# Patient Record
Sex: MALE | Race: BLACK OR AFRICAN AMERICAN | NOT HISPANIC OR LATINO | Employment: OTHER | URBAN - METROPOLITAN AREA
[De-identification: names, ages, dates, MRNs, and addresses within clinical notes are randomized per-mention and may not be internally consistent; named-entity substitution may affect disease eponyms.]

---

## 2017-01-03 RX ORDER — METOPROLOL SUCCINATE 100 MG/1
TABLET, EXTENDED RELEASE ORAL
Qty: 90 TABLET | Refills: 0 | Status: SHIPPED | OUTPATIENT
Start: 2017-01-03 | End: 2017-02-21 | Stop reason: SDUPTHER

## 2017-01-12 RX ORDER — NAPROXEN SODIUM 220 MG/1
TABLET, FILM COATED ORAL
Qty: 90 TABLET | Refills: 0 | Status: SHIPPED | OUTPATIENT
Start: 2017-01-12 | End: 2017-02-21 | Stop reason: SDUPTHER

## 2017-02-16 ENCOUNTER — OFFICE VISIT (OUTPATIENT)
Dept: RHEUMATOLOGY | Facility: CLINIC | Age: 82
End: 2017-02-16
Payer: MEDICARE

## 2017-02-16 VITALS
BODY MASS INDEX: 29.29 KG/M2 | HEART RATE: 58 BPM | DIASTOLIC BLOOD PRESSURE: 74 MMHG | SYSTOLIC BLOOD PRESSURE: 154 MMHG | HEIGHT: 71 IN | WEIGHT: 209.19 LBS

## 2017-02-16 DIAGNOSIS — M06.09 SERONEGATIVE ARTHROPATHY OF MULTIPLE SITES: Primary | ICD-10-CM

## 2017-02-16 DIAGNOSIS — M15.9 PRIMARY OSTEOARTHRITIS INVOLVING MULTIPLE JOINTS: ICD-10-CM

## 2017-02-16 PROCEDURE — 99999 PR PBB SHADOW E&M-EST. PATIENT-LVL III: CPT | Mod: PBBFAC,,, | Performed by: INTERNAL MEDICINE

## 2017-02-16 PROCEDURE — 99213 OFFICE O/P EST LOW 20 MIN: CPT | Mod: S$PBB,,, | Performed by: INTERNAL MEDICINE

## 2017-02-16 PROCEDURE — 99213 OFFICE O/P EST LOW 20 MIN: CPT | Mod: PBBFAC | Performed by: INTERNAL MEDICINE

## 2017-02-16 NOTE — PROGRESS NOTES
History of present illness: 81-year-old gentleman I saw initially in October 2015.  He had had a previous episode of gout and was on chronic allopurinol therapy.  He had osteoarthritis and has undergone previous left total knee replacement.  He presented with synovitis in his hands and wrist.  Laboratory studies revealed increased inflammatory markers but negative SHIRA, CCP antibody, and rheumatoid factor.  I diagnosed him as having seronegative rheumatoid arthritis.  He was placed initially on prednisone.  He did quite well.  There was some problems getting him off prednisone initially but he is been off prednisone for the past month.  He has noticed no difference with stopping the prednisone.  He still has some pain in the hands but no swelling.  He did have an episode of swelling in his feet over the weekend.  This was the top of the foot.  He had been using a shovel.  It hurt for 24 hours.  He is topical medications which helped.  He still has some swelling but no pain.  He remains on gabapentin which has been helping.    Physical examination:  Musculoskeletal: He has bony hypertrophy of the PIPs and DIPs bilaterally.  He has no synovitis in the hands.  Wrists, elbows, and shoulders are unremarkable.  Knees and ankles are unremarkable.  He has soft tissue swelling on the dorsum of the left foot.  It does not appear to be confined any joint.  He has no tenderness to palpation.  There is no erythema or increased warmth.  He does have bilateral bunion deformities.    Assessment: I only find evidence of osteoarthritis.  He has no evidence of active inflammatory arthritis at this time.    Plans: Laboratory studies are obtained.  Continue gabapentin as before.  Return to see me in 6 months.

## 2017-02-21 ENCOUNTER — OFFICE VISIT (OUTPATIENT)
Dept: FAMILY MEDICINE | Facility: CLINIC | Age: 82
End: 2017-02-21
Payer: MEDICARE

## 2017-02-21 VITALS
BODY MASS INDEX: 29.26 KG/M2 | SYSTOLIC BLOOD PRESSURE: 160 MMHG | HEART RATE: 60 BPM | WEIGHT: 209 LBS | HEIGHT: 71 IN | TEMPERATURE: 98 F | OXYGEN SATURATION: 99 % | DIASTOLIC BLOOD PRESSURE: 70 MMHG

## 2017-02-21 DIAGNOSIS — D64.9 ANEMIA, UNSPECIFIED: ICD-10-CM

## 2017-02-21 DIAGNOSIS — N18.30 CKD STAGE 3 DUE TO TYPE 2 DIABETES MELLITUS: ICD-10-CM

## 2017-02-21 DIAGNOSIS — E78.5 HYPERLIPIDEMIA, UNSPECIFIED HYPERLIPIDEMIA TYPE: ICD-10-CM

## 2017-02-21 DIAGNOSIS — E11.21 DIABETES MELLITUS WITH PROTEINURIC DIABETIC NEPHROPATHY: ICD-10-CM

## 2017-02-21 DIAGNOSIS — E11.311 DIABETIC RETINOPATHY OF BOTH EYES WITH MACULAR EDEMA ASSOCIATED WITH TYPE 2 DIABETES MELLITUS, UNSPECIFIED RETINOPATHY SEVERITY: ICD-10-CM

## 2017-02-21 DIAGNOSIS — E11.29 DIABETES MELLITUS WITH PROTEINURIA: ICD-10-CM

## 2017-02-21 DIAGNOSIS — M1A.9XX0 CHRONIC GOUT WITHOUT TOPHUS, UNSPECIFIED CAUSE, UNSPECIFIED SITE: ICD-10-CM

## 2017-02-21 DIAGNOSIS — E11.22 CKD STAGE 3 DUE TO TYPE 2 DIABETES MELLITUS: ICD-10-CM

## 2017-02-21 DIAGNOSIS — I10 ESSENTIAL HYPERTENSION: ICD-10-CM

## 2017-02-21 DIAGNOSIS — K21.9 GASTROESOPHAGEAL REFLUX DISEASE, ESOPHAGITIS PRESENCE NOT SPECIFIED: ICD-10-CM

## 2017-02-21 DIAGNOSIS — R80.9 DIABETES MELLITUS WITH PROTEINURIA: ICD-10-CM

## 2017-02-21 DIAGNOSIS — Z86.010 HISTORY OF COLONIC POLYPS: ICD-10-CM

## 2017-02-21 PROCEDURE — 99215 OFFICE O/P EST HI 40 MIN: CPT | Mod: S$PBB,,, | Performed by: INTERNAL MEDICINE

## 2017-02-21 PROCEDURE — 99999 PR PBB SHADOW E&M-EST. PATIENT-LVL III: CPT | Mod: PBBFAC,,, | Performed by: INTERNAL MEDICINE

## 2017-02-21 PROCEDURE — 99213 OFFICE O/P EST LOW 20 MIN: CPT | Mod: PBBFAC,PO | Performed by: INTERNAL MEDICINE

## 2017-02-21 RX ORDER — ATORVASTATIN CALCIUM 20 MG/1
20 TABLET, FILM COATED ORAL DAILY
Qty: 90 TABLET | Refills: 3 | Status: SHIPPED | OUTPATIENT
Start: 2017-02-21 | End: 2018-03-05 | Stop reason: SDUPTHER

## 2017-02-21 RX ORDER — HYDROCHLOROTHIAZIDE 12.5 MG/1
12.5 CAPSULE ORAL
Qty: 38 CAPSULE | Refills: 11 | Status: SHIPPED | OUTPATIENT
Start: 2017-02-22 | End: 2018-03-16 | Stop reason: SDUPTHER

## 2017-02-21 RX ORDER — VALSARTAN 320 MG/1
320 TABLET ORAL DAILY
Qty: 90 TABLET | Refills: 90 | Status: SHIPPED | OUTPATIENT
Start: 2017-02-21 | End: 2018-03-05 | Stop reason: SDUPTHER

## 2017-02-21 RX ORDER — GLIPIZIDE 10 MG/1
10 TABLET, FILM COATED, EXTENDED RELEASE ORAL DAILY
Qty: 90 TABLET | Refills: 0 | Status: SHIPPED | OUTPATIENT
Start: 2017-02-21 | End: 2017-06-27 | Stop reason: SDUPTHER

## 2017-02-21 RX ORDER — NAPROXEN SODIUM 220 MG/1
TABLET, FILM COATED ORAL
Qty: 90 TABLET | Refills: 12 | Status: SHIPPED | OUTPATIENT
Start: 2017-02-21 | End: 2018-04-14 | Stop reason: SDUPTHER

## 2017-02-21 RX ORDER — ESOMEPRAZOLE MAGNESIUM 40 MG/1
CAPSULE, DELAYED RELEASE ORAL
Qty: 90 CAPSULE | Refills: 4 | Status: SHIPPED | OUTPATIENT
Start: 2017-02-21 | End: 2017-03-14 | Stop reason: SDUPTHER

## 2017-02-21 RX ORDER — ALLOPURINOL 100 MG/1
100 TABLET ORAL DAILY
Qty: 90 TABLET | Refills: 12 | Status: SHIPPED | OUTPATIENT
Start: 2017-02-21 | End: 2018-04-13 | Stop reason: SDUPTHER

## 2017-02-21 RX ORDER — AMLODIPINE BESYLATE 10 MG/1
10 TABLET ORAL DAILY
Qty: 90 TABLET | Refills: 12 | Status: SHIPPED | OUTPATIENT
Start: 2017-02-21 | End: 2017-05-02 | Stop reason: SDUPTHER

## 2017-02-21 RX ORDER — METOPROLOL SUCCINATE 100 MG/1
TABLET, EXTENDED RELEASE ORAL
Qty: 90 TABLET | Refills: 12 | Status: SHIPPED | OUTPATIENT
Start: 2017-02-21 | End: 2017-04-17 | Stop reason: SDUPTHER

## 2017-02-21 NOTE — PROGRESS NOTES
Chief complaint: Gen. checkup      81-year-old black male here for his general physical although he has Medicare which does not formally cover physical his general health and risk assessment will be done either way and we will also check on his medical problems including his diabetes.  He wasn't aware he needed to follow up every 3 months and explain that would twice a day oh.  His last A1c was 6 months ago.  His blood pressure runs 143/73 at home and today is 160 repeated.  He is due for a 5 year follow-up on his colon polyps and he does so at [facility].  He did see his [facility] urologist in November with an apparent normal PSA.  He is followed by an outside ophthalmologist for diabetic neuropathy and macular degeneration apparently.  He is followed by nephrology as well for his chronic kidney disease.  He would like to see a podiatrist here will be due for that in September or so.  Patient is always medications sent to a new pharmacy and he also needs diabetic supplies Center new pharmacy and all that was entered in sent by the physician himself.  Call her regarding all these issues and his physical essentially was performedTotal time over 45 minutes with over 50% counseling.      ROS:   CONST: weight stable. EYES: no vision change. ENT: no sore throat. CV: no chest pain w/ exertion. RESP: no shortness of breath. GI: no nausea, vomiting, diarrhea. No dysphagia. : no urinary issues. MUSCULOSKELETAL: no new myalgias or arthralgias. SKIN: no new changes. NEURO: no focal deficits. PSYCH: no new issues. ENDOCRINE: no polyuria. HEME: no lymph nodes. ALLERGY: no general pruritis.       Past medical history:  1.  Diabetes with renal disease and retinopathy  2.  Hypertension  3.  Hyperlipidemia  4.  Chronic renal failure stage III  5.  History of colon polyp, normal scope March 2012, 5 years----GI at [facility]- Dr Kumari  6.  Hyperuricemia and gout  7.  History of sciatica and lumbar disc disease remotely  8.  Erectile dysfunction  9.   Macular degeneration, followed by outside retina specialist  10.  GERD  11.  Low HDL  12.  Inflammatory arthritis of the hands, seeing rheumatology  13.  Bilateral cervical radiculopathy and axonal neuropathy, to have surgery 2015    14.  Followed by outside urology at [facility] Dr. noriega   15.  TKA  -angie Han  who is at [facility]    Past surgical history: Right knee replacement, left knee arthroscopy, bilateral cataracts, scrotal cyst removed.    Family history: Mother with hypertension and diabetes.  Father's history is unknown.  One brother who is okay.    Social history: Retired, quit smoking 1974.   with 3 children.  Drinks socially.    Vital signs as above  Gen: no distress  EYES: conjunctiva clear, non-icteric, PERRL  ENT: nose clear, nasal mucosa normal, oropharynx clear and moist, teeth good  NECK:supple, thyroid non-palpable  RESP: effort is good, lungs clear  CV: heart RRR w/o murmur, gallops or rubs; no carotid bruits, no edema  GI: abdomen soft, non-distended, non-tender, no hepatosplenomegaly  MS: gait normal, no clubbing or cyanosis of the digits  SKIN: no rashes, warm to touch    Bria was seen today for diabetes and results.    Diagnoses and all orders for this visit:    Uncontrolled type 2 diabetes mellitus with stage 3 chronic kidney disease, without long-term current use of insulin, reassess and follow-up in 3 months.  -     Microalbumin/creatinine urine ratio; Future  -     Hemoglobin A1c; Future    CKD stage 3 due to type 2 diabetes mellitus, reassess and followed by nephrology    Essential hypertension, potentially uncontrolled, he can monitor at home and encouraged him to send me the readings via the computer    Hyperlipidemia, unspecified hyperlipidemia type, reassess in the future    Diabetes mellitus with proteinuria    Diabetes mellitus with proteinuric diabetic nephropathy    History of colonic polyps, due for 5 year follow-up    Chronic gout without tophus, unspecified cause,  "unspecified site, medications refilled    Gastroesophageal reflux disease, esophagitis presence not specified, medications refilled    Anemia, unspecified  -     CBC auto differential; Future  -     Iron and TIBC; Future  -     Ferritin; Future    Diabetic retinopathy of both eyes with macular edema associated with type 2 diabetes mellitus, unspecified retinopathy severity    Other orders  -     valsartan (DIOVAN) 320 MG tablet; Take 1 tablet (320 mg total) by mouth once daily.  -     linagliptin (TRADJENTA) 5 mg Tab tablet; Take 1 tablet (5 mg total) by mouth once daily.  -     glipiZIDE (GLUCOTROL) 10 MG TR24; Take 1 tablet (10 mg total) by mouth once daily.  -     atorvastatin (LIPITOR) 20 MG tablet; Take 1 tablet (20 mg total) by mouth once daily.  -     metoprolol succinate (TOPROL-XL) 100 MG 24 hr tablet; TAKE 1 TABLET(100 MG) BY MOUTH EVERY DAY  -     amlodipine (NORVASC) 10 MG tablet; Take 1 tablet (10 mg total) by mouth once daily.  -     hydrochlorothiazide (MICROZIDE) 12.5 mg capsule; Take 1 capsule (12.5 mg total) by mouth every Mon, Wed, Fri.  -     esomeprazole (NEXIUM) 40 MG capsule; Take 1 capsule by mouth daily  -     allopurinol (ZYLOPRIM) 100 MG tablet; Take 1 tablet (100 mg total) by mouth once daily.  -     aspirin 81 MG Chew; CHEW AND SWALLOW 1 TABLET BY MOUTH DAILY  -     blood sugar diagnostic Strp; 1 strip by Misc.(Non-Drug; Combo Route) route 2 (two) times daily.          Clinical note will be sensitive so as to not cause confusion regarding physical and evaluation and management issues"This note will not be shared with the patient."  "

## 2017-03-14 ENCOUNTER — LAB VISIT (OUTPATIENT)
Dept: LAB | Facility: HOSPITAL | Age: 82
End: 2017-03-14
Attending: INTERNAL MEDICINE
Payer: MEDICARE

## 2017-03-14 DIAGNOSIS — K21.9 GASTROESOPHAGEAL REFLUX DISEASE, ESOPHAGITIS PRESENCE NOT SPECIFIED: Primary | ICD-10-CM

## 2017-03-14 DIAGNOSIS — N18.30 CKD (CHRONIC KIDNEY DISEASE), STAGE III: ICD-10-CM

## 2017-03-14 LAB
BILIRUB UR QL STRIP: NEGATIVE
CLARITY UR REFRACT.AUTO: CLEAR
COLOR UR AUTO: YELLOW
CREAT UR-MCNC: 97 MG/DL
CREAT UR-MCNC: 97 MG/DL
GLUCOSE UR QL STRIP: NEGATIVE
HGB UR QL STRIP: NEGATIVE
KETONES UR QL STRIP: NEGATIVE
LEUKOCYTE ESTERASE UR QL STRIP: NEGATIVE
MICROALBUMIN UR DL<=1MG/L-MCNC: 26 UG/ML
MICROALBUMIN/CREATININE RATIO: 26.8 UG/MG
MICROSCOPIC COMMENT: NORMAL
NITRITE UR QL STRIP: NEGATIVE
PH UR STRIP: 5 [PH] (ref 5–8)
PROT UR QL STRIP: NEGATIVE
PROT UR-MCNC: 15 MG/DL
PROT/CREAT RATIO, UR: 0.15
RBC #/AREA URNS AUTO: 2 /HPF (ref 0–4)
SP GR UR STRIP: 1.01 (ref 1–1.03)
URN SPEC COLLECT METH UR: ABNORMAL
UROBILINOGEN UR STRIP-ACNC: ABNORMAL EU/DL
WBC #/AREA URNS AUTO: 0 /HPF (ref 0–5)

## 2017-03-14 PROCEDURE — 82570 ASSAY OF URINE CREATININE: CPT

## 2017-03-14 PROCEDURE — 81001 URINALYSIS AUTO W/SCOPE: CPT

## 2017-03-14 PROCEDURE — 82043 UR ALBUMIN QUANTITATIVE: CPT

## 2017-03-14 RX ORDER — ESOMEPRAZOLE MAGNESIUM 40 MG/1
CAPSULE, DELAYED RELEASE ORAL
Qty: 90 CAPSULE | Refills: 4 | Status: SHIPPED | OUTPATIENT
Start: 2017-03-14 | End: 2017-08-11 | Stop reason: SDUPTHER

## 2017-03-16 DIAGNOSIS — E11.9 TYPE 2 DIABETES MELLITUS WITHOUT COMPLICATION: ICD-10-CM

## 2017-03-23 ENCOUNTER — OFFICE VISIT (OUTPATIENT)
Dept: NEPHROLOGY | Facility: CLINIC | Age: 82
End: 2017-03-23
Payer: MEDICARE

## 2017-03-23 VITALS
OXYGEN SATURATION: 97 % | HEIGHT: 71 IN | HEART RATE: 56 BPM | BODY MASS INDEX: 28.83 KG/M2 | DIASTOLIC BLOOD PRESSURE: 60 MMHG | WEIGHT: 205.94 LBS | SYSTOLIC BLOOD PRESSURE: 158 MMHG

## 2017-03-23 DIAGNOSIS — N18.30 CKD STAGE 3 DUE TO TYPE 2 DIABETES MELLITUS: Primary | ICD-10-CM

## 2017-03-23 DIAGNOSIS — E11.22 CKD STAGE 3 DUE TO TYPE 2 DIABETES MELLITUS: Primary | ICD-10-CM

## 2017-03-23 DIAGNOSIS — I12.9 HYPERTENSIVE CKD (CHRONIC KIDNEY DISEASE): ICD-10-CM

## 2017-03-23 DIAGNOSIS — I10 ESSENTIAL HYPERTENSION: ICD-10-CM

## 2017-03-23 PROCEDURE — 99214 OFFICE O/P EST MOD 30 MIN: CPT | Mod: S$PBB,,, | Performed by: INTERNAL MEDICINE

## 2017-03-23 PROCEDURE — 99213 OFFICE O/P EST LOW 20 MIN: CPT | Mod: PBBFAC | Performed by: INTERNAL MEDICINE

## 2017-03-23 PROCEDURE — 99999 PR PBB SHADOW E&M-EST. PATIENT-LVL III: CPT | Mod: PBBFAC,,, | Performed by: INTERNAL MEDICINE

## 2017-03-23 NOTE — PROGRESS NOTES
Subjective:       Patient ID: Bria Lawson is a 81 y.o. Black or  male who presents for follow-up evaluation of Chronic Kidney Disease    HPI     Mr. Lawson is seen in nephrology clinic for follow up on CKD. He was last seen on 10/13/16. He has CKD III, diabetes type 2, hypertension since age 38, chronic knee pain, DJD, rheumatoid arthritis. He had prior nephrology care at [facility] when he was told of eGFR in the range of 40's in 2013.     He is overall doing fine. He reports otherwise he has been doing fine, denies any nausea/ decreased or increased urine/ flank pain/ vomiting/ diarrhea/ fever. He denies NSAID use.     Labs from 3/14/17 noted for Na 141, K 4.2, bicarbonate 24, BUN 13, creatinine 1.4, Ca 9.7, phos 3.1, albumin 4.4, PTH 44. Urine PC ratio is 0.15.     Prior labs include normal C4, PTH 46, hep B and C screen negative, no evidence of paraproteinemia. US showed normal kidney size and no mass or obstruction.     Review of Systems     Constitutional: Negative for fever, chills, activity change, appetite change and fatigue.   HENT: Negative for hearing loss and nosebleeds.    Respiratory: Negative for cough, shortness of breath and wheezing.   Cardiovascular: Negative for chest pain and palpitations.   Gastrointestinal: Negative for nausea, vomiting, abdominal pain and diarrhea.   Endocrine: Negative for polydipsia and polyuria.   Genitourinary: Negative for dysuria, frequency, hematuria and flank pain.   Musculoskeletal: Positive for back pain, arthralgias and gait problem. Negative for myalgias.   Skin: Negative for pallor and rash.   Neurological: Negative for dizziness, light-headedness and headaches.   Psychiatric/Behavioral: Negative for behavioral problems. The patient is not nervous/anxious    Objective:      Physical Exam     Constitutional: He is oriented to person, place, and time. He appears well-developed and well-nourished. No distress.   Head: Normocephalic and atraumatic.    Neck: Normal range of motion. Neck supple. No JVD present.   Cardiovascular: Normal rate, regular rhythm and normal heart sounds.   No murmur heard.  Pulmonary/Chest: Effort normal and breath sounds normal. No respiratory distress. He has no wheezes. He has no rales.   Abdominal: Soft. Bowel sounds are normal. He exhibits no distension. There is no tenderness.   Musculoskeletal: He exhibits trace edema.   Neurological: He is alert and oriented to person, place, and time.   Skin: Skin is warm and dry. He is not diaphoretic.    Psychiatric: He has a normal mood and affect. His behavior is normal.   Vitals reviewed.    Assessment:       1. CKD stage 3 due to type 2 diabetes mellitus    2. Essential hypertension    3. Hypertensive CKD (chronic kidney disease)        Plan:     Mr. Lawson has longstanding type 2 diabetes, hypertension, occasional NSAID use, has CKD likely due to diabetic, hypertensive nephropathy and occasional NSAID use in the past, he has CKD III. Pt appears to have CKD III per labs here, he was under nephrology care at [facility] in the past.     CKD III  Stable renal labs  Continue current regimen of antihypertensive including ARB containing regimen for RAAS blockade, he has been on Diovan  Low salt diet  avoid NSAID/ bactrim/ IV contrast/ gadolinium/ aminoglycoside/ Fleet enema where possible    hypertension  Strict low salt diet  continue ARB containing regimen  home BP monitoring, goal less than 130-140/80                                                                                                                                                                                                                                                                                                                                                                 Chronic gout  Continue allopurinol    Diabetes, hyperlipidemia, RA management per PCP and rheumatology.  Labs discussed with patient, potential for  decline in renal function explained to him, periodic follow up on renal function stressed. His questions were answered to his satisfaction. Stressed importance of better glycemic control, weight loss and strict low salt diet. He agreed with the plan. Noted mildly low wbc count of 3.88, he has not had that before. Will be obtaining labs again next visit.    RTC 6 months.

## 2017-03-30 DIAGNOSIS — E11.9 TYPE 2 DIABETES MELLITUS WITHOUT COMPLICATION: ICD-10-CM

## 2017-04-17 RX ORDER — METOPROLOL SUCCINATE 100 MG/1
TABLET, EXTENDED RELEASE ORAL
Qty: 90 TABLET | Refills: 12 | Status: SHIPPED | OUTPATIENT
Start: 2017-04-17 | End: 2018-07-10 | Stop reason: SDUPTHER

## 2017-05-02 RX ORDER — AMLODIPINE BESYLATE 10 MG/1
10 TABLET ORAL DAILY
Qty: 90 TABLET | Refills: 3 | Status: SHIPPED | OUTPATIENT
Start: 2017-05-02 | End: 2018-03-05 | Stop reason: SDUPTHER

## 2017-06-16 ENCOUNTER — TELEPHONE (OUTPATIENT)
Dept: FAMILY MEDICINE | Facility: CLINIC | Age: 82
End: 2017-06-16

## 2017-06-16 NOTE — TELEPHONE ENCOUNTER
Looks like requesting clearance for eyelid reconstruction and brow plexi by Dr. Garcia.  Patient was seen back in February.  He was cleared from back but he will need an appointment if general anesthesia planned.  Takes aspirin and was cleared for 7-14 days if they need to hold that.  I will send copies of his labs from March and EKG that appears normal from the past.

## 2017-06-21 ENCOUNTER — OFFICE VISIT (OUTPATIENT)
Dept: PODIATRY | Facility: CLINIC | Age: 82
End: 2017-06-21
Payer: MEDICARE

## 2017-06-21 VITALS
BODY MASS INDEX: 28.7 KG/M2 | WEIGHT: 205 LBS | HEIGHT: 71 IN | DIASTOLIC BLOOD PRESSURE: 60 MMHG | SYSTOLIC BLOOD PRESSURE: 124 MMHG

## 2017-06-21 DIAGNOSIS — L60.0 INGROWN NAIL: ICD-10-CM

## 2017-06-21 DIAGNOSIS — E11.49 TYPE II DIABETES MELLITUS WITH NEUROLOGICAL MANIFESTATIONS: ICD-10-CM

## 2017-06-21 DIAGNOSIS — B35.1 DERMATOPHYTOSIS OF NAIL: ICD-10-CM

## 2017-06-21 DIAGNOSIS — E11.9 ENCOUNTER FOR DIABETIC FOOT EXAM: Primary | ICD-10-CM

## 2017-06-21 PROCEDURE — 99214 OFFICE O/P EST MOD 30 MIN: CPT | Mod: S$PBB,,, | Performed by: PODIATRIST

## 2017-06-21 PROCEDURE — 1159F MED LIST DOCD IN RCRD: CPT | Mod: ,,, | Performed by: PODIATRIST

## 2017-06-21 PROCEDURE — 1126F AMNT PAIN NOTED NONE PRSNT: CPT | Mod: ,,, | Performed by: PODIATRIST

## 2017-06-21 PROCEDURE — 99213 OFFICE O/P EST LOW 20 MIN: CPT | Mod: PBBFAC,PO | Performed by: PODIATRIST

## 2017-06-21 PROCEDURE — 99999 PR PBB SHADOW E&M-EST. PATIENT-LVL III: CPT | Mod: PBBFAC,,, | Performed by: PODIATRIST

## 2017-06-21 RX ORDER — TRIAMCINOLONE ACETONIDE 1 MG/G
OINTMENT TOPICAL
COMMUNITY
Start: 2017-04-07 | End: 2017-10-11 | Stop reason: ALTCHOICE

## 2017-06-21 RX ORDER — GABAPENTIN 300 MG/1
CAPSULE ORAL
COMMUNITY
Start: 2017-05-10 | End: 2017-10-11 | Stop reason: SDUPTHER

## 2017-06-21 NOTE — PATIENT INSTRUCTIONS
Recommend lotions: eucerin, aquaphor, A&D ointment, gold bond for diabetics, sween    Shoe recommendations: (try 6pm.com, zappos.com , nordstromrack.com, or shoes.com for discounted prices) you can visit DSW shoes in [place] as well    Asics (GT 1000 or gel foundations), new balance, saucony (stabil c3),  Warren (transcend), vionic, propet (tennis shoe)    soft brand, clarks, crocs, aerosoles, naturalizers, SAS, ecco, marcella, fran quinonez, rockports (dress shoes)    Vionic, volitiles, burkenstocks, fitflops, propet (sandals)    Nike comfort thong sandals, crocs (house shoes)    Nail Home remedy:  Vicks Vapor rub OR Listerine and apple cider vinegar in a spray bottle to nails    Occasional soaks for 15-20 mins in luke warm water with 1 cup of listerine and 1 cup of apple cider vinegar are ok You may add several drops of oil of oregano or tea tree oil as well      Diabetes: Inspecting Your Feet  Diabetes increases your chances of developing foot problems. So inspect your feet every day. This helps you find small skin irritations before they become serious infections. If you have trouble seeing the bottoms of your feet, use a mirror or ask a family member or friend to help.     Pressure spots on the bottom of the foot are common areas where problems develop.   How to check your feet  Below are tips to help you look for foot problems. Try to check your feet at the same time each day, such as when you get out of bed in the morning:  · Check the top of each foot. The tops of toes, back of the heel, and outer edge of the foot can get a lot of rubbing from poor-fitting shoes.  · Check the bottom of each foot. Daily wear and tear often leads to problems at pressure spots.  · Check the toes and nails. Fungal infections often occur between toes. Toenail problems can also be a sign of fungal infections or lead to breaks in the skin.  · Check your shoes, too. Loose objects inside a shoe can injure the foot. Use your hand to feel  inside your shoes for things like lane, loose stitching, or rough areas that could irritate your skin.  Warning signs  Look for any color changes in the foot. Redness with streaks can signal a severe infection, which needs immediate medical attention. Tell your doctor right away if you have any of these problems:  · Swelling, sometimes with color changes, may be a sign of poor blood flow or infection. Symptoms include tenderness and an increase in the size of your foot.  · Warm or hot areas on your feet may be signs of infection. A foot that is cold may not be getting enough blood.  · Sensations such as burning, tingling, or pins and needles can be signs of a problem. Also check for areas that may be numb.  · Hot spots are caused by friction or pressure. Look for hot spots in areas that get a lot of rubbing. Hot spots can turn into blisters, calluses, or sores.  · Cracks and sores are caused by dry or irritated skin. They are a sign that the skin is breaking down, which can lead to infection.  · Toenail problems to watch for include nails growing into the skin (ingrown toenail) and causing redness or pain. Thick, yellow, or discolored nails can signal a fungal infection.  · Drainage and odor can develop from untreated sores and ulcers. Call your doctor right away if you notice white or yellow drainage, bleeding, or unpleasant odor.   © 4557-6768 [employer]. [address] [place] [address], [place] [address]. All rights reserved. This information is not intended as a substitute for professional medical care. Always follow your healthcare professional's instructions.

## 2017-06-21 NOTE — PROGRESS NOTES
Subjective:      Patient ID: Bria Lawson is a 81 y.o. male.    Chief Complaint: Ingrown Toenail (right foot big toe pcp Dr. Bragg 2-21-17); Diabetes Mellitus; and Diabetic Foot Exam    Bria is a 81 y.o. male who presents to the clinic for evaluation and treatment of high risk feet. 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 The patient's chief complaint is painful medial right hallux ingrown toenail. Patient has attempted self treatment with nail nippers and soaks. This is a recurrent problem.  Patient  denies purulent drainage.     This patient has documented high risk feet requiring routine maintenance secondary to diabetes mellitis and those secondary complications of diabetes, as mentioned..    PCP: Adiel Bragg MD    Date Last Seen by PCP:   Chief Complaint   Patient presents with    Ingrown Toenail     right foot big toe pcp Dr. Bragg 2-21-17    Diabetes Mellitus    Diabetic Foot Exam     Current shoe gear:  Casual tennis shoes    Hemoglobin A1C   Date Value Ref Range Status   03/14/2017 6.8 (H) 4.5 - 6.2 % Final     Comment:     According to ADA guidelines, hemoglobin A1C <7.0% represents  optimal control in non-pregnant diabetic patients.  Different  metrics may apply to specific populations.   Standards of Medical Care in Diabetes - 2016.  For the purpose of screening for the presence of diabetes:  <5.7%     Consistent with the absence of diabetes  5.7-6.4%  Consistent with increasing risk for diabetes   (prediabetes)  >or=6.5%  Consistent with diabetes  Currently no  consensus exists for use of hemoglobin A1C  for diagnosis of diabetes for children.     08/18/2016 6.6 (H) 4.5 - 6.2 % Final     Comment:     According to ADA guidelines, hemoglobin A1C <7.0% represents  optimal control in non-pregnant diabetic patients.  Different  metrics may apply to specific populations.   Standards of Medical Care in Diabetes - 2016.  For the purpose of screening for the presence of diabetes:  <5.7%     Consistent with the absence of diabetes  5.7-6.4%  Consistent with increasing risk for diabetes   (prediabetes)  >or=6.5%  Consistent with diabetes  Currently no consensus exists for use of hemoglobin A1C  for diagnosis of diabetes for children.     03/11/2016 7.8 (H) 4.5 - 6.2 % Final         Patient Active Problem List   Diagnosis    GERD (gastroesophageal reflux disease)    Gout, chronic    HTN (hypertension)    Diabetes mellitus with renal manifestations, uncontrolled    Hyperlipidemia    CKD stage 3 due to type 2 diabetes mellitus    HDL lipoprotein deficiency    Cervical radiculopathy, acute    CN (constipation)    Uncontrolled secondary diabetes mellitus with stage 3 CKD (GFR 30-59)    Seronegative arthropathy of multiple sites    Long term current use of systemic steroids    Anemia    Gout of foot    CKD (chronic kidney disease), stage III    Primary osteoarthritis involving multiple joints    Diabetes mellitus with proteinuria    Diabetes mellitus with proteinuric diabetic nephropathy    Hypertensive CKD (chronic kidney disease)       Current Outpatient Prescriptions on File Prior to Visit   Medication Sig Dispense Refill    allopurinol (ZYLOPRIM) 100 MG tablet Take 1 tablet (100 mg total) by mouth once daily. 90 tablet 12    amlodipine (NORVASC) 10 MG tablet Take 1 tablet (10 mg total) by mouth once daily. 90 tablet 3    ascorbic acid (VITAMIN C) 1000 MG tablet Take 1,000 mg by mouth once daily.      aspirin 81 MG Chew CHEW AND SWALLOW 1 TABLET BY MOUTH DAILY 90  tablet 12    atorvastatin (LIPITOR) 20 MG tablet Take 1 tablet (20 mg total) by mouth once daily. 90 tablet 3    blood sugar diagnostic Strp 1 strip by Misc.(Non-Drug; Combo Route) route 2 (two) times daily. 100 strip 12    clobetasol (TEMOVATE) 0.05 % cream   2    esomeprazole (NEXIUM) 40 MG capsule Take 1 capsule by mouth daily 90 capsule 4    folic acid (FOLVITE) 800 MCG Tab Take 800 mcg by mouth once daily.      glipiZIDE (GLUCOTROL) 10 MG TR24 Take 1 tablet (10 mg total) by mouth once daily. 90 tablet 0    hydrochlorothiazide (MICROZIDE) 12.5 mg capsule Take 1 capsule (12.5 mg total) by mouth every Mon, Wed, Fri. 38 capsule 11    linagliptin (TRADJENTA) 5 mg Tab tablet Take 1 tablet (5 mg total) by mouth once daily. 90 tablet 3    metoprolol succinate (TOPROL-XL) 100 MG 24 hr tablet TAKE 1 TABLET(100 MG) BY MOUTH EVERY DAY 90 tablet 12    niacin 500 MG Tab Take 100 mg by mouth every evening.      oxycodone-acetaminophen (PERCOCET) 5-325 mg per tablet       tacrolimus (PROTOPIC) 0.1 % ointment       valsartan (DIOVAN) 320 MG tablet Take 1 tablet (320 mg total) by mouth once daily. 90 tablet 90     No current facility-administered medications on file prior to visit.        Review of patient's allergies indicates:  No Known Allergies    Past Surgical History:   Procedure Laterality Date    EYE SURGERY      cataracts    JOINT REPLACEMENT      knee replacement    scrotal cyst         History reviewed. No pertinent family history.    Social History     Social History    Marital status:      Spouse name: N/A    Number of children: N/A    Years of education: N/A     Occupational History    Not on file.     Social History Main Topics    Smoking status: Former Smoker    Smokeless tobacco: Not on file    Alcohol use 0.0 oz/week      Comment: Social    Drug use: No    Sexual activity: Not on file     Other Topics Concern    Not on file     Social History Narrative    No narrative on file  "          Review of Systems   Constitution: Negative for chills, fever and weakness.   Cardiovascular: Positive for leg swelling. Negative for claudication.   Respiratory: Negative for cough and shortness of breath.    Skin: Positive for dry skin and nail changes. Negative for itching and rash.   Musculoskeletal: Positive for arthritis (RA), back pain, joint pain and myalgias. Negative for falls, joint swelling and muscle weakness.   Gastrointestinal: Negative for diarrhea, nausea and vomiting.   Neurological: Positive for paresthesias. Negative for numbness and tremors.   Psychiatric/Behavioral: Negative for altered mental status and hallucinations.           Objective:       Vitals:    06/21/17 1123   BP: 124/60   Weight: 93 kg (205 lb)   Height: 5' 11" (1.803 m)   PainSc: 0-No pain       Physical Exam   Constitutional:  Non-toxic appearance. He does not have a sickly appearance. No distress.   Pt. is well-developed, well-nourished, appears stated age, in no acute distress, alert and oriented x 3. No evidence of depression, anxiety, or agitation. Calm, cooperative, and communicative. Appropriate interactions and affect.   Cardiovascular:   Pulses:       Dorsalis pedis pulses are 1+ on the right side, and 1+ on the left side.        Posterior tibial pulses are 1+ on the right side, and 1+ on the left side.    Dorsalis pedis and posterior tibial pulses are diminished Bilaterally. Toes are cool to touch. Feet are warm proximally.There is decreased digital hair. Skin is atrophic   Pulmonary/Chest: No respiratory distress.   Musculoskeletal:        Right ankle: No tenderness. No lateral malleolus, no medial malleolus, no AITFL, no CF ligament and no posterior TFL tenderness found. Achilles tendon exhibits no pain, no defect and normal Dahl's test results.        Left ankle: No tenderness. No lateral malleolus, no medial malleolus, no AITFL, no CF ligament and no posterior TFL tenderness found. Achilles tendon " exhibits no pain, no defect and normal Dahl's test results.        Right foot: There is no tenderness and no bony tenderness.        Left foot: There is no tenderness and no bony tenderness.   Fat pad atrophy to heels and met heads bilateral     Lymphadenopathy:   No lymphatic streaking    Negative lymphadenopathy bilateral popliteal fossa and tarsal tunnel.     Neurological:   [place] 5.07 monofilament is intact bilateral feet. Sharp/dull sensation is also intact Bilateral feet. Proprioception is grossly intact. Vibratory sensation intact (pt able to sense vibration stop within 3-5 seconds)    Paresthesias, and hyperesthesia bilateral feet with no clearly identified trigger or source.           Skin: Skin is warm, dry and intact. No abrasion, no bruising, no burn, no ecchymosis and no rash noted. He is not diaphoretic. No cyanosis. No pallor. Nails show no clubbing.    Skin is thin, atrophic    Toenails 1-2 bilaterally are elongated by 2-3 mm, thickened by 2-3 mm, discolored/yellowed, dystrophic, brittle with subungual debris. Tender to distal nail plate pressure, without periungual skin abnormality of each.    Medial hallux nail margin of right foot with ingrown nail plate. Surrounding erythema and minimal edema is noted there is no granuloma formation noted. no malodor      Psychiatric: His mood appears not anxious. His affect is not inappropriate. His speech is not slurred. He is not combative. He is communicative. He is attentive.   Nursing note reviewed.            Assessment:       Encounter Diagnoses   Name Primary?    Type II diabetes mellitus with neurological manifestations     Encounter for diabetic foot exam Yes    Dermatophytosis of nail     Ingrown nail          Plan:       Bria was seen today for ingrown toenail, diabetes mellitus and diabetic foot exam.    Diagnoses and all orders for this visit:    Encounter for diabetic foot exam    Type II diabetes mellitus with neurological  manifestations    Dermatophytosis of nail    Ingrown nail      I counseled the patient on his conditions, their implications and medical management.      Greater than 50% of this visit spent on counseling and coordination of care.    Greater than 20 minutes spent on education about the diabetic foot, neuropathy, and prevention of limb loss.    Shoe inspection. Diabetic Foot Education. Patient reminded of the importance of good nutrition and blood sugar control to help prevent podiatric complications of diabetes. Patient instructed on proper foot hygeine. We discussed wearing proper shoe gear, daily foot inspections, never walking without protective shoe gear, never putting sharp instruments to feet.        In depth conversation on the treatment of ingrown nail; partial nail avulsion vs chemical matrixectomy vs conservative treatment of soaking and nail trimming    Informed patient that many nail problems can be prevented by wearing the right shoes and trimming your nails properly.   The right shoes: Feet were measured.  Patient is to wear shoes that are supportive and roomy enough for toes to wiggle. Look for shoes made of natural materials such as leather, which allow  feet to breathe.   Proper trimming: To avoid problems, she was instructed to trim toenails straight across without cutting down into the corners.       He will continue to monitor the areas daily, inspect his feet, wear protective shoe gear when ambulatory, moisturizer to maintain skin integrity and follow in this office in approximately 6-12 months, sooner if he wishes to have nail procedure

## 2017-06-27 ENCOUNTER — OFFICE VISIT (OUTPATIENT)
Dept: FAMILY MEDICINE | Facility: CLINIC | Age: 82
End: 2017-06-27
Payer: MEDICARE

## 2017-06-27 VITALS
OXYGEN SATURATION: 95 % | WEIGHT: 204.13 LBS | TEMPERATURE: 98 F | HEART RATE: 56 BPM | SYSTOLIC BLOOD PRESSURE: 136 MMHG | HEIGHT: 72 IN | DIASTOLIC BLOOD PRESSURE: 80 MMHG | BODY MASS INDEX: 27.65 KG/M2

## 2017-06-27 DIAGNOSIS — E11.22 CKD STAGE 3 DUE TO TYPE 2 DIABETES MELLITUS: ICD-10-CM

## 2017-06-27 DIAGNOSIS — D69.6 THROMBOCYTOPENIA: ICD-10-CM

## 2017-06-27 DIAGNOSIS — I10 ESSENTIAL HYPERTENSION: ICD-10-CM

## 2017-06-27 DIAGNOSIS — N18.30 CKD STAGE 3 DUE TO TYPE 2 DIABETES MELLITUS: ICD-10-CM

## 2017-06-27 DIAGNOSIS — D64.9 ANEMIA, UNSPECIFIED TYPE: ICD-10-CM

## 2017-06-27 DIAGNOSIS — E78.5 HYPERLIPIDEMIA, UNSPECIFIED HYPERLIPIDEMIA TYPE: ICD-10-CM

## 2017-06-27 PROCEDURE — 90670 PCV13 VACCINE IM: CPT | Mod: PBBFAC,PO

## 2017-06-27 PROCEDURE — 99999 PR PBB SHADOW E&M-EST. PATIENT-LVL III: CPT | Mod: PBBFAC,,, | Performed by: INTERNAL MEDICINE

## 2017-06-27 PROCEDURE — 1159F MED LIST DOCD IN RCRD: CPT | Mod: ,,, | Performed by: INTERNAL MEDICINE

## 2017-06-27 PROCEDURE — 1126F AMNT PAIN NOTED NONE PRSNT: CPT | Mod: ,,, | Performed by: INTERNAL MEDICINE

## 2017-06-27 PROCEDURE — 99213 OFFICE O/P EST LOW 20 MIN: CPT | Mod: PBBFAC,PO | Performed by: INTERNAL MEDICINE

## 2017-06-27 PROCEDURE — 99214 OFFICE O/P EST MOD 30 MIN: CPT | Mod: S$PBB,,, | Performed by: INTERNAL MEDICINE

## 2017-06-27 RX ORDER — GLIPIZIDE 10 MG/1
10 TABLET, FILM COATED, EXTENDED RELEASE ORAL DAILY
Qty: 90 TABLET | Refills: 0 | Status: SHIPPED | OUTPATIENT
Start: 2017-06-27 | End: 2017-10-16 | Stop reason: SDUPTHER

## 2017-06-27 NOTE — PROGRESS NOTES
Chief complaint: Follow-up on diabetes    81-year-old black male here to follow-up on diabetes although did not have labs prior.  Reviewed all his labs and abnormalities.  In March his A1c was 6.8.  He has some chronic renal insufficiency.  He had a slight hemoglobin reduction which appears chronic and fluctuating clinical of last year or so us slight thrombocytopenia.  We discussed all those issues and the need to monitor them over time.  Is otherwise feeling well.  He has arthritis in his hands we discussed the application of heat in the morning.  It is not limiting.  His blood pressure appears to be in a good control.  Counseled at length regarding all these age-related issues, monitoring and so forth.Total time over 25 minutes with over 50% counseling.      ROS:   CONST: weight stable. EYES: no vision change. ENT: no sore throat. CV: no chest pain w/ exertion. RESP: no shortness of breath. GI: no nausea, vomiting, diarrhea. No dysphagia. : no urinary issues. MUSCULOSKELETAL: no new myalgias or arthralgias. SKIN: no new changes. NEURO: no focal deficits. PSYCH: no new issues. ENDOCRINE: no polyuria. HEME: no lymph nodes. ALLERGY: no general pruritis.       Past medical history:  1.  Diabetes with renal disease and retinopathy  2.  Hypertension  3.  Hyperlipidemia  4.  Chronic renal failure stage III  5.  History of colon polyp, normal scope March 2012, 5 years----GI at [facility]- Dr Kumari  6.  Hyperuricemia and gout  7.  History of sciatica and lumbar disc disease remotely  8.  Erectile dysfunction  9.  Macular degeneration, followed by outside retina specialist  10.  GERD  11.  Low HDL  12.  Inflammatory arthritis of the hands, seeing rheumatology  13.  Bilateral cervical radiculopathy and axonal neuropathy, to have surgery 2015    14.  Followed by outside urology at [facility] Dr. noriega   15.  TKA  -angie Han  who is at [facility]  16.  Prevnar given 2017    Past surgical history: Right knee replacement, left knee  "arthroscopy, bilateral cataracts, scrotal cyst removed.    Family history: Mother with hypertension and diabetes.  Father's history is unknown.  One brother who is okay.    Social history: Retired, quit smoking 1974.   with 3 children.  Drinks socially.    Vital signs as above  Gen: no distress     Bria was seen today for hypertension, diabetes and follow-up.    Diagnoses and all orders for this visit:    Uncontrolled secondary diabetes mellitus with stage 3 CKD (GFR 30-59), discussed treatment to a normal level to do what we can to prevent progression of his renal insufficiency and similarly make sure blood pressures under good control and so forth.  -     Hemoglobin A1c; Future    Essential hypertension  -     Comprehensive metabolic panel; Future    Uncontrolled type 2 diabetes mellitus with stage 3 chronic kidney disease, without long-term current use of insulin    CKD stage 3 due to type 2 diabetes mellitus, reassess  -     Comprehensive metabolic panel; Future    Hyperlipidemia, unspecified hyperlipidemia type, good control    Anemia, unspecified type, reassess  -     CBC auto differential; Future    Thrombocytopenia, reassess and consider referral to hematology should it ever progressively worsen.  We discussed bone marrow issues which can be associated with anemia, thrombocytopenia and so forth.  -     CBC auto differential; Future    Other orders  -     Pneumococcal Conjugate Vaccine (13 Valent) (IM)  -     Cancel: Lipid panel; Future  -     glipiZIDE (GLUCOTROL) 10 MG TR24; Take 1 tablet (10 mg total) by mouth once daily.       Clinical note will be sensitive so as to not cause confusion regarding physical and evaluation and management issues"This note will not be shared with the patient."  "

## 2017-06-28 ENCOUNTER — LAB VISIT (OUTPATIENT)
Dept: LAB | Facility: HOSPITAL | Age: 82
End: 2017-06-28
Attending: INTERNAL MEDICINE
Payer: MEDICARE

## 2017-06-28 DIAGNOSIS — E11.9 TYPE 2 DIABETES MELLITUS WITHOUT COMPLICATION: ICD-10-CM

## 2017-06-28 DIAGNOSIS — N18.30 CKD STAGE 3 DUE TO TYPE 2 DIABETES MELLITUS: ICD-10-CM

## 2017-06-28 DIAGNOSIS — D64.9 ANEMIA, UNSPECIFIED TYPE: ICD-10-CM

## 2017-06-28 DIAGNOSIS — D69.6 THROMBOCYTOPENIA: ICD-10-CM

## 2017-06-28 DIAGNOSIS — E11.22 CKD STAGE 3 DUE TO TYPE 2 DIABETES MELLITUS: ICD-10-CM

## 2017-06-28 DIAGNOSIS — I10 ESSENTIAL HYPERTENSION: ICD-10-CM

## 2017-06-28 LAB
ALBUMIN SERPL BCP-MCNC: 4 G/DL
ALP SERPL-CCNC: 54 U/L
ALT SERPL W/O P-5'-P-CCNC: 14 U/L
ANION GAP SERPL CALC-SCNC: 9 MMOL/L
AST SERPL-CCNC: 15 U/L
BASOPHILS # BLD AUTO: 0.01 K/UL
BASOPHILS NFR BLD: 0.2 %
BILIRUB SERPL-MCNC: 1.4 MG/DL
BUN SERPL-MCNC: 16 MG/DL
CALCIUM SERPL-MCNC: 9.4 MG/DL
CHLORIDE SERPL-SCNC: 105 MMOL/L
CHOLEST/HDLC SERPL: 3.2 {RATIO}
CO2 SERPL-SCNC: 28 MMOL/L
CREAT SERPL-MCNC: 1.2 MG/DL
DIFFERENTIAL METHOD: ABNORMAL
EOSINOPHIL # BLD AUTO: 0.3 K/UL
EOSINOPHIL NFR BLD: 5.3 %
ERYTHROCYTE [DISTWIDTH] IN BLOOD BY AUTOMATED COUNT: 13.3 %
EST. GFR  (AFRICAN AMERICAN): >60 ML/MIN/1.73 M^2
EST. GFR  (NON AFRICAN AMERICAN): 56.8 ML/MIN/1.73 M^2
GLUCOSE SERPL-MCNC: 166 MG/DL
HCT VFR BLD AUTO: 39.1 %
HDL/CHOLESTEROL RATIO: 31 %
HDLC SERPL-MCNC: 100 MG/DL
HDLC SERPL-MCNC: 31 MG/DL
HGB BLD-MCNC: 12.6 G/DL
LDLC SERPL CALC-MCNC: 35.4 MG/DL
LYMPHOCYTES # BLD AUTO: 1.2 K/UL
LYMPHOCYTES NFR BLD: 24 %
MCH RBC QN AUTO: 30.4 PG
MCHC RBC AUTO-ENTMCNC: 32.2 %
MCV RBC AUTO: 94 FL
MONOCYTES # BLD AUTO: 0.8 K/UL
MONOCYTES NFR BLD: 15.6 %
NEUTROPHILS # BLD AUTO: 2.7 K/UL
NEUTROPHILS NFR BLD: 54.3 %
NONHDLC SERPL-MCNC: 69 MG/DL
PLATELET # BLD AUTO: 116 K/UL
PMV BLD AUTO: 11.4 FL
POTASSIUM SERPL-SCNC: 4 MMOL/L
PROT SERPL-MCNC: 7.3 G/DL
RBC # BLD AUTO: 4.15 M/UL
SODIUM SERPL-SCNC: 142 MMOL/L
TRIGL SERPL-MCNC: 168 MG/DL
WBC # BLD AUTO: 4.95 K/UL

## 2017-06-28 PROCEDURE — 36415 COLL VENOUS BLD VENIPUNCTURE: CPT | Mod: PO

## 2017-06-28 PROCEDURE — 80061 LIPID PANEL: CPT

## 2017-06-28 PROCEDURE — 85025 COMPLETE CBC W/AUTO DIFF WBC: CPT

## 2017-06-28 PROCEDURE — 80053 COMPREHEN METABOLIC PANEL: CPT

## 2017-06-28 PROCEDURE — 83036 HEMOGLOBIN GLYCOSYLATED A1C: CPT

## 2017-06-29 LAB
ESTIMATED AVG GLUCOSE: 151 MG/DL
HBA1C MFR BLD HPLC: 6.9 %

## 2017-07-05 ENCOUNTER — TELEPHONE (OUTPATIENT)
Dept: FAMILY MEDICINE | Facility: CLINIC | Age: 82
End: 2017-07-05

## 2017-07-05 NOTE — TELEPHONE ENCOUNTER
----- Message from Margaret Bruner sent at 7/5/2017 12:20 PM CDT -----  Contact: SELF  PA NEEDED: esomeprazole (NEXIUM) 40 MG capsule    Patient advised that he has been waiting and drop off form to office.

## 2017-07-05 NOTE — TELEPHONE ENCOUNTER
----- Message from Mulu Caldera sent at 7/5/2017  2:09 PM CDT -----  Contact: [facility]  Pharmacy to check status of PA for esomeprazole (NEXIUM) 40 MG capsule.  Please call [facility] at .    Thanks

## 2017-07-05 NOTE — TELEPHONE ENCOUNTER
Your prior authorization has been faxed to the plan. The plan will receive your PA as a paper copy.    The plan will respond with the PA determination directly to your office, usually via fax.    Plan response time varies, but one to five business days is typical. If you haven't heard from the plan after that timeframe, or if you have any questions about your submission, please contact the plan directly at the number listed on the top of the PA request.     Key # [id number]

## 2017-07-14 ENCOUNTER — OFFICE VISIT (OUTPATIENT)
Dept: RHEUMATOLOGY | Facility: CLINIC | Age: 82
End: 2017-07-14
Payer: MEDICARE

## 2017-07-14 VITALS
HEART RATE: 56 BPM | WEIGHT: 204.31 LBS | BODY MASS INDEX: 27.67 KG/M2 | HEIGHT: 72 IN | DIASTOLIC BLOOD PRESSURE: 69 MMHG | SYSTOLIC BLOOD PRESSURE: 179 MMHG

## 2017-07-14 DIAGNOSIS — M70.22 OLECRANON BURSITIS OF LEFT ELBOW: Primary | ICD-10-CM

## 2017-07-14 DIAGNOSIS — M06.09 SERONEGATIVE ARTHROPATHY OF MULTIPLE SITES: ICD-10-CM

## 2017-07-14 DIAGNOSIS — M15.9 PRIMARY OSTEOARTHRITIS INVOLVING MULTIPLE JOINTS: ICD-10-CM

## 2017-07-14 DIAGNOSIS — Z79.52 LONG TERM CURRENT USE OF SYSTEMIC STEROIDS: ICD-10-CM

## 2017-07-14 DIAGNOSIS — M1A.00X0 IDIOPATHIC CHRONIC GOUT WITHOUT TOPHUS, UNSPECIFIED SITE: ICD-10-CM

## 2017-07-14 PROCEDURE — 1159F MED LIST DOCD IN RCRD: CPT | Mod: ,,, | Performed by: INTERNAL MEDICINE

## 2017-07-14 PROCEDURE — 99999 PR PBB SHADOW E&M-EST. PATIENT-LVL III: CPT | Mod: PBBFAC,,, | Performed by: INTERNAL MEDICINE

## 2017-07-14 PROCEDURE — 99213 OFFICE O/P EST LOW 20 MIN: CPT | Mod: PBBFAC | Performed by: INTERNAL MEDICINE

## 2017-07-14 PROCEDURE — 1125F AMNT PAIN NOTED PAIN PRSNT: CPT | Mod: ,,, | Performed by: INTERNAL MEDICINE

## 2017-07-14 PROCEDURE — 99214 OFFICE O/P EST MOD 30 MIN: CPT | Mod: S$PBB,GC,, | Performed by: INTERNAL MEDICINE

## 2017-07-14 ASSESSMENT — ROUTINE ASSESSMENT OF PATIENT INDEX DATA (RAPID3)
AM STIFFNESS SCORE: 1, YES
FATIGUE SCORE: 2
PSYCHOLOGICAL DISTRESS SCORE: 0
MDHAQ FUNCTION SCORE: 0
PAIN SCORE: .5
TOTAL RAPID3 SCORE: .83
PATIENT GLOBAL ASSESSMENT SCORE: 2
WHEN YOU AWAKENED IN THE MORNING OVER THE LAST WEEK, PLEASE INDICATE THE AMOUNT OF TIME IT TAKES UNTIL YOU ARE AS LIMBER AS YOU WILL BE FOR THE DAY: 10MIN

## 2017-07-14 NOTE — PROGRESS NOTES
"Subjective:       Patient ID: Bria Lawson is a 81 y.o. male.    Chief Complaint: Swelling (elbow fluid)    Mr. Lawson is a 82 yo F with hx of seronegative RA and gout. Presents to clinic due to a swelling over his left elbow that he noticed yesterday evening. He denies any pain and it does not cause him any discomfort. He denies any trauma to the elbow, but did note that he was resting his elbows often over the past week.  He denies any fever, chills, redness over the elbow joint. On 7/16/17,he had a bilateral eyelid surgery.   Denies any joint stiffness, pain or swelling. Feels well overall. He does not exercise, but stays active around the house.  Denies any recent gout attacks. Takes allopurinol daily.      Review of Systems   Constitutional: Negative for chills, fatigue, fever and unexpected weight change.   HENT:        + dry eyes   Respiratory: Negative for cough and shortness of breath.    Cardiovascular: Negative for chest pain.   Gastrointestinal: Positive for constipation. Negative for blood in stool, diarrhea, nausea and vomiting.   Genitourinary: Negative for dysuria and hematuria.   Musculoskeletal: Positive for joint swelling.   Skin: Positive for color change.        Lichen planus   Neurological: Negative for numbness.         Objective:   BP (!) 179/69 (BP Location: Right arm, Patient Position: Sitting, BP Method: Automatic)   Pulse (!) 56   Ht 5' 11.5" (1.816 m)   Wt 92.7 kg (204 lb 4.8 oz)   BMI 28.10 kg/m²      Physical Exam   Constitutional: He is oriented to person, place, and time and well-developed, well-nourished, and in no distress.   HENT:   Head: Normocephalic and atraumatic.   Eyes:   Swollen upper eyelids  Red conjunctivae bilaterally   Neck: No thyromegaly present.   Cardiovascular: Normal rate and regular rhythm.    No murmur heard.  Pulmonary/Chest: Breath sounds normal. He has no wheezes.   Abdominal: Soft. Bowel sounds are normal. He exhibits no distension. There is no " tenderness.       Right Side Rheumatological Exam     Examination finds the shoulder, elbow, wrist, knee, 1st PIP, 1st MCP, 2nd PIP, 2nd MCP, 3rd PIP, 3rd MCP, 4th PIP, 4th MCP, 5th PIP and 5th MCP normal.    Joint Exam Comments   Wrist: Limited ROM    Shoulder Exam   Sensation: normal    Knee Exam   Sensation: normal    Hip Exam   Sensation: normal    Elbow/Wrist Exam   Sensation: normal    Muscle Strength (0-5 scale):  Biceps: 5/5   Triceps:  5  : 5/5   Quadriceps:  5   Distal Lower Extremity: 5    Left Side Rheumatological Exam     Examination finds the wrist, knee, 1st PIP, 1st MCP, 2nd PIP, 2nd MCP, 3rd PIP, 3rd MCP, 4th PIP, 4th MCP, 5th PIP and 5th MCP normal.    The patient has an enlarged elbow.    Joint Exam Comments   Wrist: Limited ROM    Shoulder Exam   Sensation: normal    Knee Exam   Sensation: normal    Hip Exam   Sensation: normal    Elbow/Wrist Exam   Sensation: normal    Muscle Strength (0-5 scale):  Biceps: 5/5   Triceps:  5  :  5/5   Quadriceps:  5   Distal Lower Extremity: 5      Lymphadenopathy:     He has no cervical adenopathy.   Neurological: He is alert and oriented to person, place, and time.   Musculoskeletal: He exhibits edema. He exhibits no tenderness.   Fluid filled golf ball sac like structure over left olecranon           Assessment:       1. Olecranon bursitis of left elbow    2. Seronegative arthropathy of multiple sites    3. Primary osteoarthritis involving multiple joints    4. Long term current use of systemic steroids    5. Idiopathic chronic gout without tophus, unspecified site            Plan:   1. Olecranon bursitis  - no signs of infection  - monitor for any redness or pain  -avoid resting on elbows    2. Return to clinic in 6 months

## 2017-07-17 NOTE — PROGRESS NOTES
I have personally taken the history and examined the patient and concur with the resident's note as above.  He has a history of gouty arthritis and his been on chronic allopurinol therapy.  He has had no recent gout attacks.  He recently developed a seronegative inflammatory arthritis.  This has resolved and he is on no DMARD's.  He comes in now because of swelling in the left olecranon bursa.  It does not appear to be inflamed.  It is not tender to touch.  I do not feel it needs to be aspirated.  I explained to him the nature of the problem.  He will continue his prior medications.  I will see him in 6 months.

## 2017-08-07 RX ORDER — GABAPENTIN 300 MG/1
CAPSULE ORAL
Qty: 270 CAPSULE | Refills: 2 | Status: SHIPPED | OUTPATIENT
Start: 2017-08-07 | End: 2018-05-15 | Stop reason: SDUPTHER

## 2017-08-11 DIAGNOSIS — K21.9 GASTROESOPHAGEAL REFLUX DISEASE, ESOPHAGITIS PRESENCE NOT SPECIFIED: ICD-10-CM

## 2017-08-11 RX ORDER — ESOMEPRAZOLE MAGNESIUM 40 MG/1
CAPSULE, DELAYED RELEASE ORAL
Qty: 90 CAPSULE | Refills: 4 | Status: SHIPPED | OUTPATIENT
Start: 2017-08-11 | End: 2017-09-18 | Stop reason: CLARIF

## 2017-08-11 RX ORDER — OMEPRAZOLE 40 MG/1
40 CAPSULE, DELAYED RELEASE ORAL DAILY
Qty: 90 CAPSULE | Refills: 4 | Status: SHIPPED | OUTPATIENT
Start: 2017-08-11 | End: 2018-07-10 | Stop reason: SDUPTHER

## 2017-08-11 NOTE — TELEPHONE ENCOUNTER
Per Dr. SNIDER pt needs to be switched to another med like prilosec due to ins not covering nexium please advise left message informing pt

## 2017-08-11 NOTE — TELEPHONE ENCOUNTER
----- Message from Shameka Tiwari sent at 8/11/2017  1:40 PM CDT -----  Contact: [phone]  Pt is returning the phone call Please call pt at your earliest convenience.  Thanks !

## 2017-09-18 ENCOUNTER — TELEPHONE (OUTPATIENT)
Dept: FAMILY MEDICINE | Facility: CLINIC | Age: 82
End: 2017-09-18

## 2017-09-18 NOTE — TELEPHONE ENCOUNTER
[facility], spoke with pharmacy and this was corrected, omeprazole was sent by pharmacy to patient

## 2017-09-18 NOTE — TELEPHONE ENCOUNTER
----- Message from Nya Blake sent at 9/15/2017  9:03 AM CDT -----  Contact: Sandeep with [facility]  Has a question regarding patient meds esomeprazole (NEXIUM. Sandeep can be reached at [phone] . Refer #[id number]        Thanks,

## 2017-10-02 ENCOUNTER — LAB VISIT (OUTPATIENT)
Dept: LAB | Facility: HOSPITAL | Age: 82
End: 2017-10-02
Attending: INTERNAL MEDICINE
Payer: MEDICARE

## 2017-10-02 DIAGNOSIS — E11.22 CKD STAGE 3 DUE TO TYPE 2 DIABETES MELLITUS: ICD-10-CM

## 2017-10-02 DIAGNOSIS — N18.30 CKD STAGE 3 DUE TO TYPE 2 DIABETES MELLITUS: ICD-10-CM

## 2017-10-02 LAB
25(OH)D3+25(OH)D2 SERPL-MCNC: 46 NG/ML
ALBUMIN SERPL BCP-MCNC: 3.8 G/DL
ANION GAP SERPL CALC-SCNC: 7 MMOL/L
BASOPHILS # BLD AUTO: 0.01 K/UL
BASOPHILS NFR BLD: 0.3 %
BUN SERPL-MCNC: 16 MG/DL
CALCIUM SERPL-MCNC: 9.3 MG/DL
CHLORIDE SERPL-SCNC: 103 MMOL/L
CO2 SERPL-SCNC: 27 MMOL/L
CREAT SERPL-MCNC: 1.3 MG/DL
DIFFERENTIAL METHOD: ABNORMAL
EOSINOPHIL # BLD AUTO: 0.2 K/UL
EOSINOPHIL NFR BLD: 4.3 %
ERYTHROCYTE [DISTWIDTH] IN BLOOD BY AUTOMATED COUNT: 13 %
EST. GFR  (AFRICAN AMERICAN): 59.6 ML/MIN/1.73 M^2
EST. GFR  (NON AFRICAN AMERICAN): 51.5 ML/MIN/1.73 M^2
GLUCOSE SERPL-MCNC: 257 MG/DL
HCT VFR BLD AUTO: 37.4 %
HGB BLD-MCNC: 12.5 G/DL
LYMPHOCYTES # BLD AUTO: 0.9 K/UL
LYMPHOCYTES NFR BLD: 26.7 %
MCH RBC QN AUTO: 30.6 PG
MCHC RBC AUTO-ENTMCNC: 33.4 G/DL
MCV RBC AUTO: 91 FL
MONOCYTES # BLD AUTO: 0.6 K/UL
MONOCYTES NFR BLD: 15.9 %
NEUTROPHILS # BLD AUTO: 1.8 K/UL
NEUTROPHILS NFR BLD: 51.6 %
PHOSPHATE SERPL-MCNC: 2.8 MG/DL
PLATELET # BLD AUTO: 100 K/UL
PMV BLD AUTO: 10.8 FL
POTASSIUM SERPL-SCNC: 4.1 MMOL/L
PTH-INTACT SERPL-MCNC: 40 PG/ML
RBC # BLD AUTO: 4.09 M/UL
SODIUM SERPL-SCNC: 137 MMOL/L
WBC # BLD AUTO: 3.45 K/UL

## 2017-10-02 PROCEDURE — 80069 RENAL FUNCTION PANEL: CPT

## 2017-10-02 PROCEDURE — 82306 VITAMIN D 25 HYDROXY: CPT

## 2017-10-02 PROCEDURE — 85025 COMPLETE CBC W/AUTO DIFF WBC: CPT

## 2017-10-02 PROCEDURE — 36415 COLL VENOUS BLD VENIPUNCTURE: CPT | Mod: PO

## 2017-10-02 PROCEDURE — 83970 ASSAY OF PARATHORMONE: CPT

## 2017-10-11 ENCOUNTER — OFFICE VISIT (OUTPATIENT)
Dept: NEPHROLOGY | Facility: CLINIC | Age: 82
End: 2017-10-11
Payer: MEDICARE

## 2017-10-11 VITALS
OXYGEN SATURATION: 98 % | HEART RATE: 60 BPM | WEIGHT: 203.25 LBS | HEIGHT: 72 IN | SYSTOLIC BLOOD PRESSURE: 152 MMHG | DIASTOLIC BLOOD PRESSURE: 60 MMHG | BODY MASS INDEX: 27.53 KG/M2

## 2017-10-11 DIAGNOSIS — E11.29 DIABETES MELLITUS WITH PROTEINURIA: ICD-10-CM

## 2017-10-11 DIAGNOSIS — R80.9 DIABETES MELLITUS WITH PROTEINURIA: ICD-10-CM

## 2017-10-11 DIAGNOSIS — D69.6 THROMBOCYTOPENIA: ICD-10-CM

## 2017-10-11 DIAGNOSIS — D72.819 LEUKOPENIA, UNSPECIFIED TYPE: ICD-10-CM

## 2017-10-11 DIAGNOSIS — E11.22 CKD STAGE 3 DUE TO TYPE 2 DIABETES MELLITUS: Primary | ICD-10-CM

## 2017-10-11 DIAGNOSIS — N18.30 CKD STAGE 3 DUE TO TYPE 2 DIABETES MELLITUS: Primary | ICD-10-CM

## 2017-10-11 DIAGNOSIS — I12.9 HYPERTENSIVE CKD (CHRONIC KIDNEY DISEASE): ICD-10-CM

## 2017-10-11 PROCEDURE — 99999 PR PBB SHADOW E&M-EST. PATIENT-LVL III: CPT | Mod: PBBFAC,,, | Performed by: INTERNAL MEDICINE

## 2017-10-11 PROCEDURE — 99213 OFFICE O/P EST LOW 20 MIN: CPT | Mod: PBBFAC | Performed by: INTERNAL MEDICINE

## 2017-10-11 PROCEDURE — 99215 OFFICE O/P EST HI 40 MIN: CPT | Mod: S$PBB,,, | Performed by: INTERNAL MEDICINE

## 2017-10-11 RX ORDER — LANCETS 33 GAUGE
EACH MISCELLANEOUS
Refills: 12 | COMMUNITY
Start: 2017-09-18

## 2017-10-11 RX ORDER — ESOMEPRAZOLE MAGNESIUM 40 MG/1
40 CAPSULE, DELAYED RELEASE ORAL DAILY
COMMUNITY
End: 2018-07-18

## 2017-10-11 NOTE — PROGRESS NOTES
Subjective:       Patient ID: Bria Lawson is a 81 y.o. Black or  male who presents for follow-up evaluation of Chronic Kidney Disease    HPI     Mr. Lawson is seen in nephrology clinic for follow up on CKD. He was last seen on 3/23/17. He has CKD III, diabetes type 2, hypertension since age 38, chronic knee pain, DJD, rheumatoid arthritis. He had prior nephrology care at [facility] when he was told of eGFR in the range of 40's in 2013.     He is overall doing fine. He reports otherwise he has been doing fine, denies any nausea/ decreased or increased urine/ flank pain/ vomiting/ diarrhea/ fever. He denies NSAID use. He does have pain due to DJD/ sciatica.     Labs from 10/2/17 noted for Na 137, K 4.1, bicarbonate 27, BUN 16, creatinine 1.3, eGFR 59.6, Ca 9.3, phos 2.8, albumin 3.8, vit D 46, PTH 40, Hb 12.5. Urine PC ratio is 0.19, 1 RBC.     Prior labs include normal C4, hep B and C screen negative, no evidence of paraproteinemia. US from 1/16 showed normal kidney size and no mass or obstruction.     Review of Systems     Constitutional: Negative for fever, chills, activity change, appetite change and fatigue.   HENT: Negative for hearing loss and nosebleeds.    Respiratory: Negative for cough, shortness of breath and wheezing.   Cardiovascular: Negative for chest pain and palpitations.   Gastrointestinal: Negative for nausea, vomiting, abdominal pain and diarrhea.   Endocrine: Negative for polydipsia and polyuria.   Genitourinary: Negative for dysuria, frequency, hematuria and flank pain.   Musculoskeletal: Positive for back pain, arthralgias and gait problem. Negative for myalgias.   Skin: Negative for pallor and rash.   Neurological: Negative for dizziness, light-headedness and headaches.   Psychiatric/Behavioral: Negative for behavioral problems. The patient is not nervous/anxious    Objective:      Physical Exam     Constitutional: He is oriented to person, place, and time. He appears  well-developed and well-nourished. No distress.   Head: Normocephalic and atraumatic.   Neck: Normal range of motion. Neck supple. No JVD present.   Cardiovascular: Normal rate, regular rhythm and normal heart sounds.   No murmur heard.  Pulmonary/Chest: Effort normal and breath sounds normal. No respiratory distress. He has no wheezes. He has no rales.   Abdominal: Soft. Bowel sounds are normal. He exhibits no distension. There is no tenderness.   Musculoskeletal: He exhibits trace edema.   Neurological: He is alert and oriented to person, place, and time.   Skin: Skin is warm and dry. He is not diaphoretic.    Psychiatric: He has a normal mood and affect. His behavior is normal.   Vitals reviewed.    Assessment:       1. CKD stage 3 due to type 2 diabetes mellitus    2. Uncontrolled secondary diabetes mellitus with stage 3 CKD (GFR 30-59)    3. Diabetes mellitus with proteinuria    4. Hypertensive CKD (chronic kidney disease)        Plan:     Mr. Lawson has longstanding type 2 diabetes, hypertension, occasional NSAID use, has CKD due to diabetic, hypertensive nephropathy and occasional NSAID use in the past, he has CKD III. Pt appears to have CKD III per labs here, he was under nephrology care [facility] in the past.     CKD III  Stable renal labs  Continue current regimen of antihypertensive including ARB containing regimen for RAAS blockade, he has been on Diovan  Low salt diet  avoid NSAID/ bactrim/ IV contrast/ gadolinium/ aminoglycoside/ Fleet enema where possible    hypertension  Strict low salt diet  continue ARB containing regimen  home BP monitoring, goal less than 130-140/80                                                                                                                                                                                                                                                                                                                                                                    Chronic gout  Continue allopurinol  Follow uric acid level    Leukopenia  Thrombocytopenia  Slowly worsening, d/w patient about possible need to see hematologist but he is not interested  Will send a note to PCP     Diabetes, hyperlipidemia, RA management per PCP and rheumatology.  Labs discussed with patient, potential for decline in renal function explained to him, periodic follow up on renal function stressed. His questions were answered to his satisfaction. Stressed importance of better glycemic control, weight loss and strict low salt diet. He agreed with the plan.   RTC 6 months.

## 2017-10-11 NOTE — Clinical Note
He has slowly trending leukopenia and thrombocytopenia. He seems asymptomatic. I reviewed his meds, offered to see heme but he is not interested. Just wanted to keep you informed,  Thanks, Ella

## 2017-10-17 RX ORDER — GLIPIZIDE 10 MG/1
TABLET, FILM COATED, EXTENDED RELEASE ORAL
Qty: 90 TABLET | Refills: 0 | Status: SHIPPED | OUTPATIENT
Start: 2017-10-17 | End: 2018-01-29 | Stop reason: SDUPTHER

## 2017-11-03 ENCOUNTER — CLINICAL SUPPORT (OUTPATIENT)
Dept: FAMILY MEDICINE | Facility: CLINIC | Age: 82
End: 2017-11-03
Payer: MEDICARE

## 2017-11-03 DIAGNOSIS — Z23 NEED FOR PROPHYLACTIC VACCINATION AND INOCULATION AGAINST INFLUENZA: Primary | ICD-10-CM

## 2017-11-03 PROCEDURE — 99499 UNLISTED E&M SERVICE: CPT | Mod: S$PBB,,, | Performed by: INTERNAL MEDICINE

## 2017-11-03 PROCEDURE — G0008 ADMIN INFLUENZA VIRUS VAC: HCPCS | Mod: PBBFAC,PO | Performed by: INTERNAL MEDICINE

## 2017-11-03 NOTE — PROGRESS NOTES
Flu given &.VIS given. Tolerated injection well.Patient instructed to wait 15 minutes for monitoring. .

## 2017-11-30 ENCOUNTER — LAB VISIT (OUTPATIENT)
Dept: LAB | Facility: HOSPITAL | Age: 82
End: 2017-11-30
Attending: INTERNAL MEDICINE
Payer: MEDICARE

## 2017-11-30 ENCOUNTER — OFFICE VISIT (OUTPATIENT)
Dept: FAMILY MEDICINE | Facility: CLINIC | Age: 82
End: 2017-11-30
Payer: MEDICARE

## 2017-11-30 VITALS
OXYGEN SATURATION: 98 % | SYSTOLIC BLOOD PRESSURE: 130 MMHG | HEART RATE: 55 BPM | TEMPERATURE: 99 F | BODY MASS INDEX: 28.27 KG/M2 | DIASTOLIC BLOOD PRESSURE: 73 MMHG | WEIGHT: 201.94 LBS | HEIGHT: 71 IN

## 2017-11-30 DIAGNOSIS — N18.30 CKD STAGE 3 DUE TO TYPE 2 DIABETES MELLITUS: ICD-10-CM

## 2017-11-30 DIAGNOSIS — D69.6 THROMBOCYTOPENIA: ICD-10-CM

## 2017-11-30 DIAGNOSIS — D64.9 ANEMIA, UNSPECIFIED TYPE: ICD-10-CM

## 2017-11-30 DIAGNOSIS — E11.21 DIABETES MELLITUS WITH PROTEINURIC DIABETIC NEPHROPATHY: ICD-10-CM

## 2017-11-30 DIAGNOSIS — I10 ESSENTIAL HYPERTENSION: ICD-10-CM

## 2017-11-30 DIAGNOSIS — E11.22 CKD STAGE 3 DUE TO TYPE 2 DIABETES MELLITUS: ICD-10-CM

## 2017-11-30 LAB
ESTIMATED AVG GLUCOSE: 146 MG/DL
HBA1C MFR BLD HPLC: 6.7 %

## 2017-11-30 PROCEDURE — 99999 PR PBB SHADOW E&M-EST. PATIENT-LVL III: CPT | Mod: PBBFAC,,, | Performed by: INTERNAL MEDICINE

## 2017-11-30 PROCEDURE — 83036 HEMOGLOBIN GLYCOSYLATED A1C: CPT

## 2017-11-30 PROCEDURE — 99213 OFFICE O/P EST LOW 20 MIN: CPT | Mod: PBBFAC,PO | Performed by: INTERNAL MEDICINE

## 2017-11-30 PROCEDURE — 36415 COLL VENOUS BLD VENIPUNCTURE: CPT | Mod: PO

## 2017-11-30 PROCEDURE — 99214 OFFICE O/P EST MOD 30 MIN: CPT | Mod: S$PBB,,, | Performed by: INTERNAL MEDICINE

## 2017-11-30 NOTE — PROGRESS NOTES
Chief complaint: Follow-up on diabetes    82-year-old black male here to follow-up on diabetes although did not have labs prior, again.  Reviewed all his labs and abnormalities.  In june his A1c was 6.9.  He has some chronic renal insufficiency.  He had a slight hemoglobin reduction which appears chronic and fluctuating clinical of last year or so us slight thrombocytopenia.  We discussed all those issues and the need to monitor them over time.  Is otherwise feeling well.  He has arthritis in his hands we discussed the application of heat in the morning.  It is not limiting.  His blood pressure appears to be in a good control.  Counseled at length regarding all these age-related issues, monitoring and so forth.Total time over 25 minutes with over 50% counseling.    Recently when he went on vacation he had 2 weeks of sciatica down the right leg.  We discussed that we really need to avoid steroids with his diabetes but if it severe we might give him some mild dose steroids.  We need to avoid anti-inflammatories with the renal insufficiency.  Her pressures at home run  He took them right before the visit.  His sugars been running higher.  He is on 2 oral medications we discussed we may need to add insulin or Actos.  He does remember being on Actos in the past without problems.  We did discuss the possibility of increased    bladder cancer risk that it might still be an option since this is not absolutely proven.  His no family history personal history of bladder cancer and he does not smoke.  Counseled regarding all these issues    ROS:   CONST: weight stable. EYES: no vision change. ENT: no sore throat. CV: no chest pain w/ exertion. RESP: no shortness of breath. GI: no nausea, vomiting, diarrhea. No dysphagia. : no urinary issues. MUSCULOSKELETAL: no new myalgias or arthralgias. SKIN: no new changes. NEURO: no focal deficits. PSYCH: no new issues. ENDOCRINE: no polyuria. HEME: no lymph nodes. ALLERGY: no general  "pruritis.       Past medical history:  1.  Diabetes with renal disease and retinopathy  2.  Hypertension  3.  Hyperlipidemia  4.  Chronic renal failure stage III  5.  History of colon polyp, normal scope March 2012, 5 years----GI at [facility]- Dr Kumari  6.  Hyperuricemia and gout  7.  History of sciatica and lumbar disc disease remotely  8.  Erectile dysfunction  9.  Macular degeneration, followed by outside retina specialist  10.  GERD  11.  Low HDL  12.  Inflammatory arthritis of the hands, seeing rheumatology  13.  Bilateral cervical radiculopathy and axonal neuropathy, to have surgery 2015    14.  Followed by outside urology at [facility] Dr. noriega   15.  TKA  -angie Han  who is at [facility]  16.  Prevnar given 2017    Past surgical history: Right knee replacement, left knee arthroscopy, bilateral cataracts, scrotal cyst removed.    Family history: Mother with hypertension and diabetes.  Father's history is unknown.  One brother who is okay.    Social history: Retired, quit smoking 1974.   with 3 children.  Drinks socially.    Vital signs as above  Gen: no distress     Bria was seen today for diabetes.    Diagnoses and all orders for this visit:    Uncontrolled type 2 diabetes mellitus with stage 3 chronic kidney disease, without long-term current use of insulin, overdue and possibly worse, meds discussed as above  -     Hemoglobin A1c; Future    Diabetes mellitus with proteinuric diabetic nephropathy    CKD stage 3 due to type 2 diabetes mellitus, followed by nephrology    Essential hypertension, chronic and stable    Anemia, unspecified type, chronic and stable    Thrombocytopenia, chronic and stable    Right-sided sciatica, resolved, may be intermittent and may need intermittent treatment we are limited by his other medical problems       Clinical note will be sensitive so as to not cause confusion regarding physical and evaluation and management issues""This note will not be shared with the patient."  "

## 2017-12-29 RX ORDER — VALSARTAN 320 MG/1
TABLET ORAL
Qty: 90 TABLET | Refills: 0 | Status: SHIPPED | OUTPATIENT
Start: 2017-12-29 | End: 2018-03-05

## 2018-01-10 ENCOUNTER — OFFICE VISIT (OUTPATIENT)
Dept: RHEUMATOLOGY | Facility: CLINIC | Age: 83
End: 2018-01-10
Payer: MEDICARE

## 2018-01-10 VITALS
WEIGHT: 205 LBS | BODY MASS INDEX: 28.59 KG/M2 | HEART RATE: 66 BPM | SYSTOLIC BLOOD PRESSURE: 173 MMHG | DIASTOLIC BLOOD PRESSURE: 82 MMHG

## 2018-01-10 DIAGNOSIS — M15.9 PRIMARY OSTEOARTHRITIS INVOLVING MULTIPLE JOINTS: Primary | ICD-10-CM

## 2018-01-10 DIAGNOSIS — M06.09 SERONEGATIVE ARTHROPATHY OF MULTIPLE SITES: ICD-10-CM

## 2018-01-10 PROCEDURE — 99213 OFFICE O/P EST LOW 20 MIN: CPT | Mod: PBBFAC | Performed by: INTERNAL MEDICINE

## 2018-01-10 PROCEDURE — 99213 OFFICE O/P EST LOW 20 MIN: CPT | Mod: S$PBB,,, | Performed by: INTERNAL MEDICINE

## 2018-01-10 PROCEDURE — 99999 PR PBB SHADOW E&M-EST. PATIENT-LVL III: CPT | Mod: PBBFAC,,, | Performed by: INTERNAL MEDICINE

## 2018-01-11 NOTE — PROGRESS NOTES
History of present illness: 82-year-old gentleman with a long history of osteoarthritis.  He is status post left total knee replacement he has a history of gout and his been on chronic allopurinol therapy.  In 2015 he developed an inflammatory arthritis of his hands and wrists.  I diagnosed him as having seronegative rheumatoid arthritis.  It did not seem to be related to his gout.  He did well on prednisone and was eventually tapered off the prednisone.  I never placed him on a DMARD.  In July he had swelling in the olecranon bursa.  I did not think it was worthwhile aspirating it.  The swelling eventually resolve.  He had an episode of sciatic pain since his last visit but this one away.  His aching is otherwise been stable.  He continues to take gabapentin, primarily for diabetic neuropathy, but it helps all his pain.  He has been taking no other medication for his pain.  He has a history of chronic nail problems which has gotten recently.  He has been seeing dermatology and is on topical treatments.  He has had no other recent medical problems.    Physical examination:  Musculoskeletal: He has bony hypertrophy of the PIPs.  He has no synovitis in the hands or wrist.  Shoulders and elbows are unremarkable.  He has full range of motion of lumbar spine.  He has no synovitis in the lower extremities.    Assessment:  1.  Osteoarthritis  2.  Intercritical gout  3.  He has no further evidence of inflammatory arthritis    Plans: Continue gabapentin as before.  Return to see me in 12 months.

## 2018-01-30 RX ORDER — GLIPIZIDE 10 MG/1
TABLET, FILM COATED, EXTENDED RELEASE ORAL
Qty: 90 TABLET | Refills: 0 | Status: SHIPPED | OUTPATIENT
Start: 2018-01-30 | End: 2018-03-05 | Stop reason: SDUPTHER

## 2018-03-05 ENCOUNTER — LAB VISIT (OUTPATIENT)
Dept: LAB | Facility: HOSPITAL | Age: 83
End: 2018-03-05
Attending: INTERNAL MEDICINE
Payer: MEDICARE

## 2018-03-05 ENCOUNTER — OFFICE VISIT (OUTPATIENT)
Dept: FAMILY MEDICINE | Facility: CLINIC | Age: 83
End: 2018-03-05
Payer: MEDICARE

## 2018-03-05 VITALS
WEIGHT: 201.94 LBS | BODY MASS INDEX: 28.27 KG/M2 | OXYGEN SATURATION: 97 % | HEIGHT: 71 IN | SYSTOLIC BLOOD PRESSURE: 124 MMHG | DIASTOLIC BLOOD PRESSURE: 64 MMHG | TEMPERATURE: 98 F | HEART RATE: 56 BPM

## 2018-03-05 DIAGNOSIS — N18.30 CKD STAGE 3 DUE TO TYPE 2 DIABETES MELLITUS: ICD-10-CM

## 2018-03-05 DIAGNOSIS — D64.9 ANEMIA, UNSPECIFIED TYPE: ICD-10-CM

## 2018-03-05 DIAGNOSIS — E11.21 DIABETES MELLITUS WITH PROTEINURIC DIABETIC NEPHROPATHY: ICD-10-CM

## 2018-03-05 DIAGNOSIS — I12.9 HYPERTENSIVE CKD (CHRONIC KIDNEY DISEASE): ICD-10-CM

## 2018-03-05 DIAGNOSIS — R80.9 DIABETES MELLITUS WITH PROTEINURIA: ICD-10-CM

## 2018-03-05 DIAGNOSIS — E11.29 DIABETES MELLITUS WITH PROTEINURIA: ICD-10-CM

## 2018-03-05 DIAGNOSIS — I10 ESSENTIAL HYPERTENSION: ICD-10-CM

## 2018-03-05 DIAGNOSIS — E11.22 CKD STAGE 3 DUE TO TYPE 2 DIABETES MELLITUS: ICD-10-CM

## 2018-03-05 DIAGNOSIS — D69.6 THROMBOCYTOPENIA: ICD-10-CM

## 2018-03-05 LAB
ANION GAP SERPL CALC-SCNC: 10 MMOL/L
BASOPHILS # BLD AUTO: 0.01 K/UL
BASOPHILS NFR BLD: 0.3 %
BUN SERPL-MCNC: 19 MG/DL
CALCIUM SERPL-MCNC: 9.7 MG/DL
CHLORIDE SERPL-SCNC: 105 MMOL/L
CO2 SERPL-SCNC: 25 MMOL/L
CREAT SERPL-MCNC: 1.4 MG/DL
DIFFERENTIAL METHOD: ABNORMAL
EOSINOPHIL # BLD AUTO: 0.2 K/UL
EOSINOPHIL NFR BLD: 5.2 %
ERYTHROCYTE [DISTWIDTH] IN BLOOD BY AUTOMATED COUNT: 12.7 %
EST. GFR  (AFRICAN AMERICAN): 54.1 ML/MIN/1.73 M^2
EST. GFR  (NON AFRICAN AMERICAN): 46.8 ML/MIN/1.73 M^2
ESTIMATED AVG GLUCOSE: 143 MG/DL
GLUCOSE SERPL-MCNC: 225 MG/DL
HBA1C MFR BLD HPLC: 6.6 %
HCT VFR BLD AUTO: 37 %
HGB BLD-MCNC: 12.1 G/DL
IMM GRANULOCYTES # BLD AUTO: 0.03 K/UL
IMM GRANULOCYTES NFR BLD AUTO: 0.9 %
LYMPHOCYTES # BLD AUTO: 1.3 K/UL
LYMPHOCYTES NFR BLD: 38.8 %
MCH RBC QN AUTO: 30.1 PG
MCHC RBC AUTO-ENTMCNC: 32.7 G/DL
MCV RBC AUTO: 92 FL
MONOCYTES # BLD AUTO: 0.7 K/UL
MONOCYTES NFR BLD: 21.8 %
NEUTROPHILS # BLD AUTO: 1.1 K/UL
NEUTROPHILS NFR BLD: 33 %
NRBC BLD-RTO: 0 /100 WBC
PLATELET # BLD AUTO: 106 K/UL
PMV BLD AUTO: 11.6 FL
POTASSIUM SERPL-SCNC: 4.5 MMOL/L
RBC # BLD AUTO: 4.02 M/UL
SODIUM SERPL-SCNC: 140 MMOL/L
WBC # BLD AUTO: 3.25 K/UL

## 2018-03-05 PROCEDURE — 83036 HEMOGLOBIN GLYCOSYLATED A1C: CPT

## 2018-03-05 PROCEDURE — 80048 BASIC METABOLIC PNL TOTAL CA: CPT

## 2018-03-05 PROCEDURE — 99999 PR PBB SHADOW E&M-EST. PATIENT-LVL III: CPT | Mod: PBBFAC,,, | Performed by: INTERNAL MEDICINE

## 2018-03-05 PROCEDURE — 99214 OFFICE O/P EST MOD 30 MIN: CPT | Mod: S$PBB,,, | Performed by: INTERNAL MEDICINE

## 2018-03-05 PROCEDURE — 36415 COLL VENOUS BLD VENIPUNCTURE: CPT | Mod: PO

## 2018-03-05 PROCEDURE — 85025 COMPLETE CBC W/AUTO DIFF WBC: CPT

## 2018-03-05 PROCEDURE — 99213 OFFICE O/P EST LOW 20 MIN: CPT | Mod: PBBFAC,PO | Performed by: INTERNAL MEDICINE

## 2018-03-05 RX ORDER — ATORVASTATIN CALCIUM 20 MG/1
20 TABLET, FILM COATED ORAL DAILY
Qty: 90 TABLET | Refills: 3 | Status: SHIPPED | OUTPATIENT
Start: 2018-03-05 | End: 2019-02-15 | Stop reason: SDUPTHER

## 2018-03-05 RX ORDER — AMLODIPINE BESYLATE 10 MG/1
10 TABLET ORAL DAILY
Qty: 90 TABLET | Refills: 3 | Status: SHIPPED | OUTPATIENT
Start: 2018-03-05 | End: 2019-05-07 | Stop reason: SDUPTHER

## 2018-03-05 RX ORDER — GLIPIZIDE 10 MG/1
TABLET, FILM COATED, EXTENDED RELEASE ORAL
Qty: 90 TABLET | Refills: 0 | Status: SHIPPED | OUTPATIENT
Start: 2018-03-05 | End: 2018-07-10 | Stop reason: SDUPTHER

## 2018-03-05 RX ORDER — VALSARTAN 320 MG/1
320 TABLET ORAL DAILY
Qty: 90 TABLET | Refills: 90 | Status: SHIPPED | OUTPATIENT
Start: 2018-03-05 | End: 2018-08-15

## 2018-03-05 NOTE — PROGRESS NOTES
Chief complaint: Follow-up on diabetes    82-year-old black male here to follow-up on diabetes although did not have labs prior, again.  Reviewed all his labs and abnormalities.  In Nov his A1c was 6.7..  He has some chronic renal insufficiency.  He had a slight hemoglobin reduction which appears chronic and fluctuating clinical of last year or so us slight thrombocytopenia.  We discussed all those issues and the need to monitor them over time.  Is otherwise feeling well.    His blood pressure appears to be in a good control.  Counseled at length regarding all these age-related issues, monitoring and so forth.Total time over 25 minutes with over 50% counseling.Counseled regarding all these issues.  We discussed getting lipids with his next lab when he is fasting.  Monitors blood pressure at home it runs 124/64.  Reviewed that he had a normal colonoscopy last year outside the clinic.    ROS:   CONST: weight stable. EYES: no vision change. ENT: no sore throat. CV: no chest pain w/ exertion. RESP: no shortness of breath. GI: no nausea, vomiting, diarrhea. No dysphagia. : no urinary issues. MUSCULOSKELETAL: no new myalgias or arthralgias. SKIN: no new changes. NEURO: no focal deficits. PSYCH: no new issues. ENDOCRINE: no polyuria. HEME: no lymph nodes. ALLERGY: no general pruritis.       Past medical history:  1.  Diabetes with renal disease and retinopathy  2.  Hypertension  3.  Hyperlipidemia  4.  Chronic renal failure stage III  5.  History of colon polyp, normal scope March 2012, normal 2017-- 5 yrs----GI at [facility]- Dr Kumari  6.  Hyperuricemia and gout  7.  History of sciatica and lumbar disc disease remotely  8.  Erectile dysfunction  9.  Macular degeneration, followed by outside retina specialist  10.  GERD  11.  Low HDL  12.  Inflammatory arthritis of the hands, seeing rheumatology  13.  Bilateral cervical radiculopathy and axonal neuropathy, to have surgery 2015    14.  Followed by outside urology at [facility]   leann   15.  TKA  -angie Han  who is at [facility]  16.  Prevnar given 2017    Past surgical history: Right knee replacement, left knee arthroscopy, bilateral cataracts, scrotal cyst removed.    Family history: Mother with hypertension and diabetes.  Father's history is unknown.  One brother who is okay.    Social history: Retired, quit smoking 1974.   with 3 children.  Drinks socially.    Vital signs as above  Gen: no distress, exam otherwise deferred    Bria was seen today for diabetes.    Diagnoses and all orders for this visit:    Uncontrolled secondary diabetes mellitus with stage 3 CKD (GFR 30-59), patient did not have labs prior to the visit, we'll try to get him to have blood work in the future prior to the visit so that we can make management decisions.  Refilled his medications, discussed issues at length regarding glucometer issues.  We'll give him a prescription to get a new  -     Hemoglobin A1c; Future  -     Basic metabolic panel; Future  -     CBC auto differential; Future    Essential hypertension, monitoring at home, chronic and stable  -     Basic metabolic panel; Future    Hypertensive CKD (chronic kidney disease), reassess    Uncontrolled type 2 diabetes mellitus with stage 3 chronic kidney disease, without long-term current use of insulin    Diabetes mellitus with proteinuric diabetic nephropathy    Diabetes mellitus with proteinuria    CKD stage 3 due to type 2 diabetes mellitus  -     Basic metabolic panel; Future    Anemia, unspecified type, reassess  -     CBC auto differential; Future    Thrombocytopenia, reassess  -     CBC auto differential; Future    Other orders  -     glipiZIDE (GLUCOTROL) 10 MG TR24; TAKE 1 TABLET(10 MG) BY MOUTH EVERY DAY  -     atorvastatin (LIPITOR) 20 MG tablet; Take 1 tablet (20 mg total) by mouth once daily.  -     amLODIPine (NORVASC) 10 MG tablet; Take 1 tablet (10 mg total) by mouth once daily.  -     valsartan (DIOVAN) 320 MG tablet; Take 1  "tablet (320 mg total) by mouth once daily.  -     linagliptin (TRADJENTA) 5 mg Tab tablet; Take 1 tablet (5 mg total) by mouth once daily.  -     blood sugar diagnostic Strp; 1 strip by Misc.(Non-Drug; Combo Route) route 2 (two) times daily. Disp glucometer and lancets as well            Clinical note will be sensitive so as to not cause confusion regarding physical and evaluation and management issues"This note will not be shared with the patient."  "

## 2018-03-07 ENCOUNTER — TELEPHONE (OUTPATIENT)
Dept: FAMILY MEDICINE | Facility: CLINIC | Age: 83
End: 2018-03-07

## 2018-03-07 NOTE — TELEPHONE ENCOUNTER
In future, please process the refill and process to correct pharmacy rather than sending this message, thanks, will speed things up    Please process prescription

## 2018-03-07 NOTE — TELEPHONE ENCOUNTER
----- Message from Shameka Tiwari sent at 3/7/2018  2:08 PM CST -----  Contact: [phone]  Pt said his linagliptin (TRADJENTA) 5 mg Tab tablet was [facility] [place], [place] - [address] AT [address]AL TO [facility]

## 2018-03-15 RX ORDER — LINAGLIPTIN 5 MG/1
TABLET, FILM COATED ORAL
Qty: 90 TABLET | Refills: 0 | Status: SHIPPED | OUTPATIENT
Start: 2018-03-15 | End: 2018-05-29 | Stop reason: SDUPTHER

## 2018-03-19 RX ORDER — HYDROCHLOROTHIAZIDE 12.5 MG/1
CAPSULE ORAL
Qty: 38 CAPSULE | Refills: 12 | Status: SHIPPED | OUTPATIENT
Start: 2018-03-19 | End: 2019-05-16 | Stop reason: SDUPTHER

## 2018-04-13 RX ORDER — ALLOPURINOL 100 MG/1
TABLET ORAL
Qty: 90 TABLET | Refills: 0 | Status: SHIPPED | OUTPATIENT
Start: 2018-04-13 | End: 2018-07-10 | Stop reason: SDUPTHER

## 2018-04-13 RX ORDER — METOPROLOL SUCCINATE 100 MG/1
TABLET, EXTENDED RELEASE ORAL
Qty: 90 TABLET | Refills: 0 | Status: SHIPPED | OUTPATIENT
Start: 2018-04-13 | End: 2018-08-02 | Stop reason: SDUPTHER

## 2018-04-15 RX ORDER — NAPROXEN SODIUM 220 MG/1
TABLET, FILM COATED ORAL
Qty: 90 TABLET | Refills: 0 | Status: SHIPPED | OUTPATIENT
Start: 2018-04-15 | End: 2018-07-10 | Stop reason: SDUPTHER

## 2018-05-01 ENCOUNTER — OFFICE VISIT (OUTPATIENT)
Dept: NEPHROLOGY | Facility: CLINIC | Age: 83
End: 2018-05-01
Payer: MEDICARE

## 2018-05-01 VITALS
BODY MASS INDEX: 28.8 KG/M2 | SYSTOLIC BLOOD PRESSURE: 132 MMHG | DIASTOLIC BLOOD PRESSURE: 62 MMHG | OXYGEN SATURATION: 97 % | WEIGHT: 205.69 LBS | HEART RATE: 53 BPM | HEIGHT: 71 IN

## 2018-05-01 DIAGNOSIS — E11.22 CKD STAGE 3 DUE TO TYPE 2 DIABETES MELLITUS: Primary | ICD-10-CM

## 2018-05-01 DIAGNOSIS — E11.29 DIABETES MELLITUS WITH PROTEINURIA: ICD-10-CM

## 2018-05-01 DIAGNOSIS — R80.9 DIABETES MELLITUS WITH PROTEINURIA: ICD-10-CM

## 2018-05-01 DIAGNOSIS — N18.30 HYPERTENSIVE KIDNEY DISEASE WITH STAGE 3 CHRONIC KIDNEY DISEASE: ICD-10-CM

## 2018-05-01 DIAGNOSIS — I12.9 HYPERTENSIVE KIDNEY DISEASE WITH STAGE 3 CHRONIC KIDNEY DISEASE: ICD-10-CM

## 2018-05-01 DIAGNOSIS — N18.30 CKD STAGE 3 DUE TO TYPE 2 DIABETES MELLITUS: Primary | ICD-10-CM

## 2018-05-01 PROCEDURE — 99999 PR PBB SHADOW E&M-EST. PATIENT-LVL III: CPT | Mod: PBBFAC,,, | Performed by: INTERNAL MEDICINE

## 2018-05-01 PROCEDURE — 99213 OFFICE O/P EST LOW 20 MIN: CPT | Mod: PBBFAC | Performed by: INTERNAL MEDICINE

## 2018-05-01 PROCEDURE — 99214 OFFICE O/P EST MOD 30 MIN: CPT | Mod: S$PBB,,, | Performed by: INTERNAL MEDICINE

## 2018-05-01 RX ORDER — CLOBETASOL PROPIONATE 0.5 MG/G
OINTMENT TOPICAL
COMMUNITY
Start: 2018-04-16 | End: 2018-08-02 | Stop reason: SDUPTHER

## 2018-05-01 RX ORDER — TRIAMCINOLONE ACETONIDE 1 MG/G
OINTMENT TOPICAL
COMMUNITY
Start: 2018-04-16 | End: 2020-10-28

## 2018-05-01 NOTE — PROGRESS NOTES
Subjective:       Patient ID: Bria Lawson is a 82 y.o. Black or  male who presents for follow-up evaluation of Chronic Kidney Disease    HPI     Mr. Lawson is seen in nephrology clinic for follow up on CKD. He was last seen on 10/11/17. He has CKD III, diabetes type 2, hypertension since age 38, chronic knee pain, DJD, rheumatoid arthritis. He had prior nephrology care at [facility] when he was told of eGFR in the range of 40's in 2013.     He is overall doing fine. He reports otherwise he has been doing fine, denies any nausea/ decreased or increased urine/ flank pain/ vomiting/ diarrhea/ fever. He denies NSAID use. He does have pain due to DJD/ sciatica. He is planning his new trip in summer to [place].     He has been on Diovan. He also takes Omeprazole. He states he does not tolerate GERD symptoms if he skips taking Omeprazole.     Prior labs include normal C4, hep B and C screen negative, no evidence of paraproteinemia. US from 1/16 showed normal kidney size and no mass or obstruction.     Renal Function:  Lab Results   Component Value Date     (H) 03/05/2018     (H) 10/02/2017     03/05/2018     10/02/2017    K 4.5 03/05/2018    K 4.1 10/02/2017     03/05/2018     10/02/2017    CO2 25 03/05/2018    CO2 27 10/02/2017    BUN 19 03/05/2018    BUN 16 10/02/2017    CALCIUM 9.7 03/05/2018    CALCIUM 9.3 10/02/2017    CREATININE 1.4 03/05/2018    CREATININE 1.3 10/02/2017    ALBUMIN 3.8 10/02/2017    ALBUMIN 4.0 06/28/2017    PHOS 2.8 10/02/2017    PHOS 3.1 03/14/2017    ESTGFRAFRICA 54.1 (A) 03/05/2018    ESTGFRAFRICA 59.6 (A) 10/02/2017    EGFRNONAA 46.8 (A) 03/05/2018    EGFRNONAA 51.5 (A) 10/02/2017       Urinalysis:  Lab Results   Component Value Date    APPEARANCEUA Clear 10/02/2017    PHUR 6.0 10/02/2017    SPECGRAV 1.010 10/02/2017    PROTEINUA Negative 10/02/2017    GLUCUA 3+ (A) 10/02/2017    OCCULTUA Negative 10/02/2017    NITRITE Negative 10/02/2017     LEUKOCYTESUR Negative 10/02/2017       Protein/Creatinine Ratio:  Lab Results   Component Value Date    PROTEINURINE 10 10/02/2017    CREATRANDUR 53.0 10/02/2017    UTPCR 0.19 10/02/2017       CBC:  Lab Results   Component Value Date    WBC 3.25 (L) 03/05/2018    HGB 12.1 (L) 03/05/2018    HCT 37.0 (L) 03/05/2018     Last vit D 46, PTH 40      Review of Systems     Constitutional: Negative for fever, chills, activity change, appetite change and fatigue.   HENT: Negative for hearing loss and nosebleeds.    Respiratory: Negative for cough, shortness of breath and wheezing.   Cardiovascular: Negative for chest pain and palpitations.   Gastrointestinal: Negative for nausea, vomiting, abdominal pain and diarrhea.   Endocrine: Negative for polydipsia and polyuria.   Genitourinary: Negative for dysuria, frequency, hematuria and flank pain.   Musculoskeletal: Positive for back pain, arthralgias and gait problem. Negative for myalgias.   Skin: Negative for pallor and rash.   Neurological: Negative for dizziness, light-headedness and headaches.   Psychiatric/Behavioral: Negative for behavioral problems. The patient is not nervous/anxious    Objective:      Physical Exam     Constitutional: He is oriented to person, place, and time. He appears well-developed and well-nourished. No distress.   Head: Normocephalic and atraumatic.   Neck: Normal range of motion. Neck supple. No JVD present.   Cardiovascular: Normal rate, regular rhythm and normal heart sounds.   No murmur heard.  Pulmonary/Chest: Effort normal and breath sounds normal. No respiratory distress. He has no wheezes. He has no rales.   Abdominal: Soft. Bowel sounds are normal. He exhibits no distension. There is no tenderness.   Musculoskeletal: He exhibits trace edema.   Neurological: He is alert and oriented to person, place, and time.   Skin: Skin is warm and dry. He is not diaphoretic.    Psychiatric: He has a normal mood and affect. His behavior is normal.    Vitals reviewed.    Assessment:       1. CKD stage 3 due to type 2 diabetes mellitus    2. Diabetes mellitus with proteinuria    3. Hypertensive kidney disease with stage 3 chronic kidney disease        Plan:     Mr. Lawson has longstanding type 2 diabetes, hypertension, occasional NSAID use, has CKD due to diabetic, hypertensive nephropathy and occasional NSAID use in the past, he has CKD III.    CKD III  Stable renal labs  Continue current regimen of antihypertensive including ARB containing regimen for RAAS blockade, he has been on Diovan  Low salt diet  avoid NSAID/ bactrim/ IV contrast/ gadolinium/ aminoglycoside/ Fleet enema where possible    hypertension  Strict low salt diet  continue ARB containing regimen  home BP monitoring, goal less than 130-140/80                                                                                                                                                                                                                                                                                                                                                                   Chronic gout  Continue allopurinol  Follow uric acid level    Leukopenia  Thrombocytopenia  Slowly worsening, d/w patient about possible need to see hematologist but he is not interested. Defer further follow up to PCP.    Diabetes, hyperlipidemia, RA management per PCP and rheumatology.  Labs discussed with patient, potential for decline in renal function explained to him, periodic follow up on renal function stressed. His questions were answered to his satisfaction. Stressed importance of better glycemic control, weight loss and strict low salt diet. He agreed with the plan.   RTC 6 months.

## 2018-05-11 ENCOUNTER — PES CALL (OUTPATIENT)
Dept: ADMINISTRATIVE | Facility: CLINIC | Age: 83
End: 2018-05-11

## 2018-05-15 RX ORDER — GABAPENTIN 300 MG/1
CAPSULE ORAL
Qty: 270 CAPSULE | Refills: 0 | Status: SHIPPED | OUTPATIENT
Start: 2018-05-15 | End: 2018-08-16 | Stop reason: SDUPTHER

## 2018-07-10 ENCOUNTER — OFFICE VISIT (OUTPATIENT)
Dept: FAMILY MEDICINE | Facility: CLINIC | Age: 83
End: 2018-07-10
Payer: MEDICARE

## 2018-07-10 ENCOUNTER — LAB VISIT (OUTPATIENT)
Dept: LAB | Facility: HOSPITAL | Age: 83
End: 2018-07-10
Attending: INTERNAL MEDICINE
Payer: MEDICARE

## 2018-07-10 VITALS
DIASTOLIC BLOOD PRESSURE: 70 MMHG | HEART RATE: 55 BPM | SYSTOLIC BLOOD PRESSURE: 130 MMHG | BODY MASS INDEX: 28.52 KG/M2 | OXYGEN SATURATION: 98 % | TEMPERATURE: 98 F | WEIGHT: 203.69 LBS | HEIGHT: 71 IN

## 2018-07-10 DIAGNOSIS — I10 ESSENTIAL HYPERTENSION: ICD-10-CM

## 2018-07-10 DIAGNOSIS — D72.819 LEUKOPENIA, UNSPECIFIED TYPE: ICD-10-CM

## 2018-07-10 DIAGNOSIS — N18.30 STAGE 3 CHRONIC KIDNEY DISEASE: ICD-10-CM

## 2018-07-10 DIAGNOSIS — D69.6 THROMBOCYTOPENIA: ICD-10-CM

## 2018-07-10 DIAGNOSIS — D61.818 PANCYTOPENIA: ICD-10-CM

## 2018-07-10 LAB
ALBUMIN SERPL BCP-MCNC: 4.1 G/DL
ALP SERPL-CCNC: 54 U/L
ALT SERPL W/O P-5'-P-CCNC: 18 U/L
ANION GAP SERPL CALC-SCNC: 7 MMOL/L
AST SERPL-CCNC: 20 U/L
BASOPHILS # BLD AUTO: 0.02 K/UL
BASOPHILS NFR BLD: 0.5 %
BILIRUB SERPL-MCNC: 0.9 MG/DL
BUN SERPL-MCNC: 17 MG/DL
CALCIUM SERPL-MCNC: 9.5 MG/DL
CHLORIDE SERPL-SCNC: 103 MMOL/L
CO2 SERPL-SCNC: 26 MMOL/L
CREAT SERPL-MCNC: 1.3 MG/DL
DIFFERENTIAL METHOD: ABNORMAL
EOSINOPHIL # BLD AUTO: 0.1 K/UL
EOSINOPHIL NFR BLD: 3.3 %
ERYTHROCYTE [DISTWIDTH] IN BLOOD BY AUTOMATED COUNT: 12.7 %
EST. GFR  (AFRICAN AMERICAN): 59.2 ML/MIN/1.73 M^2
EST. GFR  (NON AFRICAN AMERICAN): 51.2 ML/MIN/1.73 M^2
ESTIMATED AVG GLUCOSE: 143 MG/DL
GLUCOSE SERPL-MCNC: 219 MG/DL
HBA1C MFR BLD HPLC: 6.6 %
HCT VFR BLD AUTO: 38.2 %
HGB BLD-MCNC: 12.2 G/DL
IMM GRANULOCYTES # BLD AUTO: 0.1 K/UL
IMM GRANULOCYTES NFR BLD AUTO: 2.6 %
LYMPHOCYTES # BLD AUTO: 1.3 K/UL
LYMPHOCYTES NFR BLD: 32.4 %
MCH RBC QN AUTO: 30 PG
MCHC RBC AUTO-ENTMCNC: 31.9 G/DL
MCV RBC AUTO: 94 FL
MONOCYTES # BLD AUTO: 0.8 K/UL
MONOCYTES NFR BLD: 21.3 %
NEUTROPHILS # BLD AUTO: 1.6 K/UL
NEUTROPHILS NFR BLD: 39.9 %
NRBC BLD-RTO: 0 /100 WBC
PLATELET # BLD AUTO: 97 K/UL
PMV BLD AUTO: 12 FL
POTASSIUM SERPL-SCNC: 4.1 MMOL/L
PROT SERPL-MCNC: 7.5 G/DL
RBC # BLD AUTO: 4.06 M/UL
SODIUM SERPL-SCNC: 136 MMOL/L
WBC # BLD AUTO: 3.89 K/UL

## 2018-07-10 PROCEDURE — 83036 HEMOGLOBIN GLYCOSYLATED A1C: CPT

## 2018-07-10 PROCEDURE — 99214 OFFICE O/P EST MOD 30 MIN: CPT | Mod: S$PBB,,, | Performed by: INTERNAL MEDICINE

## 2018-07-10 PROCEDURE — 85025 COMPLETE CBC W/AUTO DIFF WBC: CPT

## 2018-07-10 PROCEDURE — 99214 OFFICE O/P EST MOD 30 MIN: CPT | Mod: PBBFAC,PO | Performed by: INTERNAL MEDICINE

## 2018-07-10 PROCEDURE — 80053 COMPREHEN METABOLIC PANEL: CPT

## 2018-07-10 PROCEDURE — 99999 PR PBB SHADOW E&M-EST. PATIENT-LVL IV: CPT | Mod: PBBFAC,,, | Performed by: INTERNAL MEDICINE

## 2018-07-10 PROCEDURE — 36415 COLL VENOUS BLD VENIPUNCTURE: CPT | Mod: PO

## 2018-07-10 RX ORDER — GLIPIZIDE 10 MG/1
TABLET, FILM COATED, EXTENDED RELEASE ORAL
Qty: 90 TABLET | Refills: 3 | Status: SHIPPED | OUTPATIENT
Start: 2018-07-10 | End: 2019-07-26 | Stop reason: SDUPTHER

## 2018-07-10 RX ORDER — OMEPRAZOLE 40 MG/1
40 CAPSULE, DELAYED RELEASE ORAL DAILY
Qty: 90 CAPSULE | Refills: 4 | Status: SHIPPED | OUTPATIENT
Start: 2018-07-10 | End: 2018-08-02 | Stop reason: SDUPTHER

## 2018-07-10 RX ORDER — METOPROLOL SUCCINATE 100 MG/1
TABLET, EXTENDED RELEASE ORAL
Qty: 90 TABLET | Refills: 3 | Status: SHIPPED | OUTPATIENT
Start: 2018-07-10 | End: 2018-07-11 | Stop reason: SDUPTHER

## 2018-07-10 RX ORDER — NAPROXEN SODIUM 220 MG/1
TABLET, FILM COATED ORAL
Qty: 90 TABLET | Refills: 3 | Status: SHIPPED | OUTPATIENT
Start: 2018-07-10 | End: 2019-07-15 | Stop reason: SDUPTHER

## 2018-07-10 RX ORDER — ALLOPURINOL 100 MG/1
TABLET ORAL
Qty: 90 TABLET | Refills: 3 | Status: SHIPPED | OUTPATIENT
Start: 2018-07-10 | End: 2019-07-02 | Stop reason: SDUPTHER

## 2018-07-10 NOTE — PROGRESS NOTES
Chief complaint: Follow-up on diabetes    82-year-old black male here to follow-up on diabetes although did not have labs prior, again.  Reviewed all his labs and abnormalities.  In Nov his A1c was 6.7..  He has some chronic renal insufficiency. Trying to set renal appt- will refer.  He had a slight hemoglobin reduction which appears chronic and fluctuating clinical of last year or so with slight thrombocytopenia.  We discussed all those issues and the need to monitor them over time.  Is otherwise feeling well.    His blood pressure appears to be in a good control.  Counseled at length regarding all these age-related issues, monitoring and so forth.Total time over 25 minutes with over 50% counseling.Counseled regarding all these issues.  .  Monitors blood pressure at home it runs 130/70.  Reviewed that he had a normal colonoscopy last year outside the clinic.    ROS:   CONST: weight stable. EYES: no vision change. ENT: no sore throat. CV: no chest pain w/ exertion. RESP: no shortness of breath. GI: no nausea, vomiting, diarrhea. No dysphagia. : no urinary issues. MUSCULOSKELETAL: no new myalgias or arthralgias. SKIN: no new changes. NEURO: no focal deficits. PSYCH: no new issues. ENDOCRINE: no polyuria. HEME: no lymph nodes. ALLERGY: no general pruritis.       Past medical history:  1.  Diabetes with renal disease and retinopathy  2.  Hypertension  3.  Hyperlipidemia  4.  Chronic renal failure stage III  5.  History of colon polyp, normal scope March 2012, normal 2017-- 5 yrs----GI at [facility]- Dr Kumari  6.  Hyperuricemia and gout  7.  History of sciatica and lumbar disc disease remotely  8.  Erectile dysfunction  9.  Macular degeneration, followed by outside retina specialist  10.  GERD  11.  Low HDL  12.  Inflammatory arthritis of the hands, seeing rheumatology  13.  Bilateral cervical radiculopathy and axonal neuropathy, to have surgery 2015    14.  Followed by outside urology at [facility] Dr. noriega   15.  TKA  -  "angie Henriquez  who is at [facility]  16.  Prevnar given 2017 17. Pancytopenia 2018    Past surgical history: Right knee replacement, left knee arthroscopy, bilateral cataracts, scrotal cyst removed.    Family history: Mother with hypertension and diabetes.  Father's history is unknown.  One brother who is okay.    Social history: Retired, quit smoking 1974.   with 3 children.  Drinks socially.    Vital signs as above  Gen: no distress, exam otherwise deferred    Bria was seen today for diabetes.    Diagnoses and all orders for this visit:    Uncontrolled type 2 diabetes mellitus with stage 3 chronic kidney disease, without long-term current use of insulin, due, labs before appt next time  -     Comprehensive metabolic panel; Future  -     Hemoglobin A1c; Future  -     CBC auto differential; Future    Pancytopenia- refer for w/u- discussed possible BM Bx  -     Ambulatory referral to Hematology / Oncology    Leukopenia, unspecified type  -     CBC auto differential; Future    Thrombocytopenia  -     CBC auto differential; Future    Essential hypertension,  ,chronic condition and stable.    Other orders  -     glipiZIDE (GLUCOTROL) 10 MG TR24; TAKE 1 TABLET(10 MG) BY MOUTH EVERY DAY  -     metoprolol succinate (TOPROL-XL) 100 MG 24 hr tablet; TAKE 1 TABLET(100 MG) BY MOUTH EVERY DAY  -     allopurinol (ZYLOPRIM) 100 MG tablet; TAKE 1 TABLET(100 MG) BY MOUTH EVERY DAY  -     aspirin 81 MG Chew; CHEW AND SWALLOW 1 TABLET BY MOUTH DAILY  -     linagliptin (TRADJENTA) 5 mg Tab tablet; Take 1 tablet (5 mg total) by mouth once daily.  -     omeprazole (PRILOSEC) 40 MG capsule; Take 1 capsule (40 mg total) by mouth once daily.              Clinical note will be sensitive so as to not cause confusion regarding physical and evaluation and management issues""This note will not be shared with the patient."  "

## 2018-07-11 ENCOUNTER — TELEPHONE (OUTPATIENT)
Dept: FAMILY MEDICINE | Facility: CLINIC | Age: 83
End: 2018-07-11

## 2018-07-11 RX ORDER — METOPROLOL SUCCINATE 100 MG/1
TABLET, EXTENDED RELEASE ORAL
Qty: 90 TABLET | Refills: 3 | Status: SHIPPED | OUTPATIENT
Start: 2018-07-11 | End: 2019-04-22

## 2018-07-18 ENCOUNTER — TELEPHONE (OUTPATIENT)
Dept: FAMILY MEDICINE | Facility: CLINIC | Age: 83
End: 2018-07-18

## 2018-07-18 RX ORDER — OMEPRAZOLE 40 MG/1
40 CAPSULE, DELAYED RELEASE ORAL DAILY
Qty: 90 CAPSULE | Refills: 4 | Status: SHIPPED | OUTPATIENT
Start: 2018-07-18 | End: 2018-08-31 | Stop reason: SDUPTHER

## 2018-07-18 NOTE — TELEPHONE ENCOUNTER
Received prior auth form for pt nexium, left message for pt to call his ins and ask what would be the alternative drug they will cover.

## 2018-07-18 NOTE — TELEPHONE ENCOUNTER
Agree, looks like we already have omeprazole on his list and so I sent that back to the pharmacy.  Perhaps the Nexium was an incorrect refill that went to the pharmacy

## 2018-07-31 NOTE — PROGRESS NOTES
Chief Complaint :   Low blood counts    Hx of Present illness :  Patient is a 82 y.o. year old male who presents to the clinic today for   Oncology evaluation. No fever, pruritis, night sweats. . No weight loss. No overt bleeding. Stay7s active.      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    Transfusion :  None    Menstrual & obstetric Hx :  N/A        Present Meds :   Medication List with Changes/Refills   Current Medications    ALLOPURINOL (ZYLOPRIM) 100 MG TABLET    TAKE 1 TABLET(100 MG) BY MOUTH EVERY DAY    AMLODIPINE (NORVASC) 10 MG TABLET    Take 1 tablet (10 mg total) by mouth once daily.    ASCORBIC ACID (VITAMIN C) 1000 MG TABLET    Take 1,000 mg by mouth once daily.    ASPIRIN 81 MG CHEW    CHEW AND SWALLOW 1 TABLET BY MOUTH DAILY    ATORVASTATIN (LIPITOR) 20 MG TABLET    Take 1 tablet (20 mg total) by mouth once daily.    BLOOD SUGAR DIAGNOSTIC STRP    1 strip by Misc.(Non-Drug; Combo Route) route 2 (two) times daily.    BLOOD SUGAR DIAGNOSTIC STRP    1 strip by Misc.(Non-Drug; Combo Route) route 2 (two) times daily. Disp glucometer and lancets as well    CLOBETASOL (TEMOVATE) 0.05 % CREAM        CLOBETASOL 0.05% (TEMOVATE) 0.05 % OINT        FOLIC ACID (FOLVITE) 800 MCG TAB    Take 800 mcg by mouth once daily.    GABAPENTIN (NEURONTIN) 300 MG CAPSULE    TAKE 1 CAPSULE BY MOUTH THREE TIMES DAILY    GLIPIZIDE (GLUCOTROL) 10 MG TR24    TAKE 1 TABLET(10 MG) BY MOUTH EVERY DAY    HYDROCHLOROTHIAZIDE (MICROZIDE) 12.5 MG CAPSULE    TAKE 1 CAPSULE BY MOUTH EVERY MONDAY, WEDNESDAY, FRIDAY    LINAGLIPTIN (TRADJENTA) 5 MG TAB TABLET    Take 1 tablet (5 mg total) by mouth once daily.    LINAGLIPTIN (TRADJENTA) 5 MG TAB TABLET    Take 1 tablet (5 mg total) by mouth once daily.    METOPROLOL SUCCINATE (TOPROL-XL) 100 MG 24 HR TABLET    TAKE 1 TABLET(100 MG) BY MOUTH EVERY DAY    METOPROLOL SUCCINATE (TOPROL-XL) 100 MG 24 HR TABLET     TAKE 1 TABLET(100 MG) BY MOUTH EVERY DAY    NIACIN 500 MG TAB    Take 100 mg by mouth every evening.    OMEPRAZOLE (PRILOSEC) 40 MG CAPSULE    Take 1 capsule (40 mg total) by mouth once daily.    OMEPRAZOLE (PRILOSEC) 40 MG CAPSULE    Take 1 capsule (40 mg total) by mouth once daily.    TRIAMCINOLONE ACETONIDE 0.1% (KENALOG) 0.1 % OINTMENT        TRUEPLUS LANCETS 33 GAUGE MISC    USE TO TEST BLOOD SUGAR BID    VALSARTAN (DIOVAN) 320 MG TABLET    Take 1 tablet (320 mg total) by mouth once daily.       Past Medical Hx :   Hypertension; DM; Hyperlipidemia; Chronic atrial fibrillation; Macular degeneration, Lichen planus..CKD 3; CAD.GERD; Peripheral Neuropathy.  Known to have Rheumatoid arthritis, on no Rx  No Hx of TB, , Lupus, sarcoid, Seizure disorder or CVA. No Hx of DVT or PE./ No past Hx of cancer, chemotherapy or radiation therapy.    Past Medical Hx :  Past Medical History:   Diagnosis Date    Atrial fibrillation     CKD stage 3 due to type 2 diabetes mellitus 5/26/2015    Colon polyp     Coronary artery disease     Diabetes mellitus with renal manifestations, uncontrolled 2/26/2015    GERD (gastroesophageal reflux disease) 2/26/2015    Gout     Gout, chronic 2/26/2015    HDL lipoprotein deficiency 5/26/2015    Hyperlipidemia 2/26/2015    Uncontrolled secondary diabetes mellitus with stage 3 CKD (GFR 30-59) 8/28/2015       Travel Hx :   Back from three week vacation to [facility].    Immunization :  Immunization History   Administered Date(s) Administered    Influenza - High Dose 11/03/2017    Pneumococcal Conjugate - 13 Valent 06/27/2017    Td (ADULT) 11/18/2005       Family Hx :  No family history on file.    Social Hx :  Social History     Social History    Marital status:      Spouse name: N/A    Number of children: N/A    Years of education: N/A     Occupational History    Not on file.     Social History Main Topics    Smoking status: Former Smoker    Smokeless tobacco:  Never Used    Alcohol use 0.0 oz/week      Comment: Social    Drug use: No    Sexual activity: Not on file     Other Topics Concern    Not on file     Social History Narrative    No narrative on file       Surgery :  Bilateral Knee replacement; Bilateral Cataract surgery.    Symptoms :    Review of Systems   Constitutional: Negative for chills, diaphoresis, fever, malaise/fatigue and weight loss.   HENT: Positive for congestion. Negative for ear discharge, ear pain, hearing loss, nosebleeds, sinus pain, sore throat and tinnitus.    Eyes: Negative for blurred vision, double vision, photophobia, pain, discharge and redness.   Respiratory: Positive for shortness of breath. Negative for cough, hemoptysis, sputum production, wheezing and stridor.    Cardiovascular: Negative for chest pain, palpitations, orthopnea, claudication, leg swelling and PND.   Gastrointestinal: Negative for abdominal pain, blood in stool, constipation, diarrhea, heartburn, melena, nausea and vomiting.   Genitourinary: Negative for dysuria, flank pain, frequency, hematuria and urgency.   Musculoskeletal: Negative for back pain, falls, joint pain, myalgias and neck pain.   Skin: Negative for itching and rash.   Neurological: Positive for tingling and sensory change. Negative for dizziness, weakness and headaches.   Endo/Heme/Allergies: Negative for environmental allergies. Does not bruise/bleed easily.   Psychiatric/Behavioral: Negative for depression, hallucinations, memory loss, substance abuse and suicidal ideas. The patient is not nervous/anxious and does not have insomnia.        Physical Exam :  Looks younger than the stated age.  Physical Exam   Constitutional: He is oriented to person, place, and time and well-developed, well-nourished, and in no distress. Vital signs are normal. No distress.   HENT:   Head: Normocephalic and atraumatic.   Right Ear: External ear normal.   Left Ear: External ear normal.   Nose: Nose normal.    Mouth/Throat: Oropharynx is clear and moist. No oropharyngeal exudate.   Eyes: Conjunctivae, EOM and lids are normal. Pupils are equal, round, and reactive to light. Lids are everted and swept, no foreign bodies found. Right eye exhibits no discharge. Left eye exhibits no discharge. No scleral icterus.       Neck: Trachea normal, normal range of motion, full passive range of motion without pain and phonation normal. Neck supple. Normal carotid pulses, no hepatojugular reflux and no JVD present. No tracheal tenderness present. Carotid bruit is not present. No tracheal deviation present. No thyroid mass and no thyromegaly present.   Cardiovascular: Normal rate, regular rhythm, normal heart sounds, intact distal pulses and normal pulses.  PMI is not displaced.  Exam reveals no gallop and no friction rub.    No murmur heard.  Pulmonary/Chest: Effort normal and breath sounds normal. No stridor. No apnea. No respiratory distress. He has no wheezes. He has no rales. He exhibits no tenderness.   Abdominal: Soft. Normal appearance, normal aorta and bowel sounds are normal. He exhibits no distension, no ascites and no mass. There is no hepatosplenomegaly. There is no tenderness. There is no rebound, no guarding and no CVA tenderness. No hernia.   Genitourinary:   Genitourinary Comments: Not examined   Musculoskeletal: Normal range of motion. He exhibits no edema, tenderness or deformity.   Lymphadenopathy:        Head (right side): No submental, no submandibular, no tonsillar, no preauricular, no posterior auricular and no occipital adenopathy present.        Head (left side): No submental, no submandibular, no tonsillar, no preauricular, no posterior auricular and no occipital adenopathy present.     He has no cervical adenopathy.     He has no axillary adenopathy.        Right: No inguinal, no supraclavicular and no epitrochlear adenopathy present.        Left: No inguinal, no supraclavicular and no epitrochlear  adenopathy present.   Neurological: He is alert and oriented to person, place, and time. He has normal motor skills, normal sensation, normal strength, normal reflexes and intact cranial nerves. He displays normal reflexes. No cranial nerve deficit. He exhibits normal muscle tone. Gait normal. Coordination normal. GCS score is 15.   Skin: Skin is warm, dry and intact. No rash noted. He is not diaphoretic. No cyanosis or erythema. No pallor. Nails show no clubbing.   Psychiatric: Mood, memory, affect and judgment normal.         Labs & Imaging :  07/10/18 : WBC 3,590. ANC 1,600. Hgb 12. 2; Hct 35.2; Plts 97,000. Normal indices.   Platlets 134,000 in 2017 and 143,000 in 2016. NFBS 219; cr. 1.3; ca 9.5; Bili 0.9 ALP 54        Dx :  Thrombocytopenia. Mild neutropenia.      Assessment & Plan: Physical Exam unremarkable. Will get CBC, Plts, LDH, ESR, Rheumatoid factor. Review smear. Re evaluate with results. Patient understands and verbalised

## 2018-08-02 ENCOUNTER — LAB VISIT (OUTPATIENT)
Dept: LAB | Facility: HOSPITAL | Age: 83
End: 2018-08-02
Attending: INTERNAL MEDICINE
Payer: MEDICARE

## 2018-08-02 ENCOUNTER — INITIAL CONSULT (OUTPATIENT)
Dept: HEMATOLOGY/ONCOLOGY | Facility: CLINIC | Age: 83
End: 2018-08-02
Payer: MEDICARE

## 2018-08-02 VITALS
OXYGEN SATURATION: 97 % | HEIGHT: 71 IN | TEMPERATURE: 99 F | BODY MASS INDEX: 28.43 KG/M2 | WEIGHT: 203.06 LBS | SYSTOLIC BLOOD PRESSURE: 161 MMHG | HEART RATE: 66 BPM | DIASTOLIC BLOOD PRESSURE: 73 MMHG

## 2018-08-02 DIAGNOSIS — D69.6 THROMBOCYTOPENIA: ICD-10-CM

## 2018-08-02 DIAGNOSIS — D69.6 THROMBOCYTOPENIA: Primary | ICD-10-CM

## 2018-08-02 DIAGNOSIS — M06.9 RHEUMATOID ARTHRITIS, INVOLVING UNSPECIFIED SITE, UNSPECIFIED RHEUMATOID FACTOR PRESENCE: ICD-10-CM

## 2018-08-02 LAB
BASOPHILS # BLD AUTO: 0 K/UL
BASOPHILS NFR BLD: 0 %
DIFFERENTIAL METHOD: ABNORMAL
EOSINOPHIL # BLD AUTO: 0.1 K/UL
EOSINOPHIL NFR BLD: 1.6 %
ERYTHROCYTE [DISTWIDTH] IN BLOOD BY AUTOMATED COUNT: 13.2 %
ERYTHROCYTE [SEDIMENTATION RATE] IN BLOOD BY WESTERGREN METHOD: 6 MM/HR
HCT VFR BLD AUTO: 38.6 %
HGB BLD-MCNC: 12.8 G/DL
LDH SERPL L TO P-CCNC: 182 U/L
LYMPHOCYTES # BLD AUTO: 1.1 K/UL
LYMPHOCYTES NFR BLD: 13.9 %
MCH RBC QN AUTO: 30.1 PG
MCHC RBC AUTO-ENTMCNC: 33.2 G/DL
MCV RBC AUTO: 91 FL
MONOCYTES # BLD AUTO: 1.8 K/UL
MONOCYTES NFR BLD: 23 %
NEUTROPHILS # BLD AUTO: 4.7 K/UL
NEUTROPHILS NFR BLD: 61.9 %
PLATELET # BLD AUTO: 85 K/UL
PMV BLD AUTO: 11.8 FL
RBC # BLD AUTO: 4.25 M/UL
WBC # BLD AUTO: 7.62 K/UL

## 2018-08-02 PROCEDURE — 99205 OFFICE O/P NEW HI 60 MIN: CPT | Mod: S$PBB,,, | Performed by: INTERNAL MEDICINE

## 2018-08-02 PROCEDURE — 86431 RHEUMATOID FACTOR QUANT: CPT

## 2018-08-02 PROCEDURE — 85652 RBC SED RATE AUTOMATED: CPT

## 2018-08-02 PROCEDURE — 36415 COLL VENOUS BLD VENIPUNCTURE: CPT

## 2018-08-02 PROCEDURE — 99213 OFFICE O/P EST LOW 20 MIN: CPT | Mod: PBBFAC | Performed by: INTERNAL MEDICINE

## 2018-08-02 PROCEDURE — 99999 PR PBB SHADOW E&M-EST. PATIENT-LVL III: CPT | Mod: PBBFAC,,, | Performed by: INTERNAL MEDICINE

## 2018-08-02 PROCEDURE — 83615 LACTATE (LD) (LDH) ENZYME: CPT

## 2018-08-02 PROCEDURE — 85025 COMPLETE CBC W/AUTO DIFF WBC: CPT

## 2018-08-02 RX ORDER — VITAMIN E 268 MG
400 CAPSULE ORAL DAILY
COMMUNITY

## 2018-08-02 NOTE — LETTER
August 2, 2018      Adiel Bragg MD  [address] [place] [address]           [facility]  [address] [facility] [address] [place] [address]  Phone: [phone]          Patient: Bria Lawson   MR Number: [id number]   YOB: 1935   Date of Visit: 8/2/2018       Dear Dr. Adiel Bragg:    Thank you for referring Bria Lawson to me for evaluation. Attached you will find relevant portions of my assessment and plan of care.    If you have questions, please do not hesitate to call me. I look forward to following Bria Laswon along with you.    Sincerely,    Enoch Aranda MD    Enclosure  CC:  No Recipients    If you would like to receive this communication electronically, please contact [email or web address] or [phone] to request more information on [facility] access.    For providers and/or their staff who would like to refer a patient to [facility], please contact us through our one-stop-shop provider referral line, [facility], at [phone].    If you feel you have received this communication in error or would no longer like to receive these types of communications, please e-mail [email or web address]

## 2018-08-03 LAB — RHEUMATOID FACT SERPL-ACNC: <10 IU/ML

## 2018-08-07 ENCOUNTER — OFFICE VISIT (OUTPATIENT)
Dept: PODIATRY | Facility: CLINIC | Age: 83
End: 2018-08-07
Payer: MEDICARE

## 2018-08-07 VITALS
SYSTOLIC BLOOD PRESSURE: 136 MMHG | HEIGHT: 70 IN | WEIGHT: 203 LBS | DIASTOLIC BLOOD PRESSURE: 60 MMHG | BODY MASS INDEX: 29.06 KG/M2

## 2018-08-07 DIAGNOSIS — L43.9 LICHEN PLANUS: ICD-10-CM

## 2018-08-07 DIAGNOSIS — M20.5X2 HALLUX LIMITUS, ACQUIRED, LEFT: ICD-10-CM

## 2018-08-07 DIAGNOSIS — E11.9 ENCOUNTER FOR DIABETIC FOOT EXAM: ICD-10-CM

## 2018-08-07 DIAGNOSIS — M20.5X1 HALLUX LIMITUS, ACQUIRED, RIGHT: ICD-10-CM

## 2018-08-07 DIAGNOSIS — M20.41 HAMMER TOES OF BOTH FEET: ICD-10-CM

## 2018-08-07 DIAGNOSIS — M20.42 HAMMER TOES OF BOTH FEET: ICD-10-CM

## 2018-08-07 DIAGNOSIS — B35.1 DERMATOPHYTOSIS OF NAIL: ICD-10-CM

## 2018-08-07 DIAGNOSIS — E11.49 TYPE II DIABETES MELLITUS WITH NEUROLOGICAL MANIFESTATIONS: Primary | ICD-10-CM

## 2018-08-07 PROCEDURE — 99213 OFFICE O/P EST LOW 20 MIN: CPT | Mod: S$PBB,,, | Performed by: PODIATRIST

## 2018-08-07 PROCEDURE — 99999 PR PBB SHADOW E&M-EST. PATIENT-LVL III: CPT | Mod: PBBFAC,,, | Performed by: PODIATRIST

## 2018-08-07 PROCEDURE — 99213 OFFICE O/P EST LOW 20 MIN: CPT | Mod: PBBFAC,PO | Performed by: PODIATRIST

## 2018-08-07 NOTE — PATIENT INSTRUCTIONS
Recommend lotions: eucerin, eucerin for diabetics, aquaphor, A&D ointment, gold bond for diabetics, sween, [place]'s Bees all purpose baby ointment,  urea 40 with aloe (found on amazon.com)    Shoe recommendations: (try 6pm.com, zappos.Desire2Learn , nordstromrack.Desire2Learn, or shoes.Desire2Learn for discounted prices) you can visit DSW shoes in [place]  or [employer] [facility] in the [facility] (there are also several shoe brand outlets in the [facility])    Asics (GT 2000 or gel foundations), new balance stability type shoes (900 series), saucony (stabil c3),  Warren (GTS or Beast or transcend), vionic, propet (tennis shoe)    sofft brand, clarks, crocs, aerosoles, naturalizers, SAS, ecco, born, fran quinonez, rockports (dress shoes)    Vionic, burkenstocks, fitflops, propet (sandals)  Nike comfort thong sandals, crocs, propet (house shoes)    Nail Home remedy:  Vicks Vapor rub to nails for easier managability    Occasional soaks for 15-20 mins in luke warm water with 1 cup of listerine and 1 cup of apple cider vinegar are ok You may add several drops of oil of oregano or tea tree oil as well        Diabetes: Inspecting Your Feet  Diabetes increases your chances of developing foot problems. So inspect your feet every day. This helps you find small skin irritations before they become serious infections. If you have trouble seeing the bottoms of your feet, use a mirror or ask a family member or friend to help.     Pressure spots on the bottom of the foot are common areas where problems develop.   How to check your feet  Below are tips to help you look for foot problems. Try to check your feet at the same time each day, such as when you get out of bed in the morning:  · Check the top of each foot. The tops of toes, back of the heel, and outer edge of the foot can get a lot of rubbing from poor-fitting shoes.  · Check the bottom of each foot. Daily wear and tear often leads to problems at pressure spots.  · Check the toes and nails. Fungal infections often  occur between toes. Toenail problems can also be a sign of fungal infections or lead to breaks in the skin.  · Check your shoes, too. Loose objects inside a shoe can injure the foot. Use your hand to feel inside your shoes for things like lane, loose stitching, or rough areas that could irritate your skin.  Warning signs  Look for any color changes in the foot. Redness with streaks can signal a severe infection, which needs immediate medical attention. Tell your doctor right away if you have any of these problems:  · Swelling, sometimes with color changes, may be a sign of poor blood flow or infection. Symptoms include tenderness and an increase in the size of your foot.  · Warm or hot areas on your feet may be signs of infection. A foot that is cold may not be getting enough blood.  · Sensations such as burning, tingling, or pins and needles can be signs of a problem. Also check for areas that may be numb.  · Hot spots are caused by friction or pressure. Look for hot spots in areas that get a lot of rubbing. Hot spots can turn into blisters, calluses, or sores.  · Cracks and sores are caused by dry or irritated skin. They are a sign that the skin is breaking down, which can lead to infection.  · Toenail problems to watch for include nails growing into the skin (ingrown toenail) and causing redness or pain. Thick, yellow, or discolored nails can signal a fungal infection.  · Drainage and odor can develop from untreated sores and ulcers. Call your doctor right away if you notice white or yellow drainage, bleeding, or unpleasant odor.   © 2379-7085 [employer]. [address] [place] [address]. All rights reserved. This information is not intended as a substitute for professional medical care. Always follow your healthcare professional's instructions.        Step-by-Step:  Inspecting Your Feet (Diabetes)    Date Last Reviewed: 10/1/2016  © 2668-5616 [employer]. [address] [place] [address]  [place] [address]. All rights reserved. This information is not intended as a substitute for professional medical care. Always follow your healthcare professional's instructions.

## 2018-08-07 NOTE — PROGRESS NOTES
Subjective:      Patient ID: Bria Lawson is a 82 y.o. male.    Chief Complaint: Diabetes Mellitus (7/10/18 Ehrensing); Diabetic Foot Exam; and Nail Care    Bria is a 82 y.o. male who presents to the clinic for evaluation and treatment of high risk feet. 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  The patient has no major complaints with feet. Chief concern is how to care for feet as a diabetic.    This patient has documented high risk feet requiring routine maintenance secondary to diabetes mellitis and those secondary complications of diabetes, as mentioned..    PCP: Adiel Bragg MD    Date Last Seen by PCP:   Chief Complaint   Patient presents with    Diabetes Mellitus     7/10/18 Ehrensing    Diabetic Foot Exam    Nail Care     Current shoe gear:  Casual tennis shoes    Hemoglobin A1C   Date Value Ref Range Status   07/10/2018 6.6 (H) 4.0 - 5.6 % Final     Comment:     ADA Screening Guidelines:  5.7-6.4%  Consistent with prediabetes  >or=6.5%  Consistent with diabetes  High levels of fetal hemoglobin interfere with the HbA1C  assay. Heterozygous hemoglobin variants (HbS, HgC, etc)do  not significantly interfere with this assay.   However, presence of multiple variants may affect accuracy.     03/05/2018 6.6 (H) 4.0 - 5.6 % Final     Comment:     According to ADA guidelines, hemoglobin A1c <7.0% represents  optimal control in non-pregnant diabetic patients. Different  metrics may apply to specific patient populations.   Standards of Medical Care in Diabetes-2016.  For the purpose of  screening for the presence of diabetes:  <5.7%     Consistent with the absence of diabetes  5.7-6.4%  Consistent with increasing risk for diabetes   (prediabetes)  >or=6.5%  Consistent with diabetes  Currently, no consensus exists for use of hemoglobin A1c  for diagnosis of diabetes for children.  This Hemoglobin A1c assay has significant interference with fetal   hemoglobin   (HbF). The results are invalid for patients with abnormal amounts of   HbF,   including those with known Hereditary Persistence   of Fetal Hemoglobin. Heterozygous hemoglobin variants (HbAS, HbAC,   HbAD, HbAE, HbA2) do not significantly interfere with this assay;   however, presence of multiple variants in a sample may impact the %   interference.     11/30/2017 6.7 (H) 4.0 - 5.6 % Final     Comment:     According to ADA guidelines, hemoglobin A1c <7.0% represents  optimal control in non-pregnant diabetic patients. Different  metrics may apply to specific patient populations.   Standards of Medical Care in Diabetes-2016.  For the purpose of screening for the presence of diabetes:  <5.7%     Consistent with the absence of diabetes  5.7-6.4%  Consistent with increasing risk for diabetes   (prediabetes)  >or=6.5%  Consistent with diabetes  Currently, no consensus exists for use of hemoglobin A1c  for diagnosis of diabetes for children.  This Hemoglobin A1c assay has significant interference with fetal   hemoglobin   (HbF). The results are invalid for patients with abnormal amounts of   HbF,   including those with known Hereditary Persistence   of Fetal Hemoglobin. Heterozygous hemoglobin variants (HbAS, HbAC,   HbAD, HbAE, HbA2) do not significantly interfere with this assay;   however, presence of multiple variants in a sample may impact the %   interference.           Patient Active Problem List   Diagnosis    GERD (gastroesophageal reflux disease)    Gout, chronic    HTN (hypertension)    Diabetes mellitus with renal manifestations,  uncontrolled    Hyperlipidemia    CKD stage 3 due to type 2 diabetes mellitus    HDL lipoprotein deficiency    Cervical radiculopathy, acute    CN (constipation)    Uncontrolled secondary diabetes mellitus with stage 3 CKD (GFR 30-59)    Seronegative arthropathy of multiple sites    Anemia    Gout of foot    CKD (chronic kidney disease), stage III    Primary osteoarthritis involving multiple joints    Diabetes mellitus with proteinuria    Diabetes mellitus with proteinuric diabetic nephropathy    Hypertensive CKD (chronic kidney disease)    Olecranon bursitis of left elbow    Leukopenia    Thrombocytopenia       Current Outpatient Prescriptions on File Prior to Visit   Medication Sig Dispense Refill    allopurinol (ZYLOPRIM) 100 MG tablet TAKE 1 TABLET(100 MG) BY MOUTH EVERY DAY 90 tablet 3    amLODIPine (NORVASC) 10 MG tablet Take 1 tablet (10 mg total) by mouth once daily. 90 tablet 3    ascorbic acid (VITAMIN C) 1000 MG tablet Take 1,000 mg by mouth once daily.      aspirin 81 MG Chew CHEW AND SWALLOW 1 TABLET BY MOUTH DAILY 90 tablet 3    atorvastatin (LIPITOR) 20 MG tablet Take 1 tablet (20 mg total) by mouth once daily. 90 tablet 3    blood sugar diagnostic Strp 1 strip by Misc.(Non-Drug; Combo Route) route 2 (two) times daily. Disp glucometer and lancets as well 100 strip 12    clobetasol (TEMOVATE) 0.05 % cream   2    folic acid (FOLVITE) 800 MCG Tab Take 800 mcg by mouth once daily.      gabapentin (NEURONTIN) 300 MG capsule TAKE 1 CAPSULE BY MOUTH THREE TIMES DAILY 270 capsule 0    glipiZIDE (GLUCOTROL) 10 MG TR24 TAKE 1 TABLET(10 MG) BY MOUTH EVERY DAY 90 tablet 3    hydroCHLOROthiazide (MICROZIDE) 12.5 mg capsule TAKE 1 CAPSULE BY MOUTH EVERY MONDAY, WEDNESDAY, FRIDAY 38 capsule 12    linagliptin (TRADJENTA) 5 mg Tab tablet Take 1 tablet (5 mg total) by mouth once daily. 90 tablet 3    metoprolol succinate (TOPROL-XL) 100 MG 24 hr tablet TAKE 1 TABLET(100 MG) BY MOUTH EVERY  DAY 90 tablet 3    multivit-min/FA/lycopen/lutein (CENTRUM SILVER MEN ORAL) Take by mouth.      niacin 500 MG Tab Take 100 mg by mouth every evening.      omeprazole (PRILOSEC) 40 MG capsule Take 1 capsule (40 mg total) by mouth once daily. 90 capsule 4    triamcinolone acetonide 0.1% (KENALOG) 0.1 % ointment       TRUEPLUS LANCETS 33 gauge Misc USE TO TEST BLOOD SUGAR BID  12    TURMERIC ROOT EXTRACT ORAL Take by mouth.      valsartan (DIOVAN) 320 MG tablet Take 1 tablet (320 mg total) by mouth once daily. 90 tablet 90    vitamin E 400 UNIT capsule Take 400 Units by mouth once daily.       No current facility-administered medications on file prior to visit.        Review of patient's allergies indicates:  No Known Allergies    Past Surgical History:   Procedure Laterality Date    EYE SURGERY      cataracts    JOINT REPLACEMENT      knee replacement    scrotal cyst         History reviewed. No pertinent family history.    Social History     Social History    Marital status:      Spouse name: N/A    Number of children: N/A    Years of education: N/A     Occupational History    Not on file.     Social History Main Topics    Smoking status: Former Smoker    Smokeless tobacco: Never Used    Alcohol use 0.0 oz/week      Comment: Social    Drug use: No    Sexual activity: Not on file     Other Topics Concern    Not on file     Social History Narrative    No narrative on file           Review of Systems   Constitution: Negative for chills, fever and weakness.   Cardiovascular: Positive for leg swelling. Negative for claudication.   Respiratory: Negative for cough and shortness of breath.    Skin: Positive for dry skin, itching, nail changes and rash.        Lichen planus   Musculoskeletal: Positive for arthritis (RA), back pain, joint pain and myalgias. Negative for falls, joint swelling and muscle weakness.   Gastrointestinal: Negative for diarrhea, nausea and vomiting.   Neurological:  "Positive for paresthesias. Negative for numbness and tremors.   Psychiatric/Behavioral: Negative for altered mental status and hallucinations.           Objective:       Vitals:    08/07/18 1045   BP: 136/60   Weight: 92.1 kg (203 lb)   Height: 5' 10" (1.778 m)   PainSc: 0-No pain       Physical Exam   Constitutional:  Non-toxic appearance. He does not have a sickly appearance. No distress.   Pt. is well-developed, well-nourished, appears stated age, in no acute distress, alert and oriented x 3. No evidence of depression, anxiety, or agitation. Calm, cooperative, and communicative. Appropriate interactions and affect.   Cardiovascular:   Pulses:       Dorsalis pedis pulses are 1+ on the right side, and 1+ on the left side.        Posterior tibial pulses are 1+ on the right side, and 1+ on the left side.    Dorsalis pedis and posterior tibial pulses are diminished Bilaterally. Toes are cool to touch. Feet are warm proximally.There is decreased digital hair. Skin is atrophic   Pulmonary/Chest: No respiratory distress.   Musculoskeletal:        Right ankle: No tenderness. No lateral malleolus, no medial malleolus, no AITFL, no CF ligament and no posterior TFL tenderness found. Achilles tendon exhibits no pain, no defect and normal Dahl's test results.        Left ankle: No tenderness. No lateral malleolus, no medial malleolus, no AITFL, no CF ligament and no posterior TFL tenderness found. Achilles tendon exhibits no pain, no defect and normal Dahl's test results.        Right foot: There is no tenderness and no bony tenderness.        Left foot: There is no tenderness and no bony tenderness.   Fat pad atrophy to heels and met heads bilateral    Decreased stride, station of gait.  apropulsive toe off.  Increased angle and base of gait.    Patient has hammertoes of digits 2-5 bilateral partially reducible without symptom today.    Decreased first MPJ range of motion both weightbearing and nonweightbearing, no " crepitus observed the first MP joint, + dorsal flag sign.Mild  bunion deformity is observed .     Lymphadenopathy:   No lymphatic streaking    Negative lymphadenopathy bilateral popliteal fossa and tarsal tunnel.     Neurological: A sensory deficit is present.   [place] 5.07 monofilament is intact bilateral feet. Sharp/dull sensation is also intact Bilateral feet. Proprioception is grossly intact.     Decreased/absent vibratory sensation bilateral feet to 128Hz tuning fork.    Paresthesias, and hyperesthesia bilateral feet with no clearly identified trigger or source.           Skin: Skin is warm, dry and intact. Lesion noted. No abrasion, no bruising, no burn, no ecchymosis and no rash noted. He is not diaphoretic. No cyanosis. No pallor. Nails show no clubbing.    Skin is thin, atrophic    Toenails 1-2 bilaterally are elongated by  discolored/yellowed, dystrophic, brittle with subungual debris.     Diffuse flat topped violaceous lesions to the upper and lower extremity   Psychiatric: His mood appears not anxious. His affect is not inappropriate. His speech is not slurred. He is not combative. He is communicative. He is attentive.   Nursing note reviewed.          Assessment:       Encounter Diagnoses   Name Primary?    Type II diabetes mellitus with neurological manifestations Yes    Encounter for diabetic foot exam     Dermatophytosis of nail     Hammer toes of both feet     Hallux limitus, acquired, left     Hallux limitus, acquired, right     Lichen planus          Plan:       Bria was seen today for diabetes mellitus, diabetic foot exam and nail care.    Diagnoses and all orders for this visit:    Type II diabetes mellitus with neurological manifestations    Encounter for diabetic foot exam    Dermatophytosis of nail    Hammer toes of both feet    Hallux limitus, acquired, left    Hallux limitus, acquired, right    Lichen planus      I counseled the patient on his conditions, their  implications and medical management.      Greater than 50% of this visit spent on counseling and coordination of care.    Education about the diabetic foot, neuropathy, and prevention of limb loss.    Shoe inspection. Diabetic Foot Education. Patient reminded of the importance of good nutrition and blood sugar control to help prevent podiatric complications of diabetes. Patient instructed on proper foot hygeine. We discussed wearing proper shoe gear, daily foot inspections, never walking without protective shoe gear, never putting sharp instruments to feet.      LP being managed by dermatologist    Informed patient that many nail problems can be prevented by wearing the right shoes and trimming your nails properly.   The right shoes: Feet were measured.  Patient is to wear shoes that are supportive and roomy enough for toes to wiggle. Look for shoes made of natural materials such as leather, which allow  feet to breathe.   Proper trimming: To avoid problems, she was instructed to trim toenails straight across without cutting down into the corners.       He will continue to monitor the areas daily, inspect his feet, wear protective shoe gear when ambulatory, moisturizer to maintain skin integrity and follow in this office in approximately 6-12 months, sooner if he wishes to have nail procedure

## 2018-08-13 ENCOUNTER — TELEPHONE (OUTPATIENT)
Dept: FAMILY MEDICINE | Facility: CLINIC | Age: 83
End: 2018-08-13

## 2018-08-13 RX ORDER — IRBESARTAN 300 MG/1
300 TABLET ORAL NIGHTLY
Qty: 90 TABLET | Refills: 3 | Status: SHIPPED | OUTPATIENT
Start: 2018-08-13 | End: 2019-01-22

## 2018-08-13 NOTE — TELEPHONE ENCOUNTER
----- Message from Christopher Bello sent at 8/13/2018  1:50 PM CDT -----  Contact: Long/[phone]  #valsartan (DIOVAN) 320 MG tablet#    Brandon called to request and alternative medication for valsartan (DIOVAN) 320 MG tablet because of recall. Thank you.

## 2018-08-15 ENCOUNTER — OFFICE VISIT (OUTPATIENT)
Dept: NEPHROLOGY | Facility: CLINIC | Age: 83
End: 2018-08-15
Payer: MEDICARE

## 2018-08-15 ENCOUNTER — LAB VISIT (OUTPATIENT)
Dept: LAB | Facility: HOSPITAL | Age: 83
End: 2018-08-15
Attending: INTERNAL MEDICINE
Payer: MEDICARE

## 2018-08-15 VITALS
WEIGHT: 201.06 LBS | SYSTOLIC BLOOD PRESSURE: 124 MMHG | BODY MASS INDEX: 28.78 KG/M2 | OXYGEN SATURATION: 98 % | HEIGHT: 70 IN | DIASTOLIC BLOOD PRESSURE: 64 MMHG | HEART RATE: 58 BPM

## 2018-08-15 DIAGNOSIS — E11.22 CKD STAGE 3 DUE TO TYPE 2 DIABETES MELLITUS: Primary | ICD-10-CM

## 2018-08-15 DIAGNOSIS — N18.30 CKD STAGE 3 DUE TO TYPE 2 DIABETES MELLITUS: Primary | ICD-10-CM

## 2018-08-15 DIAGNOSIS — E11.29 DIABETES MELLITUS WITH PROTEINURIA: ICD-10-CM

## 2018-08-15 DIAGNOSIS — E11.22 CKD STAGE 3 DUE TO TYPE 2 DIABETES MELLITUS: ICD-10-CM

## 2018-08-15 DIAGNOSIS — R80.9 DIABETES MELLITUS WITH PROTEINURIA: ICD-10-CM

## 2018-08-15 DIAGNOSIS — N18.30 HYPERTENSIVE KIDNEY DISEASE WITH STAGE 3 CHRONIC KIDNEY DISEASE: ICD-10-CM

## 2018-08-15 DIAGNOSIS — N18.30 CKD (CHRONIC KIDNEY DISEASE), STAGE III: ICD-10-CM

## 2018-08-15 DIAGNOSIS — I12.9 HYPERTENSIVE KIDNEY DISEASE WITH STAGE 3 CHRONIC KIDNEY DISEASE: ICD-10-CM

## 2018-08-15 DIAGNOSIS — N18.30 CKD STAGE 3 DUE TO TYPE 2 DIABETES MELLITUS: ICD-10-CM

## 2018-08-15 DIAGNOSIS — M1A.0790 IDIOPATHIC CHRONIC GOUT OF FOOT WITHOUT TOPHUS, UNSPECIFIED LATERALITY: ICD-10-CM

## 2018-08-15 LAB
BILIRUB UR QL STRIP: NEGATIVE
CLARITY UR REFRACT.AUTO: CLEAR
COLOR UR AUTO: YELLOW
CREAT UR-MCNC: 85 MG/DL
GLUCOSE UR QL STRIP: ABNORMAL
HGB UR QL STRIP: NEGATIVE
KETONES UR QL STRIP: NEGATIVE
LEUKOCYTE ESTERASE UR QL STRIP: NEGATIVE
MICROSCOPIC COMMENT: NORMAL
NITRITE UR QL STRIP: NEGATIVE
PH UR STRIP: 5 [PH] (ref 5–8)
PROT UR QL STRIP: NEGATIVE
PROT UR-MCNC: 7 MG/DL
PROT/CREAT UR: 0.08 MG/G{CREAT}
RBC #/AREA URNS AUTO: 1 /HPF (ref 0–4)
SP GR UR STRIP: 1.01 (ref 1–1.03)
SQUAMOUS #/AREA URNS AUTO: 0 /HPF
URN SPEC COLLECT METH UR: ABNORMAL
UROBILINOGEN UR STRIP-ACNC: NEGATIVE EU/DL

## 2018-08-15 PROCEDURE — 81001 URINALYSIS AUTO W/SCOPE: CPT

## 2018-08-15 PROCEDURE — 82570 ASSAY OF URINE CREATININE: CPT

## 2018-08-15 PROCEDURE — 99214 OFFICE O/P EST MOD 30 MIN: CPT | Mod: S$PBB,,, | Performed by: INTERNAL MEDICINE

## 2018-08-15 PROCEDURE — 99999 PR PBB SHADOW E&M-EST. PATIENT-LVL IV: CPT | Mod: PBBFAC,,, | Performed by: INTERNAL MEDICINE

## 2018-08-15 PROCEDURE — 99214 OFFICE O/P EST MOD 30 MIN: CPT | Mod: PBBFAC | Performed by: INTERNAL MEDICINE

## 2018-08-15 NOTE — LETTER
August 15, 2018      Adiel Bragg MD  [address]  [place] [address]           [facility] - Nephrology  [address]  [place] [address]  Phone: [phone]  Fax: [phone]          Patient: Bria Lawson   MR Number: [id number]   YOB: 1935   Date of Visit: 8/15/2018       Dear Dr. Adiel Bragg:    Thank you for referring Bria Lawson to me for evaluation. Attached you will find relevant portions of my assessment and plan of care.    If you have questions, please do not hesitate to call me. I look forward to following Bria Lawson along with you.    Sincerely,    Ella Holm MD    Enclosure  CC:  No Recipients    If you would like to receive this communication electronically, please contact [email or web address] or [phone] to request more information on [employer] Link access.    For providers and/or their staff who would like to refer a patient to [facility], please contact us through our one-stop-shop provider referral line, [facility], at [phone].    If you feel you have received this communication in error or would no longer like to receive these types of communications, please e-mail [email or web address]

## 2018-08-15 NOTE — PROGRESS NOTES
Chief Complaint :   Low blood counts    Hx of Present illness :  Patient is a 82 y.o. year old male who presents to the clinic today for   Oncology evaluation. No fever, pruritis, night sweats. . No weight loss. No overt bleeding. Stay7s active.      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    Transfusion :  None    Menstrual & obstetric Hx :  N/A        Present Meds :   Medication List with Changes/Refills   Current Medications    ALLOPURINOL (ZYLOPRIM) 100 MG TABLET    TAKE 1 TABLET(100 MG) BY MOUTH EVERY DAY    AMLODIPINE (NORVASC) 10 MG TABLET    Take 1 tablet (10 mg total) by mouth once daily.    ASCORBIC ACID (VITAMIN C) 1000 MG TABLET    Take 1,000 mg by mouth once daily.    ASPIRIN 81 MG CHEW    CHEW AND SWALLOW 1 TABLET BY MOUTH DAILY    ATORVASTATIN (LIPITOR) 20 MG TABLET    Take 1 tablet (20 mg total) by mouth once daily.    BLOOD SUGAR DIAGNOSTIC STRP    1 strip by Misc.(Non-Drug; Combo Route) route 2 (two) times daily. Disp glucometer and lancets as well    CLOBETASOL (TEMOVATE) 0.05 % CREAM        FOLIC ACID (FOLVITE) 800 MCG TAB    Take 800 mcg by mouth once daily.    GABAPENTIN (NEURONTIN) 300 MG CAPSULE    TAKE 1 CAPSULE BY MOUTH THREE TIMES DAILY    GLIPIZIDE (GLUCOTROL) 10 MG TR24    TAKE 1 TABLET(10 MG) BY MOUTH EVERY DAY    HYDROCHLOROTHIAZIDE (MICROZIDE) 12.5 MG CAPSULE    TAKE 1 CAPSULE BY MOUTH EVERY MONDAY, WEDNESDAY, FRIDAY    IRBESARTAN (AVAPRO) 300 MG TABLET    Take 1 tablet (300 mg total) by mouth every evening.    LINAGLIPTIN (TRADJENTA) 5 MG TAB TABLET    Take 1 tablet (5 mg total) by mouth once daily.    METOPROLOL SUCCINATE (TOPROL-XL) 100 MG 24 HR TABLET    TAKE 1 TABLET(100 MG) BY MOUTH EVERY DAY    MULTIVIT-MIN/FA/LYCOPEN/LUTEIN (CENTRUM SILVER MEN ORAL)    Take by mouth.    NIACIN 500 MG TAB    Take 100 mg by mouth every evening.    OMEPRAZOLE (PRILOSEC) 40 MG CAPSULE    Take 1 capsule (40 mg  total) by mouth once daily.    TRIAMCINOLONE ACETONIDE 0.1% (KENALOG) 0.1 % OINTMENT        TRUEPLUS LANCETS 33 GAUGE MISC    USE TO TEST BLOOD SUGAR BID    TURMERIC ROOT EXTRACT ORAL    Take by mouth.    VALSARTAN (DIOVAN) 320 MG TABLET    Take 1 tablet (320 mg total) by mouth once daily.    VITAMIN E 400 UNIT CAPSULE    Take 400 Units by mouth once daily.       Past Medical Hx :   Hypertension; DM; Hyperlipidemia; Chronic atrial fibrillation; Macular degeneration, Lichen planus..CKD 3; CAD.GERD; Peripheral Neuropathy.  Known to have Rheumatoid arthritis, on no Rx  No Hx of TB, , Lupus, sarcoid, Seizure disorder or CVA. No Hx of DVT or PE./ No past Hx of cancer, chemotherapy or radiation therapy.    Past Medical Hx :  Past Medical History:   Diagnosis Date    Atrial fibrillation     CKD stage 3 due to type 2 diabetes mellitus 5/26/2015    Colon polyp     Coronary artery disease     Diabetes mellitus with renal manifestations, uncontrolled 2/26/2015    GERD (gastroesophageal reflux disease) 2/26/2015    Gout     Gout, chronic 2/26/2015    HDL lipoprotein deficiency 5/26/2015    Hyperlipidemia 2/26/2015    Uncontrolled secondary diabetes mellitus with stage 3 CKD (GFR 30-59) 8/28/2015       Travel Hx :   Back from three week vacation to [place] and [place].    Immunization :  Immunization History   Administered Date(s) Administered    Influenza - High Dose 11/03/2017    Pneumococcal Conjugate - 13 Valent 06/27/2017    Td (ADULT) 11/18/2005       Family Hx :  No family history on file.    Social Hx :  Social History     Socioeconomic History    Marital status:      Spouse name: Not on file    Number of children: Not on file    Years of education: Not on file    Highest education level: Not on file   Social Needs    Financial resource strain: Not on file    Food insecurity - worry: Not on file    Food insecurity - inability: Not on file    Transportation needs - medical: Not on file     Transportation needs - non-medical: Not on file   Occupational History    Not on file   Tobacco Use    Smoking status: Former Smoker    Smokeless tobacco: Never Used   Substance and Sexual Activity    Alcohol use: Yes     Alcohol/week: 0.0 oz     Comment: Social    Drug use: No    Sexual activity: Not on file   Other Topics Concern    Not on file   Social History Narrative    Not on file       Surgery :  Bilateral Knee replacement; Bilateral Cataract surgery.    Symptoms :   No new Sx.    Review of Systems   Constitutional: Negative for chills, diaphoresis, fever, malaise/fatigue and weight loss.   HENT: Positive for congestion. Negative for ear discharge, ear pain, hearing loss, nosebleeds, sinus pain, sore throat and tinnitus.    Eyes: Negative for blurred vision, double vision, photophobia, pain, discharge and redness.   Respiratory: Positive for shortness of breath. Negative for cough, hemoptysis, sputum production, wheezing and stridor.    Cardiovascular: Negative for chest pain, palpitations, orthopnea, claudication, leg swelling and PND.   Gastrointestinal: Negative for abdominal pain, blood in stool, constipation, diarrhea, heartburn, melena, nausea and vomiting.   Genitourinary: Negative for dysuria, flank pain, frequency, hematuria and urgency.   Musculoskeletal: Negative for back pain, falls, joint pain, myalgias and neck pain.   Skin: Negative for itching and rash.   Neurological: Positive for tingling and sensory change. Negative for dizziness, weakness and headaches.   Endo/Heme/Allergies: Negative for environmental allergies. Does not bruise/bleed easily.   Psychiatric/Behavioral: Negative for depression, hallucinations, memory loss, substance abuse and suicidal ideas. The patient is not nervous/anxious and does not have insomnia.        Physical Exam :  Looks younger than the stated age.  Physical Exam   Constitutional: He is oriented to person, place, and time and well-developed,  well-nourished, and in no distress. Vital signs are normal. No distress.   HENT:   Head: Normocephalic and atraumatic.   Right Ear: External ear normal.   Left Ear: External ear normal.   Nose: Nose normal.   Mouth/Throat: Oropharynx is clear and moist. No oropharyngeal exudate.   Eyes: Conjunctivae, EOM and lids are normal. Pupils are equal, round, and reactive to light. Lids are everted and swept, no foreign bodies found. Right eye exhibits no discharge. Left eye exhibits no discharge. No scleral icterus.       Neck: Trachea normal, normal range of motion, full passive range of motion without pain and phonation normal. Neck supple. Normal carotid pulses, no hepatojugular reflux and no JVD present. No tracheal tenderness present. Carotid bruit is not present. No tracheal deviation present. No thyroid mass and no thyromegaly present.   Cardiovascular: Normal rate, regular rhythm, normal heart sounds, intact distal pulses and normal pulses. PMI is not displaced. Exam reveals no gallop and no friction rub.   No murmur heard.  Pulmonary/Chest: Effort normal and breath sounds normal. No stridor. No apnea. No respiratory distress. He has no wheezes. He has no rales. He exhibits no tenderness.   Abdominal: Soft. Normal appearance, normal aorta and bowel sounds are normal. He exhibits no distension, no ascites and no mass. There is no hepatosplenomegaly. There is no tenderness. There is no rebound, no guarding and no CVA tenderness. No hernia.   Genitourinary:   Genitourinary Comments: Not examined   Musculoskeletal: Normal range of motion. He exhibits no edema, tenderness or deformity.   Lymphadenopathy:        Head (right side): No submental, no submandibular, no tonsillar, no preauricular, no posterior auricular and no occipital adenopathy present.        Head (left side): No submental, no submandibular, no tonsillar, no preauricular, no posterior auricular and no occipital adenopathy present.     He has no cervical  adenopathy.     He has no axillary adenopathy.        Right: No inguinal, no supraclavicular and no epitrochlear adenopathy present.        Left: No inguinal, no supraclavicular and no epitrochlear adenopathy present.   Neurological: He is alert and oriented to person, place, and time. He has normal motor skills, normal sensation, normal strength, normal reflexes and intact cranial nerves. No cranial nerve deficit. He exhibits normal muscle tone. Gait normal. Coordination normal. GCS score is 15.   Skin: Skin is warm, dry and intact. No rash noted. He is not diaphoretic. No cyanosis or erythema. No pallor. Nails show no clubbing.   Psychiatric: Mood, memory, affect and judgment normal.   No New finding.       Labs & Imaging :  07/10/18 : WBC 3,590. ANC 1,600. Hgb 12. 2; Hct 35.2; Plts 97,000. Normal indices.   Platlets 134,000 in 2017 and 143,000 in 2016. NFBS 219; cr. 1.3; ca 9.5; Bili 0.9 ALP 54  08/02/18 :  Rheumatoid Factor Neg. ESR 1; .  Hgb 12.8; Hct 38.6 Normal indices, ANC 4,700. Plts 85,000.      Dx :  Thrombocytopenia.       Assessment & Plan: Physical Exam unremarkable.  Reviewed peripheral smear. Has about 100,000 platelets. Red cell morphology normal. No immature cells. Repeat CBC, get U/S abdomen. Re evaluate with results. Patient understands and verbalised

## 2018-08-15 NOTE — PROGRESS NOTES
Subjective:       Patient ID: Bria Lawson is a 82 y.o. Black or  male who presents for follow-up evaluation of Chronic Kidney Disease    HPI     Mr. Lawson is seen in nephrology clinic for follow up on CKD. He was last seen on 5/1/18. He has CKD III, diabetes type 2, hypertension since age 38, chronic knee pain, DJD, rheumatoid arthritis. He had prior nephrology care at [facility] when he was told of eGFR in the range of 40's in 2013.     He is overall doing fine. He reports otherwise he has been doing fine, denies any nausea/ decreased or increased urine/ flank pain/ vomiting/ diarrhea/ fever. He denies NSAID use. He does have pain due to DJD/ sciatica. He had an excellent trip in summer to [place].     He has worsened platelet count and last visit I had discussed with him to see hematologist but he had declined it. But per his PCP he was referred to one recently and now under heme care for pancytopenia. Of note, recently due to recall he was taken off valsartan and started on irbesartan.    Since his last visit he has actually lost a few lbs of weight. He has stable appetite. He denies any night sweats/ bone pain/ bleeding gums. He has stable BP per him, he did not bring any home BP readings.     He also takes Omeprazole. He states he does not tolerate GERD symptoms if he skips taking Omeprazole.     Prior labs include normal C4, hep B and C screen negative, no evidence of paraproteinemia. US from 1/16 showed normal kidney size and no mass or obstruction.     Renal Function:  Lab Results   Component Value Date     (H) 07/10/2018     (H) 03/05/2018     07/10/2018     03/05/2018    K 4.1 07/10/2018    K 4.5 03/05/2018     07/10/2018     03/05/2018    CO2 26 07/10/2018    CO2 25 03/05/2018    BUN 17 07/10/2018    BUN 19 03/05/2018    CALCIUM 9.5 07/10/2018    CALCIUM 9.7 03/05/2018    CREATININE 1.3 07/10/2018    CREATININE 1.4 03/05/2018    ALBUMIN 4.1 07/10/2018     ALBUMIN 3.8 10/02/2017    PHOS 2.8 10/02/2017    PHOS 3.1 03/14/2017    ESTGFRAFRICA 59.2 (A) 07/10/2018    ESTGFRAFRICA 54.1 (A) 03/05/2018    EGFRNONAA 51.2 (A) 07/10/2018    EGFRNONAA 46.8 (A) 03/05/2018       Urinalysis:  Lab Results   Component Value Date    APPEARANCEUA Clear 10/02/2017    PHUR 6.0 10/02/2017    SPECGRAV 1.010 10/02/2017    PROTEINUA Negative 10/02/2017    GLUCUA 3+ (A) 10/02/2017    OCCULTUA Negative 10/02/2017    NITRITE Negative 10/02/2017    LEUKOCYTESUR Negative 10/02/2017       Protein/Creatinine Ratio:  Lab Results   Component Value Date    PROTEINURINE 10 10/02/2017    CREATRANDUR 53.0 10/02/2017    UTPCR 0.19 10/02/2017       CBC:  Lab Results   Component Value Date    WBC 7.62 08/02/2018    HGB 12.8 (L) 08/02/2018    HCT 38.6 (L) 08/02/2018     Last vit D 46, PTH 40  No recent urine study      Review of Systems     Constitutional: Negative for fever, chills, activity change, appetite change and fatigue.   HENT: Negative for hearing loss and nosebleeds.    Respiratory: Negative for cough, shortness of breath and wheezing.   Cardiovascular: Negative for chest pain and palpitations.   Gastrointestinal: Negative for nausea, vomiting, abdominal pain and diarrhea.   Endocrine: Negative for polydipsia and polyuria.   Genitourinary: Negative for dysuria, frequency, hematuria and flank pain.   Musculoskeletal: Positive for back pain, arthralgias and gait problem. Negative for myalgias.   Skin: Negative for pallor and rash.   Neurological: Negative for dizziness, light-headedness and headaches.   Psychiatric/Behavioral: Negative for behavioral problems. The patient is not nervous/anxious    Objective:      Physical Exam     Constitutional: He is oriented to person, place, and time. He appears well-developed and well-nourished. No distress.   Head: Normocephalic and atraumatic.   Neck: Normal range of motion. Neck supple. No JVD present.   Cardiovascular: Normal rate, regular rhythm and normal  heart sounds.   No murmur heard.  Pulmonary/Chest: Effort normal and breath sounds normal. No respiratory distress. He has no wheezes. He has no rales.   Abdominal: Soft. Bowel sounds are normal. He exhibits no distension. There is no tenderness.   Musculoskeletal: He exhibits trace edema.   Neurological: He is alert and oriented to person, place, and time.   Skin: Skin is warm and dry. He is not diaphoretic.    Psychiatric: He has a normal mood and affect. His behavior is normal.   Vitals reviewed.    Assessment:       1. CKD stage 3 due to type 2 diabetes mellitus    2. CKD (chronic kidney disease), stage III    3. Hypertensive kidney disease with stage 3 chronic kidney disease    4. Diabetes mellitus with proteinuria    5. Idiopathic chronic gout of foot without tophus, unspecified laterality        Plan:     Mr. Lawson has longstanding type 2 diabetes, hypertension, occasional NSAID use, has CKD due to diabetic, hypertensive nephropathy and occasional NSAID use in the past, he has CKD III.    CKD III  Stable renal labs  Continue current regimen of antihypertensive including ARB containing regimen for RAAS blockade  Low salt diet  avoid NSAID/ bactrim/ IV contrast/ gadolinium/ aminoglycoside/ Fleet enema where possible    hypertension  Strict low salt diet  continue ARB containing regimen  home BP monitoring, goal less than 130-140/80                                                                                                                                                                                                                                                                                                                                                                   Chronic gout  Continue allopurinol  Follow uric acid level    Leukopenia  Thrombocytopenia  Slowly worsening, now under care of hematologist, ? If it was due to valsartan which was recently changed to Irbesartan     Diabetes,  hyperlipidemia, RA management per PCP and rheumatology.  Labs discussed with patient, potential for decline in renal function explained to him, periodic follow up on renal function stressed. His questions were answered to his satisfaction. Stressed importance of better glycemic control, weight loss and strict low salt diet. He agreed with the plan.   Surveillance renal panel in 3 months   RTC 6 months.

## 2018-08-16 ENCOUNTER — OFFICE VISIT (OUTPATIENT)
Dept: HEMATOLOGY/ONCOLOGY | Facility: CLINIC | Age: 83
End: 2018-08-16
Payer: MEDICARE

## 2018-08-16 VITALS
DIASTOLIC BLOOD PRESSURE: 67 MMHG | OXYGEN SATURATION: 97 % | TEMPERATURE: 98 F | BODY MASS INDEX: 28.52 KG/M2 | SYSTOLIC BLOOD PRESSURE: 145 MMHG | HEART RATE: 58 BPM | WEIGHT: 198.75 LBS

## 2018-08-16 DIAGNOSIS — M06.9 RHEUMATOID ARTHRITIS, INVOLVING UNSPECIFIED SITE, UNSPECIFIED RHEUMATOID FACTOR PRESENCE: ICD-10-CM

## 2018-08-16 DIAGNOSIS — D69.6 THROMBOCYTOPENIA: Primary | ICD-10-CM

## 2018-08-16 PROCEDURE — 99213 OFFICE O/P EST LOW 20 MIN: CPT | Mod: PBBFAC | Performed by: INTERNAL MEDICINE

## 2018-08-16 PROCEDURE — 99999 PR PBB SHADOW E&M-EST. PATIENT-LVL III: CPT | Mod: PBBFAC,,, | Performed by: INTERNAL MEDICINE

## 2018-08-16 PROCEDURE — 99213 OFFICE O/P EST LOW 20 MIN: CPT | Mod: S$PBB,,, | Performed by: INTERNAL MEDICINE

## 2018-08-17 RX ORDER — GABAPENTIN 300 MG/1
CAPSULE ORAL
Qty: 270 CAPSULE | Refills: 1 | Status: SHIPPED | OUTPATIENT
Start: 2018-08-17 | End: 2019-02-13 | Stop reason: SDUPTHER

## 2018-08-24 ENCOUNTER — TELEPHONE (OUTPATIENT)
Dept: NEPHROLOGY | Facility: CLINIC | Age: 83
End: 2018-08-24

## 2018-08-28 NOTE — H&P (VIEW-ONLY)
Chief Complaint :   Low blood counts    Hx of Present illness :  Patient is a 82 y.o. year old male who presents to the clinic today for   Oncology evaluation. No fever, pruritis, night sweats. . No weight loss. No overt bleeding. Stays active.      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    Transfusion :  None    Menstrual & obstetric Hx :  N/A        Present Meds :   Medication List with Changes/Refills   Current Medications    ALLOPURINOL (ZYLOPRIM) 100 MG TABLET    TAKE 1 TABLET(100 MG) BY MOUTH EVERY DAY    AMLODIPINE (NORVASC) 10 MG TABLET    Take 1 tablet (10 mg total) by mouth once daily.    ASCORBIC ACID (VITAMIN C) 1000 MG TABLET    Take 1,000 mg by mouth once daily.    ASPIRIN 81 MG CHEW    CHEW AND SWALLOW 1 TABLET BY MOUTH DAILY    ATORVASTATIN (LIPITOR) 20 MG TABLET    Take 1 tablet (20 mg total) by mouth once daily.    BLOOD SUGAR DIAGNOSTIC STRP    1 strip by Misc.(Non-Drug; Combo Route) route 2 (two) times daily. Disp glucometer and lancets as well    CLOBETASOL (TEMOVATE) 0.05 % CREAM        FOLIC ACID (FOLVITE) 800 MCG TAB    Take 800 mcg by mouth once daily.    GABAPENTIN (NEURONTIN) 300 MG CAPSULE    TAKE 1 CAPSULE BY MOUTH THREE TIMES DAILY    GLIPIZIDE (GLUCOTROL) 10 MG TR24    TAKE 1 TABLET(10 MG) BY MOUTH EVERY DAY    HYDROCHLOROTHIAZIDE (MICROZIDE) 12.5 MG CAPSULE    TAKE 1 CAPSULE BY MOUTH EVERY MONDAY, WEDNESDAY, FRIDAY    IRBESARTAN (AVAPRO) 300 MG TABLET    Take 1 tablet (300 mg total) by mouth every evening.    LINAGLIPTIN (TRADJENTA) 5 MG TAB TABLET    Take 1 tablet (5 mg total) by mouth once daily.    METOPROLOL SUCCINATE (TOPROL-XL) 100 MG 24 HR TABLET    TAKE 1 TABLET(100 MG) BY MOUTH EVERY DAY    MULTIVIT-MIN/FA/LYCOPEN/LUTEIN (CENTRUM SILVER MEN ORAL)    Take by mouth.    NIACIN 500 MG TAB    Take 100 mg by mouth every evening.    OMEPRAZOLE (PRILOSEC) 40 MG CAPSULE    Take 1 capsule (40 mg total)  by mouth once daily.    TRIAMCINOLONE ACETONIDE 0.1% (KENALOG) 0.1 % OINTMENT        TRUEPLUS LANCETS 33 GAUGE MISC    USE TO TEST BLOOD SUGAR BID    TURMERIC ROOT EXTRACT ORAL    Take by mouth.    VITAMIN E 400 UNIT CAPSULE    Take 400 Units by mouth once daily.       Past Medical Hx :   Hypertension; DM; Hyperlipidemia; Chronic atrial fibrillation; Macular degeneration, Lichen planus..CKD 3; CAD.GERD; Peripheral Neuropathy.  Known to have Rheumatoid arthritis, on no Rx  No Hx of TB, , Lupus, sarcoid, Seizure disorder or CVA. No Hx of DVT or PE./ No past Hx of cancer, chemotherapy or radiation therapy.    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.    Immunization :  Immunization History   Administered Date(s) Administered    Influenza - High Dose 11/03/2017    Pneumococcal Conjugate - 13 Valent 06/27/2017    Td (ADULT) 11/18/2005       Family Hx :  No family history on file.    Social Hx :  Social History     Socioeconomic History    Marital status:      Spouse name: Not on file    Number of children: Not on file    Years of education: Not on file    Highest education level: Not on file   Social Needs    Financial resource strain: Not on file    Food insecurity - worry: Not on file    Food insecurity - inability: Not on file    Transportation needs - medical: Not on file    Transportation needs - non-medical: Not on file   Occupational History    Not on file   Tobacco  Use    Smoking status: Former Smoker    Smokeless tobacco: Never Used   Substance and Sexual Activity    Alcohol use: Yes     Alcohol/week: 0.0 oz     Comment: Social    Drug use: No    Sexual activity: Not on file   Other Topics Concern    Not on file   Social History Narrative    Not on file       Surgery :  Bilateral Knee replacement; Bilateral Cataract surgery.    Symptoms :   No new Sx.    Review of Systems   Constitutional: Negative for chills, diaphoresis, fever, malaise/fatigue and weight loss.   HENT: Positive for congestion. Negative for ear discharge, ear pain, hearing loss, nosebleeds, sinus pain, sore throat and tinnitus.    Eyes: Negative for blurred vision, double vision, photophobia, pain, discharge and redness.   Respiratory: Positive for shortness of breath. Negative for cough, hemoptysis, sputum production, wheezing and stridor.    Cardiovascular: Negative for chest pain, palpitations, orthopnea, claudication, leg swelling and PND.   Gastrointestinal: Negative for abdominal pain, blood in stool, constipation, diarrhea, heartburn, melena, nausea and vomiting.   Genitourinary: Negative for dysuria, flank pain, frequency, hematuria and urgency.   Musculoskeletal: Negative for back pain, falls, joint pain, myalgias and neck pain.   Skin: Negative for itching and rash.   Neurological: Positive for tingling and sensory change. Negative for dizziness, weakness and headaches.   Endo/Heme/Allergies: Negative for environmental allergies. Does not bruise/bleed easily.   Psychiatric/Behavioral: Negative for depression, hallucinations, memory loss, substance abuse and suicidal ideas. The patient is not nervous/anxious and does not have insomnia.        Physical Exam :  Looks younger than the stated age.  Physical Exam   Constitutional: He is oriented to person, place, and time and well-developed, well-nourished, and in no distress. Vital signs are normal. No distress.   HENT:   Head: Normocephalic and  atraumatic.   Right Ear: External ear normal.   Left Ear: External ear normal.   Nose: Nose normal.   Mouth/Throat: Oropharynx is clear and moist. No oropharyngeal exudate.   Eyes: Conjunctivae, EOM and lids are normal. Pupils are equal, round, and reactive to light. Lids are everted and swept, no foreign bodies found. Right eye exhibits no discharge. Left eye exhibits no discharge. No scleral icterus.       Neck: Trachea normal, normal range of motion, full passive range of motion without pain and phonation normal. Neck supple. Normal carotid pulses, no hepatojugular reflux and no JVD present. No tracheal tenderness present. Carotid bruit is not present. No tracheal deviation present. No thyroid mass and no thyromegaly present.   Cardiovascular: Normal rate, regular rhythm, normal heart sounds, intact distal pulses and normal pulses. PMI is not displaced. Exam reveals no gallop and no friction rub.   No murmur heard.  Pulmonary/Chest: Effort normal and breath sounds normal. No stridor. No apnea. No respiratory distress. He has no wheezes. He has no rales. He exhibits no tenderness.   Abdominal: Soft. Normal appearance, normal aorta and bowel sounds are normal. He exhibits no distension, no ascites and no mass. There is no hepatosplenomegaly. There is no tenderness. There is no rebound, no guarding and no CVA tenderness. No hernia.   Genitourinary:   Genitourinary Comments: Not examined   Musculoskeletal: Normal range of motion. He exhibits no edema, tenderness or deformity.   Lymphadenopathy:        Head (right side): No submental, no submandibular, no tonsillar, no preauricular, no posterior auricular and no occipital adenopathy present.        Head (left side): No submental, no submandibular, no tonsillar, no preauricular, no posterior auricular and no occipital adenopathy present.     He has no cervical adenopathy.     He has no axillary adenopathy.        Right: No inguinal, no supraclavicular and no  epitrochlear adenopathy present.        Left: No inguinal, no supraclavicular and no epitrochlear adenopathy present.   Neurological: He is alert and oriented to person, place, and time. He has normal motor skills, normal sensation, normal strength, normal reflexes and intact cranial nerves. No cranial nerve deficit. He exhibits normal muscle tone. Gait normal. Coordination normal. GCS score is 15.   Skin: Skin is warm, dry and intact. No rash noted. He is not diaphoretic. No cyanosis or erythema. No pallor. Nails show no clubbing.   Psychiatric: Mood, memory, affect and judgment normal.   No New finding.       Labs & Imaging :  07/10/18 : WBC 3,590. ANC 1,600. Hgb 12. 2; Hct 35.2; Plts 97,000. Normal indices.   Platlets 134,000 in 2017 and 143,000 in 2016. NFBS 219; cr. 1.3; ca 9.5; Bili 0.9 ALP 54  08/02/18 :  Rheumatoid Factor Neg. ESR 1; .  Hgb 12.8; Hct 38.6 Normal indices, ANC 4,700. Plts 85,000.  08/16/18 : U/S abdomen : Hepatic steatosis.  Bilateral Thin walled renal cysts.  08/29/18 : WBC 3,000. Hgb 12.5; Hct 35.9; MCV 92;  Plts 77,000; ANC 1,200.     Dx :  Thrombocytopenia.  Mild neutropenia.       Assessment & Plan: Physical Exam unremarkable.  Reviewed peripheral smear.   Red Cell Morphology normal. No immature white cells. Many large platelets present . Bone Marrow Bx scheduled

## 2018-08-28 NOTE — PROGRESS NOTES
Chief Complaint :   Low blood counts    Hx of Present illness :  Patient is a 82 y.o. year old male who presents to the clinic today for   Oncology evaluation. No fever, pruritis, night sweats. . No weight loss. No overt bleeding. Stays active.      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    Transfusion :  None    Menstrual & obstetric Hx :  N/A        Present Meds :   Medication List with Changes/Refills   Current Medications    ALLOPURINOL (ZYLOPRIM) 100 MG TABLET    TAKE 1 TABLET(100 MG) BY MOUTH EVERY DAY    AMLODIPINE (NORVASC) 10 MG TABLET    Take 1 tablet (10 mg total) by mouth once daily.    ASCORBIC ACID (VITAMIN C) 1000 MG TABLET    Take 1,000 mg by mouth once daily.    ASPIRIN 81 MG CHEW    CHEW AND SWALLOW 1 TABLET BY MOUTH DAILY    ATORVASTATIN (LIPITOR) 20 MG TABLET    Take 1 tablet (20 mg total) by mouth once daily.    BLOOD SUGAR DIAGNOSTIC STRP    1 strip by Misc.(Non-Drug; Combo Route) route 2 (two) times daily. Disp glucometer and lancets as well    CLOBETASOL (TEMOVATE) 0.05 % CREAM        FOLIC ACID (FOLVITE) 800 MCG TAB    Take 800 mcg by mouth once daily.    GABAPENTIN (NEURONTIN) 300 MG CAPSULE    TAKE 1 CAPSULE BY MOUTH THREE TIMES DAILY    GLIPIZIDE (GLUCOTROL) 10 MG TR24    TAKE 1 TABLET(10 MG) BY MOUTH EVERY DAY    HYDROCHLOROTHIAZIDE (MICROZIDE) 12.5 MG CAPSULE    TAKE 1 CAPSULE BY MOUTH EVERY MONDAY, WEDNESDAY, FRIDAY    IRBESARTAN (AVAPRO) 300 MG TABLET    Take 1 tablet (300 mg total) by mouth every evening.    LINAGLIPTIN (TRADJENTA) 5 MG TAB TABLET    Take 1 tablet (5 mg total) by mouth once daily.    METOPROLOL SUCCINATE (TOPROL-XL) 100 MG 24 HR TABLET    TAKE 1 TABLET(100 MG) BY MOUTH EVERY DAY    MULTIVIT-MIN/FA/LYCOPEN/LUTEIN (CENTRUM SILVER MEN ORAL)    Take by mouth.    NIACIN 500 MG TAB    Take 100 mg by mouth every evening.    OMEPRAZOLE (PRILOSEC) 40 MG CAPSULE    Take 1 capsule (40 mg total)  by mouth once daily.    TRIAMCINOLONE ACETONIDE 0.1% (KENALOG) 0.1 % OINTMENT        TRUEPLUS LANCETS 33 GAUGE MISC    USE TO TEST BLOOD SUGAR BID    TURMERIC ROOT EXTRACT ORAL    Take by mouth.    VITAMIN E 400 UNIT CAPSULE    Take 400 Units by mouth once daily.       Past Medical Hx :   Hypertension; DM; Hyperlipidemia; Chronic atrial fibrillation; Macular degeneration, Lichen planus..CKD 3; CAD.GERD; Peripheral Neuropathy.  Known to have Rheumatoid arthritis, on no Rx  No Hx of TB, , Lupus, sarcoid, Seizure disorder or CVA. No Hx of DVT or PE./ No past Hx of cancer, chemotherapy or radiation therapy.    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    Immunization :  Immunization History   Administered Date(s) Administered    Influenza - High Dose 11/03/2017    Pneumococcal Conjugate - 13 Valent 06/27/2017    Td (ADULT) 11/18/2005       Family Hx :  No family history on file.    Social Hx :  Social History     Socioeconomic History    Marital status:      Spouse name: Not on file    Number of children: Not on file    Years of education: Not on file    Highest education level: Not on file   Social Needs    Financial resource strain: Not on file    Food insecurity - worry: Not on file    Food insecurity - inability: Not on file    Transportation needs - medical: Not on file    Transportation needs - non-medical: Not on file   Occupational History    Not on file   Tobacco  Use    Smoking status: Former Smoker    Smokeless tobacco: Never Used   Substance and Sexual Activity    Alcohol use: Yes     Alcohol/week: 0.0 oz     Comment: Social    Drug use: No    Sexual activity: Not on file   Other Topics Concern    Not on file   Social History Narrative    Not on file       Surgery :  Bilateral Knee replacement; Bilateral Cataract surgery.    Symptoms :   No new Sx.    Review of Systems   Constitutional: Negative for chills, diaphoresis, fever, malaise/fatigue and weight loss.   HENT: Positive for congestion. Negative for ear discharge, ear pain, hearing loss, nosebleeds, sinus pain, sore throat and tinnitus.    Eyes: Negative for blurred vision, double vision, photophobia, pain, discharge and redness.   Respiratory: Positive for shortness of breath. Negative for cough, hemoptysis, sputum production, wheezing and stridor.    Cardiovascular: Negative for chest pain, palpitations, orthopnea, claudication, leg swelling and PND.   Gastrointestinal: Negative for abdominal pain, blood in stool, constipation, diarrhea, heartburn, melena, nausea and vomiting.   Genitourinary: Negative for dysuria, flank pain, frequency, hematuria and urgency.   Musculoskeletal: Negative for back pain, falls, joint pain, myalgias and neck pain.   Skin: Negative for itching and rash.   Neurological: Positive for tingling and sensory change. Negative for dizziness, weakness and headaches.   Endo/Heme/Allergies: Negative for environmental allergies. Does not bruise/bleed easily.   Psychiatric/Behavioral: Negative for depression, hallucinations, memory loss, substance abuse and suicidal ideas. The patient is not nervous/anxious and does not have insomnia.        Physical Exam :  Looks younger than the stated age.  Physical Exam   Constitutional: He is oriented to person, place, and time and well-developed, well-nourished, and in no distress. Vital signs are normal. No distress.   HENT:   Head: Normocephalic and  atraumatic.   Right Ear: External ear normal.   Left Ear: External ear normal.   Nose: Nose normal.   Mouth/Throat: Oropharynx is clear and moist. No oropharyngeal exudate.   Eyes: Conjunctivae, EOM and lids are normal. Pupils are equal, round, and reactive to light. Lids are everted and swept, no foreign bodies found. Right eye exhibits no discharge. Left eye exhibits no discharge. No scleral icterus.       Neck: Trachea normal, normal range of motion, full passive range of motion without pain and phonation normal. Neck supple. Normal carotid pulses, no hepatojugular reflux and no JVD present. No tracheal tenderness present. Carotid bruit is not present. No tracheal deviation present. No thyroid mass and no thyromegaly present.   Cardiovascular: Normal rate, regular rhythm, normal heart sounds, intact distal pulses and normal pulses. PMI is not displaced. Exam reveals no gallop and no friction rub.   No murmur heard.  Pulmonary/Chest: Effort normal and breath sounds normal. No stridor. No apnea. No respiratory distress. He has no wheezes. He has no rales. He exhibits no tenderness.   Abdominal: Soft. Normal appearance, normal aorta and bowel sounds are normal. He exhibits no distension, no ascites and no mass. There is no hepatosplenomegaly. There is no tenderness. There is no rebound, no guarding and no CVA tenderness. No hernia.   Genitourinary:   Genitourinary Comments: Not examined   Musculoskeletal: Normal range of motion. He exhibits no edema, tenderness or deformity.   Lymphadenopathy:        Head (right side): No submental, no submandibular, no tonsillar, no preauricular, no posterior auricular and no occipital adenopathy present.        Head (left side): No submental, no submandibular, no tonsillar, no preauricular, no posterior auricular and no occipital adenopathy present.     He has no cervical adenopathy.     He has no axillary adenopathy.        Right: No inguinal, no supraclavicular and no  epitrochlear adenopathy present.        Left: No inguinal, no supraclavicular and no epitrochlear adenopathy present.   Neurological: He is alert and oriented to person, place, and time. He has normal motor skills, normal sensation, normal strength, normal reflexes and intact cranial nerves. No cranial nerve deficit. He exhibits normal muscle tone. Gait normal. Coordination normal. GCS score is 15.   Skin: Skin is warm, dry and intact. No rash noted. He is not diaphoretic. No cyanosis or erythema. No pallor. Nails show no clubbing.   Psychiatric: Mood, memory, affect and judgment normal.   No New finding.       Labs & Imaging :  07/10/18 : WBC 3,590. ANC 1,600. Hgb 12. 2; Hct 35.2; Plts 97,000. Normal indices.   Platlets 134,000 in 2017 and 143,000 in 2016. NFBS 219; cr. 1.3; ca 9.5; Bili 0.9 ALP 54  08/02/18 :  Rheumatoid Factor Neg. ESR 1; .  Hgb 12.8; Hct 38.6 Normal indices, ANC 4,700. Plts 85,000.  08/16/18 : U/S abdomen : Hepatic steatosis.  Bilateral Thin walled renal cysts.  08/29/18 : WBC 3,000. Hgb 12.5; Hct 35.9; MCV 92;  Plts 77,000; ANC 1,200.     Dx :  Thrombocytopenia.  Mild neutropenia.       Assessment & Plan: Physical Exam unremarkable.  Reviewed peripheral smear.   Red Cell Morphology normal. No immature white cells. Many large platelets present . Bone Marrow Bx scheduled

## 2018-08-29 ENCOUNTER — HOSPITAL ENCOUNTER (OUTPATIENT)
Dept: RADIOLOGY | Facility: HOSPITAL | Age: 83
Discharge: HOME | End: 2018-08-29
Attending: INTERNAL MEDICINE
Payer: MEDICARE

## 2018-08-29 DIAGNOSIS — D69.6 THROMBOCYTOPENIA: ICD-10-CM

## 2018-08-29 DIAGNOSIS — M06.9 RHEUMATOID ARTHRITIS, INVOLVING UNSPECIFIED SITE, UNSPECIFIED RHEUMATOID FACTOR PRESENCE: ICD-10-CM

## 2018-08-29 PROCEDURE — 76700 US EXAM ABDOM COMPLETE: CPT | Mod: 26,,, | Performed by: RADIOLOGY

## 2018-08-29 PROCEDURE — 76700 US EXAM ABDOM COMPLETE: CPT | Mod: TC

## 2018-08-30 ENCOUNTER — TELEPHONE (OUTPATIENT)
Dept: HEMATOLOGY/ONCOLOGY | Facility: CLINIC | Age: 83
End: 2018-08-30

## 2018-08-30 ENCOUNTER — OFFICE VISIT (OUTPATIENT)
Dept: HEMATOLOGY/ONCOLOGY | Facility: CLINIC | Age: 83
End: 2018-08-30
Payer: MEDICARE

## 2018-08-30 VITALS
WEIGHT: 201.25 LBS | BODY MASS INDEX: 28.88 KG/M2 | DIASTOLIC BLOOD PRESSURE: 70 MMHG | SYSTOLIC BLOOD PRESSURE: 151 MMHG | TEMPERATURE: 98 F | OXYGEN SATURATION: 97 % | HEART RATE: 54 BPM

## 2018-08-30 DIAGNOSIS — D69.6 THROMBOCYTOPENIA: ICD-10-CM

## 2018-08-30 DIAGNOSIS — M06.9 RHEUMATOID ARTHRITIS, INVOLVING UNSPECIFIED SITE, UNSPECIFIED RHEUMATOID FACTOR PRESENCE: Primary | ICD-10-CM

## 2018-08-30 DIAGNOSIS — D69.6 THROMBOCYTOPENIA: Primary | ICD-10-CM

## 2018-08-30 PROCEDURE — 99213 OFFICE O/P EST LOW 20 MIN: CPT | Mod: PBBFAC | Performed by: INTERNAL MEDICINE

## 2018-08-30 PROCEDURE — 99213 OFFICE O/P EST LOW 20 MIN: CPT | Mod: S$PBB,,, | Performed by: INTERNAL MEDICINE

## 2018-08-30 PROCEDURE — 99999 PR PBB SHADOW E&M-EST. PATIENT-LVL III: CPT | Mod: PBBFAC,,, | Performed by: INTERNAL MEDICINE

## 2018-08-31 ENCOUNTER — PES CALL (OUTPATIENT)
Dept: ADMINISTRATIVE | Facility: CLINIC | Age: 83
End: 2018-08-31

## 2018-08-31 RX ORDER — OMEPRAZOLE 40 MG/1
40 CAPSULE, DELAYED RELEASE ORAL DAILY
Qty: 90 CAPSULE | Refills: 4 | Status: SHIPPED | OUTPATIENT
Start: 2018-08-31 | End: 2019-09-24 | Stop reason: SDUPTHER

## 2018-09-17 ENCOUNTER — TELEPHONE (OUTPATIENT)
Dept: HEMATOLOGY/ONCOLOGY | Facility: CLINIC | Age: 83
End: 2018-09-17

## 2018-09-17 NOTE — TELEPHONE ENCOUNTER
Called & spoke with pt, confirmed bone marrow biopsy on Wednesday, he will arrive for 8am. Reviewed instructions with him, he voiced understanding.

## 2018-09-18 DIAGNOSIS — D70.9 NEUTROPENIA, UNSPECIFIED TYPE: Primary | ICD-10-CM

## 2018-09-19 ENCOUNTER — ANESTHESIA EVENT (OUTPATIENT)
Dept: ENDOSCOPY | Facility: HOSPITAL | Age: 83
End: 2018-09-19
Payer: MEDICARE

## 2018-09-19 ENCOUNTER — HOSPITAL ENCOUNTER (OUTPATIENT)
Facility: HOSPITAL | Age: 83
Discharge: HOME | End: 2018-09-19
Attending: INTERNAL MEDICINE | Admitting: INTERNAL MEDICINE
Payer: MEDICARE

## 2018-09-19 ENCOUNTER — ANESTHESIA (OUTPATIENT)
Dept: ENDOSCOPY | Facility: HOSPITAL | Age: 83
End: 2018-09-19
Payer: MEDICARE

## 2018-09-19 VITALS
OXYGEN SATURATION: 68 % | BODY MASS INDEX: 28.28 KG/M2 | WEIGHT: 202 LBS | TEMPERATURE: 98 F | RESPIRATION RATE: 18 BRPM | DIASTOLIC BLOOD PRESSURE: 73 MMHG | SYSTOLIC BLOOD PRESSURE: 151 MMHG | HEART RATE: 51 BPM | HEIGHT: 71 IN

## 2018-09-19 DIAGNOSIS — D69.6 THROMBOCYTOPENIA: ICD-10-CM

## 2018-09-19 PROCEDURE — 88184 FLOWCYTOMETRY/ TC 1 MARKER: CPT | Performed by: PATHOLOGY

## 2018-09-19 PROCEDURE — 63600175 PHARM REV CODE 636 W HCPCS: Performed by: NURSE ANESTHETIST, CERTIFIED REGISTERED

## 2018-09-19 PROCEDURE — D9220A PRA ANESTHESIA: Mod: ANES,,, | Performed by: ANESTHESIOLOGY

## 2018-09-19 PROCEDURE — 88341 IMHCHEM/IMCYTCHM EA ADD ANTB: CPT | Mod: 26,,, | Performed by: PATHOLOGY

## 2018-09-19 PROCEDURE — 38222 DX BONE MARROW BX & ASPIR: CPT | Mod: RT,,, | Performed by: INTERNAL MEDICINE

## 2018-09-19 PROCEDURE — 88237 TISSUE CULTURE BONE MARROW: CPT

## 2018-09-19 PROCEDURE — 88311 DECALCIFY TISSUE: CPT | Mod: 26,,, | Performed by: PATHOLOGY

## 2018-09-19 PROCEDURE — 88305 TISSUE EXAM BY PATHOLOGIST: CPT | Mod: 26,,, | Performed by: PATHOLOGY

## 2018-09-19 PROCEDURE — 88185 FLOWCYTOMETRY/TC ADD-ON: CPT | Performed by: PATHOLOGY

## 2018-09-19 PROCEDURE — 37000009 HC ANESTHESIA EA ADD 15 MINS: Performed by: INTERNAL MEDICINE

## 2018-09-19 PROCEDURE — 88189 FLOWCYTOMETRY/READ 16 & >: CPT | Mod: ,,, | Performed by: PATHOLOGY

## 2018-09-19 PROCEDURE — 38221 DX BONE MARROW BIOPSIES: CPT | Performed by: INTERNAL MEDICINE

## 2018-09-19 PROCEDURE — 88264 CHROMOSOME ANALYSIS 20-25: CPT | Mod: 59

## 2018-09-19 PROCEDURE — 38222 DX BONE MARROW BX & ASPIR: CPT | Performed by: INTERNAL MEDICINE

## 2018-09-19 PROCEDURE — 88342 IMHCHEM/IMCYTCHM 1ST ANTB: CPT | Performed by: PATHOLOGY

## 2018-09-19 PROCEDURE — 88313 SPECIAL STAINS GROUP 2: CPT | Mod: 26,,, | Performed by: PATHOLOGY

## 2018-09-19 PROCEDURE — 25000003 PHARM REV CODE 250: Performed by: INTERNAL MEDICINE

## 2018-09-19 PROCEDURE — 37000008 HC ANESTHESIA 1ST 15 MINUTES: Performed by: INTERNAL MEDICINE

## 2018-09-19 PROCEDURE — 88342 IMHCHEM/IMCYTCHM 1ST ANTB: CPT | Mod: 26,59,, | Performed by: PATHOLOGY

## 2018-09-19 PROCEDURE — 85097 BONE MARROW INTERPRETATION: CPT | Mod: ,,, | Performed by: PATHOLOGY

## 2018-09-19 PROCEDURE — 88311 DECALCIFY TISSUE: CPT | Performed by: PATHOLOGY

## 2018-09-19 PROCEDURE — D9220A PRA ANESTHESIA: Mod: CRNA,,, | Performed by: NURSE ANESTHETIST, CERTIFIED REGISTERED

## 2018-09-19 PROCEDURE — 88313 SPECIAL STAINS GROUP 2: CPT

## 2018-09-19 RX ORDER — LIDOCAINE HCL/PF 100 MG/5ML
SYRINGE (ML) INTRAVENOUS
Status: COMPLETED
Start: 2018-09-19 | End: 2018-09-19

## 2018-09-19 RX ORDER — LIDOCAINE HYDROCHLORIDE 10 MG/ML
INJECTION, SOLUTION EPIDURAL; INFILTRATION; INTRACAUDAL; PERINEURAL
Status: DISCONTINUED
Start: 2018-09-19 | End: 2018-09-19 | Stop reason: HOSPADM

## 2018-09-19 RX ORDER — SODIUM CHLORIDE 9 MG/ML
INJECTION, SOLUTION INTRAVENOUS ONCE
Status: COMPLETED | OUTPATIENT
Start: 2018-09-19 | End: 2018-09-19

## 2018-09-19 RX ORDER — PROPOFOL 10 MG/ML
VIAL (ML) INTRAVENOUS
Status: DISCONTINUED | OUTPATIENT
Start: 2018-09-19 | End: 2018-09-19

## 2018-09-19 RX ORDER — LIDOCAINE HCL/PF 100 MG/5ML
SYRINGE (ML) INTRAVENOUS
Status: DISCONTINUED | OUTPATIENT
Start: 2018-09-19 | End: 2018-09-19

## 2018-09-19 RX ORDER — PROPOFOL 10 MG/ML
VIAL (ML) INTRAVENOUS
Status: COMPLETED
Start: 2018-09-19 | End: 2018-09-19

## 2018-09-19 RX ADMIN — PROPOFOL 40 MG: 10 INJECTION, EMULSION INTRAVENOUS at 10:09

## 2018-09-19 RX ADMIN — PROPOFOL 80 MG: 10 INJECTION, EMULSION INTRAVENOUS at 10:09

## 2018-09-19 RX ADMIN — SODIUM CHLORIDE: 0.9 INJECTION, SOLUTION INTRAVENOUS at 10:09

## 2018-09-19 RX ADMIN — LIDOCAINE HYDROCHLORIDE 100 MG: 20 INJECTION, SOLUTION INTRAVENOUS at 10:09

## 2018-09-19 RX ADMIN — PROPOFOL 30 MG: 10 INJECTION, EMULSION INTRAVENOUS at 10:09

## 2018-09-19 RX ADMIN — PROPOFOL 20 MG: 10 INJECTION, EMULSION INTRAVENOUS at 10:09

## 2018-09-19 NOTE — DISCHARGE INSTRUCTIONS
Keep bandage in place for 24 hours. Do not shower or take a tub bath during this time ( you may sponge bath). Call the nurse or physician for excessive bleeding or pain at biopsy site. You may take Tylenol as needed for pain . You have received medication to sedate you. Do not drive a car or operate heavy machinery for the rest of the day. You may resume other activities as tolerated

## 2018-09-19 NOTE — INTERVAL H&P NOTE
The patient has been examined and the H&P has been reviewed:    I concur with the findings and no changes have occurred since H&P was written.    Anesthesia/Surgery risks, benefits and alternative options discussed and understood by patient/family.          Active Hospital Problems    Diagnosis  POA    Thrombocytopenia [D69.6]  Yes      Resolved Hospital Problems   No resolved problems to display.

## 2018-09-19 NOTE — DISCHARGE SUMMARY
[facility]  Hematology/Oncology  Discharge Summary      Patient Name: Bria Lawson  MRN: [id number]  Admission Date: 9/19/2018  Hospital Length of Stay: 0 days  Discharge Date and Time: 9/19/2018  Attending Physician: Velia Tobias MD   Discharging Provider: Velia Tobias MD  Primary Care Provider: Adiel Bragg MD      Procedure(s)   Biopsy-bone marrow (Right)         Pending Diagnostic Studies:     None        Final Active Diagnoses:    Diagnosis Date Noted POA    Thrombocytopenia [D69.6] 10/11/2017 Yes      Problems Resolved During this Admission:      Discharged Condition: good    Disposition: Home or Self Care    Follow Up:    Patient Instructions:      Diet Adult Regular     Notify your health care provider if you experience any of the following:  temperature >100.4     Notify your health care provider if you experience any of the following:  persistent nausea and vomiting or diarrhea     Notify your health care provider if you experience any of the following:  severe uncontrolled pain     Notify your health care provider if you experience any of the following:  redness, tenderness, or signs of infection (pain, swelling, redness, odor or green/yellow discharge around incision site)     Notify your health care provider if you experience any of the following:  difficulty breathing or increased cough     Notify your health care provider if you experience any of the following:  severe persistent headache     Notify your health care provider if you experience any of the following:  worsening rash     Notify your health care provider if you experience any of the following:  persistent dizziness, light-headedness, or visual disturbances     Notify your health care provider if you experience any of the following:  increased confusion or weakness     Notify your health care provider if you experience any of the following:     Remove dressing in 24 hours     Activity as tolerated      Medications:  Cont home meds    Velia Tobias MD  Hematology/Oncology  [facility]

## 2018-09-19 NOTE — BRIEF OP NOTE
PROCEDURE NOTE:  Bone Marrow Biopsy  Date: 9/19/18  Indication: Thrombocytopenia  Consent: Informed consent was obtained from patient.  Timeout: Done and documented.  Site: Right   posterior illiac crest.  Prep: Betadine.  Needle used: 11 gauge Jamshidi needle.  Anesthetic: 1% lidocaine 5 cc.  Biopsy: The biopsy needle was introduced into the marrow cavity and an aspirate was obtained without complications. Core biopsy obtained and sent for routine histologic examination and cytogenetics. FISH obtained  Complications: None.  Disposition: The patient was discharged home when appropriate per anesthesia.  Minimal blood loss  ROMULO Tobias MD

## 2018-09-19 NOTE — TRANSFER OF CARE
"Anesthesia Transfer of Care Note    Patient: Bria Lawson    Procedure(s) Performed: Procedure(s) (LRB):  Biopsy-bone marrow (Left)    Patient location: PACU    Anesthesia Type: general    Transport from OR: Transported from OR on room air with adequate spontaneous ventilation    Post pain: adequate analgesia    Post assessment: no apparent anesthetic complications and tolerated procedure well    Post vital signs: stable    Level of consciousness: awake, oriented and alert    Nausea/Vomiting: no nausea/vomiting    Complications: none    Transfer of care protocol was followed      Last vitals:   Visit Vitals  BP (!) 106/59 (BP Location: Left arm, Patient Position: Lying)   Pulse (!) 54   Temp 36.6 °C (97.9 °F) (Oral)   Resp 14   Ht 5' 11" (1.803 m)   Wt 91.6 kg (202 lb)   SpO2 (!) 94%   BMI 28.17 kg/m²     "

## 2018-09-20 LAB
BODY SITE - BONE MARROW: NORMAL
CLINICAL DIAGNOSIS - BONE MARROW: NORMAL
FLOW CYTOMETRY ANTIBODIES ANALYZED - BONE MARROW: NORMAL
FLOW CYTOMETRY COMMENT - BONE MARROW: NORMAL
FLOW CYTOMETRY INTERPRETATION - BONE MARROW: NORMAL

## 2018-09-21 LAB
BONE MARROW IRON STAIN COMMENT: NORMAL
BONE MARROW WRIGHT STAIN COMMENT: NORMAL

## 2018-09-21 NOTE — ANESTHESIA PREPROCEDURE EVALUATION
09/21/2018  Bria Lawson is a 82 y.o., male.    Anesthesia Evaluation     I have reviewed the Nursing Notes.      Review of Systems  Anesthesia Hx:  No problems with previous Anesthesia   Social:  Former Smoker    Cardiovascular:   Denies Pacemaker. Hypertension  Denies Valvular problems/Murmurs. CAD   Dysrhythmias atrial fibrillation  Denies Angina.         hyperlipidemia    Pulmonary:  Pulmonary Normal    Renal/:   Chronic Renal Disease, CRI    Hepatic/GI:   No Bowel Prep. Denies PUD. GERD Denies Liver Disease. Denies Hepatitis.    Musculoskeletal:   Arthritis     Neurological:   Denies CVA. Denies Seizures.    Endocrine:   Diabetes, type 2 Denies Hypothyroidism. Denies Hyperthyroidism.        Physical Exam  General:  Well nourished    Airway/Jaw/Neck:  AIRWAY FINDINGS: Normal           Mental Status:  Mental Status Findings: Normal        Anesthesia Plan  Type of Anesthesia, risks & benefits discussed:  Anesthesia Type:  general  Patient's Preference:   Intra-op Monitoring Plan: standard ASA monitors  Intra-op Monitoring Plan Comments:   Post Op Pain Control Plan:   Post Op Pain Control Plan Comments:   Induction:   IV  Beta Blocker:  Patient is on a Beta-Blocker and has received one dose within the past 24 hours (No further documentation required).       Informed Consent: Patient understands risks and agrees with Anesthesia plan.  Questions answered. Anesthesia consent signed with patient.  ASA Score: 3     Day of Surgery Review of History & Physical:  There are no significant changes.  H&P update referred to the surgeon.         Ready For Surgery From Anesthesia Perspective.

## 2018-09-21 NOTE — ANESTHESIA POSTPROCEDURE EVALUATION
"Anesthesia Post Evaluation    Patient: Bria Lawson    Procedure(s) Performed: Procedure(s) (LRB):  Biopsy-bone marrow (Left)    Final Anesthesia Type: general  Patient location during evaluation: PACU  Patient participation: Yes- Able to Participate  Level of consciousness: awake and alert and oriented  Post-procedure vital signs: reviewed and stable  Pain management: adequate  Airway patency: patent  PONV status at discharge: No PONV  Anesthetic complications: no      Cardiovascular status: blood pressure returned to baseline and hemodynamically stable  Respiratory status: unassisted and spontaneous ventilation  Hydration status: euvolemic  Follow-up not needed.        Visit Vitals  BP (!) 151/73   Pulse (!) 51   Temp 36.6 °C (97.9 °F) (Oral)   Resp 18   Ht 5' 11" (1.803 m)   Wt 91.6 kg (202 lb)   SpO2 (!) 68%   BMI 28.17 kg/m²       Pain/Lee Score: No Data Recorded      "

## 2018-10-02 NOTE — PROGRESS NOTES
Chief Complaint :   Low blood counts    Hx of Present illness :  Patient is a 82 y.o. year old male who presents to the clinic today for   Oncology evaluation. No fever, pruritis, night sweats. . No weight loss. No overt bleeding. Stays active.  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    Transfusion :  None    Menstrual & obstetric Hx :  N/A        Present Meds :      Medication List           Accurate as of 10/2/18  8:04 AM. If you have any questions, ask your nurse or doctor.               CONTINUE taking these medications    allopurinol 100 MG tablet  Commonly known as:  ZYLOPRIM  TAKE 1 TABLET(100 MG) BY MOUTH EVERY DAY     amLODIPine 10 MG tablet  Commonly known as:  NORVASC  Take 1 tablet (10 mg total) by mouth once daily.     aspirin 81 MG Chew  CHEW AND SWALLOW 1 TABLET BY MOUTH DAILY     atorvastatin 20 MG tablet  Commonly known as:  LIPITOR  Take 1 tablet (20 mg total) by mouth once daily.     blood sugar diagnostic Strp  1 strip by Misc.(Non-Drug; Combo Route) route 2 (two) times daily. Disp glucometer and lancets as well     CENTRUM SILVER MEN ORAL     clobetasol 0.05 % cream  Commonly known as:  TEMOVATE     folic acid 800 MCG Tab  Commonly known as:  FOLVITE     gabapentin 300 MG capsule  Commonly known as:  NEURONTIN  TAKE 1 CAPSULE BY MOUTH THREE TIMES DAILY     glipiZIDE 10 MG Tr24  Commonly known as:  GLUCOTROL  TAKE 1 TABLET(10 MG) BY MOUTH EVERY DAY     hydroCHLOROthiazide 12.5 mg capsule  Commonly known as:  MICROZIDE  TAKE 1 CAPSULE BY MOUTH EVERY MONDAY, WEDNESDAY, FRIDAY     irbesartan 300 MG tablet  Commonly known as:  AVAPRO  Take 1 tablet (300 mg total) by mouth every evening.     linagliptin 5 mg Tab tablet  Commonly known as:  TRADJENTA  Take 1 tablet (5 mg total) by mouth once daily.     metoprolol succinate 100 MG 24 hr tablet  Commonly known as:   TOPROL-XL  TAKE 1 TABLET(100 MG) BY MOUTH EVERY DAY     niacin 500 MG Tab     omeprazole 40 MG capsule  Commonly known as:  PRILOSEC  Take 1 capsule (40 mg total) by mouth once daily.     triamcinolone acetonide 0.1% 0.1 % ointment  Commonly known as:  KENALOG     TRUEPLUS LANCETS 33 gauge Misc  Generic drug:  lancets     TURMERIC ROOT EXTRACT ORAL     VITAMIN C 1000 MG tablet  Generic drug:  ascorbic acid (vitamin C)     vitamin E 400 UNIT capsule            Past Medical Hx :   Hypertension; DM; Hyperlipidemia; Chronic atrial fibrillation; Macular degeneration, Lichen planus..CKD 3; CAD.GERD; Peripheral Neuropathy.  Known to have Rheumatoid arthritis, on no Rx  No Hx of TB, , Lupus, sarcoid, Seizure disorder or CVA. No Hx of DVT or PE./ No past Hx of cancer, chemotherapy or radiation therapy.    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    Immunization :  Immunization History   Administered Date(s) Administered    Influenza - High Dose 11/03/2017    Pneumococcal Conjugate - 13 Valent 06/27/2017    Td (ADULT) 11/18/2005       Family Hx :  No family history on file.    Social Hx :  Social History     Socioeconomic History    Marital status:      Spouse name: Not on file    Number of children: Not on file    Years of education: Not on file    Highest education level: Not on file   Social Needs    Financial resource strain: Not on file    Food insecurity - worry: Not on file     Food insecurity - inability: Not on file    Transportation needs - medical: Not on file    Transportation needs - non-medical: Not on file   Occupational History    Not on file   Tobacco Use    Smoking status: Former Smoker    Smokeless tobacco: Never Used   Substance and Sexual Activity    Alcohol use: Yes     Alcohol/week: 0.0 oz     Comment: Social    Drug use: No    Sexual activity: Not on file   Other Topics Concern    Not on file   Social History Narrative    Not on file       Surgery :  Bilateral Knee replacement; Bilateral Cataract surgery.    Symptoms :   No new Sx.    Review of Systems   Constitutional: Negative for chills, diaphoresis, fever, malaise/fatigue and weight loss.   HENT: Positive for congestion. Negative for ear discharge, ear pain, hearing loss, nosebleeds, sinus pain, sore throat and tinnitus.    Eyes: Negative for blurred vision, double vision, photophobia, pain, discharge and redness.   Respiratory: Positive for shortness of breath. Negative for cough, hemoptysis, sputum production, wheezing and stridor.    Cardiovascular: Negative for chest pain, palpitations, orthopnea, claudication, leg swelling and PND.   Gastrointestinal: Negative for abdominal pain, blood in stool, constipation, diarrhea, heartburn, melena, nausea and vomiting.   Genitourinary: Negative for dysuria, flank pain, frequency, hematuria and urgency.   Musculoskeletal: Negative for back pain, falls, joint pain, myalgias and neck pain.   Skin: Negative for itching and rash.   Neurological: Positive for tingling and sensory change. Negative for dizziness, weakness and headaches.   Endo/Heme/Allergies: Negative for environmental allergies. Does not bruise/bleed easily.   Psychiatric/Behavioral: Negative for depression, hallucinations, memory loss, substance abuse and suicidal ideas. The patient is not nervous/anxious and does not have insomnia.        Physical Exam :  Looks younger than the stated age.  Physical  Exam   Constitutional: He is oriented to person, place, and time and well-developed, well-nourished, and in no distress. Vital signs are normal. No distress.   HENT:   Head: Normocephalic and atraumatic.   Right Ear: External ear normal.   Left Ear: External ear normal.   Nose: Nose normal.   Mouth/Throat: Oropharynx is clear and moist. No oropharyngeal exudate.   Eyes: Conjunctivae, EOM and lids are normal. Pupils are equal, round, and reactive to light. Lids are everted and swept, no foreign bodies found. Right eye exhibits no discharge. Left eye exhibits no discharge. No scleral icterus.       Neck: Trachea normal, normal range of motion, full passive range of motion without pain and phonation normal. Neck supple. Normal carotid pulses, no hepatojugular reflux and no JVD present. No tracheal tenderness present. Carotid bruit is not present. No tracheal deviation present. No thyroid mass and no thyromegaly present.   Cardiovascular: Normal rate, regular rhythm, normal heart sounds, intact distal pulses and normal pulses. PMI is not displaced. Exam reveals no gallop and no friction rub.   No murmur heard.  Pulmonary/Chest: Effort normal and breath sounds normal. No stridor. No apnea. No respiratory distress. He has no wheezes. He has no rales. He exhibits no tenderness.   Abdominal: Soft. Normal appearance, normal aorta and bowel sounds are normal. He exhibits no distension, no ascites and no mass. There is no hepatosplenomegaly. There is no tenderness. There is no rebound, no guarding and no CVA tenderness. No hernia.   Genitourinary:   Genitourinary Comments: Not examined   Musculoskeletal: Normal range of motion. He exhibits no edema, tenderness or deformity.   Lymphadenopathy:        Head (right side): No submental, no submandibular, no tonsillar, no preauricular, no posterior auricular and no occipital adenopathy present.        Head (left side): No submental, no submandibular, no tonsillar, no preauricular,  no posterior auricular and no occipital adenopathy present.     He has no cervical adenopathy.     He has no axillary adenopathy.        Right: No inguinal, no supraclavicular and no epitrochlear adenopathy present.        Left: No inguinal, no supraclavicular and no epitrochlear adenopathy present.   Neurological: He is alert and oriented to person, place, and time. He has normal motor skills, normal sensation, normal strength, normal reflexes and intact cranial nerves. No cranial nerve deficit. He exhibits normal muscle tone. Gait normal. Coordination normal. GCS score is 15.   Skin: Skin is warm, dry and intact. No rash noted. He is not diaphoretic. No cyanosis or erythema. No pallor. Nails show no clubbing.   Psychiatric: Mood, memory, affect and judgment normal.   No New finding.       Labs & Imaging :  07/10/18 : WBC 3,590. ANC 1,600. Hgb 12. 2; Hct 35.2; Plts 97,000. Normal indices.   Platlets 134,000 in 2017 and 143,000 in 2016. NFBS 219; cr. 1.3; ca 9.5; Bili 0.9 ALP 54  08/02/18 :  Rheumatoid Factor Neg. ESR 1; .  Hgb 12.8; Hct 38.6 Normal indices, ANC 4,700. Plts 85,000.  08/16/18 : U/S abdomen : Hepatic steatosis.  Bilateral Thin walled renal cysts.  08/29/18 : WBC 3,000. Hgb 12.5; Hct 35.9; MCV 92;  Plts 77,000; ANC 1,200.   09/19/18 : Bone Marrow Bx :  Hypercellular Marrow. Megakaryocytes adequate.  Flow Cytometry : B Cell lymphoproliferative disorder, involving 5 % of Marrow cells.  Iron stain normal. Chromosome analysis pending.     Dx :  Thrombocytopenia.  Mild neutropenia.   Low grade  Lymphoma Involving  Bone Marrow, Incidental finding.       Assessment & Plan : reviewed with patient and spouse.  Schedule PET/CT. Repeat CBC, Plt, LDH, B 2 Microglobulin.CMP, Hepatitis B surface antigen, HCV antibody.  Re evaluate with results. Patient and spouse  understand and verbalised.

## 2018-10-03 ENCOUNTER — OFFICE VISIT (OUTPATIENT)
Dept: HEMATOLOGY/ONCOLOGY | Facility: CLINIC | Age: 83
End: 2018-10-03
Payer: MEDICARE

## 2018-10-03 VITALS
WEIGHT: 201.38 LBS | DIASTOLIC BLOOD PRESSURE: 73 MMHG | BODY MASS INDEX: 28.09 KG/M2 | HEART RATE: 65 BPM | OXYGEN SATURATION: 96 % | SYSTOLIC BLOOD PRESSURE: 145 MMHG | TEMPERATURE: 98 F

## 2018-10-03 DIAGNOSIS — C85.19 B-CELL LYMPHOMA OF EXTRANODAL SITE: ICD-10-CM

## 2018-10-03 DIAGNOSIS — D70.9 NEUTROPENIA, UNSPECIFIED TYPE: Primary | ICD-10-CM

## 2018-10-03 DIAGNOSIS — C85.10 B-CELL LYMPHOMA, UNSPECIFIED B-CELL LYMPHOMA TYPE, UNSPECIFIED BODY REGION: ICD-10-CM

## 2018-10-03 DIAGNOSIS — D69.6 THROMBOCYTOPENIA: ICD-10-CM

## 2018-10-03 PROCEDURE — 99213 OFFICE O/P EST LOW 20 MIN: CPT | Mod: S$PBB,,, | Performed by: INTERNAL MEDICINE

## 2018-10-03 PROCEDURE — 99999 PR PBB SHADOW E&M-EST. PATIENT-LVL III: CPT | Mod: PBBFAC,,, | Performed by: INTERNAL MEDICINE

## 2018-10-03 PROCEDURE — 99213 OFFICE O/P EST LOW 20 MIN: CPT | Mod: PBBFAC | Performed by: INTERNAL MEDICINE

## 2018-10-11 ENCOUNTER — HOSPITAL ENCOUNTER (OUTPATIENT)
Dept: RADIOLOGY | Facility: HOSPITAL | Age: 83
Discharge: HOME | End: 2018-10-11
Attending: INTERNAL MEDICINE
Payer: MEDICARE

## 2018-10-11 DIAGNOSIS — C85.10 B-CELL LYMPHOMA, UNSPECIFIED B-CELL LYMPHOMA TYPE, UNSPECIFIED BODY REGION: ICD-10-CM

## 2018-10-11 DIAGNOSIS — C85.19 B-CELL LYMPHOMA OF EXTRANODAL SITE: ICD-10-CM

## 2018-10-11 DIAGNOSIS — D70.9 NEUTROPENIA, UNSPECIFIED TYPE: ICD-10-CM

## 2018-10-11 DIAGNOSIS — D69.6 THROMBOCYTOPENIA: ICD-10-CM

## 2018-10-11 PROCEDURE — 78815 PET IMAGE W/CT SKULL-THIGH: CPT | Mod: 26,PI,, | Performed by: RADIOLOGY

## 2018-10-11 PROCEDURE — A9552 F18 FDG: HCPCS

## 2018-10-15 LAB
CHROM BANDING METHOD: NORMAL
CHROMOSOME ANALYSIS BM ADDITIONAL INFORMATION: NORMAL
CHROMOSOME ANALYSIS BM RELEASED BY: NORMAL
CHROMOSOME ANALYSIS BM RESULT SUMMARY: NORMAL
CLINICAL CYTOGENETICIST REVIEW: NORMAL
KARYOTYP MAR: NORMAL
REASON FOR REFERRAL (NARRATIVE): NORMAL
REF LAB TEST METHOD: NORMAL
SPECIMEN SOURCE: NORMAL
SPECIMEN: NORMAL

## 2018-10-16 NOTE — PROGRESS NOTES
Chief Complaint :   Low blood counts    Hx of Present illness :  Patient is a 82 y.o. year old male who presents to the clinic today for   Oncology Followup. Evaluated for Thrombocytopenia. 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  Here to review   PET/CT  Results.    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    Transfusion :  None    Menstrual & obstetric Hx :  N/A        Present Meds :      Medication List           Accurate as of 10/16/18  7:42 AM. If you have any questions, ask your nurse or doctor.               CONTINUE taking these medications    allopurinol 100 MG tablet  Commonly known as:  ZYLOPRIM  TAKE 1 TABLET(100 MG) BY MOUTH EVERY DAY     amLODIPine 10 MG tablet  Commonly known as:  NORVASC  Take 1 tablet (10 mg total) by mouth once daily.     aspirin 81 MG Chew  CHEW AND SWALLOW 1 TABLET BY MOUTH DAILY     atorvastatin 20 MG tablet  Commonly known as:  LIPITOR  Take 1 tablet (20 mg total) by mouth once daily.     blood sugar diagnostic Strp  1 strip by Misc.(Non-Drug; Combo Route) route 2 (two) times daily. Disp glucometer and lancets as well     CENTRUM SILVER MEN ORAL     clobetasol 0.05 % cream  Commonly known as:  TEMOVATE     folic acid 800 MCG Tab  Commonly known as:  FOLVITE     gabapentin 300 MG capsule  Commonly known as:  NEURONTIN  TAKE 1 CAPSULE BY MOUTH THREE TIMES DAILY     glipiZIDE 10 MG Tr24  Commonly known as:  GLUCOTROL  TAKE 1 TABLET(10 MG) BY MOUTH EVERY DAY     hydroCHLOROthiazide 12.5 mg capsule  Commonly known as:  MICROZIDE  TAKE 1 CAPSULE BY MOUTH EVERY MONDAY, WEDNESDAY, FRIDAY     irbesartan 300 MG tablet  Commonly known as:  AVAPRO  Take 1 tablet (300 mg total) by mouth every evening.     linagliptin 5 mg Tab tablet  Commonly known as:  TRADJENTA  Take 1 tablet (5 mg total) by mouth once daily.     metoprolol succinate 100 MG 24 hr tablet  Commonly known as:  TOPROL-XL  TAKE 1 TABLET(100 MG) BY  MOUTH EVERY DAY     niacin 500 MG Tab     omeprazole 40 MG capsule  Commonly known as:  PRILOSEC  Take 1 capsule (40 mg total) by mouth once daily.     triamcinolone acetonide 0.1% 0.1 % ointment  Commonly known as:  KENALOG     TRUEPLUS LANCETS 33 gauge Misc  Generic drug:  lancets     TURMERIC ROOT EXTRACT ORAL     VITAMIN C 1000 MG tablet  Generic drug:  ascorbic acid (vitamin C)     vitamin E 400 UNIT capsule            Past Medical Hx :   Hypertension; DM; Hyperlipidemia; Chronic atrial fibrillation; Macular degeneration, Lichen planus..CKD 3; CAD.GERD; Peripheral Neuropathy.  Known to have Rheumatoid arthritis, on no Rx  No Hx of TB, , Lupus, sarcoid, Seizure disorder or CVA. No Hx of DVT or PE./ No past Hx of cancer, chemotherapy or radiation therapy.    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    Immunization :  Immunization History   Administered Date(s) Administered    Influenza - High Dose 11/03/2017    Pneumococcal Conjugate - 13 Valent 06/27/2017    Td (ADULT) 11/18/2005       Family Hx :  No family history on file.    Social Hx :  Social History     Socioeconomic History    Marital status:      Spouse name: Not on file    Number of children: Not on file    Years of education: Not on file    Highest education level: Not on file   Social Needs    Financial resource strain: Not on file    Food insecurity - worry: Not on file    Food insecurity - inability: Not  on file    Transportation needs - medical: Not on file    Transportation needs - non-medical: Not on file   Occupational History    Not on file   Tobacco Use    Smoking status: Former Smoker    Smokeless tobacco: Never Used   Substance and Sexual Activity    Alcohol use: Yes     Alcohol/week: 0.0 oz     Comment: Social    Drug use: No    Sexual activity: Not on file   Other Topics Concern    Not on file   Social History Narrative    Not on file       Surgery :  Bilateral Knee replacement; Bilateral Cataract surgery.    Symptoms :   No new Sx.    Review of Systems   Constitutional: Negative for chills, diaphoresis, fever, malaise/fatigue and weight loss.   HENT: Positive for congestion. Negative for ear discharge, ear pain, hearing loss, nosebleeds, sinus pain, sore throat and tinnitus.    Eyes: Negative for blurred vision, double vision, photophobia, pain, discharge and redness.   Respiratory: Positive for shortness of breath. Negative for cough, hemoptysis, sputum production, wheezing and stridor.    Cardiovascular: Negative for chest pain, palpitations, orthopnea, claudication, leg swelling and PND.   Gastrointestinal: Negative for abdominal pain, blood in stool, constipation, diarrhea, heartburn, melena, nausea and vomiting.   Genitourinary: Negative for dysuria, flank pain, frequency, hematuria and urgency.   Musculoskeletal: Negative for back pain, falls, joint pain, myalgias and neck pain.   Skin: Negative for itching and rash.   Neurological: Positive for tingling and sensory change. Negative for dizziness, weakness and headaches.   Endo/Heme/Allergies: Negative for environmental allergies. Does not bruise/bleed easily.   Psychiatric/Behavioral: Negative for depression, hallucinations, memory loss, substance abuse and suicidal ideas. The patient is not nervous/anxious and does not have insomnia.        Physical Exam :  Looks younger than the stated age.  Physical Exam   Constitutional: He is  oriented to person, place, and time and well-developed, well-nourished, and in no distress. Vital signs are normal. No distress.   HENT:   Head: Normocephalic and atraumatic.   Right Ear: External ear normal.   Left Ear: External ear normal.   Nose: Nose normal.   Mouth/Throat: Oropharynx is clear and moist. No oropharyngeal exudate.   Eyes: Conjunctivae, EOM and lids are normal. Pupils are equal, round, and reactive to light. Lids are everted and swept, no foreign bodies found. Right eye exhibits no discharge. Left eye exhibits no discharge. No scleral icterus.       Neck: Trachea normal, normal range of motion, full passive range of motion without pain and phonation normal. Neck supple. Normal carotid pulses, no hepatojugular reflux and no JVD present. No tracheal tenderness present. Carotid bruit is not present. No tracheal deviation present. No thyroid mass and no thyromegaly present.   Cardiovascular: Normal rate, regular rhythm, normal heart sounds, intact distal pulses and normal pulses. PMI is not displaced. Exam reveals no gallop and no friction rub.   No murmur heard.  Pulmonary/Chest: Effort normal and breath sounds normal. No stridor. No apnea. No respiratory distress. He has no wheezes. He has no rales. He exhibits no tenderness.   Abdominal: Soft. Normal appearance, normal aorta and bowel sounds are normal. He exhibits no distension, no ascites and no mass. There is no hepatosplenomegaly. There is no tenderness. There is no rebound, no guarding and no CVA tenderness. No hernia.   Genitourinary:   Genitourinary Comments: Not examined   Musculoskeletal: Normal range of motion. He exhibits no edema, tenderness or deformity.   Lymphadenopathy:        Head (right side): No submental, no submandibular, no tonsillar, no preauricular, no posterior auricular and no occipital adenopathy present.        Head (left side): No submental, no submandibular, no tonsillar, no preauricular, no posterior auricular and no  occipital adenopathy present.     He has no cervical adenopathy.     He has no axillary adenopathy.        Right: No inguinal, no supraclavicular and no epitrochlear adenopathy present.        Left: No inguinal, no supraclavicular and no epitrochlear adenopathy present.   Neurological: He is alert and oriented to person, place, and time. He has normal motor skills, normal sensation, normal strength, normal reflexes and intact cranial nerves. No cranial nerve deficit. He exhibits normal muscle tone. Gait normal. Coordination normal. GCS score is 15.   Skin: Skin is warm, dry and intact. No rash noted. He is not diaphoretic. No cyanosis or erythema. No pallor. Nails show no clubbing.   Psychiatric: Mood, memory, affect and judgment normal.   No New finding.       Labs & Imaging :  07/10/18 : WBC 3,590. ANC 1,600. Hgb 12. 2; Hct 35.2; Plts 97,000. Normal indices.   Platlets 134,000 in 2017 and 143,000 in 2016. NFBS 219; cr. 1.3; ca 9.5; Bili 0.9 ALP 54  08/02/18 :  Rheumatoid Factor Neg. ESR 1; .  Hgb 12.8; Hct 38.6 Normal indices, ANC 4,700. Plts 85,000.  08/16/18 : U/S abdomen : Hepatic steatosis.  Bilateral Thin walled renal cysts.  08/29/18 : WBC 3,000. Hgb 12.5; Hct 35.9; MCV 92;  Plts 77,000; ANC 1,200.   09/19/18 : Bone Marrow Bx :  Hypercellular Marrow. Megakaryocytes adequate.  Flow Cytometry : B Cell lymphoproliferative disorder, involving 5 % of Marrow cells.  Iron stain normal.  Low level involvement with low grade Lymphoma. pending.   10/11/18 : HCV antibody : Negative. Hep B surface antigen neg. PET/CT : No Hypermetabolic activity.    IgG 1,282; ; Beta 2 Microglobulin 2.5;  hgb 13.7; hct 40.5; MCV 90; plts 77.000. ANC 1,700. NFBS 167; Cr. 1.48; Ca 8.2; Bili 1.3. ALP 53.    Dx :  Thrombocytopenia.  Mild neutropenia.   Low grade  Lymphoma Involving  Bone Marrow, Incidental finding.       Assessment & Plan : reviewed with patient and spouse.   Natural Hx of Low grade Lymphoma reviewed.  Patient's presenting features explained. Recommend rituxan Monotherapy once a week x4. Rationale explained. Side effects reviewed. Schedule Chemoschool and then initiate Rx.  Consent obtained. Patient has planned trip out of town for one week 10/21 thru 10/28/18.

## 2018-10-17 ENCOUNTER — OFFICE VISIT (OUTPATIENT)
Dept: HEMATOLOGY/ONCOLOGY | Facility: CLINIC | Age: 83
End: 2018-10-17
Payer: MEDICARE

## 2018-10-17 VITALS
WEIGHT: 202.81 LBS | OXYGEN SATURATION: 96 % | DIASTOLIC BLOOD PRESSURE: 79 MMHG | HEIGHT: 71 IN | SYSTOLIC BLOOD PRESSURE: 170 MMHG | TEMPERATURE: 98 F | HEART RATE: 61 BPM | BODY MASS INDEX: 28.39 KG/M2

## 2018-10-17 DIAGNOSIS — C85.19 B-CELL LYMPHOMA OF EXTRANODAL SITE: ICD-10-CM

## 2018-10-17 DIAGNOSIS — M06.9 RHEUMATOID ARTHRITIS, INVOLVING UNSPECIFIED SITE, UNSPECIFIED RHEUMATOID FACTOR PRESENCE: ICD-10-CM

## 2018-10-17 DIAGNOSIS — D69.6 THROMBOCYTOPENIA: Primary | ICD-10-CM

## 2018-10-17 PROCEDURE — 99999 PR PBB SHADOW E&M-EST. PATIENT-LVL III: CPT | Mod: PBBFAC,,, | Performed by: INTERNAL MEDICINE

## 2018-10-17 PROCEDURE — 99213 OFFICE O/P EST LOW 20 MIN: CPT | Mod: PBBFAC | Performed by: INTERNAL MEDICINE

## 2018-10-17 PROCEDURE — 99214 OFFICE O/P EST MOD 30 MIN: CPT | Mod: S$PBB,,, | Performed by: INTERNAL MEDICINE

## 2018-10-19 ENCOUNTER — LAB VISIT (OUTPATIENT)
Dept: LAB | Facility: HOSPITAL | Age: 83
End: 2018-10-19
Attending: INTERNAL MEDICINE
Payer: MEDICARE

## 2018-10-19 ENCOUNTER — CLINICAL SUPPORT (OUTPATIENT)
Dept: FAMILY MEDICINE | Facility: CLINIC | Age: 83
End: 2018-10-19
Payer: MEDICARE

## 2018-10-19 DIAGNOSIS — N18.30 CKD STAGE 3 DUE TO TYPE 2 DIABETES MELLITUS: ICD-10-CM

## 2018-10-19 DIAGNOSIS — Z23 NEED FOR PROPHYLACTIC VACCINATION AND INOCULATION AGAINST INFLUENZA: Primary | ICD-10-CM

## 2018-10-19 DIAGNOSIS — E11.22 CKD STAGE 3 DUE TO TYPE 2 DIABETES MELLITUS: ICD-10-CM

## 2018-10-19 LAB
ALBUMIN SERPL BCP-MCNC: 4.2 G/DL
ANION GAP SERPL CALC-SCNC: 8 MMOL/L
BUN SERPL-MCNC: 18 MG/DL
CALCIUM SERPL-MCNC: 9.6 MG/DL
CHLORIDE SERPL-SCNC: 105 MMOL/L
CO2 SERPL-SCNC: 26 MMOL/L
CREAT SERPL-MCNC: 1.3 MG/DL
EST. GFR  (AFRICAN AMERICAN): 59.2 ML/MIN/1.73 M^2
EST. GFR  (NON AFRICAN AMERICAN): 51.2 ML/MIN/1.73 M^2
GLUCOSE SERPL-MCNC: 263 MG/DL
PHOSPHATE SERPL-MCNC: 3 MG/DL
POTASSIUM SERPL-SCNC: 4 MMOL/L
SODIUM SERPL-SCNC: 139 MMOL/L

## 2018-10-19 PROCEDURE — 80069 RENAL FUNCTION PANEL: CPT

## 2018-10-19 PROCEDURE — 99499 UNLISTED E&M SERVICE: CPT | Mod: S$PBB,,, | Performed by: INTERNAL MEDICINE

## 2018-10-19 PROCEDURE — 36415 COLL VENOUS BLD VENIPUNCTURE: CPT | Mod: PO

## 2018-10-19 PROCEDURE — 90662 IIV NO PRSV INCREASED AG IM: CPT | Mod: PBBFAC,PO | Performed by: INTERNAL MEDICINE

## 2018-10-22 ENCOUNTER — TELEPHONE (OUTPATIENT)
Dept: NEPHROLOGY | Facility: CLINIC | Age: 83
End: 2018-10-22

## 2018-10-30 ENCOUNTER — TELEPHONE (OUTPATIENT)
Dept: NEPHROLOGY | Facility: CLINIC | Age: 83
End: 2018-10-30

## 2018-10-31 ENCOUNTER — INFUSION (OUTPATIENT)
Dept: INFUSION THERAPY | Facility: HOSPITAL | Age: 83
End: 2018-10-31
Attending: INTERNAL MEDICINE
Payer: MEDICARE

## 2018-10-31 VITALS
SYSTOLIC BLOOD PRESSURE: 144 MMHG | RESPIRATION RATE: 17 BRPM | TEMPERATURE: 98 F | OXYGEN SATURATION: 95 % | HEART RATE: 63 BPM | DIASTOLIC BLOOD PRESSURE: 63 MMHG

## 2018-10-31 DIAGNOSIS — C85.19 B-CELL LYMPHOMA OF EXTRANODAL SITE: Primary | ICD-10-CM

## 2018-10-31 PROCEDURE — 96413 CHEMO IV INFUSION 1 HR: CPT

## 2018-10-31 PROCEDURE — 96376 TX/PRO/DX INJ SAME DRUG ADON: CPT

## 2018-10-31 PROCEDURE — 96415 CHEMO IV INFUSION ADDL HR: CPT

## 2018-10-31 PROCEDURE — 96367 TX/PROPH/DG ADDL SEQ IV INF: CPT

## 2018-10-31 PROCEDURE — 63600175 PHARM REV CODE 636 W HCPCS: Performed by: INTERNAL MEDICINE

## 2018-10-31 PROCEDURE — 25000003 PHARM REV CODE 250: Performed by: INTERNAL MEDICINE

## 2018-10-31 PROCEDURE — S0028 INJECTION, FAMOTIDINE, 20 MG: HCPCS | Performed by: INTERNAL MEDICINE

## 2018-10-31 RX ORDER — ACETAMINOPHEN 325 MG/1
650 TABLET ORAL
Status: CANCELLED | OUTPATIENT
Start: 2018-11-21

## 2018-10-31 RX ORDER — FAMOTIDINE 10 MG/ML
20 INJECTION INTRAVENOUS
Status: CANCELLED | OUTPATIENT
Start: 2018-11-21 | End: 2018-11-21

## 2018-10-31 RX ORDER — HEPARIN 100 UNIT/ML
500 SYRINGE INTRAVENOUS
Status: CANCELLED | OUTPATIENT
Start: 2018-10-31

## 2018-10-31 RX ORDER — FAMOTIDINE 10 MG/ML
20 INJECTION INTRAVENOUS
Status: COMPLETED | OUTPATIENT
Start: 2018-10-31 | End: 2018-10-31

## 2018-10-31 RX ORDER — FAMOTIDINE 10 MG/ML
20 INJECTION INTRAVENOUS
Status: CANCELLED | OUTPATIENT
Start: 2018-11-14 | End: 2018-11-14

## 2018-10-31 RX ORDER — MEPERIDINE HYDROCHLORIDE 50 MG/ML
25 INJECTION INTRAMUSCULAR; INTRAVENOUS; SUBCUTANEOUS
Status: CANCELLED | OUTPATIENT
Start: 2018-10-31

## 2018-10-31 RX ORDER — FAMOTIDINE 10 MG/ML
20 INJECTION INTRAVENOUS
Status: CANCELLED | OUTPATIENT
Start: 2018-10-31 | End: 2018-10-31

## 2018-10-31 RX ORDER — SODIUM CHLORIDE 0.9 % (FLUSH) 0.9 %
10 SYRINGE (ML) INJECTION
Status: CANCELLED | OUTPATIENT
Start: 2018-10-31

## 2018-10-31 RX ORDER — HEPARIN 100 UNIT/ML
500 SYRINGE INTRAVENOUS
Status: CANCELLED | OUTPATIENT
Start: 2018-11-21

## 2018-10-31 RX ORDER — SODIUM CHLORIDE 0.9 % (FLUSH) 0.9 %
10 SYRINGE (ML) INJECTION
Status: CANCELLED | OUTPATIENT
Start: 2018-11-14

## 2018-10-31 RX ORDER — ACETAMINOPHEN 325 MG/1
650 TABLET ORAL
Status: CANCELLED | OUTPATIENT
Start: 2018-11-14

## 2018-10-31 RX ORDER — ACETAMINOPHEN 325 MG/1
650 TABLET ORAL
Status: COMPLETED | OUTPATIENT
Start: 2018-10-31 | End: 2018-10-31

## 2018-10-31 RX ORDER — MEPERIDINE HYDROCHLORIDE 50 MG/ML
25 INJECTION INTRAMUSCULAR; INTRAVENOUS; SUBCUTANEOUS
Status: CANCELLED | OUTPATIENT
Start: 2018-11-21

## 2018-10-31 RX ORDER — ACETAMINOPHEN 325 MG/1
650 TABLET ORAL
Status: CANCELLED | OUTPATIENT
Start: 2018-11-07

## 2018-10-31 RX ORDER — MEPERIDINE HYDROCHLORIDE 50 MG/ML
25 INJECTION INTRAMUSCULAR; INTRAVENOUS; SUBCUTANEOUS
Status: CANCELLED | OUTPATIENT
Start: 2018-11-14

## 2018-10-31 RX ORDER — HEPARIN 100 UNIT/ML
500 SYRINGE INTRAVENOUS
Status: CANCELLED | OUTPATIENT
Start: 2018-11-14

## 2018-10-31 RX ORDER — SODIUM CHLORIDE 0.9 % (FLUSH) 0.9 %
10 SYRINGE (ML) INJECTION
Status: CANCELLED | OUTPATIENT
Start: 2018-11-07

## 2018-10-31 RX ORDER — MEPERIDINE HYDROCHLORIDE 50 MG/ML
25 INJECTION INTRAMUSCULAR; INTRAVENOUS; SUBCUTANEOUS
Status: CANCELLED | OUTPATIENT
Start: 2018-11-07

## 2018-10-31 RX ORDER — ACETAMINOPHEN 325 MG/1
650 TABLET ORAL
Status: CANCELLED | OUTPATIENT
Start: 2018-10-31

## 2018-10-31 RX ORDER — MEPERIDINE HYDROCHLORIDE 50 MG/ML
25 INJECTION INTRAMUSCULAR; INTRAVENOUS; SUBCUTANEOUS
Status: DISCONTINUED | OUTPATIENT
Start: 2018-10-31 | End: 2018-10-31 | Stop reason: HOSPADM

## 2018-10-31 RX ORDER — HEPARIN 100 UNIT/ML
500 SYRINGE INTRAVENOUS
Status: CANCELLED | OUTPATIENT
Start: 2018-11-07

## 2018-10-31 RX ORDER — SODIUM CHLORIDE 0.9 % (FLUSH) 0.9 %
10 SYRINGE (ML) INJECTION
Status: CANCELLED | OUTPATIENT
Start: 2018-11-21

## 2018-10-31 RX ORDER — FAMOTIDINE 10 MG/ML
20 INJECTION INTRAVENOUS
Status: CANCELLED | OUTPATIENT
Start: 2018-11-07 | End: 2018-11-07

## 2018-10-31 RX ADMIN — FAMOTIDINE 20 MG: 10 INJECTION, SOLUTION INTRAVENOUS at 08:10

## 2018-10-31 RX ADMIN — RITUXIMAB 803 MG: 10 INJECTION, SOLUTION INTRAVENOUS at 09:10

## 2018-10-31 RX ADMIN — ACETAMINOPHEN 650 MG: 325 TABLET ORAL at 08:10

## 2018-10-31 RX ADMIN — DIPHENHYDRAMINE HYDROCHLORIDE 50 MG: 50 INJECTION INTRAMUSCULAR; INTRAVENOUS at 08:10

## 2018-10-31 NOTE — PLAN OF CARE
Problem: Patient Care Overview  Goal: Plan of Care Review  Outcome: Ongoing (interventions implemented as appropriate)  Pt oriented to unit. Chemo school performed at chairside. All questions were answered. Tolerated Rituxan. No reactions noted. VSS. Pt received next appt. Discharged with wife.

## 2018-11-07 ENCOUNTER — INFUSION (OUTPATIENT)
Dept: INFUSION THERAPY | Facility: HOSPITAL | Age: 83
End: 2018-11-07
Attending: INTERNAL MEDICINE
Payer: MEDICARE

## 2018-11-07 VITALS
DIASTOLIC BLOOD PRESSURE: 65 MMHG | TEMPERATURE: 98 F | HEART RATE: 47 BPM | OXYGEN SATURATION: 95 % | RESPIRATION RATE: 17 BRPM | SYSTOLIC BLOOD PRESSURE: 145 MMHG

## 2018-11-07 DIAGNOSIS — C85.19 B-CELL LYMPHOMA OF EXTRANODAL SITE: Primary | ICD-10-CM

## 2018-11-07 PROCEDURE — 96413 CHEMO IV INFUSION 1 HR: CPT

## 2018-11-07 PROCEDURE — 96375 TX/PRO/DX INJ NEW DRUG ADDON: CPT

## 2018-11-07 PROCEDURE — 63600175 PHARM REV CODE 636 W HCPCS: Mod: JG | Performed by: INTERNAL MEDICINE

## 2018-11-07 PROCEDURE — 25000003 PHARM REV CODE 250: Performed by: INTERNAL MEDICINE

## 2018-11-07 PROCEDURE — 96367 TX/PROPH/DG ADDL SEQ IV INF: CPT

## 2018-11-07 PROCEDURE — 96415 CHEMO IV INFUSION ADDL HR: CPT

## 2018-11-07 PROCEDURE — S0028 INJECTION, FAMOTIDINE, 20 MG: HCPCS | Performed by: INTERNAL MEDICINE

## 2018-11-07 RX ORDER — MEPERIDINE HYDROCHLORIDE 50 MG/ML
25 INJECTION INTRAMUSCULAR; INTRAVENOUS; SUBCUTANEOUS
Status: DISCONTINUED | OUTPATIENT
Start: 2018-11-07 | End: 2018-11-07 | Stop reason: HOSPADM

## 2018-11-07 RX ORDER — ACETAMINOPHEN 325 MG/1
650 TABLET ORAL
Status: COMPLETED | OUTPATIENT
Start: 2018-11-07 | End: 2018-11-07

## 2018-11-07 RX ORDER — FAMOTIDINE 10 MG/ML
20 INJECTION INTRAVENOUS
Status: COMPLETED | OUTPATIENT
Start: 2018-11-07 | End: 2018-11-07

## 2018-11-07 RX ADMIN — ACETAMINOPHEN 650 MG: 325 TABLET ORAL at 08:11

## 2018-11-07 RX ADMIN — DIPHENHYDRAMINE HYDROCHLORIDE 50 MG: 50 INJECTION INTRAMUSCULAR; INTRAVENOUS at 08:11

## 2018-11-07 RX ADMIN — FAMOTIDINE 20 MG: 10 INJECTION, SOLUTION INTRAVENOUS at 08:11

## 2018-11-07 RX ADMIN — RITUXIMAB 803 MG: 10 INJECTION, SOLUTION INTRAVENOUS at 09:11

## 2018-11-07 NOTE — PLAN OF CARE
Problem: Patient Care Overview  Goal: Plan of Care Review  Outcome: Ongoing (interventions implemented as appropriate)  Arrived to unit. No reported side effects after first Rituxan. Tolerated today's dose. VSS. No reactions noted. Pt has next appts. NAD and discharged.

## 2018-11-14 ENCOUNTER — INFUSION (OUTPATIENT)
Dept: INFUSION THERAPY | Facility: HOSPITAL | Age: 83
End: 2018-11-14
Attending: INTERNAL MEDICINE
Payer: MEDICARE

## 2018-11-14 VITALS
SYSTOLIC BLOOD PRESSURE: 135 MMHG | OXYGEN SATURATION: 97 % | HEART RATE: 59 BPM | RESPIRATION RATE: 16 BRPM | TEMPERATURE: 98 F | DIASTOLIC BLOOD PRESSURE: 63 MMHG

## 2018-11-14 DIAGNOSIS — C85.19 B-CELL LYMPHOMA OF EXTRANODAL SITE: Primary | ICD-10-CM

## 2018-11-14 PROCEDURE — 96375 TX/PRO/DX INJ NEW DRUG ADDON: CPT

## 2018-11-14 PROCEDURE — 96367 TX/PROPH/DG ADDL SEQ IV INF: CPT

## 2018-11-14 PROCEDURE — 25000003 PHARM REV CODE 250: Performed by: INTERNAL MEDICINE

## 2018-11-14 PROCEDURE — 96415 CHEMO IV INFUSION ADDL HR: CPT

## 2018-11-14 PROCEDURE — 63600175 PHARM REV CODE 636 W HCPCS: Mod: JG | Performed by: INTERNAL MEDICINE

## 2018-11-14 PROCEDURE — 96413 CHEMO IV INFUSION 1 HR: CPT

## 2018-11-14 PROCEDURE — S0028 INJECTION, FAMOTIDINE, 20 MG: HCPCS | Performed by: INTERNAL MEDICINE

## 2018-11-14 RX ORDER — MEPERIDINE HYDROCHLORIDE 50 MG/ML
25 INJECTION INTRAMUSCULAR; INTRAVENOUS; SUBCUTANEOUS
Status: DISCONTINUED | OUTPATIENT
Start: 2018-11-14 | End: 2018-11-14 | Stop reason: HOSPADM

## 2018-11-14 RX ORDER — FAMOTIDINE 10 MG/ML
20 INJECTION INTRAVENOUS
Status: COMPLETED | OUTPATIENT
Start: 2018-11-14 | End: 2018-11-14

## 2018-11-14 RX ORDER — ACETAMINOPHEN 325 MG/1
650 TABLET ORAL
Status: COMPLETED | OUTPATIENT
Start: 2018-11-14 | End: 2018-11-14

## 2018-11-14 RX ADMIN — RITUXIMAB 803 MG: 10 INJECTION, SOLUTION INTRAVENOUS at 09:11

## 2018-11-14 RX ADMIN — ACETAMINOPHEN 650 MG: 325 TABLET ORAL at 09:11

## 2018-11-14 RX ADMIN — DIPHENHYDRAMINE HYDROCHLORIDE 50 MG: 50 INJECTION INTRAMUSCULAR; INTRAVENOUS at 08:11

## 2018-11-14 RX ADMIN — FAMOTIDINE 20 MG: 10 INJECTION, SOLUTION INTRAVENOUS at 09:11

## 2018-11-14 NOTE — PLAN OF CARE
Problem: Patient Care Overview  Goal: Plan of Care Review  Outcome: Ongoing (interventions implemented as appropriate)  Patient received Rituxan. Tolerated well. VSS. No reactions noted during visit. Received discharge instructions and verbalized understanding.

## 2018-11-21 ENCOUNTER — INFUSION (OUTPATIENT)
Dept: INFUSION THERAPY | Facility: HOSPITAL | Age: 83
End: 2018-11-21
Attending: INTERNAL MEDICINE
Payer: MEDICARE

## 2018-11-21 VITALS
SYSTOLIC BLOOD PRESSURE: 148 MMHG | TEMPERATURE: 98 F | RESPIRATION RATE: 17 BRPM | HEART RATE: 50 BPM | OXYGEN SATURATION: 95 % | DIASTOLIC BLOOD PRESSURE: 67 MMHG

## 2018-11-21 DIAGNOSIS — C85.19 B-CELL LYMPHOMA OF EXTRANODAL SITE: Primary | ICD-10-CM

## 2018-11-21 PROCEDURE — S0028 INJECTION, FAMOTIDINE, 20 MG: HCPCS | Performed by: INTERNAL MEDICINE

## 2018-11-21 PROCEDURE — 96415 CHEMO IV INFUSION ADDL HR: CPT

## 2018-11-21 PROCEDURE — 96367 TX/PROPH/DG ADDL SEQ IV INF: CPT

## 2018-11-21 PROCEDURE — 63600175 PHARM REV CODE 636 W HCPCS: Mod: JG | Performed by: INTERNAL MEDICINE

## 2018-11-21 PROCEDURE — 25000003 PHARM REV CODE 250: Performed by: INTERNAL MEDICINE

## 2018-11-21 PROCEDURE — 96413 CHEMO IV INFUSION 1 HR: CPT

## 2018-11-21 PROCEDURE — 96375 TX/PRO/DX INJ NEW DRUG ADDON: CPT

## 2018-11-21 RX ORDER — ACETAMINOPHEN 325 MG/1
650 TABLET ORAL
Status: COMPLETED | OUTPATIENT
Start: 2018-11-21 | End: 2018-11-21

## 2018-11-21 RX ORDER — FAMOTIDINE 10 MG/ML
20 INJECTION INTRAVENOUS
Status: COMPLETED | OUTPATIENT
Start: 2018-11-21 | End: 2018-11-21

## 2018-11-21 RX ADMIN — ACETAMINOPHEN 650 MG: 325 TABLET ORAL at 08:11

## 2018-11-21 RX ADMIN — RITUXIMAB 803 MG: 10 INJECTION, SOLUTION INTRAVENOUS at 08:11

## 2018-11-21 RX ADMIN — FAMOTIDINE 20 MG: 10 INJECTION, SOLUTION INTRAVENOUS at 08:11

## 2018-11-21 RX ADMIN — DIPHENHYDRAMINE HYDROCHLORIDE 50 MG: 50 INJECTION INTRAMUSCULAR; INTRAVENOUS at 08:11

## 2018-11-21 NOTE — PLAN OF CARE
Problem: Patient Care Overview  Goal: Plan of Care Review  Outcome: Ongoing (interventions implemented as appropriate)  Pt presented for scheduled Rituxan. VSS. Afebrile. PIV attempted to L forearm x1 and unsuccesful. PIV successfully placed to R forearm. Pt tolerated infusion well-infusion titrated at ordered intervals to max of 400mg/hr as tolerated. Pt premedicated with Tylenol, Benadryl, and Pepcid. Pt walked in and out of unit without difficulty. Future appt information reviewed with pt.

## 2018-12-12 ENCOUNTER — LAB VISIT (OUTPATIENT)
Dept: LAB | Facility: HOSPITAL | Age: 83
End: 2018-12-12
Attending: INTERNAL MEDICINE
Payer: MEDICARE

## 2018-12-12 ENCOUNTER — TELEPHONE (OUTPATIENT)
Dept: HEMATOLOGY/ONCOLOGY | Facility: CLINIC | Age: 83
End: 2018-12-12

## 2018-12-12 DIAGNOSIS — M06.9 RHEUMATOID ARTHRITIS, INVOLVING UNSPECIFIED SITE, UNSPECIFIED RHEUMATOID FACTOR PRESENCE: ICD-10-CM

## 2018-12-12 DIAGNOSIS — D70.9 NEUTROPENIA, UNSPECIFIED TYPE: ICD-10-CM

## 2018-12-12 DIAGNOSIS — D69.6 THROMBOCYTOPENIA: ICD-10-CM

## 2018-12-12 DIAGNOSIS — C85.19 B-CELL LYMPHOMA OF EXTRANODAL SITE: Primary | ICD-10-CM

## 2018-12-12 DIAGNOSIS — C85.19 B-CELL LYMPHOMA OF EXTRANODAL SITE: ICD-10-CM

## 2018-12-12 LAB
ALBUMIN SERPL BCP-MCNC: 4.3 G/DL
ALP SERPL-CCNC: 54 U/L
ALT SERPL W/O P-5'-P-CCNC: 17 U/L
ANION GAP SERPL CALC-SCNC: 7 MMOL/L
AST SERPL-CCNC: 19 U/L
BASOPHILS # BLD AUTO: 0.01 K/UL
BASOPHILS NFR BLD: 0.2 %
BILIRUB SERPL-MCNC: 0.9 MG/DL
BUN SERPL-MCNC: 18 MG/DL
CALCIUM SERPL-MCNC: 9.4 MG/DL
CHLORIDE SERPL-SCNC: 103 MMOL/L
CO2 SERPL-SCNC: 29 MMOL/L
CREAT SERPL-MCNC: 1.4 MG/DL
DIFFERENTIAL METHOD: ABNORMAL
EOSINOPHIL # BLD AUTO: 0.1 K/UL
EOSINOPHIL NFR BLD: 1.8 %
ERYTHROCYTE [DISTWIDTH] IN BLOOD BY AUTOMATED COUNT: 13.1 %
EST. GFR  (AFRICAN AMERICAN): 54 ML/MIN/1.73 M^2
EST. GFR  (NON AFRICAN AMERICAN): 46 ML/MIN/1.73 M^2
GLUCOSE SERPL-MCNC: 282 MG/DL
HCT VFR BLD AUTO: 39.1 %
HGB BLD-MCNC: 13 G/DL
LDH SERPL L TO P-CCNC: 162 U/L
LYMPHOCYTES # BLD AUTO: 0.9 K/UL
LYMPHOCYTES NFR BLD: 20.4 %
MCH RBC QN AUTO: 30 PG
MCHC RBC AUTO-ENTMCNC: 33.2 G/DL
MCV RBC AUTO: 90 FL
MONOCYTES # BLD AUTO: 0.8 K/UL
MONOCYTES NFR BLD: 17.4 %
NEUTROPHILS # BLD AUTO: 2.7 K/UL
NEUTROPHILS NFR BLD: 60.2 %
PLATELET # BLD AUTO: 76 K/UL
PMV BLD AUTO: 11.8 FL
POTASSIUM SERPL-SCNC: 4 MMOL/L
PROT SERPL-MCNC: 7.5 G/DL
RBC # BLD AUTO: 4.34 M/UL
SODIUM SERPL-SCNC: 139 MMOL/L
WBC # BLD AUTO: 4.42 K/UL

## 2018-12-12 PROCEDURE — 36415 COLL VENOUS BLD VENIPUNCTURE: CPT

## 2018-12-12 PROCEDURE — 83615 LACTATE (LD) (LDH) ENZYME: CPT

## 2018-12-12 PROCEDURE — 80053 COMPREHEN METABOLIC PANEL: CPT

## 2018-12-12 PROCEDURE — 85025 COMPLETE CBC W/AUTO DIFF WBC: CPT

## 2018-12-12 NOTE — TELEPHONE ENCOUNTER
I called the pt to let him know that his paperwork is completed and that I faxed I. The pt is welcome to  a copy. Patient did not answer and I was unable to L/M

## 2018-12-19 ENCOUNTER — OFFICE VISIT (OUTPATIENT)
Dept: HEMATOLOGY/ONCOLOGY | Facility: CLINIC | Age: 83
End: 2018-12-19
Payer: MEDICARE

## 2018-12-19 VITALS
SYSTOLIC BLOOD PRESSURE: 197 MMHG | WEIGHT: 204.13 LBS | OXYGEN SATURATION: 97 % | BODY MASS INDEX: 28.58 KG/M2 | DIASTOLIC BLOOD PRESSURE: 85 MMHG | TEMPERATURE: 98 F | HEART RATE: 60 BPM | HEIGHT: 71 IN

## 2018-12-19 DIAGNOSIS — D69.6 THROMBOCYTOPENIA: ICD-10-CM

## 2018-12-19 DIAGNOSIS — M06.9 RHEUMATOID ARTHRITIS, INVOLVING UNSPECIFIED SITE, UNSPECIFIED RHEUMATOID FACTOR PRESENCE: ICD-10-CM

## 2018-12-19 DIAGNOSIS — C85.19 B-CELL LYMPHOMA OF EXTRANODAL SITE: Primary | ICD-10-CM

## 2018-12-19 PROCEDURE — 99213 OFFICE O/P EST LOW 20 MIN: CPT | Mod: PBBFAC | Performed by: INTERNAL MEDICINE

## 2018-12-19 PROCEDURE — 99999 PR PBB SHADOW E&M-EST. PATIENT-LVL III: CPT | Mod: PBBFAC,,, | Performed by: INTERNAL MEDICINE

## 2018-12-19 PROCEDURE — 99213 OFFICE O/P EST LOW 20 MIN: CPT | Mod: S$PBB,,, | Performed by: INTERNAL MEDICINE

## 2018-12-19 RX ORDER — OMEPRAZOLE 40 MG/1
CAPSULE, DELAYED RELEASE ORAL
COMMUNITY
Start: 2018-11-13 | End: 2019-12-11 | Stop reason: SDUPTHER

## 2018-12-19 RX ORDER — CLOBETASOL PROPIONATE 0.5 MG/G
OINTMENT TOPICAL
Refills: 0 | COMMUNITY
Start: 2018-10-16 | End: 2019-01-22 | Stop reason: SDUPTHER

## 2018-12-19 NOTE — PROGRESS NOTES
Chief Complaint :   Low  Grade Lymphoma involving Bone Marrow.    Hx of Present illness :  Patient is a 83 y.o. year old male who presents to the clinic today for   Oncology Followup. Evaluated for Thrombocytopenia. 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   Has completed one four week ciourse of rituxan Monotherapy.    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    Transfusion :  None    Menstrual & obstetric Hx :  N/A        Present Meds :      Medication List           Accurate as of 12/19/18  7:25 AM. If you have any questions, ask your nurse or doctor.               CONTINUE taking these medications    allopurinol 100 MG tablet  Commonly known as:  ZYLOPRIM  TAKE 1 TABLET(100 MG) BY MOUTH EVERY DAY     amLODIPine 10 MG tablet  Commonly known as:  NORVASC  Take 1 tablet (10 mg total) by mouth once daily.     aspirin 81 MG Chew  CHEW AND SWALLOW 1 TABLET BY MOUTH DAILY     atorvastatin 20 MG tablet  Commonly known as:  LIPITOR  Take 1 tablet (20 mg total) by mouth once daily.     blood sugar diagnostic Strp  1 strip by Misc.(Non-Drug; Combo Route) route 2 (two) times daily. Disp glucometer and lancets as well     CENTRUM SILVER MEN ORAL     clobetasol 0.05 % cream  Commonly known as:  TEMOVATE     folic acid 800 MCG Tab  Commonly known as:  FOLVITE     gabapentin 300 MG capsule  Commonly known as:  NEURONTIN  TAKE 1 CAPSULE BY MOUTH THREE TIMES DAILY     glipiZIDE 10 MG Tr24  Commonly known as:  GLUCOTROL  TAKE 1 TABLET(10 MG) BY MOUTH EVERY DAY     hydroCHLOROthiazide 12.5 mg capsule  Commonly known as:  MICROZIDE  TAKE 1 CAPSULE BY MOUTH EVERY MONDAY, WEDNESDAY, FRIDAY     irbesartan 300 MG tablet  Commonly known as:  AVAPRO  Take 1 tablet (300 mg total) by mouth every evening.     linagliptin 5 mg Tab tablet  Commonly known as:  TRADJENTA  Take 1 tablet (5 mg total) by mouth once daily.     metoprolol succinate 100 MG 24 hr  tablet  Commonly known as:  TOPROL-XL  TAKE 1 TABLET(100 MG) BY MOUTH EVERY DAY     niacin 500 MG Tab     omeprazole 40 MG capsule  Commonly known as:  PRILOSEC  Take 1 capsule (40 mg total) by mouth once daily.     triamcinolone acetonide 0.1% 0.1 % ointment  Commonly known as:  KENALOG     TRUEPLUS LANCETS 33 gauge Misc  Generic drug:  lancets     TURMERIC ROOT EXTRACT ORAL     VITAMIN C 1000 MG tablet  Generic drug:  ascorbic acid (vitamin C)     vitamin E 400 UNIT capsule            Past Medical Hx :   Hypertension; DM; Hyperlipidemia; Chronic atrial fibrillation; Macular degeneration, Lichen planus..CKD 3; CAD.GERD; Peripheral Neuropathy.  Known to have Rheumatoid arthritis, on no Rx  No Hx of TB, , Lupus, sarcoid, Seizure disorder or CVA. No Hx of DVT or PE./ No past Hx of cancer, chemotherapy or radiation therapy.    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    Immunization :  Immunization History   Administered Date(s) Administered    Influenza - High Dose 11/03/2017, 10/19/2018    Pneumococcal Conjugate - 13 Valent 06/27/2017    Td (ADULT) 11/18/2005       Family Hx :  No family history on file.    Social Hx :  Social History     Socioeconomic History    Marital status:      Spouse name: Not on file    Number of children: Not on file    Years of education: Not on file    Highest education level: Not on file   Social Needs    Financial resource strain: Not on file     Food insecurity - worry: Not on file    Food insecurity - inability: Not on file    Transportation needs - medical: Not on file    Transportation needs - non-medical: Not on file   Occupational History    Not on file   Tobacco Use    Smoking status: Former Smoker    Smokeless tobacco: Never Used   Substance and Sexual Activity    Alcohol use: Yes     Alcohol/week: 0.0 oz     Comment: Social    Drug use: No    Sexual activity: Not on file   Other Topics Concern    Not on file   Social History Narrative    Not on file       Surgery :  Bilateral Knee replacement; Bilateral Cataract surgery.    Symptoms :   No new Sx.    Review of Systems   Constitutional: Negative for chills, diaphoresis, fever, malaise/fatigue and weight loss.   HENT: Positive for congestion. Negative for ear discharge, ear pain, hearing loss, nosebleeds, sinus pain, sore throat and tinnitus.    Eyes: Negative for blurred vision, double vision, photophobia, pain, discharge and redness.   Respiratory: Positive for shortness of breath. Negative for cough, hemoptysis, sputum production, wheezing and stridor.    Cardiovascular: Negative for chest pain, palpitations, orthopnea, claudication, leg swelling and PND.   Gastrointestinal: Negative for abdominal pain, blood in stool, constipation, diarrhea, heartburn, melena, nausea and vomiting.   Genitourinary: Negative for dysuria, flank pain, frequency, hematuria and urgency.   Musculoskeletal: Negative for back pain, falls, joint pain, myalgias and neck pain.   Skin: Negative for itching and rash.   Neurological: Positive for tingling and sensory change. Negative for dizziness, weakness and headaches.   Endo/Heme/Allergies: Negative for environmental allergies. Does not bruise/bleed easily.   Psychiatric/Behavioral: Negative for depression, hallucinations, memory loss, substance abuse and suicidal ideas. The patient is not nervous/anxious and does not have insomnia.        Physical Exam :  Looks  younger than the stated age.  Physical Exam   Constitutional: He is oriented to person, place, and time and well-developed, well-nourished, and in no distress. Vital signs are normal. No distress.   HENT:   Head: Normocephalic and atraumatic.   Right Ear: External ear normal.   Left Ear: External ear normal.   Nose: Nose normal.   Mouth/Throat: Oropharynx is clear and moist. No oropharyngeal exudate.   Eyes: Conjunctivae, EOM and lids are normal. Pupils are equal, round, and reactive to light. Lids are everted and swept, no foreign bodies found. Right eye exhibits no discharge. Left eye exhibits no discharge. No scleral icterus.       Neck: Trachea normal, normal range of motion, full passive range of motion without pain and phonation normal. Neck supple. Normal carotid pulses, no hepatojugular reflux and no JVD present. No tracheal tenderness present. Carotid bruit is not present. No tracheal deviation present. No thyroid mass and no thyromegaly present.   Cardiovascular: Normal rate, regular rhythm, normal heart sounds, intact distal pulses and normal pulses. PMI is not displaced. Exam reveals no gallop and no friction rub.   No murmur heard.  Pulmonary/Chest: Effort normal and breath sounds normal. No stridor. No apnea. No respiratory distress. He has no wheezes. He has no rales. He exhibits no tenderness.   Abdominal: Soft. Normal appearance, normal aorta and bowel sounds are normal. He exhibits no distension, no ascites and no mass. There is no hepatosplenomegaly. There is no tenderness. There is no rebound, no guarding and no CVA tenderness. No hernia.   Genitourinary:   Genitourinary Comments: Not examined   Musculoskeletal: Normal range of motion. He exhibits no edema, tenderness or deformity.   Lymphadenopathy:        Head (right side): No submental, no submandibular, no tonsillar, no preauricular, no posterior auricular and no occipital adenopathy present.        Head (left side): No submental, no  submandibular, no tonsillar, no preauricular, no posterior auricular and no occipital adenopathy present.     He has no cervical adenopathy.     He has no axillary adenopathy.        Right: No inguinal, no supraclavicular and no epitrochlear adenopathy present.        Left: No inguinal, no supraclavicular and no epitrochlear adenopathy present.   Neurological: He is alert and oriented to person, place, and time. He has normal motor skills, normal sensation, normal strength, normal reflexes and intact cranial nerves. No cranial nerve deficit. He exhibits normal muscle tone. Gait normal. Coordination normal. GCS score is 15.   Skin: Skin is warm, dry and intact. No rash noted. He is not diaphoretic. No cyanosis or erythema. No pallor. Nails show no clubbing.   Psychiatric: Mood, memory, affect and judgment normal.   No New finding.       Labs & Imaging :  12/12/18 :  ; WBC 4,420. ANC 2,700.  Hgb 13; hct 39.1; MCV 90; Plts 76,000.     Dx :  Thrombocytopenia.  Mild  thrombocytopenia  Low grade  Lymphoma Involving  Bone Marrow, Incidental finding.       Assessment & Plan : reviewed with patient . .   Continue Monitoring. RTC 2 months with CBC,CMP. LDH. Patient understands and verbalised.

## 2019-01-09 ENCOUNTER — TELEPHONE (OUTPATIENT)
Dept: FAMILY MEDICINE | Facility: CLINIC | Age: 84
End: 2019-01-09

## 2019-01-09 ENCOUNTER — TELEPHONE (OUTPATIENT)
Dept: NEPHROLOGY | Facility: CLINIC | Age: 84
End: 2019-01-09

## 2019-01-09 NOTE — TELEPHONE ENCOUNTER
----- Message from Madeline Pratt LPN sent at 1/8/2019 11:08 AM CST -----  Regarding: Labs  Mr Lawson has an appt on 21 / he needs labs/ he has labs scheduled in Feb / does Dr. SNIDER want to wait until feb and add labs so he is not stuck 2 times

## 2019-01-09 NOTE — TELEPHONE ENCOUNTER
----- Message from Armani Jameson sent at 1/9/2019  2:30 PM CST -----  Contact: PT   Pt would like a call back regarding scheduling f/up appointment      Pt can be reached at [phone]

## 2019-01-09 NOTE — TELEPHONE ENCOUNTER
Called the patient and let him know that as of now we dont have any open appts , once the schedule open we will reach out and get him scheduled

## 2019-01-10 NOTE — TELEPHONE ENCOUNTER
Agree, patient can have his A1c added to his upcoming lab and then he can reschedule an appointment after he gets the labs in February.    The only thing he needs for me is the A1c

## 2019-01-14 ENCOUNTER — LAB VISIT (OUTPATIENT)
Dept: LAB | Facility: HOSPITAL | Age: 84
End: 2019-01-14
Attending: INTERNAL MEDICINE
Payer: MEDICARE

## 2019-01-14 LAB
ESTIMATED AVG GLUCOSE: 166 MG/DL
HBA1C MFR BLD HPLC: 7.4 %

## 2019-01-14 PROCEDURE — 36415 COLL VENOUS BLD VENIPUNCTURE: CPT | Mod: PO

## 2019-01-14 PROCEDURE — 83036 HEMOGLOBIN GLYCOSYLATED A1C: CPT

## 2019-01-22 ENCOUNTER — OFFICE VISIT (OUTPATIENT)
Dept: FAMILY MEDICINE | Facility: CLINIC | Age: 84
End: 2019-01-22
Payer: MEDICARE

## 2019-01-22 VITALS
SYSTOLIC BLOOD PRESSURE: 166 MMHG | BODY MASS INDEX: 28.67 KG/M2 | HEIGHT: 71 IN | TEMPERATURE: 98 F | DIASTOLIC BLOOD PRESSURE: 78 MMHG | WEIGHT: 204.81 LBS

## 2019-01-22 DIAGNOSIS — E11.29 DIABETES MELLITUS WITH PROTEINURIA: ICD-10-CM

## 2019-01-22 DIAGNOSIS — D61.818 PANCYTOPENIA: ICD-10-CM

## 2019-01-22 DIAGNOSIS — E11.22 CKD STAGE 3 DUE TO TYPE 2 DIABETES MELLITUS: ICD-10-CM

## 2019-01-22 DIAGNOSIS — N18.30 CKD STAGE 3 DUE TO TYPE 2 DIABETES MELLITUS: ICD-10-CM

## 2019-01-22 DIAGNOSIS — M06.9 RHEUMATOID ARTHRITIS, INVOLVING UNSPECIFIED SITE, UNSPECIFIED RHEUMATOID FACTOR PRESENCE: ICD-10-CM

## 2019-01-22 DIAGNOSIS — I10 ESSENTIAL HYPERTENSION: ICD-10-CM

## 2019-01-22 DIAGNOSIS — D69.6 THROMBOCYTOPENIA: ICD-10-CM

## 2019-01-22 DIAGNOSIS — E11.21 DIABETES MELLITUS WITH PROTEINURIC DIABETIC NEPHROPATHY: ICD-10-CM

## 2019-01-22 DIAGNOSIS — N18.30 STAGE 3 CHRONIC KIDNEY DISEASE: ICD-10-CM

## 2019-01-22 DIAGNOSIS — R80.9 DIABETES MELLITUS WITH PROTEINURIA: ICD-10-CM

## 2019-01-22 DIAGNOSIS — E11.311 DIABETIC RETINOPATHY OF BOTH EYES WITH MACULAR EDEMA ASSOCIATED WITH TYPE 2 DIABETES MELLITUS, UNSPECIFIED RETINOPATHY SEVERITY: ICD-10-CM

## 2019-01-22 DIAGNOSIS — C85.19 B-CELL LYMPHOMA OF EXTRANODAL SITE: ICD-10-CM

## 2019-01-22 DIAGNOSIS — L71.9 ROSACEA: ICD-10-CM

## 2019-01-22 DIAGNOSIS — D64.9 ANEMIA, UNSPECIFIED TYPE: ICD-10-CM

## 2019-01-22 PROCEDURE — 99999 PR PBB SHADOW E&M-EST. PATIENT-LVL IV: ICD-10-PCS | Mod: PBBFAC,,, | Performed by: INTERNAL MEDICINE

## 2019-01-22 PROCEDURE — 99214 OFFICE O/P EST MOD 30 MIN: CPT | Mod: PBBFAC,PO | Performed by: INTERNAL MEDICINE

## 2019-01-22 PROCEDURE — 99214 OFFICE O/P EST MOD 30 MIN: CPT | Mod: S$PBB,,, | Performed by: INTERNAL MEDICINE

## 2019-01-22 PROCEDURE — 99999 PR PBB SHADOW E&M-EST. PATIENT-LVL IV: CPT | Mod: PBBFAC,,, | Performed by: INTERNAL MEDICINE

## 2019-01-22 PROCEDURE — 99214 PR OFFICE/OUTPT VISIT, EST, LEVL IV, 30-39 MIN: ICD-10-PCS | Mod: S$PBB,,, | Performed by: INTERNAL MEDICINE

## 2019-01-22 RX ORDER — VALSARTAN 320 MG/1
320 TABLET ORAL DAILY
Qty: 90 TABLET | Refills: 3 | Status: SHIPPED | OUTPATIENT
Start: 2019-01-22 | End: 2020-01-13

## 2019-01-22 NOTE — PROGRESS NOTES
Chief complaint: Follow-up on diabetes    83-year-old black male here to follow-up on diabetes although did  have A1c prior.  Last seen 8/17. Reviewed all his labs and abnormalities.   his A1c was 7.4.. Eating poorly.  He has some chronic renal insufficiency. Seen renal 8/18.   He had a slight hemoglobin reduction which appears chronic and fluctuating clinical of last year or so with slight thrombocytopenia.  We discussed all those issues and the need to monitor them over time.  Is otherwise feeling well.    His blood pressure appears to be in a good control.  Counseled at length regarding all these age-related issues, monitoring and so forth.Total time over 25 minutes with over 50% counseling.Counseled regarding all these issues.  .  Not Monitoring blood pressure at home . High at latest Onc appts.  Reviewed that he had a normal colonoscopy 2017 outside the clinic.    Previous known history of rosacea any inquires about seeing Dermatology and referral was put in.    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 EYES: no vision change. ENT: no sore throat. CV: no chest pain w/ exertion. RESP: no shortness of breath. GI: no nausea, vomiting, diarrhea. No dysphagia. : no urinary issues. MUSCULOSKELETAL: no new myalgias or arthralgias. SKIN: no new changes. NEURO: no focal deficits. PSYCH: no new issues. ENDOCRINE: no polyuria. HEME: no lymph nodes. ALLERGY: no general pruritis.       Past medical history:  1.  Diabetes with renal disease and retinopathy  2.  Hypertension  3.  Hyperlipidemia  4.  Chronic renal failure stage III  5.  History of colon polyp, normal scope March 2012, normal 2017-- 5 yrs----[facility] Kumari  6.  Hyperuricemia and gout  7.  History of sciatica and lumbar disc disease remotely  8.  Erectile dysfunction  9.  Macular degeneration, followed by outside retina specialist  10.  GERD  11.  Low HDL  12.  Inflammatory arthritis of the hands, seeing rheumatology  13.   Bilateral cervical radiculopathy and axonal neuropathy, to have surgery 2015    14.  Followed by outside urology at [facility] Dr. noriega   15.  TKA  -angie Han  who is at [facility]  16.  Prevnar given 2017  17. 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    Family history: Mother with hypertension and diabetes.  Father's history is unknown.  One brother who is okay.    Social history: Retired, quit smoking 1974.   with 3 children.  Drinks socially.    Vital signs as above  Gen: no distress, exam otherwise deferred      Bria was seen today for diabetes and hypertension.    Diagnoses and all orders for this visit:    Diabetes mellitus with renal manifestations, uncontrolled, secondary to poor diet which I will ask him to improve and recheck in 3 months    Uncontrolled type 2 diabetes mellitus with stage 3 chronic kidney disease, without long-term current use of insulin    Essential hypertension, elevated at recent and current visit, we did switch him from valsartan to Avapro so will try to switch him back to valsartan 320    Thrombocytopenia, followed by Oncology    Stage 3 chronic kidney disease, encourage patient to message Nephrology to get a follow-up appointment    Pancytopenia    Uncontrolled secondary diabetes mellitus with stage 3 CKD (GFR 30-59)    Diabetes mellitus with proteinuric diabetic nephropathy    Diabetes mellitus with proteinuria    CKD stage 3 due to type 2 diabetes mellitus    Anemia, unspecified type    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               Clinical note will be sensitive so as to not cause confusion regarding physical and evaluation and management issues"/////"This note will not be shared with the patient."  "

## 2019-02-12 ENCOUNTER — LAB VISIT (OUTPATIENT)
Dept: LAB | Facility: HOSPITAL | Age: 84
End: 2019-02-12
Attending: INTERNAL MEDICINE
Payer: MEDICARE

## 2019-02-12 DIAGNOSIS — C85.19 B-CELL LYMPHOMA OF EXTRANODAL SITE: ICD-10-CM

## 2019-02-12 DIAGNOSIS — D69.6 THROMBOCYTOPENIA: ICD-10-CM

## 2019-02-12 DIAGNOSIS — M06.9 RHEUMATOID ARTHRITIS, INVOLVING UNSPECIFIED SITE, UNSPECIFIED RHEUMATOID FACTOR PRESENCE: ICD-10-CM

## 2019-02-12 LAB
ALBUMIN SERPL BCP-MCNC: 4.3 G/DL
ALP SERPL-CCNC: 50 U/L
ALT SERPL W/O P-5'-P-CCNC: 18 U/L
ANION GAP SERPL CALC-SCNC: 10 MMOL/L
AST SERPL-CCNC: 18 U/L
BASOPHILS # BLD AUTO: 0.01 K/UL
BASOPHILS NFR BLD: 0.2 %
BILIRUB SERPL-MCNC: 1.1 MG/DL
BUN SERPL-MCNC: 16 MG/DL
CALCIUM SERPL-MCNC: 9.9 MG/DL
CHLORIDE SERPL-SCNC: 101 MMOL/L
CO2 SERPL-SCNC: 29 MMOL/L
CREAT SERPL-MCNC: 1.4 MG/DL
DIFFERENTIAL METHOD: ABNORMAL
EOSINOPHIL # BLD AUTO: 0.1 K/UL
EOSINOPHIL NFR BLD: 1.5 %
ERYTHROCYTE [DISTWIDTH] IN BLOOD BY AUTOMATED COUNT: 13.1 %
EST. GFR  (AFRICAN AMERICAN): 54 ML/MIN/1.73 M^2
EST. GFR  (NON AFRICAN AMERICAN): 46 ML/MIN/1.73 M^2
GLUCOSE SERPL-MCNC: 194 MG/DL
HCT VFR BLD AUTO: 40.7 %
HGB BLD-MCNC: 13.4 G/DL
LDH SERPL L TO P-CCNC: 193 U/L
LYMPHOCYTES # BLD AUTO: 1 K/UL
LYMPHOCYTES NFR BLD: 17.3 %
MCH RBC QN AUTO: 30.3 PG
MCHC RBC AUTO-ENTMCNC: 32.9 G/DL
MCV RBC AUTO: 92 FL
MONOCYTES # BLD AUTO: 0.8 K/UL
MONOCYTES NFR BLD: 14.2 %
NEUTROPHILS # BLD AUTO: 3.7 K/UL
NEUTROPHILS NFR BLD: 66.8 %
PLATELET # BLD AUTO: 74 K/UL
PMV BLD AUTO: 11.9 FL
POTASSIUM SERPL-SCNC: 3.7 MMOL/L
PROT SERPL-MCNC: 7.7 G/DL
RBC # BLD AUTO: 4.42 M/UL
SODIUM SERPL-SCNC: 140 MMOL/L
WBC # BLD AUTO: 5.49 K/UL

## 2019-02-12 PROCEDURE — 85025 COMPLETE CBC W/AUTO DIFF WBC: CPT

## 2019-02-12 PROCEDURE — 36415 COLL VENOUS BLD VENIPUNCTURE: CPT

## 2019-02-12 PROCEDURE — 80053 COMPREHEN METABOLIC PANEL: CPT

## 2019-02-12 PROCEDURE — 83615 LACTATE (LD) (LDH) ENZYME: CPT

## 2019-02-14 RX ORDER — GABAPENTIN 300 MG/1
300 CAPSULE ORAL 3 TIMES DAILY
Qty: 270 CAPSULE | Refills: 1 | Status: SHIPPED | OUTPATIENT
Start: 2019-02-14 | End: 2019-08-09 | Stop reason: SDUPTHER

## 2019-02-18 RX ORDER — ATORVASTATIN CALCIUM 20 MG/1
20 TABLET, FILM COATED ORAL DAILY
Qty: 90 TABLET | Refills: 12 | Status: SHIPPED | OUTPATIENT
Start: 2019-02-18 | End: 2020-05-10

## 2019-02-19 ENCOUNTER — OFFICE VISIT (OUTPATIENT)
Dept: HEMATOLOGY/ONCOLOGY | Facility: CLINIC | Age: 84
End: 2019-02-19
Payer: MEDICARE

## 2019-02-19 VITALS
WEIGHT: 202.63 LBS | TEMPERATURE: 99 F | DIASTOLIC BLOOD PRESSURE: 84 MMHG | SYSTOLIC BLOOD PRESSURE: 205 MMHG | OXYGEN SATURATION: 96 % | BODY MASS INDEX: 28.37 KG/M2 | HEIGHT: 71 IN | HEART RATE: 59 BPM

## 2019-02-19 DIAGNOSIS — C85.19 B-CELL LYMPHOMA OF EXTRANODAL SITE: Primary | ICD-10-CM

## 2019-02-19 DIAGNOSIS — D69.6 THROMBOCYTOPENIA: ICD-10-CM

## 2019-02-19 DIAGNOSIS — M06.9 RHEUMATOID ARTHRITIS, INVOLVING UNSPECIFIED SITE, UNSPECIFIED RHEUMATOID FACTOR PRESENCE: ICD-10-CM

## 2019-02-19 PROCEDURE — 99213 OFFICE O/P EST LOW 20 MIN: CPT | Mod: S$PBB,,, | Performed by: INTERNAL MEDICINE

## 2019-02-19 PROCEDURE — 99213 OFFICE O/P EST LOW 20 MIN: CPT | Mod: PBBFAC | Performed by: INTERNAL MEDICINE

## 2019-02-19 PROCEDURE — 99999 PR PBB SHADOW E&M-EST. PATIENT-LVL III: CPT | Mod: PBBFAC,,, | Performed by: INTERNAL MEDICINE

## 2019-02-19 PROCEDURE — 99213 PR OFFICE/OUTPT VISIT, EST, LEVL III, 20-29 MIN: ICD-10-PCS | Mod: S$PBB,,, | Performed by: INTERNAL MEDICINE

## 2019-02-19 PROCEDURE — 99999 PR PBB SHADOW E&M-EST. PATIENT-LVL III: ICD-10-PCS | Mod: PBBFAC,,, | Performed by: INTERNAL MEDICINE

## 2019-02-19 RX ORDER — IVERMECTIN 10 MG/G
CREAM TOPICAL
COMMUNITY
Start: 2019-02-04 | End: 2020-06-11

## 2019-02-19 NOTE — PROGRESS NOTES
Chief Complaint :   Low  Grade Lymphoma involving Bone Marrow.    Hx of Present illness :  Patient is a 83 y.o. year old male who presents to the clinic today for   Oncology Followup. Evaluated for Thrombocytopenia. 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   Has completed one four week ciourse of rituxan Monotherapy.    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    Transfusion :  None    Menstrual & obstetric Hx :  N/A        Present Meds :   Medication List with Changes/Refills   Current Medications    ALLOPURINOL (ZYLOPRIM) 100 MG TABLET    TAKE 1 TABLET(100 MG) BY MOUTH EVERY DAY    AMLODIPINE (NORVASC) 10 MG TABLET    Take 1 tablet (10 mg total) by mouth once daily.    ASCORBIC ACID (VITAMIN C) 1000 MG TABLET    Take 1,000 mg by mouth once daily.    ASPIRIN 81 MG CHEW    CHEW AND SWALLOW 1 TABLET BY MOUTH DAILY    ATORVASTATIN (LIPITOR) 20 MG TABLET    Take 1 tablet (20 mg total) by mouth once daily.    BLOOD SUGAR DIAGNOSTIC STRP    1 strip by Misc.(Non-Drug; Combo Route) route 2 (two) times daily. Disp glucometer and lancets as well    CLOBETASOL (TEMOVATE) 0.05 % CREAM        FOLIC ACID (FOLVITE) 800 MCG TAB    Take 800 mcg by mouth once daily.    GABAPENTIN (NEURONTIN) 300 MG CAPSULE    Take 1 capsule (300 mg total) by mouth 3 (three) times daily.    GLIPIZIDE (GLUCOTROL) 10 MG TR24    TAKE 1 TABLET(10 MG) BY MOUTH EVERY DAY    HYDROCHLOROTHIAZIDE (MICROZIDE) 12.5 MG CAPSULE    TAKE 1 CAPSULE BY MOUTH EVERY MONDAY, WEDNESDAY, FRIDAY    LINAGLIPTIN (TRADJENTA) 5 MG TAB TABLET    Take 1 tablet (5 mg total) by mouth once daily.    METOPROLOL SUCCINATE (TOPROL-XL) 100 MG 24 HR TABLET    TAKE 1 TABLET(100 MG) BY MOUTH EVERY DAY    MULTIVIT-MIN/FA/LYCOPEN/LUTEIN (CENTRUM SILVER MEN ORAL)    Take by mouth.    NIACIN 500 MG TAB    Take 100 mg by mouth every evening.    OMEPRAZOLE (PRILOSEC) 40 MG CAPSULE        TRIAMCINOLONE ACETONIDE 0.1%  (KENALOG) 0.1 % OINTMENT        TRUEPLUS LANCETS 33 GAUGE MISC    USE TO TEST BLOOD SUGAR BID    TURMERIC ROOT EXTRACT ORAL    Take by mouth.    VALSARTAN (DIOVAN) 320 MG TABLET    Take 1 tablet (320 mg total) by mouth once daily.    VITAMIN E 400 UNIT CAPSULE    Take 400 Units by mouth once daily.       Past Medical Hx :   Hypertension; DM; Hyperlipidemia; Chronic atrial fibrillation; Macular degeneration, Lichen planus..CKD 3; CAD.GERD; Peripheral Neuropathy.  Known to have Rheumatoid arthritis, on no Rx  No Hx of TB, , Lupus, sarcoid, Seizure disorder or CVA. No Hx of DVT or PE./ No past Hx of cancer, chemotherapy or radiation therapy.    Past Medical Hx :  Past Medical History:   Diagnosis Date    Atrial fibrillation     CKD stage 3 due to type 2 diabetes mellitus 5/26/2015    Colon polyp     Coronary artery disease     Diabetes mellitus with renal manifestations, uncontrolled 2/26/2015    GERD (gastroesophageal reflux disease) 2/26/2015    Gout     Gout, chronic 2/26/2015    HDL lipoprotein deficiency 5/26/2015    Hyperlipidemia 2/26/2015    Uncontrolled secondary diabetes mellitus with stage 3 CKD (GFR 30-59) 8/28/2015       Travel Hx :    July  three week vacation to [facility].    Immunization :  Immunization History   Administered Date(s) Administered    Influenza 10/19/2018    Influenza - High Dose 11/03/2017, 10/19/2018    Pneumococcal Conjugate - 13 Valent 06/27/2017    Td (ADULT) 11/18/2005       Family Hx :  No family history on file.    Social Hx :  Social History     Socioeconomic History    Marital status:      Spouse name: Not on file    Number of children: Not on file    Years of education: Not on file    Highest education level: Not on file   Social Needs    Financial resource strain: Not on file    Food insecurity - worry: Not on file    Food insecurity - inability: Not on file    Transportation needs - medical: Not on file    Transportation needs -  non-medical: Not on file   Occupational History    Not on file   Tobacco Use    Smoking status: Former Smoker    Smokeless tobacco: Never Used   Substance and Sexual Activity    Alcohol use: Yes     Alcohol/week: 0.0 oz     Comment: Social    Drug use: No    Sexual activity: Not on file   Other Topics Concern    Not on file   Social History Narrative    Not on file       Surgery :  Bilateral Knee replacement; Bilateral Cataract surgery.    Symptoms :   No new Sx.    Review of Systems   Constitutional: Negative for chills, diaphoresis, fever, malaise/fatigue and weight loss.   HENT: Positive for congestion. Negative for hearing loss, nosebleeds, sore throat and tinnitus.    Eyes: Negative for blurred vision, double vision, photophobia and pain.   Respiratory: Negative for cough, hemoptysis, sputum production, shortness of breath and wheezing.    Cardiovascular: Negative.  Negative for chest pain, palpitations, orthopnea, claudication, leg swelling and PND.   Gastrointestinal: Negative for abdominal pain, blood in stool, constipation, diarrhea, heartburn, nausea and vomiting.   Genitourinary: Negative for dysuria, flank pain, frequency, hematuria and urgency.   Musculoskeletal: Negative for back pain, falls, joint pain, myalgias and neck pain.   Skin: Positive for rash. Negative for itching.        Saw Dermatologist for Rosacea few weeks ago.   Neurological: Positive for tingling and sensory change. Negative for dizziness and headaches.   Endo/Heme/Allergies: Negative for environmental allergies. Does not bruise/bleed easily.   Psychiatric/Behavioral: Negative for depression and memory loss. The patient is not nervous/anxious and does not have insomnia.        Physical Exam :  Looks younger than the stated age.  Physical Exam   Constitutional: He is oriented to person, place, and time and well-developed, well-nourished, and in no distress. Vital signs are normal. No distress.   HENT:   Head: Normocephalic and  atraumatic.   Right Ear: External ear normal.   Left Ear: External ear normal.   Nose: Nose normal.   Mouth/Throat: Oropharynx is clear and moist. No oropharyngeal exudate.   Eyes: Conjunctivae, EOM and lids are normal. Pupils are equal, round, and reactive to light. Lids are everted and swept, no foreign bodies found. Right eye exhibits no discharge. Left eye exhibits no discharge. No scleral icterus.       Neck: Trachea normal, normal range of motion, full passive range of motion without pain and phonation normal. Neck supple. Normal carotid pulses, no hepatojugular reflux and no JVD present. No tracheal tenderness present. Carotid bruit is not present. No tracheal deviation present. No thyroid mass and no thyromegaly present.   Cardiovascular: Normal rate, regular rhythm, normal heart sounds, intact distal pulses and normal pulses. PMI is not displaced. Exam reveals no gallop and no friction rub.   No murmur heard.  Pulmonary/Chest: Effort normal and breath sounds normal. No stridor. No apnea. No respiratory distress. He has no wheezes. He has no rales. He exhibits no tenderness.   Abdominal: Soft. Normal appearance, normal aorta and bowel sounds are normal. He exhibits no distension, no ascites and no mass. There is no hepatosplenomegaly. There is no tenderness. There is no rebound, no guarding and no CVA tenderness. No hernia.   Genitourinary:   Genitourinary Comments: Not examined   Musculoskeletal: Normal range of motion. He exhibits no edema, tenderness or deformity.   Lymphadenopathy:        Head (right side): No submental, no submandibular, no tonsillar, no preauricular, no posterior auricular and no occipital adenopathy present.        Head (left side): No submental, no submandibular, no tonsillar, no preauricular, no posterior auricular and no occipital adenopathy present.     He has no cervical adenopathy.     He has no axillary adenopathy.        Right: No inguinal, no supraclavicular and no  epitrochlear adenopathy present.        Left: No inguinal, no supraclavicular and no epitrochlear adenopathy present.   Neurological: He is alert and oriented to person, place, and time. He has normal motor skills, normal sensation, normal strength, normal reflexes and intact cranial nerves. No cranial nerve deficit. He exhibits normal muscle tone. Gait normal. Coordination normal. GCS score is 15.   Skin: Skin is warm, dry and intact. No rash noted. He is not diaphoretic. No cyanosis or erythema. No pallor. Nails show no clubbing.   Psychiatric: Mood, memory, affect and judgment normal.   No New finding.       Labs & Imaging :  02/12/19 : ; NFBS 194; Cr.1.4.  Ca 9.9.   Bili 1.1  ALP 50;  Hgb 13.4; Hct 40.7; MCV 92; Platelet 74,000. ANC 3,700    Dx :  .  Mild  thrombocytopenia  Low grade  Lymphoma Involving  Bone Marrow, Incidental finding.       Assessment & Plan : reviewed with patient .  Last PET/CT was in Oct 2018. Will recheck PET/CT in April 2018. Blood sugar followup with PCP.   Continue Monitoring. RTC 2 months with CBC,CMP. LDH. Patient understands and verbalised.

## 2019-03-29 ENCOUNTER — OFFICE VISIT (OUTPATIENT)
Dept: NEPHROLOGY | Facility: CLINIC | Age: 84
End: 2019-03-29
Payer: MEDICARE

## 2019-03-29 VITALS
WEIGHT: 202 LBS | HEART RATE: 63 BPM | BODY MASS INDEX: 28.28 KG/M2 | OXYGEN SATURATION: 96 % | SYSTOLIC BLOOD PRESSURE: 132 MMHG | HEIGHT: 71 IN | DIASTOLIC BLOOD PRESSURE: 80 MMHG

## 2019-03-29 DIAGNOSIS — R80.9 DIABETES MELLITUS WITH PROTEINURIA: ICD-10-CM

## 2019-03-29 DIAGNOSIS — C85.19 B-CELL LYMPHOMA OF EXTRANODAL SITE: ICD-10-CM

## 2019-03-29 DIAGNOSIS — N18.30 CKD STAGE 3 DUE TO TYPE 2 DIABETES MELLITUS: ICD-10-CM

## 2019-03-29 DIAGNOSIS — N18.30 HYPERTENSIVE KIDNEY DISEASE WITH STAGE 3 CHRONIC KIDNEY DISEASE: ICD-10-CM

## 2019-03-29 DIAGNOSIS — N18.30 CKD (CHRONIC KIDNEY DISEASE), STAGE III: Primary | ICD-10-CM

## 2019-03-29 DIAGNOSIS — D69.6 THROMBOCYTOPENIA: ICD-10-CM

## 2019-03-29 DIAGNOSIS — E11.29 DIABETES MELLITUS WITH PROTEINURIA: ICD-10-CM

## 2019-03-29 DIAGNOSIS — I12.9 HYPERTENSIVE KIDNEY DISEASE WITH STAGE 3 CHRONIC KIDNEY DISEASE: ICD-10-CM

## 2019-03-29 DIAGNOSIS — E11.22 CKD STAGE 3 DUE TO TYPE 2 DIABETES MELLITUS: ICD-10-CM

## 2019-03-29 PROCEDURE — 99214 OFFICE O/P EST MOD 30 MIN: CPT | Mod: S$PBB,,, | Performed by: INTERNAL MEDICINE

## 2019-03-29 PROCEDURE — 99214 PR OFFICE/OUTPT VISIT, EST, LEVL IV, 30-39 MIN: ICD-10-PCS | Mod: S$PBB,,, | Performed by: INTERNAL MEDICINE

## 2019-03-29 PROCEDURE — 99213 OFFICE O/P EST LOW 20 MIN: CPT | Mod: PBBFAC | Performed by: INTERNAL MEDICINE

## 2019-03-29 PROCEDURE — 99999 PR PBB SHADOW E&M-EST. PATIENT-LVL III: CPT | Mod: PBBFAC,,, | Performed by: INTERNAL MEDICINE

## 2019-03-29 PROCEDURE — 99999 PR PBB SHADOW E&M-EST. PATIENT-LVL III: ICD-10-PCS | Mod: PBBFAC,,, | Performed by: INTERNAL MEDICINE

## 2019-03-29 NOTE — PROGRESS NOTES
Subjective:       Patient ID: Bria Lawson is a 83 y.o. Black or  male who presents for follow-up evaluation of Chronic Kidney Disease    HPI     Mr. Lawson is seen in nephrology clinic for follow up on CKD. He was last seen on 8/15/18. He has CKD III, diabetes type 2, hypertension since age 38, chronic knee pain, DJD, rheumatoid arthritis. He had prior nephrology care at [facility] when he was told of eGFR in the range of 40's in 2013.     Interim, due to persistent thrombocytopenia he was referred to oncology/ hematology. He received work up including a bone marrow study when he was diagnosed with B cell lymphoma, low grade. He received 4 infusions of Rituxan by his oncologist.    He does report he had severely elevated BP noted during oncology visits in the last few months, sometimes in 200 SBP range. But he reports he checks his BP at home routinely and he has readings of SBP readings are under 140 mostly, DBP in 60's. He has not had any changes to his antihypertensive medicines.    He does not have any urinary symptoms. He has chronic leg edema. He does not feel it has worsened.     He is overall doing fine. He reports otherwise he has been doing fine, denies any nausea/ decreased or increased urine/ flank pain/ vomiting/ diarrhea/ fever. He denies NSAID use. He has stable appetite. He denies any night sweats/ bone pain/ bleeding gums.      He also takes Omeprazole. He states he does not tolerate GERD symptoms if he skips taking Omeprazole.     Prior labs include normal C4, hep B and C screen negative, no evidence of paraproteinemia. US from 1/16 showed normal kidney size and no mass or obstruction.     Renal Function:  Lab Results   Component Value Date     (H) 02/12/2019     (H) 12/12/2018     02/12/2019     12/12/2018    K 3.7 02/12/2019    K 4.0 12/12/2018     02/12/2019     12/12/2018    CO2 29 02/12/2019    CO2 29 12/12/2018    BUN 16 02/12/2019    BUN 18  12/12/2018    CALCIUM 9.9 02/12/2019    CALCIUM 9.4 12/12/2018    CREATININE 1.4 02/12/2019    CREATININE 1.4 12/12/2018    ALBUMIN 4.3 02/12/2019    ALBUMIN 4.3 12/12/2018    PHOS 3.0 10/19/2018    PHOS 2.8 10/02/2017    ESTGFRAFRICA 54 (A) 02/12/2019    ESTGFRAFRICA 54 (A) 12/12/2018    EGFRNONAA 46 (A) 02/12/2019    EGFRNONAA 46 (A) 12/12/2018       Urinalysis:  Lab Results   Component Value Date    APPEARANCEUA Clear 08/15/2018    PHUR 5.0 08/15/2018    SPECGRAV 1.015 08/15/2018    PROTEINUA Negative 08/15/2018    GLUCUA 2+ (A) 08/15/2018    OCCULTUA Negative 08/15/2018    NITRITE Negative 08/15/2018    LEUKOCYTESUR Negative 08/15/2018       Protein/Creatinine Ratio:  Lab Results   Component Value Date    PROTEINURINE 7 08/15/2018    CREATRANDUR 85.0 08/15/2018    UTPCR 0.08 08/15/2018       CBC:  Lab Results   Component Value Date    WBC 5.49 02/12/2019    HGB 13.4 (L) 02/12/2019    HCT 40.7 02/12/2019     Last vit D 46, PTH 40  No recent urine study      Review of Systems     Constitutional: Negative for fever, chills, activity change, appetite change and fatigue.   HENT: Negative for hearing loss and nosebleeds.    Respiratory: Negative for cough, shortness of breath and wheezing.   Cardiovascular: Negative for chest pain and palpitations.   Gastrointestinal: Negative for nausea, vomiting, abdominal pain and diarrhea.   Endocrine: Negative for polydipsia and polyuria.   Genitourinary: Negative for dysuria, frequency, hematuria and flank pain.   Musculoskeletal: Positive for back pain, arthralgias and gait problem. Negative for myalgias.   Skin: Negative for pallor and rash.   Neurological: Negative for dizziness, light-headedness and headaches.   Psychiatric/Behavioral: Negative for behavioral problems. The patient is not nervous/anxious    Objective:      Physical Exam     Constitutional: He is oriented to person, place, and time. He appears well-developed and well-nourished. No distress.   Head:  Normocephalic and atraumatic.   Neck: Normal range of motion. Neck supple. No JVD present.   Cardiovascular: Normal rate, regular rhythm and normal heart sounds.   No murmur heard.  Pulmonary/Chest: Effort normal and breath sounds normal. No respiratory distress. He has no wheezes. He has no rales.   Abdominal: Soft. Bowel sounds are normal. He exhibits no distension. There is no tenderness.   Musculoskeletal: He exhibits trace edema.   Neurological: He is alert and oriented to person, place, and time.   Skin: Skin is warm and dry. He is not diaphoretic.    Psychiatric: He has a normal mood and affect. His behavior is normal.   Vitals reviewed.    Assessment:       1. CKD (chronic kidney disease), stage III    2. Hypertensive kidney disease with stage 3 chronic kidney disease    3. B-cell lymphoma of extranodal site    4. Thrombocytopenia    5. CKD stage 3 due to type 2 diabetes mellitus    6. Diabetes mellitus with proteinuria        Plan:     Mr. Lawson has longstanding type 2 diabetes, hypertension, occasional NSAID use, has CKD due to diabetic, hypertensive nephropathy and occasional NSAID use in the past, he has CKD III.    Interim due to thrombocytopenia he was referred to hematology, he had further work up including a bone marrow study. He was diagnosed with a low grade B cell lymphoma. He has been treated with Rituxan by his oncologist. He did have problems with BP control over the last few months. Overall CKD III remains at baseline but he does not have a recent urine study. Will check urine test to rule out proteinuria.     CKD III  Stable renal labs  Continue current regimen of antihypertensive including ARB containing regimen for RAAS blockade  Low salt diet  avoid NSAID/ bactrim/ IV contrast/ gadolinium/ aminoglycoside/ Fleet enema where possible    hypertension  Strict low salt diet  continue ARB containing regimen  home BP monitoring, goal less than 130-140/80     Pt encouraged to follow BP more  closely at home and send readings periodically                                                                                                                                                                                                                                                                                                                                                                 Chronic gout  Continue allopurinol  Follow uric acid level    Leukopenia  Thrombocytopenia  B cell lymphoma  Management per oncologist    He has higher risk of contrast induced NITIN. avoid NSAID/ bactrim/ IV contrast/ gadolinium/ aminoglycoside where possible.      Diabetes, hyperlipidemia, RA management per PCP and rheumatology.  Labs discussed with patient, potential for decline in renal function explained to him, periodic follow up on renal function stressed. His questions were answered to his satisfaction. Stressed importance of better glycemic control, weight loss and strict low salt diet. He agreed with the plan.   Surveillance renal panel in 3 months   RTC 3-5  months.

## 2019-03-31 ENCOUNTER — EXTERNAL CHRONIC CARE MANAGEMENT (OUTPATIENT)
Dept: PRIMARY CARE CLINIC | Facility: CLINIC | Age: 84
End: 2019-03-31
Payer: MEDICARE

## 2019-03-31 PROCEDURE — 99490 PR CHRONIC CARE MGMT, 1ST 20 MIN: ICD-10-PCS | Mod: S$PBB,,, | Performed by: INTERNAL MEDICINE

## 2019-03-31 PROCEDURE — 99490 CHRNC CARE MGMT STAFF 1ST 20: CPT | Mod: S$PBB,,, | Performed by: INTERNAL MEDICINE

## 2019-03-31 PROCEDURE — 99490 CHRNC CARE MGMT STAFF 1ST 20: CPT | Mod: PBBFAC,PO | Performed by: INTERNAL MEDICINE

## 2019-04-11 ENCOUNTER — HOSPITAL ENCOUNTER (OUTPATIENT)
Dept: RADIOLOGY | Facility: HOSPITAL | Age: 84
Discharge: HOME | End: 2019-04-11
Attending: INTERNAL MEDICINE
Payer: MEDICARE

## 2019-04-11 DIAGNOSIS — C85.19 B-CELL LYMPHOMA OF EXTRANODAL SITE: ICD-10-CM

## 2019-04-11 DIAGNOSIS — D69.6 THROMBOCYTOPENIA: ICD-10-CM

## 2019-04-11 PROCEDURE — A9552 F18 FDG: HCPCS

## 2019-04-11 PROCEDURE — 78815 NM PET CT ROUTINE: ICD-10-PCS | Mod: 26,PS,, | Performed by: RADIOLOGY

## 2019-04-11 PROCEDURE — 78815 PET IMAGE W/CT SKULL-THIGH: CPT | Mod: TC,PS

## 2019-04-11 PROCEDURE — 78815 PET IMAGE W/CT SKULL-THIGH: CPT | Mod: 26,PS,, | Performed by: RADIOLOGY

## 2019-04-18 ENCOUNTER — OFFICE VISIT (OUTPATIENT)
Dept: HEMATOLOGY/ONCOLOGY | Facility: CLINIC | Age: 84
End: 2019-04-18
Payer: MEDICARE

## 2019-04-18 VITALS
WEIGHT: 204.13 LBS | SYSTOLIC BLOOD PRESSURE: 184 MMHG | BODY MASS INDEX: 28.58 KG/M2 | TEMPERATURE: 98 F | HEART RATE: 55 BPM | HEIGHT: 71 IN | DIASTOLIC BLOOD PRESSURE: 81 MMHG | OXYGEN SATURATION: 97 %

## 2019-04-18 DIAGNOSIS — D69.6 THROMBOCYTOPENIA: ICD-10-CM

## 2019-04-18 DIAGNOSIS — D70.9 NEUTROPENIA, UNSPECIFIED TYPE: ICD-10-CM

## 2019-04-18 DIAGNOSIS — C85.19 B-CELL LYMPHOMA OF EXTRANODAL SITE: Primary | ICD-10-CM

## 2019-04-18 PROCEDURE — 99213 OFFICE O/P EST LOW 20 MIN: CPT | Mod: PBBFAC | Performed by: INTERNAL MEDICINE

## 2019-04-18 PROCEDURE — 99213 PR OFFICE/OUTPT VISIT, EST, LEVL III, 20-29 MIN: ICD-10-PCS | Mod: S$PBB,,, | Performed by: INTERNAL MEDICINE

## 2019-04-18 PROCEDURE — 99999 PR PBB SHADOW E&M-EST. PATIENT-LVL III: CPT | Mod: PBBFAC,,, | Performed by: INTERNAL MEDICINE

## 2019-04-18 PROCEDURE — 99213 OFFICE O/P EST LOW 20 MIN: CPT | Mod: S$PBB,,, | Performed by: INTERNAL MEDICINE

## 2019-04-18 PROCEDURE — 99999 PR PBB SHADOW E&M-EST. PATIENT-LVL III: ICD-10-PCS | Mod: PBBFAC,,, | Performed by: INTERNAL MEDICINE

## 2019-04-18 RX ORDER — IRBESARTAN 300 MG/1
TABLET ORAL
COMMUNITY
Start: 2019-04-14 | End: 2019-04-22

## 2019-04-18 NOTE — PROGRESS NOTES
Chief Complaint :   Low  Grade Lymphoma involving Bone Marrow.    Hx of Present illness :  Patient is a 83 y.o. year old male who presents to the clinic today for   Oncology Followup. Evaluated for Thrombocytopenia. 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   Has completed one four week ciourse of rituxan Monotherapy.    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    Transfusion :  None    Menstrual & obstetric Hx :  N/A        Present Meds :   Medication List with Changes/Refills   Current Medications    ALLOPURINOL (ZYLOPRIM) 100 MG TABLET    TAKE 1 TABLET(100 MG) BY MOUTH EVERY DAY    AMLODIPINE (NORVASC) 10 MG TABLET    Take 1 tablet (10 mg total) by mouth once daily.    ASCORBIC ACID (VITAMIN C) 1000 MG TABLET    Take 1,000 mg by mouth once daily.    ASPIRIN 81 MG CHEW    CHEW AND SWALLOW 1 TABLET BY MOUTH DAILY    ATORVASTATIN (LIPITOR) 20 MG TABLET    Take 1 tablet (20 mg total) by mouth once daily.    BLOOD SUGAR DIAGNOSTIC STRP    1 strip by Misc.(Non-Drug; Combo Route) route 2 (two) times daily. Disp glucometer and lancets as well    CLOBETASOL (TEMOVATE) 0.05 % CREAM        FOLIC ACID (FOLVITE) 800 MCG TAB    Take 800 mcg by mouth once daily.    GABAPENTIN (NEURONTIN) 300 MG CAPSULE    Take 1 capsule (300 mg total) by mouth 3 (three) times daily.    GLIPIZIDE (GLUCOTROL) 10 MG TR24    TAKE 1 TABLET(10 MG) BY MOUTH EVERY DAY    HYDROCHLOROTHIAZIDE (MICROZIDE) 12.5 MG CAPSULE    TAKE 1 CAPSULE BY MOUTH EVERY MONDAY, WEDNESDAY, FRIDAY    LINAGLIPTIN (TRADJENTA) 5 MG TAB TABLET    Take 1 tablet (5 mg total) by mouth once daily.    METOPROLOL SUCCINATE (TOPROL-XL) 100 MG 24 HR TABLET    TAKE 1 TABLET(100 MG) BY MOUTH EVERY DAY    MULTIVIT-MIN/FA/LYCOPEN/LUTEIN (CENTRUM SILVER MEN ORAL)    Take by mouth.    NIACIN 500 MG TAB    Take 100 mg by mouth every evening.    OMEPRAZOLE (PRILOSEC) 40 MG CAPSULE        SOOLANTRA 1 % CREA         TRIAMCINOLONE ACETONIDE 0.1% (KENALOG) 0.1 % OINTMENT        TRUEPLUS LANCETS 33 GAUGE MISC    USE TO TEST BLOOD SUGAR BID    TURMERIC ROOT EXTRACT ORAL    Take by mouth.    VALSARTAN (DIOVAN) 320 MG TABLET    Take 1 tablet (320 mg total) by mouth once daily.    VITAMIN E 400 UNIT CAPSULE    Take 400 Units by mouth once daily.       Past Medical Hx :   Hypertension; DM; Hyperlipidemia; Chronic atrial fibrillation; Macular degeneration, Lichen planus..CKD 3; CAD.GERD; Peripheral Neuropathy.  Known to have Rheumatoid arthritis, on no Rx  No Hx of TB, , Lupus, sarcoid, Seizure disorder or CVA. No Hx of DVT or PE./ No past Hx of cancer, chemotherapy or radiation therapy.    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    Immunization :  Immunization History   Administered Date(s) Administered    Influenza 10/19/2018    Influenza - High Dose 11/03/2017, 10/19/2018    Pneumococcal Conjugate - 13 Valent 06/27/2017    Td (ADULT) 11/18/2005       Family Hx :  No family history on file.    Social Hx :  Social History     Socioeconomic History    Marital status:      Spouse name: Not on file    Number of children: Not on file    Years of education: Not on file    Highest education level: Not on file   Occupational History    Not on file   Social Needs    Financial resource strain: Not on file    Food insecurity:     Worry: Not on file     Inability: Not on file    Transportation needs:      Medical: Not on file     Non-medical: Not on file   Tobacco Use    Smoking status: Former Smoker    Smokeless tobacco: Never Used   Substance and Sexual Activity    Alcohol use: Yes     Alcohol/week: 0.0 oz     Comment: Social    Drug use: No    Sexual activity: Not on file   Lifestyle    Physical activity:     Days per week: Not on file     Minutes per session: Not on file    Stress: Not on file   Relationships    Social connections:     Talks on phone: Not on file     Gets together: Not on file     Attends Oriental orthodox service: Not on file     Active member of club or organization: Not on file     Attends meetings of clubs or organizations: Not on file     Relationship status: Not on file   Other Topics Concern    Not on file   Social History Narrative    Not on file       Surgery :  Bilateral Knee replacement; Bilateral Cataract surgery.    Symptoms :   No new Sx.    Review of Systems   Constitutional: Negative for chills, diaphoresis, fever, malaise/fatigue and weight loss.   HENT: Positive for congestion. Negative for hearing loss, nosebleeds, sore throat and tinnitus.    Eyes: Negative for blurred vision, double vision, photophobia and pain.   Respiratory: Negative for cough, hemoptysis, sputum production, shortness of breath and wheezing.    Cardiovascular: Negative.  Negative for chest pain, palpitations, orthopnea, claudication, leg swelling and PND.   Gastrointestinal: Negative for abdominal pain, blood in stool, constipation, diarrhea, heartburn, nausea and vomiting.   Genitourinary: Negative for dysuria, flank pain, frequency, hematuria and urgency.   Musculoskeletal: Negative for back pain, falls, joint pain, myalgias and neck pain.   Skin: Positive for rash. Negative for itching.        Saw Dermatologist for Rosacea few weeks ago.   Neurological: Positive for tingling and sensory change. Negative for dizziness and headaches.   Endo/Heme/Allergies: Negative for environmental allergies. Does  not bruise/bleed easily.   Psychiatric/Behavioral: Negative for depression and memory loss. The patient is not nervous/anxious and does not have insomnia.        Physical Exam :  Looks younger than the stated age.  Physical Exam   Constitutional: He is oriented to person, place, and time and well-developed, well-nourished, and in no distress. Vital signs are normal. No distress.   HENT:   Head: Normocephalic and atraumatic.   Right Ear: External ear normal.   Left Ear: External ear normal.   Nose: Nose normal.   Mouth/Throat: Oropharynx is clear and moist. No oropharyngeal exudate.   Eyes: Pupils are equal, round, and reactive to light. Conjunctivae, EOM and lids are normal. Lids are everted and swept, no foreign bodies found. Right eye exhibits no discharge. Left eye exhibits no discharge. No scleral icterus.       Neck: Trachea normal, normal range of motion, full passive range of motion without pain and phonation normal. Neck supple. Normal carotid pulses, no hepatojugular reflux and no JVD present. No tracheal tenderness present. Carotid bruit is not present. No tracheal deviation present. No thyroid mass and no thyromegaly present.   Cardiovascular: Normal rate, regular rhythm, normal heart sounds, intact distal pulses and normal pulses. PMI is not displaced. Exam reveals no gallop and no friction rub.   No murmur heard.  Pulmonary/Chest: Effort normal and breath sounds normal. No stridor. No apnea. No respiratory distress. He has no wheezes. He has no rales. He exhibits no tenderness.   Abdominal: Soft. Normal appearance, normal aorta and bowel sounds are normal. He exhibits no distension, no ascites and no mass. There is no hepatosplenomegaly. There is no tenderness. There is no rebound, no guarding and no CVA tenderness. No hernia.   Genitourinary:   Genitourinary Comments: Not examined   Musculoskeletal: Normal range of motion. He exhibits no edema, tenderness or deformity.   Lymphadenopathy:        Head  (right side): No submental, no submandibular, no tonsillar, no preauricular, no posterior auricular and no occipital adenopathy present.        Head (left side): No submental, no submandibular, no tonsillar, no preauricular, no posterior auricular and no occipital adenopathy present.     He has no cervical adenopathy.     He has no axillary adenopathy.        Right: No inguinal, no supraclavicular and no epitrochlear adenopathy present.        Left: No inguinal, no supraclavicular and no epitrochlear adenopathy present.   Neurological: He is alert and oriented to person, place, and time. He has normal motor skills, normal sensation, normal strength, normal reflexes and intact cranial nerves. No cranial nerve deficit. He exhibits normal muscle tone. Gait normal. Coordination normal. GCS score is 15.   Skin: Skin is warm, dry and intact. No rash noted. He is not diaphoretic. No cyanosis or erythema. No pallor. Nails show no clubbing.   Psychiatric: Mood, memory, affect and judgment normal.   No New finding.       Labs & Imaging :  04/11/19 :  ; NFBS 176. Cr.1.3; Ca 9.5  Bili 0.9. ALP 52.  WBC 4,310. ANC 2,200. Platelets 2,200.  hgb 12.9; Hct 39.9; .    04/11/19 : no hypermetabolic activity detected    Dx :  .  Mild Chronic  thrombocytopenia  Low grade  Lymphoma Involving  Bone Marrow, Incidental finding.  Poorly Controlled DM.      Assessment & Plan : reviewed with patient .  . Blood sugar followup with PCP.   Continue Monitoring. RTC 2 months with CBC,CMP. LDH. Patient understands and verbalised.         Advance Care Planning     Power of   I initiated the process of advance care planning today and explained the importance of this process to the patient.  I introduced the concept of advance directives to the patient, as well. Then the patient received detailed information about the importance of designating a Health Care Power of  (HCPOA). He was also instructed to communicate with this  person about their wishes for future healthcare, should he become sick and lose decision-making capacity. The patient  Has/has not:previously appointed a HCPOA. After our discussion, the patient Has decided to  Talk to hs wife         Living Will  During this visit, I engaged the patient in the advance care planning process.  The patient and I reviewed the role for advance directives and their purpose in directing future healthcare if the patient's unable to speak for him/herself.  At this point in time, the patient does have full decision-making capacity.  We discussed different extreme health states that he could experience, and reviewed what kind of medical care he would want in those situations.  The patient communicated that if he were comatose and had little chance of a meaningful recovery, he  Does not want machines/life-sustaining treatments used.    The patient HAS NOT completed a living will to reflect these preferences.  The patient   HAS NOT already designated a healthcare power of  to make decisions on his behalf.         Papers given to patient.

## 2019-04-22 ENCOUNTER — LAB VISIT (OUTPATIENT)
Dept: LAB | Facility: HOSPITAL | Age: 84
End: 2019-04-22
Attending: INTERNAL MEDICINE
Payer: MEDICARE

## 2019-04-22 ENCOUNTER — OFFICE VISIT (OUTPATIENT)
Dept: FAMILY MEDICINE | Facility: CLINIC | Age: 84
End: 2019-04-22
Payer: MEDICARE

## 2019-04-22 VITALS
HEART RATE: 61 BPM | OXYGEN SATURATION: 95 % | BODY MASS INDEX: 28.61 KG/M2 | HEIGHT: 71 IN | DIASTOLIC BLOOD PRESSURE: 76 MMHG | WEIGHT: 204.38 LBS | SYSTOLIC BLOOD PRESSURE: 166 MMHG | TEMPERATURE: 99 F

## 2019-04-22 DIAGNOSIS — E78.5 HYPERLIPIDEMIA, UNSPECIFIED HYPERLIPIDEMIA TYPE: ICD-10-CM

## 2019-04-22 DIAGNOSIS — L71.9 ROSACEA: ICD-10-CM

## 2019-04-22 DIAGNOSIS — M06.9 RHEUMATOID ARTHRITIS, INVOLVING UNSPECIFIED SITE, UNSPECIFIED RHEUMATOID FACTOR PRESENCE: ICD-10-CM

## 2019-04-22 DIAGNOSIS — E11.22 CKD STAGE 3 DUE TO TYPE 2 DIABETES MELLITUS: ICD-10-CM

## 2019-04-22 DIAGNOSIS — N18.30 HYPERTENSIVE KIDNEY DISEASE WITH STAGE 3 CHRONIC KIDNEY DISEASE: ICD-10-CM

## 2019-04-22 DIAGNOSIS — N18.30 STAGE 3 CHRONIC KIDNEY DISEASE: ICD-10-CM

## 2019-04-22 DIAGNOSIS — N18.30 CKD STAGE 3 DUE TO TYPE 2 DIABETES MELLITUS: ICD-10-CM

## 2019-04-22 DIAGNOSIS — E11.311 DIABETIC RETINOPATHY OF BOTH EYES WITH MACULAR EDEMA ASSOCIATED WITH TYPE 2 DIABETES MELLITUS, UNSPECIFIED RETINOPATHY SEVERITY: ICD-10-CM

## 2019-04-22 DIAGNOSIS — E11.21 DIABETES MELLITUS WITH PROTEINURIC DIABETIC NEPHROPATHY: ICD-10-CM

## 2019-04-22 DIAGNOSIS — E11.29 DIABETES MELLITUS WITH PROTEINURIA: ICD-10-CM

## 2019-04-22 DIAGNOSIS — C85.19 B-CELL LYMPHOMA OF EXTRANODAL SITE: ICD-10-CM

## 2019-04-22 DIAGNOSIS — I10 ESSENTIAL HYPERTENSION: Primary | ICD-10-CM

## 2019-04-22 DIAGNOSIS — D61.818 PANCYTOPENIA: ICD-10-CM

## 2019-04-22 DIAGNOSIS — I12.9 HYPERTENSIVE KIDNEY DISEASE WITH STAGE 3 CHRONIC KIDNEY DISEASE: ICD-10-CM

## 2019-04-22 DIAGNOSIS — M06.09 SERONEGATIVE ARTHROPATHY OF MULTIPLE SITES: ICD-10-CM

## 2019-04-22 DIAGNOSIS — R80.9 DIABETES MELLITUS WITH PROTEINURIA: ICD-10-CM

## 2019-04-22 DIAGNOSIS — D69.6 THROMBOCYTOPENIA: ICD-10-CM

## 2019-04-22 DIAGNOSIS — D64.9 ANEMIA, UNSPECIFIED TYPE: ICD-10-CM

## 2019-04-22 LAB
CHOLEST SERPL-MCNC: 109 MG/DL (ref 120–199)
CHOLEST/HDLC SERPL: 3.1 {RATIO} (ref 2–5)
HDLC SERPL-MCNC: 35 MG/DL (ref 40–75)
HDLC SERPL: 32.1 % (ref 20–50)
LDLC SERPL CALC-MCNC: 43.4 MG/DL (ref 63–159)
NONHDLC SERPL-MCNC: 74 MG/DL
TRIGL SERPL-MCNC: 153 MG/DL (ref 30–150)

## 2019-04-22 PROCEDURE — 99214 OFFICE O/P EST MOD 30 MIN: CPT | Mod: S$PBB,,, | Performed by: INTERNAL MEDICINE

## 2019-04-22 PROCEDURE — 83036 HEMOGLOBIN GLYCOSYLATED A1C: CPT

## 2019-04-22 PROCEDURE — 99214 OFFICE O/P EST MOD 30 MIN: CPT | Mod: PBBFAC,PO | Performed by: INTERNAL MEDICINE

## 2019-04-22 PROCEDURE — 80061 LIPID PANEL: CPT

## 2019-04-22 PROCEDURE — 99999 PR PBB SHADOW E&M-EST. PATIENT-LVL IV: ICD-10-PCS | Mod: PBBFAC,,, | Performed by: INTERNAL MEDICINE

## 2019-04-22 PROCEDURE — 99214 PR OFFICE/OUTPT VISIT, EST, LEVL IV, 30-39 MIN: ICD-10-PCS | Mod: S$PBB,,, | Performed by: INTERNAL MEDICINE

## 2019-04-22 PROCEDURE — 99999 PR PBB SHADOW E&M-EST. PATIENT-LVL IV: CPT | Mod: PBBFAC,,, | Performed by: INTERNAL MEDICINE

## 2019-04-22 PROCEDURE — 36415 COLL VENOUS BLD VENIPUNCTURE: CPT | Mod: PO

## 2019-04-22 RX ORDER — CARVEDILOL 25 MG/1
25 TABLET ORAL 2 TIMES DAILY WITH MEALS
Qty: 60 TABLET | Refills: 11 | Status: SHIPPED | OUTPATIENT
Start: 2019-04-22 | End: 2020-03-19

## 2019-04-22 NOTE — PROGRESS NOTES
Chief complaint: Follow-up on diabetes    83-year-old black male here to follow-up on diabetes although did  have A1c prior. Reviewed all his labs and abnormalities.   his A1c was 7.4 in 2/19. Eating poorly.  He has some chronic renal insufficiency. Seen renal 3/19.   He had a slight hemoglobin reduction which appears chronic and fluctuating clinical of last year or so with slight thrombocytopenia.  We discussed all those issues and the need to monitor them over time.  Is otherwise feeling well.    His blood pressure appears to be in a good control on some days but elevated on other days.  He is on 4 medications for his blood pressure.  He monitors pulse at home and is typically in the 50s but on several days has been as low as 47 but without any symptoms.  He is checking his blood pressure in the morning and we discussed checking at different times and he was doing so.  For no particular reason there were several days in a row where was very excellent other days where it was in the 140s to 150s.  Today at home his readings were similar to the initial reading of 166 P.  Counseled at length regarding all these age-related issues, monitoring and so forth.Total time over 25 minutes with over 50% counseling.Counseled regarding all these issues.Total time over 25 minutes with over 50% counseling.  .  Monitoring blood pressure at home . High at latest Onc appts but ok at renal appt ???.  Reviewed that he had a normal colonoscopy 2017 outside the clinic.  Also discussed his medications for diabetes which appear to be too medications and were avoiding metformin likely due to the renal insufficiency.  We discussed better diet improvement which likely needs to happen.  He is fasting today and his last lipid profile was reviewed about 2 years ago so will update.    We discussed switching the metoprolol 100 carvedilol 25 mg twice a day.  I gave him written instructions on the switch as well as to monitor blood pressure and  pulse.  If it is equivalent we might go back to metoprolol but hopefully will get better blood pressure control with the carvedilol.    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 EYES: no vision change. ENT: no sore throat. CV: no chest pain w/ exertion. RESP: no shortness of breath. GI: no nausea, vomiting, diarrhea. No dysphagia. : no urinary issues. MUSCULOSKELETAL: no new myalgias or arthralgias. SKIN: no new changes. NEURO: no focal deficits. PSYCH: no new issues. ENDOCRINE: no polyuria. HEME: no lymph nodes. ALLERGY: no general pruritis.       Past medical history:  1.  Diabetes with renal disease and retinopathy  2.  Hypertension  3.  Hyperlipidemia  4.  Chronic renal failure stage III  5.  History of colon polyp, normal scope March 2012, normal 2017-- 5 yrs----GI at [facility]- Dr Kumari  6.  Hyperuricemia and gout  7.  History of sciatica and lumbar disc disease remotely  8.  Erectile dysfunction  9.  Macular degeneration, followed by outside retina specialist  10.  GERD  11.  Low HDL  12.  Inflammatory arthritis of the hands, seeing rheumatology  13.  Bilateral cervical radiculopathy and axonal neuropathy, to have surgery 2015    14.  Followed by outside urology at [facility] Dr. noriega   15.  TKA  -angie Han  who is at [facility]  16.  Prevnar given 2017  17. 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    Family history: Mother with hypertension and diabetes.  Father's history is unknown.  One brother who is okay.    Social history: Retired, quit smoking 1974.   with 3 children.  Drinks socially.    Vital signs as above  Gen: no distress, exam otherwise deferred    Bria was seen today for diabetes and blood pressure check.    Diagnoses and all orders for this visit:    Essential hypertension, assume to be generally uncontrolled although he has good  "days.  He is asymptomatic regarding bradycardia.  We will switch metoprolol to carvedilol hopefully for better blood pressure control.  He continues on his low-dose HCTZ only 3 times a week due to the renal insufficiency so will hesitate on increasing that.    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 2 diabetes mellitus    Anemia, unspecified type    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          Clinical note will be sensitive so as to not cause confusion regarding physical and evaluation and management issues"This note will not be shared with the patient."  "

## 2019-04-23 LAB
ESTIMATED AVG GLUCOSE: 143 MG/DL (ref 68–131)
HBA1C MFR BLD HPLC: 6.6 % (ref 4–5.6)

## 2019-04-30 ENCOUNTER — EXTERNAL CHRONIC CARE MANAGEMENT (OUTPATIENT)
Dept: PRIMARY CARE CLINIC | Facility: CLINIC | Age: 84
End: 2019-04-30
Payer: MEDICARE

## 2019-04-30 PROCEDURE — 99490 CHRNC CARE MGMT STAFF 1ST 20: CPT | Mod: PBBFAC,PO | Performed by: INTERNAL MEDICINE

## 2019-04-30 PROCEDURE — 99490 PR CHRONIC CARE MGMT, 1ST 20 MIN: ICD-10-PCS | Mod: S$PBB,,, | Performed by: INTERNAL MEDICINE

## 2019-04-30 PROCEDURE — 99490 CHRNC CARE MGMT STAFF 1ST 20: CPT | Mod: S$PBB,,, | Performed by: INTERNAL MEDICINE

## 2019-05-07 RX ORDER — AMLODIPINE BESYLATE 10 MG/1
TABLET ORAL
Qty: 90 TABLET | Refills: 12 | Status: SHIPPED | OUTPATIENT
Start: 2019-05-07 | End: 2020-05-10

## 2019-05-16 ENCOUNTER — OFFICE VISIT (OUTPATIENT)
Dept: OPTOMETRY | Facility: CLINIC | Age: 84
End: 2019-05-16
Payer: MEDICARE

## 2019-05-16 DIAGNOSIS — E11.9 TYPE 2 DIABETES MELLITUS WITHOUT RETINOPATHY: Primary | ICD-10-CM

## 2019-05-16 DIAGNOSIS — Z96.1 PSEUDOPHAKIA: ICD-10-CM

## 2019-05-16 DIAGNOSIS — H52.7 REFRACTIVE ERROR: ICD-10-CM

## 2019-05-16 PROCEDURE — 92015 PR REFRACTION: ICD-10-PCS | Mod: ,,, | Performed by: OPTOMETRIST

## 2019-05-16 PROCEDURE — 99999 PR PBB SHADOW E&M-EST. PATIENT-LVL I: CPT | Mod: PBBFAC,,, | Performed by: OPTOMETRIST

## 2019-05-16 PROCEDURE — 92004 PR EYE EXAM, NEW PATIENT,COMPREHESV: ICD-10-PCS | Mod: S$PBB,,, | Performed by: OPTOMETRIST

## 2019-05-16 PROCEDURE — 99211 OFF/OP EST MAY X REQ PHY/QHP: CPT | Mod: PBBFAC,PO | Performed by: OPTOMETRIST

## 2019-05-16 PROCEDURE — 99999 PR PBB SHADOW E&M-EST. PATIENT-LVL I: ICD-10-PCS | Mod: PBBFAC,,, | Performed by: OPTOMETRIST

## 2019-05-16 PROCEDURE — 92004 COMPRE OPH EXAM NEW PT 1/>: CPT | Mod: S$PBB,,, | Performed by: OPTOMETRIST

## 2019-05-16 PROCEDURE — 92015 DETERMINE REFRACTIVE STATE: CPT | Mod: ,,, | Performed by: OPTOMETRIST

## 2019-05-16 RX ORDER — HYDROCHLOROTHIAZIDE 12.5 MG/1
CAPSULE ORAL
Qty: 38 CAPSULE | Refills: 0 | Status: SHIPPED | OUTPATIENT
Start: 2019-05-16 | End: 2019-08-06 | Stop reason: SDUPTHER

## 2019-05-16 NOTE — PROGRESS NOTES
Subjective:       Patient ID: Bria Lawson is a 83 y.o. male      Chief Complaint   Patient presents with    Diabetic Eye Exam     History of Present Illness  Dls: 6 months eye care Dr. Crawford     84 y/o male presents today for diabetic eye exam.  Pt states no changes in vision. Pt wears trifocal glasses. Pt wants new Rx   for glasses     today     No tearing  No itching  No burning  No pain  No floaters  No flashes    Eye meds  systane ultra ou  Prn     Hemoglobin A1C       Date                     Value               Ref Range           Status             04/22/2019               6.6 (H)             4.0 - 5.6 %         Final            01/14/2019               7.4 (H)             4.0 - 5.6 %         Final           07/10/2018               6.6 (H)             4.0 - 5.6 %         Final             Assessment/Plan:     1. Type 2 diabetes mellitus without retinopathy  No diabetic retinopathy. Discussed with pt the effects of diabetes on vision, importance of good blood sugar control, compliance with meds, and follow up care with PCP. Return in 1 year for dilated eye exam, sooner PRN.    2. Pseudophakia  Well-centered. Clear.     3. Refractive error  Educated patient on refractive error and discussed lens options. Dispensed updated spectacle Rx. Educated about adaptation period to new specs.    Eyeglass Final Rx     Eyeglass Final Rx       Sphere Cylinder Add    Right [place] Sphere +2.75    Left -0.50 Sphere +2.75    Expiration Date:  5/16/2020                  Follow up in about 1 year (around 5/16/2020) for Diabetic Eye Exam.

## 2019-05-20 ENCOUNTER — TELEPHONE (OUTPATIENT)
Dept: NEPHROLOGY | Facility: CLINIC | Age: 84
End: 2019-05-20

## 2019-05-21 ENCOUNTER — TELEPHONE (OUTPATIENT)
Dept: NEPHROLOGY | Facility: CLINIC | Age: 84
End: 2019-05-21

## 2019-05-21 NOTE — TELEPHONE ENCOUNTER
----- Message from Daniela Jaffe sent at 5/21/2019 11:14 AM CDT -----  Contact: Self [phone]  Pt is requesting a call back from [place] in response to the missed call to schedule an appointment.    Pt may be reached at [phone].    Thank you.  LC

## 2019-05-29 ENCOUNTER — TELEPHONE (OUTPATIENT)
Dept: PODIATRY | Facility: CLINIC | Age: 84
End: 2019-05-29

## 2019-05-29 NOTE — TELEPHONE ENCOUNTER
Spoke with patient     States that his toe is red and swollen and he is diabetic    Appointment scheduled

## 2019-05-29 NOTE — TELEPHONE ENCOUNTER
----- Message from David Soriano sent at 5/29/2019  2:14 PM CDT -----  Contact: pt   .Type: Patient Call Back    Who called:pt     What is the request in detail: Pt wants a sooner appt, and because his toe is swelling and he is a diabetic he feels he needs to be seen immediatley.     Can the clinic reply by [facility]?no    Would the patient rather a call back or a response via [facility]? Call back     Best call back number:[phone]    Additional Information

## 2019-05-30 ENCOUNTER — OFFICE VISIT (OUTPATIENT)
Dept: PODIATRY | Facility: CLINIC | Age: 84
End: 2019-05-30
Payer: MEDICARE

## 2019-05-30 VITALS
WEIGHT: 204 LBS | HEIGHT: 71 IN | DIASTOLIC BLOOD PRESSURE: 60 MMHG | BODY MASS INDEX: 28.56 KG/M2 | SYSTOLIC BLOOD PRESSURE: 138 MMHG

## 2019-05-30 DIAGNOSIS — L02.612 ABSCESS OF GREAT TOE, LEFT: ICD-10-CM

## 2019-05-30 DIAGNOSIS — M79.89 PAIN AND SWELLING OF TOE OF LEFT FOOT: Primary | ICD-10-CM

## 2019-05-30 DIAGNOSIS — M79.675 PAIN AND SWELLING OF TOE OF LEFT FOOT: Primary | ICD-10-CM

## 2019-05-30 DIAGNOSIS — L03.032 CELLULITIS OF GREAT TOE, LEFT: ICD-10-CM

## 2019-05-30 PROCEDURE — 99214 PR OFFICE/OUTPT VISIT, EST, LEVL IV, 30-39 MIN: ICD-10-PCS | Mod: S$PBB,25,, | Performed by: PODIATRIST

## 2019-05-30 PROCEDURE — 87070 CULTURE OTHR SPECIMN AEROBIC: CPT

## 2019-05-30 PROCEDURE — 99214 OFFICE O/P EST MOD 30 MIN: CPT | Mod: S$PBB,25,, | Performed by: PODIATRIST

## 2019-05-30 PROCEDURE — 99213 OFFICE O/P EST LOW 20 MIN: CPT | Mod: PBBFAC,PO,25 | Performed by: PODIATRIST

## 2019-05-30 PROCEDURE — 10160 PR PUNCTURE DRAINAGE, LESION: ICD-10-PCS | Mod: S$PBB,LT,, | Performed by: PODIATRIST

## 2019-05-30 PROCEDURE — 10160 PNXR ASPIR ABSC HMTMA BULLA: CPT | Mod: S$PBB,LT,, | Performed by: PODIATRIST

## 2019-05-30 PROCEDURE — 99999 PR PBB SHADOW E&M-EST. PATIENT-LVL III: CPT | Mod: PBBFAC,,, | Performed by: PODIATRIST

## 2019-05-30 PROCEDURE — 99999 PR PBB SHADOW E&M-EST. PATIENT-LVL III: ICD-10-PCS | Mod: PBBFAC,,, | Performed by: PODIATRIST

## 2019-05-30 PROCEDURE — 10160 PNXR ASPIR ABSC HMTMA BULLA: CPT | Mod: PBBFAC,PO | Performed by: PODIATRIST

## 2019-05-30 PROCEDURE — 87075 CULTR BACTERIA EXCEPT BLOOD: CPT

## 2019-05-30 RX ORDER — CLINDAMYCIN HYDROCHLORIDE 300 MG/1
300 CAPSULE ORAL EVERY 8 HOURS
Qty: 30 CAPSULE | Refills: 0 | Status: SHIPPED | OUTPATIENT
Start: 2019-05-30 | End: 2019-06-09

## 2019-05-30 NOTE — PROGRESS NOTES
Subjective:      Patient ID: Bria Lawson is a 83 y.o. male.    Chief Complaint: Diabetes Mellitus (left foot great toe redness (PCP Dr Bragg 4/22/19)); Diabetic Foot Exam; and Nail Care    Bria is a 83 y.o. male who presents to the clinic for evaluation and treatment of high risk feet. 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 He presents to the clinic complaining of painful ingrown toenail, left medial border. Patient has not attempted self treatment.  Patient relates that he was doing yard work this weekend in old shoes when he subsequently noted a red swollen toe. This is a initial problem.  Patient  denies seeing any drainage.       This patient has documented high risk feet requiring routine maintenance secondary to diabetes mellitis and those secondary complications of diabetes, as mentioned..      PCP: Adiel Bragg MD    Date Last Seen by PCP:   Chief Complaint   Patient presents with    Diabetes Mellitus     left foot great toe redness (PCP Dr Bragg 4/22/19)    Diabetic Foot Exam    Nail Care     Current shoe gear:  Casual tennis shoes    Hemoglobin A1C   Date Value Ref Range Status   04/22/2019 6.6 (H) 4.0 - 5.6 % Final     Comment:     ADA Screening Guidelines:  5.7-6.4%  Consistent with prediabetes  >or=6.5%  Consistent with diabetes  High levels of fetal hemoglobin interfere with the HbA1C  assay. Heterozygous hemoglobin variants (HbS, HgC, etc)do  not significantly interfere with this assay.   However, presence of  multiple variants may affect accuracy.     01/14/2019 7.4 (H) 4.0 - 5.6 % Final     Comment:     ADA Screening Guidelines:  5.7-6.4%  Consistent with prediabetes  >or=6.5%  Consistent with diabetes  High levels of fetal hemoglobin interfere with the HbA1C  assay. Heterozygous hemoglobin variants (HbS, HgC, etc)do  not significantly interfere with this assay.   However, presence of multiple variants may affect accuracy.     07/10/2018 6.6 (H) 4.0 - 5.6 % Final     Comment:     ADA Screening Guidelines:  5.7-6.4%  Consistent with prediabetes  >or=6.5%  Consistent with diabetes  High levels of fetal hemoglobin interfere with the HbA1C  assay. Heterozygous hemoglobin variants (HbS, HgC, etc)do  not significantly interfere with this assay.   However, presence of multiple variants may affect accuracy.           Patient Active Problem List   Diagnosis    GERD (gastroesophageal reflux disease)    Gout, chronic    HTN (hypertension)    Diabetes mellitus with renal manifestations, uncontrolled    Hyperlipidemia    CKD stage 3 due to type 2 diabetes mellitus    HDL lipoprotein deficiency    Cervical radiculopathy, acute    CN (constipation)    Uncontrolled secondary diabetes mellitus with stage 3 CKD (GFR 30-59)    Seronegative arthropathy of multiple sites    Anemia    Gout of foot    CKD (chronic kidney disease), stage III    Primary osteoarthritis involving multiple joints    Diabetes mellitus with proteinuria    Diabetes mellitus with proteinuric diabetic nephropathy    Hypertensive CKD (chronic kidney disease)    Olecranon bursitis of left elbow    Leukopenia    Thrombocytopenia    Rheumatoid arthritis    B-cell lymphoma of extranodal site    Neutropenia    Refractive error    Pseudophakia       Current Outpatient Medications on File Prior to Visit   Medication Sig Dispense Refill    allopurinol (ZYLOPRIM) 100 MG tablet TAKE 1 TABLET(100 MG) BY MOUTH EVERY DAY 90 tablet 3    amLODIPine  (NORVASC) 10 MG tablet TAKE 1 TABLET(10 MG) BY MOUTH EVERY DAY 90 tablet 12    ascorbic acid (VITAMIN C) 1000 MG tablet Take 1,000 mg by mouth once daily.      aspirin 81 MG Chew CHEW AND SWALLOW 1 TABLET BY MOUTH DAILY 90 tablet 3    atorvastatin (LIPITOR) 20 MG tablet Take 1 tablet (20 mg total) by mouth once daily. 90 tablet 12    blood sugar diagnostic Strp 1 strip by Misc.(Non-Drug; Combo Route) route 2 (two) times daily. Disp glucometer and lancets as well 100 strip 12    carvedilol (COREG) 25 MG tablet Take 1 tablet (25 mg total) by mouth 2 (two) times daily with meals. 60 tablet 11    clobetasol (TEMOVATE) 0.05 % cream   2    folic acid (FOLVITE) 800 MCG Tab Take 800 mcg by mouth once daily.      gabapentin (NEURONTIN) 300 MG capsule Take 1 capsule (300 mg total) by mouth 3 (three) times daily. 270 capsule 1    glipiZIDE (GLUCOTROL) 10 MG TR24 TAKE 1 TABLET(10 MG) BY MOUTH EVERY DAY 90 tablet 3    hydroCHLOROthiazide (MICROZIDE) 12.5 mg capsule TAKE 1 CAPSULE BY MOUTH EVERY MONDAY, WEDNESDAY, FRIDAY 38 capsule 0    linagliptin (TRADJENTA) 5 mg Tab tablet Take 1 tablet (5 mg total) by mouth once daily. 90 tablet 3    multivit-min/FA/lycopen/lutein (CENTRUM SILVER MEN ORAL) Take by mouth.      niacin 500 MG Tab Take 100 mg by mouth every evening.      omeprazole (PRILOSEC) 40 MG capsule       SOOLANTRA 1 % Crea       triamcinolone acetonide 0.1% (KENALOG) 0.1 % ointment       TRUEPLUS LANCETS 33 gauge Misc USE TO TEST BLOOD SUGAR BID  12    TURMERIC ROOT EXTRACT ORAL Take by mouth.      valsartan (DIOVAN) 320 MG tablet Take 1 tablet (320 mg total) by mouth once daily. 90 tablet 3    vitamin E 400 UNIT capsule Take 400 Units by mouth once daily.       No current facility-administered medications on file prior to visit.        Review of patient's allergies indicates:  No Known Allergies    Past Surgical History:   Procedure Laterality Date    Biopsy-bone marrow Left 9/19/2018    Performed by  [facility] at [facility]    CATARACT EXTRACTION Bilateral 1996    EYE SURGERY      cataracts    JOINT REPLACEMENT      knee replacement    retinal injection s Bilateral     scrotal cyst         Family History   Problem Relation Age of Onset    No Known Problems Mother     No Known Problems Father     No Known Problems Sister     No Known Problems Brother     No Known Problems Maternal Aunt     No Known Problems Maternal Uncle     No Known Problems Paternal Aunt     No Known Problems Paternal Uncle     No Known Problems Maternal Grandmother     No Known Problems Maternal Grandfather     No Known Problems Paternal Grandmother     No Known Problems Paternal Grandfather     Amblyopia Neg Hx     Blindness Neg Hx     Cancer Neg Hx     Cataracts Neg Hx     Diabetes Neg Hx     Glaucoma Neg Hx     Hypertension Neg Hx     Macular degeneration Neg Hx     Retinal detachment Neg Hx     Strabismus Neg Hx     Stroke Neg Hx     Thyroid disease Neg Hx        Social History     Socioeconomic History    Marital status:      Spouse name: Not on file    Number of children: Not on file    Years of education: Not on file    Highest education level: Not on file   Occupational History    Not on file   Social Needs    Financial resource strain: Not on file    Food insecurity:     Worry: Not on file     Inability: Not on file    Transportation needs:     Medical: Not on file     Non-medical: Not on file   Tobacco Use    Smoking status: Former Smoker    Smokeless tobacco: Never Used   Substance and Sexual Activity    Alcohol use: Yes     Alcohol/week: 0.0 oz     Comment: Social    Drug use: No    Sexual activity: Not on file   Lifestyle    Physical activity:     Days per week: Not on file     Minutes per session: Not on file    Stress: Not on file   Relationships    Social connections:     Talks on phone: Not on file     Gets together: Not on file     Attends Mu-ism service: Not  "on file     Active member of club or organization: Not on file     Attends meetings of clubs or organizations: Not on file     Relationship status: Not on file   Other Topics Concern    Not on file   Social History Narrative    Not on file           Review of Systems   Constitution: Negative for chills and fever.   Cardiovascular: Positive for leg swelling. Negative for claudication.   Respiratory: Negative for cough and shortness of breath.    Skin: Positive for dry skin, itching, nail changes and rash.        Lichen planus   Musculoskeletal: Positive for arthritis (RA), back pain, joint pain and myalgias. Negative for falls, joint swelling and muscle weakness.   Gastrointestinal: Negative for diarrhea, nausea and vomiting.   Neurological: Positive for paresthesias. Negative for numbness, tremors and weakness.   Psychiatric/Behavioral: Negative for altered mental status and hallucinations.           Objective:       Vitals:    05/30/19 0930   BP: 138/60   Weight: 92.5 kg (204 lb)   Height: 5' 11" (1.803 m)   PainSc: 0-No pain       Physical Exam   Constitutional:  Non-toxic appearance. He does not have a sickly appearance. No distress.   Pt. is well-developed, well-nourished, appears stated age, in no acute distress, alert and oriented x 3. No evidence of depression, anxiety, or agitation. Calm, cooperative, and communicative. Appropriate interactions and affect.   Cardiovascular:   Pulses:       Dorsalis pedis pulses are 1+ on the right side, and 1+ on the left side.        Posterior tibial pulses are 1+ on the right side, and 1+ on the left side.    Dorsalis pedis and posterior tibial pulses are diminished Bilaterally. Toes are cool to touch. Feet are warm proximally.There is decreased digital hair. Skin is atrophic   Pulmonary/Chest: No respiratory distress.   Musculoskeletal:        Right ankle: No tenderness. No lateral malleolus, no medial malleolus, no AITFL, no CF ligament and no posterior TFL tenderness " found. Achilles tendon exhibits no pain, no defect and normal Dahl's test results.        Left ankle: No tenderness. No lateral malleolus, no medial malleolus, no AITFL, no CF ligament and no posterior TFL tenderness found. Achilles tendon exhibits no pain, no defect and normal Dahl's test results.        Right foot: There is no tenderness and no bony tenderness.        Left foot: There is no tenderness and no bony tenderness.   Fat pad atrophy to heels and met heads bilateral    Decreased stride, station of gait.  apropulsive toe off.  Increased angle and base of gait.    Patient has hammertoes of digits 2-5 bilateral partially reducible without symptom today.    Decreased first MPJ range of motion both weightbearing and nonweightbearing, no crepitus observed the first MP joint, + dorsal flag sign.Mild  bunion deformity is observed .     Lymphadenopathy:   No lymphatic streaking    Negative lymphadenopathy bilateral popliteal fossa and tarsal tunnel.     Neurological: A sensory deficit is present.   Gladstone-Libby 5.07 monofilament is intact bilateral feet. Sharp/dull sensation is also intact Bilateral feet. Proprioception is grossly intact.     Decreased/absent vibratory sensation bilateral feet to 128Hz tuning fork.    Paresthesias, and hyperesthesia bilateral feet with no clearly identified trigger or source.           Skin: Skin is warm and dry. Lesion noted. No abrasion, no bruising, no ecchymosis and no rash noted. He is not diaphoretic. There is erythema. No cyanosis. No pallor. Nails show no clubbing.    Skin is thin, atrophic    Medial hallux nail margin of left foot with ingrown nail plate. Significant erythema and edema with fluctuance noted there is no granuloma formation noted. no malodor      Psychiatric: His mood appears not anxious. His affect is not inappropriate. His speech is not slurred. He is not combative. He is communicative. He is attentive.   Nursing note reviewed.                   Assessment:       Encounter Diagnoses   Name Primary?    Pain and swelling of toe of left foot Yes    Abscess of great toe, left     Cellulitis of great toe, left          Plan:       Bria was seen today for diabetes mellitus, diabetic foot exam and nail care.    Diagnoses and all orders for this visit:    Pain and swelling of toe of left foot  -     Aerobic culture  -     Culture, Anaerobic  -     X-Ray Foot Complete Left; Future    Abscess of great toe, left  -     Aerobic culture  -     Culture, Anaerobic  -     X-Ray Foot Complete Left; Future    Cellulitis of great toe, left  -     Aerobic culture  -     Culture, Anaerobic  -     X-Ray Foot Complete Left; Future    Other orders  -     clindamycin (CLEOCIN) 300 MG capsule; Take 1 capsule (300 mg total) by mouth every 8 (eight) hours. for 10 days      I counseled the patient on his conditions, their implications and medical management.    Greater than 50% of this visit spent on counseling and coordination of care.    Education about the prevention of limb loss.    Discussed wound healing cycle, skin integrity, ways to care for skin.Counseled patient on the effects of  blood glucose on healing. He verbalizes understanding that it can increase the chances of delayed healing and this prolonged exposure leads to infection or progression of infection which subsequently can result in loss of limb.    Adequate vitamin supplementation, protein intake, and hydration - discussed with patient    Imaging ordered to rule out bone involvement or gas in the soft tissues.     The toe is cleansed of foreign material as much as possible and inspected. Abscess noted. Abscess puncture procedure performed     Aerobic and anerobic cultures swabs taken. Rx empiric Cleocin    Dressings: iodosorb  Offloading: toe football and darco shoe    Detailed care instructions dispensed.     Follow-up: 2 weeks but should call [facility] immediately if any signs of infection, such as fever,  chills, sweats, increased redness or pain.    Short-term goals include maintaining good offloading and minimizing bioburden, promoting granulation and epithelialization to healing.  Long-term goals include keeping the wound healed by good offloading and medical management under the direction of internist.    Shoe inspection. Diabetic Foot Education. Patient reminded of the importance of good nutrition and blood sugar control to help prevent podiatric complications of diabetes. Patient instructed on proper foot hygeine. We discussed wearing proper shoe gear, daily foot inspections, never walking without protective shoe gear, never putting sharp instruments to feet.        Bulla aspiration as noted in procedure note.    OPERATIVE REPORT    SURGEON: Dari Lowe DPM  ASSITANT: None  PRE-OP DX: Bulla L foot  POST-OP DX: same.  PROCEDURE: Puncture aspiration of Bulla L foot  ANESTHESIA:  no anesthesia required.  HEMOSTASIS: None  EBL: None.    Time Out performed to verify patient and site and informed consent obtained. Alcohol prep of site.     Procedure: The patient was seen in the clinic room in satisfactory condition and placed in the supine position on the treatment table.  Attention was directed to the left hallux medial nail border. where a bulla was noted. See description in progress note. A puncture was made utilizing a scalpel. This initial puncture was carried around the roof of the bulla to completely unroof it. Once the level of the wounds and skin were reached, the area was flushed with saline, draining the fluid. No active bleeding occurred.     The surgical site was flushed thoroughly with sterile saline. Iodosorb and a dry, sterile, compressive dressing was then applied to the left foot. Capillary return was noted to be immediate to all digits of the foot. The patient having tolerated both surgery and anesthesia well     Home wound care instructions dispensed; patient to RTC in 2 weeks, sooner PRN.

## 2019-05-30 NOTE — PATIENT INSTRUCTIONS
Wound care Instructions:   · Once a day beginning in 24 hours::   ¨ Clean the toe separate from your body with warm running water and antibacterial soap such as dial   ¨ Clean any remaining crust away with soap and water using a cotton-tipped applicator.  ¨ DRY COMPLETELY  ¨ Apply betadine to the toe.  ¨ Cover with a breathable bandage until there is no more drainage or open flesh.  · Change the dressing daily, or whenever it becomes wet or dirty.  · If you were prescribed antibiotics, take them as directed until they are all gone.  · Wear comfortable shoes with a lot of toe room, or open-toe sandals, while your toe is healing.  · You may use acetaminophen or ibuprofen to control pain, unless another medicine was prescribed. If you have chronic liver or kidney disease or ever had a stomach ulcer or GI bleeding, talk with your doctor before using these medicines.      When to seek medical advice  Call your health care provider right away if any of the following occur:  · Increasing redness, pain or swelling of the toe  · Red streaks in the skin leading away from the wound  · Continued pus or fluid drainage for more than 24 hours  · Fever of 100.4º F (38º C) or higher, or as directed by your health care provider

## 2019-05-31 ENCOUNTER — EXTERNAL CHRONIC CARE MANAGEMENT (OUTPATIENT)
Dept: PRIMARY CARE CLINIC | Facility: CLINIC | Age: 84
End: 2019-05-31
Payer: MEDICARE

## 2019-05-31 PROCEDURE — 99490 PR CHRONIC CARE MGMT, 1ST 20 MIN: ICD-10-PCS | Mod: S$PBB,,, | Performed by: INTERNAL MEDICINE

## 2019-05-31 PROCEDURE — 99490 CHRNC CARE MGMT STAFF 1ST 20: CPT | Mod: PBBFAC,PO | Performed by: INTERNAL MEDICINE

## 2019-05-31 PROCEDURE — 99490 CHRNC CARE MGMT STAFF 1ST 20: CPT | Mod: S$PBB,,, | Performed by: INTERNAL MEDICINE

## 2019-06-03 ENCOUNTER — HOSPITAL ENCOUNTER (OUTPATIENT)
Dept: RADIOLOGY | Facility: HOSPITAL | Age: 84
Discharge: HOME | End: 2019-06-03
Attending: PODIATRIST
Payer: MEDICARE

## 2019-06-03 DIAGNOSIS — L03.032 CELLULITIS OF GREAT TOE, LEFT: ICD-10-CM

## 2019-06-03 DIAGNOSIS — M79.675 PAIN AND SWELLING OF TOE OF LEFT FOOT: ICD-10-CM

## 2019-06-03 DIAGNOSIS — M79.89 PAIN AND SWELLING OF TOE OF LEFT FOOT: ICD-10-CM

## 2019-06-03 DIAGNOSIS — L02.612 ABSCESS OF GREAT TOE, LEFT: ICD-10-CM

## 2019-06-03 LAB — BACTERIA SPEC AEROBE CULT: NORMAL

## 2019-06-03 PROCEDURE — 73630 X-RAY EXAM OF FOOT: CPT | Mod: 26,LT,, | Performed by: RADIOLOGY

## 2019-06-03 PROCEDURE — 73630 X-RAY EXAM OF FOOT: CPT | Mod: TC,FY,LT

## 2019-06-03 PROCEDURE — 73630 XR FOOT COMPLETE 3 VIEW LEFT: ICD-10-PCS | Mod: 26,LT,, | Performed by: RADIOLOGY

## 2019-06-04 LAB — BACTERIA SPEC ANAEROBE CULT: NORMAL

## 2019-06-13 ENCOUNTER — OFFICE VISIT (OUTPATIENT)
Dept: PODIATRY | Facility: CLINIC | Age: 84
End: 2019-06-13
Payer: MEDICARE

## 2019-06-13 VITALS
WEIGHT: 204 LBS | DIASTOLIC BLOOD PRESSURE: 60 MMHG | HEIGHT: 71 IN | BODY MASS INDEX: 28.56 KG/M2 | SYSTOLIC BLOOD PRESSURE: 132 MMHG

## 2019-06-13 DIAGNOSIS — E11.49 TYPE II DIABETES MELLITUS WITH NEUROLOGICAL MANIFESTATIONS: ICD-10-CM

## 2019-06-13 DIAGNOSIS — Z09 FOLLOW-UP EXAMINATION FOLLOWING SURGERY: Primary | ICD-10-CM

## 2019-06-13 PROCEDURE — 99024 POSTOP FOLLOW-UP VISIT: CPT | Mod: POP,,, | Performed by: PODIATRIST

## 2019-06-13 PROCEDURE — 99024 PR POST-OP FOLLOW-UP VISIT: ICD-10-PCS | Mod: POP,,, | Performed by: PODIATRIST

## 2019-06-13 PROCEDURE — 99213 OFFICE O/P EST LOW 20 MIN: CPT | Mod: PBBFAC,PO | Performed by: PODIATRIST

## 2019-06-13 PROCEDURE — 99999 PR PBB SHADOW E&M-EST. PATIENT-LVL III: CPT | Mod: PBBFAC,,, | Performed by: PODIATRIST

## 2019-06-13 PROCEDURE — 99999 PR PBB SHADOW E&M-EST. PATIENT-LVL III: ICD-10-PCS | Mod: PBBFAC,,, | Performed by: PODIATRIST

## 2019-06-13 NOTE — PROGRESS NOTES
Subjective:      Patient ID: Bria Lawson is a 83 y.o. male.    Chief Complaint: Foot Pain (left foot great toe redness (PCP Dr Bragg 4/22/19)) and Foot Problem      Bria Lawson is a 83 y.o. male returns to clinic for follow up of  Puncture aspiration of Bulla L foot    Date of Surgery: 05/30/19    He has cared for toe as instructed and completed abx    Sings of infection: none    No new pedal complaints.      Current Outpatient Medications on File Prior to Visit   Medication Sig Dispense Refill    allopurinol (ZYLOPRIM) 100 MG tablet TAKE 1 TABLET(100 MG) BY MOUTH EVERY DAY 90 tablet 3    amLODIPine (NORVASC) 10 MG tablet TAKE 1 TABLET(10 MG) BY MOUTH EVERY DAY 90 tablet 12    ascorbic acid (VITAMIN C) 1000 MG tablet Take 1,000 mg by mouth once daily.      aspirin 81 MG Chew CHEW AND SWALLOW 1 TABLET BY MOUTH DAILY 90 tablet 3    atorvastatin (LIPITOR) 20 MG tablet Take 1 tablet (20 mg total) by mouth once daily. 90 tablet 12    blood sugar diagnostic Strp 1 strip by Misc.(Non-Drug; Combo Route) route 2 (two) times daily. Disp glucometer and lancets as well 100 strip 12    carvedilol (COREG) 25 MG tablet Take 1 tablet (25 mg total) by mouth 2 (two) times daily with meals. 60 tablet 11    clobetasol (TEMOVATE) 0.05 % cream   2    folic acid (FOLVITE) 800 MCG Tab Take 800 mcg by mouth once daily.      gabapentin (NEURONTIN) 300 MG capsule Take 1 capsule (300 mg total) by mouth 3 (three) times daily. 270 capsule 1    glipiZIDE (GLUCOTROL) 10 MG TR24 TAKE 1 TABLET(10 MG) BY MOUTH EVERY DAY 90 tablet 3    hydroCHLOROthiazide (MICROZIDE) 12.5 mg capsule TAKE 1 CAPSULE BY MOUTH EVERY MONDAY, WEDNESDAY, FRIDAY 38 capsule 0    linagliptin (TRADJENTA) 5 mg Tab tablet Take 1 tablet (5 mg total) by mouth once daily. 90 tablet 3    multivit-min/FA/lycopen/lutein (CENTRUM SILVER MEN ORAL) Take by mouth.      niacin 500 MG Tab Take 100 mg by mouth every evening.      omeprazole (PRILOSEC) 40 MG  "capsule       SOOLANTRA 1 % Crea       triamcinolone acetonide 0.1% (KENALOG) 0.1 % ointment       TRUEPLUS LANCETS 33 gauge Misc USE TO TEST BLOOD SUGAR BID  12    TURMERIC ROOT EXTRACT ORAL Take by mouth.      valsartan (DIOVAN) 320 MG tablet Take 1 tablet (320 mg total) by mouth once daily. 90 tablet 3    vitamin E 400 UNIT capsule Take 400 Units by mouth once daily.       No current facility-administered medications on file prior to visit.        Review of patient's allergies indicates:  No Known Allergies      Review of Systems   Constitution: Negative for chills and fever.   Cardiovascular: Positive for leg swelling. Negative for claudication.   Respiratory: Negative for cough and shortness of breath.    Skin: Positive for dry skin, itching, nail changes and rash.        Lichen planus   Musculoskeletal: Positive for arthritis (RA), back pain, joint pain and myalgias. Negative for falls, joint swelling and muscle weakness.   Gastrointestinal: Negative for diarrhea, nausea and vomiting.   Neurological: Positive for paresthesias. Negative for numbness, tremors and weakness.   Psychiatric/Behavioral: Negative for altered mental status and hallucinations.           Objective:      Vitals:    06/13/19 1118   BP: 132/60   Weight: 92.5 kg (204 lb)   Height: 5' 11" (1.803 m)   PainSc: 0-No pain       General: Pt. is well-developed, well-nourished, appears stated age, in no acute distress, alert and oriented x 3. No evidence of depression, anxiety, or agitation. Calm, cooperative, and communicative. Appropriate interactions and affect.    Surgical Site  No drainage, pus, tracking, fluctuance, malodor, or signs of infection.   Signs of infection: none  Drainage: none  Periwound: Normal    06/13/19 05/30/2019                  Assessment:       Encounter Diagnoses   Name Primary?    Follow-up examination following surgery Yes    Type II diabetes mellitus with neurological manifestations          Plan:     "   Bria was seen today for foot pain and foot problem.    Diagnoses and all orders for this visit:    Follow-up examination following surgery    Type II diabetes mellitus with neurological manifestations      I counseled the patient on his conditions, their implications and medical management.    Wound has healed well with no signs of continued skin breakdown noted   Ok to transition into normal shoe gear at this time. I advised patient to check feet daily for signs of drainage or lesion re-opening   Discussed use of daily foot moisturizer to feet, avoiding the maggypace's    Follow-up: 12 months but should call [facility] immediately if any signs of infection, such as fever, chills, sweats, increased redness or pain.    Short-term goals include maintaining good offloading and minimizing bioburden, promoting granulation and epithelialization to healing.  Long-term goals include keeping the wound healed by good offloading and medical management under the direction of internist.        Procedures

## 2019-07-02 RX ORDER — ALLOPURINOL 100 MG/1
TABLET ORAL
Qty: 90 TABLET | Refills: 0 | Status: SHIPPED | OUTPATIENT
Start: 2019-07-02 | End: 2019-09-24 | Stop reason: SDUPTHER

## 2019-07-08 RX ORDER — LINAGLIPTIN 5 MG/1
TABLET, FILM COATED ORAL
Qty: 90 TABLET | Refills: 0 | Status: SHIPPED | OUTPATIENT
Start: 2019-07-08 | End: 2019-09-15 | Stop reason: SDUPTHER

## 2019-07-09 ENCOUNTER — LAB VISIT (OUTPATIENT)
Dept: LAB | Facility: HOSPITAL | Age: 84
End: 2019-07-09
Attending: INTERNAL MEDICINE
Payer: MEDICARE

## 2019-07-09 DIAGNOSIS — D69.6 THROMBOCYTOPENIA: ICD-10-CM

## 2019-07-09 DIAGNOSIS — C85.19 B-CELL LYMPHOMA OF EXTRANODAL SITE: ICD-10-CM

## 2019-07-09 LAB
ALBUMIN SERPL BCP-MCNC: 4 G/DL (ref 3.5–5.2)
ALP SERPL-CCNC: 56 U/L (ref 55–135)
ALT SERPL W/O P-5'-P-CCNC: 12 U/L (ref 10–44)
ANION GAP SERPL CALC-SCNC: 9 MMOL/L (ref 8–16)
AST SERPL-CCNC: 19 U/L (ref 10–40)
BASOPHILS # BLD AUTO: 0.01 K/UL (ref 0–0.2)
BASOPHILS NFR BLD: 0.2 % (ref 0–1.9)
BILIRUB SERPL-MCNC: 1.1 MG/DL (ref 0.1–1)
BUN SERPL-MCNC: 22 MG/DL (ref 8–23)
CALCIUM SERPL-MCNC: 9.1 MG/DL (ref 8.7–10.5)
CHLORIDE SERPL-SCNC: 105 MMOL/L (ref 95–110)
CO2 SERPL-SCNC: 25 MMOL/L (ref 23–29)
CREAT SERPL-MCNC: 1.4 MG/DL (ref 0.5–1.4)
DIFFERENTIAL METHOD: ABNORMAL
EOSINOPHIL # BLD AUTO: 0.1 K/UL (ref 0–0.5)
EOSINOPHIL NFR BLD: 2 % (ref 0–8)
ERYTHROCYTE [DISTWIDTH] IN BLOOD BY AUTOMATED COUNT: 12.8 % (ref 11.5–14.5)
EST. GFR  (AFRICAN AMERICAN): 54 ML/MIN/1.73 M^2
EST. GFR  (NON AFRICAN AMERICAN): 46 ML/MIN/1.73 M^2
GLUCOSE SERPL-MCNC: 254 MG/DL (ref 70–110)
HCT VFR BLD AUTO: 36.5 % (ref 40–54)
HGB BLD-MCNC: 11.8 G/DL (ref 14–18)
LYMPHOCYTES # BLD AUTO: 1.1 K/UL (ref 1–4.8)
LYMPHOCYTES NFR BLD: 21.7 % (ref 18–48)
MCH RBC QN AUTO: 29.9 PG (ref 27–31)
MCHC RBC AUTO-ENTMCNC: 32.3 G/DL (ref 32–36)
MCV RBC AUTO: 92 FL (ref 82–98)
MONOCYTES # BLD AUTO: 0.7 K/UL (ref 0.3–1)
MONOCYTES NFR BLD: 13.3 % (ref 4–15)
NEUTROPHILS # BLD AUTO: 3.1 K/UL (ref 1.8–7.7)
NEUTROPHILS NFR BLD: 63.2 % (ref 38–73)
PLATELET # BLD AUTO: 69 K/UL (ref 150–350)
PMV BLD AUTO: 12 FL (ref 9.2–12.9)
POTASSIUM SERPL-SCNC: 4.2 MMOL/L (ref 3.5–5.1)
PROT SERPL-MCNC: 7.3 G/DL (ref 6–8.4)
RBC # BLD AUTO: 3.95 M/UL (ref 4.6–6.2)
SODIUM SERPL-SCNC: 139 MMOL/L (ref 136–145)
WBC # BLD AUTO: 4.89 K/UL (ref 3.9–12.7)

## 2019-07-09 PROCEDURE — 85025 COMPLETE CBC W/AUTO DIFF WBC: CPT

## 2019-07-09 PROCEDURE — 80053 COMPREHEN METABOLIC PANEL: CPT

## 2019-07-09 PROCEDURE — 36415 COLL VENOUS BLD VENIPUNCTURE: CPT

## 2019-07-10 ENCOUNTER — OFFICE VISIT (OUTPATIENT)
Dept: HEMATOLOGY/ONCOLOGY | Facility: CLINIC | Age: 84
End: 2019-07-10
Payer: MEDICARE

## 2019-07-10 VITALS
SYSTOLIC BLOOD PRESSURE: 129 MMHG | HEART RATE: 52 BPM | DIASTOLIC BLOOD PRESSURE: 60 MMHG | BODY MASS INDEX: 29.14 KG/M2 | OXYGEN SATURATION: 96 % | WEIGHT: 208.13 LBS | HEIGHT: 71 IN | TEMPERATURE: 99 F

## 2019-07-10 DIAGNOSIS — C85.19 B-CELL LYMPHOMA OF EXTRANODAL SITE: Primary | ICD-10-CM

## 2019-07-10 DIAGNOSIS — D69.6 THROMBOCYTOPENIA: ICD-10-CM

## 2019-07-10 PROCEDURE — 99999 PR PBB SHADOW E&M-EST. PATIENT-LVL III: CPT | Mod: PBBFAC,,, | Performed by: INTERNAL MEDICINE

## 2019-07-10 PROCEDURE — 99213 PR OFFICE/OUTPT VISIT, EST, LEVL III, 20-29 MIN: ICD-10-PCS | Mod: S$PBB,,, | Performed by: INTERNAL MEDICINE

## 2019-07-10 PROCEDURE — 99999 PR PBB SHADOW E&M-EST. PATIENT-LVL III: ICD-10-PCS | Mod: PBBFAC,,, | Performed by: INTERNAL MEDICINE

## 2019-07-10 PROCEDURE — 99213 OFFICE O/P EST LOW 20 MIN: CPT | Mod: PBBFAC | Performed by: INTERNAL MEDICINE

## 2019-07-10 PROCEDURE — 99213 OFFICE O/P EST LOW 20 MIN: CPT | Mod: S$PBB,,, | Performed by: INTERNAL MEDICINE

## 2019-07-10 NOTE — PROGRESS NOTES
Chief Complaint :   Low  Grade Lymphoma involving Bone Marrow.    Hx of Present illness :  Patient is a 83 y.o. year old male who presents to the clinic today for   Oncology Followup. Evaluated for Thrombocytopenia. 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   Has completed one four week ciourse of rituxan Monotherapy.    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    Transfusion :  None    Menstrual & obstetric Hx :  N/A        Present Meds :   Medication List with Changes/Refills   Current Medications    ALLOPURINOL (ZYLOPRIM) 100 MG TABLET    TAKE 1 TABLET(100 MG) BY MOUTH EVERY DAY    AMLODIPINE (NORVASC) 10 MG TABLET    TAKE 1 TABLET(10 MG) BY MOUTH EVERY DAY    ASCORBIC ACID (VITAMIN C) 1000 MG TABLET    Take 1,000 mg by mouth once daily.    ASPIRIN 81 MG CHEW    CHEW AND SWALLOW 1 TABLET BY MOUTH DAILY    ATORVASTATIN (LIPITOR) 20 MG TABLET    Take 1 tablet (20 mg total) by mouth once daily.    BLOOD SUGAR DIAGNOSTIC STRP    1 strip by Misc.(Non-Drug; Combo Route) route 2 (two) times daily. Disp glucometer and lancets as well    CARVEDILOL (COREG) 25 MG TABLET    Take 1 tablet (25 mg total) by mouth 2 (two) times daily with meals.    CLOBETASOL (TEMOVATE) 0.05 % CREAM        FOLIC ACID (FOLVITE) 800 MCG TAB    Take 800 mcg by mouth once daily.    GABAPENTIN (NEURONTIN) 300 MG CAPSULE    Take 1 capsule (300 mg total) by mouth 3 (three) times daily.    GLIPIZIDE (GLUCOTROL) 10 MG TR24    TAKE 1 TABLET(10 MG) BY MOUTH EVERY DAY    HYDROCHLOROTHIAZIDE (MICROZIDE) 12.5 MG CAPSULE    TAKE 1 CAPSULE BY MOUTH EVERY MONDAY, WEDNESDAY, FRIDAY    MULTIVIT-MIN/FA/LYCOPEN/LUTEIN (CENTRUM SILVER MEN ORAL)    Take by mouth.    NIACIN 500 MG TAB    Take 100 mg by mouth every evening.    OMEPRAZOLE (PRILOSEC) 40 MG CAPSULE        SOOLANTRA 1 % CREA        TRADJENTA 5 MG TAB TABLET    TAKE 1 TABLET DAILY    TRIAMCINOLONE ACETONIDE 0.1% (KENALOG) 0.1 %  OINTMENT        TRUEPLUS LANCETS 33 GAUGE MISC    USE TO TEST BLOOD SUGAR BID    TURMERIC ROOT EXTRACT ORAL    Take by mouth.    VALSARTAN (DIOVAN) 320 MG TABLET    Take 1 tablet (320 mg total) by mouth once daily.    VITAMIN E 400 UNIT CAPSULE    Take 400 Units by mouth once daily.       Past Medical Hx :   Hypertension; DM; Hyperlipidemia; Chronic atrial fibrillation; Macular degeneration, Lichen planus..CKD 3; CAD.GERD; Peripheral Neuropathy.  Known to have Rheumatoid arthritis, on no Rx  No Hx of TB, , Lupus, sarcoid, Seizure disorder or CVA. No Hx of DVT or PE./ No past Hx of cancer, chemotherapy or radiation therapy.    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    Immunization :  Immunization History   Administered Date(s) Administered    Influenza 10/19/2018    Influenza - High Dose 11/03/2017, 10/19/2018    Pneumococcal Conjugate - 13 Valent 06/27/2017    Td (ADULT) 11/18/2005       Family Hx :  Family History   Problem Relation Age of Onset    No Known Problems Mother     No Known Problems Father     No Known Problems Sister     No Known Problems Brother     No Known Problems Maternal Aunt     No Known Problems Maternal Uncle     No Known Problems Paternal Aunt     No Known Problems Paternal Uncle     No Known Problems Maternal Grandmother     No Known Problems Maternal Grandfather     No Known Problems  Paternal Grandmother     No Known Problems Paternal Grandfather     Amblyopia Neg Hx     Blindness Neg Hx     Cancer Neg Hx     Cataracts Neg Hx     Diabetes Neg Hx     Glaucoma Neg Hx     Hypertension Neg Hx     Macular degeneration Neg Hx     Retinal detachment Neg Hx     Strabismus Neg Hx     Stroke Neg Hx     Thyroid disease Neg Hx        Social Hx :  Social History     Socioeconomic History    Marital status:      Spouse name: Not on file    Number of children: Not on file    Years of education: Not on file    Highest education level: Not on file   Occupational History    Not on file   Social Needs    Financial resource strain: Not on file    Food insecurity:     Worry: Not on file     Inability: Not on file    Transportation needs:     Medical: Not on file     Non-medical: Not on file   Tobacco Use    Smoking status: Former Smoker    Smokeless tobacco: Never Used   Substance and Sexual Activity    Alcohol use: Yes     Alcohol/week: 0.0 oz     Comment: Social    Drug use: No    Sexual activity: Not on file   Lifestyle    Physical activity:     Days per week: Not on file     Minutes per session: Not on file    Stress: Not on file   Relationships    Social connections:     Talks on phone: Not on file     Gets together: Not on file     Attends Restorationism service: Not on file     Active member of club or organization: Not on file     Attends meetings of clubs or organizations: Not on file     Relationship status: Not on file   Other Topics Concern    Not on file   Social History Narrative    Not on file       Surgery :  Bilateral Knee replacement; Bilateral Cataract surgery.    Symptoms :   No new Sx.    Review of Systems   Constitutional: Negative for chills, diaphoresis, fever, malaise/fatigue and weight loss.   HENT: Positive for congestion. Negative for hearing loss, nosebleeds, sore throat and tinnitus.    Eyes: Negative for blurred vision, double vision and photophobia.    Respiratory: Negative for cough, hemoptysis, shortness of breath and wheezing.    Cardiovascular: Negative.  Negative for chest pain, palpitations, orthopnea, claudication, leg swelling and PND.   Gastrointestinal: Negative for abdominal pain, blood in stool, constipation, diarrhea, heartburn, nausea and vomiting.   Genitourinary: Negative for dysuria, flank pain, frequency, hematuria and urgency.   Musculoskeletal: Negative for back pain, falls, joint pain, myalgias and neck pain.   Skin: Positive for rash. Negative for itching.        Saw Dermatologist for Rosacea few weeks ago.   Neurological: Positive for sensory change. Negative for dizziness, tingling and headaches.   Endo/Heme/Allergies: Negative for environmental allergies. Does not bruise/bleed easily.   Psychiatric/Behavioral: Negative for depression and memory loss. The patient is not nervous/anxious and does not have insomnia.        Physical Exam :  Looks younger than the stated age.  Physical Exam   Constitutional: He is oriented to person, place, and time and well-developed, well-nourished, and in no distress. Vital signs are normal. No distress.   HENT:   Head: Normocephalic and atraumatic.   Right Ear: External ear normal.   Left Ear: External ear normal.   Nose: Nose normal.   Mouth/Throat: Oropharynx is clear and moist. No oropharyngeal exudate.   Eyes: Pupils are equal, round, and reactive to light. Conjunctivae, EOM and lids are normal. Lids are everted and swept, no foreign bodies found. Right eye exhibits no discharge. Left eye exhibits no discharge. No scleral icterus.       Neck: Trachea normal, normal range of motion, full passive range of motion without pain and phonation normal. Neck supple. Normal carotid pulses, no hepatojugular reflux and no JVD present. No tracheal tenderness present. Carotid bruit is not present. No tracheal deviation present. No thyroid mass and no thyromegaly present.   Cardiovascular: Normal rate, regular  rhythm, normal heart sounds, intact distal pulses and normal pulses. PMI is not displaced. Exam reveals no gallop and no friction rub.   No murmur heard.  Pulmonary/Chest: Effort normal and breath sounds normal. No stridor. No apnea. No respiratory distress. He has no wheezes. He has no rales. He exhibits no tenderness.   Abdominal: Soft. Normal appearance, normal aorta and bowel sounds are normal. He exhibits no distension, no ascites and no mass. There is no hepatosplenomegaly. There is no tenderness. There is no rebound, no guarding and no CVA tenderness. No hernia.   Genitourinary:   Genitourinary Comments: Not examined   Musculoskeletal: Normal range of motion. He exhibits no edema, tenderness or deformity.   Lymphadenopathy:        Head (right side): No submental, no submandibular, no tonsillar, no preauricular, no posterior auricular and no occipital adenopathy present.        Head (left side): No submental, no submandibular, no tonsillar, no preauricular, no posterior auricular and no occipital adenopathy present.     He has no cervical adenopathy.     He has no axillary adenopathy.        Right: No inguinal, no supraclavicular and no epitrochlear adenopathy present.        Left: No inguinal, no supraclavicular and no epitrochlear adenopathy present.   Neurological: He is alert and oriented to person, place, and time. He has normal motor skills, normal sensation, normal strength, normal reflexes and intact cranial nerves. No cranial nerve deficit. He exhibits normal muscle tone. Gait normal. Coordination normal. GCS score is 15.   Skin: Skin is warm, dry and intact. No rash noted. He is not diaphoretic. No cyanosis or erythema. No pallor. Nails show no clubbing.   Psychiatric: Mood, memory, affect and judgment normal.   No New finding.       Labs & Imaging :  07/09/19 : NFBS 254;  Ca 9.1; cr 1.4.   Bili 1.1.  Liver enzymes normal.  WBC 4,890. hgb 11.5; hct 36.5; MCV 92. Platelets 69,000. ANC 3,100.        Dx  :  .  Mild Chronic  thrombocytopenia  Low grade  Lymphoma Involving  Bone Marrow, Incidental finding.  Poorly Controlled DM.      Assessment & Plan : reviewed with patient .  . Blood sugar followup with PCP.   Continue Monitoring. RTC 2 months with CBC,CMP. LDH. Patient understands and verbalised.         Advance Care Planning     Power of   I initiated the process of advance care planning today and explained the importance of this process to the patient.  I introduced the concept of advance directives to the patient, as well. Then the patient received detailed information about the importance of designating a Health Care Power of  (HCPOA). He was also instructed to communicate with this person about their wishes for future healthcare, should he become sick and lose decision-making capacity. The patient  Has/has not:previously appointed a HCPOA. After our discussion, the patient Has decided to  Talk to hs wife         Living Will  During this visit, I engaged the patient in the advance care planning process.  The patient and I reviewed the role for advance directives and their purpose in directing future healthcare if the patient's unable to speak for him/herself.  At this point in time, the patient does have full decision-making capacity.  We discussed different extreme health states that he could experience, and reviewed what kind of medical care he would want in those situations.  The patient communicated that if he were comatose and had little chance of a meaningful recovery, he  Does not want machines/life-sustaining treatments used.    The patient HAS NOT completed a living will to reflect these preferences.  The patient   HAS NOT already designated a healthcare power of  to make decisions on his behalf.         Papers given to patient.

## 2019-07-16 RX ORDER — NAPROXEN SODIUM 220 MG/1
TABLET, FILM COATED ORAL
Qty: 90 TABLET | Refills: 12 | Status: SHIPPED | OUTPATIENT
Start: 2019-07-16 | End: 2020-10-02

## 2019-07-17 ENCOUNTER — LAB VISIT (OUTPATIENT)
Dept: LAB | Facility: HOSPITAL | Age: 84
End: 2019-07-17
Attending: INTERNAL MEDICINE
Payer: MEDICARE

## 2019-07-17 DIAGNOSIS — E11.22 CKD STAGE 3 DUE TO TYPE 2 DIABETES MELLITUS: ICD-10-CM

## 2019-07-17 DIAGNOSIS — N18.30 HYPERTENSIVE KIDNEY DISEASE WITH STAGE 3 CHRONIC KIDNEY DISEASE: ICD-10-CM

## 2019-07-17 DIAGNOSIS — N18.30 CKD (CHRONIC KIDNEY DISEASE), STAGE III: ICD-10-CM

## 2019-07-17 DIAGNOSIS — E11.29 DIABETES MELLITUS WITH PROTEINURIA: ICD-10-CM

## 2019-07-17 DIAGNOSIS — I12.9 HYPERTENSIVE KIDNEY DISEASE WITH STAGE 3 CHRONIC KIDNEY DISEASE: ICD-10-CM

## 2019-07-17 DIAGNOSIS — N18.30 CKD STAGE 3 DUE TO TYPE 2 DIABETES MELLITUS: ICD-10-CM

## 2019-07-17 DIAGNOSIS — R80.9 DIABETES MELLITUS WITH PROTEINURIA: ICD-10-CM

## 2019-07-17 LAB
25(OH)D3+25(OH)D2 SERPL-MCNC: 46 NG/ML (ref 30–96)
ALBUMIN SERPL BCP-MCNC: 4 G/DL (ref 3.5–5.2)
ANION GAP SERPL CALC-SCNC: 7 MMOL/L (ref 8–16)
BUN SERPL-MCNC: 22 MG/DL (ref 8–23)
CALCIUM SERPL-MCNC: 9.3 MG/DL (ref 8.7–10.5)
CHLORIDE SERPL-SCNC: 102 MMOL/L (ref 95–110)
CO2 SERPL-SCNC: 27 MMOL/L (ref 23–29)
CREAT SERPL-MCNC: 1.4 MG/DL (ref 0.5–1.4)
EST. GFR  (AFRICAN AMERICAN): 54 ML/MIN/1.73 M^2
EST. GFR  (NON AFRICAN AMERICAN): 46 ML/MIN/1.73 M^2
GLUCOSE SERPL-MCNC: 242 MG/DL (ref 70–110)
PHOSPHATE SERPL-MCNC: 3.1 MG/DL (ref 2.7–4.5)
POTASSIUM SERPL-SCNC: 4.1 MMOL/L (ref 3.5–5.1)
PTH-INTACT SERPL-MCNC: 57.8 PG/ML (ref 9–77)
SODIUM SERPL-SCNC: 136 MMOL/L (ref 136–145)

## 2019-07-17 PROCEDURE — 36415 COLL VENOUS BLD VENIPUNCTURE: CPT

## 2019-07-17 PROCEDURE — 82306 VITAMIN D 25 HYDROXY: CPT

## 2019-07-17 PROCEDURE — 80069 RENAL FUNCTION PANEL: CPT

## 2019-07-17 PROCEDURE — 83970 ASSAY OF PARATHORMONE: CPT

## 2019-07-24 ENCOUNTER — OFFICE VISIT (OUTPATIENT)
Dept: NEPHROLOGY | Facility: CLINIC | Age: 84
End: 2019-07-24
Payer: MEDICARE

## 2019-07-24 VITALS
DIASTOLIC BLOOD PRESSURE: 60 MMHG | HEIGHT: 71 IN | WEIGHT: 202 LBS | HEART RATE: 53 BPM | OXYGEN SATURATION: 97 % | SYSTOLIC BLOOD PRESSURE: 104 MMHG | BODY MASS INDEX: 28.28 KG/M2

## 2019-07-24 DIAGNOSIS — D69.6 THROMBOCYTOPENIA: ICD-10-CM

## 2019-07-24 DIAGNOSIS — N18.30 CKD (CHRONIC KIDNEY DISEASE), STAGE III: Primary | ICD-10-CM

## 2019-07-24 DIAGNOSIS — M1A.00X0 IDIOPATHIC CHRONIC GOUT WITHOUT TOPHUS, UNSPECIFIED SITE: ICD-10-CM

## 2019-07-24 DIAGNOSIS — E11.21 DIABETES MELLITUS WITH PROTEINURIC DIABETIC NEPHROPATHY: ICD-10-CM

## 2019-07-24 DIAGNOSIS — I12.9 HYPERTENSIVE KIDNEY DISEASE WITH STAGE 3 CHRONIC KIDNEY DISEASE: ICD-10-CM

## 2019-07-24 DIAGNOSIS — N18.30 HYPERTENSIVE KIDNEY DISEASE WITH STAGE 3 CHRONIC KIDNEY DISEASE: ICD-10-CM

## 2019-07-24 PROCEDURE — 99214 PR OFFICE/OUTPT VISIT, EST, LEVL IV, 30-39 MIN: ICD-10-PCS | Mod: S$PBB,,, | Performed by: INTERNAL MEDICINE

## 2019-07-24 PROCEDURE — 99999 PR PBB SHADOW E&M-EST. PATIENT-LVL III: ICD-10-PCS | Mod: PBBFAC,,, | Performed by: INTERNAL MEDICINE

## 2019-07-24 PROCEDURE — 99999 PR PBB SHADOW E&M-EST. PATIENT-LVL III: CPT | Mod: PBBFAC,,, | Performed by: INTERNAL MEDICINE

## 2019-07-24 PROCEDURE — 99214 OFFICE O/P EST MOD 30 MIN: CPT | Mod: S$PBB,,, | Performed by: INTERNAL MEDICINE

## 2019-07-24 PROCEDURE — 99213 OFFICE O/P EST LOW 20 MIN: CPT | Mod: PBBFAC | Performed by: INTERNAL MEDICINE

## 2019-07-26 RX ORDER — GLIPIZIDE 10 MG/1
TABLET, FILM COATED, EXTENDED RELEASE ORAL
Qty: 90 TABLET | Refills: 0 | Status: SHIPPED | OUTPATIENT
Start: 2019-07-26 | End: 2019-07-29 | Stop reason: SDUPTHER

## 2019-07-29 RX ORDER — GLIPIZIDE 10 MG/1
TABLET, FILM COATED, EXTENDED RELEASE ORAL
Qty: 90 TABLET | Refills: 0 | Status: SHIPPED | OUTPATIENT
Start: 2019-07-29 | End: 2020-01-09

## 2019-07-30 ENCOUNTER — PATIENT OUTREACH (OUTPATIENT)
Dept: ADMINISTRATIVE | Facility: HOSPITAL | Age: 84
End: 2019-07-30

## 2019-08-06 RX ORDER — HYDROCHLOROTHIAZIDE 12.5 MG/1
CAPSULE ORAL
Qty: 38 CAPSULE | Refills: 12 | Status: SHIPPED | OUTPATIENT
Start: 2019-08-06 | End: 2020-01-23

## 2019-08-09 RX ORDER — GABAPENTIN 300 MG/1
CAPSULE ORAL
Qty: 270 CAPSULE | Refills: 0 | Status: SHIPPED | OUTPATIENT
Start: 2019-08-09 | End: 2019-11-22 | Stop reason: SDUPTHER

## 2019-08-13 ENCOUNTER — OFFICE VISIT (OUTPATIENT)
Dept: FAMILY MEDICINE | Facility: CLINIC | Age: 84
End: 2019-08-13
Payer: MEDICARE

## 2019-08-13 VITALS
DIASTOLIC BLOOD PRESSURE: 62 MMHG | BODY MASS INDEX: 29.32 KG/M2 | HEIGHT: 71 IN | OXYGEN SATURATION: 97 % | TEMPERATURE: 99 F | WEIGHT: 209.44 LBS | SYSTOLIC BLOOD PRESSURE: 130 MMHG | HEART RATE: 95 BPM

## 2019-08-13 DIAGNOSIS — D69.6 THROMBOCYTOPENIA: ICD-10-CM

## 2019-08-13 DIAGNOSIS — C85.19 B-CELL LYMPHOMA OF EXTRANODAL SITE: ICD-10-CM

## 2019-08-13 DIAGNOSIS — D61.818 PANCYTOPENIA: ICD-10-CM

## 2019-08-13 DIAGNOSIS — E11.21 DIABETES MELLITUS WITH PROTEINURIC DIABETIC NEPHROPATHY: ICD-10-CM

## 2019-08-13 DIAGNOSIS — D64.9 ANEMIA, UNSPECIFIED TYPE: ICD-10-CM

## 2019-08-13 DIAGNOSIS — E11.22 CKD STAGE 3 DUE TO TYPE 2 DIABETES MELLITUS: ICD-10-CM

## 2019-08-13 DIAGNOSIS — J30.2 SEASONAL ALLERGIC RHINITIS, UNSPECIFIED TRIGGER: ICD-10-CM

## 2019-08-13 DIAGNOSIS — E11.29 DIABETES MELLITUS WITH PROTEINURIA: ICD-10-CM

## 2019-08-13 DIAGNOSIS — R80.9 DIABETES MELLITUS WITH PROTEINURIA: ICD-10-CM

## 2019-08-13 DIAGNOSIS — E78.5 HYPERLIPIDEMIA, UNSPECIFIED HYPERLIPIDEMIA TYPE: ICD-10-CM

## 2019-08-13 DIAGNOSIS — I10 ESSENTIAL HYPERTENSION: ICD-10-CM

## 2019-08-13 DIAGNOSIS — N18.30 CKD STAGE 3 DUE TO TYPE 2 DIABETES MELLITUS: ICD-10-CM

## 2019-08-13 DIAGNOSIS — N18.30 STAGE 3 CHRONIC KIDNEY DISEASE: ICD-10-CM

## 2019-08-13 PROCEDURE — 99214 OFFICE O/P EST MOD 30 MIN: CPT | Mod: S$PBB,,, | Performed by: INTERNAL MEDICINE

## 2019-08-13 PROCEDURE — 99999 PR PBB SHADOW E&M-EST. PATIENT-LVL III: ICD-10-PCS | Mod: PBBFAC,,, | Performed by: INTERNAL MEDICINE

## 2019-08-13 PROCEDURE — 99214 PR OFFICE/OUTPT VISIT, EST, LEVL IV, 30-39 MIN: ICD-10-PCS | Mod: S$PBB,,, | Performed by: INTERNAL MEDICINE

## 2019-08-13 PROCEDURE — 99999 PR PBB SHADOW E&M-EST. PATIENT-LVL III: CPT | Mod: PBBFAC,,, | Performed by: INTERNAL MEDICINE

## 2019-08-13 PROCEDURE — 99213 OFFICE O/P EST LOW 20 MIN: CPT | Mod: PBBFAC,PO | Performed by: INTERNAL MEDICINE

## 2019-08-13 RX ORDER — FLUTICASONE PROPIONATE 50 MCG
2 SPRAY, SUSPENSION (ML) NASAL DAILY
Qty: 1 BOTTLE | Refills: 12 | Status: SHIPPED | OUTPATIENT
Start: 2019-08-13 | End: 2019-09-12

## 2019-08-13 NOTE — PROGRESS NOTES
Chief complaint: Follow-up on diabetes    83-year-old black male here to follow-up on diabetes although did  not have A1c prior. Reviewed all his labs and abnormalities.   his A1c was 7.4 in 2/19 then 6.6 april. Eating poorly.  He has some chronic renal insufficiency. Seen renal 7/19.   He had a slight hemoglobin reduction which appears chronic and fluctuating clinical of last year or so with slight thrombocytopenia.  We discussed all those issues and the need to monitor them over time.  Is otherwise feeling well.   He does have an upcoming lab appointment but not sure what day and it looks like it for Hematology and he will have an A1c added, order put in.     His blood pressure appears to be in a good control on some days but elevated on other days.  He is on 4 medications for his blood pressure.  He monitors pulse at home and is typically in the 50s but on several days has been as low as 47 but without any symptoms.  He is checking his blood pressure and good at home  Counseled at length regarding all these age-related issues, monitoring and so forth.Total time over 25 minutes with over 50% counseling.  .      Monitoring blood pressure at home .  Reviewed that he had a normal colonoscopy 2017 outside the clinic.  Also discussed his medications for diabetes which appear to be too medications and were avoiding metformin likely due to the renal insufficiency.  We discussed better diet improvement which likely needs to happen.      We switched  metoprolol 100 carvedilol 25 mg twice a day.  I gave him written instructions on the switch as well as to monitor blood pressure and pulse.  If it is equivalent we might go back to metoprolol but hopefully will get better blood pressure control with the carvedilol.  Blood pressure is improving.  Pulse is pretty much averaging right and 50 but he is asymptomatic    Bone Marrow + for Low grade Lymphoma    Also with some head congestion and cough and phlegm in the upper throat.   Occasionally this tan.  He has been ongoing for months and his wife noted it more than he did.  It does not sound infectious.  We discussed Claritin in the morning, Delsym for cough and also add Flonase and possibly Singulair in the future    ROS:   CONST: weight stable. EYES: no vision change. ENT: no sore throat. CV: no chest pain w/ exertion. RESP: no shortness of breath. GI: no nausea, vomiting, diarrhea. No dysphagia. : no urinary issues. MUSCULOSKELETAL: no new myalgias or arthralgias. SKIN: no new changes. NEURO: no focal deficits. PSYCH: no new issues. ENDOCRINE: no polyuria. HEME: no lymph nodes. ALLERGY: no general pruritis.       Past medical history:  1.  Diabetes with renal disease and retinopathy  2.  Hypertension  3.  Hyperlipidemia  4.  Chronic renal failure stage III  5.  History of colon polyp, normal scope March 2012, normal 2017-- 5 yrs----[facility] Kumari  6.  Hyperuricemia and gout  7.  History of sciatica and lumbar disc disease remotely  8.  Erectile dysfunction  9.  Macular degeneration, followed by outside retina specialist  10.  GERD  11.  Low HDL  12.  Inflammatory arthritis of the hands, seeing rheumatology  13.  Bilateral cervical radiculopathy and axonal neuropathy, to have surgery 2015    14.  Followed by outside urology at [facility] Dr. noriega   15.  TKA  -angie Han  who is at [facility]  16.  Prevnar given 2017  17. 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    Family history: Mother with hypertension and diabetes.  Father's history is unknown.  One brother who is okay.    Social history: Retired, quit smoking 1974.   with 3 children.  Drinks socially.    Vital signs as above  Gen: no distress  EYES: conjunctiva clear, non-icteric, PERRL  ENT: nose clear, nasal mucosa normal, oropharynx clear and moist, teeth good  NECK:supple, thyroid  "non-palpable  RESP: effort is good, lungs clear  CV: heart RRR w/o murmur, gallops or rubs; no carotid bruits, no edema  GI: abdomen soft, non-distended, non-tender, no hepatosplenomegaly  MS: gait normal, no clubbing or cyanosis of the digits  SKIN: no rashes, warm to touch    Bria was seen today for diabetes, fatigue and cough.    Diagnoses and all orders for this visit:    Diabetes mellitus with renal manifestations, uncontrolled, check w next labs    Uncontrolled type 2 diabetes mellitus with stage 3 chronic kidney disease, without long-term current use of insulin  -     Hemoglobin A1c; Future    Essential hypertension, chronic and stable, he does have bradycardia with the changed to carvedilol but he is asymptomatic    Thrombocytopenia, followed by Hematology    Stage 3 chronic kidney disease, followed by Nephrology    Pancytopenia    Uncontrolled secondary diabetes mellitus with stage 3 CKD (GFR 30-59)    Diabetes mellitus with proteinuric diabetic nephropathy    Diabetes mellitus with proteinuria    CKD stage 3 due to type 2 diabetes mellitus    Anemia, unspecified type, likely secondary to chronic renal insufficiency    B-cell lymphoma of extranodal site    Hyperlipidemia, unspecified hyperlipidemia type     cough/allergies, medications as above           Clinical note will be sensitive so as to not cause confusion regarding physical and evaluation and management issues"This note will not be shared with the patient."  "

## 2019-08-15 ENCOUNTER — LAB VISIT (OUTPATIENT)
Dept: LAB | Facility: HOSPITAL | Age: 84
End: 2019-08-15
Attending: INTERNAL MEDICINE
Payer: MEDICARE

## 2019-08-15 LAB
ESTIMATED AVG GLUCOSE: 148 MG/DL (ref 68–131)
HBA1C MFR BLD HPLC: 6.8 % (ref 4–5.6)

## 2019-08-15 PROCEDURE — 83036 HEMOGLOBIN GLYCOSYLATED A1C: CPT

## 2019-08-15 PROCEDURE — 36415 COLL VENOUS BLD VENIPUNCTURE: CPT

## 2019-09-02 ENCOUNTER — PATIENT MESSAGE (OUTPATIENT)
Dept: ADMINISTRATIVE | Facility: OTHER | Age: 84
End: 2019-09-02

## 2019-09-03 DIAGNOSIS — E11.9 TYPE 2 DIABETES MELLITUS: ICD-10-CM

## 2019-09-09 ENCOUNTER — LAB VISIT (OUTPATIENT)
Dept: LAB | Facility: HOSPITAL | Age: 84
End: 2019-09-09
Attending: INTERNAL MEDICINE
Payer: MEDICARE

## 2019-09-09 DIAGNOSIS — D69.6 THROMBOCYTOPENIA: ICD-10-CM

## 2019-09-09 DIAGNOSIS — C85.19 B-CELL LYMPHOMA OF EXTRANODAL SITE: ICD-10-CM

## 2019-09-09 LAB
ALBUMIN SERPL BCP-MCNC: 4.3 G/DL (ref 3.5–5.2)
ALP SERPL-CCNC: 52 U/L (ref 55–135)
ALT SERPL W/O P-5'-P-CCNC: 13 U/L (ref 10–44)
ANION GAP SERPL CALC-SCNC: 9 MMOL/L (ref 8–16)
AST SERPL-CCNC: 16 U/L (ref 10–40)
BASOPHILS # BLD AUTO: 0.01 K/UL (ref 0–0.2)
BASOPHILS NFR BLD: 0.2 % (ref 0–1.9)
BILIRUB SERPL-MCNC: 1.2 MG/DL (ref 0.1–1)
BUN SERPL-MCNC: 17 MG/DL (ref 8–23)
CALCIUM SERPL-MCNC: 9.5 MG/DL (ref 8.7–10.5)
CHLORIDE SERPL-SCNC: 106 MMOL/L (ref 95–110)
CO2 SERPL-SCNC: 28 MMOL/L (ref 23–29)
CREAT SERPL-MCNC: 1.4 MG/DL (ref 0.5–1.4)
DIFFERENTIAL METHOD: ABNORMAL
EOSINOPHIL # BLD AUTO: 0.1 K/UL (ref 0–0.5)
EOSINOPHIL NFR BLD: 1.6 % (ref 0–8)
ERYTHROCYTE [DISTWIDTH] IN BLOOD BY AUTOMATED COUNT: 13.3 % (ref 11.5–14.5)
EST. GFR  (AFRICAN AMERICAN): 54 ML/MIN/1.73 M^2
EST. GFR  (NON AFRICAN AMERICAN): 46 ML/MIN/1.73 M^2
GLUCOSE SERPL-MCNC: 173 MG/DL (ref 70–110)
HCT VFR BLD AUTO: 38.4 % (ref 40–54)
HGB BLD-MCNC: 12.1 G/DL (ref 14–18)
LYMPHOCYTES # BLD AUTO: 1.1 K/UL (ref 1–4.8)
LYMPHOCYTES NFR BLD: 21.9 % (ref 18–48)
MCH RBC QN AUTO: 29.4 PG (ref 27–31)
MCHC RBC AUTO-ENTMCNC: 31.5 G/DL (ref 32–36)
MCV RBC AUTO: 93 FL (ref 82–98)
MONOCYTES # BLD AUTO: 0.9 K/UL (ref 0.3–1)
MONOCYTES NFR BLD: 18.6 % (ref 4–15)
NEUTROPHILS # BLD AUTO: 2.8 K/UL (ref 1.8–7.7)
NEUTROPHILS NFR BLD: 57.7 % (ref 38–73)
PLATELET # BLD AUTO: 65 K/UL (ref 150–350)
PMV BLD AUTO: 11.3 FL (ref 9.2–12.9)
POTASSIUM SERPL-SCNC: 4.1 MMOL/L (ref 3.5–5.1)
PROT SERPL-MCNC: 7.4 G/DL (ref 6–8.4)
RBC # BLD AUTO: 4.12 M/UL (ref 4.6–6.2)
SODIUM SERPL-SCNC: 143 MMOL/L (ref 136–145)
WBC # BLD AUTO: 4.85 K/UL (ref 3.9–12.7)

## 2019-09-09 PROCEDURE — 80053 COMPREHEN METABOLIC PANEL: CPT

## 2019-09-09 PROCEDURE — 36415 COLL VENOUS BLD VENIPUNCTURE: CPT

## 2019-09-09 PROCEDURE — 85025 COMPLETE CBC W/AUTO DIFF WBC: CPT

## 2019-09-10 ENCOUNTER — PATIENT OUTREACH (OUTPATIENT)
Dept: OTHER | Facility: OTHER | Age: 84
End: 2019-09-10

## 2019-09-10 NOTE — LETTER
November 12, 2019     Bria Lawson  [address] [place] [address]       Dear Bria,    Welcome to [facility] Our goal is to make care effective, proactive and convenient by using data you send us from home to better treat your chronic conditions.          My name is Elba Ballesteros, and I am your dedicated Digital Medicine clinician. As an expert in medication management, I will help ensure that the medications you are taking continue to provide the intended benefits and help you reach your goals. You can reach me directly at [phone] or by sending me a message directly through your [facility] account.      I am Mckenna Torres and I will be your health . My job is to help you identify lifestyle changes to improve your disease control. We will talk about nutrition, exercise, and other ways you may be able to adjust your current habits to better your health. Additionally, we will help ensure you are completing the tests and screenings that are necessary to help manage your conditions. You can reach me directly at [phone] or by sending me a message directly through your [facility] account.    Most importantly, YOU are at the center of this team. Together, we will work to improve your overall health and encourage you to meet your goals for a healthier lifestyle.     What we expect from YOU:  · Please take frequent home blood pressure measurements. We ask that you take at least 1 blood pressure reading per week, but more information will better help us get you know you. Be sure you rest for a few minutes before taking the reading in a quiet, comfortable place.    · Please take frequent home blood sugar measurements according to the frequency your physician and Digital Medicine care team specify. It is important that your team see both fasting and after meal readings.      Be available to receive phone calls or [facility] messages, when appropriate, from your care team. Please  let us know if there are any specific days or times that work best for us to reach you via phone.     Complete routine tests and screenings. Dont worry, we will help keep you on track!           What you should expect from your Digital Medicine Care Team:   We will work with you to create a personalized plan of care and provide you with encouragement and education, including regarding lifestyle changes, that could help you manage your disease states.     We will adjust your current medications, if needed, and continue to monitor your long-term progress.     We will provide you and your physician with monthly progress reports after you have been in the program for more than 30 days.     We will send you reminders through [facility] and text messages to help ensure you do not miss any testing deadlines to help manage your disease states.    You will be able to reach us by phone or through your [facility] account by clicking our names under Care Team on the right side of the home screen.    I look forward to working with you to achieve your blood pressure goals!    We look forward to working with you to help manage your health,    Sincerely,    Your Digital Medicine Team    Please visit our websites to learn more:   · Hypertension: [email or web address]  · Diabetes: [email or web address]      Remember, we are not available for emergencies. If you have an emergency, please contact your doctors office directly or call [facility] on-call ([phone] or [phone]) or 911.    Diabetes: We want help you get important tests and screenings done regularly to assure that your health needs are met. We have put a new system in place, called CareTouch that will help us improve how we monitor and reach out to you about the following lab tests that you will need to help manage your diabetes.  · Hemoglobin A1c testing (Frequency: Every 3 to 6 months, dependent on A1c  goal)  · Nephropathy Assessment, generally urine micro albumin testing (Frequency: Yearly)  · Eye exam through a quick 30-minute Eye Photo Exam (Frequency: 1-2 Years, depending on result)    When necessary you can come in to one of the labs on the attached page any weekday between 10:30 am and 4:00 pm to have your tests done prior to their due date. Tell the  you received a CareTouch letter, or just look for the CareTouch sign.

## 2019-09-10 NOTE — LETTER
November 12, 2019     Bria Lawson  [address] [place] [address]       Dear Bria,    Welcome to [facility] Our goal is to make care effective, proactive and convenient by using data you send us from home to better treat your chronic conditions.          My name is Elba Ballesteros, and I am your dedicated Digital Medicine clinician. As an expert in medication management, I will help ensure that the medications you are taking continue to provide the intended benefits and help you reach your goals. You can reach me directly at [phone] or by sending me a message directly through your [facility] account.      I am Mckenna Torres and I will be your health . My job is to help you identify lifestyle changes to improve your disease control. We will talk about nutrition, exercise, and other ways you may be able to adjust your current habits to better your health. Additionally, we will help ensure you are completing the tests and screenings that are necessary to help manage your conditions. You can reach me directly at [phone] or by sending me a message directly through your [facility] account.    Most importantly, YOU are at the center of this team. Together, we will work to improve your overall health and encourage you to meet your goals for a healthier lifestyle.     What we expect from YOU:  · Please take frequent home blood pressure measurements. We ask that you take at least 1 blood pressure reading per week, but more information will better help us get you know you. Be sure you rest for a few minutes before taking the reading in a quiet, comfortable place.    · Please take frequent home blood sugar measurements according to the frequency your physician and Digital Medicine care team specify. It is important that your team see both fasting and after meal readings.      Be available to receive phone calls or [facility] messages, when appropriate, from your care team. Please  let us know if there are any specific days or times that work best for us to reach you via phone.     Complete routine tests and screenings. Dont worry, we will help keep you on track!           What you should expect from your Digital Medicine Care Team:   We will work with you to create a personalized plan of care and provide you with encouragement and education, including regarding lifestyle changes, that could help you manage your disease states.     We will adjust your current medications, if needed, and continue to monitor your long-term progress.     We will provide you and your physician with monthly progress reports after you have been in the program for more than 30 days.     We will send you reminders through [facility] and text messages to help ensure you do not miss any testing deadlines to help manage your disease states.    You will be able to reach us by phone or through your [facility] account by clicking our names under Care Team on the right side of the home screen.    I look forward to working with you to achieve your blood pressure goals!    We look forward to working with you to help manage your health,    Sincerely,    Your Digital Medicine Team    Please visit our websites to learn more:   · Hypertension: [email or web address]  · Diabetes: [email or web address]      Remember, we are not available for emergencies. If you have an emergency, please contact your doctors office directly or call [facility] on-call ([phone] or [phone]) or 911.    Diabetes: We want help you get important tests and screenings done regularly to assure that your health needs are met. We have put a new system in place, called CareTouch that will help us improve how we monitor and reach out to you about the following lab tests that you will need to help manage your diabetes.  · Hemoglobin A1c testing (Frequency: Every 3 to 6 months, dependent on A1c  goal)  · Nephropathy Assessment, generally urine micro albumin testing (Frequency: Yearly)  · Eye exam through a quick 30-minute Eye Photo Exam (Frequency: 1-2 Years, depending on result)    When necessary you can come in to one of the labs on the attached page any weekday between 10:30 am and 4:00 pm to have your tests done prior to their due date. Tell the  you received a CareTouch letter, or just look for the CareTouch sign.

## 2019-09-10 NOTE — LETTER
November 12, 2019     Bria Lawson  [address] [place] [address]       Dear Bria,    Thank you for your interest in [facility], a clinically-proven program designed to help you manage your chronic condition more conveniently and effectively. [facility] gives you:   Technology that lets you monitor your health at home and send readings directly to your care team at [facility]   Medications managed by a dedicated pharmacist or clinician    A  who calls and helps you take manageable steps towards a healthier lifestyle       We have tried to reach you via phone and [facility] Message to complete your enrollment in the Digital Medicine program. Unfortunately, weve been unable to reach you.     Please call us to complete your enrollment. Our number is [phone]. If we dont hear from you by 12/3/2019, we will be unable to complete your enrollment at this time.     We look forward to hearing from you soon.    Sincerely,     The Digital Medicine Team

## 2019-09-10 NOTE — LETTER
November 12, 2019     Bria Lawson  [address] [place] [address]       Dear Bria,    Thank you for your interest in [facility], a clinically-proven program designed to help you manage your chronic condition more conveniently and effectively. [facility] gives you:   Technology that lets you monitor your health at home and send readings directly to your care team at [facility]   Medications managed by a dedicated pharmacist or clinician    A  who calls and helps you take manageable steps towards a healthier lifestyle       We have tried to reach you via phone and [facility] Message to complete your enrollment in the Digital Medicine program. Unfortunately, weve been unable to reach you.     Please call us to complete your enrollment. Our number is [phone]. If we dont hear from you by 12/3/2019, we will be unable to complete your enrollment at this time.     We look forward to hearing from you soon.    Sincerely,     The Digital Medicine Team

## 2019-09-11 ENCOUNTER — OFFICE VISIT (OUTPATIENT)
Dept: HEMATOLOGY/ONCOLOGY | Facility: CLINIC | Age: 84
End: 2019-09-11
Payer: MEDICARE

## 2019-09-11 VITALS
HEIGHT: 71 IN | DIASTOLIC BLOOD PRESSURE: 69 MMHG | BODY MASS INDEX: 28.55 KG/M2 | SYSTOLIC BLOOD PRESSURE: 149 MMHG | HEART RATE: 51 BPM | TEMPERATURE: 99 F | WEIGHT: 203.94 LBS | OXYGEN SATURATION: 95 %

## 2019-09-11 DIAGNOSIS — C85.19 B-CELL LYMPHOMA OF EXTRANODAL SITE: Primary | ICD-10-CM

## 2019-09-11 DIAGNOSIS — D69.6 THROMBOCYTOPENIA: ICD-10-CM

## 2019-09-11 PROCEDURE — 99213 OFFICE O/P EST LOW 20 MIN: CPT | Mod: S$PBB,,, | Performed by: INTERNAL MEDICINE

## 2019-09-11 PROCEDURE — 99999 PR PBB SHADOW E&M-EST. PATIENT-LVL III: ICD-10-PCS | Mod: PBBFAC,,, | Performed by: INTERNAL MEDICINE

## 2019-09-11 PROCEDURE — 99213 OFFICE O/P EST LOW 20 MIN: CPT | Mod: PBBFAC | Performed by: INTERNAL MEDICINE

## 2019-09-11 PROCEDURE — 99213 PR OFFICE/OUTPT VISIT, EST, LEVL III, 20-29 MIN: ICD-10-PCS | Mod: S$PBB,,, | Performed by: INTERNAL MEDICINE

## 2019-09-11 PROCEDURE — 99999 PR PBB SHADOW E&M-EST. PATIENT-LVL III: CPT | Mod: PBBFAC,,, | Performed by: INTERNAL MEDICINE

## 2019-09-11 NOTE — PROGRESS NOTES
Chief Complaint :   Low  Grade Lymphoma involving Bone Marrow.    Hx of Present illness :  Patient is a 83 y.o. year old male who presents to the clinic today for   Oncology Followup. Evaluated for Thrombocytopenia. 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   Has completed one four week ciourse of rituxan Monotherapy.    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    Transfusion :  None    Menstrual & obstetric Hx :  N/A        Present Meds :   Medication List with Changes/Refills   Current Medications    ALLOPURINOL (ZYLOPRIM) 100 MG TABLET    TAKE 1 TABLET(100 MG) BY MOUTH EVERY DAY    AMLODIPINE (NORVASC) 10 MG TABLET    TAKE 1 TABLET(10 MG) BY MOUTH EVERY DAY    ASCORBIC ACID (VITAMIN C) 1000 MG TABLET    Take 1,000 mg by mouth once daily.    ASPIRIN 81 MG CHEW    CHEW AND SWALLOW 1 TABLET BY MOUTH DAILY    ATORVASTATIN (LIPITOR) 20 MG TABLET    Take 1 tablet (20 mg total) by mouth once daily.    BLOOD SUGAR DIAGNOSTIC STRP    1 strip by Misc.(Non-Drug; Combo Route) route 2 (two) times daily. Disp glucometer and lancets as well    CARVEDILOL (COREG) 25 MG TABLET    Take 1 tablet (25 mg total) by mouth 2 (two) times daily with meals.    CLOBETASOL (TEMOVATE) 0.05 % CREAM        FLUTICASONE PROPIONATE (FLONASE) 50 MCG/ACTUATION NASAL SPRAY    2 sprays (100 mcg total) by Each Nostril route once daily.    FOLIC ACID (FOLVITE) 800 MCG TAB    Take 800 mcg by mouth once daily.    GABAPENTIN (NEURONTIN) 300 MG CAPSULE    TAKE 1 CAPSULE(300 MG) BY MOUTH THREE TIMES DAILY    GLIPIZIDE (GLUCOTROL) 10 MG TR24    TAKE 1 TABLET(10 MG) BY MOUTH EVERY DAY    HYDROCHLOROTHIAZIDE (MICROZIDE) 12.5 MG CAPSULE    TAKE 1 CAPSULE BY MOUTH EVERY MONDAY, WEDNESDAY, FRIDAY    MULTIVIT-MIN/FA/LYCOPEN/LUTEIN (CENTRUM SILVER MEN ORAL)    Take by mouth.    NIACIN 500 MG TAB    Take 100 mg by mouth every evening.    OMEPRAZOLE (PRILOSEC) 40 MG CAPSULE        SOOLANTRA  1 % CREA        TRADJENTA 5 MG TAB TABLET    TAKE 1 TABLET DAILY    TRIAMCINOLONE ACETONIDE 0.1% (KENALOG) 0.1 % OINTMENT        TRUEPLUS LANCETS 33 GAUGE MISC    USE TO TEST BLOOD SUGAR BID    TURMERIC ROOT EXTRACT ORAL    Take by mouth.    VALSARTAN (DIOVAN) 320 MG TABLET    Take 1 tablet (320 mg total) by mouth once daily.    VITAMIN E 400 UNIT CAPSULE    Take 400 Units by mouth once daily.       Past Medical Hx :   Hypertension; DM; Hyperlipidemia; Chronic atrial fibrillation; Macular degeneration, Lichen planus..CKD 3; CAD.GERD; Peripheral Neuropathy.  Known to have Rheumatoid arthritis, on no Rx  No Hx of TB, , Lupus, sarcoid, Seizure disorder or CVA. No Hx of DVT or PE./ No past Hx of cancer, chemotherapy or radiation therapy.    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    Immunization :  Immunization History   Administered Date(s) Administered    Influenza 10/19/2018    Influenza - High Dose - PF (65 years and older) 11/03/2017, 10/19/2018    Pneumococcal Conjugate - 13 Valent 06/27/2017    Td (ADULT) 11/18/2005       Family Hx :  Family History   Problem Relation Age of Onset    No Known Problems Mother     No Known Problems Father     No Known Problems Sister     No Known Problems Brother     No Known Problems Maternal Aunt     No Known Problems Maternal Uncle     No Known Problems Paternal Aunt     No Known  Problems Paternal Uncle     No Known Problems Maternal Grandmother     No Known Problems Maternal Grandfather     No Known Problems Paternal Grandmother     No Known Problems Paternal Grandfather     Amblyopia Neg Hx     Blindness Neg Hx     Cancer Neg Hx     Cataracts Neg Hx     Diabetes Neg Hx     Glaucoma Neg Hx     Hypertension Neg Hx     Macular degeneration Neg Hx     Retinal detachment Neg Hx     Strabismus Neg Hx     Stroke Neg Hx     Thyroid disease Neg Hx        Social Hx :  Social History     Socioeconomic History    Marital status:      Spouse name: Not on file    Number of children: Not on file    Years of education: Not on file    Highest education level: Not on file   Occupational History    Not on file   Social Needs    Financial resource strain: Not hard at all    Food insecurity:     Worry: Never true     Inability: Never true    Transportation needs:     Medical: No     Non-medical: No   Tobacco Use    Smoking status: Former Smoker    Smokeless tobacco: Never Used   Substance and Sexual Activity    Alcohol use: Yes     Alcohol/week: 0.0 oz     Frequency: 2-4 times a month     Drinks per session: 1 or 2     Binge frequency: Never     Comment: Social    Drug use: No    Sexual activity: Not on file   Lifestyle    Physical activity:     Days per week: Not on file     Minutes per session: Not on file    Stress: Not on file   Relationships    Social connections:     Talks on phone: More than three times a week     Gets together: Once a week     Attends Sikhism service: 1 to 4 times per year     Active member of club or organization: No     Attends meetings of clubs or organizations: Never     Relationship status:    Other Topics Concern    Not on file   Social History Narrative    Not on file       Surgery :  Bilateral Knee replacement; Bilateral Cataract surgery.    Symptoms :   No new Sx.    Review of Systems   Constitutional: Negative for chills,  diaphoresis, fever, malaise/fatigue and weight loss.        Feels well. In good spirits. No fever, pruritis, night sweats or evening rise in temp.   HENT: Positive for congestion. Negative for hearing loss, nosebleeds, sore throat and tinnitus.    Eyes: Negative for blurred vision, double vision and photophobia.   Respiratory: Negative for cough, hemoptysis, shortness of breath and wheezing.    Cardiovascular: Negative.  Negative for chest pain, palpitations, orthopnea, claudication, leg swelling and PND.   Gastrointestinal: Positive for constipation. Negative for abdominal pain, blood in stool, diarrhea, heartburn, nausea and vomiting.   Genitourinary: Negative.  Negative for dysuria, flank pain, frequency, hematuria and urgency.   Musculoskeletal: Negative.  Negative for back pain, falls, joint pain, myalgias and neck pain.   Skin: Positive for rash. Negative for itching.        Saw Dermatologist for Rosacea few weeks ago.   Neurological: Positive for sensory change. Negative for dizziness, tingling and headaches.   Endo/Heme/Allergies: Negative for environmental allergies. Does not bruise/bleed easily.   Psychiatric/Behavioral: Negative for depression and memory loss. The patient is not nervous/anxious and does not have insomnia.        Physical Exam :  Looks younger than the stated age.  Physical Exam   Constitutional: He is oriented to person, place, and time and well-developed, well-nourished, and in no distress. Vital signs are normal. No distress.   HENT:   Head: Normocephalic and atraumatic.   Right Ear: External ear normal.   Left Ear: External ear normal.   Nose: Nose normal.   Mouth/Throat: Oropharynx is clear and moist. No oropharyngeal exudate.   Eyes: Pupils are equal, round, and reactive to light. Conjunctivae, EOM and lids are normal. Lids are everted and swept, no foreign bodies found. Right eye exhibits no discharge. Left eye exhibits no discharge. No scleral icterus.       Neck: Trachea normal,  normal range of motion, full passive range of motion without pain and phonation normal. Neck supple. Normal carotid pulses, no hepatojugular reflux and no JVD present. No tracheal tenderness present. Carotid bruit is not present. No tracheal deviation present. No thyroid mass and no thyromegaly present.   Cardiovascular: Normal rate, regular rhythm, normal heart sounds, intact distal pulses and normal pulses. PMI is not displaced. Exam reveals no gallop and no friction rub.   No murmur heard.  Pulmonary/Chest: Effort normal and breath sounds normal. No stridor. No apnea. No respiratory distress. He has no wheezes. He has no rales. He exhibits no tenderness.   Abdominal: Soft. Normal appearance, normal aorta and bowel sounds are normal. He exhibits no distension, no ascites and no mass. There is no hepatosplenomegaly. There is no tenderness. There is no rebound, no guarding and no CVA tenderness. No hernia.   Genitourinary:   Genitourinary Comments: Not examined   Musculoskeletal: Normal range of motion. He exhibits no edema, tenderness or deformity.   Lymphadenopathy:        Head (right side): No submental, no submandibular, no tonsillar, no preauricular, no posterior auricular and no occipital adenopathy present.        Head (left side): No submental, no submandibular, no tonsillar, no preauricular, no posterior auricular and no occipital adenopathy present.     He has no cervical adenopathy.     He has no axillary adenopathy.        Right: No inguinal, no supraclavicular and no epitrochlear adenopathy present.        Left: No inguinal, no supraclavicular and no epitrochlear adenopathy present.   Neurological: He is alert and oriented to person, place, and time. He has normal motor skills, normal sensation, normal strength, normal reflexes and intact cranial nerves. No cranial nerve deficit. He exhibits normal muscle tone. Gait normal. Coordination normal. GCS score is 15.   Skin: Skin is warm, dry and intact. No  rash noted. He is not diaphoretic. No cyanosis or erythema. No pallor. Nails show no clubbing.   Psychiatric: Mood, memory, affect and judgment normal.   No New finding.       Labs & Imaging :  09/09/19 :  NFBS 173. Cr.1.4; eGFR 46. Ca 9.5. Bili 1.2.  ALP 52. hgb 12.1; hct 38.4;  MCV 93. Platelets 65,000 ANC 2,500        Dx :  .  Mild Chronic  thrombocytopenia  Low grade  Lymphoma Involving  Bone Marrow, Incidental finding.  Poorly Controlled DM.      Assessment & Plan : reviewed with patient .  . Blood sugar followup with PCP.   Continue Monitoring. RTC 3 months with CBC,CMP. LDH. Patient understands and verbalised.         Advance Care Planning     Power of   I initiated the process of advance care planning today and explained the importance of this process to the patient.  I introduced the concept of advance directives to the patient, as well. Then the patient received detailed information about the importance of designating a Health Care Power of  (HCPOA). He was also instructed to communicate with this person about their wishes for future healthcare, should he become sick and lose decision-making capacity. The patient  Has/has not:previously appointed a HCPOA. After our discussion, the patient Has decided to  Talk to hs wife         Living Will  During this visit, I engaged the patient in the advance care planning process.  The patient and I reviewed the role for advance directives and their purpose in directing future healthcare if the patient's unable to speak for him/herself.  At this point in time, the patient does have full decision-making capacity.  We discussed different extreme health states that he could experience, and reviewed what kind of medical care he would want in those situations.  The patient communicated that if he were comatose and had little chance of a meaningful recovery, he  Does not want machines/life-sustaining treatments used.    The patient HAS NOT completed a living  will to reflect these preferences.  The patient   HAS NOT already designated a healthcare power of  to make decisions on his behalf.         Papers given to patient.

## 2019-09-16 RX ORDER — LINAGLIPTIN 5 MG/1
TABLET, FILM COATED ORAL
Qty: 90 TABLET | Refills: 0 | Status: SHIPPED | OUTPATIENT
Start: 2019-09-16 | End: 2020-01-23

## 2019-09-24 RX ORDER — OMEPRAZOLE 40 MG/1
CAPSULE, DELAYED RELEASE ORAL
Qty: 90 CAPSULE | Refills: 12 | Status: SHIPPED | OUTPATIENT
Start: 2019-09-24 | End: 2020-08-27

## 2019-09-24 RX ORDER — ALLOPURINOL 100 MG/1
TABLET ORAL
Qty: 90 TABLET | Refills: 12 | Status: SHIPPED | OUTPATIENT
Start: 2019-09-24 | End: 2020-10-02

## 2019-09-25 NOTE — PROGRESS NOTES
Digital [facility] Program Enrollment  HPI   2nd attempt for enrollment call.  No answer, left voicemail.  Sent my portal message.

## 2019-09-30 ENCOUNTER — EXTERNAL CHRONIC CARE MANAGEMENT (OUTPATIENT)
Dept: PRIMARY CARE CLINIC | Facility: CLINIC | Age: 84
End: 2019-09-30
Payer: MEDICARE

## 2019-09-30 PROCEDURE — 99490 PR CHRONIC CARE MGMT, 1ST 20 MIN: ICD-10-PCS | Mod: S$PBB,,, | Performed by: INTERNAL MEDICINE

## 2019-09-30 PROCEDURE — 99490 CHRNC CARE MGMT STAFF 1ST 20: CPT | Mod: PBBFAC,PO | Performed by: INTERNAL MEDICINE

## 2019-09-30 PROCEDURE — 99490 CHRNC CARE MGMT STAFF 1ST 20: CPT | Mod: S$PBB,,, | Performed by: INTERNAL MEDICINE

## 2019-10-11 ENCOUNTER — LAB VISIT (OUTPATIENT)
Dept: LAB | Facility: HOSPITAL | Age: 84
End: 2019-10-11
Attending: INTERNAL MEDICINE
Payer: MEDICARE

## 2019-10-11 ENCOUNTER — PATIENT MESSAGE (OUTPATIENT)
Dept: ADMINISTRATIVE | Facility: OTHER | Age: 84
End: 2019-10-11

## 2019-10-11 ENCOUNTER — PATIENT MESSAGE (OUTPATIENT)
Dept: FAMILY MEDICINE | Facility: CLINIC | Age: 84
End: 2019-10-11

## 2019-10-11 DIAGNOSIS — N18.30 CKD STAGE 3 DUE TO TYPE 2 DIABETES MELLITUS: ICD-10-CM

## 2019-10-11 DIAGNOSIS — E11.22 CKD STAGE 3 DUE TO TYPE 2 DIABETES MELLITUS: ICD-10-CM

## 2019-10-11 LAB
25(OH)D3+25(OH)D2 SERPL-MCNC: 46 NG/ML (ref 30–96)
ALBUMIN SERPL BCP-MCNC: 4 G/DL (ref 3.5–5.2)
ANION GAP SERPL CALC-SCNC: 8 MMOL/L (ref 8–16)
BUN SERPL-MCNC: 18 MG/DL (ref 8–23)
CALCIUM SERPL-MCNC: 9.1 MG/DL (ref 8.7–10.5)
CHLORIDE SERPL-SCNC: 107 MMOL/L (ref 95–110)
CO2 SERPL-SCNC: 26 MMOL/L (ref 23–29)
CREAT SERPL-MCNC: 1.4 MG/DL (ref 0.5–1.4)
EST. GFR  (AFRICAN AMERICAN): 54 ML/MIN/1.73 M^2
EST. GFR  (NON AFRICAN AMERICAN): 46 ML/MIN/1.73 M^2
GLUCOSE SERPL-MCNC: 270 MG/DL (ref 70–110)
PHOSPHATE SERPL-MCNC: 3.4 MG/DL (ref 2.7–4.5)
POTASSIUM SERPL-SCNC: 4.3 MMOL/L (ref 3.5–5.1)
PTH-INTACT SERPL-MCNC: 60.4 PG/ML (ref 9–77)
SODIUM SERPL-SCNC: 141 MMOL/L (ref 136–145)
URATE SERPL-MCNC: 5.7 MG/DL (ref 3.4–7)

## 2019-10-11 PROCEDURE — 80069 RENAL FUNCTION PANEL: CPT

## 2019-10-11 PROCEDURE — 84550 ASSAY OF BLOOD/URIC ACID: CPT

## 2019-10-11 PROCEDURE — 36415 COLL VENOUS BLD VENIPUNCTURE: CPT

## 2019-10-11 PROCEDURE — 83970 ASSAY OF PARATHORMONE: CPT

## 2019-10-11 PROCEDURE — 82306 VITAMIN D 25 HYDROXY: CPT

## 2019-10-14 NOTE — PROGRESS NOTES
Digital Medicine Program Enrollment  HPI     3rd attempt for enrollment call.  No answer, left voicemail.

## 2019-10-24 NOTE — PROGRESS NOTES
Digital Medicine Program Enrollment  HPI     4th  attempt for enrollment call.  No answer, left voicemail.Also sent enrolling provider message.

## 2019-10-31 ENCOUNTER — EXTERNAL CHRONIC CARE MANAGEMENT (OUTPATIENT)
Dept: PRIMARY CARE CLINIC | Facility: CLINIC | Age: 84
End: 2019-10-31
Payer: MEDICARE

## 2019-10-31 PROCEDURE — 99490 CHRNC CARE MGMT STAFF 1ST 20: CPT | Mod: PBBFAC,PO | Performed by: INTERNAL MEDICINE

## 2019-10-31 PROCEDURE — 99490 PR CHRONIC CARE MGMT, 1ST 20 MIN: ICD-10-PCS | Mod: S$PBB,,, | Performed by: INTERNAL MEDICINE

## 2019-10-31 PROCEDURE — 99490 CHRNC CARE MGMT STAFF 1ST 20: CPT | Mod: S$PBB,,, | Performed by: INTERNAL MEDICINE

## 2019-11-12 NOTE — PROGRESS NOTES
Digital Medicine Program Enrollment  HPI     5th  attempt for enrollment call.  No answer, left voicemail.  Also sent incomplete enrollment letter to pt.

## 2019-11-12 NOTE — PROGRESS NOTES
Digital Medicine Program Enrollment      Our goal is to decrease A1c within patient-specific target levels and make the process convenient so patient can avoid extra trips to the office. Reducing A1C by merely 1% results in a decreased risk of complications of at least 10%. For example, an A1C reduced from 8.5% to 7.5% results in almost 40% lower risk of kidney, eye, and nerve disease      Reviewed that the Digital Medicine care team - consisting of a clinician and a health  - will follow the most current evidence-based national guidelines for treating your condition.  The health  will focus on lifestyle modifications and motivation while the clinician will focus on medication therapy.  The care team will review all data on a regular basis and reach out as needed.      Explained that one of the key parts of the program is communication with the care team.  Asked patient to respond to outreach attempts and complete questionnaires.  Stressed importance of medication adherence.        Reviewed that the Digital Medicine care team - consisting of a clinician and a health  - will follow the most current evidence-based national guidelines for treating your condition.  The health  will focus on lifestyle modifications and motivation while the clinician will focus on medication therapy.  The care team will review all data on a regular basis and reach out as needed.      Explained that one of the key parts of the program is communication with the care team.  Asked patient to respond to outreach attempts and complete questionnaires.  Stressed importance of medication adherence.      Instructed patient not to allow anyone else to use phone and monitoring device.  Explained that we expect patient to test their blood sugar as prescribed by their physician or Digital Medicine Clinician.      Explained to patient that the digital medicine team is not available for emergencies.  Patient will call [facility] on-call  ([phone] or [phone]) or [phone] if needed.      Our goal is to get BP to consistently below 130/80mmHg and make the process convenient so patient can avoid extra trips to the office. Getting your blood pressure below 130/80mmHg (definition of control) will reduce your risk for heart attack, kidney failure, stroke and death (as well as kidney failure, eye disease, & dementia)      Reviewed that the Digital Medicine care team - consisting of a clinician and a health  - will follow the most current evidence-based national guidelines for treating your condition.  The health  will focus on lifestyle modifications and motivation while the clinician will focus on medication therapy.  The care team will review all data on a regular basis and reach out as needed.      Explained that one of the key parts of the program is communication with the care team.  Asked patient to respond to outreach attempts and complete questionnaires.  Stressed importance of medication adherence.      Explained that we expect patient to obtain several blood pressures per week at random times of day.  Instructed patient not to allow anyone else to use phone and monitoring device.  Confirmed appropriate BP monitoring technique.      Explained to patient that the digital medicine team is not available for emergencies.  Patient will call [facility] on-call ([phone] or [phone]) or [phone] if needed.

## 2019-11-13 RX ORDER — GABAPENTIN 300 MG/1
CAPSULE ORAL
Qty: 270 CAPSULE | Refills: 0 | OUTPATIENT
Start: 2019-11-13

## 2019-11-14 ENCOUNTER — PATIENT OUTREACH (OUTPATIENT)
Dept: OTHER | Facility: OTHER | Age: 84
End: 2019-11-14

## 2019-11-19 ENCOUNTER — PATIENT MESSAGE (OUTPATIENT)
Dept: RHEUMATOLOGY | Facility: CLINIC | Age: 84
End: 2019-11-19

## 2019-11-19 RX ORDER — GABAPENTIN 300 MG/1
CAPSULE ORAL
Qty: 270 CAPSULE | Refills: 0 | OUTPATIENT
Start: 2019-11-19

## 2019-11-19 NOTE — TELEPHONE ENCOUNTER
----- Message from Olga Dao sent at 11/19/2019  3:05 PM CST -----  Contact: pt   Rx Refill/Request     Is this a Refill or New Rx:  Refill  Rx Name and Strength:  gabapentin (NEURONTIN) 300 MG capsule  Preferred Pharmacy with phone number:   [id number] - [place] - [address] AT [facility] [address]  [address]  [place] [address]  Phone: [phone] Fax: [phone]  Communication Preference: [phone]  Additional Information: pt stated if he need an appt, please call to advise

## 2019-11-22 ENCOUNTER — PATIENT OUTREACH (OUTPATIENT)
Dept: ADMINISTRATIVE | Facility: OTHER | Age: 84
End: 2019-11-22

## 2019-11-22 ENCOUNTER — OFFICE VISIT (OUTPATIENT)
Dept: RHEUMATOLOGY | Facility: CLINIC | Age: 84
End: 2019-11-22
Payer: MEDICARE

## 2019-11-22 VITALS
SYSTOLIC BLOOD PRESSURE: 143 MMHG | HEIGHT: 71 IN | BODY MASS INDEX: 29.38 KG/M2 | WEIGHT: 209.88 LBS | DIASTOLIC BLOOD PRESSURE: 66 MMHG | HEART RATE: 54 BPM

## 2019-11-22 DIAGNOSIS — M1A.00X0 IDIOPATHIC CHRONIC GOUT WITHOUT TOPHUS, UNSPECIFIED SITE: ICD-10-CM

## 2019-11-22 DIAGNOSIS — M15.9 PRIMARY OSTEOARTHRITIS INVOLVING MULTIPLE JOINTS: Primary | ICD-10-CM

## 2019-11-22 DIAGNOSIS — E11.21 DIABETES MELLITUS WITH PROTEINURIC DIABETIC NEPHROPATHY: ICD-10-CM

## 2019-11-22 DIAGNOSIS — Z87.39 HISTORY OF RHEUMATOID ARTHRITIS: ICD-10-CM

## 2019-11-22 DIAGNOSIS — C85.19 B-CELL LYMPHOMA OF EXTRANODAL SITE: ICD-10-CM

## 2019-11-22 PROCEDURE — 99213 OFFICE O/P EST LOW 20 MIN: CPT | Mod: PBBFAC | Performed by: INTERNAL MEDICINE

## 2019-11-22 PROCEDURE — 99213 PR OFFICE/OUTPT VISIT, EST, LEVL III, 20-29 MIN: ICD-10-PCS | Mod: S$PBB,,, | Performed by: INTERNAL MEDICINE

## 2019-11-22 PROCEDURE — 99999 PR PBB SHADOW E&M-EST. PATIENT-LVL III: CPT | Mod: PBBFAC,,, | Performed by: INTERNAL MEDICINE

## 2019-11-22 PROCEDURE — 1159F PR MEDICATION LIST DOCUMENTED IN MEDICAL RECORD: ICD-10-PCS | Mod: ,,, | Performed by: INTERNAL MEDICINE

## 2019-11-22 PROCEDURE — 99999 PR PBB SHADOW E&M-EST. PATIENT-LVL III: ICD-10-PCS | Mod: PBBFAC,,, | Performed by: INTERNAL MEDICINE

## 2019-11-22 PROCEDURE — 99213 OFFICE O/P EST LOW 20 MIN: CPT | Mod: S$PBB,,, | Performed by: INTERNAL MEDICINE

## 2019-11-22 PROCEDURE — 1126F PR PAIN SEVERITY QUANTIFIED, NO PAIN PRESENT: ICD-10-PCS | Mod: ,,, | Performed by: INTERNAL MEDICINE

## 2019-11-22 PROCEDURE — 1126F AMNT PAIN NOTED NONE PRSNT: CPT | Mod: ,,, | Performed by: INTERNAL MEDICINE

## 2019-11-22 PROCEDURE — 1159F MED LIST DOCD IN RCRD: CPT | Mod: ,,, | Performed by: INTERNAL MEDICINE

## 2019-11-22 RX ORDER — GABAPENTIN 300 MG/1
CAPSULE ORAL
Qty: 270 CAPSULE | Refills: 3 | Status: SHIPPED | OUTPATIENT
Start: 2019-11-22 | End: 2020-11-19 | Stop reason: SDUPTHER

## 2019-11-25 NOTE — PROGRESS NOTES
History of present illness:  84-year-old gentleman I follow for osteoarthritis.  Her he also has a history of gouty arthritis and remains on allopurinol.  He was found to have a B-cell lymphoma since his last visit.  He was treated with rituximab in his disease is in remission.  He has had no recent gout attacks in his arthritis has been stable.  He remains on gabapentin 3 times daily for her neuropathy.  This diabetes has been doing well.  He has had no joint swelling he has no other recent medical problems.    Physical examination:  Musculoskeletal:  He has bony hypertrophy of the PIP and DIP knees.  He has no synovitis in the hands.  He has no tophi.  Other peripheral joints are stable.    Assessment:  1.  Osteoarthritis  2.  Inter critical gout  3.  Peripheral neuropathy  4.  B-cell lymphoma    Plans:  Continue allopurinol and gabapentin as before.  Return to see me in 12 months.

## 2019-11-30 ENCOUNTER — EXTERNAL CHRONIC CARE MANAGEMENT (OUTPATIENT)
Dept: PRIMARY CARE CLINIC | Facility: CLINIC | Age: 84
End: 2019-11-30
Payer: MEDICARE

## 2019-11-30 PROCEDURE — 99490 CHRNC CARE MGMT STAFF 1ST 20: CPT | Mod: PBBFAC,PO | Performed by: INTERNAL MEDICINE

## 2019-11-30 PROCEDURE — 99490 CHRNC CARE MGMT STAFF 1ST 20: CPT | Mod: S$PBB,,, | Performed by: INTERNAL MEDICINE

## 2019-11-30 PROCEDURE — 99490 PR CHRONIC CARE MGMT, 1ST 20 MIN: ICD-10-PCS | Mod: S$PBB,,, | Performed by: INTERNAL MEDICINE

## 2019-12-09 ENCOUNTER — LAB VISIT (OUTPATIENT)
Dept: LAB | Facility: HOSPITAL | Age: 84
End: 2019-12-09
Attending: INTERNAL MEDICINE
Payer: MEDICARE

## 2019-12-09 DIAGNOSIS — D69.6 THROMBOCYTOPENIA: ICD-10-CM

## 2019-12-09 DIAGNOSIS — C85.19 B-CELL LYMPHOMA OF EXTRANODAL SITE: ICD-10-CM

## 2019-12-09 LAB
ALBUMIN SERPL BCP-MCNC: 4.4 G/DL (ref 3.5–5.2)
ALP SERPL-CCNC: 50 U/L (ref 55–135)
ALT SERPL W/O P-5'-P-CCNC: 17 U/L (ref 10–44)
ANION GAP SERPL CALC-SCNC: 7 MMOL/L (ref 8–16)
AST SERPL-CCNC: 17 U/L (ref 10–40)
BASOPHILS # BLD AUTO: 0.03 K/UL (ref 0–0.2)
BASOPHILS NFR BLD: 0.6 % (ref 0–1.9)
BILIRUB SERPL-MCNC: 1.1 MG/DL (ref 0.1–1)
BUN SERPL-MCNC: 19 MG/DL (ref 8–23)
CALCIUM SERPL-MCNC: 9.4 MG/DL (ref 8.7–10.5)
CHLORIDE SERPL-SCNC: 107 MMOL/L (ref 95–110)
CO2 SERPL-SCNC: 26 MMOL/L (ref 23–29)
CREAT SERPL-MCNC: 1.5 MG/DL (ref 0.5–1.4)
DIFFERENTIAL METHOD: ABNORMAL
EOSINOPHIL # BLD AUTO: 0.1 K/UL (ref 0–0.5)
EOSINOPHIL NFR BLD: 2.1 % (ref 0–8)
ERYTHROCYTE [DISTWIDTH] IN BLOOD BY AUTOMATED COUNT: 12.4 % (ref 11.5–14.5)
EST. GFR  (AFRICAN AMERICAN): 49 ML/MIN/1.73 M^2
EST. GFR  (NON AFRICAN AMERICAN): 42 ML/MIN/1.73 M^2
GLUCOSE SERPL-MCNC: 253 MG/DL (ref 70–110)
HCT VFR BLD AUTO: 40.2 % (ref 40–54)
HGB BLD-MCNC: 12.6 G/DL (ref 14–18)
IMM GRANULOCYTES # BLD AUTO: 0.05 K/UL (ref 0–0.04)
IMM GRANULOCYTES NFR BLD AUTO: 1 % (ref 0–0.5)
LYMPHOCYTES # BLD AUTO: 0.9 K/UL (ref 1–4.8)
LYMPHOCYTES NFR BLD: 16.6 % (ref 18–48)
MCH RBC QN AUTO: 29.2 PG (ref 27–31)
MCHC RBC AUTO-ENTMCNC: 31.3 G/DL (ref 32–36)
MCV RBC AUTO: 93 FL (ref 82–98)
MONOCYTES # BLD AUTO: 0.7 K/UL (ref 0.3–1)
MONOCYTES NFR BLD: 14.3 % (ref 4–15)
NEUTROPHILS # BLD AUTO: 3.4 K/UL (ref 1.8–7.7)
NEUTROPHILS NFR BLD: 65.4 % (ref 38–73)
NRBC BLD-RTO: 0 /100 WBC
PLATELET # BLD AUTO: 60 K/UL (ref 150–350)
PMV BLD AUTO: 12.8 FL (ref 9.2–12.9)
POTASSIUM SERPL-SCNC: 4.1 MMOL/L (ref 3.5–5.1)
PROT SERPL-MCNC: 7.5 G/DL (ref 6–8.4)
RBC # BLD AUTO: 4.32 M/UL (ref 4.6–6.2)
SODIUM SERPL-SCNC: 140 MMOL/L (ref 136–145)
WBC # BLD AUTO: 5.17 K/UL (ref 3.9–12.7)

## 2019-12-09 PROCEDURE — 36415 COLL VENOUS BLD VENIPUNCTURE: CPT

## 2019-12-09 PROCEDURE — 85025 COMPLETE CBC W/AUTO DIFF WBC: CPT

## 2019-12-09 PROCEDURE — 80053 COMPREHEN METABOLIC PANEL: CPT

## 2019-12-10 NOTE — PROGRESS NOTES
Chief Complaint :   Low  Grade Lymphoma involving Bone Marrow.    Hx of Present illness :  Patient is a 84 y.o. year old male who presents to the clinic today for   Oncology Followup. Evaluated for Thrombocytopenia. 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   Has completed one four week ciourse of rituxan Monotherapy.    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    Transfusion :  None    Menstrual & obstetric Hx :  N/A        Present Meds :   Medication List with Changes/Refills   Current Medications    ALLOPURINOL (ZYLOPRIM) 100 MG TABLET    TAKE 1 TABLET(100 MG) BY MOUTH EVERY DAY    AMLODIPINE (NORVASC) 10 MG TABLET    TAKE 1 TABLET(10 MG) BY MOUTH EVERY DAY    ASCORBIC ACID (VITAMIN C) 1000 MG TABLET    Take 1,000 mg by mouth once daily.    ASPIRIN 81 MG CHEW    CHEW AND SWALLOW 1 TABLET BY MOUTH DAILY    ATORVASTATIN (LIPITOR) 20 MG TABLET    Take 1 tablet (20 mg total) by mouth once daily.    BLOOD SUGAR DIAGNOSTIC STRP    1 strip by Misc.(Non-Drug; Combo Route) route 2 (two) times daily. Disp glucometer and lancets as well    CARVEDILOL (COREG) 25 MG TABLET    Take 1 tablet (25 mg total) by mouth 2 (two) times daily with meals.    CLOBETASOL (TEMOVATE) 0.05 % CREAM        FOLIC ACID (FOLVITE) 800 MCG TAB    Take 800 mcg by mouth once daily.    GABAPENTIN (NEURONTIN) 300 MG CAPSULE    TAKE 1 CAPSULE(300 MG) BY MOUTH THREE TIMES DAILY    GLIPIZIDE (GLUCOTROL) 10 MG TR24    TAKE 1 TABLET(10 MG) BY MOUTH EVERY DAY    HYDROCHLOROTHIAZIDE (MICROZIDE) 12.5 MG CAPSULE    TAKE 1 CAPSULE BY MOUTH EVERY MONDAY, WEDNESDAY, FRIDAY    MULTIVIT-MIN/FA/LYCOPEN/LUTEIN (CENTRUM SILVER MEN ORAL)    Take by mouth.    NIACIN 500 MG TAB    Take 100 mg by mouth every evening.    OMEPRAZOLE (PRILOSEC) 40 MG CAPSULE        OMEPRAZOLE (PRILOSEC) 40 MG CAPSULE    TAKE 1 CAPSULE DAILY    SOOLANTRA 1 % CREA        TRADJENTA 5 MG TAB TABLET    TAKE 1 TABLET  DAILY    TRIAMCINOLONE ACETONIDE 0.1% (KENALOG) 0.1 % OINTMENT        TRUEPLUS LANCETS 33 GAUGE MISC    USE TO TEST BLOOD SUGAR BID    TURMERIC ROOT EXTRACT ORAL    Take by mouth.    VALSARTAN (DIOVAN) 320 MG TABLET    Take 1 tablet (320 mg total) by mouth once daily.    VITAMIN E 400 UNIT CAPSULE    Take 400 Units by mouth once daily.       Past Medical Hx :   Hypertension; DM; Hyperlipidemia; Chronic atrial fibrillation; Macular degeneration, Lichen planus..CKD 3; CAD.GERD; Peripheral Neuropathy.  Known to have Rheumatoid arthritis, on no Rx  No Hx of TB, , Lupus, sarcoid, Seizure disorder or CVA. No Hx of DVT or PE./ No past Hx of cancer, chemotherapy or radiation therapy.    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    Immunization :  Immunization History   Administered Date(s) Administered    Influenza 10/19/2018    Influenza - High Dose - PF (65 years and older) 11/03/2017, 10/19/2018, 10/11/2019    Pneumococcal Conjugate - 13 Valent 06/27/2017    Td (ADULT) 11/18/2005       Family Hx :  Family History   Problem Relation Age of Onset    No Known Problems Mother     No Known Problems Father     No Known Problems Sister     No Known Problems Brother     No Known Problems Maternal Aunt     No Known Problems Maternal Uncle     No Known Problems Paternal Aunt     No Known Problems Paternal Uncle     No Known Problems  Maternal Grandmother     No Known Problems Maternal Grandfather     No Known Problems Paternal Grandmother     No Known Problems Paternal Grandfather     Amblyopia Neg Hx     Blindness Neg Hx     Cancer Neg Hx     Cataracts Neg Hx     Diabetes Neg Hx     Glaucoma Neg Hx     Hypertension Neg Hx     Macular degeneration Neg Hx     Retinal detachment Neg Hx     Strabismus Neg Hx     Stroke Neg Hx     Thyroid disease Neg Hx        Social Hx :  Social History     Socioeconomic History    Marital status:      Spouse name: Not on file    Number of children: Not on file    Years of education: Not on file    Highest education level: Not on file   Occupational History    Not on file   Social Needs    Financial resource strain: Not hard at all    Food insecurity:     Worry: Never true     Inability: Never true    Transportation needs:     Medical: No     Non-medical: No   Tobacco Use    Smoking status: Former Smoker    Smokeless tobacco: Never Used   Substance and Sexual Activity    Alcohol use: Yes     Alcohol/week: 0.0 standard drinks     Frequency: 2-4 times a month     Drinks per session: 1 or 2     Binge frequency: Never     Comment: Social    Drug use: No    Sexual activity: Not on file   Lifestyle    Physical activity:     Days per week: Not on file     Minutes per session: Not on file    Stress: Not on file   Relationships    Social connections:     Talks on phone: More than three times a week     Gets together: Once a week     Attends [employer] service: 1 to 4 times per year     Active member of club or organization: No     Attends meetings of clubs or organizations: Never     Relationship status:    Other Topics Concern    Not on file   Social History Narrative    Not on file       Surgery :  Bilateral Knee replacement; Bilateral Cataract surgery.    Symptoms :   No new Sx.    Review of Systems   Constitutional: Negative for chills, diaphoresis, fever, malaise/fatigue  and weight loss.        Feels well. In good spirits. No fever, pruritis,  or evening rise in temp.  When humidity is high has night sweats.   HENT: Positive for congestion. Negative for hearing loss, nosebleeds, sore throat and tinnitus.    Eyes: Negative for blurred vision, double vision and photophobia.        Wears glasses   Respiratory: Negative for cough, hemoptysis, shortness of breath and wheezing.    Cardiovascular: Negative.  Negative for chest pain, palpitations, orthopnea, claudication, leg swelling and PND.   Gastrointestinal: Positive for constipation. Negative for abdominal pain, blood in stool, diarrhea, heartburn, nausea and vomiting.   Genitourinary: Negative.  Negative for dysuria, flank pain, frequency, hematuria and urgency.   Musculoskeletal: Negative.  Negative for back pain, falls, joint pain, myalgias and neck pain.   Skin: Positive for rash. Negative for itching.        Saw Dermatologist for Rosacea few weeks ago.   Neurological: Negative for dizziness, tingling, sensory change and headaches.   Endo/Heme/Allergies: Negative for environmental allergies. Does not bruise/bleed easily.   Psychiatric/Behavioral: Negative for depression and memory loss. The patient is not nervous/anxious and does not have insomnia.        Physical Exam :  Looks younger than the stated age.  Physical Exam   Constitutional: He is oriented to person, place, and time and well-developed, well-nourished, and in no distress. Vital signs are normal. No distress.   HENT:   Head: Normocephalic and atraumatic.   Right Ear: External ear normal.   Left Ear: External ear normal.   Nose: Nose normal.   Mouth/Throat: Oropharynx is clear and moist. No oropharyngeal exudate.   Eyes: Pupils are equal, round, and reactive to light. Conjunctivae, EOM and lids are normal. Lids are everted and swept, no foreign bodies found. Right eye exhibits no discharge. Left eye exhibits no discharge. No scleral icterus.       Neck: Trachea normal,  normal range of motion, full passive range of motion without pain and phonation normal. Neck supple. Normal carotid pulses, no hepatojugular reflux and no JVD present. No tracheal tenderness present. Carotid bruit is not present. No tracheal deviation present. No thyroid mass and no thyromegaly present.   Cardiovascular: Normal rate, regular rhythm, normal heart sounds, intact distal pulses and normal pulses. PMI is not displaced. Exam reveals no gallop and no friction rub.   No murmur heard.  Pulmonary/Chest: Effort normal and breath sounds normal. No stridor. No apnea. No respiratory distress. He has no wheezes. He has no rales. He exhibits no tenderness.   Abdominal: Soft. Normal appearance, normal aorta and bowel sounds are normal. He exhibits no distension, no ascites and no mass. There is no hepatosplenomegaly. There is no tenderness. There is no rebound, no guarding and no CVA tenderness. No hernia.   Genitourinary:   Genitourinary Comments: Not examined   Musculoskeletal: Normal range of motion. He exhibits no edema, tenderness or deformity.   Lymphadenopathy:        Head (right side): No submental, no submandibular, no tonsillar, no preauricular, no posterior auricular and no occipital adenopathy present.        Head (left side): No submental, no submandibular, no tonsillar, no preauricular, no posterior auricular and no occipital adenopathy present.     He has no cervical adenopathy.     He has no axillary adenopathy.        Right: No inguinal, no supraclavicular and no epitrochlear adenopathy present.        Left: No inguinal, no supraclavicular and no epitrochlear adenopathy present.   Neurological: He is alert and oriented to person, place, and time. He has normal motor skills, normal sensation, normal strength, normal reflexes and intact cranial nerves. No cranial nerve deficit. He exhibits normal muscle tone. Gait normal. Coordination normal. GCS score is 15.   Skin: Skin is warm, dry and intact. No  rash noted. He is not diaphoretic. No cyanosis or erythema. No pallor. Nails show no clubbing.   Psychiatric: Mood, memory, affect and judgment normal.   No New finding.     Labs & Imaging :  09/09/19 :  NFBS 173. Cr.1.4; eGFR 46. Ca 9.5. Bili 1.2.  ALP 52. hgb 12.1; hct 38.4;  MCV 93. Platelets 65,000 ANC 2,500  12/09/19 :  NFBS 253; Cr.1.5; eGFR 42; Ca 9.4; bili 1.1; Liver enzymes normal. Hgb 12.6; hct 40.2.  MCV 93; platelets 60,000 ANC 3,400      Dx :  .   Chronic  thrombocytopenia  Low grade  Lymphoma Involving  Bone Marrow, Incidental finding.  Poorly Controlled DM.      Assessment & Plan : reviewed with patient .  . Blood sugar followup with PCP.   Continue Monitoring. RTC 3 months with CBC,CMP. LDH. Patient understands and verbalised.         Advance Care Planning     Power of   I initiated the process of advance care planning today and explained the importance of this process to the patient.  I introduced the concept of advance directives to the patient, as well. Then the patient received detailed information about the importance of designating a Health Care Power of  (HCPOA). He was also instructed to communicate with this person about their wishes for future healthcare, should he become sick and lose decision-making capacity. The patient  Has/has not:previously appointed a HCPOA. After our discussion, the patient Has decided to  Talk to hs wife         Living Will  During this visit, I engaged the patient in the advance care planning process.  The patient and I reviewed the role for advance directives and their purpose in directing future healthcare if the patient's unable to speak for him/herself.  At this point in time, the patient does have full decision-making capacity.  We discussed different extreme health states that he could experience, and reviewed what kind of medical care he would want in those situations.  The patient communicated that if he were comatose and had little chance  of a meaningful recovery, he  Does not want machines/life-sustaining treatments used.    The patient HAS NOT completed a living will to reflect these preferences.  The patient   HAS NOT already designated a healthcare power of  to make decisions on his behalf.         Papers given to patient.

## 2019-12-11 ENCOUNTER — OFFICE VISIT (OUTPATIENT)
Dept: HEMATOLOGY/ONCOLOGY | Facility: CLINIC | Age: 84
End: 2019-12-11
Payer: MEDICARE

## 2019-12-11 VITALS
HEART RATE: 54 BPM | WEIGHT: 205.94 LBS | TEMPERATURE: 98 F | BODY MASS INDEX: 28.83 KG/M2 | SYSTOLIC BLOOD PRESSURE: 145 MMHG | DIASTOLIC BLOOD PRESSURE: 70 MMHG | HEIGHT: 71 IN | OXYGEN SATURATION: 99 %

## 2019-12-11 DIAGNOSIS — D69.6 THROMBOCYTOPENIA: ICD-10-CM

## 2019-12-11 DIAGNOSIS — M06.9 RHEUMATOID ARTHRITIS, INVOLVING UNSPECIFIED SITE, UNSPECIFIED RHEUMATOID FACTOR PRESENCE: ICD-10-CM

## 2019-12-11 DIAGNOSIS — C85.19 B-CELL LYMPHOMA OF EXTRANODAL SITE: Primary | ICD-10-CM

## 2019-12-11 PROCEDURE — 1159F MED LIST DOCD IN RCRD: CPT | Mod: ,,, | Performed by: INTERNAL MEDICINE

## 2019-12-11 PROCEDURE — 1126F PR PAIN SEVERITY QUANTIFIED, NO PAIN PRESENT: ICD-10-PCS | Mod: ,,, | Performed by: INTERNAL MEDICINE

## 2019-12-11 PROCEDURE — 1126F AMNT PAIN NOTED NONE PRSNT: CPT | Mod: ,,, | Performed by: INTERNAL MEDICINE

## 2019-12-11 PROCEDURE — 99213 OFFICE O/P EST LOW 20 MIN: CPT | Mod: PBBFAC | Performed by: INTERNAL MEDICINE

## 2019-12-11 PROCEDURE — 99999 PR PBB SHADOW E&M-EST. PATIENT-LVL III: ICD-10-PCS | Mod: PBBFAC,,, | Performed by: INTERNAL MEDICINE

## 2019-12-11 PROCEDURE — 1159F PR MEDICATION LIST DOCUMENTED IN MEDICAL RECORD: ICD-10-PCS | Mod: ,,, | Performed by: INTERNAL MEDICINE

## 2019-12-11 PROCEDURE — 99999 PR PBB SHADOW E&M-EST. PATIENT-LVL III: CPT | Mod: PBBFAC,,, | Performed by: INTERNAL MEDICINE

## 2019-12-11 PROCEDURE — 99213 PR OFFICE/OUTPT VISIT, EST, LEVL III, 20-29 MIN: ICD-10-PCS | Mod: S$PBB,,, | Performed by: INTERNAL MEDICINE

## 2019-12-11 PROCEDURE — 99213 OFFICE O/P EST LOW 20 MIN: CPT | Mod: S$PBB,,, | Performed by: INTERNAL MEDICINE

## 2019-12-11 RX ORDER — MOMETASONE FUROATE 1 MG/G
CREAM TOPICAL
Refills: 1 | COMMUNITY
Start: 2019-10-08 | End: 2021-03-19

## 2019-12-16 ENCOUNTER — TELEPHONE (OUTPATIENT)
Dept: NEPHROLOGY | Facility: CLINIC | Age: 84
End: 2019-12-16

## 2019-12-16 NOTE — TELEPHONE ENCOUNTER
----- Message from Edelmira Concepcion MA sent at 11/22/2019 10:44 AM CST -----  Pt past due for a f/u c [facility]

## 2019-12-19 NOTE — PROGRESS NOTES
Digital Medicine: Health  Introduction    Introduced Bria Lawson to Digital Medicine. Discussed health  role and recommended lifestyle modifications.    The history is provided by the patient.     HYPERTENSION  Our goal is to get BP to consistently below 130/80mmHg and make the process convenient so patient can avoid extra trips to the office. Getting your blood pressure below 130/80mmHg (definition of control) will reduce your risk for heart attack, kidney failure, stroke and death (as well as kidney failure, eye disease, & dementia)      Reviewed non-pharmacologic therapies and impact on BP.      Patient's BP goal is 140/90.Patient's BP average is 123/61 mmHg, which is at or below goal, per 2017 ACC/AHA Hypertension Guidelines.        DIABETES  Instructed patient not to allow anyone else to use phone and monitoring device.  Explained that we expect patient to test their blood sugar as prescribed by their physician or Digital Medicine Clinician.      Reviewed general Self-Monitoring of Blood Glucose (SMBG) goals:  · FPG: <  mg/dL  · 1h PPG: < 180 mg/dL  · 2h PPG: < 160 mg/dL  · Bedtime: < 150 mg/dL    Expected SMBG schedule: Daily  Patient's A1C goal is less than or equal to 7.  Patient's most recent A1C result is at or below goal.  Lab Results     Component                Value               Date                     HGBA1C                   6.8 (H)             08/15/2019                    Last 5 Patient Entered Readings                                      Current 30 Day Average: 123/61     Recent Readings 12/10/2019 12/10/2019 12/8/2019 12/8/2019 12/2/2019    SBP (mmHg) 128 135 121 127 132    DBP (mmHg) 59 62 59 60 63    Pulse 46 47 48 48 50        Last 6 Patient Entered Readings                                          Most Recent A1c: 6.8% on 8/15/2019  (Goal: 7%)     Recent Readings 12/19/2019 12/16/2019 12/15/2019 12/14/2019 12/13/2019    Blood Glucose (mg/dL) 142 180 175 144 158     "      INTERVENTION(S)  encouragement/support    PLAN  patient verbalizes understanding, patient unable to make changes at this time and additional monitoring needed      There are no preventive care reminders to display for this patient.    Reviewed the importance of self-monitoring, medication adherence, and that the health  can be used as a resource for lifestyle modifications to help reduce or maintain a healthy lifestyle.    Sent link to [facility]'s Digital Medicine webpages and my contact information via [employer] for future questions. Follow up scheduled.             Diet Screening   He cooks for self.      He cooks "quick and fast" meals. He would like to discuss diet after the holidays.    Physical Activity Screening     Patient has limited mobility: old injury    He says he cannot run because he has two bad knees but he can walk. He thinks he is "in pretty good shape for my age". He says he likes to stay busy and plans to build a shed with his son.    Medication Adherence Screening       Patient identified the following reasons for non-compliance: lack of perceived benefit    Pt says he has stopped HCTz and cut carvedilol in half. I do not see any mention of this in his chart. Will notify DM pharmacist Jessie SANTANA  "

## 2019-12-31 ENCOUNTER — EXTERNAL CHRONIC CARE MANAGEMENT (OUTPATIENT)
Dept: PRIMARY CARE CLINIC | Facility: CLINIC | Age: 84
End: 2019-12-31
Payer: MEDICARE

## 2019-12-31 PROCEDURE — 99490 CHRNC CARE MGMT STAFF 1ST 20: CPT | Mod: S$PBB,,, | Performed by: INTERNAL MEDICINE

## 2019-12-31 PROCEDURE — 99490 PR CHRONIC CARE MGMT, 1ST 20 MIN: ICD-10-PCS | Mod: S$PBB,,, | Performed by: INTERNAL MEDICINE

## 2019-12-31 PROCEDURE — 99490 CHRNC CARE MGMT STAFF 1ST 20: CPT | Mod: PBBFAC,PO | Performed by: INTERNAL MEDICINE

## 2020-01-09 RX ORDER — GLIPIZIDE 10 MG/1
TABLET, FILM COATED, EXTENDED RELEASE ORAL
Qty: 90 TABLET | Refills: 0 | Status: SHIPPED | OUTPATIENT
Start: 2020-01-09 | End: 2020-04-23

## 2020-01-13 RX ORDER — VALSARTAN 320 MG/1
TABLET ORAL
Qty: 90 TABLET | Refills: 3 | Status: SHIPPED | OUTPATIENT
Start: 2020-01-13 | End: 2020-12-27

## 2020-01-16 ENCOUNTER — PATIENT OUTREACH (OUTPATIENT)
Dept: OTHER | Facility: OTHER | Age: 85
End: 2020-01-16

## 2020-01-16 NOTE — PROGRESS NOTES
Digital Medicine: Health  Follow-Up    Pt is feeling well recently. No questions/concerns. Encouraged frequent monitoring and portion control.     The history is provided by the patient.     Follow Up  Follow-up reason(s): routine education      Routine Education Topics: eating patterns        INTERVENTION(S)  recommended diet modifications and encouragement/support    PLAN  patient verbalizes understanding, patient amenable to changes and additional monitoring needed      There are no preventive care reminders to display for this patient.    Last 5 Patient Entered Readings                                      Current 30 Day Average: 126/62     Recent Readings 1/15/2020 1/15/2020 1/12/2020 1/12/2020 1/11/2020    SBP (mmHg) 112 113 146 154 141    DBP (mmHg) 57 56 69 70 66    Pulse 49 50 49 50 61        Last 6 Patient Entered Readings                                          Most Recent A1c: 6.8% on 8/15/2019  (Goal: 7%)     Recent Readings 1/16/2020 1/15/2020 1/14/2020 1/12/2020 1/11/2020    Blood Glucose (mg/dL) 165 175 125 161 168                    Diet Screening   Patient reports eating or drinking the following: packaged/processed foodsHe skips meals regularly.  He cooks for self and has meals prepared by family.      Breakfast: maple and brown sugar instant oatmeal (12g sugar), coffee.   Lunch: usually doesn't eat. If he goes out, he doesn't eat dinner.  Dinner: He usually cooks. Baked fish and veggies, gumbo, soup, beans, grits and eggs  Snacks: unsalted crackers and peanut butter, coffee  Cookies, potato chips - depends on the day. If he's busy, he doesn't eat as much.    Pt sometimes reads food labels. Discussed having plain oats and adding toppings as an alternative to packaged oatmeal. Encouraged him to look at the serving size for the crackers, cookies, etc and try to keep snacks around 15g carbs.     Intervention(s): portion control and reading food labels    Physical Activity Screening       Dos  "house/yardwork - likes "to have a project"       SDOH  "

## 2020-01-23 ENCOUNTER — LAB VISIT (OUTPATIENT)
Dept: LAB | Facility: HOSPITAL | Age: 85
End: 2020-01-23
Attending: INTERNAL MEDICINE
Payer: MEDICARE

## 2020-01-23 ENCOUNTER — OFFICE VISIT (OUTPATIENT)
Dept: FAMILY MEDICINE | Facility: CLINIC | Age: 85
End: 2020-01-23
Payer: MEDICARE

## 2020-01-23 VITALS
BODY MASS INDEX: 28.73 KG/M2 | HEIGHT: 71 IN | HEART RATE: 54 BPM | TEMPERATURE: 98 F | DIASTOLIC BLOOD PRESSURE: 60 MMHG | SYSTOLIC BLOOD PRESSURE: 130 MMHG | OXYGEN SATURATION: 96 % | WEIGHT: 205.25 LBS

## 2020-01-23 DIAGNOSIS — N18.30 STAGE 3 CHRONIC KIDNEY DISEASE: ICD-10-CM

## 2020-01-23 DIAGNOSIS — E11.22 CKD STAGE 3 DUE TO TYPE 2 DIABETES MELLITUS: ICD-10-CM

## 2020-01-23 DIAGNOSIS — E11.21 DIABETES MELLITUS WITH PROTEINURIC DIABETIC NEPHROPATHY: ICD-10-CM

## 2020-01-23 DIAGNOSIS — E78.5 HYPERLIPIDEMIA, UNSPECIFIED HYPERLIPIDEMIA TYPE: ICD-10-CM

## 2020-01-23 DIAGNOSIS — D64.9 ANEMIA, UNSPECIFIED TYPE: ICD-10-CM

## 2020-01-23 DIAGNOSIS — M06.9 RHEUMATOID ARTHRITIS, INVOLVING UNSPECIFIED SITE, UNSPECIFIED RHEUMATOID FACTOR PRESENCE: ICD-10-CM

## 2020-01-23 DIAGNOSIS — M06.09 SERONEGATIVE ARTHROPATHY OF MULTIPLE SITES: ICD-10-CM

## 2020-01-23 DIAGNOSIS — D61.818 PANCYTOPENIA: ICD-10-CM

## 2020-01-23 DIAGNOSIS — D69.6 THROMBOCYTOPENIA: ICD-10-CM

## 2020-01-23 DIAGNOSIS — I12.9 HYPERTENSIVE KIDNEY DISEASE WITH STAGE 3 CHRONIC KIDNEY DISEASE: ICD-10-CM

## 2020-01-23 DIAGNOSIS — N18.30 HYPERTENSIVE KIDNEY DISEASE WITH STAGE 3 CHRONIC KIDNEY DISEASE: ICD-10-CM

## 2020-01-23 DIAGNOSIS — C85.19 B-CELL LYMPHOMA OF EXTRANODAL SITE: ICD-10-CM

## 2020-01-23 DIAGNOSIS — E11.29 DIABETES MELLITUS WITH PROTEINURIA: ICD-10-CM

## 2020-01-23 DIAGNOSIS — R80.9 DIABETES MELLITUS WITH PROTEINURIA: ICD-10-CM

## 2020-01-23 DIAGNOSIS — E11.311 DIABETIC RETINOPATHY OF BOTH EYES WITH MACULAR EDEMA ASSOCIATED WITH TYPE 2 DIABETES MELLITUS, UNSPECIFIED RETINOPATHY SEVERITY: ICD-10-CM

## 2020-01-23 DIAGNOSIS — I10 ESSENTIAL HYPERTENSION: ICD-10-CM

## 2020-01-23 DIAGNOSIS — N18.30 CKD STAGE 3 DUE TO TYPE 2 DIABETES MELLITUS: ICD-10-CM

## 2020-01-23 LAB
ANION GAP SERPL CALC-SCNC: 9 MMOL/L (ref 8–16)
BUN SERPL-MCNC: 15 MG/DL (ref 8–23)
CALCIUM SERPL-MCNC: 9.9 MG/DL (ref 8.7–10.5)
CHLORIDE SERPL-SCNC: 102 MMOL/L (ref 95–110)
CHOLEST SERPL-MCNC: 112 MG/DL (ref 120–199)
CHOLEST/HDLC SERPL: 3.3 {RATIO} (ref 2–5)
CO2 SERPL-SCNC: 27 MMOL/L (ref 23–29)
CREAT SERPL-MCNC: 1.4 MG/DL (ref 0.5–1.4)
EST. GFR  (AFRICAN AMERICAN): 53.3 ML/MIN/1.73 M^2
EST. GFR  (NON AFRICAN AMERICAN): 46.1 ML/MIN/1.73 M^2
GLUCOSE SERPL-MCNC: 268 MG/DL (ref 70–110)
HDLC SERPL-MCNC: 34 MG/DL (ref 40–75)
HDLC SERPL: 30.4 % (ref 20–50)
LDLC SERPL CALC-MCNC: 44 MG/DL (ref 63–159)
NONHDLC SERPL-MCNC: 78 MG/DL
POTASSIUM SERPL-SCNC: 4.3 MMOL/L (ref 3.5–5.1)
SODIUM SERPL-SCNC: 138 MMOL/L (ref 136–145)
TRIGL SERPL-MCNC: 170 MG/DL (ref 30–150)

## 2020-01-23 PROCEDURE — 36415 COLL VENOUS BLD VENIPUNCTURE: CPT | Mod: PO

## 2020-01-23 PROCEDURE — 1159F MED LIST DOCD IN RCRD: CPT | Mod: ,,, | Performed by: INTERNAL MEDICINE

## 2020-01-23 PROCEDURE — 1126F AMNT PAIN NOTED NONE PRSNT: CPT | Mod: ,,, | Performed by: INTERNAL MEDICINE

## 2020-01-23 PROCEDURE — 99214 OFFICE O/P EST MOD 30 MIN: CPT | Mod: PBBFAC,PO | Performed by: INTERNAL MEDICINE

## 2020-01-23 PROCEDURE — 99999 PR PBB SHADOW E&M-EST. PATIENT-LVL IV: CPT | Mod: PBBFAC,,, | Performed by: INTERNAL MEDICINE

## 2020-01-23 PROCEDURE — 1126F PR PAIN SEVERITY QUANTIFIED, NO PAIN PRESENT: ICD-10-PCS | Mod: ,,, | Performed by: INTERNAL MEDICINE

## 2020-01-23 PROCEDURE — 99214 OFFICE O/P EST MOD 30 MIN: CPT | Mod: S$PBB,,, | Performed by: INTERNAL MEDICINE

## 2020-01-23 PROCEDURE — 80061 LIPID PANEL: CPT

## 2020-01-23 PROCEDURE — 1159F PR MEDICATION LIST DOCUMENTED IN MEDICAL RECORD: ICD-10-PCS | Mod: ,,, | Performed by: INTERNAL MEDICINE

## 2020-01-23 PROCEDURE — 80048 BASIC METABOLIC PNL TOTAL CA: CPT

## 2020-01-23 PROCEDURE — 99214 PR OFFICE/OUTPT VISIT, EST, LEVL IV, 30-39 MIN: ICD-10-PCS | Mod: S$PBB,,, | Performed by: INTERNAL MEDICINE

## 2020-01-23 PROCEDURE — 83036 HEMOGLOBIN GLYCOSYLATED A1C: CPT

## 2020-01-23 PROCEDURE — 99999 PR PBB SHADOW E&M-EST. PATIENT-LVL IV: ICD-10-PCS | Mod: PBBFAC,,, | Performed by: INTERNAL MEDICINE

## 2020-01-23 RX ORDER — AMOXICILLIN 500 MG/1
CAPSULE ORAL
COMMUNITY
Start: 2020-01-08 | End: 2021-03-19

## 2020-01-23 NOTE — PROGRESS NOTES
Chief complaint: Follow-up on diabetes    84-year-old black male here to follow-up on diabetes although did  not have A1c prior. Reviewed all his labs and abnormalities.   his A1c was 7.4 in 2/19 then 6.6, 6.8  In 8/19. Eating poorly.  He has some chronic renal insufficiency. Seen renal .   He had a slight hemoglobin reduction which appears chronic and fluctuating clinical of last year or so with slight thrombocytopenia.  We discussed all those issues and the need to monitor them over time.  Is otherwise feeling well.       His blood pressure was actually running a little bit low and so back in September he stopped his 3 times per week hydrochlorothiazide.  He takes the Coreg 25 mg in the morning but he cut the evening to 1/2 a pill.  We discussed eliminating the half pill in the evening and monitoring blood pressure.  It may be more convenient than taking half pill.  He continues on the valsartan 320.      Counseled at length regarding all these age-related issues, monitoring and so forth.Total time over 25 minutes with over 50% counseling.  .     .  Reviewed that he had a normal colonoscopy 2017 outside the clinic.      We also reviewed his chronic renal failure.  His creatinine in December did go up to the highest it has been in years to 1.5.  Although slight increase we discussed and he is not using any anti-inflammatories, at that time he did not have any volume loss or dehydration.  We will reassess today.  Will check his lipids as well for the year.  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 EYES: no vision change. ENT: no sore throat. CV: no chest pain w/ exertion. RESP: no shortness of breath. GI: no nausea, vomiting, diarrhea. No dysphagia. : no urinary issues. MUSCULOSKELETAL: no new myalgias or arthralgias. SKIN: no new changes. NEURO: no focal deficits. PSYCH: no new issues. ENDOCRINE: no polyuria. HEME: no lymph nodes. ALLERGY: no general pruritis.       Past medical  history:  1.  Diabetes with renal disease and retinopathy  2.  Hypertension  3.  Hyperlipidemia  4.  Chronic renal failure stage III  5.  History of colon polyp, normal scope March 2012, normal 2017-- 5 yrs----GI at [facility]- Dr Kumari  6.  Hyperuricemia and gout  7.  History of sciatica and lumbar disc disease remotely  8.  Erectile dysfunction  9.  Macular degeneration, followed by outside retina specialist  10.  GERD  11.  Low HDL  12.  Inflammatory arthritis of the hands, seeing rheumatology  13.  Bilateral cervical radiculopathy and axonal neuropathy, to have surgery 2015    14.  Followed by outside urology at [facility] Dr. noriega   15.  TKA  -angie Han  who is at [facility]  16.  Prevnar given 2017  17. 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    Family history: Mother with hypertension and diabetes.  Father's history is unknown.  One brother who is okay.    Social history: Retired, quit smoking 1974.   with 3 children.  Drinks socially.    Vital signs as above  Gen: no distress    Bria was seen today for diabetes.    Diagnoses and all orders for this visit:    Diabetes mellitus with renal manifestations, uncontrolled, reassess, likely needs to improved diet    Uncontrolled type 2 diabetes mellitus with stage 3 chronic kidney disease, without long-term current use of insulin  -     Hemoglobin A1c; Future  -     Basic metabolic panel; Future    Essential hypertension, reduced medications and agree with that and continue to possibly reduce and eliminate the p.m. dose of carvedilol    Thrombocytopenia, chronic and stable    Stage 3 chronic kidney disease, reassess for worsening which can occur over time  -     Basic metabolic panel; Future    Pancytopenia    Uncontrolled secondary diabetes mellitus with stage 3 CKD (GFR 30-59)    Diabetes mellitus with proteinuric diabetic  "nephropathy    Diabetes mellitus with proteinuria    CKD stage 3 due to type 2 diabetes mellitus    Anemia, unspecified type    B-cell lymphoma of extranodal site    Hyperlipidemia, unspecified hyperlipidemia type  -     Lipid panel; Future    Diabetic retinopathy of both eyes with macular edema associated with type 2 diabetes mellitus, unspecified retinopathy severity    Rheumatoid arthritis, involving unspecified site, unspecified rheumatoid factor presence    Hypertensive kidney disease with stage 3 chronic kidney disease    Seronegative arthropathy of multiple sites    Other orders  -     SITagliptin (JANUVIA) 100 MG Tab; Take 1 tablet (100 mg total) by mouth once daily.           Clinical note will be sensitive so as to not cause confusion regarding evaluation and management issues"This note will not be shared with the patient."  "

## 2020-01-24 ENCOUNTER — PATIENT OUTREACH (OUTPATIENT)
Dept: OTHER | Facility: OTHER | Age: 85
End: 2020-01-24

## 2020-01-24 LAB
ESTIMATED AVG GLUCOSE: 146 MG/DL (ref 68–131)
HBA1C MFR BLD HPLC: 6.7 % (ref 4–5.6)

## 2020-01-27 ENCOUNTER — PATIENT OUTREACH (OUTPATIENT)
Dept: ADMINISTRATIVE | Facility: OTHER | Age: 85
End: 2020-01-27

## 2020-01-28 ENCOUNTER — OFFICE VISIT (OUTPATIENT)
Dept: PODIATRY | Facility: CLINIC | Age: 85
End: 2020-01-28
Payer: MEDICARE

## 2020-01-28 VITALS
BODY MASS INDEX: 28.73 KG/M2 | WEIGHT: 205.25 LBS | HEIGHT: 71 IN | SYSTOLIC BLOOD PRESSURE: 126 MMHG | DIASTOLIC BLOOD PRESSURE: 80 MMHG

## 2020-01-28 DIAGNOSIS — M20.5X2 HALLUX LIMITUS, ACQUIRED, LEFT: ICD-10-CM

## 2020-01-28 DIAGNOSIS — M20.42 HAMMER TOES OF BOTH FEET: ICD-10-CM

## 2020-01-28 DIAGNOSIS — M20.41 HAMMER TOES OF BOTH FEET: ICD-10-CM

## 2020-01-28 DIAGNOSIS — M20.5X1 HALLUX LIMITUS, ACQUIRED, RIGHT: ICD-10-CM

## 2020-01-28 DIAGNOSIS — E11.9 ENCOUNTER FOR DIABETIC FOOT EXAM: ICD-10-CM

## 2020-01-28 DIAGNOSIS — B35.1 DERMATOPHYTOSIS OF NAIL: ICD-10-CM

## 2020-01-28 DIAGNOSIS — E11.49 TYPE II DIABETES MELLITUS WITH NEUROLOGICAL MANIFESTATIONS: Primary | ICD-10-CM

## 2020-01-28 PROCEDURE — 99999 PR PBB SHADOW E&M-EST. PATIENT-LVL III: CPT | Mod: PBBFAC,,, | Performed by: PODIATRIST

## 2020-01-28 PROCEDURE — 99213 OFFICE O/P EST LOW 20 MIN: CPT | Mod: S$PBB,,, | Performed by: PODIATRIST

## 2020-01-28 PROCEDURE — 99213 PR OFFICE/OUTPT VISIT, EST, LEVL III, 20-29 MIN: ICD-10-PCS | Mod: S$PBB,,, | Performed by: PODIATRIST

## 2020-01-28 PROCEDURE — 99999 PR PBB SHADOW E&M-EST. PATIENT-LVL III: ICD-10-PCS | Mod: PBBFAC,,, | Performed by: PODIATRIST

## 2020-01-28 PROCEDURE — 1126F AMNT PAIN NOTED NONE PRSNT: CPT | Mod: ,,, | Performed by: PODIATRIST

## 2020-01-28 PROCEDURE — 99213 OFFICE O/P EST LOW 20 MIN: CPT | Mod: PBBFAC,PO | Performed by: PODIATRIST

## 2020-01-28 PROCEDURE — 1159F PR MEDICATION LIST DOCUMENTED IN MEDICAL RECORD: ICD-10-PCS | Mod: ,,, | Performed by: PODIATRIST

## 2020-01-28 PROCEDURE — 1126F PR PAIN SEVERITY QUANTIFIED, NO PAIN PRESENT: ICD-10-PCS | Mod: ,,, | Performed by: PODIATRIST

## 2020-01-28 PROCEDURE — 1159F MED LIST DOCD IN RCRD: CPT | Mod: ,,, | Performed by: PODIATRIST

## 2020-01-28 NOTE — PROGRESS NOTES
Subjective:      Patient ID: Bria Lawson is a 84 y.o. male.    Chief Complaint: Diabetes Mellitus (1/23/20 Dr Bragg PCP); Diabetic Foot Exam; and Nail Care    Bria is a 84 y.o. male who presents to the clinic for evaluation and treatment of high risk feet. 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  The patient has no major complaints with feet. Chief concern is how to care for feet as a diabetic.    This patient has documented high risk feet requiring routine maintenance secondary to diabetes mellitis and those secondary complications of diabetes, as mentioned..    PCP: Adiel Bragg MD    Date Last Seen by PCP:   Chief Complaint   Patient presents with    Diabetes Mellitus     1/23/20 Dr Bragg PCP    Diabetic Foot Exam    Nail Care     Current shoe gear:  Casual tennis shoes    Hemoglobin A1C   Date Value Ref Range Status   01/23/2020 6.7 (H) 4.0 - 5.6 % Final     Comment:     ADA Screening Guidelines:  5.7-6.4%  Consistent with prediabetes  >or=6.5%  Consistent with diabetes  High levels of fetal hemoglobin interfere with the HbA1C  assay. Heterozygous hemoglobin variants (HbS, HgC, etc)do  not significantly interfere with this assay.   However, presence of multiple variants may affect accuracy.     08/15/2019 6.8 (H) 4.0 - 5.6 % Final     Comment:     ADA Screening Guidelines:  5.7-6.4%  Consistent with prediabetes  >or=6.5%  Consistent with diabetes  High levels of fetal hemoglobin interfere with the HbA1C  assay. Heterozygous  hemoglobin variants (HbS, HgC, etc)do  not significantly interfere with this assay.   However, presence of multiple variants may affect accuracy.     04/22/2019 6.6 (H) 4.0 - 5.6 % Final     Comment:     ADA Screening Guidelines:  5.7-6.4%  Consistent with prediabetes  >or=6.5%  Consistent with diabetes  High levels of fetal hemoglobin interfere with the HbA1C  assay. Heterozygous hemoglobin variants (HbS, HgC, etc)do  not significantly interfere with this assay.   However, presence of multiple variants may affect accuracy.           Patient Active Problem List   Diagnosis    GERD (gastroesophageal reflux disease)    Gout, chronic    HTN (hypertension)    Diabetes mellitus with renal manifestations, uncontrolled    Hyperlipidemia    CKD stage 3 due to type 2 diabetes mellitus    HDL lipoprotein deficiency    Cervical radiculopathy, acute    CN (constipation)    Uncontrolled secondary diabetes mellitus with stage 3 CKD (GFR 30-59)    Seronegative arthropathy of multiple sites    Anemia    Gout of foot    CKD (chronic kidney disease), stage III    Primary osteoarthritis involving multiple joints    Diabetes mellitus with proteinuria    Diabetes mellitus with proteinuric diabetic nephropathy    Hypertensive CKD (chronic kidney disease)    Olecranon bursitis of left elbow    Leukopenia    Thrombocytopenia    Rheumatoid arthritis    B-cell lymphoma of extranodal site    Neutropenia    Refractive error    Pseudophakia       Current Outpatient Medications on File Prior to Visit   Medication Sig Dispense Refill    allopurinol (ZYLOPRIM) 100 MG tablet TAKE 1 TABLET(100 MG) BY MOUTH EVERY DAY 90 tablet 12    amLODIPine (NORVASC) 10 MG tablet TAKE 1 TABLET(10 MG) BY MOUTH EVERY DAY 90 tablet 12    amoxicillin (AMOXIL) 500 MG capsule       ascorbic acid (VITAMIN C) 1000 MG tablet Take 1,000 mg by mouth once daily.      aspirin 81 MG Chew CHEW AND SWALLOW 1 TABLET BY MOUTH DAILY 90 tablet 12     atorvastatin (LIPITOR) 20 MG tablet Take 1 tablet (20 mg total) by mouth once daily. 90 tablet 12    blood sugar diagnostic Strp 1 strip by Misc.(Non-Drug; Combo Route) route 2 (two) times daily. Disp glucometer and lancets as well 100 strip 12    carvedilol (COREG) 25 MG tablet Take 1 tablet (25 mg total) by mouth 2 (two) times daily with meals. 60 tablet 11    clobetasol (TEMOVATE) 0.05 % cream   2    folic acid (FOLVITE) 800 MCG Tab Take 800 mcg by mouth once daily.      gabapentin (NEURONTIN) 300 MG capsule TAKE 1 CAPSULE(300 MG) BY MOUTH THREE TIMES DAILY 270 capsule 3    glipiZIDE (GLUCOTROL) 10 MG TR24 TAKE 1 TABLET(10 MG) BY MOUTH EVERY DAY 90 tablet 0    mometasone 0.1% (ELOCON) 0.1 % cream APPLY TO AFFECTED AREA QD  1    multivit-min/FA/lycopen/lutein (CENTRUM SILVER MEN ORAL) Take by mouth.      niacin 500 MG Tab Take 100 mg by mouth every evening.      omeprazole (PRILOSEC) 40 MG capsule TAKE 1 CAPSULE DAILY 90 capsule 12    SITagliptin (JANUVIA) 100 MG Tab Take 1 tablet (100 mg total) by mouth once daily. 90 tablet 3    SOOLANTRA 1 % Crea       triamcinolone acetonide 0.1% (KENALOG) 0.1 % ointment       TRUEPLUS LANCETS 33 gauge Misc USE TO TEST BLOOD SUGAR BID  12    TURMERIC ROOT EXTRACT ORAL Take by mouth.      valsartan (DIOVAN) 320 MG tablet TAKE 1 TABLET ONCE DAILY 90 tablet 3    vitamin E 400 UNIT capsule Take 400 Units by mouth once daily.       No current facility-administered medications on file prior to visit.        Review of patient's allergies indicates:  No Known Allergies    Past Surgical History:   Procedure Laterality Date    BONE MARROW BIOPSY Left 9/19/2018    Procedure: Biopsy-bone marrow;  Surgeon: Velia Tobias MD;  [facility];  Service: Oncology;  Laterality: Left;    CATARACT EXTRACTION Bilateral 1996    EYE SURGERY      cataracts    JOINT REPLACEMENT      knee replacement    retinal injection s Bilateral     scrotal cyst         Family  History   Problem Relation Age of Onset    No Known Problems Mother     No Known Problems Father     No Known Problems Sister     No Known Problems Brother     No Known Problems Maternal Aunt     No Known Problems Maternal Uncle     No Known Problems Paternal Aunt     No Known Problems Paternal Uncle     No Known Problems Maternal Grandmother     No Known Problems Maternal Grandfather     No Known Problems Paternal Grandmother     No Known Problems Paternal Grandfather     Amblyopia Neg Hx     Blindness Neg Hx     Cancer Neg Hx     Cataracts Neg Hx     Diabetes Neg Hx     Glaucoma Neg Hx     Hypertension Neg Hx     Macular degeneration Neg Hx     Retinal detachment Neg Hx     Strabismus Neg Hx     Stroke Neg Hx     Thyroid disease Neg Hx        Social History     Socioeconomic History    Marital status:      Spouse name: Not on file    Number of children: Not on file    Years of education: Not on file    Highest education level: Not on file   Occupational History    Not on file   Social Needs    Financial resource strain: Not hard at all    Food insecurity:     Worry: Never true     Inability: Never true    Transportation needs:     Medical: No     Non-medical: No   Tobacco Use    Smoking status: Former Smoker    Smokeless tobacco: Never Used   Substance and Sexual Activity    Alcohol use: Yes     Alcohol/week: 0.0 standard drinks     Frequency: 2-4 times a month     Drinks per session: 1 or 2     Binge frequency: Never     Comment: Social    Drug use: No    Sexual activity: Not on file   Lifestyle    Physical activity:     Days per week: Not on file     Minutes per session: Not on file    Stress: Not on file   Relationships    Social connections:     Talks on phone: More than three times a week     Gets together: Once a week     Attends Mosque service: 1 to 4 times per year     Active member of club or organization: No     Attends meetings of clubs or organizations:  "Never     Relationship status:    Other Topics Concern    Not on file   Social History Narrative    Not on file           Review of Systems   Constitution: Negative for chills and fever.   Cardiovascular: Positive for leg swelling. Negative for claudication.   Respiratory: Negative for cough and shortness of breath.    Skin: Positive for dry skin, itching, nail changes and rash.        Lichen planus   Musculoskeletal: Positive for arthritis (RA), back pain, joint pain and myalgias. Negative for falls, joint swelling and muscle weakness.   Gastrointestinal: Negative for diarrhea, nausea and vomiting.   Neurological: Positive for paresthesias. Negative for numbness, tremors and weakness.   Psychiatric/Behavioral: Negative for altered mental status and hallucinations.           Objective:       Vitals:    01/28/20 1135   Weight: 93.1 kg (205 lb 4 oz)   Height: 5' 11" (1.803 m)   PainSc: 0-No pain       Physical Exam   Constitutional:  Non-toxic appearance. He does not have a sickly appearance. No distress.   Pt. is well-developed, well-nourished, appears stated age, in no acute distress, alert and oriented x 3. No evidence of depression, anxiety, or agitation. Calm, cooperative, and communicative. Appropriate interactions and affect.   Cardiovascular:   Pulses:       Dorsalis pedis pulses are 1+ on the right side, and 1+ on the left side.        Posterior tibial pulses are 1+ on the right side, and 1+ on the left side.    Dorsalis pedis and posterior tibial pulses are diminished Bilaterally. Toes are cool to touch. Feet are warm proximally.There is decreased digital hair. Skin is atrophic   Pulmonary/Chest: No respiratory distress.   Musculoskeletal:        Right ankle: No tenderness. No lateral malleolus, no medial malleolus, no AITFL, no CF ligament and no posterior TFL tenderness found. Achilles tendon exhibits no pain, no defect and normal Dahl's test results.        Left ankle: No tenderness. No " lateral malleolus, no medial malleolus, no AITFL, no CF ligament and no posterior TFL tenderness found. Achilles tendon exhibits no pain, no defect and normal Dahl's test results.        Right foot: There is no tenderness and no bony tenderness.        Left foot: There is no tenderness and no bony tenderness.   Fat pad atrophy to heels and met heads bilateral    Decreased stride, station of gait.  apropulsive toe off.  Increased angle and base of gait.    Patient has hammertoes of digits 2-5 bilateral partially reducible without symptom today.    Decreased first MPJ range of motion both weightbearing and nonweightbearing, no crepitus observed the first MP joint, + dorsal flag sign.Mild  bunion deformity is observed .     Lymphadenopathy:   No lymphatic streaking    Negative lymphadenopathy bilateral popliteal fossa and tarsal tunnel.     Neurological: A sensory deficit is present.   [place]by 5.07 monofilament is intact bilateral feet. Sharp/dull sensation is also intact Bilateral feet. Proprioception is grossly intact.     Decreased/absent vibratory sensation bilateral feet to 128Hz tuning fork.    Paresthesias, and hyperesthesia bilateral feet with no clearly identified trigger or source.           Skin: Skin is warm, dry and intact. Lesion noted. No abrasion, no bruising, no burn, no ecchymosis and no rash noted. He is not diaphoretic. No cyanosis. No pallor. Nails show no clubbing.    Skin is thin, atrophic    Toenails 1-2 bilaterally are elongated by  discolored/yellowed, dystrophic, brittle with subungual debris.     Diffuse flat topped violaceous lesions to the upper and lower extremity   Psychiatric: His mood appears not anxious. His affect is not inappropriate. His speech is not slurred. He is not combative. He is communicative. He is attentive.   Nursing note reviewed.          Assessment:       Encounter Diagnoses   Name Primary?    Type II diabetes mellitus with neurological manifestations  Yes    Encounter for diabetic foot exam     Dermatophytosis of nail     Hammer toes of both feet     Hallux limitus, acquired, left     Hallux limitus, acquired, right          Plan:       Bria was seen today for diabetes mellitus, diabetic foot exam and nail care.    Diagnoses and all orders for this visit:    Type II diabetes mellitus with neurological manifestations    Encounter for diabetic foot exam    Dermatophytosis of nail    Hammer toes of both feet    Hallux limitus, acquired, left    Hallux limitus, acquired, right      I counseled the patient on his conditions, their implications and medical management.      Greater than 50% of this visit spent on counseling and coordination of care.    Education about the diabetic foot, neuropathy, and prevention of limb loss.    Shoe inspection. Diabetic Foot Education. Patient reminded of the importance of good nutrition and blood sugar control to help prevent podiatric complications of diabetes. Patient instructed on proper foot hygeine. We discussed wearing proper shoe gear, daily foot inspections, never walking without protective shoe gear, never putting sharp instruments to feet.      LP being managed by dermatologist    Informed patient that many nail problems can be prevented by wearing the right shoes and trimming your nails properly.   The right shoes: Feet were measured.  Patient is to wear shoes that are supportive and roomy enough for toes to wiggle. Look for shoes made of natural materials such as leather, which allow  feet to breathe.   Proper trimming: To avoid problems, she was instructed to trim toenails straight across without cutting down into the corners.       He will continue to monitor the areas daily, inspect his feet, wear protective shoe gear when ambulatory, moisturizer to maintain skin integrity and follow in this office in approximately 6-12 months, sooner if he wishes to have nail procedure

## 2020-01-28 NOTE — PATIENT INSTRUCTIONS
Recommend lotions: eucerin, eucerin for diabetics, aquaphor, A&D ointment, gold bond for diabetics, sween, [place]'s Bees all purpose baby ointment,  urea 40 with aloe (found on amazon.com)    Shoe recommendations: (try 6pm.com, zappos.com , [email or web address].Offline Media, or shoes.Offline Media for discounted prices) you can visit DSW shoes in [place]  or [employer] in the [facility] (there are also several shoe brand outlets in the [facility])    Asics (GT 2000 or gel foundations), new balance stability type shoes, saucony (stabil c3),  Warren (GTS or Beast or transcend), propet (tennis shoe)    Sofft Brand or axxiom (women) Kelli&Dorian (men), clarks, crocs, aerosoles, naturalizers, SAS, ecco, born, fran qunionez, rockports (dress shoes)    Vionic, burkenstocks, fitflops, propet (sandals)  Nike comfort thong sandals, crocs, propet (house shoes)    Nail Home remedy:  Vicks Vapor rub to nails for easier manageability    Diabetes: Inspecting Your Feet  Diabetes increases your chances of developing foot problems. So inspect your feet every day. This helps you find small skin irritations before they become serious infections. If you have trouble seeing the bottoms of your feet, use a mirror or ask a family member or friend to help.     Pressure spots on the bottom of the foot are common areas where problems develop.   How to check your feet  Below are tips to help you look for foot problems. Try to check your feet at the same time each day, such as when you get out of bed in the morning:  · Check the top of each foot. The tops of toes, back of the heel, and outer edge of the foot can get a lot of rubbing from poor-fitting shoes.  · Check the bottom of each foot. Daily wear and tear often leads to problems at pressure spots.  · Check the toes and nails. Fungal infections often occur between toes. Toenail problems can also be a sign of fungal infections or lead to breaks in the skin.  · Check your shoes, too. Loose objects inside a shoe can  injure the foot. Use your hand to feel inside your shoes for things like lane, loose stitching, or rough areas that could irritate your skin.  Warning signs  Look for any color changes in the foot. Redness with streaks can signal a severe infection, which needs immediate medical attention. Tell your doctor right away if you have any of these problems:  · Swelling, sometimes with color changes, may be a sign of poor blood flow or infection. Symptoms include tenderness and an increase in the size of your foot.  · Warm or hot areas on your feet may be signs of infection. A foot that is cold may not be getting enough blood.  · Sensations such as burning, tingling, or pins and needles can be signs of a problem. Also check for areas that may be numb.  · Hot spots are caused by friction or pressure. Look for hot spots in areas that get a lot of rubbing. Hot spots can turn into blisters, calluses, or sores.  · Cracks and sores are caused by dry or irritated skin. They are a sign that the skin is breaking down, which can lead to infection.  · Toenail problems to watch for include nails growing into the skin (ingrown toenail) and causing redness or pain. Thick, yellow, or discolored nails can signal a fungal infection.  · Drainage and odor can develop from untreated sores and ulcers. Call your doctor right away if you notice white or yellow drainage, bleeding, or unpleasant odor.   © 2578-5288 [employer]. [address] [place] [address]. All rights reserved. This information is not intended as a substitute for professional medical care. Always follow your healthcare professional's instructions.        Step-by-Step:  Inspecting Your Feet (Diabetes)    Date Last Reviewed: 10/1/2016  © 6065-5450 [employer]. [address] [place] [address]. All rights reserved. This information is not intended as a substitute for professional medical care. Always follow your healthcare  professional's instructions.

## 2020-01-31 ENCOUNTER — EXTERNAL CHRONIC CARE MANAGEMENT (OUTPATIENT)
Dept: PRIMARY CARE CLINIC | Facility: CLINIC | Age: 85
End: 2020-01-31
Payer: MEDICARE

## 2020-01-31 PROCEDURE — 99490 PR CHRONIC CARE MGMT, 1ST 20 MIN: ICD-10-PCS | Mod: S$PBB,,, | Performed by: INTERNAL MEDICINE

## 2020-01-31 PROCEDURE — 99490 CHRNC CARE MGMT STAFF 1ST 20: CPT | Mod: S$PBB,,, | Performed by: INTERNAL MEDICINE

## 2020-01-31 PROCEDURE — 99490 CHRNC CARE MGMT STAFF 1ST 20: CPT | Mod: PBBFAC,PO | Performed by: INTERNAL MEDICINE

## 2020-02-29 ENCOUNTER — EXTERNAL CHRONIC CARE MANAGEMENT (OUTPATIENT)
Dept: PRIMARY CARE CLINIC | Facility: CLINIC | Age: 85
End: 2020-02-29
Payer: MEDICARE

## 2020-02-29 PROCEDURE — 99490 CHRNC CARE MGMT STAFF 1ST 20: CPT | Mod: PBBFAC,PO | Performed by: INTERNAL MEDICINE

## 2020-02-29 PROCEDURE — 99490 PR CHRONIC CARE MGMT, 1ST 20 MIN: ICD-10-PCS | Mod: S$PBB,,, | Performed by: INTERNAL MEDICINE

## 2020-02-29 PROCEDURE — 99490 CHRNC CARE MGMT STAFF 1ST 20: CPT | Mod: S$PBB,,, | Performed by: INTERNAL MEDICINE

## 2020-03-05 ENCOUNTER — PATIENT OUTREACH (OUTPATIENT)
Dept: OTHER | Facility: OTHER | Age: 85
End: 2020-03-05

## 2020-03-05 NOTE — PROGRESS NOTES
"Digital Medicine: Health  Follow-Up    The history is provided by the patient.     Follow Up  Follow-up reason(s): routine education      Routine Education Topics: eating patterns and meal planning        INTERVENTION(S)  recommended diet modifications and encouragement/support    PLAN  patient verbalizes understanding, patient amenable to changes and continue monitoring      There are no preventive care reminders to display for this patient.    Last 5 Patient Entered Readings                                      Current 30 Day Average: 133/61     Recent Readings 3/4/2020 3/4/2020 2/28/2020 2/28/2020 2/20/2020    SBP (mmHg) 134 140 136 138 131    DBP (mmHg) 62 63 59 66 62    Pulse 50 50 51 52 52        Last 6 Patient Entered Readings                                          Most Recent A1c: 6.7% on 1/23/2020  (Goal: 7%)     Recent Readings 3/5/2020 3/4/2020 3/3/2020 3/2/2020 3/1/2020    Blood Glucose (mg/dL) 112 114 100 124 108                    Diet Screening       Cut out "junk between meals" for Lent and he has noticed improvements in blood sugar. He is looking for healthier snacks - discussed some options such as yogurt, peanut butter and low sodium crackers, nuts. Agreeable to receive list of healthy snacks, will send via email.     Intervention(s): meal planning and increasing snacking (diabetic)    Physical Activity Screening   When asked if exercising, patient responded: yes    Patient participates in the following activities: yard/housework    Keeping busy with house/yardwork project. Today he was putting in LED lights in the attic, still working on shed project with his son, and preparing to do more gardening for spring.      SDOH  "

## 2020-03-09 ENCOUNTER — LAB VISIT (OUTPATIENT)
Dept: LAB | Facility: HOSPITAL | Age: 85
End: 2020-03-09
Attending: INTERNAL MEDICINE
Payer: MEDICARE

## 2020-03-09 DIAGNOSIS — N18.30 CHRONIC KIDNEY DISEASE, STAGE III (MODERATE): Primary | ICD-10-CM

## 2020-03-09 DIAGNOSIS — M06.9 RHEUMATOID ARTHRITIS, INVOLVING UNSPECIFIED SITE, UNSPECIFIED RHEUMATOID FACTOR PRESENCE: ICD-10-CM

## 2020-03-09 DIAGNOSIS — D69.6 THROMBOCYTOPENIA: ICD-10-CM

## 2020-03-09 DIAGNOSIS — C85.19 B-CELL LYMPHOMA OF EXTRANODAL SITE: ICD-10-CM

## 2020-03-09 LAB
ALBUMIN SERPL BCP-MCNC: 4 G/DL (ref 3.5–5.2)
ALP SERPL-CCNC: 51 U/L (ref 55–135)
ALT SERPL W/O P-5'-P-CCNC: 16 U/L (ref 10–44)
ANION GAP SERPL CALC-SCNC: 8 MMOL/L (ref 8–16)
AST SERPL-CCNC: 16 U/L (ref 10–40)
BASOPHILS # BLD AUTO: 0.02 K/UL (ref 0–0.2)
BASOPHILS NFR BLD: 0.3 % (ref 0–1.9)
BILIRUB SERPL-MCNC: 1 MG/DL (ref 0.1–1)
BUN SERPL-MCNC: 21 MG/DL (ref 8–23)
CALCIUM SERPL-MCNC: 8.9 MG/DL (ref 8.7–10.5)
CHLORIDE SERPL-SCNC: 108 MMOL/L (ref 95–110)
CO2 SERPL-SCNC: 26 MMOL/L (ref 23–29)
CREAT SERPL-MCNC: 1.4 MG/DL (ref 0.5–1.4)
DIFFERENTIAL METHOD: ABNORMAL
EOSINOPHIL # BLD AUTO: 0.1 K/UL (ref 0–0.5)
EOSINOPHIL NFR BLD: 1.3 % (ref 0–8)
ERYTHROCYTE [DISTWIDTH] IN BLOOD BY AUTOMATED COUNT: 12.5 % (ref 11.5–14.5)
EST. GFR  (AFRICAN AMERICAN): 53 ML/MIN/1.73 M^2
EST. GFR  (NON AFRICAN AMERICAN): 46 ML/MIN/1.73 M^2
GLUCOSE SERPL-MCNC: 204 MG/DL (ref 70–110)
HCT VFR BLD AUTO: 38.5 % (ref 40–54)
HGB BLD-MCNC: 12.2 G/DL (ref 14–18)
IMM GRANULOCYTES # BLD AUTO: 0.05 K/UL (ref 0–0.04)
IMM GRANULOCYTES NFR BLD AUTO: 0.7 % (ref 0–0.5)
LYMPHOCYTES # BLD AUTO: 1 K/UL (ref 1–4.8)
LYMPHOCYTES NFR BLD: 12.9 % (ref 18–48)
MCH RBC QN AUTO: 29.6 PG (ref 27–31)
MCHC RBC AUTO-ENTMCNC: 31.7 G/DL (ref 32–36)
MCV RBC AUTO: 93 FL (ref 82–98)
MONOCYTES # BLD AUTO: 1.5 K/UL (ref 0.3–1)
MONOCYTES NFR BLD: 19.9 % (ref 4–15)
NEUTROPHILS # BLD AUTO: 4.8 K/UL (ref 1.8–7.7)
NEUTROPHILS NFR BLD: 64.9 % (ref 38–73)
NRBC BLD-RTO: 0 /100 WBC
PLATELET # BLD AUTO: 59 K/UL (ref 150–350)
PMV BLD AUTO: 12.9 FL (ref 9.2–12.9)
POTASSIUM SERPL-SCNC: 4.1 MMOL/L (ref 3.5–5.1)
PROT SERPL-MCNC: 7.1 G/DL (ref 6–8.4)
RBC # BLD AUTO: 4.12 M/UL (ref 4.6–6.2)
SODIUM SERPL-SCNC: 142 MMOL/L (ref 136–145)
WBC # BLD AUTO: 7.42 K/UL (ref 3.9–12.7)

## 2020-03-09 PROCEDURE — 36415 COLL VENOUS BLD VENIPUNCTURE: CPT

## 2020-03-09 PROCEDURE — 80053 COMPREHEN METABOLIC PANEL: CPT

## 2020-03-09 PROCEDURE — 85025 COMPLETE CBC W/AUTO DIFF WBC: CPT

## 2020-03-10 NOTE — PROGRESS NOTES
Chief Complaint :   Low  Grade Lymphoma involving Bone Marrow.    Hx of Present illness :  Patient is a 84 y.o. year old male who presents to the clinic today for   Oncology Followup. Evaluated for Thrombocytopenia. 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   Has completed one four week ciourse of rituxan Monotherapy.    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    Transfusion :  None    Menstrual & obstetric Hx :  N/A        Present Meds :   Medication List with Changes/Refills   Current Medications    ALLOPURINOL (ZYLOPRIM) 100 MG TABLET    TAKE 1 TABLET(100 MG) BY MOUTH EVERY DAY    AMLODIPINE (NORVASC) 10 MG TABLET    TAKE 1 TABLET(10 MG) BY MOUTH EVERY DAY    AMOXICILLIN (AMOXIL) 500 MG CAPSULE        ASCORBIC ACID (VITAMIN C) 1000 MG TABLET    Take 1,000 mg by mouth once daily.    ASPIRIN 81 MG CHEW    CHEW AND SWALLOW 1 TABLET BY MOUTH DAILY    ATORVASTATIN (LIPITOR) 20 MG TABLET    Take 1 tablet (20 mg total) by mouth once daily.    BLOOD SUGAR DIAGNOSTIC STRP    1 strip by Misc.(Non-Drug; Combo Route) route 2 (two) times daily. Disp glucometer and lancets as well    CARVEDILOL (COREG) 25 MG TABLET    Take 1 tablet (25 mg total) by mouth 2 (two) times daily with meals.    CLOBETASOL (TEMOVATE) 0.05 % CREAM        FOLIC ACID (FOLVITE) 800 MCG TAB    Take 800 mcg by mouth once daily.    GABAPENTIN (NEURONTIN) 300 MG CAPSULE    TAKE 1 CAPSULE(300 MG) BY MOUTH THREE TIMES DAILY    GLIPIZIDE (GLUCOTROL) 10 MG TR24    TAKE 1 TABLET(10 MG) BY MOUTH EVERY DAY    MOMETASONE 0.1% (ELOCON) 0.1 % CREAM    APPLY TO AFFECTED AREA QD    MULTIVIT-MIN/FA/LYCOPEN/LUTEIN (CENTRUM SILVER MEN ORAL)    Take by mouth.    NIACIN 500 MG TAB    Take 100 mg by mouth every evening.    OMEPRAZOLE (PRILOSEC) 40 MG CAPSULE    TAKE 1 CAPSULE DAILY    SITAGLIPTIN (JANUVIA) 100 MG TAB    Take 1 tablet (100 mg total) by mouth once daily.    SOOLANTRA 1 % CREA         TRIAMCINOLONE ACETONIDE 0.1% (KENALOG) 0.1 % OINTMENT        TRUEPLUS LANCETS 33 GAUGE MISC    USE TO TEST BLOOD SUGAR BID    TURMERIC ROOT EXTRACT ORAL    Take by mouth.    VALSARTAN (DIOVAN) 320 MG TABLET    TAKE 1 TABLET ONCE DAILY    VITAMIN E 400 UNIT CAPSULE    Take 400 Units by mouth once daily.       Past Medical Hx :   Hypertension; DM; Hyperlipidemia; Chronic atrial fibrillation; Macular degeneration, Lichen planus..CKD 3; CAD.GERD; Peripheral Neuropathy.  Known to have Rheumatoid arthritis, on no Rx  No Hx of TB, , Lupus, sarcoid, Seizure disorder or CVA. No Hx of DVT or PE./ No past Hx of cancer, chemotherapy or radiation therapy.    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    Immunization :  Immunization History   Administered Date(s) Administered    Influenza 10/19/2018    Influenza - High Dose - PF (65 years and older) 11/03/2017, 10/19/2018, 10/11/2019    Pneumococcal Conjugate - 13 Valent 06/27/2017    Td (ADULT) 11/18/2005       Family Hx :  Family History   Problem Relation Age of Onset    No Known Problems Mother     No Known Problems Father     No Known Problems Sister     No Known Problems Brother     No Known Problems Maternal Aunt     No Known Problems Maternal Uncle     No Known Problems Paternal Aunt     No Known Problems Paternal Uncle     No Known Problems Maternal Grandmother     No Known  Problems Maternal Grandfather     No Known Problems Paternal Grandmother     No Known Problems Paternal Grandfather     Amblyopia Neg Hx     Blindness Neg Hx     Cancer Neg Hx     Cataracts Neg Hx     Diabetes Neg Hx     Glaucoma Neg Hx     Hypertension Neg Hx     Macular degeneration Neg Hx     Retinal detachment Neg Hx     Strabismus Neg Hx     Stroke Neg Hx     Thyroid disease Neg Hx        Social Hx :  Social History     Socioeconomic History    Marital status:      Spouse name: Not on file    Number of children: Not on file    Years of education: Not on file    Highest education level: Not on file   Occupational History    Not on file   Social Needs    Financial resource strain: Not hard at all    Food insecurity:     Worry: Never true     Inability: Never true    Transportation needs:     Medical: No     Non-medical: No   Tobacco Use    Smoking status: Former Smoker    Smokeless tobacco: Never Used   Substance and Sexual Activity    Alcohol use: Yes     Alcohol/week: 0.0 standard drinks     Frequency: 2-4 times a month     Drinks per session: 1 or 2     Binge frequency: Never     Comment: Social    Drug use: No    Sexual activity: Not on file   Lifestyle    Physical activity:     Days per week: Not on file     Minutes per session: Not on file    Stress: Not on file   Relationships    Social connections:     Talks on phone: More than three times a week     Gets together: Once a week     Attends Nondenominational service: 1 to 4 times per year     Active member of club or organization: No     Attends meetings of clubs or organizations: Never     Relationship status:    Other Topics Concern    Not on file   Social History Narrative    Not on file       Surgery :  Bilateral Knee replacement; Bilateral Cataract surgery.    Symptoms :   No new Sx.    Review of Systems   Constitutional: Negative for chills, diaphoresis, fever, malaise/fatigue and weight loss.        . No fever,  pruritis,  or evening rise in temp.  When humidity is high has night sweats.   HENT: Positive for congestion. Negative for hearing loss, nosebleeds, sore throat and tinnitus.    Eyes: Negative for blurred vision, double vision and photophobia.        Wears glasses   Respiratory: Negative for cough, hemoptysis, shortness of breath and wheezing.    Cardiovascular: Negative.  Negative for chest pain, palpitations, orthopnea, claudication, leg swelling and PND.   Gastrointestinal: Positive for constipation. Negative for abdominal pain, blood in stool, diarrhea, heartburn, nausea and vomiting.   Genitourinary: Negative.  Negative for dysuria, flank pain, frequency, hematuria and urgency.   Musculoskeletal: Negative.  Negative for back pain, falls, joint pain, myalgias and neck pain.   Skin: Positive for rash. Negative for itching.        Saw Dermatologist for Rosacea few weeks ago.   Neurological: Negative for dizziness, tingling, sensory change and headaches.   Endo/Heme/Allergies: Negative for environmental allergies. Does not bruise/bleed easily.   Psychiatric/Behavioral: Negative for depression and memory loss. The patient is not nervous/anxious and does not have insomnia.        Physical Exam :  Looks younger than the stated age.  Physical Exam   Constitutional: He is oriented to person, place, and time and well-developed, well-nourished, and in no distress. Vital signs are normal. No distress.   HENT:   Head: Normocephalic and atraumatic.   Right Ear: External ear normal.   Left Ear: External ear normal.   Nose: Nose normal.   Mouth/Throat: Oropharynx is clear and moist. No oropharyngeal exudate.   Eyes: Pupils are equal, round, and reactive to light. Conjunctivae, EOM and lids are normal. Lids are everted and swept, no foreign bodies found. Right eye exhibits no discharge. Left eye exhibits no discharge. No scleral icterus.       Neck: Trachea normal, normal range of motion, full passive range of motion without  pain and phonation normal. Neck supple. Normal carotid pulses, no hepatojugular reflux and no JVD present. No tracheal tenderness present. Carotid bruit is not present. No tracheal deviation present. No thyroid mass and no thyromegaly present.   Cardiovascular: Normal rate, regular rhythm, normal heart sounds, intact distal pulses and normal pulses. PMI is not displaced. Exam reveals no gallop and no friction rub.   No murmur heard.  Pulmonary/Chest: Effort normal and breath sounds normal. No stridor. No apnea. No respiratory distress. He has no wheezes. He has no rales. He exhibits no tenderness.   Abdominal: Soft. Normal appearance, normal aorta and bowel sounds are normal. He exhibits no distension, no ascites and no mass. There is no hepatosplenomegaly. There is no tenderness. There is no rebound, no guarding and no CVA tenderness. No hernia.   Genitourinary:   Genitourinary Comments: Not examined   Musculoskeletal: Normal range of motion. He exhibits no edema, tenderness or deformity.   Lymphadenopathy:        Head (right side): No submental, no submandibular, no tonsillar, no preauricular, no posterior auricular and no occipital adenopathy present.        Head (left side): No submental, no submandibular, no tonsillar, no preauricular, no posterior auricular and no occipital adenopathy present.     He has no cervical adenopathy.     He has no axillary adenopathy.        Right: No inguinal, no supraclavicular and no epitrochlear adenopathy present.        Left: No inguinal, no supraclavicular and no epitrochlear adenopathy present.   Neurological: He is alert and oriented to person, place, and time. He has normal motor skills, normal sensation, normal strength, normal reflexes and intact cranial nerves. No cranial nerve deficit. He exhibits normal muscle tone. Gait normal. Coordination normal. GCS score is 15.   Skin: Skin is warm, dry and intact. No rash noted. He is not diaphoretic. No cyanosis or erythema. No  pallor. Nails show no clubbing.   Psychiatric: Mood, memory, affect and judgment normal.   No New finding.     Labs & Imaging :  03/09/2020 : NFBS 204;  Cr 1.4; Ca 8.9; Bili  1.0; Liver enzymes normal. eGFR 46; Hgb 12.2; Hct 38.5; MCV 93;  Platelets 59,000. ANC 4,800.      Dx :  .   Chronic  thrombocytopenia  Low grade  Lymphoma Involving  Bone Marrow, Incidental finding.  Poorly Controlled DM.      Assessment & Plan : Reviewed with patient .  . Blood sugar followup with PCP.   Continue Monitoring. RTC 3 months with CBC,CMP. LDH. Patient understands and verbalised.         Advance Care Planning     Power of   I initiated the process of advance care planning today and explained the importance of this process to the patient.  I introduced the concept of advance directives to the patient, as well. Then the patient received detailed information about the importance of designating a Health Care Power of  (HCPOA). He was also instructed to communicate with this person about their wishes for future healthcare, should he become sick and lose decision-making capacity. The patient  Has/has not:previously appointed a HCPOA. After our discussion, the patient Has decided to  Talk to hs wife         Living Will  During this visit, I engaged the patient in the advance care planning process.  The patient and I reviewed the role for advance directives and their purpose in directing future healthcare if the patient's unable to speak for him/herself.  At this point in time, the patient does have full decision-making capacity.  We discussed different extreme health states that he could experience, and reviewed what kind of medical care he would want in those situations.  The patient communicated that if he were comatose and had little chance of a meaningful recovery, he  Does not want machines/life-sustaining treatments used.    The patient HAS NOT completed a living will to reflect these preferences.  The patient   HAS  NOT already designated a healthcare power of  to make decisions on his behalf.         Papers given to patient.

## 2020-03-11 ENCOUNTER — OFFICE VISIT (OUTPATIENT)
Dept: HEMATOLOGY/ONCOLOGY | Facility: CLINIC | Age: 85
End: 2020-03-11
Payer: MEDICARE

## 2020-03-11 VITALS
TEMPERATURE: 99 F | HEIGHT: 71 IN | HEART RATE: 54 BPM | OXYGEN SATURATION: 98 % | WEIGHT: 201.06 LBS | BODY MASS INDEX: 28.15 KG/M2 | SYSTOLIC BLOOD PRESSURE: 159 MMHG | DIASTOLIC BLOOD PRESSURE: 74 MMHG

## 2020-03-11 DIAGNOSIS — C85.19 B-CELL LYMPHOMA OF EXTRANODAL SITE: Primary | ICD-10-CM

## 2020-03-11 DIAGNOSIS — M06.9 RHEUMATOID ARTHRITIS, INVOLVING UNSPECIFIED SITE, UNSPECIFIED RHEUMATOID FACTOR PRESENCE: ICD-10-CM

## 2020-03-11 DIAGNOSIS — D69.6 THROMBOCYTOPENIA: ICD-10-CM

## 2020-03-11 PROCEDURE — 99999 PR PBB SHADOW E&M-EST. PATIENT-LVL III: ICD-10-PCS | Mod: PBBFAC,,, | Performed by: INTERNAL MEDICINE

## 2020-03-11 PROCEDURE — 99999 PR PBB SHADOW E&M-EST. PATIENT-LVL III: CPT | Mod: PBBFAC,,, | Performed by: INTERNAL MEDICINE

## 2020-03-11 PROCEDURE — 99213 OFFICE O/P EST LOW 20 MIN: CPT | Mod: PBBFAC | Performed by: INTERNAL MEDICINE

## 2020-03-11 PROCEDURE — 99213 OFFICE O/P EST LOW 20 MIN: CPT | Mod: S$PBB,,, | Performed by: INTERNAL MEDICINE

## 2020-03-11 PROCEDURE — 99213 PR OFFICE/OUTPT VISIT, EST, LEVL III, 20-29 MIN: ICD-10-PCS | Mod: S$PBB,,, | Performed by: INTERNAL MEDICINE

## 2020-03-16 ENCOUNTER — TELEPHONE (OUTPATIENT)
Dept: NEPHROLOGY | Facility: CLINIC | Age: 85
End: 2020-03-16

## 2020-03-18 ENCOUNTER — PATIENT OUTREACH (OUTPATIENT)
Dept: ADMINISTRATIVE | Facility: OTHER | Age: 85
End: 2020-03-18

## 2020-03-19 ENCOUNTER — PATIENT OUTREACH (OUTPATIENT)
Dept: OTHER | Facility: OTHER | Age: 85
End: 2020-03-19

## 2020-03-19 DIAGNOSIS — I10 ESSENTIAL HYPERTENSION: ICD-10-CM

## 2020-03-19 DIAGNOSIS — E11.21 DIABETES MELLITUS WITH PROTEINURIC DIABETIC NEPHROPATHY: Primary | ICD-10-CM

## 2020-03-19 RX ORDER — CARVEDILOL 25 MG/1
TABLET ORAL
Qty: 60 TABLET | Refills: 2 | Status: SHIPPED | OUTPATIENT
Start: 2020-03-19 | End: 2020-05-19

## 2020-03-19 NOTE — PROGRESS NOTES
Digital Medicine: Clinician Introduction    Bria Lawson is a 84 y.o. male who is newly enrolled in the Digital Medicine Clinic.    The following information was reviewed and updated:  Preferred pharmacy   [id number] - [place], [place] - [address] AT [facility] [address]  [address]  [place] [address]  Phone: [phone] Fax: [phone]    [facility] - [place], [place] - [address] AT [facility]  [address]  [facility] [address]  Phone: [phone] Fax: [phone]      Patient prefers a 90 days supply.     Review of patient's allergies indicates:  No Known Allergies    Completed initial call with Bria Lawson for Hypertension and Diabetes Digital Medicine program.     PMH: Patient Active Problem List:     GERD (gastroesophageal reflux disease)     Gout, chronic     HTN (hypertension)     Diabetes mellitus with renal manifestations, uncontrolled     Hyperlipidemia     CKD stage 3 due to type 2 diabetes mellitus     HDL lipoprotein deficiency     Cervical radiculopathy, acute     CN (constipation)     Uncontrolled secondary diabetes mellitus with stage 3 CKD (GFR 30-59)     Seronegative arthropathy of multiple sites     Anemia     Gout of foot     CKD (chronic kidney disease), stage III     Primary osteoarthritis involving multiple joints     Diabetes mellitus with proteinuria     Diabetes mellitus with proteinuric diabetic nephropathy     Hypertensive CKD (chronic kidney disease)     Olecranon bursitis of left elbow     Leukopenia     Thrombocytopenia     Rheumatoid arthritis     B-cell lymphoma of extranodal site     Neutropenia     Refractive error     Pseudophakia     Verified drug allergies. Reconciled medication regimen.     Reviewed program goals and monitoring frequency. Explained that we expect patient to obtain several blood pressures per week at random times of the day. Confirmed appropriate BP  monitoring technique.     Reviewed signs and symptoms of hypertension (HA, CP, SOB, etc.) and hypotension (dizziness, weakness, fatigue, etc).     Patient's BP average is 133/62 which is slightly above goal of <130/80 per 2017 ACC/AHA HTN guidelines.     Patient denies hypoglycemic s/sx (dizziness, extreme hunger, headaches, confusion, trouble concentrating, sweating, shaking, blurred vision, personality changes); patient denies hyperglycemic s/sx (increased thirst, increased urination, headaches, trouble concentrating, blurred vision, fatigue).    Reviewed recent readings. Per ADA guidelines (goal A1C < 7%), DM is well controlled.    Patient states he was working in the yard today and developed a rash from ankles to buttocks. He was able to receive a steroidal injection. Discussed expected response to injection on patient's BG and BP. He is understanding and will monitor.      Patient also received Rituxan recently - discussed potential affect on BP increasing post injection and possibility of this medication to cause peripheral edema (like amlodipine).     The history is provided by the patient.     HYPERTENSION  Our goal is to get BP to consistently below 130/80mmHg and make the process convenient so patient can avoid extra trips to the office. Getting your blood pressure below 130/80mmHg (definition of control) will reduce your risk for heart attack, kidney failure, stroke and death (as well as kidney failure, eye disease, & dementia)      Reviewed non-pharmacologic therapies and impact on BP      Explained that we expect patient to obtain several blood pressures per week at random times of day.  Instructed patient not to allow anyone else to use phone and monitoring device.  Confirmed appropriate BP monitoring technique.      Explained to patient that the digital medicine team is not available for emergencies.  Patient will call [facility] on-call ([phone] or [phone]) or [phone] if needed.    Patient's BP  goal is 130/80. Patients BP average is 133/62 mmHg, which is above goal, per 2017 ACC/AHA Hypertension Guidelines.    Allergies reviewed.    DIABETES    Explained to patient that the digital medicine team is not available for emergencies.  Patient will call [facility] on-call ([phone] or [phone]) or [phone] if needed.      Patient's A1C goals is less than or equal to 7. Patient's most recent A1C result is at or below goal. Lab Results     Component                Value               Date                     HGBA1C                   6.7 (H)             01/23/2020             Allergies reviewed.        Last 5 Patient Entered Readings                                      Current 30 Day Average: 133/62     Recent Readings 3/18/2020 3/18/2020 3/11/2020 3/11/2020 3/10/2020    SBP (mmHg) 132 147 141 147 143    DBP (mmHg) 61 68 63 68 62    Pulse 48 49 52 53 49        Last 6 Patient Entered Readings                                          Most Recent A1c: 6.7% on 1/23/2020  (Goal: 7%)     Recent Readings 3/19/2020 3/18/2020 3/17/2020 3/16/2020 3/15/2020    Blood Glucose (mg/dL) 152 185 155 180 135          INTERVENTION(S)  reviewed appropriate dose schedule, recommended physical activity, reviewed monitoring technique and encouragement/support    PLAN  patient verbalizes understanding, Health  follow up and continue monitoring    Continue current medication regimen. If further BG control is needed, consider a GLP-1 due to renal function and age limiting options.   Patient encouraged to continue with physical activity and monitor dietary intake.   I will continue to monitor regularly and will follow-up in 2-3 months.     Patient has my contact information and knows to call with any concerns or clinical changes.        There are no preventive care reminders to display for this patient.    Current Medication Regimen:  Hypertension Medications             amLODIPine (NORVASC) 10 MG tablet TAKE 1 TABLET(10 MG) BY  MOUTH EVERY DAY    carvediloL (COREG) 25 MG tablet TAKE 1 TABLET(25 MG) BY MOUTH TWICE DAILY WITH MEALS    valsartan (DIOVAN) 320 MG tablet TAKE 1 TABLET ONCE DAILY        Diabetes Medications             glipiZIDE (GLUCOTROL) 10 MG TR24 TAKE 1 TABLET(10 MG) BY MOUTH EVERY DAY    SITagliptin (JANUVIA) 100 MG Tab Take 1 tablet (100 mg total) by mouth once daily.          Reviewed the importance of self-monitoring, medication adherence, and that the health  can be used as a resource for lifestyle modifications to help reduce or maintain a healthy lifestyle.    Sent link to [facility] webpages and my contact information via [employer] for future questions. Follow up scheduled.         Screenings

## 2020-03-31 ENCOUNTER — EXTERNAL CHRONIC CARE MANAGEMENT (OUTPATIENT)
Dept: PRIMARY CARE CLINIC | Facility: CLINIC | Age: 85
End: 2020-03-31
Payer: MEDICARE

## 2020-03-31 PROCEDURE — 99490 CHRNC CARE MGMT STAFF 1ST 20: CPT | Mod: PBBFAC,PO | Performed by: INTERNAL MEDICINE

## 2020-03-31 PROCEDURE — 99490 PR CHRONIC CARE MGMT, 1ST 20 MIN: ICD-10-PCS | Mod: S$PBB,,, | Performed by: INTERNAL MEDICINE

## 2020-03-31 PROCEDURE — 99490 CHRNC CARE MGMT STAFF 1ST 20: CPT | Mod: S$PBB,,, | Performed by: INTERNAL MEDICINE

## 2020-04-16 ENCOUNTER — PATIENT OUTREACH (OUTPATIENT)
Dept: OTHER | Facility: OTHER | Age: 85
End: 2020-04-16

## 2020-04-23 RX ORDER — GLIPIZIDE 10 MG/1
TABLET, FILM COATED, EXTENDED RELEASE ORAL
Qty: 90 TABLET | Refills: 0 | Status: SHIPPED | OUTPATIENT
Start: 2020-04-23 | End: 2020-07-23 | Stop reason: SDUPTHER

## 2020-04-30 ENCOUNTER — EXTERNAL CHRONIC CARE MANAGEMENT (OUTPATIENT)
Dept: PRIMARY CARE CLINIC | Facility: CLINIC | Age: 85
End: 2020-04-30
Payer: MEDICARE

## 2020-04-30 PROCEDURE — 99490 CHRNC CARE MGMT STAFF 1ST 20: CPT | Mod: PBBFAC,PO | Performed by: INTERNAL MEDICINE

## 2020-04-30 PROCEDURE — 99490 PR CHRONIC CARE MGMT, 1ST 20 MIN: ICD-10-PCS | Mod: S$PBB,,, | Performed by: INTERNAL MEDICINE

## 2020-04-30 PROCEDURE — 99490 CHRNC CARE MGMT STAFF 1ST 20: CPT | Mod: S$PBB,,, | Performed by: INTERNAL MEDICINE

## 2020-05-10 RX ORDER — AMLODIPINE BESYLATE 10 MG/1
TABLET ORAL
Qty: 90 TABLET | Refills: 12 | Status: SHIPPED | OUTPATIENT
Start: 2020-05-10 | End: 2021-06-15

## 2020-05-10 RX ORDER — ATORVASTATIN CALCIUM 20 MG/1
TABLET, FILM COATED ORAL
Qty: 90 TABLET | Refills: 12 | Status: SHIPPED | OUTPATIENT
Start: 2020-05-10 | End: 2021-06-15

## 2020-05-19 RX ORDER — CARVEDILOL 12.5 MG/1
12.5 TABLET ORAL 2 TIMES DAILY WITH MEALS
Qty: 180 TABLET | Refills: 1
Start: 2020-05-19 | End: 2020-06-09 | Stop reason: SDUPTHER

## 2020-05-19 NOTE — PROGRESS NOTES
Digital Medicine: Clinician Follow-Up    Patient reports doing well without major concerns.  Staying active by doing some chores/updates around the house.    HTN: Reports taking carvedilol 1 tablet once daily but realized his readings were high so he went back to 1 tablet twice daily.  Then cut the evening dose in half due to lower readings.    Diabetes: Willing to update his A1c.  Reports her sugars are fine with the occasional diet excursion.  He understands not to let this get out of hand.    The history is provided by the patient.     Follow Up  Follow-up reason(s): reading review and medication change      Readings are trending down   Medication Change: dose decrease        INTERVENTION(S)  reviewed appropriate dose schedule, recommended med change and encouragement/support    PLAN  patient verbalizes understanding and patient amenable to changes    Assessment  HTN:  -30 day avg. 115/54 mmHg controlled but trending downwards    Diabetes:  -Last A1c 6.7% (Jan 2020) controlled but will update    Plan:  HTN:  -Decrease carvedilol to 12.5 mg (1/2 tab) twice daily    Diabetes:  -Continue medications as prescribed  -Add A1c to upcoming lab appointment on June 8        There are no preventive care reminders to display for this patient.    Last 5 Patient Entered Readings                                      Current 30 Day Average: 115/54     Recent Readings 5/16/2020 5/16/2020 4/28/2020 4/28/2020 4/15/2020    SBP (mmHg) 115 122 115 120 114    DBP (mmHg) 52 55 51 56 55    Pulse 49 50 49 51 53        Last 6 Patient Entered Readings                                          Most Recent A1c: 6.7% on 1/23/2020  (Goal: 7%)     Recent Readings 5/18/2020 5/17/2020 5/15/2020 5/14/2020 5/13/2020    Blood Glucose (mg/dL) 166 126 170 165 168           Hypertension Medications             amLODIPine (NORVASC) 10 MG tablet TAKE 1 TABLET(10 MG) BY MOUTH EVERY DAY    carvediloL (COREG) 25 MG tablet TAKE 1 TABLET(25 MG) BY MOUTH TWICE  DAILY WITH MEALS    valsartan (DIOVAN) 320 MG tablet TAKE 1 TABLET ONCE DAILY        Diabetes Medications             glipiZIDE (GLUCOTROL) 10 MG TR24 TAKE 1 TABLET(10 MG) BY MOUTH EVERY DAY    SITagliptin (JANUVIA) 100 MG Tab Take 1 tablet (100 mg total) by mouth once daily.                   Sleep Apnea Screening  Patient previously diagnosed with LORELEI He reports he is not currently using CPAP.

## 2020-05-26 ENCOUNTER — OFFICE VISIT (OUTPATIENT)
Dept: URGENT CARE | Facility: CLINIC | Age: 85
End: 2020-05-26
Payer: MEDICARE

## 2020-05-26 VITALS
TEMPERATURE: 97 F | OXYGEN SATURATION: 97 % | RESPIRATION RATE: 18 BRPM | DIASTOLIC BLOOD PRESSURE: 62 MMHG | SYSTOLIC BLOOD PRESSURE: 156 MMHG | HEART RATE: 71 BPM

## 2020-05-26 DIAGNOSIS — R04.2 HEMOPTYSIS: ICD-10-CM

## 2020-05-26 DIAGNOSIS — R05.9 COUGH: Primary | ICD-10-CM

## 2020-05-26 DIAGNOSIS — R05.3 COUGH, PERSISTENT: ICD-10-CM

## 2020-05-26 DIAGNOSIS — R53.83 FATIGUE, UNSPECIFIED TYPE: ICD-10-CM

## 2020-05-26 LAB
GLUCOSE SERPL-MCNC: 145 MG/DL (ref 70–110)
POC ANION GAP: 18 MMOL/L (ref 10–20)
POC BUN: 22 MMOL/L (ref 8–26)
POC CHLORIDE: 108 MMOL/L (ref 98–109)
POC CREATININE: 1.5 MG/DL (ref 0.6–1.3)
POC HEMATOCRIT: 36 %PCV (ref 42–52)
POC HEMOGLOBIN: 12.2 G/DL (ref 13.5–18)
POC ICA: 1.05 MMOL/L (ref 1.12–1.32)
POC POTASSIUM: 4.2 MMOL/L (ref 3.5–4.9)
POC SODIUM: 143 MMOL/L (ref 138–146)
POC TCO2: 23 MMOL/L (ref 24–29)
SARS-COV-2 IGG SERPLBLD QL IA.RAPID: NEGATIVE

## 2020-05-26 PROCEDURE — U0003 INFECTIOUS AGENT DETECTION BY NUCLEIC ACID (DNA OR RNA); SEVERE ACUTE RESPIRATORY SYNDROME CORONAVIRUS 2 (SARS-COV-2) (CORONAVIRUS DISEASE [COVID-19]), AMPLIFIED PROBE TECHNIQUE, MAKING USE OF HIGH THROUGHPUT TECHNOLOGIES AS DESCRIBED BY CMS-2020-01-R: HCPCS

## 2020-05-26 PROCEDURE — 99214 OFFICE O/P EST MOD 30 MIN: CPT | Mod: S$GLB,,, | Performed by: NURSE PRACTITIONER

## 2020-05-26 PROCEDURE — 80047 BASIC METABLC PNL IONIZED CA: CPT | Mod: QW,S$GLB,, | Performed by: NURSE PRACTITIONER

## 2020-05-26 PROCEDURE — 86769 SARS-COV-2 COVID-19 ANTIBODY: CPT

## 2020-05-26 PROCEDURE — 99214 PR OFFICE/OUTPT VISIT, EST, LEVL IV, 30-39 MIN: ICD-10-PCS | Mod: S$GLB,,, | Performed by: NURSE PRACTITIONER

## 2020-05-26 PROCEDURE — 71046 X-RAY EXAM CHEST 2 VIEWS: CPT | Mod: S$GLB,,, | Performed by: RADIOLOGY

## 2020-05-26 PROCEDURE — 71046 XR CHEST PA AND LATERAL: ICD-10-PCS | Mod: S$GLB,,, | Performed by: RADIOLOGY

## 2020-05-26 PROCEDURE — 80047 POCT CHEMISTRY PANEL: ICD-10-PCS | Mod: QW,S$GLB,, | Performed by: NURSE PRACTITIONER

## 2020-05-26 NOTE — PATIENT INSTRUCTIONS
If your cough worsens or if you develop fever or new symptoms, please call your Primary Provider to schedule an appointment or return tho this clinic.  If you symptoms become severe, you develop difficulty breathing or extreme fatigue, call 911 and go immediately to the Emergency Department.      Instructions for Patients with Confirmed or Suspected COVID-19    If you are awaiting your test result, you will either be called or it will be released to the patient portal.  If you have any questions about your test, please visit [email or web address] or call our COVID-19 information line at [phone].       Stay home and stay away from family members and friends. The CDC says, you can leave home after these three things have happened: 1) You have had no fever for at least 72 hours (that is three full days of no fever without the use of medicine that reduces fevers) 2) AND other symptoms have improved (for example, when your cough or shortness of breath have improved) 3) AND at least 7 days have passed since your symptoms first appeared.   Separate yourself from other people and animals in your home.   Call ahead before visiting your doctor.   Wear a facemask.   Cover your coughs and sneezes.   Wash your hands often with soap and water; hand  can be used, too.   Avoid sharing personal household items.   Wipe down surfaces used daily.   Monitor your symptoms. Seek prompt medical attention if your illness is worsening (e.g., difficulty breathing).    Before seeking care, call your healthcare provider.   If you have a medical emergency and need to call 911, notify the dispatch personnel that you have, or are being evaluated for COVID-19. If possible, put on a facemask before emergency medical services arrive.        Recommended precautions for household members, intimate partners, and caregivers in a home setting of a patient with symptomatic laboratory-confirmed COVID-19 or a patient under  investigation.  Household members, intimate partners, and caregivers in the home setting awaiting tests results have close contact with a person with symptomatic, laboratory-confirmed COVID-19 or a person under investigation. Close contacts should monitor their health; they should call their provider right away if they develop symptoms suggestive of COVID-19 (e.g., fever, cough, shortness of breath).    Close contacts should also follow these recommendations:   Make sure that you understand and can help the patient follow their provider's instructions for medication(s) and care. You should help the patient with basic needs in the home and provide support for getting groceries, prescriptions, and other personal needs.   Monitor the patient's symptoms. If the patient is getting sicker, call his or her healthcare provider and tell them that the patient has laboratory-confirmed COVID-19. If the patient has a medical emergency and you need to call 911, notify the dispatch personnel that the patient has, or is being evaluated for COVID-19.   Household members should stay in another room or be  from the patient. Household members should use a separate bedroom and bathroom, if available.   Prohibit visitors.   Household members should care for any pets in the home.   Make sure that shared spaces in the home have good air flow, such as by an air conditioner or an opened window, weather permitting.   Perform hand hygiene frequently. Wash your hands often with soap and water for at least 20 seconds or use an alcohol-based hand  (that contains > 60% alcohol) covering all surfaces of your hands and rubbing them together until they feel dry. Soap and water should be used preferentially.   Avoid touching your eyes, nose, and mouth.   The patient should wear a facemask. If the patient is not able to wear a facemask (for example, because it causes trouble breathing), caregivers should wear a mask when they  are in the same room as the patient.   Wear a disposable facemask and gloves when you touch or have contact with the patient's blood, stool, or body fluids, such as saliva, sputum, nasal mucus, vomit, urine.  o Throw out disposable facemasks and gloves after using them. Do not reuse.  o When removing personal protective equipment, first remove and dispose of gloves. Then, immediately clean your hands with soap and water or alcohol-based hand . Next, remove and dispose of facemask, and immediately clean your hands again with soap and water or alcohol-based hand .   You should not share dishes, drinking glasses, cups, eating utensils, towels, bedding, or other items with the patient. After the patient uses these items, you should wash them thoroughly (see below Wash laundry thoroughly).   Clean all high-touch surfaces, such as counters, tabletops, doorknobs, bathroom fixtures, toilets, phones, keyboards, tablets, and bedside tables, every day. Also, clean any surfaces that may have blood, stool, or body fluids on them.   Use a household cleaning spray or wipe, according to the label instructions. Labels contain instructions for safe and effective use of the cleaning product including precautions you should take when applying the product, such as wearing gloves and making sure you have good ventilation during use of the product.   Wash laundry thoroughly.  o Immediately remove and wash clothes or bedding that have blood, stool, or body fluids on them.  o Wear disposable gloves while handling soiled items and keep soiled items away from your body. Clean your hands (with soap and water or an alcohol-based hand ) immediately after removing your gloves.  o Read and follow directions on labels of laundry or clothing items and detergent. In general, using a normal laundry detergent according to washing machine instructions and dry thoroughly using the warmest temperatures recommended on  the clothing label.   Place all used disposable gloves, facemasks, and other contaminated items in a lined container before disposing of them with other household waste. Clean your hands (with soap and water or an alcohol-based hand ) immediately after handling these items. Soap and water should be used preferentially if hands are visibly dirty.   Discuss any additional questions with your state or local health department or healthcare provider. Check available hours when contacting your local health department.    For more information see CDC link below.      https://www.cdc.gov/coronavirus/2019-ncov/hcp/guidance-prevent-spread.html#precautions        Sources:  CDC, [facility] and [facility]

## 2020-05-26 NOTE — PROGRESS NOTES
Subjective:       Patient ID: Bria Lawson is a 84 y.o. male.    Vitals:  temperature is 96.6 °F (35.9 °C). His blood pressure is 156/62 (abnormal) and his pulse is 71. His respiration is 18 and oxygen saturation is 97%.     Chief Complaint: Cough    Coughing up blood for about three weeks  Headaches  Coughing is mainly at night    Provider note begins below:    Mr Lawson is an otherwise healthy appearing adult male in NAD.  He states he has had blood in sputum intermittently with deep cough for around 3 weeks. He denies fever or SOB.  No GI symptoms.  States has been able to perform ADLs without issue. Denies known sick contacts. He also complains of intermittent HA and has been somewhat tired. He was unsure if this was related to changes in his Carvedilol. This was changed from BID to once daily per HTN monitoring due to lower B/P, but subsequently changed back to BID 1 week ago.    Patient states he was seen in January by PCP and told he had post nasal drip, likely associated with allergy. Pt assumed this was the cause for persistent cough.  He became concerned due to hemoptysis.  He is also diagnosed with lymphoma, thrombocytopenia and anemia and recently completed course of rituxan.    Patient also has Hx of GERD on Prilosec for multiple years. No hx of esophageal varices or liver dysfunction.      Cough   This is a new problem. The current episode started 1 to 4 weeks ago. The problem has been gradually worsening. The cough is productive of bloody sputum. Associated symptoms include headaches and hemoptysis. Pertinent negatives include no chills, ear pain, eye redness, fever, myalgias, rash, sore throat, shortness of breath or wheezing. He has tried nothing for the symptoms.       Constitution: Positive for fatigue. Negative for chills, sweating and fever.   HENT: Negative for ear pain, congestion, sinus pain, sinus pressure, sore throat and voice change.    Neck: Negative for painful lymph nodes.   Eyes:  Negative for eye redness.   Respiratory: Positive for cough and bloody sputum. Negative for chest tightness, sputum production, COPD, shortness of breath, stridor, wheezing and asthma.    Gastrointestinal: Negative for nausea and vomiting.   Musculoskeletal: Negative for muscle ache.   Skin: Negative for rash.   Allergic/Immunologic: Negative for seasonal allergies and asthma.   Neurological: Positive for headaches.   Hematologic/Lymphatic: Negative for swollen lymph nodes.       Objective:      Physical Exam   Constitutional: He is oriented to person, place, and time. He appears well-developed and well-nourished. He is cooperative.  Non-toxic appearance. He does not have a sickly appearance. He does not appear ill. No distress.   HENT:   Head: Normocephalic and atraumatic.   Right Ear: Hearing and external ear normal.   Left Ear: Hearing and external ear normal.   Nose: No rhinorrhea. No epistaxis.   Mouth/Throat: Uvula is midline, oropharynx is clear and moist and mucous membranes are normal. Normal dentition. No oropharyngeal exudate, posterior oropharyngeal edema or posterior oropharyngeal erythema.   Eyes: Conjunctivae and lids are normal. No scleral icterus.   Neck: Trachea normal, full passive range of motion without pain and phonation normal. Neck supple. No JVD present. Carotid bruit is not present. No neck rigidity. No edema and no erythema present.   Cardiovascular: Normal rate, regular rhythm, intact distal pulses and normal pulses.   Murmur heard.   Systolic murmur is present with a grade of 2/6.  Pulses:       Carotid pulses are 2+ on the right side, and 2+ on the left side.       Radial pulses are 2+ on the right side, and 2+ on the left side.   Pulmonary/Chest: Effort normal and breath sounds normal. No stridor. No respiratory distress. He has no decreased breath sounds. He has no wheezes. He has no rhonchi. He has no rales.   Abdominal: Normal appearance.   Musculoskeletal: Normal range of motion.  He exhibits no edema or deformity.   Neurological: He is alert and oriented to person, place, and time. He is not disoriented. He displays no tremor. He exhibits normal muscle tone. Coordination normal.   Skin: Skin is warm, dry, intact, not diaphoretic and not pale.   Psychiatric: He has a normal mood and affect. His speech is normal and behavior is normal. Judgment and thought content normal. Cognition and memory are normal.   Nursing note and vitals reviewed.    X-ray Chest Pa And Lateral    Result Date: 5/26/2020  EXAMINATION: XR CHEST PA AND LATERAL CLINICAL HISTORY: Cough TECHNIQUE: PA and lateral views of the chest were performed. COMPARISON: None FINDINGS: Mediastinal structures are midline.  Cardiomediastinal silhouette is stable.  Descending thoracic aorta is tortuous.  Calcific plaque noted at the level of the arch in this 84-year-old man. There is faint patchy heterogeneous opacity in both lungs, more apparent in the right perihilar region but also evident in the left lower lung zone as seen on PA view.  This disease is not well visualized on lateral projection.  Radiographic appearance suggests aspirated blood in this patient with a history of hemoptysis.  I consider pulmonary parenchymal hemorrhage, aspiration or pneumonia less likely but not excluded.  Normal pulmonary vascular distribution argues against pulmonary edema. I detect no lymph node enlargement or pleural fluid in this patient with a history of lymphoma. I detect no pneumothorax, pneumomediastinum, pneumoperitoneum or significant osseous abnormality.     Abnormal chest radiograph.  Chest CT might provide more sensitive evaluation of the airways to assess for possible source for hemoptysis which could lead to aspiration of blood into the pulmonary parenchyma.  CT might also provide further information regarding pulmonary parenchymal detail. Electronically signed by: Chiara West MD Date:    05/26/2020 Time:    09:09    Results for orders  placed or performed in visit on 05/26/20   POCT Chemistry Panel   Result Value Ref Range    POC Sodium 143 138 - 146 MMOL/L    POC Potassium 4.2 3.5 - 4.9 MMOL/L    POC Chloride 108 98 - 109 MMOL/L    POC BUN 22 8 - 26 MMOL/L    POC Glucose 145 (A) 70 - 110 MG/DL    POC Creatinine 1.5 (A) 0.6 - 1.3 mg/dL    POC iCA 1.05 (A) 1.12 - 1.32 MMOL/L    POC TCO2 23 (A) 24 - 29 MMOL/L    POC Hematocrit 36 (A) 42 - 52 %PCV    POC Hemoglobin 12.2 (A) 13.5 - 18 g/dL    POC Anion Gap 18 10.0 - 20 MMOL/L     Orthostatics:    LYING    144/69 P 60    SITTING  155/71 P 60    STANDING 163/70 P 60        Assessment:       1. Cough    2. Hemoptysis    3. Fatigue, unspecified type    4. Cough, persistent        Plan:       Labs and Xrays ordered at this visit reviewed.     I discussed the disposition of this patient with the Medical Director, Dr. Khalil.     Contacted Pt's Hemonc and PCP and they are aware of labs ordered and CT and will follow up.    Cough  -     X-Ray Chest PA And Lateral; Future; Expected date: 05/26/2020  -     CBC auto differential  -     POCT Chemistry Panel  -     COVID-19 Routine Screening  -     COVID-19 (SARS CoV-2) IgG Antibody  -     Orthostatic vital signs  -     CT Chest Without Contrast; Future; Expected date: 05/26/2020    Hemoptysis  -     X-Ray Chest PA And Lateral; Future; Expected date: 05/26/2020  -     CBC auto differential  -     POCT Chemistry Panel  -     COVID-19 Routine Screening  -     COVID-19 (SARS CoV-2) IgG Antibody  -     Orthostatic vital signs  -     CT Chest Without Contrast; Future; Expected date: 05/26/2020    Fatigue, unspecified type  -     Orthostatic vital signs    Cough, persistent  -     CT Chest Without Contrast; Future; Expected date: 05/26/2020      Patient Instructions   If your cough worsens or if you develop fever or new symptoms, please call your Primary Provider to schedule an appointment or return tho this clinic.  If you symptoms become severe, you develop difficulty  breathing or extreme fatigue, call 911 and go immediately to the Emergency Department.      Instructions for Patients with Confirmed or Suspected COVID-19    If you are awaiting your test result, you will either be called or it will be released to the patient portal.  If you have any questions about your test, please visit [email or web address] or call our COVID-19 information line at [phone].       Stay home and stay away from family members and friends. The CDC says, you can leave home after these three things have happened: 1) You have had no fever for at least 72 hours (that is three full days of no fever without the use of medicine that reduces fevers) 2) AND other symptoms have improved (for example, when your cough or shortness of breath have improved) 3) AND at least 7 days have passed since your symptoms first appeared.   Separate yourself from other people and animals in your home.   Call ahead before visiting your doctor.   Wear a facemask.   Cover your coughs and sneezes.   Wash your hands often with soap and water; hand  can be used, too.   Avoid sharing personal household items.   Wipe down surfaces used daily.   Monitor your symptoms. Seek prompt medical attention if your illness is worsening (e.g., difficulty breathing).    Before seeking care, call your healthcare provider.   If you have a medical emergency and need to call 911, notify the dispatch personnel that you have, or are being evaluated for COVID-19. If possible, put on a facemask before emergency medical services arrive.        Recommended precautions for household members, intimate partners, and caregivers in a home setting of a patient with symptomatic laboratory-confirmed COVID-19 or a patient under investigation.  Household members, intimate partners, and caregivers in the home setting awaiting tests results have close contact with a person with symptomatic, laboratory-confirmed COVID-19 or a person  under investigation. Close contacts should monitor their health; they should call their provider right away if they develop symptoms suggestive of COVID-19 (e.g., fever, cough, shortness of breath).    Close contacts should also follow these recommendations:   Make sure that you understand and can help the patient follow their provider's instructions for medication(s) and care. You should help the patient with basic needs in the home and provide support for getting groceries, prescriptions, and other personal needs.   Monitor the patient's symptoms. If the patient is getting sicker, call his or her healthcare provider and tell them that the patient has laboratory-confirmed COVID-19. If the patient has a medical emergency and you need to call 911, notify the dispatch personnel that the patient has, or is being evaluated for COVID-19.   Household members should stay in another room or be  from the patient. Household members should use a separate bedroom and bathroom, if available.   Prohibit visitors.   Household members should care for any pets in the home.   Make sure that shared spaces in the home have good air flow, such as by an air conditioner or an opened window, weather permitting.   Perform hand hygiene frequently. Wash your hands often with soap and water for at least 20 seconds or use an alcohol-based hand  (that contains > 60% alcohol) covering all surfaces of your hands and rubbing them together until they feel dry. Soap and water should be used preferentially.   Avoid touching your eyes, nose, and mouth.   The patient should wear a facemask. If the patient is not able to wear a facemask (for example, because it causes trouble breathing), caregivers should wear a mask when they are in the same room as the patient.   Wear a disposable facemask and gloves when you touch or have contact with the patient's blood, stool, or body fluids, such as saliva, sputum, nasal mucus, vomit,  urine.  o Throw out disposable facemasks and gloves after using them. Do not reuse.  o When removing personal protective equipment, first remove and dispose of gloves. Then, immediately clean your hands with soap and water or alcohol-based hand . Next, remove and dispose of facemask, and immediately clean your hands again with soap and water or alcohol-based hand .   You should not share dishes, drinking glasses, cups, eating utensils, towels, bedding, or other items with the patient. After the patient uses these items, you should wash them thoroughly (see below Wash laundry thoroughly).   Clean all high-touch surfaces, such as counters, tabletops, doorknobs, bathroom fixtures, toilets, phones, keyboards, tablets, and bedside tables, every day. Also, clean any surfaces that may have blood, stool, or body fluids on them.   Use a household cleaning spray or wipe, according to the label instructions. Labels contain instructions for safe and effective use of the cleaning product including precautions you should take when applying the product, such as wearing gloves and making sure you have good ventilation during use of the product.   Wash laundry thoroughly.  o Immediately remove and wash clothes or bedding that have blood, stool, or body fluids on them.  o Wear disposable gloves while handling soiled items and keep soiled items away from your body. Clean your hands (with soap and water or an alcohol-based hand ) immediately after removing your gloves.  o Read and follow directions on labels of laundry or clothing items and detergent. In general, using a normal laundry detergent according to washing machine instructions and dry thoroughly using the warmest temperatures recommended on the clothing label.   Place all used disposable gloves, facemasks, and other contaminated items in a lined container before disposing of them with other household waste. Clean your hands (with soap and  water or an alcohol-based hand ) immediately after handling these items. Soap and water should be used preferentially if hands are visibly dirty.   Discuss any additional questions with your state or local health department or healthcare provider. Check available hours when contacting your local health department.    For more information see CDC link below.      https://www.cdc.gov/coronavirus/2019-ncov/hcp/guidance-prevent-spread.html#precautions        Sources:  [facility], [place] [facility] and [facility]

## 2020-05-27 ENCOUNTER — HOSPITAL ENCOUNTER (OUTPATIENT)
Dept: RADIOLOGY | Facility: HOSPITAL | Age: 85
Discharge: HOME | End: 2020-05-27
Attending: NURSE PRACTITIONER
Payer: MEDICARE

## 2020-05-27 DIAGNOSIS — R04.2 HEMOPTYSIS: ICD-10-CM

## 2020-05-27 DIAGNOSIS — R05.3 COUGH, PERSISTENT: ICD-10-CM

## 2020-05-27 DIAGNOSIS — R05.9 COUGH: ICD-10-CM

## 2020-05-27 LAB — SARS-COV-2 RNA RESP QL NAA+PROBE: NOT DETECTED

## 2020-05-27 PROCEDURE — 71250 CT CHEST WITHOUT CONTRAST: ICD-10-PCS | Mod: 26,,, | Performed by: RADIOLOGY

## 2020-05-27 PROCEDURE — 71250 CT THORAX DX C-: CPT | Mod: 26,,, | Performed by: RADIOLOGY

## 2020-05-27 PROCEDURE — 71250 CT THORAX DX C-: CPT | Mod: TC

## 2020-05-28 ENCOUNTER — TELEPHONE (OUTPATIENT)
Dept: URGENT CARE | Facility: CLINIC | Age: 85
End: 2020-05-28

## 2020-05-28 DIAGNOSIS — I50.9 CONGESTIVE HEART FAILURE, UNSPECIFIED HF CHRONICITY, UNSPECIFIED HEART FAILURE TYPE: Primary | ICD-10-CM

## 2020-05-28 PROBLEM — M70.22 OLECRANON BURSITIS OF LEFT ELBOW: Status: RESOLVED | Noted: 2017-07-14 | Resolved: 2020-05-28

## 2020-05-28 LAB
BASOPHILS # BLD AUTO: 0.2 X10E3/UL (ref 0–0.2)
BASOPHILS NFR BLD AUTO: 3 %
EOSINOPHIL # BLD AUTO: 0.2 X10E3/UL (ref 0–0.4)
EOSINOPHIL NFR BLD AUTO: 3 %
ERYTHROCYTE [DISTWIDTH] IN BLOOD BY AUTOMATED COUNT: 13.7 % (ref 11.6–15.4)
HCT VFR BLD AUTO: 36.5 % (ref 37.5–51)
HGB BLD-MCNC: 11.4 G/DL (ref 13–17.7)
LYMPHOCYTES # BLD AUTO: 2.1 X10E3/UL (ref 0.7–3.1)
LYMPHOCYTES NFR BLD AUTO: 37 %
MCH RBC QN AUTO: 28.8 PG (ref 26.6–33)
MCHC RBC AUTO-ENTMCNC: 31.2 G/DL (ref 31.5–35.7)
MCV RBC AUTO: 92 FL (ref 79–97)
MONOCYTES # BLD AUTO: 0.5 X10E3/UL (ref 0.1–0.9)
MONOCYTES NFR BLD AUTO: 9 %
MORPHOLOGY BLD-IMP: ABNORMAL
NEUTROPHILS # BLD AUTO: 2.7 X10E3/UL (ref 1.4–7)
NEUTROPHILS NFR BLD AUTO: 48 %
NRBC BLD AUTO-RTO: 3 % (ref 0–0)
PLATELET # BLD AUTO: 84 X10E3/UL (ref 150–450)
RBC # BLD AUTO: 3.96 X10E6/UL (ref 4.14–5.8)
WBC # BLD AUTO: 5.6 X10E3/UL (ref 3.4–10.8)

## 2020-05-28 RX ORDER — FLUOCINONIDE 0.5 MG/G
CREAM TOPICAL
COMMUNITY
Start: 2020-03-19 | End: 2020-06-11

## 2020-05-28 RX ORDER — FUROSEMIDE 20 MG/1
10 TABLET ORAL DAILY
Qty: 30 TABLET | Refills: 2 | Status: SHIPPED | OUTPATIENT
Start: 2020-05-28 | End: 2020-10-21 | Stop reason: SDUPTHER

## 2020-05-28 NOTE — TELEPHONE ENCOUNTER
I did speak with pt nephrologist about his situation. She recommends lasix as a choice for diuretic and agrees ct suggests chf. She recommend cardiology eval as well.   I will refer to cardiology. Also will start lasix and repeat cmp and BNP in 1 week. Also recommend pt follow with his PCP .   Pt notified of all this and he agrees to this plan.

## 2020-05-28 NOTE — PROGRESS NOTES
I left a message for pt to call us back for test results. CT suggests heart failure.   Labs show mild anemia and CKD- which appear stable . covid test negative and ct more indicative of CHF. Would suggest adding low dose diuretic and refer to cardiology and have him return to his kidney specialist as well within the week.

## 2020-05-29 ENCOUNTER — PATIENT OUTREACH (OUTPATIENT)
Dept: ADMINISTRATIVE | Facility: OTHER | Age: 85
End: 2020-05-29

## 2020-05-29 ENCOUNTER — OFFICE VISIT (OUTPATIENT)
Dept: CARDIOLOGY | Facility: CLINIC | Age: 85
End: 2020-05-29
Payer: MEDICARE

## 2020-05-29 VITALS
WEIGHT: 200.63 LBS | DIASTOLIC BLOOD PRESSURE: 72 MMHG | HEART RATE: 55 BPM | SYSTOLIC BLOOD PRESSURE: 142 MMHG | OXYGEN SATURATION: 95 % | RESPIRATION RATE: 16 BRPM | BODY MASS INDEX: 27.98 KG/M2

## 2020-05-29 DIAGNOSIS — R00.1 BRADYCARDIA: ICD-10-CM

## 2020-05-29 DIAGNOSIS — J81.0 ACUTE PULMONARY EDEMA: ICD-10-CM

## 2020-05-29 DIAGNOSIS — D69.6 THROMBOCYTOPENIA: ICD-10-CM

## 2020-05-29 DIAGNOSIS — I25.10 CORONARY ARTERY CALCIFICATION SEEN ON CAT SCAN: ICD-10-CM

## 2020-05-29 DIAGNOSIS — E78.5 HYPERLIPIDEMIA, UNSPECIFIED HYPERLIPIDEMIA TYPE: ICD-10-CM

## 2020-05-29 DIAGNOSIS — Z76.89 ESTABLISHING CARE WITH NEW DOCTOR, ENCOUNTER FOR: ICD-10-CM

## 2020-05-29 DIAGNOSIS — I10 ESSENTIAL HYPERTENSION: Primary | ICD-10-CM

## 2020-05-29 DIAGNOSIS — I44.0 FIRST DEGREE AV BLOCK: ICD-10-CM

## 2020-05-29 PROCEDURE — 99999 PR PBB SHADOW E&M-EST. PATIENT-LVL V: CPT | Mod: PBBFAC,,, | Performed by: INTERNAL MEDICINE

## 2020-05-29 PROCEDURE — 99204 PR OFFICE/OUTPT VISIT, NEW, LEVL IV, 45-59 MIN: ICD-10-PCS | Mod: S$PBB,,, | Performed by: INTERNAL MEDICINE

## 2020-05-29 PROCEDURE — 99204 OFFICE O/P NEW MOD 45 MIN: CPT | Mod: S$PBB,,, | Performed by: INTERNAL MEDICINE

## 2020-05-29 PROCEDURE — 93010 ELECTROCARDIOGRAM REPORT: CPT | Mod: S$PBB,,, | Performed by: INTERNAL MEDICINE

## 2020-05-29 PROCEDURE — 93005 ELECTROCARDIOGRAM TRACING: CPT | Mod: PBBFAC | Performed by: INTERNAL MEDICINE

## 2020-05-29 PROCEDURE — 93010 EKG 12-LEAD: ICD-10-PCS | Mod: S$PBB,,, | Performed by: INTERNAL MEDICINE

## 2020-05-29 PROCEDURE — 99215 OFFICE O/P EST HI 40 MIN: CPT | Mod: PBBFAC,25 | Performed by: INTERNAL MEDICINE

## 2020-05-29 PROCEDURE — 99999 PR PBB SHADOW E&M-EST. PATIENT-LVL V: ICD-10-PCS | Mod: PBBFAC,,, | Performed by: INTERNAL MEDICINE

## 2020-05-29 NOTE — PROGRESS NOTES
CARDIOLOGY CONSULTATION    REASON FOR CONSULT:   Bria Lawson is a 84 y.o. male who presents for evaluation of shortness of breath/hemoptysis.      HISTORY OF PRESENT ILLNESS:     Bria Lawson is an 85yo male who presents for evaluation of shortness of breath/hemoptysis. Symptoms since January of this year. C/o cough, mostly at night while laying down. No chest pain. Patient had a CT scan on 5/26/20 showing pulmonary edema, bilateral pleural effusions. Coronary calcification also noted - 3V. Hx of HTN, HLP, LDL 44.  DM. Hx of lymphoma/thrombocyytopenia/anemia. He denies any hx of CAD. No prior CV evaluation. Was also c/o fatigue. Had his coreg decreased secondary to bradycardia.      CT Chest 5/26/20:    1. CT findings favoring sequela of cardiac decompensation causing pulmonary edema and bilateral pleural effusions.  Superimposed COVID-19 pneumonia cannot be entirely excluded noting that pleural effusions is not a commonly reported finding and therefore would be atypical.  2. Persistent soft tissue attenuation nodule within the prevascular mediastinum, presumably an enlarged lymph node and unchanged when compared to previous PET-CTs.  3. Slight interval increased size of multiple mediastinal lymph nodes, nonspecific.  4. Cardiomegaly with severe dense coronary artery atherosclerosis in a 3 vessel distribution.  5. Additional findings as detailed in the body of the report.    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    Hyperlipidemia 2/26/2015    Hypertension     Uncontrolled  secondary diabetes mellitus with stage 3 CKD (GFR 30-59) 8/28/2015       PAST SURGICAL HISTORY:     Past Surgical History:   Procedure Laterality Date    BONE MARROW BIOPSY Left 9/19/2018    Procedure: Biopsy-bone marrow;  Surgeon: Velia Tobias MD;  [facility];  Service: Oncology;  Laterality: Left;    CATARACT EXTRACTION Bilateral 1996    EYE SURGERY      cataracts    JOINT REPLACEMENT      knee replacement    retinal injection s Bilateral     scrotal cyst         ALLERGIES AND MEDICATION:   Review of patient's allergies indicates:  No Known Allergies     Medication List           Accurate as of May 29, 2020 11:09 AM. If you have any questions, ask your nurse or doctor.               CONTINUE taking these medications    allopurinoL 100 MG tablet  Commonly known as:  ZYLOPRIM  TAKE 1 TABLET(100 MG) BY MOUTH EVERY DAY     amLODIPine 10 MG tablet  Commonly known as:  NORVASC  TAKE 1 TABLET(10 MG) BY MOUTH EVERY DAY     amoxicillin 500 MG capsule  Commonly known as:  AMOXIL     aspirin 81 MG Chew  CHEW AND SWALLOW 1 TABLET BY MOUTH DAILY     atorvastatin 20 MG tablet  Commonly known as:  LIPITOR  TAKE 1 TABLET(20 MG) BY MOUTH EVERY DAY     blood sugar diagnostic Strp  1 strip by Misc.(Non-Drug; Combo Route) route 2 (two) times daily. Disp glucometer and lancets as well     carvediloL 12.5 MG tablet  Commonly known as:  COREG  Take 1 tablet (12.5 mg total) by mouth 2 (two) times daily with meals.     CENTRUM SILVER MEN ORAL     clobetasoL 0.05 % cream  Commonly known as:  TEMOVATE     fluocinonide 0.05% 0.05 % cream  Commonly known as:  LIDEX     folic acid 800 MCG Tab  Commonly known as:  FOLVITE     furosemide 20 MG tablet  Commonly known as:  LASIX  Take 0.5 tablets (10 mg total) by mouth once daily.     gabapentin 300 MG capsule  Commonly known as:  NEURONTIN  TAKE 1 CAPSULE(300 MG) BY MOUTH THREE TIMES DAILY     glipiZIDE 10 MG Tr24  Commonly known as:  GLUCOTROL  TAKE 1 TABLET(10 MG) BY MOUTH  EVERY DAY     mometasone 0.1% 0.1 % cream  Commonly known as:  ELOCON     niacin 500 MG Tab     omeprazole 40 MG capsule  Commonly known as:  PRILOSEC  TAKE 1 CAPSULE DAILY     SITagliptin 100 MG Tab  Commonly known as:  JANUVIA  Take 1 tablet (100 mg total) by mouth once daily.     SOOLANTRA 1 % Crea  Generic drug:  ivermectin     triamcinolone acetonide 0.1% 0.1 % ointment  Commonly known as:  KENALOG     TRUEPLUS LANCETS 33 gauge Misc  Generic drug:  lancets     TURMERIC ROOT EXTRACT ORAL     valsartan 320 MG tablet  Commonly known as:  DIOVAN  TAKE 1 TABLET ONCE DAILY     VITAMIN C 1000 MG tablet  Generic drug:  ascorbic acid (vitamin C)     vitamin E 400 UNIT capsule            SOCIAL HISTORY:     Social History     Socioeconomic History    Marital status:      Spouse name: Not on file    Number of children: Not on file    Years of education: Not on file    Highest education level: Not on file   Occupational History    Not on file   Social Needs    Financial resource strain: Not hard at all    Food insecurity:     Worry: Never true     Inability: Never true    Transportation needs:     Medical: No     Non-medical: No   Tobacco Use    Smoking status: Former Smoker    Smokeless tobacco: Never Used   Substance and Sexual Activity    Alcohol use: Yes     Alcohol/week: 0.0 standard drinks     Frequency: 2-4 times a month     Drinks per session: 1 or 2     Binge frequency: Never     Comment: Social    Drug use: No    Sexual activity: Not on file   Lifestyle    Physical activity:     Days per week: Not on file     Minutes per session: Not on file    Stress: Not on file   Relationships    Social connections:     Talks on phone: More than three times a week     Gets together: Once a week     Attends Islam service: 1 to 4 times per year     Active member of club or organization: No     Attends meetings of clubs or organizations: Never     Relationship status:    Other Topics Concern     Not on file   Social History Narrative    Not on file       FAMILY HISTORY:     Family History   Problem Relation Age of Onset    No Known Problems Mother     No Known Problems Father     No Known Problems Sister     No Known Problems Brother     No Known Problems Maternal Aunt     No Known Problems Maternal Uncle     No Known Problems Paternal Aunt     No Known Problems Paternal Uncle     No Known Problems Maternal Grandmother     No Known Problems Maternal Grandfather     No Known Problems Paternal Grandmother     No Known Problems Paternal Grandfather     Amblyopia Neg Hx     Blindness Neg Hx     Cancer Neg Hx     Cataracts Neg Hx     Diabetes Neg Hx     Glaucoma Neg Hx     Hypertension Neg Hx     Macular degeneration Neg Hx     Retinal detachment Neg Hx     Strabismus Neg Hx     Stroke Neg Hx     Thyroid disease Neg Hx        REVIEW OF SYSTEMS:   Review of Systems   Constitutional: Positive for malaise/fatigue. Negative for diaphoresis and weight loss.   Respiratory: Positive for cough, hemoptysis and shortness of breath. Negative for sputum production and wheezing.    Cardiovascular: Negative for chest pain, palpitations, orthopnea, claudication, leg swelling and PND.   Gastrointestinal: Negative for abdominal pain, blood in stool, heartburn, nausea and vomiting.   Neurological: Negative for dizziness, sensory change, speech change, focal weakness, seizures, loss of consciousness, weakness and headaches.       PHYSICAL EXAM:     Vitals:    05/29/20 1048   BP: (!) 142/72   Pulse: (!) 55   Resp: 16    Body mass index is 27.98 kg/m².  Weight: 91 kg (200 lb 9.9 oz)         Physical Exam   Constitutional: He is oriented to person, place, and time. He appears well-developed and well-nourished. No distress.   Neck: No JVD present. Carotid bruit is not present.   Cardiovascular: Regular rhythm, normal heart sounds and normal pulses. Bradycardia present.   Pulmonary/Chest: Effort normal and  breath sounds normal.   Abdominal: Soft. Bowel sounds are normal. There is no tenderness.   Musculoskeletal:        Right lower leg: He exhibits no edema.        Left lower leg: He exhibits no edema.   Neurological: He is alert and oriented to person, place, and time.   Skin: He is not diaphoretic.   Psychiatric: He has a normal mood and affect. His speech is normal and behavior is normal.   Vitals reviewed.      DATA:   EKG: (personally reviewed tracing)  5/29/20 - SB with first degree AVB, low voltage  Laboratory:  CBC:  Recent Labs   Lab 12/09/19  0849 03/09/20  0905 05/26/20  0808   WBC 5.17 7.42 5.6   Hemoglobin 12.6 L 12.2 L 11.4 L   Hematocrit 40.2 38.5 L 36.5 L   Platelets 60 L 59 L 84 LL       CHEMISTRIES:  Recent Labs   Lab 12/09/19  0849 01/23/20  1154 03/09/20  0905   Glucose 253 H 268 H 204 H   Sodium 140 138 142   Potassium 4.1 4.3 4.1   BUN, Bld 19 15 21   Creatinine 1.5 H 1.4 1.4   eGFR if  49 A 53.3 A 53 A   eGFR if non  42 A 46.1 A 46 A   Calcium 9.4 9.9 8.9       CARDIAC BIOMARKERS:        COAGS:        LIPIDS/LFTS:  Recent Labs   Lab 06/28/17  0923  04/22/19  1151  09/09/19  0816 12/09/19  0849 01/23/20  1154 03/09/20  0905   Cholesterol 100 L  --  109 L  --   --   --  112 L  --    Triglycerides 168 H  --  153 H  --   --   --  170 H  --    HDL 31 L  --  35 L  --   --   --  34 L  --    LDL Cholesterol 35.4 L  --  43.4 L  --   --   --  44.0 L  --    Non-HDL Cholesterol 69  --  74  --   --   --  78  --    AST 15   < >  --    < > 16 17  --  16   ALT 14   < >  --    < > 13 17  --  16    < > = values in this interval not displayed.         ASSESSMENT:     1. Shortness of breath/hemoptysis  2. Abnormal CT scan/coronary calcification  3. Pulmonary edema  4. HTN  5. HLP: LDL 44  6. DM  7. Abnormal ekg  8. Thrombocytopenia    PLAN:     1. 2d echo  2. Continue current management  3. BP log  4. RTC 3 weeks.      Oleg Fontana MD, MPH, FACC, [facility]

## 2020-05-29 NOTE — LETTER
May 29, 2020      Flakita Aguila, DO  [address] [facility] [address] [facility] [address]           [facility]  [address]   [facility] [address]  Phone: [phone]          Patient: Bria Lawson   MR Number: [id number]   YOB: 1935   Date of Visit: 5/29/2020       Dear Dr. Flakita Aguila:    Thank you for referring Bria Lawson to me for evaluation. Attached you will find relevant portions of my assessment and plan of care.    If you have questions, please do not hesitate to call me. I look forward to following Bria Lawson along with you.    Sincerely,    Oleg Fontana MD    Enclosure  CC:  No Recipients    If you would like to receive this communication electronically, please contact [email or web address] or [phone] to request more information on [facility] access.    For providers and/or their staff who would like to refer a patient to [facility], please contact us through our one-stop-shop provider referral line, [facility], at [phone].    If you feel you have received this communication in error or would no longer like to receive these types of communications, please e-mail [email or web address]

## 2020-05-31 ENCOUNTER — EXTERNAL CHRONIC CARE MANAGEMENT (OUTPATIENT)
Dept: PRIMARY CARE CLINIC | Facility: CLINIC | Age: 85
End: 2020-05-31
Payer: MEDICARE

## 2020-05-31 PROCEDURE — 99490 PR CHRONIC CARE MGMT, 1ST 20 MIN: ICD-10-PCS | Mod: S$PBB,,, | Performed by: INTERNAL MEDICINE

## 2020-05-31 PROCEDURE — 99490 CHRNC CARE MGMT STAFF 1ST 20: CPT | Mod: PBBFAC,PO | Performed by: INTERNAL MEDICINE

## 2020-05-31 PROCEDURE — 99490 CHRNC CARE MGMT STAFF 1ST 20: CPT | Mod: S$PBB,,, | Performed by: INTERNAL MEDICINE

## 2020-05-31 PROCEDURE — 99454 REM MNTR PHYSIOL PARAM 16-30: CPT | Mod: PBBFAC,PO | Performed by: INTERNAL MEDICINE

## 2020-06-08 ENCOUNTER — LAB VISIT (OUTPATIENT)
Dept: LAB | Facility: HOSPITAL | Age: 85
End: 2020-06-08
Attending: INTERNAL MEDICINE
Payer: MEDICARE

## 2020-06-08 DIAGNOSIS — D69.6 THROMBOCYTOPENIA: ICD-10-CM

## 2020-06-08 DIAGNOSIS — E11.21 DIABETES MELLITUS WITH PROTEINURIC DIABETIC NEPHROPATHY: ICD-10-CM

## 2020-06-08 DIAGNOSIS — M06.9 RHEUMATOID ARTHRITIS, INVOLVING UNSPECIFIED SITE, UNSPECIFIED RHEUMATOID FACTOR PRESENCE: ICD-10-CM

## 2020-06-08 DIAGNOSIS — C85.19 B-CELL LYMPHOMA OF EXTRANODAL SITE: ICD-10-CM

## 2020-06-08 LAB
ALBUMIN SERPL BCP-MCNC: 4.2 G/DL (ref 3.5–5.2)
ALP SERPL-CCNC: 55 U/L (ref 55–135)
ALT SERPL W/O P-5'-P-CCNC: 19 U/L (ref 10–44)
ANION GAP SERPL CALC-SCNC: 6 MMOL/L (ref 8–16)
AST SERPL-CCNC: 17 U/L (ref 10–40)
BASOPHILS # BLD AUTO: 0.02 K/UL (ref 0–0.2)
BASOPHILS NFR BLD: 0.6 % (ref 0–1.9)
BILIRUB SERPL-MCNC: 1 MG/DL (ref 0.1–1)
BUN SERPL-MCNC: 16 MG/DL (ref 8–23)
CALCIUM SERPL-MCNC: 9.3 MG/DL (ref 8.7–10.5)
CHLORIDE SERPL-SCNC: 105 MMOL/L (ref 95–110)
CO2 SERPL-SCNC: 28 MMOL/L (ref 23–29)
CREAT SERPL-MCNC: 1.3 MG/DL (ref 0.5–1.4)
DIFFERENTIAL METHOD: ABNORMAL
EOSINOPHIL # BLD AUTO: 0.1 K/UL (ref 0–0.5)
EOSINOPHIL NFR BLD: 3 % (ref 0–8)
ERYTHROCYTE [DISTWIDTH] IN BLOOD BY AUTOMATED COUNT: 13.2 % (ref 11.5–14.5)
EST. GFR  (AFRICAN AMERICAN): 58 ML/MIN/1.73 M^2
EST. GFR  (NON AFRICAN AMERICAN): 50 ML/MIN/1.73 M^2
ESTIMATED AVG GLUCOSE: 108 MG/DL (ref 68–131)
GLUCOSE SERPL-MCNC: 193 MG/DL (ref 70–110)
HBA1C MFR BLD HPLC: 5.4 % (ref 4–5.6)
HCT VFR BLD AUTO: 41 % (ref 40–54)
HGB BLD-MCNC: 12.7 G/DL (ref 14–18)
IMM GRANULOCYTES # BLD AUTO: 0.04 K/UL (ref 0–0.04)
IMM GRANULOCYTES NFR BLD AUTO: 1.2 % (ref 0–0.5)
LDH SERPL L TO P-CCNC: 234 U/L (ref 110–260)
LYMPHOCYTES # BLD AUTO: 0.7 K/UL (ref 1–4.8)
LYMPHOCYTES NFR BLD: 21 % (ref 18–48)
MCH RBC QN AUTO: 28.5 PG (ref 27–31)
MCHC RBC AUTO-ENTMCNC: 31 G/DL (ref 32–36)
MCV RBC AUTO: 92 FL (ref 82–98)
MONOCYTES # BLD AUTO: 0.5 K/UL (ref 0.3–1)
MONOCYTES NFR BLD: 15.9 % (ref 4–15)
NEUTROPHILS # BLD AUTO: 1.9 K/UL (ref 1.8–7.7)
NEUTROPHILS NFR BLD: 58.3 % (ref 38–73)
NRBC BLD-RTO: 0 /100 WBC
PLATELET # BLD AUTO: 83 K/UL (ref 150–350)
PMV BLD AUTO: 12.1 FL (ref 9.2–12.9)
POTASSIUM SERPL-SCNC: 4.1 MMOL/L (ref 3.5–5.1)
PROT SERPL-MCNC: 7.5 G/DL (ref 6–8.4)
RBC # BLD AUTO: 4.45 M/UL (ref 4.6–6.2)
SODIUM SERPL-SCNC: 139 MMOL/L (ref 136–145)
WBC # BLD AUTO: 3.33 K/UL (ref 3.9–12.7)

## 2020-06-08 PROCEDURE — 80053 COMPREHEN METABOLIC PANEL: CPT

## 2020-06-08 PROCEDURE — 83615 LACTATE (LD) (LDH) ENZYME: CPT

## 2020-06-08 PROCEDURE — 83036 HEMOGLOBIN GLYCOSYLATED A1C: CPT

## 2020-06-08 PROCEDURE — 36415 COLL VENOUS BLD VENIPUNCTURE: CPT

## 2020-06-08 PROCEDURE — 85025 COMPLETE CBC W/AUTO DIFF WBC: CPT

## 2020-06-09 ENCOUNTER — OFFICE VISIT (OUTPATIENT)
Dept: FAMILY MEDICINE | Facility: CLINIC | Age: 85
End: 2020-06-09
Payer: MEDICARE

## 2020-06-09 VITALS
OXYGEN SATURATION: 97 % | DIASTOLIC BLOOD PRESSURE: 76 MMHG | RESPIRATION RATE: 18 BRPM | BODY MASS INDEX: 27.75 KG/M2 | SYSTOLIC BLOOD PRESSURE: 129 MMHG | WEIGHT: 198.19 LBS | TEMPERATURE: 98 F | HEIGHT: 71 IN | HEART RATE: 55 BPM

## 2020-06-09 DIAGNOSIS — R04.2 HEMOPTYSIS: ICD-10-CM

## 2020-06-09 DIAGNOSIS — N18.30 STAGE 3 CHRONIC KIDNEY DISEASE: ICD-10-CM

## 2020-06-09 DIAGNOSIS — D69.6 THROMBOCYTOPENIA: ICD-10-CM

## 2020-06-09 DIAGNOSIS — I10 ESSENTIAL HYPERTENSION: ICD-10-CM

## 2020-06-09 DIAGNOSIS — D64.9 ANEMIA, UNSPECIFIED TYPE: ICD-10-CM

## 2020-06-09 DIAGNOSIS — I25.10 CORONARY ARTERY DISEASE, ANGINA PRESENCE UNSPECIFIED, UNSPECIFIED VESSEL OR LESION TYPE, UNSPECIFIED WHETHER NATIVE OR TRANSPLANTED HEART: ICD-10-CM

## 2020-06-09 DIAGNOSIS — D61.818 PANCYTOPENIA: ICD-10-CM

## 2020-06-09 DIAGNOSIS — R93.89 ABNORMAL CT OF THE CHEST: ICD-10-CM

## 2020-06-09 DIAGNOSIS — R00.1 BRADYCARDIA: Primary | ICD-10-CM

## 2020-06-09 PROCEDURE — 99213 OFFICE O/P EST LOW 20 MIN: CPT | Mod: PBBFAC,PO | Performed by: INTERNAL MEDICINE

## 2020-06-09 PROCEDURE — 99999 PR PBB SHADOW E&M-EST. PATIENT-LVL III: CPT | Mod: PBBFAC,,, | Performed by: INTERNAL MEDICINE

## 2020-06-09 PROCEDURE — 99214 PR OFFICE/OUTPT VISIT, EST, LEVL IV, 30-39 MIN: ICD-10-PCS | Mod: S$PBB,,, | Performed by: INTERNAL MEDICINE

## 2020-06-09 PROCEDURE — 99999 PR PBB SHADOW E&M-EST. PATIENT-LVL III: ICD-10-PCS | Mod: PBBFAC,,, | Performed by: INTERNAL MEDICINE

## 2020-06-09 PROCEDURE — 99214 OFFICE O/P EST MOD 30 MIN: CPT | Mod: S$PBB,,, | Performed by: INTERNAL MEDICINE

## 2020-06-09 RX ORDER — CARVEDILOL 6.25 MG/1
6.25 TABLET ORAL 2 TIMES DAILY WITH MEALS
Qty: 180 TABLET | Refills: 12 | Status: SHIPPED | OUTPATIENT
Start: 2020-06-09 | End: 2021-06-29

## 2020-06-09 NOTE — PROGRESS NOTES
Chief complaint: Follow-up on diabetes, chest x-ray coughing up blood    84-year-old black male had a little bit of a cough earlier this year.  At the end of May he coughed up a little bit of blood and shows me a picture was mucus that appear to be reddish but not dominguez blood.  He only had a couple of occasions and has not had any since.  He feels fine.  He never really had any shortness of breath just a little bit of a cough, no fever.  He did go to urgent care and I communicated back and forth with them.  His chest x-ray showed some diffuse infiltrates and he did have a CT scan which again showed suggestion of pulmonary edema.  He really has no orthopnea PND or edema.  He did see cardiology ordered an echo which is set.  He is on digital hypertension and we reviewed his blood pressure and pulse readings.  His pulses mostly in the 40s and 50s occasionally up to 60.  He had his carvedilol reduced from 12.5-6.25 twice a day.  His pulse still appears to be mostly in the 50s.  He is asymptomatic.  We discussed that if symptomatic we would need to eliminate the carvedilol and assess response.  Reviewed all his other labs.  With the Lasix he did not have any worsening renal insufficiency.  He has an appointment with Hematology tomorrow.  His platelet count appears stable.      Seen in  when sent note to me:  I did speak with pt nephrologist about his situation. She recommends lasix as a choice for diuretic and agrees ct suggests chf. She recommend cardiology eval as well.   I will refer to cardiology. Also will start lasix and repeat cmp and BNP in 1 week. Also recommend pt follow with his PCP .   Pt notified of all this and he agrees to this plan.     Chest CT  Impression       1. CT findings favoring sequela of cardiac decompensation causing pulmonary edema and bilateral pleural effusions.  Superimposed COVID-19 pneumonia cannot be entirely excluded noting that pleural effusions is not a commonly reported finding and  therefore would be atypical.  2. Persistent soft tissue attenuation nodule within the prevascular mediastinum, presumably an enlarged lymph node and unchanged when compared to previous PET-CTs.  3. Slight interval increased size of multiple mediastinal lymph nodes, nonspecific.  4. Cardiomegaly with severe dense coronary artery atherosclerosis in a 3 vessel distribution.             here to follow-up on diabetes although did  not have A1c prior. Reviewed all his labs and abnormalities.   his A1c was 7.4 in 2/19 then 6.6, 6.8  In 8/19. Eating poorly.  He has some chronic renal insufficiency. Seen renal .   He had a slight hemoglobin reduction which appears chronic and fluctuating clinical of last year or so with slight thrombocytopenia.  We discussed all those issues and the need to monitor them over time.  Is otherwise feeling well.       His blood pressure was actually running a little bit low and so back in September he stopped his 3 times per week hydrochlorothiazide.  He takes the Coreg 25 mg in the morning but he cut the evening to 1/2 a pill.  We discussed eliminating the half pill in the evening and monitoring blood pressure.  It may be more convenient than taking half pill.  He continues on the valsartan 320.      Counseled at length regarding all these age-related issues, monitoring and so forth.Total time over 25 minutes with over 50% counseling.  .     .  Reviewed that he had a normal colonoscopy 2017 outside the clinic.      We also reviewed his chronic renal failure.  His creatinine in December did go up to the highest it has been in years to 1.5.  Although slight increase we discussed and he is not using any anti-inflammatories, at that time he did not have any volume loss or dehydration.  We will reassess today.  Will check his lipids as well for the year.  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 EYES: no vision change. ENT: no sore throat. CV: no chest pain w/ exertion.  RESP: no shortness of breath. GI: no nausea, vomiting, diarrhea. No dysphagia. : no urinary issues. MUSCULOSKELETAL: no new myalgias or arthralgias. SKIN: no new changes. NEURO: no focal deficits. PSYCH: no new issues. ENDOCRINE: no polyuria. HEME: no lymph nodes. ALLERGY: no general pruritis.       Past medical history:  1.  Diabetes with renal disease and retinopathy  2.  Hypertension  3.  Hyperlipidemia  4.  Chronic renal failure stage III  5.  History of colon polyp, normal scope March 2012, normal 2017-- 5 yrs----GI at [facility]- Dr Kumari  6.  Hyperuricemia and gout  7.  History of sciatica and lumbar disc disease remotely  8.  Erectile dysfunction  9.  Macular degeneration, followed by outside retina specialist  10.  GERD  11.  Low HDL  12.  Inflammatory arthritis of the hands, seeing rheumatology  13.  Bilateral cervical radiculopathy and axonal neuropathy, to have surgery 2015    14.  Followed by outside urology at [facility] Dr. noriega   15.  TKA  -angie Han  who is at [facility]  16.  Prevnar given 2017  17. 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    Family history: Mother with hypertension and diabetes.  Father's history is unknown.  One brother who is okay.    Social history: Retired, quit smoking 1974.   with 3 children.  Drinks socially.    Vital signs as above  Gen: no distress  EYES: conjunctiva clear, non-icteric, PERRL  ENT: nose clear, nasal mucosa normal, oropharynx clear and moist, teeth good  NECK:supple, thyroid non-palpable  RESP: effort is good, lungs clear  CV: heart RRR w/o murmur, gallops or rubs; no carotid bruits, no edema  GI: abdomen soft, non-distended, non-tender,  MS: gait normal, no clubbing or cyanosis of the digits  SKIN: no rashes, warm to touch    Bria was seen today for cough.    Diagnoses and all orders for this visit:    Bradycardia,  "asymptomatic and he did have a reduction in his carvedilol.  Plan as above    Diabetes mellitus with renal manifestations, uncontrolled, chronic and stable    Uncontrolled type 2 diabetes mellitus with stage 3 chronic kidney disease, without long-term current use of insulin    Essential hypertension  -     carvediloL (COREG) 6.25 MG tablet; Take 1 tablet (6.25 mg total) by mouth 2 (two) times daily with meals.    Thrombocytopenia, followed by hematology    Stage 3 chronic kidney disease, stable even with use of Lasix    Pancytopenia    Anemia, unspecified type    Hemoptysis    Abnormal CT of the chest, being worked up for CHF although his symptoms do not correlate.  Perhaps he had some transient viral infection.  His COVID testing was negative but discussed that could be false negative and about 14 days later from his initial negative testing and resolution of symptoms we discussed doing an antibody test and I put that order in and he will get it done with his next scheduled blood work  -     COVID-19 (SARS CoV-2) IgG Antibody; Future    Coronary artery disease, angina presence unspecified, unspecified vessel or lesion type, unspecified whether native or transplanted heart            Clinical note will be sensitive so as to not cause confusion regarding evaluation and management issues""This note will not be shared with the patient."    "

## 2020-06-09 NOTE — PROGRESS NOTES
Chief Complaint :   Low  Grade Lymphoma involving Bone Marrow.    Hx of Present illness :  Patient is a 84 y.o. year old male who presents to the clinic today for   Oncology Followup. Evaluated for Thrombocytopenia. 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   Has completed one four week ciourse of rituxan Monotherapy. About ten days ago went to Urgent care with Sx of Hemoptysis. CT Chest revealed cardiomegaly, cardiac decompensation, and slightly enlarged mediastinal nodes.  Has itching due to Lichen Planus.    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    Transfusion :  None    Menstrual & obstetric Hx :  N/A        Present Meds :   Medication List with Changes/Refills   Current Medications    ALLOPURINOL (ZYLOPRIM) 100 MG TABLET    TAKE 1 TABLET(100 MG) BY MOUTH EVERY DAY    AMLODIPINE (NORVASC) 10 MG TABLET    TAKE 1 TABLET(10 MG) BY MOUTH EVERY DAY    AMOXICILLIN (AMOXIL) 500 MG CAPSULE        ASCORBIC ACID (VITAMIN C) 1000 MG TABLET    Take 1,000 mg by mouth once daily.    ASPIRIN 81 MG CHEW    CHEW AND SWALLOW 1 TABLET BY MOUTH DAILY    ATORVASTATIN (LIPITOR) 20 MG TABLET    TAKE 1 TABLET(20 MG) BY MOUTH EVERY DAY    BLOOD SUGAR DIAGNOSTIC STRP    1 strip by Misc.(Non-Drug; Combo Route) route 2 (two) times daily. Disp glucometer and lancets as well    CARVEDILOL (COREG) 12.5 MG TABLET    Take 1 tablet (12.5 mg total) by mouth 2 (two) times daily with meals.    CLOBETASOL (TEMOVATE) 0.05 % CREAM        FLUOCINONIDE 0.05% (LIDEX) 0.05 % CREAM    APPLY AA BID    FOLIC ACID (FOLVITE) 800 MCG TAB    Take 800 mcg by mouth once daily.    FUROSEMIDE (LASIX) 20 MG TABLET    Take 0.5 tablets (10 mg total) by mouth once daily.    GABAPENTIN (NEURONTIN) 300 MG CAPSULE    TAKE 1 CAPSULE(300 MG) BY MOUTH THREE TIMES DAILY    GLIPIZIDE (GLUCOTROL) 10 MG TR24    TAKE 1 TABLET(10 MG) BY MOUTH EVERY DAY    MOMETASONE 0.1% (ELOCON) 0.1 % CREAM    APPLY  TO AFFECTED AREA QD    MULTIVIT-MIN/FA/LYCOPEN/LUTEIN (CENTRUM SILVER MEN ORAL)    Take by mouth.    NIACIN 500 MG TAB    Take 100 mg by mouth every evening.    OMEPRAZOLE (PRILOSEC) 40 MG CAPSULE    TAKE 1 CAPSULE DAILY    SITAGLIPTIN (JANUVIA) 100 MG TAB    Take 1 tablet (100 mg total) by mouth once daily.    SOOLANTRA 1 % CREA        TRIAMCINOLONE ACETONIDE 0.1% (KENALOG) 0.1 % OINTMENT        TRUEPLUS LANCETS 33 GAUGE MISC    USE TO TEST BLOOD SUGAR BID    TURMERIC ROOT EXTRACT ORAL    Take by mouth.    VALSARTAN (DIOVAN) 320 MG TABLET    TAKE 1 TABLET ONCE DAILY    VITAMIN E 400 UNIT CAPSULE    Take 400 Units by mouth once daily.       Past Medical Hx :   Hypertension; DM; Hyperlipidemia; Chronic atrial fibrillation; Macular degeneration, Lichen planus..CKD 3; CAD.GERD; Peripheral Neuropathy.  Known to have Rheumatoid arthritis, on no Rx  No Hx of TB, , Lupus, sarcoid, Seizure disorder or CVA. No Hx of DVT or PE./ No past Hx of cancer, chemotherapy or radiation therapy.    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    Immunization :  Immunization History   Administered Date(s) Administered    Influenza 10/19/2018    Influenza - High Dose - PF (65 years and older) 11/03/2017, 10/19/2018, 10/11/2019    Pneumococcal Conjugate - 13 Valent 06/27/2017    Td (ADULT) 11/18/2005       Family Hx :  Family History   Problem Relation Age  of Onset    No Known Problems Mother     No Known Problems Father     No Known Problems Sister     No Known Problems Brother     No Known Problems Maternal Aunt     No Known Problems Maternal Uncle     No Known Problems Paternal Aunt     No Known Problems Paternal Uncle     No Known Problems Maternal Grandmother     No Known Problems Maternal Grandfather     No Known Problems Paternal Grandmother     No Known Problems Paternal Grandfather     Amblyopia Neg Hx     Blindness Neg Hx     Cancer Neg Hx     Cataracts Neg Hx     Diabetes Neg Hx     Glaucoma Neg Hx     Hypertension Neg Hx     Macular degeneration Neg Hx     Retinal detachment Neg Hx     Strabismus Neg Hx     Stroke Neg Hx     Thyroid disease Neg Hx        Social Hx :  Social History     Socioeconomic History    Marital status:      Spouse name: Not on file    Number of children: Not on file    Years of education: Not on file    Highest education level: Not on file   Occupational History    Not on file   Social Needs    Financial resource strain: Not hard at all    Food insecurity:     Worry: Never true     Inability: Never true    Transportation needs:     Medical: No     Non-medical: No   Tobacco Use    Smoking status: Former Smoker    Smokeless tobacco: Never Used   Substance and Sexual Activity    Alcohol use: Yes     Alcohol/week: 0.0 standard drinks     Frequency: 2-4 times a month     Drinks per session: 1 or 2     Binge frequency: Never     Comment: Social    Drug use: No    Sexual activity: Not on file   Lifestyle    Physical activity:     Days per week: Not on file     Minutes per session: Not on file    Stress: Not on file   Relationships    Social connections:     Talks on phone: More than three times a week     Gets together: Once a week     Attends Church service: 1 to 4 times per year     Active member of club or organization: No     Attends meetings of clubs or organizations: Never      Relationship status:    Other Topics Concern    Not on file   Social History Narrative    Not on file       Surgery :  Bilateral Knee replacement; Bilateral Cataract surgery.    Symptoms :   No new Sx.    Review of Systems   Constitutional: Negative for chills, diaphoresis, fever, malaise/fatigue and weight loss.        . Has lost a few pounds.  No fever, pruritis,  or evening rise in temp.  When humidity is high has night sweats.   HENT: Positive for congestion. Negative for hearing loss, nosebleeds, sore throat and tinnitus.    Eyes: Negative for blurred vision, double vision and photophobia.        Wears glasses   Respiratory: Negative for cough, hemoptysis, shortness of breath and wheezing.    Cardiovascular: Negative.  Negative for chest pain, palpitations, orthopnea, claudication, leg swelling and PND.   Gastrointestinal: Negative for abdominal pain, blood in stool, constipation, diarrhea, heartburn, nausea and vomiting.   Genitourinary: Negative.  Negative for dysuria, flank pain, frequency, hematuria and urgency.   Musculoskeletal: Negative.  Negative for back pain, falls, joint pain, myalgias and neck pain.   Skin: Positive for rash. Negative for itching.        Saw Dermatologist for Rosacea few weeks ago.   Neurological: Negative for dizziness, tingling, sensory change and headaches.   Endo/Heme/Allergies: Negative for environmental allergies. Does not bruise/bleed easily.   Psychiatric/Behavioral: Negative for depression and memory loss. The patient is not nervous/anxious and does not have insomnia.        Physical Exam :  Looks younger than the stated age.  Physical Exam   Constitutional: He is oriented to person, place, and time and well-developed, well-nourished, and in no distress. Vital signs are normal. No distress.   HENT:   Head: Normocephalic and atraumatic.   Right Ear: External ear normal.   Left Ear: External ear normal.   Nose: Nose normal.   Mouth/Throat: Oropharynx is clear and  moist. No oropharyngeal exudate.   Eyes: Pupils are equal, round, and reactive to light. Conjunctivae, EOM and lids are normal. Lids are everted and swept, no foreign bodies found. Right eye exhibits no discharge. Left eye exhibits no discharge. No scleral icterus.       Neck: Trachea normal, normal range of motion, full passive range of motion without pain and phonation normal. Neck supple. Normal carotid pulses, no hepatojugular reflux and no JVD present. No tracheal tenderness present. Carotid bruit is not present. No tracheal deviation present. No thyroid mass and no thyromegaly present.   Cardiovascular: Normal rate, regular rhythm, normal heart sounds, intact distal pulses and normal pulses. PMI is not displaced. Exam reveals no gallop and no friction rub.   No murmur heard.  Pulmonary/Chest: Effort normal and breath sounds normal. No stridor. No apnea. No respiratory distress. He has no wheezes. He has no rales. He exhibits no tenderness.   Abdominal: Soft. Normal appearance, normal aorta and bowel sounds are normal. He exhibits no distension, no ascites and no mass. There is no hepatosplenomegaly. There is no tenderness. There is no rebound, no guarding and no CVA tenderness. No hernia.   Genitourinary:   Genitourinary Comments: Not examined   Musculoskeletal: Normal range of motion. He exhibits no edema, tenderness or deformity.   Lymphadenopathy:        Head (right side): No submental, no submandibular, no tonsillar, no preauricular, no posterior auricular and no occipital adenopathy present.        Head (left side): No submental, no submandibular, no tonsillar, no preauricular, no posterior auricular and no occipital adenopathy present.     He has no cervical adenopathy.     He has no axillary adenopathy.        Right: No inguinal, no supraclavicular and no epitrochlear adenopathy present.        Left: No inguinal, no supraclavicular and no epitrochlear adenopathy present.   Neurological: He is alert and  oriented to person, place, and time. He has normal motor skills, normal sensation, normal strength, normal reflexes and intact cranial nerves. No cranial nerve deficit. He exhibits normal muscle tone. Gait normal. Coordination normal. GCS score is 15.   Skin: Skin is warm, dry and intact. No rash noted. He is not diaphoretic. No cyanosis or erythema. No pallor. Nails show no clubbing.   Psychiatric: Mood, memory, affect and judgment normal.   No New finding.     Labs & Imaging :  06/08/2020 :  ; NFBS 109; Cr. 1.3; eGFR 50; ca 9.3. Bili 1.3; Liver enzymes normal. Hgb 12.7; Hct 41.0; MCV 92; ; Platelets 83,000. ANC 1,000.       03/09/2020 : NFBS 204;  Cr 1.4; Ca 8.9; Bili  1.0; Liver enzymes normal. eGFR 46; Hgb 12.2; Hct 38.5; MCV 93;  Platelets 59,000. ANC 4,800.      Dx :  .   Chronic  thrombocytopenia  Low grade  Lymphoma Involving  Bone Marrow, Incidental finding.  Poorly Controlled DM.      Assessment & Plan : Reviewed with patient .  . Blood sugar followup with PCP.   Schedule PET/CT in view  Of increasing mediastinal nodes. Re evaluate with results. . Patient understands and verbalised.         Advance Care Planning     Power of   I initiated the process of advance care planning today and explained the importance of this process to the patient.  I introduced the concept of advance directives to the patient, as well. Then the patient received detailed information about the importance of designating a Health Care Power of  (HCPOA). He was also instructed to communicate with this person about their wishes for future healthcare, should he become sick and lose decision-making capacity. The patient  Has/has not:previously appointed a HCPOA. After our discussion, the patient Has decided to  Talk to hs wife         Living Will  During this visit, I engaged the patient in the advance care planning process.  The patient and I reviewed the role for advance directives and their purpose in directing  future healthcare if the patient's unable to speak for him/herself.  At this point in time, the patient does have full decision-making capacity.  We discussed different extreme health states that he could experience, and reviewed what kind of medical care he would want in those situations.  The patient communicated that if he were comatose and had little chance of a meaningful recovery, he  Does not want machines/life-sustaining treatments used.    The patient HAS NOT completed a living will to reflect these preferences.  The patient   HAS NOT already designated a healthcare power of  to make decisions on his behalf.         Papers given to patient.

## 2020-06-11 ENCOUNTER — OFFICE VISIT (OUTPATIENT)
Dept: HEMATOLOGY/ONCOLOGY | Facility: CLINIC | Age: 85
End: 2020-06-11
Payer: MEDICARE

## 2020-06-11 VITALS
HEART RATE: 60 BPM | TEMPERATURE: 98 F | WEIGHT: 199.5 LBS | DIASTOLIC BLOOD PRESSURE: 79 MMHG | HEIGHT: 71 IN | BODY MASS INDEX: 27.93 KG/M2 | SYSTOLIC BLOOD PRESSURE: 176 MMHG | OXYGEN SATURATION: 97 %

## 2020-06-11 DIAGNOSIS — M06.9 RHEUMATOID ARTHRITIS, INVOLVING UNSPECIFIED SITE, UNSPECIFIED RHEUMATOID FACTOR PRESENCE: ICD-10-CM

## 2020-06-11 DIAGNOSIS — C85.19 B-CELL LYMPHOMA OF EXTRANODAL SITE: Primary | ICD-10-CM

## 2020-06-11 DIAGNOSIS — D69.6 THROMBOCYTOPENIA: ICD-10-CM

## 2020-06-11 PROCEDURE — 99213 OFFICE O/P EST LOW 20 MIN: CPT | Mod: PBBFAC | Performed by: INTERNAL MEDICINE

## 2020-06-11 PROCEDURE — 99213 OFFICE O/P EST LOW 20 MIN: CPT | Mod: S$PBB,,, | Performed by: INTERNAL MEDICINE

## 2020-06-11 PROCEDURE — 99999 PR PBB SHADOW E&M-EST. PATIENT-LVL III: CPT | Mod: PBBFAC,,, | Performed by: INTERNAL MEDICINE

## 2020-06-11 PROCEDURE — 99999 PR PBB SHADOW E&M-EST. PATIENT-LVL III: ICD-10-PCS | Mod: PBBFAC,,, | Performed by: INTERNAL MEDICINE

## 2020-06-11 PROCEDURE — 99213 PR OFFICE/OUTPT VISIT, EST, LEVL III, 20-29 MIN: ICD-10-PCS | Mod: S$PBB,,, | Performed by: INTERNAL MEDICINE

## 2020-06-12 ENCOUNTER — TELEPHONE (OUTPATIENT)
Dept: NEPHROLOGY | Facility: CLINIC | Age: 85
End: 2020-06-12

## 2020-06-18 ENCOUNTER — PATIENT OUTREACH (OUTPATIENT)
Dept: OTHER | Facility: OTHER | Age: 85
End: 2020-06-18

## 2020-06-18 NOTE — PROGRESS NOTES
LVM to discuss BP & BG readings and medications.     A1c was repeated and is well controlled and meeting goal of <7%  BP remains controlled and near goal of <130/80.     Follow up on medication adjustment made with last outreach - carvedilol dose reduced to 12.5 mg BID. Dose now further reduced by PCP to 6.25 mg BID. Unclear if patient is taking once daily. Will inquire if patient is compliant with change and tolerating well.     Sees cardiology tomorrow.     Last 5 Patient Entered Readings                                      Current 30 Day Average: 132/62     Recent Readings 6/16/2020 6/16/2020 6/11/2020 6/11/2020 6/9/2020    SBP (mmHg) 121 127 157 159 146    DBP (mmHg) 54 58 68 70 66    Pulse 57 57 53 55 52        Hypertension Medications             amLODIPine (NORVASC) 10 MG tablet TAKE 1 TABLET(10 MG) BY MOUTH EVERY DAY    carvediloL (COREG) 6.25 MG tablet Take 1 tablet (6.25 mg total) by mouth 2 (two) times daily with meals.    furosemide (LASIX) 20 MG tablet Take 0.5 tablets (10 mg total) by mouth once daily.    valsartan (DIOVAN) 320 MG tablet TAKE 1 TABLET ONCE DAILY

## 2020-06-19 ENCOUNTER — HOSPITAL ENCOUNTER (OUTPATIENT)
Dept: CARDIOLOGY | Facility: HOSPITAL | Age: 85
Discharge: HOME | End: 2020-06-19
Attending: INTERNAL MEDICINE
Payer: MEDICARE

## 2020-06-19 DIAGNOSIS — I10 ESSENTIAL HYPERTENSION: ICD-10-CM

## 2020-06-19 DIAGNOSIS — I25.10 CORONARY ARTERY CALCIFICATION SEEN ON CAT SCAN: ICD-10-CM

## 2020-06-19 DIAGNOSIS — J81.0 ACUTE PULMONARY EDEMA: ICD-10-CM

## 2020-06-19 LAB
AORTIC ROOT ANNULUS: 3.06 CM
AORTIC VALVE CUSP SEPERATION: 1.88 CM
ASCENDING AORTA: 3.1 CM
AV INDEX (PROSTH): 0.94
AV MEAN GRADIENT: 3 MMHG
AV PEAK GRADIENT: 7 MMHG
AV VALVE AREA: 2.92 CM2
AV VELOCITY RATIO: 0.86
CV ECHO LV RWT: 0.41 CM
DOP CALC AO PEAK VEL: 1.28 M/S
DOP CALC AO VTI: 31.69 CM
DOP CALC LVOT AREA: 3.1 CM2
DOP CALC LVOT DIAMETER: 1.99 CM
DOP CALC LVOT PEAK VEL: 1.1 M/S
DOP CALC LVOT STROKE VOLUME: 92.55 CM3
DOP CALCLVOT PEAK VEL VTI: 29.77 CM
E WAVE DECELERATION TIME: 267.28 MSEC
E/A RATIO: 1.15
E/E' RATIO: 13.07 M/S
ECHO LV POSTERIOR WALL: 1.21 CM (ref 0.6–1.1)
FRACTIONAL SHORTENING: 43 % (ref 28–44)
INTERVENTRICULAR SEPTUM: 1.2 CM (ref 0.6–1.1)
IVRT: 73.82 MSEC
LA MAJOR: 6.54 CM
LA MINOR: 6.46 CM
LA WIDTH: 5.29 CM
LEFT ATRIUM SIZE: 5.42 CM
LEFT ATRIUM VOLUME: 158.41 CM3
LEFT INTERNAL DIMENSION IN SYSTOLE: 3.34 CM (ref 2.1–4)
LEFT VENTRICLE DIASTOLIC VOLUME: 173.13 ML
LEFT VENTRICLE SYSTOLIC VOLUME: 45.6 ML
LEFT VENTRICULAR INTERNAL DIMENSION IN DIASTOLE: 5.9 CM (ref 3.5–6)
LEFT VENTRICULAR MASS: 307.17 G
LV LATERAL E/E' RATIO: 10.89 M/S
LV SEPTAL E/E' RATIO: 16.33 M/S
MV PEAK A VEL: 0.85 M/S
MV PEAK E VEL: 0.98 M/S
MV STENOSIS PRESSURE HALF TIME: 120.56 MS
MV VALVE AREA P 1/2 METHOD: 1.82 CM2
PISA TR MAX VEL: 2.84 M/S
PV PEAK VELOCITY: 1.16 CM/S
RA MAJOR: 5.83 CM
RA PRESSURE: 3 MMHG
RA WIDTH: 4.68 CM
RIGHT VENTRICULAR END-DIASTOLIC DIMENSION: 4.27 CM
RV TISSUE DOPPLER FREE WALL SYSTOLIC VELOCITY 1 (APICAL 4 CHAMBER VIEW): 9.63 CM/S
SINUS: 3.18 CM
STJ: 2.42 CM
TDI LATERAL: 0.09 M/S
TDI SEPTAL: 0.06 M/S
TDI: 0.08 M/S
TR MAX PG: 32 MMHG
TRICUSPID ANNULAR PLANE SYSTOLIC EXCURSION: 1.98 CM
TV REST PULMONARY ARTERY PRESSURE: 35 MMHG

## 2020-06-19 PROCEDURE — 93306 TTE W/DOPPLER COMPLETE: CPT | Mod: 26,,, | Performed by: INTERNAL MEDICINE

## 2020-06-19 PROCEDURE — 93306 ECHO (CUPID ONLY): ICD-10-PCS | Mod: 26,,, | Performed by: INTERNAL MEDICINE

## 2020-06-19 PROCEDURE — 93306 TTE W/DOPPLER COMPLETE: CPT

## 2020-06-26 ENCOUNTER — HOSPITAL ENCOUNTER (OUTPATIENT)
Dept: RADIOLOGY | Facility: HOSPITAL | Age: 85
Discharge: HOME | End: 2020-06-26
Attending: INTERNAL MEDICINE
Payer: MEDICARE

## 2020-06-26 DIAGNOSIS — D69.6 THROMBOCYTOPENIA: ICD-10-CM

## 2020-06-26 DIAGNOSIS — C85.19 B-CELL LYMPHOMA OF EXTRANODAL SITE: ICD-10-CM

## 2020-06-26 PROCEDURE — A9552 F18 FDG: HCPCS

## 2020-06-26 PROCEDURE — 78815 PET IMAGE W/CT SKULL-THIGH: CPT | Mod: 26,PS,, | Performed by: RADIOLOGY

## 2020-06-26 PROCEDURE — 78815 NM PET CT ROUTINE: ICD-10-PCS | Mod: 26,PS,, | Performed by: RADIOLOGY

## 2020-06-26 PROCEDURE — 78815 PET IMAGE W/CT SKULL-THIGH: CPT | Mod: TC,PS

## 2020-06-29 PROCEDURE — 99454 REM MNTR PHYSIOL PARAM 16-30: CPT | Mod: PBBFAC,PO | Performed by: INTERNAL MEDICINE

## 2020-06-30 ENCOUNTER — EXTERNAL CHRONIC CARE MANAGEMENT (OUTPATIENT)
Dept: PRIMARY CARE CLINIC | Facility: CLINIC | Age: 85
End: 2020-06-30
Payer: MEDICARE

## 2020-06-30 PROCEDURE — 99490 CHRNC CARE MGMT STAFF 1ST 20: CPT | Mod: S$PBB,,, | Performed by: INTERNAL MEDICINE

## 2020-06-30 PROCEDURE — 99490 PR CHRONIC CARE MGMT, 1ST 20 MIN: ICD-10-PCS | Mod: S$PBB,,, | Performed by: INTERNAL MEDICINE

## 2020-06-30 PROCEDURE — 99490 CHRNC CARE MGMT STAFF 1ST 20: CPT | Mod: PBBFAC,PO | Performed by: INTERNAL MEDICINE

## 2020-07-01 NOTE — PROGRESS NOTES
Digital Medicine: Clinician Follow-Up    Called patient for routine follow up.     Early June coughing up blood - not much, but tinged. Went to urgent care. States his pulse was reduced at this visit as well as follow up cardiology which prompted a reduction in carvedilol dose.     He is tolerating the change well, no complaints. Today he is active, changing lightbulbs in his home.     Denies further bleeding, but does endorse 10-15 pound unintentional weight reduction. States he did discuss with his physician.   A1c repeat was obtained, very well controlled, but considering false low due to SMBG values being higher and recent bleed.     The history is provided by the patient.   Follow Up  Follow-up reason(s): reading review, medication change follow-up and routine education      Patient started new medication.    Is patient tolerating med change?:  Yes      INTERVENTION(S)  reviewed appropriate dose schedule, reviewed monitoring technique and encouragement/support    PLAN  patient verbalizes understanding, Health  follow up and continue monitoring    BP is near goal - no changes made today.   SMBG is slightly elevated on occasion, A1c is well controlled - consider false low.     Recommend repeat of A1c at 3 month wilma.     Will continue to monitor.       There are no preventive care reminders to display for this patient.    Last 5 Patient Entered Readings                                      Current 30 Day Average: 131/61     Recent Readings 6/29/2020 6/23/2020 6/23/2020 6/16/2020 6/16/2020    SBP (mmHg) 135 127 141 121 127    DBP (mmHg) 66 62 66 54 58    Pulse 48 50 51 57 57        Last 6 Patient Entered Readings                                          Most Recent A1c: 5.4% on 6/8/2020  (Goal: 7%)     Recent Readings 7/1/2020 6/30/2020 6/29/2020 6/27/2020 6/26/2020    Blood Glucose (mg/dL) 134 141 157 186 133           Hypertension Medications             amLODIPine (NORVASC) 10 MG tablet TAKE 1 TABLET(10  MG) BY MOUTH EVERY DAY    carvediloL (COREG) 6.25 MG tablet Take 1 tablet (6.25 mg total) by mouth 2 (two) times daily with meals.    furosemide (LASIX) 20 MG tablet Take 0.5 tablets (10 mg total) by mouth once daily.    valsartan (DIOVAN) 320 MG tablet TAKE 1 TABLET ONCE DAILY        Diabetes Medications             glipiZIDE (GLUCOTROL) 10 MG TR24 TAKE 1 TABLET(10 MG) BY MOUTH EVERY DAY    SITagliptin (JANUVIA) 100 MG Tab Take 1 tablet (100 mg total) by mouth once daily.                   Sleep Apnea Screening  Patient previously diagnosed with LORELEI He reports he is not currently using CPAP.

## 2020-07-01 NOTE — PROGRESS NOTES
Chief Complaint :   Low  Grade Lymphoma involving Bone Marrow.    Hx of Present illness :  Patient is a 84 y.o. year old male who presents to the clinic today for   Oncology Followup. Evaluated for Thrombocytopenia. 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   Has completed one four week ciourse of rituxan Monotherapy. About ten days ago went to Urgent care with Sx of Hemoptysis. CT Chest revealed cardiomegaly, cardiac decompensation, and slightly enlarged mediastinal nodes.  Has itching due to Lichen Planus.    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    Transfusion :  None    Menstrual & obstetric Hx :  N/A        Present Meds :   Medication List with Changes/Refills   Current Medications    ALLOPURINOL (ZYLOPRIM) 100 MG TABLET    TAKE 1 TABLET(100 MG) BY MOUTH EVERY DAY    AMLODIPINE (NORVASC) 10 MG TABLET    TAKE 1 TABLET(10 MG) BY MOUTH EVERY DAY    AMOXICILLIN (AMOXIL) 500 MG CAPSULE        ASCORBIC ACID (VITAMIN C) 1000 MG TABLET    Take 1,000 mg by mouth once daily.    ASPIRIN 81 MG CHEW    CHEW AND SWALLOW 1 TABLET BY MOUTH DAILY    ATORVASTATIN (LIPITOR) 20 MG TABLET    TAKE 1 TABLET(20 MG) BY MOUTH EVERY DAY    BLOOD SUGAR DIAGNOSTIC STRP    1 strip by Misc.(Non-Drug; Combo Route) route 2 (two) times daily. Disp glucometer and lancets as well    CARVEDILOL (COREG) 6.25 MG TABLET    Take 1 tablet (6.25 mg total) by mouth 2 (two) times daily with meals.    FOLIC ACID (FOLVITE) 800 MCG TAB    Take 800 mcg by mouth once daily.    FUROSEMIDE (LASIX) 20 MG TABLET    Take 0.5 tablets (10 mg total) by mouth once daily.    GABAPENTIN (NEURONTIN) 300 MG CAPSULE    TAKE 1 CAPSULE(300 MG) BY MOUTH THREE TIMES DAILY    GLIPIZIDE (GLUCOTROL) 10 MG TR24    TAKE 1 TABLET(10 MG) BY MOUTH EVERY DAY    MOMETASONE 0.1% (ELOCON) 0.1 % CREAM    APPLY TO AFFECTED AREA QD    MULTIVIT-MIN/FA/LYCOPEN/LUTEIN (CENTRUM SILVER MEN ORAL)    Take by mouth.     NIACIN 500 MG TAB    Take 100 mg by mouth every evening.    OMEPRAZOLE (PRILOSEC) 40 MG CAPSULE    TAKE 1 CAPSULE DAILY    SITAGLIPTIN (JANUVIA) 100 MG TAB    Take 1 tablet (100 mg total) by mouth once daily.    TRIAMCINOLONE ACETONIDE 0.1% (KENALOG) 0.1 % OINTMENT        TRUEPLUS LANCETS 33 GAUGE MISC    USE TO TEST BLOOD SUGAR BID    TURMERIC ROOT EXTRACT ORAL    Take by mouth.    VALSARTAN (DIOVAN) 320 MG TABLET    TAKE 1 TABLET ONCE DAILY    VITAMIN E 400 UNIT CAPSULE    Take 400 Units by mouth once daily.       Past Medical Hx :   Hypertension; DM; Hyperlipidemia; Chronic atrial fibrillation; Macular degeneration, Lichen planus..CKD 3; CAD.GERD; Peripheral Neuropathy.  Known to have Rheumatoid arthritis, on no Rx  No Hx of TB, , Lupus, sarcoid, Seizure disorder or CVA. No Hx of DVT or PE./ No past Hx of cancer, chemotherapy or radiation therapy.    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    Immunization :  Immunization History   Administered Date(s) Administered    Influenza 10/19/2018    Influenza - High Dose - PF (65 years and older) 11/03/2017, 10/19/2018, 10/11/2019    Pneumococcal Conjugate - 13 Valent 06/27/2017    Td (ADULT) 11/18/2005       Family Hx :  Family History   Problem Relation Age of Onset    No Known Problems Mother     No Known Problems Father     No Known Problems Sister     No Known Problems  Brother     No Known Problems Maternal Aunt     No Known Problems Maternal Uncle     No Known Problems Paternal Aunt     No Known Problems Paternal Uncle     No Known Problems Maternal Grandmother     No Known Problems Maternal Grandfather     No Known Problems Paternal Grandmother     No Known Problems Paternal Grandfather     Amblyopia Neg Hx     Blindness Neg Hx     Cancer Neg Hx     Cataracts Neg Hx     Diabetes Neg Hx     Glaucoma Neg Hx     Hypertension Neg Hx     Macular degeneration Neg Hx     Retinal detachment Neg Hx     Strabismus Neg Hx     Stroke Neg Hx     Thyroid disease Neg Hx        Social Hx :  Social History     Socioeconomic History    Marital status:      Spouse name: Not on file    Number of children: Not on file    Years of education: Not on file    Highest education level: Not on file   Occupational History    Not on file   Social Needs    Financial resource strain: Not hard at all    Food insecurity     Worry: Never true     Inability: Never true    Transportation needs     Medical: No     Non-medical: No   Tobacco Use    Smoking status: Former Smoker    Smokeless tobacco: Never Used   Substance and Sexual Activity    Alcohol use: Yes     Alcohol/week: 0.0 standard drinks     Frequency: 2-4 times a month     Drinks per session: 1 or 2     Binge frequency: Never     Comment: Social    Drug use: No    Sexual activity: Not on file   Lifestyle    Physical activity     Days per week: Not on file     Minutes per session: Not on file    Stress: Not on file   Relationships    Social connections     Talks on phone: More than three times a week     Gets together: Once a week     Attends Scientologist service: 1 to 4 times per year     Active member of club or organization: No     Attends meetings of clubs or organizations: Never     Relationship status:    Other Topics Concern    Not on file   Social History Narrative    Not on file       Surgery :   Bilateral Knee replacement; Bilateral Cataract surgery.    Symptoms :   No new Sx.    Review of Systems   Constitutional: Negative for chills, diaphoresis, fever, malaise/fatigue and weight loss.        . Has lost a few pounds.  No fever, pruritis,  or evening rise in temp.  When humidity is high has night sweats.   HENT: Positive for congestion. Negative for hearing loss, nosebleeds, sore throat and tinnitus.    Eyes: Negative for blurred vision, double vision and photophobia.        Wears glasses   Respiratory: Negative for cough, hemoptysis, shortness of breath and wheezing.    Cardiovascular: Negative.  Negative for chest pain, palpitations, orthopnea, claudication, leg swelling and PND.   Gastrointestinal: Negative for abdominal pain, blood in stool, constipation, diarrhea, heartburn, nausea and vomiting.   Genitourinary: Negative.  Negative for dysuria, flank pain, frequency, hematuria and urgency.   Musculoskeletal: Negative.  Negative for back pain, falls, joint pain, myalgias and neck pain.   Skin: Positive for rash. Negative for itching.        Saw Dermatologist for Rosacea few weeks ago.   Neurological: Negative for dizziness, tingling, sensory change and headaches.   Endo/Heme/Allergies: Negative for environmental allergies. Does not bruise/bleed easily.   Psychiatric/Behavioral: Negative for depression and memory loss. The patient is not nervous/anxious and does not have insomnia.    No new Sx    Physical Exam :    Physical Exam   Constitutional: He is oriented to person, place, and time and well-developed, well-nourished, and in no distress. Vital signs are normal. No distress.   HENT:   Head: Normocephalic and atraumatic.   Right Ear: External ear normal.   Left Ear: External ear normal.   Nose: Nose normal.   Mouth/Throat: Oropharynx is clear and moist. No oropharyngeal exudate.   Eyes: Pupils are equal, round, and reactive to light. Conjunctivae, EOM and lids are normal. Lids are everted and swept,  no foreign bodies found. Right eye exhibits no discharge. Left eye exhibits no discharge. No scleral icterus.       Neck: Trachea normal, normal range of motion, full passive range of motion without pain and phonation normal. Neck supple. Normal carotid pulses, no hepatojugular reflux and no JVD present. No tracheal tenderness present. Carotid bruit is not present. No tracheal deviation present. No thyroid mass and no thyromegaly present.   Cardiovascular: Normal rate, regular rhythm, normal heart sounds, intact distal pulses and normal pulses. PMI is not displaced. Exam reveals no gallop and no friction rub.   No murmur heard.  Pulmonary/Chest: Effort normal and breath sounds normal. No stridor. No apnea. No respiratory distress. He has no wheezes. He has no rales. He exhibits no tenderness.   Abdominal: Soft. Normal appearance, normal aorta and bowel sounds are normal. He exhibits no distension, no ascites and no mass. There is no hepatosplenomegaly. There is no abdominal tenderness. There is no rebound, no guarding and no CVA tenderness. No hernia.   Genitourinary:    Genitourinary Comments: Not examined     Musculoskeletal: Normal range of motion.         General: No tenderness, deformity or edema.   Lymphadenopathy:        Head (right side): No submental, no submandibular, no tonsillar, no preauricular, no posterior auricular and no occipital adenopathy present.        Head (left side): No submental, no submandibular, no tonsillar, no preauricular, no posterior auricular and no occipital adenopathy present.     He has no cervical adenopathy.     He has no axillary adenopathy.        Right: No supraclavicular and no epitrochlear adenopathy present.        Left: No supraclavicular and no epitrochlear adenopathy present.   Neurological: He is alert and oriented to person, place, and time. He has normal motor skills, normal sensation, normal strength, normal reflexes and intact cranial nerves. No cranial nerve  deficit. He exhibits normal muscle tone. Gait normal. Coordination normal. GCS score is 15.   Skin: Skin is warm, dry and intact. No rash noted. He is not diaphoretic. No cyanosis or erythema. No pallor. Nails show no clubbing.   Psychiatric: Mood, memory, affect and judgment normal.   No New finding.     Labs & Imaging :  06/26/2020 : PET/CT : No hypermetabolic activity to suggest Lymphoma.  Non Hypermetabolic Prevascular nodule Unchanged.       06/08/2020 :  ; NFBS 109; Cr. 1.3; eGFR 50; ca 9.3. Bili 1.3; Liver enzymes normal. Hgb 12.7; Hct 41.0; MCV 92; ; Platelets 83,000. ANC 1,000.       03/09/2020 : NFBS 204;  Cr 1.4; Ca 8.9; Bili  1.0; Liver enzymes normal. eGFR 46; Hgb 12.2; Hct 38.5; MCV 93;  Platelets 59,000. ANC 4,800.      Dx :  .   Chronic  thrombocytopenia  Low grade  Lymphoma Involving  Bone Marrow, Incidental finding.  Poorly Controlled DM.      Assessment & Plan : Reviewed with patient .  . Blood sugar followup with PCP.   RTC three  Months with CBC,CMP. . Patient understands and verbalised.         Advance Care Planning     Power of   I initiated the process of advance care planning today and explained the importance of this process to the patient.  I introduced the concept of advance directives to the patient, as well. Then the patient received detailed information about the importance of designating a Health Care Power of  (HCPOA). He was also instructed to communicate with this person about their wishes for future healthcare, should he become sick and lose decision-making capacity. The patient  Has/has not:previously appointed a HCPOA. After our discussion, the patient Has decided to  Talk to hs wife         Living Will  During this visit, I engaged the patient in the advance care planning process.  The patient and I reviewed the role for advance directives and their purpose in directing future healthcare if the patient's unable to speak for him/herself.  At this point in  time, the patient does have full decision-making capacity.  We discussed different extreme health states that he could experience, and reviewed what kind of medical care he would want in those situations.  The patient communicated that if he were comatose and had little chance of a meaningful recovery, he  Does not want machines/life-sustaining treatments used.    The patient HAS NOT completed a living will to reflect these preferences.  The patient   HAS NOT already designated a healthcare power of  to make decisions on his behalf.         Papers given to patient.

## 2020-07-07 ENCOUNTER — OFFICE VISIT (OUTPATIENT)
Dept: HEMATOLOGY/ONCOLOGY | Facility: CLINIC | Age: 85
End: 2020-07-07
Payer: MEDICARE

## 2020-07-07 VITALS
BODY MASS INDEX: 27.81 KG/M2 | HEIGHT: 71 IN | HEART RATE: 65 BPM | WEIGHT: 198.63 LBS | DIASTOLIC BLOOD PRESSURE: 84 MMHG | TEMPERATURE: 98 F | SYSTOLIC BLOOD PRESSURE: 181 MMHG | OXYGEN SATURATION: 97 %

## 2020-07-07 DIAGNOSIS — D69.6 THROMBOCYTOPENIA: ICD-10-CM

## 2020-07-07 DIAGNOSIS — C85.19 B-CELL LYMPHOMA OF EXTRANODAL SITE: Primary | ICD-10-CM

## 2020-07-07 DIAGNOSIS — M06.9 RHEUMATOID ARTHRITIS, INVOLVING UNSPECIFIED SITE, UNSPECIFIED RHEUMATOID FACTOR PRESENCE: ICD-10-CM

## 2020-07-07 PROCEDURE — 99215 OFFICE O/P EST HI 40 MIN: CPT | Mod: PBBFAC | Performed by: INTERNAL MEDICINE

## 2020-07-07 PROCEDURE — 99999 PR PBB SHADOW E&M-EST. PATIENT-LVL V: ICD-10-PCS | Mod: PBBFAC,,, | Performed by: INTERNAL MEDICINE

## 2020-07-07 PROCEDURE — 99212 OFFICE O/P EST SF 10 MIN: CPT | Mod: S$PBB,,, | Performed by: INTERNAL MEDICINE

## 2020-07-07 PROCEDURE — 99212 PR OFFICE/OUTPT VISIT, EST, LEVL II, 10-19 MIN: ICD-10-PCS | Mod: S$PBB,,, | Performed by: INTERNAL MEDICINE

## 2020-07-07 PROCEDURE — 99999 PR PBB SHADOW E&M-EST. PATIENT-LVL V: CPT | Mod: PBBFAC,,, | Performed by: INTERNAL MEDICINE

## 2020-07-08 ENCOUNTER — PATIENT OUTREACH (OUTPATIENT)
Dept: ADMINISTRATIVE | Facility: OTHER | Age: 85
End: 2020-07-08

## 2020-07-10 ENCOUNTER — OFFICE VISIT (OUTPATIENT)
Dept: CARDIOLOGY | Facility: CLINIC | Age: 85
End: 2020-07-10
Payer: MEDICARE

## 2020-07-10 VITALS
HEIGHT: 71 IN | DIASTOLIC BLOOD PRESSURE: 76 MMHG | HEART RATE: 58 BPM | SYSTOLIC BLOOD PRESSURE: 165 MMHG | BODY MASS INDEX: 27.78 KG/M2 | WEIGHT: 198.44 LBS | OXYGEN SATURATION: 95 %

## 2020-07-10 DIAGNOSIS — I34.0 NONRHEUMATIC MITRAL VALVE REGURGITATION: ICD-10-CM

## 2020-07-10 DIAGNOSIS — E11.22 CKD STAGE 3 DUE TO TYPE 2 DIABETES MELLITUS: ICD-10-CM

## 2020-07-10 DIAGNOSIS — I25.10 CORONARY ARTERY CALCIFICATION SEEN ON CAT SCAN: Primary | ICD-10-CM

## 2020-07-10 DIAGNOSIS — E78.5 DYSLIPIDEMIA, GOAL LDL BELOW 100: ICD-10-CM

## 2020-07-10 DIAGNOSIS — I10 ESSENTIAL HYPERTENSION: ICD-10-CM

## 2020-07-10 DIAGNOSIS — I27.21 PAH (PULMONARY ARTERY HYPERTENSION): ICD-10-CM

## 2020-07-10 DIAGNOSIS — D69.6 THROMBOCYTOPENIA: ICD-10-CM

## 2020-07-10 DIAGNOSIS — N18.30 CKD STAGE 3 DUE TO TYPE 2 DIABETES MELLITUS: ICD-10-CM

## 2020-07-10 PROCEDURE — 99215 OFFICE O/P EST HI 40 MIN: CPT | Mod: PBBFAC | Performed by: INTERNAL MEDICINE

## 2020-07-10 PROCEDURE — 99999 PR PBB SHADOW E&M-EST. PATIENT-LVL V: CPT | Mod: PBBFAC,,, | Performed by: INTERNAL MEDICINE

## 2020-07-10 PROCEDURE — 99213 PR OFFICE/OUTPT VISIT, EST, LEVL III, 20-29 MIN: ICD-10-PCS | Mod: S$PBB,,, | Performed by: INTERNAL MEDICINE

## 2020-07-10 PROCEDURE — 99213 OFFICE O/P EST LOW 20 MIN: CPT | Mod: S$PBB,,, | Performed by: INTERNAL MEDICINE

## 2020-07-10 PROCEDURE — 99999 PR PBB SHADOW E&M-EST. PATIENT-LVL V: ICD-10-PCS | Mod: PBBFAC,,, | Performed by: INTERNAL MEDICINE

## 2020-07-10 NOTE — PROGRESS NOTES
CARDIOLOGY CLINIC VISIT        HISTORY OF PRESENT ILLNESS:     Bria Lawson is an 83yo male who presents for continued care. Blood pressure has been elevated on last few clinic visits. His bp log from home shows good values. He feels good. No chest pain or sob. Doing work at his house. Denies limitations.  Echo showed normal to low normal LVEF. LVH. Mod MR, mild TR. Mildly elevated PASP. Heme/Onc visit 7/7. Completed four week course rituxan. PET 6/11/20. No evidence of active lymphoma. Seen 5/29/20 for evaluation of shortness of breath/hemoptysis. No recurrence. Patient had a CT scan on 5/26/20 showing pulmonary edema, bilateral pleural effusions. Coronary calcification also noted - 3V. Hx of HTN, HLP, LDL 44.  DM.         CT Chest 5/26/20:     1. CT findings favoring sequela of cardiac decompensation causing pulmonary edema and bilateral pleural effusions.  Superimposed COVID-19 pneumonia cannot be entirely excluded noting that pleural effusions is not a commonly reported finding and therefore would be atypical.  2. Persistent soft tissue attenuation nodule within the prevascular mediastinum, presumably an enlarged lymph node and unchanged when compared to previous PET-CTs.  3. Slight interval increased size of multiple mediastinal lymph nodes, nonspecific.  4. Cardiomegaly with severe dense coronary artery atherosclerosis in a 3 vessel distribution.  5. Additional findings as detailed in the body of the report.    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    Hyperlipidemia 2/26/2015     Hypertension     Uncontrolled secondary diabetes mellitus with stage 3 CKD (GFR 30-59) 8/28/2015       PAST SURGICAL HISTORY:     Past Surgical History:   Procedure Laterality Date    BONE MARROW BIOPSY Left 9/19/2018    Procedure: Biopsy-bone marrow;  Surgeon: Velia Tobias MD;  Location: [facility];  Service: Oncology;  Laterality: Left;    CATARACT EXTRACTION Bilateral 1996    EYE SURGERY      cataracts    JOINT REPLACEMENT      knee replacement    retinal injection s Bilateral     scrotal cyst         ALLERGIES AND MEDICATION:   Review of patient's allergies indicates:  No Known Allergies     Medication List          Accurate as of July 10, 2020  8:46 AM. If you have any questions, ask your nurse or doctor.            CONTINUE taking these medications    allopurinoL 100 MG tablet  Commonly known as: ZYLOPRIM  TAKE 1 TABLET(100 MG) BY MOUTH EVERY DAY     amLODIPine 10 MG tablet  Commonly known as: NORVASC  TAKE 1 TABLET(10 MG) BY MOUTH EVERY DAY     amoxicillin 500 MG capsule  Commonly known as: AMOXIL     aspirin 81 MG Chew  CHEW AND SWALLOW 1 TABLET BY MOUTH DAILY     atorvastatin 20 MG tablet  Commonly known as: LIPITOR  TAKE 1 TABLET(20 MG) BY MOUTH EVERY DAY     blood sugar diagnostic Strp  1 strip by Misc.(Non-Drug; Combo Route) route 2 (two) times daily. Disp glucometer and lancets as well     carvediloL 6.25 MG tablet  Commonly known as: COREG  Take 1 tablet (6.25 mg total) by mouth 2 (two) times daily with meals.     CENTRUM SILVER MEN ORAL     folic acid 800 MCG Tab  Commonly known as: FOLVITE     furosemide 20 MG tablet  Commonly known as: LASIX  Take 0.5 tablets (10 mg total) by mouth once daily.     gabapentin 300 MG capsule  Commonly known as: NEURONTIN  TAKE 1 CAPSULE(300 MG) BY MOUTH THREE TIMES DAILY     glipiZIDE 10 MG Tr24  Commonly known as: GLUCOTROL  TAKE 1 TABLET(10 MG) BY MOUTH EVERY DAY     mometasone 0.1% 0.1 % cream  Commonly known as: ELOCON     niacin 500 MG Tab      omeprazole 40 MG capsule  Commonly known as: PRILOSEC  TAKE 1 CAPSULE DAILY     SITagliptin 100 MG Tab  Commonly known as: JANUVIA  Take 1 tablet (100 mg total) by mouth once daily.     triamcinolone acetonide 0.1% 0.1 % ointment  Commonly known as: KENALOG     TRUEPLUS LANCETS 33 gauge Misc  Generic drug: lancets     TURMERIC ROOT EXTRACT ORAL     valsartan 320 MG tablet  Commonly known as: DIOVAN  TAKE 1 TABLET ONCE DAILY     VITAMIN C 1000 MG tablet  Generic drug: ascorbic acid (vitamin C)     vitamin E 400 UNIT capsule            SOCIAL HISTORY:     Social History     Socioeconomic History    Marital status:      Spouse name: Not on file    Number of children: Not on file    Years of education: Not on file    Highest education level: Not on file   Occupational History    Not on file   Social Needs    Financial resource strain: Not hard at all    Food insecurity     Worry: Never true     Inability: Never true    Transportation needs     Medical: No     Non-medical: No   Tobacco Use    Smoking status: Former Smoker    Smokeless tobacco: Never Used   Substance and Sexual Activity    Alcohol use: Yes     Alcohol/week: 0.0 standard drinks     Frequency: 2-4 times a month     Drinks per session: 1 or 2     Binge frequency: Never     Comment: Social    Drug use: No    Sexual activity: Not on file   Lifestyle    Physical activity     Days per week: Not on file     Minutes per session: Not on file    Stress: Not on file   Relationships    Social connections     Talks on phone: More than three times a week     Gets together: Once a week     Attends [employer] service: 1 to 4 times per year     Active member of club or organization: No     Attends meetings of clubs or organizations: Never     Relationship status:    Other Topics Concern    Not on file   Social History Narrative    Not on file       FAMILY HISTORY:     Family History   Problem Relation Age of Onset    No Known Problems  Mother     No Known Problems Father     No Known Problems Sister     No Known Problems Brother     No Known Problems Maternal Aunt     No Known Problems Maternal Uncle     No Known Problems Paternal Aunt     No Known Problems Paternal Uncle     No Known Problems Maternal Grandmother     No Known Problems Maternal Grandfather     No Known Problems Paternal Grandmother     No Known Problems Paternal Grandfather     Amblyopia Neg Hx     Blindness Neg Hx     Cancer Neg Hx     Cataracts Neg Hx     Diabetes Neg Hx     Glaucoma Neg Hx     Hypertension Neg Hx     Macular degeneration Neg Hx     Retinal detachment Neg Hx     Strabismus Neg Hx     Stroke Neg Hx     Thyroid disease Neg Hx        REVIEW OF SYSTEMS:   Review of Systems   Constitutional: Negative for diaphoresis, malaise/fatigue and weight loss.   HENT: Negative for nosebleeds.    Respiratory: Negative for cough, hemoptysis and shortness of breath.    Cardiovascular: Negative for chest pain, palpitations, orthopnea, claudication, leg swelling and PND.   Gastrointestinal: Negative for abdominal pain, heartburn, nausea and vomiting.   Neurological: Negative for dizziness, tingling, sensory change, speech change, focal weakness, weakness and headaches.   Endo/Heme/Allergies: Does not bruise/bleed easily.       PHYSICAL EXAM:   There were no vitals filed for this visit. There is no height or weight on file to calculate BMI.            Physical Exam  Vitals signs reviewed.   Constitutional:       General: He is not in acute distress.     Appearance: He is well-developed. He is not diaphoretic.   Neck:      Vascular: No carotid bruit or JVD.   Cardiovascular:      Rate and Rhythm: Normal rate and regular rhythm.      Pulses: Normal pulses.      Heart sounds: Murmur present. Systolic murmur present with a grade of 3/6.   Pulmonary:      Effort: Pulmonary effort is normal.      Breath sounds: Normal breath sounds.   Abdominal:      General: Bowel  sounds are normal.      Palpations: Abdomen is soft.      Tenderness: There is no abdominal tenderness.   Musculoskeletal:      Right lower leg: No edema.      Left lower leg: No edema.   Neurological:      Mental Status: He is alert and oriented to person, place, and time.   Psychiatric:         Speech: Speech normal.         Behavior: Behavior normal.         DATA:     Laboratory:  CBC:  Recent Labs   Lab 03/09/20  0905 05/26/20  0808 06/08/20  0954   WBC 7.42 5.6 3.33 L   Hemoglobin 12.2 L 11.4 L 12.7 L   Hematocrit 38.5 L 36.5 L 41.0   Platelets 59 L 84 LL 83 L       CHEMISTRIES:  Recent Labs   Lab 01/23/20  1154 03/09/20  0905 06/08/20  0954   Glucose 268 H 204 H 193 H   Sodium 138 142 139   Potassium 4.3 4.1 4.1   BUN, Bld 15 21 16   Creatinine 1.4 1.4 1.3   eGFR if  53.3 A 53 A 58 A   eGFR if non  46.1 A 46 A 50 A   Calcium 9.9 8.9 9.3             LIPIDS/LFTS:  Recent Labs   Lab 04/22/19  1151  12/09/19  0849 01/23/20  1154 03/09/20  0905 06/08/20  0954   Cholesterol 109 L  --   --  112 L  --   --    Triglycerides 153 H  --   --  170 H  --   --    HDL 35 L  --   --  34 L  --   --    LDL Cholesterol 43.4 L  --   --  44.0 L  --   --    Non-HDL Cholesterol 74  --   --  78  --   --    AST  --    < > 17  --  16 17   ALT  --    < > 17  --  16 19    < > = values in this interval not displayed.       Cardiovascular Testing:    Echo 6/19/20:    · Low normal left ventricular systolic function. The estimated ejection fraction is 50-55%.  · Mild eccentric left ventricular hypertrophy.  · Mild left ventricular enlargement.  · No wall motion abnormalities.  · Normal right ventricular systolic function.  · Moderate mitral regurgitation.  · Mild tricuspid regurgitation.  · The estimated PA systolic pressure is 35 mmHg.    ASSESSMENT:     1. Shortness of breath/hemoptysis: no recurrence  2. Abnormal CT scan/coronary calcification  3. HTN: home log values good  4. HLP: LDL 44  5. DM  6.  Lymphoma  7. PAH: mild  8. Mitral regurgitation      PLAN:     1. Continue current management.  2. RTC 3 months.       Oleg Fontana MD, MPH, FACC, [facility]

## 2020-07-13 ENCOUNTER — PATIENT OUTREACH (OUTPATIENT)
Dept: OTHER | Facility: OTHER | Age: 85
End: 2020-07-13

## 2020-07-13 NOTE — PROGRESS NOTES
"Digital Medicine: Health  Follow-Up    Pt is doing well. He's had some doctors visits recently but states everything is fine. He had a PET scan that was negative. He reports elevated BP in office was likely due to anxiety about the PET scan.     The history is provided by the patient.   Follow Up  Follow-up reason(s): reading review and routine education          INTERVENTION(S)  encouragement/support    PLAN  patient verbalizes understanding and continue monitoring      There are no preventive care reminders to display for this patient.    Last 5 Patient Entered Readings                                      Current 30 Day Average: 134/62     Recent Readings 7/8/2020 7/8/2020 7/7/2020 7/7/2020 6/29/2020    SBP (mmHg) 123 128 162 162 135    DBP (mmHg) 59 61 66 70 66    Pulse 59 58 55 55 48        Last 6 Patient Entered Readings                                          Most Recent A1c: 5.4% on 6/8/2020  (Goal: 7%)     Recent Readings 7/13/2020 7/12/2020 7/11/2020 7/10/2020 7/9/2020    Blood Glucose (mg/dL) 133 163 157 167 120                    Diet Screening       Patient attributes elevated BP last week to eating too much on the 4th of July - pork chops, sausage, corn, potatoes, beer, ice cream. Recognizes he "fell off the wagon" but otherwise states that diet has been normal.     Physical Activity Screening   No change to exercise routine.          SDOH  "

## 2020-07-23 RX ORDER — GLIPIZIDE 10 MG/1
TABLET, FILM COATED, EXTENDED RELEASE ORAL
Qty: 90 TABLET | Refills: 0 | Status: SHIPPED | OUTPATIENT
Start: 2020-07-23 | End: 2020-10-19

## 2020-07-31 ENCOUNTER — EXTERNAL CHRONIC CARE MANAGEMENT (OUTPATIENT)
Dept: PRIMARY CARE CLINIC | Facility: CLINIC | Age: 85
End: 2020-07-31
Payer: MEDICARE

## 2020-07-31 PROCEDURE — 99457 RPM TX MGMT 1ST 20 MIN: CPT | Mod: S$PBB,,, | Performed by: INTERNAL MEDICINE

## 2020-07-31 PROCEDURE — 99490 CHRNC CARE MGMT STAFF 1ST 20: CPT | Mod: PBBFAC,PO | Performed by: INTERNAL MEDICINE

## 2020-07-31 PROCEDURE — 99490 PR CHRONIC CARE MGMT, 1ST 20 MIN: ICD-10-PCS | Mod: S$PBB,,, | Performed by: INTERNAL MEDICINE

## 2020-07-31 PROCEDURE — 99490 CHRNC CARE MGMT STAFF 1ST 20: CPT | Mod: S$PBB,,, | Performed by: INTERNAL MEDICINE

## 2020-07-31 PROCEDURE — 99457 PR MONITORING, PHYSIOL PARAM, REMOTE, 1ST 20 MINS, PER MONTH: ICD-10-PCS | Mod: S$PBB,,, | Performed by: INTERNAL MEDICINE

## 2020-08-18 ENCOUNTER — PATIENT OUTREACH (OUTPATIENT)
Dept: ADMINISTRATIVE | Facility: OTHER | Age: 85
End: 2020-08-18

## 2020-08-19 ENCOUNTER — OFFICE VISIT (OUTPATIENT)
Dept: OPTOMETRY | Facility: CLINIC | Age: 85
End: 2020-08-19
Payer: MEDICARE

## 2020-08-19 DIAGNOSIS — E11.9 TYPE 2 DIABETES MELLITUS WITHOUT RETINOPATHY: Primary | ICD-10-CM

## 2020-08-19 DIAGNOSIS — H52.7 REFRACTIVE ERROR: ICD-10-CM

## 2020-08-19 DIAGNOSIS — Z96.1 PSEUDOPHAKIA: ICD-10-CM

## 2020-08-19 PROCEDURE — 92015 PR REFRACTION: ICD-10-PCS | Mod: ,,, | Performed by: OPTOMETRIST

## 2020-08-19 PROCEDURE — 99999 PR PBB SHADOW E&M-EST. PATIENT-LVL III: ICD-10-PCS | Mod: PBBFAC,,, | Performed by: OPTOMETRIST

## 2020-08-19 PROCEDURE — 92014 COMPRE OPH EXAM EST PT 1/>: CPT | Mod: S$PBB,,, | Performed by: OPTOMETRIST

## 2020-08-19 PROCEDURE — 99213 OFFICE O/P EST LOW 20 MIN: CPT | Mod: PBBFAC,PO | Performed by: OPTOMETRIST

## 2020-08-19 PROCEDURE — 99999 PR PBB SHADOW E&M-EST. PATIENT-LVL III: CPT | Mod: PBBFAC,,, | Performed by: OPTOMETRIST

## 2020-08-19 PROCEDURE — 92015 DETERMINE REFRACTIVE STATE: CPT | Mod: ,,, | Performed by: OPTOMETRIST

## 2020-08-19 PROCEDURE — 92014 PR EYE EXAM, EST PATIENT,COMPREHESV: ICD-10-PCS | Mod: S$PBB,,, | Performed by: OPTOMETRIST

## 2020-08-19 NOTE — PROGRESS NOTES
Subjective:       Patient ID: Bria Lawson is a 84 y.o. male      Chief Complaint   Patient presents with    Concerns About Ocular Health    Diabetic Eye Exam     History of Present Illness  Dls: 5/16/19 Dr. Gibson     85 y/o male presents today for diabetic eye exam.   Pt states no changes in vision. Pt wear trifocal glasses.      this am     + tearing  No itching  No burning  No pain  No ha's  No floaters  No flashes    Eye meds  None    Hemoglobin A1C       Date                     Value               Ref Range           Status                06/08/2020               5.4                 4.0 - 5.6 %         Final                  01/23/2020               6.7 (H)             4.0 - 5.6 %         Final                 08/15/2019               6.8 (H)             4.0 - 5.6 %         Final                 Assessment/Plan:     1. Type 2 diabetes mellitus without retinopathy  No diabetic retinopathy. Discussed with pt the effects of diabetes on vision, importance of good blood sugar control, compliance with meds, and follow up care with PCP. Return in 1 year for dilated eye exam, sooner PRN.    2. Pseudophakia  Well-centered. Clear.     3. Refractive error  Educated patient on refractive error and discussed lens options. Dispensed updated spectacle Rx. Educated about adaptation period to new specs.    Eyeglass Final Rx     Eyeglass Final Rx       Sphere Cylinder Add    Right Lane Sphere +2.75    Left -0.50 Sphere +2.75    Expiration Date: 8/20/2021                  Follow up in about 1 year (around 8/19/2021) for Diabetic Eye Exam.

## 2020-08-20 ENCOUNTER — OFFICE VISIT (OUTPATIENT)
Dept: PODIATRY | Facility: CLINIC | Age: 85
End: 2020-08-20
Payer: MEDICARE

## 2020-08-20 VITALS
SYSTOLIC BLOOD PRESSURE: 136 MMHG | BODY MASS INDEX: 27.72 KG/M2 | HEIGHT: 71 IN | WEIGHT: 198 LBS | DIASTOLIC BLOOD PRESSURE: 78 MMHG

## 2020-08-20 DIAGNOSIS — B35.1 DERMATOPHYTOSIS OF NAIL: ICD-10-CM

## 2020-08-20 DIAGNOSIS — M20.5X1 HALLUX LIMITUS, ACQUIRED, RIGHT: ICD-10-CM

## 2020-08-20 DIAGNOSIS — E11.49 TYPE II DIABETES MELLITUS WITH NEUROLOGICAL MANIFESTATIONS: Primary | ICD-10-CM

## 2020-08-20 DIAGNOSIS — M20.41 HAMMER TOES OF BOTH FEET: ICD-10-CM

## 2020-08-20 DIAGNOSIS — L43.9 LICHEN PLANUS: ICD-10-CM

## 2020-08-20 DIAGNOSIS — M20.5X2 HALLUX LIMITUS, ACQUIRED, LEFT: ICD-10-CM

## 2020-08-20 DIAGNOSIS — E11.9 ENCOUNTER FOR DIABETIC FOOT EXAM: ICD-10-CM

## 2020-08-20 DIAGNOSIS — M20.42 HAMMER TOES OF BOTH FEET: ICD-10-CM

## 2020-08-20 PROCEDURE — 99999 PR PBB SHADOW E&M-EST. PATIENT-LVL IV: CPT | Mod: PBBFAC,,, | Performed by: PODIATRIST

## 2020-08-20 PROCEDURE — 99214 PR OFFICE/OUTPT VISIT, EST, LEVL IV, 30-39 MIN: ICD-10-PCS | Mod: S$PBB,,, | Performed by: PODIATRIST

## 2020-08-20 PROCEDURE — 99214 OFFICE O/P EST MOD 30 MIN: CPT | Mod: S$PBB,,, | Performed by: PODIATRIST

## 2020-08-20 PROCEDURE — 99214 OFFICE O/P EST MOD 30 MIN: CPT | Mod: PBBFAC,PO | Performed by: PODIATRIST

## 2020-08-20 PROCEDURE — 99999 PR PBB SHADOW E&M-EST. PATIENT-LVL IV: ICD-10-PCS | Mod: PBBFAC,,, | Performed by: PODIATRIST

## 2020-08-20 NOTE — PROGRESS NOTES
Subjective:      Patient ID: Bria Lawson is a 84 y.o. male.    Chief Complaint: Diabetes Mellitus (PCP  06/09/2020) and Nail Care    Bria is a 84 y.o. male who presents to the clinic for evaluation and treatment of high risk feet. 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  The patient has no major complaints with feet. Chief concern is how to care for feet as a diabetic.  However patient does have known like his planus and is currently having itchy plaques to the right ft.    This patient has documented high risk feet requiring routine maintenance secondary to diabetes mellitis and those secondary complications of diabetes, as mentioned..    PCP: Adiel Bragg MD    Date Last Seen by PCP:   Chief Complaint   Patient presents with    Diabetes Mellitus     PCP  06/09/2020    Nail Care     Current shoe gear:  Casual tennis shoes    Hemoglobin A1C   Date Value Ref Range Status   06/08/2020 5.4 4.0 - 5.6 % Final     Comment:     ADA Screening Guidelines:  5.7-6.4%  Consistent with prediabetes  >or=6.5%  Consistent with diabetes  High levels of fetal hemoglobin interfere with the HbA1C  assay. Heterozygous hemoglobin variants (HbS, HgC, etc)do  not significantly interfere with this assay.   However, presence of multiple variants may affect accuracy.     01/23/2020 6.7 (H) 4.0 - 5.6 % Final     Comment:     ADA Screening Guidelines:  5.7-6.4%  Consistent with prediabetes  >or=6.5%  Consistent with diabetes  High levels of  fetal hemoglobin interfere with the HbA1C  assay. Heterozygous hemoglobin variants (HbS, HgC, etc)do  not significantly interfere with this assay.   However, presence of multiple variants may affect accuracy.     08/15/2019 6.8 (H) 4.0 - 5.6 % Final     Comment:     ADA Screening Guidelines:  5.7-6.4%  Consistent with prediabetes  >or=6.5%  Consistent with diabetes  High levels of fetal hemoglobin interfere with the HbA1C  assay. Heterozygous hemoglobin variants (HbS, HgC, etc)do  not significantly interfere with this assay.   However, presence of multiple variants may affect accuracy.           Patient Active Problem List   Diagnosis    GERD (gastroesophageal reflux disease)    Gout, chronic    HTN (hypertension)    Diabetes mellitus with renal manifestations, uncontrolled    Hyperlipidemia    CKD stage 3 due to type 2 diabetes mellitus    HDL lipoprotein deficiency    Cervical radiculopathy, acute    CN (constipation)    Uncontrolled secondary diabetes mellitus with stage 3 CKD (GFR 30-59)    Seronegative arthropathy of multiple sites    Anemia    Gout of foot    CKD (chronic kidney disease), stage III    Primary osteoarthritis involving multiple joints    Diabetes mellitus with proteinuria    Diabetes mellitus with proteinuric diabetic nephropathy    Hypertensive CKD (chronic kidney disease)    Leukopenia    Thrombocytopenia    Rheumatoid arthritis    B-cell lymphoma of extranodal site    Neutropenia    Refractive error    Pseudophakia       Current Outpatient Medications on File Prior to Visit   Medication Sig Dispense Refill    allopurinol (ZYLOPRIM) 100 MG tablet TAKE 1 TABLET(100 MG) BY MOUTH EVERY DAY 90 tablet 12    amLODIPine (NORVASC) 10 MG tablet TAKE 1 TABLET(10 MG) BY MOUTH EVERY DAY 90 tablet 12    amoxicillin (AMOXIL) 500 MG capsule       ascorbic acid (VITAMIN C) 1000 MG tablet Take 1,000 mg by mouth once daily.      aspirin 81 MG Chew CHEW AND SWALLOW 1 TABLET BY MOUTH  DAILY 90 tablet 12    atorvastatin (LIPITOR) 20 MG tablet TAKE 1 TABLET(20 MG) BY MOUTH EVERY DAY 90 tablet 12    blood sugar diagnostic Strp 1 strip by Misc.(Non-Drug; Combo Route) route 2 (two) times daily. Disp glucometer and lancets as well 100 strip 12    carvediloL (COREG) 6.25 MG tablet Take 1 tablet (6.25 mg total) by mouth 2 (two) times daily with meals. 180 tablet 12    folic acid (FOLVITE) 800 MCG Tab Take 800 mcg by mouth once daily.      furosemide (LASIX) 20 MG tablet Take 0.5 tablets (10 mg total) by mouth once daily. 30 tablet 2    gabapentin (NEURONTIN) 300 MG capsule TAKE 1 CAPSULE(300 MG) BY MOUTH THREE TIMES DAILY 270 capsule 3    glipiZIDE (GLUCOTROL) 10 MG TR24 TAKE 1 TABLET(10 MG) BY MOUTH EVERY DAY 90 tablet 0    mometasone 0.1% (ELOCON) 0.1 % cream APPLY TO AFFECTED AREA QD  1    multivit-min/FA/lycopen/lutein (CENTRUM SILVER MEN ORAL) Take by mouth.      niacin 500 MG Tab Take 100 mg by mouth every evening.      omeprazole (PRILOSEC) 40 MG capsule TAKE 1 CAPSULE DAILY 90 capsule 12    SITagliptin (JANUVIA) 100 MG Tab Take 1 tablet (100 mg total) by mouth once daily. 90 tablet 3    triamcinolone acetonide 0.1% (KENALOG) 0.1 % ointment       TRUEPLUS LANCETS 33 gauge Misc USE TO TEST BLOOD SUGAR BID  12    TURMERIC ROOT EXTRACT ORAL Take by mouth.      valsartan (DIOVAN) 320 MG tablet TAKE 1 TABLET ONCE DAILY 90 tablet 3    vitamin E 400 UNIT capsule Take 400 Units by mouth once daily.       No current facility-administered medications on file prior to visit.        Review of patient's allergies indicates:  No Known Allergies    Past Surgical History:   Procedure Laterality Date    BONE MARROW BIOPSY Left 9/19/2018    Procedure: Biopsy-bone marrow;  Surgeon: Velia Tobias MD;  Location: [facility];  Service: Oncology;  Laterality: Left;    CATARACT EXTRACTION Bilateral 1996    EYE SURGERY      cataracts    JOINT REPLACEMENT      knee replacement    retinal injection  s Bilateral     scrotal cyst         Family History   Problem Relation Age of Onset    No Known Problems Mother     No Known Problems Father     No Known Problems Sister     No Known Problems Brother     No Known Problems Maternal Aunt     No Known Problems Maternal Uncle     No Known Problems Paternal Aunt     No Known Problems Paternal Uncle     No Known Problems Maternal Grandmother     No Known Problems Maternal Grandfather     No Known Problems Paternal Grandmother     No Known Problems Paternal Grandfather     Amblyopia Neg Hx     Blindness Neg Hx     Cancer Neg Hx     Cataracts Neg Hx     Diabetes Neg Hx     Glaucoma Neg Hx     Hypertension Neg Hx     Macular degeneration Neg Hx     Retinal detachment Neg Hx     Strabismus Neg Hx     Stroke Neg Hx     Thyroid disease Neg Hx        Social History     Socioeconomic History    Marital status:      Spouse name: Not on file    Number of children: Not on file    Years of education: Not on file    Highest education level: Not on file   Occupational History    Not on file   Social Needs    Financial resource strain: Not hard at all    Food insecurity     Worry: Never true     Inability: Never true    Transportation needs     Medical: No     Non-medical: No   Tobacco Use    Smoking status: Former Smoker    Smokeless tobacco: Never Used   Substance and Sexual Activity    Alcohol use: Yes     Alcohol/week: 0.0 standard drinks     Frequency: 2-4 times a month     Drinks per session: 1 or 2     Binge frequency: Never     Comment: Social    Drug use: No    Sexual activity: Not on file   Lifestyle    Physical activity     Days per week: Not on file     Minutes per session: Not on file    Stress: Not on file   Relationships    Social connections     Talks on phone: More than three times a week     Gets together: Once a week     Attends [employer] service: 1 to 4 times per year     Active member of club or organization: No      "Attends meetings of clubs or organizations: Never     Relationship status:    Other Topics Concern    Not on file   Social History Narrative    Not on file           Review of Systems   Constitution: Negative for chills and fever.   Cardiovascular: Positive for leg swelling. Negative for claudication.   Respiratory: Negative for cough and shortness of breath.    Skin: Positive for dry skin, itching, nail changes and rash.        Lichen planus   Musculoskeletal: Positive for arthritis (RA), back pain, joint pain and myalgias. Negative for falls, joint swelling and muscle weakness.   Gastrointestinal: Negative for diarrhea, nausea and vomiting.   Neurological: Positive for paresthesias. Negative for numbness, tremors and weakness.   Psychiatric/Behavioral: Negative for altered mental status and hallucinations.           Objective:       Vitals:    08/20/20 1109   BP: 136/78   Weight: 89.8 kg (198 lb)   Height: 5' 11" (1.803 m)   PainSc: 0-No pain       Physical Exam  Nursing note reviewed.   Constitutional:       General: He is not in acute distress.     Appearance: He is not toxic-appearing or diaphoretic.      Comments: Pt. is well-developed, well-nourished, appears stated age, in no acute distress, alert and oriented x 3. No evidence of depression, anxiety, or agitation. Calm, cooperative, and communicative. Appropriate interactions and affect.   Cardiovascular:      Pulses:           Dorsalis pedis pulses are 1+ on the right side and 1+ on the left side.        Posterior tibial pulses are 1+ on the right side and 1+ on the left side.      Comments:  Dorsalis pedis and posterior tibial pulses are diminished Bilaterally. Toes are cool to touch. Feet are warm proximally.There is decreased digital hair. Skin is atrophic  Pulmonary:      Effort: No respiratory distress.   Musculoskeletal:      Right ankle: No tenderness. No lateral malleolus, no medial malleolus, no AITFL, no CF ligament and no posterior TFL " tenderness found. Achilles tendon exhibits no pain, no defect and normal Dahl's test results.      Left ankle: No tenderness. No lateral malleolus, no medial malleolus, no AITFL, no CF ligament and no posterior TFL tenderness found. Achilles tendon exhibits no pain, no defect and normal Dahl's test results.      Right foot: No tenderness or bony tenderness.      Left foot: No tenderness or bony tenderness.      Comments: Fat pad atrophy to heels and met heads bilateral    Decreased stride, station of gait.  apropulsive toe off.  Increased angle and base of gait.    Patient has hammertoes of digits 2-5 bilateral partially reducible without symptom today.    Decreased first MPJ range of motion both weightbearing and nonweightbearing, no crepitus observed the first MP joint, + dorsal flag sign.Mild  bunion deformity is observed .     Lymphadenopathy:      Comments: No lymphatic streaking    Negative lymphadenopathy bilateral popliteal fossa and tarsal tunnel.     Skin:     General: Skin is warm and dry.      Coloration: Skin is not pale.      Findings: Lesion present. No abrasion, bruising, burn, ecchymosis or rash.      Nails: There is no clubbing.        Comments:  Skin is thin, atrophic    Toenails 1-2 bilaterally are elongated by  discolored/yellowed, dystrophic, brittle with subungual debris.     Diffuse flat topped violaceous lesions to the upper and lower extremity, of note to the 1st MTPJ of the right foot   Neurological:      Sensory: Sensory deficit present.      Comments: [place] 5.07 monofilament is intact bilateral feet. Sharp/dull sensation is also intact Bilateral feet. Proprioception is grossly intact.     Decreased/absent vibratory sensation bilateral feet to 128Hz tuning fork.    Paresthesias, and hyperesthesia bilateral feet with no clearly identified trigger or source.           Psychiatric:         Attention and Perception: He is attentive.         Mood and Affect: Mood is not  anxious. Affect is not inappropriate.         Speech: He is communicative. Speech is not slurred.         Behavior: Behavior is not combative.             Assessment:       Encounter Diagnoses   Name Primary?    Type II diabetes mellitus with neurological manifestations Yes    Encounter for diabetic foot exam     Dermatophytosis of nail     Hammer toes of both feet     Hallux limitus, acquired, left     Hallux limitus, acquired, right     Lichen planus          Plan:       Bria was seen today for diabetes mellitus and nail care.    Diagnoses and all orders for this visit:    Type II diabetes mellitus with neurological manifestations    Encounter for diabetic foot exam    Dermatophytosis of nail    Hammer toes of both feet    Hallux limitus, acquired, left    Hallux limitus, acquired, right    Lichen planus      I counseled the patient on his conditions, their implications and medical management.      Greater than 50% of this visit spent on counseling and coordination of care.    Education about the diabetic foot, neuropathy, and prevention of limb loss.    Shoe inspection. Diabetic Foot Education. Patient reminded of the importance of good nutrition and blood sugar control to help prevent podiatric complications of diabetes. Patient instructed on proper foot hygeine. We discussed wearing proper shoe gear, daily foot inspections, never walking without protective shoe gear, never putting sharp instruments to feet.      Patient declines prescription shoes.    LP being managed by dermatologist however for itching lesions of the right foot, compound cream from [employer] prescribed    Informed patient that many nail problems can be prevented by wearing the right shoes and trimming your nails properly.   The right shoes: Feet were measured.  Patient is to wear shoes that are supportive and roomy enough for toes to wiggle. Look for shoes made of natural materials such as leather, which allow  feet to breathe.    Proper trimming: To avoid problems, she was instructed to trim toenails straight across without cutting down into the corners.       He will continue to monitor the areas daily, inspect his feet, wear protective shoe gear when ambulatory, moisturizer to maintain skin integrity and follow in this office in approximately 12 months, sooner if he wishes to have nail procedure

## 2020-08-31 ENCOUNTER — EXTERNAL CHRONIC CARE MANAGEMENT (OUTPATIENT)
Dept: PRIMARY CARE CLINIC | Facility: CLINIC | Age: 85
End: 2020-08-31
Payer: MEDICARE

## 2020-08-31 ENCOUNTER — TELEPHONE (OUTPATIENT)
Dept: FAMILY MEDICINE | Facility: CLINIC | Age: 85
End: 2020-08-31

## 2020-08-31 PROCEDURE — 99490 CHRNC CARE MGMT STAFF 1ST 20: CPT | Mod: S$PBB,,, | Performed by: INTERNAL MEDICINE

## 2020-08-31 PROCEDURE — 99454 REM MNTR PHYSIOL PARAM 16-30: CPT | Mod: PBBFAC,PO | Performed by: INTERNAL MEDICINE

## 2020-08-31 PROCEDURE — 99490 CHRNC CARE MGMT STAFF 1ST 20: CPT | Mod: PBBFAC,PO | Performed by: INTERNAL MEDICINE

## 2020-08-31 PROCEDURE — 99490 PR CHRONIC CARE MGMT, 1ST 20 MIN: ICD-10-PCS | Mod: S$PBB,,, | Performed by: INTERNAL MEDICINE

## 2020-09-04 ENCOUNTER — TELEPHONE (OUTPATIENT)
Dept: NEPHROLOGY | Facility: CLINIC | Age: 85
End: 2020-09-04

## 2020-09-04 DIAGNOSIS — N18.30 CKD (CHRONIC KIDNEY DISEASE), STAGE III: Primary | ICD-10-CM

## 2020-09-04 NOTE — TELEPHONE ENCOUNTER
----- Message from Berta Benavides sent at 9/4/2020  2:13 PM CDT -----  Contact: patient  Name of Caller:     Bria     Reason for Visit/Symptoms:   fallow up      Best Contact Number or Confirm if Mychart Preferred:[phone]    Preferred Date/Time of Appointment: first available     Interested in Virtual Visit:  No     Additional Information: Patient would like a in person visit. I tried to schedule but no appointments coming up and would like to speak to your offcie

## 2020-09-15 ENCOUNTER — LAB VISIT (OUTPATIENT)
Dept: LAB | Facility: HOSPITAL | Age: 85
End: 2020-09-15
Attending: INTERNAL MEDICINE
Payer: MEDICARE

## 2020-09-15 DIAGNOSIS — N18.30 CKD (CHRONIC KIDNEY DISEASE), STAGE III: ICD-10-CM

## 2020-09-15 LAB
ALBUMIN SERPL BCP-MCNC: 4.2 G/DL (ref 3.5–5.2)
ANION GAP SERPL CALC-SCNC: 7 MMOL/L (ref 8–16)
BUN SERPL-MCNC: 16 MG/DL (ref 8–23)
CALCIUM SERPL-MCNC: 8.8 MG/DL (ref 8.7–10.5)
CHLORIDE SERPL-SCNC: 104 MMOL/L (ref 95–110)
CO2 SERPL-SCNC: 29 MMOL/L (ref 23–29)
CREAT SERPL-MCNC: 1.4 MG/DL (ref 0.5–1.4)
EST. GFR  (AFRICAN AMERICAN): 53 ML/MIN/1.73 M^2
EST. GFR  (NON AFRICAN AMERICAN): 46 ML/MIN/1.73 M^2
GLUCOSE SERPL-MCNC: 236 MG/DL (ref 70–110)
PHOSPHATE SERPL-MCNC: 2.6 MG/DL (ref 2.7–4.5)
POTASSIUM SERPL-SCNC: 4.3 MMOL/L (ref 3.5–5.1)
SODIUM SERPL-SCNC: 140 MMOL/L (ref 136–145)

## 2020-09-15 PROCEDURE — 36415 COLL VENOUS BLD VENIPUNCTURE: CPT

## 2020-09-15 PROCEDURE — 80069 RENAL FUNCTION PANEL: CPT

## 2020-09-15 NOTE — PROGRESS NOTES
Your kidney function is at your baseline and is stable from what is has been in the past.  Continue your medications as prescribed and follow-up as scheduled. Blood sugar is 236 which is high, hope you're in contact with your diabetes doctor.     Thanks.

## 2020-09-21 ENCOUNTER — PATIENT OUTREACH (OUTPATIENT)
Dept: ADMINISTRATIVE | Facility: OTHER | Age: 85
End: 2020-09-21

## 2020-09-21 NOTE — PROGRESS NOTES
Care Everywhere: updated  Immunization: updated (delay in links system)   Health Maintenance: updated  Media Review:   Legacy Review:   Order placed:   Upcoming appts:

## 2020-09-22 ENCOUNTER — PATIENT OUTREACH (OUTPATIENT)
Dept: OTHER | Facility: OTHER | Age: 85
End: 2020-09-22

## 2020-09-22 ENCOUNTER — OFFICE VISIT (OUTPATIENT)
Dept: NEPHROLOGY | Facility: CLINIC | Age: 85
End: 2020-09-22
Payer: MEDICARE

## 2020-09-22 VITALS
DIASTOLIC BLOOD PRESSURE: 70 MMHG | OXYGEN SATURATION: 98 % | HEART RATE: 54 BPM | BODY MASS INDEX: 27.67 KG/M2 | SYSTOLIC BLOOD PRESSURE: 158 MMHG | WEIGHT: 198.44 LBS

## 2020-09-22 DIAGNOSIS — N18.30 CKD STAGE 3 DUE TO TYPE 2 DIABETES MELLITUS: ICD-10-CM

## 2020-09-22 DIAGNOSIS — R80.9 DIABETES MELLITUS WITH PROTEINURIA: ICD-10-CM

## 2020-09-22 DIAGNOSIS — I12.9 HYPERTENSIVE KIDNEY DISEASE WITH STAGE 3 CHRONIC KIDNEY DISEASE: ICD-10-CM

## 2020-09-22 DIAGNOSIS — E11.22 CKD STAGE 3 DUE TO TYPE 2 DIABETES MELLITUS: ICD-10-CM

## 2020-09-22 DIAGNOSIS — N18.30 HYPERTENSIVE KIDNEY DISEASE WITH STAGE 3 CHRONIC KIDNEY DISEASE: ICD-10-CM

## 2020-09-22 DIAGNOSIS — N18.30 CKD (CHRONIC KIDNEY DISEASE), STAGE III: Primary | ICD-10-CM

## 2020-09-22 DIAGNOSIS — M1A.00X0 IDIOPATHIC CHRONIC GOUT WITHOUT TOPHUS, UNSPECIFIED SITE: ICD-10-CM

## 2020-09-22 DIAGNOSIS — E11.29 DIABETES MELLITUS WITH PROTEINURIA: ICD-10-CM

## 2020-09-22 PROCEDURE — 99999 PR PBB SHADOW E&M-EST. PATIENT-LVL V: CPT | Mod: PBBFAC,,, | Performed by: INTERNAL MEDICINE

## 2020-09-22 PROCEDURE — 99214 PR OFFICE/OUTPT VISIT, EST, LEVL IV, 30-39 MIN: ICD-10-PCS | Mod: S$PBB,,, | Performed by: INTERNAL MEDICINE

## 2020-09-22 PROCEDURE — 99999 PR PBB SHADOW E&M-EST. PATIENT-LVL V: ICD-10-PCS | Mod: PBBFAC,,, | Performed by: INTERNAL MEDICINE

## 2020-09-22 PROCEDURE — 99215 OFFICE O/P EST HI 40 MIN: CPT | Mod: PBBFAC | Performed by: INTERNAL MEDICINE

## 2020-09-22 PROCEDURE — 99214 OFFICE O/P EST MOD 30 MIN: CPT | Mod: S$PBB,,, | Performed by: INTERNAL MEDICINE

## 2020-09-22 RX ORDER — LIDOCAINE 50 MG/G
OINTMENT TOPICAL
COMMUNITY
Start: 2020-08-20 | End: 2021-03-19

## 2020-09-22 RX ORDER — HYDROCORTISONE 25 MG/G
CREAM TOPICAL
COMMUNITY
Start: 2020-08-20 | End: 2022-05-03

## 2020-09-22 NOTE — PROGRESS NOTES
Subjective:       Patient ID: Bria Lawson is a 84 y.o. Black or  male who presents for follow-up evaluation of CKD.    HPI     Mr. Lawson is seen in nephrology clinic for follow up on CKD. In March 2020 he had a virtual visit, but he could not connect to the [facility] so visit was replaced by a phone evaluation for him, he was doing fine then.    He has CKD III, diabetes type 2, hypertension since age 38, chronic knee pain, DJD, rheumatoid arthritis. He had prior nephrology care at [facility] when he was told of eGFR in the range of 40's in 2013.       In may 2020 he had a visit to urgent care due to coughing up blood and headaches. He received CT Chest without contrast from the urgent care. It was read showing pulmonary edema and pleural effusions. Subsequently Dr. Aguila contacted me as she was evaluating this CT scan and she wanted to start him on diuretic, she wanted to confirm it was ok from renal standpoint. I did clarify to her it was ok from renal standpoint and deferred the dose to her. Since then he has been started on lasix 10 mg daily. He has had cardiology visit subsequently. He does not have a recent changes in his medicines otherwise. He does not feel SOB. He reports chronic leg puffiness due to his skin condition, lichen planus. He denies orthopnea.    He reports stable BP control recently. He denies any episodes of low BP. He has been having itching of legs, he feels he had received relief last time his dermatologist gave him a steroid shot. He wanted to know if it was ok. It appears he received it from a non [facility] dermatologist in the past, so unable to review any details pertaining to this. Explained to him ok for steroid shots and difference between steroids and non steroidal anti-inflammatory medicines/ NSAIDs.     Per prior notes:  Due to persistent thrombocytopenia he was referred to oncology/ hematology. He received work up including a bone marrow study when he was diagnosed with B  cell lymphoma, low grade. He received 4 infusions of Rituxan by his oncologist. He does not have any urinary symptoms. He has chronic leg edema. He does not feel it has worsened.     He also takes Omeprazole. He states he does not tolerate GERD symptoms if he skips taking Omeprazole.     Prior labs include normal C4, hep B and C screen negative, no evidence of paraproteinemia. US from 1/16 showed normal kidney size and no mass or obstruction.     Renal Function:  Lab Results   Component Value Date     (H) 09/15/2020     (H) 06/08/2020     09/15/2020     06/08/2020    K 4.3 09/15/2020    K 4.1 06/08/2020     09/15/2020     06/08/2020    CO2 29 09/15/2020    CO2 28 06/08/2020    BUN 16 09/15/2020    BUN 16 06/08/2020    CALCIUM 8.8 09/15/2020    CALCIUM 9.3 06/08/2020    CREATININE 1.4 09/15/2020    CREATININE 1.3 06/08/2020    ALBUMIN 4.2 09/15/2020    ALBUMIN 4.2 06/08/2020    PHOS 2.6 (L) 09/15/2020    PHOS 3.4 10/11/2019    ESTGFRAFRICA 53 (A) 09/15/2020    ESTGFRAFRICA 58 (A) 06/08/2020    EGFRNONAA 46 (A) 09/15/2020    EGFRNONAA 50 (A) 06/08/2020       Urinalysis:  Lab Results   Component Value Date    APPEARANCEUA Clear 09/15/2020    PHUR 6.0 09/15/2020    SPECGRAV 1.015 09/15/2020    PROTEINUA Negative 09/15/2020    GLUCUA 2+ (A) 09/15/2020    OCCULTUA Negative 09/15/2020    NITRITE Negative 09/15/2020    LEUKOCYTESUR Negative 09/15/2020       Protein/Creatinine Ratio:  Lab Results   Component Value Date    PROTEINURINE 14 09/15/2020    CREATRANDUR 57.1 09/15/2020    UTPCR 0.25 (H) 09/15/2020       CBC:  Lab Results   Component Value Date    WBC 3.33 (L) 06/08/2020    HGB 12.7 (L) 06/08/2020    HCT 41.0 06/08/2020     Last vit D 46, PTH 60.4      Constitutional: Negative for fever, chills, activity change, appetite change and fatigue.   HENT: Negative for hearing loss and nosebleeds.    Respiratory: Negative for cough, shortness of breath and wheezing.   Cardiovascular:  Negative for chest pain and palpitations.   Gastrointestinal: Negative for nausea, vomiting, abdominal pain and diarrhea.   Endocrine: Negative for polydipsia and polyuria.   Genitourinary: Negative for dysuria, frequency, hematuria and flank pain.   Musculoskeletal: Positive for back pain. Negative for myalgias.   Skin: Negative for pallor. +ve itching.  Neurological: Negative for dizziness, light-headedness and headaches.   Psychiatric/Behavioral: Negative for behavioral problems. The patient is not nervous/anxious    Objective:      Physical Exam     Constitutional: He is oriented to person, place, and time. He appears well-developed and well-nourished. No distress.   Head: Normocephalic.   Neck: Normal range of motion. Neck supple.   Cardiovascular: Normal rate, regular rhythm  Pulmonary/Chest: Effort normal. No respiratory distress. He has no audible wheezes.   Abdominal: Soft. There is no tenderness. There is no CVA tenderness.   Musculoskeletal: He exhibits trace edema.   Neurological: He is alert and oriented to person, place, and time.   Skin: Skin is warm and dry. He is not diaphoretic.    Psychiatric: He has a normal mood and affect. His behavior is normal.   Vitals reviewed.    Assessment:       1. CKD (chronic kidney disease), stage III    2. Hypertensive kidney disease with stage 3 chronic kidney disease    3. CKD stage 3 due to type 2 diabetes mellitus    4. Diabetes mellitus with proteinuria    5. Idiopathic chronic gout without tophus, unspecified site        Plan:     Mr. Lawson has longstanding type 2 diabetes, hypertension, occasional NSAID use, has CKD due to diabetic, hypertensive nephropathy and occasional NSAID use in the past, he has CKD III.    Interim due to thrombocytopenia he was referred to hematology, he had further work up including a bone marrow study. He was diagnosed with a low grade B cell lymphoma. He has been treated with Rituxan by his oncologist.     Overall CKD III remains at  baseline with minimal proteinuria.     Continue with Digital medicine/ PCP for hypertension management.    CKD III  Stable renal labs  Continue current regimen of antihypertensive including ARB containing regimen for RAAS blockade  Low salt diet  avoid NSAID/ bactrim/ IV contrast/ gadolinium/ aminoglycoside/ Fleet enema where possible    hypertension  Strict low salt diet  continue ARB containing regimen  home BP monitoring, goal less than 130-140/80     Pt encouraged to follow BP more closely at home and send readings periodically                                                                                                                                                                                                                                                                                                                                                                 Chronic gout  Continue allopurinol  Follow uric acid level, stable per last check    Leukopenia  Thrombocytopenia  B cell lymphoma  Management per oncologist    He has higher risk of contrast induced NITIN. avoid NSAID/ bactrim/ IV contrast/ gadolinium/ aminoglycoside where possible.    He is going to follow with cardiologist next month to decide on the continuation of lasix. CT chest was reported as pulmonary edema when he had an episode of hemoptysis but apparently being treated with a very low dose of lasix. So not sure if he had actually upper or lower respiratory tract infection/ inflammation causing his symptoms in May 2020. Pt explained. Also pt to see his dermatologist soon given itching. Ok for steroid injection from renal standpoint. Avoid NSAID use, systemic or oral, both can cause worsening of CKD.    Diabetes, hyperlipidemia, RA management per PCP and rheumatology.  Labs discussed with patient, potential for decline in renal function explained to him, periodic follow up on renal function stressed. His questions were answered to his  satisfaction. Stressed importance of better glycemic control, weight loss and strict low salt diet. He agreed with the plan.   RTC 4 months

## 2020-09-22 NOTE — PROGRESS NOTES
Digital Medicine: Health  Follow-Up    The history is provided by the patient.             Reason for review: Blood glucose not at goal and Blood pressure at goal        Topics Covered on Call: Diet    Additional Follow-up details: Patient is doing well. He saw his nephrologist today. He has some concerns about itching in feet which is an ongoing problem that he is working with podiatry on. He asked about medication interactions for topical medications which I encouraged him to discussed with podiatry.    I encouraged BP readings at least once per week.           Diet-Change      Dietary Indiscretions:Patient reports overeating recently and attributes elevated BG due to this. Also reports eating restaurant food (biscuits, potatoes) with a few alcoholic drinks when he went out to eat. He recognizes which foods elevated BG and will make an effort to reduce those foods. I encouaged him to portion out snacks instead of sitting down with bag.     Intervention(s): portion control and carb reduction      Physical Activity-Not assessed    Medication Adherence-Medication Adherence not addressed.      Substance, Sleep, Stress-Not assessed      Additional monitoring needed.  Continue current diet/physical activity routine.       Addressed patient questions and patient has my contact information if needed prior to next outreach. Patient verbalizes understanding.      Explained the importance of self-monitoring and medication adherence. Encouraged the patient to communicate with their health  for lifestyle modifications to help improve or maintain a healthy lifestyle.            There are no preventive care reminders to display for this patient.    Last 5 Patient Entered Readings                                      Current 30 Day Average: 133/61     Recent Readings 9/18/2020 9/18/2020 9/14/2020 9/14/2020 9/13/2020    SBP (mmHg) 123 124 113 116 144    DBP (mmHg) 57 60 52 55 63    Pulse 57 59 54 55 51        Last 6  Patient Entered Readings                                          Most Recent A1c: 5.4% on 6/8/2020  (Goal: 7%)     Recent Readings 9/21/2020 9/20/2020 9/19/2020 9/18/2020 9/17/2020    Blood Glucose (mg/dL) 220 183 206 160 173

## 2020-09-30 ENCOUNTER — PATIENT OUTREACH (OUTPATIENT)
Dept: OTHER | Facility: OTHER | Age: 85
End: 2020-09-30

## 2020-09-30 ENCOUNTER — EXTERNAL CHRONIC CARE MANAGEMENT (OUTPATIENT)
Dept: PRIMARY CARE CLINIC | Facility: CLINIC | Age: 85
End: 2020-09-30
Payer: MEDICARE

## 2020-09-30 PROCEDURE — 99490 CHRNC CARE MGMT STAFF 1ST 20: CPT | Mod: S$PBB,,, | Performed by: INTERNAL MEDICINE

## 2020-09-30 PROCEDURE — 99490 PR CHRONIC CARE MGMT, 1ST 20 MIN: ICD-10-PCS | Mod: S$PBB,,, | Performed by: INTERNAL MEDICINE

## 2020-09-30 PROCEDURE — 99490 CHRNC CARE MGMT STAFF 1ST 20: CPT | Mod: PBBFAC,PO | Performed by: INTERNAL MEDICINE

## 2020-09-30 NOTE — PROGRESS NOTES
LVM to discuss BG readings and medications.   A1c at goal.     Last 6 Patient Entered Readings                                          Most Recent A1c: 5.4% on 6/8/2020  (Goal: 7%)     Recent Readings 9/30/2020 9/29/2020 9/28/2020 9/27/2020 9/27/2020    Blood Glucose (mg/dL) 138 130 207 171 213        Diabetes Medications             glipiZIDE (GLUCOTROL) 10 MG TR24 TAKE 1 TABLET(10 MG) BY MOUTH EVERY DAY    SITagliptin (JANUVIA) 100 MG Tab Take 1 tablet (100 mg total) by mouth once daily.

## 2020-10-02 ENCOUNTER — PATIENT OUTREACH (OUTPATIENT)
Dept: ADMINISTRATIVE | Facility: HOSPITAL | Age: 85
End: 2020-10-02

## 2020-10-02 RX ORDER — ALLOPURINOL 100 MG/1
TABLET ORAL
Qty: 90 TABLET | Refills: 12 | Status: SHIPPED | OUTPATIENT
Start: 2020-10-02 | End: 2021-12-10

## 2020-10-02 RX ORDER — NAPROXEN SODIUM 220 MG/1
TABLET, FILM COATED ORAL
Qty: 90 TABLET | Refills: 12 | Status: SHIPPED | OUTPATIENT
Start: 2020-10-02 | End: 2022-04-05

## 2020-10-05 ENCOUNTER — LAB VISIT (OUTPATIENT)
Dept: LAB | Facility: HOSPITAL | Age: 85
End: 2020-10-05
Attending: INTERNAL MEDICINE
Payer: MEDICARE

## 2020-10-05 DIAGNOSIS — D69.6 THROMBOCYTOPENIA: ICD-10-CM

## 2020-10-05 DIAGNOSIS — C85.19 B-CELL LYMPHOMA OF EXTRANODAL SITE: ICD-10-CM

## 2020-10-05 DIAGNOSIS — M06.9 RHEUMATOID ARTHRITIS: ICD-10-CM

## 2020-10-05 LAB
ALBUMIN SERPL BCP-MCNC: 4.4 G/DL (ref 3.5–5.2)
ALP SERPL-CCNC: 52 U/L (ref 55–135)
ALT SERPL W/O P-5'-P-CCNC: 14 U/L (ref 10–44)
ANION GAP SERPL CALC-SCNC: 9 MMOL/L (ref 8–16)
AST SERPL-CCNC: 16 U/L (ref 10–40)
BASOPHILS # BLD AUTO: 0.01 K/UL (ref 0–0.2)
BASOPHILS NFR BLD: 0.2 % (ref 0–1.9)
BILIRUB SERPL-MCNC: 1.2 MG/DL (ref 0.1–1)
BUN SERPL-MCNC: 20 MG/DL (ref 8–23)
CALCIUM SERPL-MCNC: 9.3 MG/DL (ref 8.7–10.5)
CHLORIDE SERPL-SCNC: 105 MMOL/L (ref 95–110)
CO2 SERPL-SCNC: 29 MMOL/L (ref 23–29)
CREAT SERPL-MCNC: 1.5 MG/DL (ref 0.5–1.4)
DIFFERENTIAL METHOD: ABNORMAL
EOSINOPHIL # BLD AUTO: 0.1 K/UL (ref 0–0.5)
EOSINOPHIL NFR BLD: 2.2 % (ref 0–8)
ERYTHROCYTE [DISTWIDTH] IN BLOOD BY AUTOMATED COUNT: 12.3 % (ref 11.5–14.5)
EST. GFR  (AFRICAN AMERICAN): 49 ML/MIN/1.73 M^2
EST. GFR  (NON AFRICAN AMERICAN): 42 ML/MIN/1.73 M^2
GLUCOSE SERPL-MCNC: 194 MG/DL (ref 70–110)
HCT VFR BLD AUTO: 40.7 % (ref 40–54)
HGB BLD-MCNC: 13 G/DL (ref 14–18)
IMM GRANULOCYTES # BLD AUTO: 0.02 K/UL (ref 0–0.04)
IMM GRANULOCYTES NFR BLD AUTO: 0.4 % (ref 0–0.5)
LDH SERPL L TO P-CCNC: 208 U/L (ref 110–260)
LYMPHOCYTES # BLD AUTO: 0.7 K/UL (ref 1–4.8)
LYMPHOCYTES NFR BLD: 15 % (ref 18–48)
MCH RBC QN AUTO: 29.3 PG (ref 27–31)
MCHC RBC AUTO-ENTMCNC: 31.9 G/DL (ref 32–36)
MCV RBC AUTO: 92 FL (ref 82–98)
MONOCYTES # BLD AUTO: 0.7 K/UL (ref 0.3–1)
MONOCYTES NFR BLD: 14.8 % (ref 4–15)
NEUTROPHILS # BLD AUTO: 3.3 K/UL (ref 1.8–7.7)
NEUTROPHILS NFR BLD: 67.4 % (ref 38–73)
NRBC BLD-RTO: 0 /100 WBC
PLATELET # BLD AUTO: 58 K/UL (ref 150–350)
PMV BLD AUTO: 12.5 FL (ref 9.2–12.9)
POTASSIUM SERPL-SCNC: 4.1 MMOL/L (ref 3.5–5.1)
PROT SERPL-MCNC: 7.6 G/DL (ref 6–8.4)
RBC # BLD AUTO: 4.43 M/UL (ref 4.6–6.2)
SODIUM SERPL-SCNC: 143 MMOL/L (ref 136–145)
WBC # BLD AUTO: 4.93 K/UL (ref 3.9–12.7)

## 2020-10-05 PROCEDURE — 85025 COMPLETE CBC W/AUTO DIFF WBC: CPT

## 2020-10-05 PROCEDURE — 80053 COMPREHEN METABOLIC PANEL: CPT

## 2020-10-05 PROCEDURE — 83615 LACTATE (LD) (LDH) ENZYME: CPT

## 2020-10-05 PROCEDURE — 36415 COLL VENOUS BLD VENIPUNCTURE: CPT

## 2020-10-05 NOTE — PROGRESS NOTES
Chief Complaint :    Thrombocytopenia. Low  Grade Lymphoma involving Bone Marrow.    Hx of Present illness :  Patient is a 84 y.o. year old male who presents to the clinic today for   Oncology Followup. Evaluated for Thrombocytopenia. 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   Has completed one four week ciourse of rituxan Monotherapy. About ten days ago went to Urgent care with Sx of Hemoptysis. CT Chest revealed cardiomegaly, cardiac decompensation, and slightly enlarged mediastinal nodes.  Has itching due to Lichen Planus.    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    Transfusion :  None    Menstrual & obstetric Hx :  N/A        Present Meds :   Medication List with Changes/Refills   Current Medications    ALLOPURINOL (ZYLOPRIM) 100 MG TABLET    TAKE 1 TABLET(100 MG) BY MOUTH EVERY DAY    AMLODIPINE (NORVASC) 10 MG TABLET    TAKE 1 TABLET(10 MG) BY MOUTH EVERY DAY    AMOXICILLIN (AMOXIL) 500 MG CAPSULE        ASCORBIC ACID (VITAMIN C) 1000 MG TABLET    Take 1,000 mg by mouth once daily.    ASPIRIN 81 MG CHEW    CHEW AND SWALLOW 1 TABLET BY MOUTH DAILY    ATORVASTATIN (LIPITOR) 20 MG TABLET    TAKE 1 TABLET(20 MG) BY MOUTH EVERY DAY    BLOOD SUGAR DIAGNOSTIC STRP    1 strip by Misc.(Non-Drug; Combo Route) route 2 (two) times daily. Disp glucometer and lancets as well    CARVEDILOL (COREG) 6.25 MG TABLET    Take 1 tablet (6.25 mg total) by mouth 2 (two) times daily with meals.    FOLIC ACID (FOLVITE) 800 MCG TAB    Take 800 mcg by mouth once daily.    FUROSEMIDE (LASIX) 20 MG TABLET    Take 0.5 tablets (10 mg total) by mouth once daily.    GABAPENTIN (NEURONTIN) 300 MG CAPSULE    TAKE 1 CAPSULE(300 MG) BY MOUTH THREE TIMES DAILY    GLIPIZIDE (GLUCOTROL) 10 MG TR24    TAKE 1 TABLET(10 MG) BY MOUTH EVERY DAY    HYDROCORTISONE 2.5 % CREAM    APPLY UP TO ONE HALF GRAM TO THE AFFECTED AREA TWICE DAILY    LIDOCAINE (XYLOCAINE) 5 % OINT  OINTMENT    APPLY 1.5 GRAMS TO THE AFFECTED AREAS TWICE DAILY.    MOMETASONE 0.1% (ELOCON) 0.1 % CREAM    APPLY TO AFFECTED AREA QD    MULTIVIT-MIN/FA/LYCOPEN/LUTEIN (CENTRUM SILVER MEN ORAL)    Take by mouth.    NIACIN 500 MG TAB    Take 100 mg by mouth every evening.    OMEPRAZOLE (PRILOSEC) 40 MG CAPSULE    TAKE 1 CAPSULE DAILY    SITAGLIPTIN (JANUVIA) 100 MG TAB    Take 1 tablet (100 mg total) by mouth once daily.    TRIAMCINOLONE ACETONIDE 0.1% (KENALOG) 0.1 % OINTMENT        TRUEPLUS LANCETS 33 GAUGE MISC    USE TO TEST BLOOD SUGAR BID    TURMERIC ROOT EXTRACT ORAL    Take by mouth.    VALSARTAN (DIOVAN) 320 MG TABLET    TAKE 1 TABLET ONCE DAILY    VITAMIN E 400 UNIT CAPSULE    Take 400 Units by mouth once daily.       Past Medical Hx :   Hypertension; DM; Hyperlipidemia; Chronic atrial fibrillation; Macular degeneration, Lichen planus..CKD 3; CAD.GERD; Peripheral Neuropathy.  Known to have Rheumatoid arthritis, on no Rx  No Hx of TB, , Lupus, sarcoid, Seizure disorder or CVA. No Hx of DVT or PE./ No past Hx of cancer, chemotherapy or radiation therapy.    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    Immunization :  Immunization History   Administered Date(s) Administered    Influenza 10/19/2018    Influenza - High Dose - PF (65 years and older) 11/03/2017, 10/19/2018, 10/11/2019    Pneumococcal Conjugate - 13 Valent  06/27/2017    Td (ADULT) 11/18/2005       Family Hx :  Family History   Problem Relation Age of Onset    No Known Problems Mother     No Known Problems Father     No Known Problems Sister     No Known Problems Brother     No Known Problems Maternal Aunt     No Known Problems Maternal Uncle     No Known Problems Paternal Aunt     No Known Problems Paternal Uncle     No Known Problems Maternal Grandmother     No Known Problems Maternal Grandfather     No Known Problems Paternal Grandmother     No Known Problems Paternal Grandfather     Amblyopia Neg Hx     Blindness Neg Hx     Cancer Neg Hx     Cataracts Neg Hx     Diabetes Neg Hx     Glaucoma Neg Hx     Hypertension Neg Hx     Macular degeneration Neg Hx     Retinal detachment Neg Hx     Strabismus Neg Hx     Stroke Neg Hx     Thyroid disease Neg Hx        Social Hx :  Social History     Socioeconomic History    Marital status:      Spouse name: Not on file    Number of children: Not on file    Years of education: Not on file    Highest education level: Not on file   Occupational History    Not on file   Social Needs    Financial resource strain: Not hard at all    Food insecurity     Worry: Never true     Inability: Never true    Transportation needs     Medical: No     Non-medical: No   Tobacco Use    Smoking status: Former Smoker    Smokeless tobacco: Never Used   Substance and Sexual Activity    Alcohol use: Yes     Alcohol/week: 0.0 standard drinks     Frequency: 2-4 times a month     Drinks per session: 1 or 2     Binge frequency: Never     Comment: Social    Drug use: No    Sexual activity: Not on file   Lifestyle    Physical activity     Days per week: Not on file     Minutes per session: Not on file    Stress: Not on file   Relationships    Social connections     Talks on phone: More than three times a week     Gets together: Once a week     Attends Church service: 1 to 4 times per year     Active member of  club or organization: No     Attends meetings of clubs or organizations: Never     Relationship status:    Other Topics Concern    Not on file   Social History Narrative    Not on file       Surgery :  Bilateral Knee replacement; Bilateral Cataract surgery.    Symptoms :   No new Sx.    Review of Systems   Constitutional: Negative for chills, diaphoresis, fever, malaise/fatigue and weight loss.        . Has lost a few pounds.  No fever, pruritis,  or evening rise in temp.  When humidity is high has night sweats.   HENT: Positive for congestion. Negative for hearing loss, nosebleeds, sore throat and tinnitus.    Eyes: Negative for blurred vision, double vision and photophobia.        Wears glasses   Respiratory: Negative for cough, hemoptysis, shortness of breath and wheezing.    Cardiovascular: Negative.  Negative for chest pain, palpitations, orthopnea, claudication, leg swelling and PND.   Gastrointestinal: Negative for abdominal pain, blood in stool, constipation, diarrhea, heartburn, nausea and vomiting.   Genitourinary: Negative.  Negative for dysuria, flank pain, frequency, hematuria and urgency.   Musculoskeletal: Negative.  Negative for back pain, falls, joint pain, myalgias and neck pain.   Skin: Positive for rash. Negative for itching.        Saw Dermatologist for Rosacea few weeks ago.   Neurological: Negative for dizziness, tingling, sensory change and headaches.   Endo/Heme/Allergies: Negative for environmental allergies. Does not bruise/bleed easily.   Psychiatric/Behavioral: Negative for depression and memory loss. The patient is not nervous/anxious and does not have insomnia.    No new Sx    Physical Exam :    Physical Exam   Constitutional: He is oriented to person, place, and time and well-developed, well-nourished, and in no distress. Vital signs are normal. No distress.   HENT:   Head: Normocephalic and atraumatic.   Right Ear: External ear normal.   Left Ear: External ear normal.    Nose: Nose normal.   Mouth/Throat: Oropharynx is clear and moist. No oropharyngeal exudate.   Eyes: Pupils are equal, round, and reactive to light. Conjunctivae, EOM and lids are normal. Lids are everted and swept, no foreign bodies found. Right eye exhibits no discharge. Left eye exhibits no discharge. No scleral icterus.       Neck: Trachea normal, normal range of motion, full passive range of motion without pain and phonation normal. Neck supple. Normal carotid pulses, no hepatojugular reflux and no JVD present. No tracheal tenderness present. Carotid bruit is not present. No tracheal deviation present. No thyroid mass and no thyromegaly present.   Cardiovascular: Normal rate, regular rhythm, normal heart sounds, intact distal pulses and normal pulses. PMI is not displaced. Exam reveals no gallop and no friction rub.   No murmur heard.  Pulmonary/Chest: Effort normal and breath sounds normal. No stridor. No apnea. No respiratory distress. He has no wheezes. He has no rales. He exhibits no tenderness.   Abdominal: Soft. Normal appearance, normal aorta and bowel sounds are normal. He exhibits no distension, no ascites and no mass. There is no hepatosplenomegaly. There is no abdominal tenderness. There is no rebound, no guarding and no CVA tenderness. No hernia.   Genitourinary:    Genitourinary Comments: Not examined     Musculoskeletal: Normal range of motion.         General: No tenderness, deformity or edema.   Lymphadenopathy:        Head (right side): No submental, no submandibular, no tonsillar, no preauricular, no posterior auricular and no occipital adenopathy present.        Head (left side): No submental, no submandibular, no tonsillar, no preauricular, no posterior auricular and no occipital adenopathy present.     He has no cervical adenopathy.     He has no axillary adenopathy.        Right: No supraclavicular and no epitrochlear adenopathy present.        Left: No supraclavicular and no epitrochlear  adenopathy present.   Neurological: He is alert and oriented to person, place, and time. He has normal motor skills, normal sensation, normal strength, normal reflexes and intact cranial nerves. No cranial nerve deficit. He exhibits normal muscle tone. Gait normal. Coordination normal. GCS score is 15.   Skin: Skin is warm, dry and intact. No rash noted. He is not diaphoretic. No cyanosis or erythema. No pallor. Nails show no clubbing.   Psychiatric: Mood, memory, affect and judgment normal.   No New finding.     Labs & Imaging :  10/05/2020 : WBC 4,930.  ANC 3,300; Hgb 13.0; Hct 40.7; MCV 92; Platelets 58,000.  NFBS 194; Cr.1.5; eGFR 42; Ca 9.3; Bili 1.2; Liver enzymes normal.     LDH  : 234        06/26/2020 : PET/CT : No hypermetabolic activity to suggest Lymphoma.  Non Hypermetabolic Prevascular nodule Unchanged.       06/08/2020 :  ; NFBS 109; Cr. 1.3; eGFR 50; ca 9.3. Bili 1.3; Liver enzymes normal. Hgb 12.7; Hct 41.0; MCV 92; ; Platelets 83,000. ANC 1,000.       03/09/2020 : NFBS 204;  Cr 1.4; Ca 8.9; Bili  1.0; Liver enzymes normal. eGFR 46; Hgb 12.2; Hct 38.5; MCV 93;  Platelets 59,000. ANC 4,800.      Dx :  .   Chronic  thrombocytopenia  Low grade  Lymphoma Involving  Bone Marrow, Incidental finding.  Poorly Controlled DM.      Assessment & Plan : Reviewed with patient .  .   RTC three  Months with CBC,CMP. Blood sugar followup with .. Patient understands and verbalised.         Advance Care Planning     Power of   I initiated the process of advance care planning today and explained the importance of this process to the patient.  I introduced the concept of advance directives to the patient, as well. Then the patient received detailed information about the importance of designating a Health Care Power of  (HCPOA). He was also instructed to communicate with this person about their wishes for future healthcare, should he become sick and lose decision-making capacity. The  patient  Has/has not:previously appointed a HCPOA. After our discussion, the patient Has decided to  Talk to hs wife         Living Will  During this visit, I engaged the patient in the advance care planning process.  The patient and I reviewed the role for advance directives and their purpose in directing future healthcare if the patient's unable to speak for him/herself.  At this point in time, the patient does have full decision-making capacity.  We discussed different extreme health states that he could experience, and reviewed what kind of medical care he would want in those situations.  The patient communicated that if he were comatose and had little chance of a meaningful recovery, he  Does not want machines/life-sustaining treatments used.    The patient HAS NOT completed a living will to reflect these preferences.  The patient   HAS NOT already designated a healthcare power of  to make decisions on his behalf.         Papers given to patient.

## 2020-10-07 ENCOUNTER — OFFICE VISIT (OUTPATIENT)
Dept: HEMATOLOGY/ONCOLOGY | Facility: CLINIC | Age: 85
End: 2020-10-07
Payer: MEDICARE

## 2020-10-07 VITALS
OXYGEN SATURATION: 97 % | TEMPERATURE: 98 F | DIASTOLIC BLOOD PRESSURE: 72 MMHG | HEIGHT: 71 IN | HEART RATE: 61 BPM | BODY MASS INDEX: 27.84 KG/M2 | WEIGHT: 198.88 LBS | SYSTOLIC BLOOD PRESSURE: 164 MMHG

## 2020-10-07 DIAGNOSIS — D69.6 THROMBOCYTOPENIA: ICD-10-CM

## 2020-10-07 DIAGNOSIS — C85.19 B-CELL LYMPHOMA OF EXTRANODAL SITE: Primary | ICD-10-CM

## 2020-10-07 PROCEDURE — 99213 OFFICE O/P EST LOW 20 MIN: CPT | Mod: S$PBB,,, | Performed by: INTERNAL MEDICINE

## 2020-10-07 PROCEDURE — 99213 PR OFFICE/OUTPT VISIT, EST, LEVL III, 20-29 MIN: ICD-10-PCS | Mod: S$PBB,,, | Performed by: INTERNAL MEDICINE

## 2020-10-07 PROCEDURE — 99215 OFFICE O/P EST HI 40 MIN: CPT | Mod: PBBFAC | Performed by: INTERNAL MEDICINE

## 2020-10-07 PROCEDURE — 99999 PR PBB SHADOW E&M-EST. PATIENT-LVL V: ICD-10-PCS | Mod: PBBFAC,,, | Performed by: INTERNAL MEDICINE

## 2020-10-07 PROCEDURE — 99999 PR PBB SHADOW E&M-EST. PATIENT-LVL V: CPT | Mod: PBBFAC,,, | Performed by: INTERNAL MEDICINE

## 2020-10-12 ENCOUNTER — OFFICE VISIT (OUTPATIENT)
Dept: CARDIOLOGY | Facility: CLINIC | Age: 85
End: 2020-10-12
Payer: MEDICARE

## 2020-10-12 VITALS
BODY MASS INDEX: 27.9 KG/M2 | HEART RATE: 85 BPM | HEIGHT: 71 IN | SYSTOLIC BLOOD PRESSURE: 182 MMHG | OXYGEN SATURATION: 96 % | DIASTOLIC BLOOD PRESSURE: 76 MMHG | WEIGHT: 199.31 LBS

## 2020-10-12 DIAGNOSIS — I27.21 PAH (PULMONARY ARTERY HYPERTENSION): ICD-10-CM

## 2020-10-12 DIAGNOSIS — I10 ESSENTIAL HYPERTENSION: ICD-10-CM

## 2020-10-12 DIAGNOSIS — I34.0 NONRHEUMATIC MITRAL VALVE REGURGITATION: ICD-10-CM

## 2020-10-12 DIAGNOSIS — I11.9 LEFT VENTRICULAR HYPERTROPHY DUE TO HYPERTENSIVE DISEASE, WITHOUT HEART FAILURE: ICD-10-CM

## 2020-10-12 DIAGNOSIS — E11.22 CKD STAGE 3 DUE TO TYPE 2 DIABETES MELLITUS: ICD-10-CM

## 2020-10-12 DIAGNOSIS — I25.10 CORONARY ARTERY CALCIFICATION SEEN ON CAT SCAN: Primary | ICD-10-CM

## 2020-10-12 DIAGNOSIS — I70.0 ATHEROSCLEROSIS OF ABDOMINAL AORTA: ICD-10-CM

## 2020-10-12 DIAGNOSIS — E78.5 HYPERLIPIDEMIA ASSOCIATED WITH TYPE 2 DIABETES MELLITUS: ICD-10-CM

## 2020-10-12 DIAGNOSIS — E11.69 HYPERLIPIDEMIA ASSOCIATED WITH TYPE 2 DIABETES MELLITUS: ICD-10-CM

## 2020-10-12 DIAGNOSIS — I44.0 FIRST DEGREE AV BLOCK: ICD-10-CM

## 2020-10-12 DIAGNOSIS — N18.30 CKD STAGE 3 DUE TO TYPE 2 DIABETES MELLITUS: ICD-10-CM

## 2020-10-12 PROCEDURE — 99999 PR PBB SHADOW E&M-EST. PATIENT-LVL V: ICD-10-PCS | Mod: PBBFAC,,, | Performed by: INTERNAL MEDICINE

## 2020-10-12 PROCEDURE — 99214 OFFICE O/P EST MOD 30 MIN: CPT | Mod: S$PBB,,, | Performed by: INTERNAL MEDICINE

## 2020-10-12 PROCEDURE — 99214 PR OFFICE/OUTPT VISIT, EST, LEVL IV, 30-39 MIN: ICD-10-PCS | Mod: S$PBB,,, | Performed by: INTERNAL MEDICINE

## 2020-10-12 PROCEDURE — 99999 PR PBB SHADOW E&M-EST. PATIENT-LVL V: CPT | Mod: PBBFAC,,, | Performed by: INTERNAL MEDICINE

## 2020-10-12 PROCEDURE — 99215 OFFICE O/P EST HI 40 MIN: CPT | Mod: PBBFAC | Performed by: INTERNAL MEDICINE

## 2020-10-12 NOTE — PROGRESS NOTES
CARDIOLOGY CLINIC VISIT        HISTORY OF PRESENT ILLNESS:     Bria Lawson presents for continued care.  Last seen 07/10/2020.  Feels good.  No complaints.  Digital hypertension log shows well-controlled blood pressures though today's values elevated. His initial echo showed normal to low normal LVEF. LVH. Mod MR, mild TR. Mildly elevated PASP. PET 6/11/20. No evidence of active lymphoma. Seen 5/29/20 for evaluation of shortness of breath/hemoptysis. No recurrence. Patient had a CT scan on 5/26/20 showing pulmonary edema, bilateral pleural effusions. Coronary calcification also noted - 3V. Hx of HTN, HLP, LDL 44.  DM.         CT Chest 5/26/20:     1. CT findings favoring sequela of cardiac decompensation causing pulmonary edema and bilateral pleural effusions.  Superimposed COVID-19 pneumonia cannot be entirely excluded noting that pleural effusions is not a commonly reported finding and therefore would be atypical.  2. Persistent soft tissue attenuation nodule within the prevascular mediastinum, presumably an enlarged lymph node and unchanged when compared to previous PET-CTs.  3. Slight interval increased size of multiple mediastinal lymph nodes, nonspecific.  4. Cardiomegaly with severe dense coronary artery atherosclerosis in a 3 vessel distribution.  5. Additional findings as detailed in the body of the report.    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    Hyperlipidemia 2/26/2015    Hypertension     Uncontrolled secondary diabetes mellitus with  stage 3 CKD (GFR 30-59) 8/28/2015       PAST SURGICAL HISTORY:     Past Surgical History:   Procedure Laterality Date    BONE MARROW BIOPSY Left 9/19/2018    Procedure: Biopsy-bone marrow;  Surgeon: Velia Tobias MD;  [facility];  Service: Oncology;  Laterality: Left;    CATARACT EXTRACTION Bilateral 1996    EYE SURGERY      cataracts    JOINT REPLACEMENT      knee replacement    retinal injection s Bilateral     scrotal cyst         ALLERGIES AND MEDICATION:   Review of patient's allergies indicates:  No Known Allergies     Medication List          Accurate as of October 12, 2020 10:29 AM. If you have any questions, ask your nurse or doctor.            CONTINUE taking these medications    allopurinoL 100 MG tablet  Commonly known as: ZYLOPRIM  TAKE 1 TABLET(100 MG) BY MOUTH EVERY DAY     amLODIPine 10 MG tablet  Commonly known as: NORVASC  TAKE 1 TABLET(10 MG) BY MOUTH EVERY DAY     amoxicillin 500 MG capsule  Commonly known as: AMOXIL     aspirin 81 MG Chew  CHEW AND SWALLOW 1 TABLET BY MOUTH DAILY     atorvastatin 20 MG tablet  Commonly known as: LIPITOR  TAKE 1 TABLET(20 MG) BY MOUTH EVERY DAY     blood sugar diagnostic Strp  1 strip by Misc.(Non-Drug; Combo Route) route 2 (two) times daily. Disp glucometer and lancets as well     carvediloL 6.25 MG tablet  Commonly known as: COREG  Take 1 tablet (6.25 mg total) by mouth 2 (two) times daily with meals.     CENTRUM SILVER MEN ORAL     folic acid 800 MCG Tab  Commonly known as: FOLVITE     furosemide 20 MG tablet  Commonly known as: LASIX  Take 0.5 tablets (10 mg total) by mouth once daily.     gabapentin 300 MG capsule  Commonly known as: NEURONTIN  TAKE 1 CAPSULE(300 MG) BY MOUTH THREE TIMES DAILY     glipiZIDE 10 MG Tr24  Commonly known as: GLUCOTROL  TAKE 1 TABLET(10 MG) BY MOUTH EVERY DAY     hydrocortisone 2.5 % cream     lidocaine 5 % Oint ointment  Commonly known as: XYLOCAINE     mometasone 0.1% 0.1 % cream  Commonly known as: ELOCON      niacin 500 MG Tab     omeprazole 40 MG capsule  Commonly known as: PRILOSEC  TAKE 1 CAPSULE DAILY     SITagliptin 100 MG Tab  Commonly known as: JANUVIA  Take 1 tablet (100 mg total) by mouth once daily.     triamcinolone acetonide 0.1% 0.1 % ointment  Commonly known as: KENALOG     TRUEPLUS LANCETS 33 gauge Misc  Generic drug: lancets     TURMERIC ROOT EXTRACT ORAL     valsartan 320 MG tablet  Commonly known as: DIOVAN  TAKE 1 TABLET ONCE DAILY     VITAMIN C 1000 MG tablet  Generic drug: ascorbic acid (vitamin C)     vitamin E 400 UNIT capsule            SOCIAL HISTORY:     Social History     Socioeconomic History    Marital status:      Spouse name: Not on file    Number of children: Not on file    Years of education: Not on file    Highest education level: Not on file   Occupational History    Not on file   Social Needs    Financial resource strain: Not hard at all    Food insecurity     Worry: Never true     Inability: Never true    Transportation needs     Medical: No     Non-medical: No   Tobacco Use    Smoking status: Former Smoker    Smokeless tobacco: Never Used   Substance and Sexual Activity    Alcohol use: Yes     Alcohol/week: 0.0 standard drinks     Frequency: 2-4 times a month     Drinks per session: 1 or 2     Binge frequency: Never     Comment: Social    Drug use: No    Sexual activity: Not on file   Lifestyle    Physical activity     Days per week: Not on file     Minutes per session: Not on file    Stress: Not on file   Relationships    Social connections     Talks on phone: More than three times a week     Gets together: Once a week     Attends [employer] service: 1 to 4 times per year     Active member of club or organization: No     Attends meetings of clubs or organizations: Never     Relationship status:    Other Topics Concern    Not on file   Social History Narrative    Not on file       FAMILY HISTORY:     Family History   Problem Relation Age of Onset     "No Known Problems Mother     No Known Problems Father     No Known Problems Sister     No Known Problems Brother     No Known Problems Maternal Aunt     No Known Problems Maternal Uncle     No Known Problems Paternal Aunt     No Known Problems Paternal Uncle     No Known Problems Maternal Grandmother     No Known Problems Maternal Grandfather     No Known Problems Paternal Grandmother     No Known Problems Paternal Grandfather     Amblyopia Neg Hx     Blindness Neg Hx     Cancer Neg Hx     Cataracts Neg Hx     Diabetes Neg Hx     Glaucoma Neg Hx     Hypertension Neg Hx     Macular degeneration Neg Hx     Retinal detachment Neg Hx     Strabismus Neg Hx     Stroke Neg Hx     Thyroid disease Neg Hx        REVIEW OF SYSTEMS:   Review of Systems   Respiratory: Negative for sputum production, shortness of breath and wheezing.    Cardiovascular: Negative for chest pain, palpitations, orthopnea, claudication, leg swelling and PND.   Gastrointestinal: Negative for abdominal pain, heartburn, nausea and vomiting.   Neurological: Negative for dizziness, sensory change, speech change, seizures, loss of consciousness, weakness and headaches.       PHYSICAL EXAM:     Vitals:    10/12/20 1019   BP: (!) 182/76   Pulse: 85    Body mass index is 27.8 kg/m².  Weight: 90.4 kg (199 lb 4.7 oz)   Height: 5' 11" (180.3 cm)     Physical Exam  Vitals signs reviewed.   Constitutional:       General: He is not in acute distress.     Appearance: He is well-developed. He is not diaphoretic.   Neck:      Vascular: No carotid bruit or JVD.   Cardiovascular:      Rate and Rhythm: Normal rate and regular rhythm.      Pulses: Normal pulses.      Heart sounds: Murmur present. Systolic murmur present with a grade of 3/6.   Pulmonary:      Effort: Pulmonary effort is normal.      Breath sounds: Normal breath sounds.   Abdominal:      General: Bowel sounds are normal.      Palpations: Abdomen is soft.      Tenderness: There is no " abdominal tenderness.   Musculoskeletal:      Right lower leg: No edema.      Left lower leg: No edema.   Neurological:      Mental Status: He is alert and oriented to person, place, and time.   Psychiatric:         Speech: Speech normal.         Behavior: Behavior normal.         DATA:     Laboratory:  CBC:  Recent Labs   Lab 05/26/20  0808 06/08/20  0954 10/05/20  0838   WBC 5.6 3.33 L 4.93   Hemoglobin 11.4 L 12.7 L 13.0 L   Hematocrit 36.5 L 41.0 40.7   Platelets 84 LL 83 L 58 L       CHEMISTRIES:  Recent Labs   Lab 06/08/20  0954 09/15/20  1017 10/05/20  0838   Glucose 193 H 236 H 194 H   Sodium 139 140 143   Potassium 4.1 4.3 4.1   BUN, Bld 16 16 20   Creatinine 1.3 1.4 1.5 H   eGFR if  58 A 53 A 49 A   eGFR if non  50 A 46 A 42 A   Calcium 9.3 8.8 9.3       CARDIAC BIOMARKERS:        COAGS:        LIPIDS/LFTS:  Recent Labs   Lab 04/22/19  1151  01/23/20  1154 03/09/20  0905 06/08/20  0954 10/05/20  0838   Cholesterol 109 L  --  112 L  --   --   --    Triglycerides 153 H  --  170 H  --   --   --    HDL 35 L  --  34 L  --   --   --    LDL Cholesterol 43.4 L  --  44.0 L  --   --   --    Non-HDL Cholesterol 74  --  78  --   --   --    AST  --    < >  --  16 17 16   ALT  --    < >  --  16 19 14    < > = values in this interval not displayed.       Cardiovascular Testing:    Echo 6/19/20:     · Low normal left ventricular systolic function. The estimated ejection fraction is 50-55%.  · Mild eccentric left ventricular hypertrophy.  · Mild left ventricular enlargement.  · No wall motion abnormalities.  · Normal right ventricular systolic function.  · Moderate mitral regurgitation.  · Mild tricuspid regurgitation.  · The estimated PA systolic pressure is 35 mmHg.    ASSESSMENT:     1. Shortness of breath/hemoptysis: no recurrence  2. Abnormal CT scan/coronary calcification  3. HTN: home log values good  4. HLP: LDL 44  5. DM  6. Lymphoma:  No recurrence on PET  7. PAH: mild  8. Mitral  regurgitation:  Moderate by last echo  9.  Atherosclerosis of abdominal aorta  10.  Left ventricular hypertrophy without heart failure    PLAN:     1. Shortness of breath/hemoptysis: no recurrence  2. Abnormal CT scan/coronary calcification:  Risk factor modification  3. HTN: home log values good  4. HLP:  Continue current management  5.  Mitral regurgitation:  Follow-up echo in 6 months.  6. Return to clinic 4 months.    Oleg Fontana MD, MPH, FACC, [facility]

## 2020-10-15 ENCOUNTER — OFFICE VISIT (OUTPATIENT)
Dept: FAMILY MEDICINE | Facility: CLINIC | Age: 85
End: 2020-10-15
Payer: MEDICARE

## 2020-10-15 VITALS
OXYGEN SATURATION: 97 % | HEIGHT: 71 IN | TEMPERATURE: 98 F | HEART RATE: 54 BPM | SYSTOLIC BLOOD PRESSURE: 101 MMHG | WEIGHT: 198.63 LBS | DIASTOLIC BLOOD PRESSURE: 51 MMHG | BODY MASS INDEX: 27.81 KG/M2

## 2020-10-15 DIAGNOSIS — E78.5 HYPERLIPIDEMIA, UNSPECIFIED HYPERLIPIDEMIA TYPE: ICD-10-CM

## 2020-10-15 DIAGNOSIS — E11.29 DIABETES MELLITUS WITH PROTEINURIA: ICD-10-CM

## 2020-10-15 DIAGNOSIS — R00.1 BRADYCARDIA: ICD-10-CM

## 2020-10-15 DIAGNOSIS — I25.10 CORONARY ARTERY DISEASE, ANGINA PRESENCE UNSPECIFIED, UNSPECIFIED VESSEL OR LESION TYPE, UNSPECIFIED WHETHER NATIVE OR TRANSPLANTED HEART: ICD-10-CM

## 2020-10-15 DIAGNOSIS — D69.6 THROMBOCYTOPENIA: ICD-10-CM

## 2020-10-15 DIAGNOSIS — D64.9 ANEMIA, UNSPECIFIED TYPE: ICD-10-CM

## 2020-10-15 DIAGNOSIS — N18.30 STAGE 3 CHRONIC KIDNEY DISEASE, UNSPECIFIED WHETHER STAGE 3A OR 3B CKD: ICD-10-CM

## 2020-10-15 DIAGNOSIS — R80.9 DIABETES MELLITUS WITH PROTEINURIA: ICD-10-CM

## 2020-10-15 DIAGNOSIS — I10 ESSENTIAL HYPERTENSION: ICD-10-CM

## 2020-10-15 DIAGNOSIS — C85.19 B-CELL LYMPHOMA OF EXTRANODAL SITE: ICD-10-CM

## 2020-10-15 DIAGNOSIS — E11.21 DIABETES MELLITUS WITH PROTEINURIC DIABETIC NEPHROPATHY: ICD-10-CM

## 2020-10-15 PROCEDURE — 99215 OFFICE O/P EST HI 40 MIN: CPT | Mod: PBBFAC,PO | Performed by: INTERNAL MEDICINE

## 2020-10-15 PROCEDURE — 99999 PR PBB SHADOW E&M-EST. PATIENT-LVL V: ICD-10-PCS | Mod: PBBFAC,,, | Performed by: INTERNAL MEDICINE

## 2020-10-15 PROCEDURE — 99214 PR OFFICE/OUTPT VISIT, EST, LEVL IV, 30-39 MIN: ICD-10-PCS | Mod: S$PBB,,, | Performed by: INTERNAL MEDICINE

## 2020-10-15 PROCEDURE — 99999 PR PBB SHADOW E&M-EST. PATIENT-LVL V: CPT | Mod: PBBFAC,,, | Performed by: INTERNAL MEDICINE

## 2020-10-15 PROCEDURE — G0008 ADMIN INFLUENZA VIRUS VAC: HCPCS | Mod: PBBFAC,PO

## 2020-10-15 PROCEDURE — 90694 VACC AIIV4 NO PRSRV 0.5ML IM: CPT | Mod: PBBFAC,PO

## 2020-10-15 PROCEDURE — 99214 OFFICE O/P EST MOD 30 MIN: CPT | Mod: S$PBB,,, | Performed by: INTERNAL MEDICINE

## 2020-10-15 NOTE — PROGRESS NOTES
Called for routine follow up.     BP and A1c controlled.     Patient is at clinic and requests a call back later.

## 2020-10-15 NOTE — PROGRESS NOTES
Chief complaint: Follow-up on diabetes,     84-year-old black male.  Here for general follow-up and is flu shot.  We did review labs back in June his A1c had improved.  We discussed checking it with his scheduled labs at the end of December.  His diabetes has generally been uncontrolled in the 6.5 range although did improve into five range most recently.  We will continue to monitor.  He has had some chronic renal insufficiency and is followed by nephrology.  There was a general trend up in his creatinine lately but overall by review of all of his labs it has been essentially this way for some time.  He is on Lasix 10 mg a day but has no edema or shortness of breath in review of the recent normal ejection fraction will have him hold the Lasix.  I reviewed his blood pressures on his phone.  1 day will be 147/67 then yesterday it was 101/51.  He was initially up today and up at the Cardiology office but he is followed and appears to have generally good blood pressure control on his monitor more days than not.  We will not make adjustments in his blood pressure medications because of this     .  He did see cardiology ordered an echo     · Low normal left ventricular systolic function. The estimated ejection fraction is 50-55%.  · Mild eccentric left ventricular hypertrophy.  · Mild left ventricular enlargement.  · No wall motion abnormalities.  · Normal right ventricular systolic function.  · Moderate mitral regurgitation.  · Mild tricuspid regurgitation.  · The estimated PA systolic pressure is 35 mmHg.     He is on digital hypertension and we reviewed his blood pressure and pulse readings.  His pulses mostly in the 40s and 50s occasionally up to 60.  He had his carvedilol reduced from 12.5-6.25 twice a day.  His pulse still appears to be mostly in the 50s.  He is asymptomatic.  We discussed that if symptomatic we would need to eliminate the carvedilol and assess response.  Reviewed all his other labs.  With the Lasix  he did not have any worsening renal insufficiency.  He has an appointment with Hematology tomorrow.  His platelet count appears stable.      Seen in  when sent note to me:  I did speak with pt nephrologist about his situation. She recommends lasix as a choice for diuretic and agrees ct suggests chf. She recommend cardiology eval as well.   I will refer to cardiology. Also will start lasix and repeat cmp and BNP in 1 week. Also recommend pt follow with his PCP .   Pt notified of all this and he agrees to this plan.     Chest CT  Impression       1. CT findings favoring sequela of cardiac decompensation causing pulmonary edema and bilateral pleural effusions.  Superimposed COVID-19 pneumonia cannot be entirely excluded noting that pleural effusions is not a commonly reported finding and therefore would be atypical.  2. Persistent soft tissue attenuation nodule within the prevascular mediastinum, presumably an enlarged lymph node and unchanged when compared to previous PET-CTs.  3. Slight interval increased size of multiple mediastinal lymph nodes, nonspecific.  4. Cardiomegaly with severe dense coronary artery atherosclerosis in a 3 vessel distribution.             here to follow-up on diabetes although did  not have A1c prior. Reviewed all his labs and abnormalities.   his A1c was 7.4 in 2/19 then 6.6, 6.8, 5.4 in june. Was Eating poorly.  He has some chronic renal insufficiency. Seen renal .   He had a slight hemoglobin reduction which appears chronic and fluctuating clinical of last year or so with slight thrombocytopenia.  We discussed all those issues and the need to monitor them over time.  Is otherwise feeling well.           Counseled at length regarding all these age-related issues, monitoring and so forth.Total time over 25 minutes with over 50% counseling.  .     .  Reviewed that he had a normal colonoscopy 2017 outside the clinic.            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 EYES: no vision change. ENT: no sore throat. CV: no chest pain w/ exertion. RESP: no shortness of breath. GI: no nausea, vomiting, diarrhea. No dysphagia. : no urinary issues. MUSCULOSKELETAL: no new myalgias or arthralgias. SKIN: no new changes. NEURO: no focal deficits. PSYCH: no new issues. ENDOCRINE: no polyuria. HEME: no lymph nodes. ALLERGY: no general pruritis.       Past medical history:  1.  Diabetes with renal disease and retinopathy  2.  Hypertension  3.  Hyperlipidemia  4.  Chronic renal failure stage III  5.  History of colon polyp, normal scope March 2012, normal 2017-- 5 yrs----GI at [facility]- Dr Kumari  6.  Hyperuricemia and gout  7.  History of sciatica and lumbar disc disease remotely  8.  Erectile dysfunction  9.  Macular degeneration, followed by outside retina specialist  10.  GERD  11.  Low HDL  12.  Inflammatory arthritis of the hands, seeing rheumatology  13.  Bilateral cervical radiculopathy and axonal neuropathy, to have surgery 2015    14.  Followed by outside urology at [facility] Dr. noriega   15.  TKA  -angie Han  who is at [facility]  16.  Prevnar given 2017  17. 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    Family history: Mother with hypertension and diabetes.  Father's history is unknown.  One brother who is okay.    Social history: Retired, quit smoking 1974.   with 3 children.  Drinks socially.    Vital signs as above  Gen: no distress  No edema    Bria was seen today for blood pressure check.    Diagnoses and all orders for this visit:    Diabetes mellitus with renal manifestations, uncontrolled, significant improvement in June but will continue to follow as in general it has been uncontrolled.  Recheck in December    Uncontrolled type 2 diabetes mellitus with stage 3 chronic kidney disease, without long-term current use of  "insulin    Essential hypertension, followed by digital medicine and overall appears to be controlled although has some days where it    Thrombocytopenia, appears stable, followed by Hematology    Stage 3 chronic kidney disease, unspecified whether stage 3a or 3b CKD, followed by renal    Anemia, unspecified type, chronic and stable    Coronary artery disease, angina presence unspecified, unspecified vessel or lesion type, unspecified whether native or transplanted heart, followed by Cardiology    Diabetes mellitus with proteinuria    Diabetes mellitus with proteinuric diabetic nephropathy    Bradycardia    B-cell lymphoma of extranodal site    Hyperlipidemia, unspecified hyperlipidemia type         Clinical note will be sensitive so as to not cause confusion regarding evaluation and management issues"ooooo"This note will not be shared with the patient."      Answers for HPI/ROS submitted by the patient on 10/15/2020   Hypertension  Chronicity: recurrent  Onset: more than 1 year ago  Progression since onset: waxing and waning  Condition status: resistant  anxiety: Yes  blurred vision: No  chest pain: No  headaches: No  malaise/fatigue: No  neck pain: No  peripheral edema: No  PND: No  shortness of breath: No  sweats: Yes  Agents associated with hypertension: no associated agents  CAD risks: diabetes mellitus  Compliance problems: diet, medication side effects  Past treatments: ACE inhibitors, angiotensin blockers, beta blockers  Improvement on treatment: moderate    "

## 2020-10-21 DIAGNOSIS — I50.9 CONGESTIVE HEART FAILURE, UNSPECIFIED HF CHRONICITY, UNSPECIFIED HEART FAILURE TYPE: ICD-10-CM

## 2020-10-21 NOTE — PROGRESS NOTES
Digital Medicine: Clinician Follow-Up    Eating out more. Discussed BG elevation.   Patient is taking fasting readings, labeled appropriately. He is in [place] now after staying for a couple of nights. He ate out for every meal.     Lasix recently reduced by PCP. Tolerating fine. BP controlled.     The history is provided by the patient.      Review of patient's allergies indicates:  No Known Allergies  Follow-up reason(s): medication change follow-up and routine follow up.     Hypertension    Patient's blood pressure is stable.   Diabetes    Readings are trending up   Patient is not experiencing signs/symptoms of hypotension.  Patient is not experiencing signs/symptoms of hypertension.  Patient is not experiencing signs/symptoms of hypoglycemia.  Patient is not experiencing signs/symptoms of hyperglycemia.    Additional Follow-up details: Reduction of lasix dose to 10 mg tolerated.     Patient did make medication change.    Is patient tolerating med change? yes            Last 5 Patient Entered Readings                                      Current 30 Day Average: 128/61     Recent Readings 10/14/2020 10/14/2020 10/13/2020 10/13/2020 10/11/2020    SBP (mmHg) 99 101 147 155 143    DBP (mmHg) 48 51 67 69 66    Pulse 54 53 54 55 54        Last 6 Patient Entered Readings                                          Most Recent A1c: 5.4% on 6/8/2020  (Goal: 7%)     Recent Readings 10/18/2020 10/17/2020 10/15/2020 10/14/2020 10/13/2020    Blood Glucose (mg/dL) 143 173 161 160 190               Depression Screening  Did not address depression screening.    Sleep Apnea Screening    Did not address sleep apnea screening.     Medication Affordability Screening  Did not address medication affordability screening.     Medication Adherence-Medication adherence was assessed.          ASSESSMENT(S)  Patients BP average is 128/61 mmHg, which is at goal. Patient's BP goal is less than or equal to 130/80.  Patient's A1C goal is less  than or equal to 7. Patient's most recent A1C result is at goal. Lab Results    Component                Value               Date                     HGBA1C                   5.4                 06/08/2020          .       A1c is well controlled, due for repeat in December, patient is aware and will obtain at already scheduled lab appointment.   SMBG fasting readings are elevated above goal. Patient states he has been eating out more and not monitoring his intake.       Hypertension Plan  Continue current therapy.  Continue current diet/physical activity routine.    Diabetes Plan  Labs ordered. A1c standing order in place, patient to obtain in December Expected Lab Date:  Continue current therapy. Initiate GLP-1 with next outreach if further control is needed. Patient to work on dietary indisgressions.        Addressed patient questions and patient has my contact information if needed prior to next outreach. Patient verbalizes understanding.      Explained the importance of self-monitoring and medication adherence. Encouraged the patient to communicate with their health  for lifestyle modifications to help improve or maintain a healthy lifestyle.               There are no preventive care reminders to display for this patient.  There are no preventive care reminders to display for this patient.      Hypertension Medications             amLODIPine (NORVASC) 10 MG tablet TAKE 1 TABLET(10 MG) BY MOUTH EVERY DAY    carvediloL (COREG) 6.25 MG tablet Take 1 tablet (6.25 mg total) by mouth 2 (two) times daily with meals.    furosemide (LASIX) 20 MG tablet Take 0.5 tablets (10 mg total) by mouth once daily.    valsartan (DIOVAN) 320 MG tablet TAKE 1 TABLET ONCE DAILY        Diabetes Medications             glipiZIDE (GLUCOTROL) 10 MG TR24 TAKE 1 TABLET(10 MG) BY MOUTH EVERY DAY    SITagliptin (JANUVIA) 100 MG Tab Take 1 tablet (100 mg total) by mouth once daily.

## 2020-10-23 RX ORDER — FUROSEMIDE 20 MG/1
10 TABLET ORAL DAILY
Qty: 30 TABLET | Refills: 2 | Status: ON HOLD | OUTPATIENT
Start: 2020-10-23 | End: 2020-11-24

## 2020-10-26 ENCOUNTER — HOSPITAL ENCOUNTER (EMERGENCY)
Facility: HOSPITAL | Age: 85
Discharge: HOME | End: 2020-10-26
Attending: EMERGENCY MEDICINE
Payer: MEDICARE

## 2020-10-26 ENCOUNTER — OFFICE VISIT (OUTPATIENT)
Dept: URGENT CARE | Facility: CLINIC | Age: 85
End: 2020-10-26
Payer: MEDICARE

## 2020-10-26 VITALS
WEIGHT: 198 LBS | SYSTOLIC BLOOD PRESSURE: 179 MMHG | OXYGEN SATURATION: 97 % | HEART RATE: 70 BPM | TEMPERATURE: 100 F | BODY MASS INDEX: 27.62 KG/M2 | DIASTOLIC BLOOD PRESSURE: 80 MMHG

## 2020-10-26 VITALS
HEIGHT: 71 IN | WEIGHT: 198 LBS | DIASTOLIC BLOOD PRESSURE: 79 MMHG | TEMPERATURE: 99 F | OXYGEN SATURATION: 98 % | RESPIRATION RATE: 24 BRPM | BODY MASS INDEX: 27.72 KG/M2 | SYSTOLIC BLOOD PRESSURE: 189 MMHG | HEART RATE: 64 BPM

## 2020-10-26 DIAGNOSIS — I25.10 CAD, MULTIPLE VESSEL: ICD-10-CM

## 2020-10-26 DIAGNOSIS — R14.0 BLOATING: ICD-10-CM

## 2020-10-26 DIAGNOSIS — R10.13 EPIGASTRIC PAIN: Primary | ICD-10-CM

## 2020-10-26 DIAGNOSIS — I44.0 FIRST DEGREE HEART BLOCK: ICD-10-CM

## 2020-10-26 DIAGNOSIS — D64.9 ANEMIA, UNSPECIFIED TYPE: ICD-10-CM

## 2020-10-26 DIAGNOSIS — R11.2 NON-INTRACTABLE VOMITING WITH NAUSEA, UNSPECIFIED VOMITING TYPE: ICD-10-CM

## 2020-10-26 DIAGNOSIS — R10.13 EPIGASTRIC ABDOMINAL PAIN: Primary | ICD-10-CM

## 2020-10-26 DIAGNOSIS — R03.0 ELEVATED BLOOD PRESSURE READING: ICD-10-CM

## 2020-10-26 LAB
ALBUMIN SERPL BCP-MCNC: 3.7 G/DL (ref 3.5–5.2)
ALP SERPL-CCNC: 78 U/L (ref 55–135)
ALT SERPL W/O P-5'-P-CCNC: 24 U/L (ref 10–44)
ANION GAP SERPL CALC-SCNC: 7 MMOL/L (ref 8–16)
AST SERPL-CCNC: 24 U/L (ref 10–40)
BACTERIA #/AREA URNS HPF: NORMAL /HPF
BASOPHILS # BLD AUTO: 0.02 K/UL (ref 0–0.2)
BASOPHILS NFR BLD: 0.2 % (ref 0–1.9)
BILIRUB SERPL-MCNC: 1.2 MG/DL (ref 0.1–1)
BILIRUB UR QL STRIP: NEGATIVE
BILIRUB UR QL STRIP: NEGATIVE
BUN SERPL-MCNC: 15 MG/DL (ref 8–23)
CALCIUM SERPL-MCNC: 9.6 MG/DL (ref 8.7–10.5)
CHLORIDE SERPL-SCNC: 103 MMOL/L (ref 95–110)
CLARITY UR: CLEAR
CO2 SERPL-SCNC: 28 MMOL/L (ref 23–29)
COLOR UR: YELLOW
CREAT SERPL-MCNC: 1.2 MG/DL (ref 0.5–1.4)
CTP QC/QA: YES
DIFFERENTIAL METHOD: ABNORMAL
EOSINOPHIL # BLD AUTO: 0.1 K/UL (ref 0–0.5)
EOSINOPHIL NFR BLD: 1.1 % (ref 0–8)
ERYTHROCYTE [DISTWIDTH] IN BLOOD BY AUTOMATED COUNT: 12.4 % (ref 11.5–14.5)
EST. GFR  (AFRICAN AMERICAN): >60 ML/MIN/1.73 M^2
EST. GFR  (NON AFRICAN AMERICAN): 56 ML/MIN/1.73 M^2
GLUCOSE SERPL-MCNC: 144 MG/DL (ref 70–110)
GLUCOSE SERPL-MCNC: 166 MG/DL (ref 70–110)
GLUCOSE UR QL STRIP: NEGATIVE
GLUCOSE UR QL STRIP: NEGATIVE
HCT VFR BLD AUTO: 38.6 % (ref 40–54)
HGB BLD-MCNC: 12.3 G/DL (ref 14–18)
HGB UR QL STRIP: NEGATIVE
HYALINE CASTS #/AREA URNS LPF: 0 /LPF
IMM GRANULOCYTES # BLD AUTO: 0.06 K/UL (ref 0–0.04)
IMM GRANULOCYTES NFR BLD AUTO: 0.7 % (ref 0–0.5)
KETONES UR QL STRIP: NEGATIVE
KETONES UR QL STRIP: NEGATIVE
LACTATE SERPL-SCNC: 1 MMOL/L (ref 0.5–2.2)
LEUKOCYTE ESTERASE UR QL STRIP: NEGATIVE
LEUKOCYTE ESTERASE UR QL STRIP: NEGATIVE
LIPASE SERPL-CCNC: 21 U/L (ref 4–60)
LYMPHOCYTES # BLD AUTO: 0.6 K/UL (ref 1–4.8)
LYMPHOCYTES NFR BLD: 7 % (ref 18–48)
MCH RBC QN AUTO: 29.5 PG (ref 27–31)
MCHC RBC AUTO-ENTMCNC: 31.9 G/DL (ref 32–36)
MCV RBC AUTO: 93 FL (ref 82–98)
MICROSCOPIC COMMENT: NORMAL
MONOCYTES # BLD AUTO: 1.7 K/UL (ref 0.3–1)
MONOCYTES NFR BLD: 18.5 % (ref 4–15)
NEUTROPHILS # BLD AUTO: 6.6 K/UL (ref 1.8–7.7)
NEUTROPHILS NFR BLD: 72.5 % (ref 38–73)
NITRITE UR QL STRIP: NEGATIVE
NRBC BLD-RTO: 0 /100 WBC
PH UR STRIP: 6 [PH] (ref 5–8)
PH, POC UA: 6 (ref 5–8)
PLATELET # BLD AUTO: 125 K/UL (ref 150–350)
PMV BLD AUTO: 12.2 FL (ref 9.2–12.9)
POC ANION GAP: 20 MMOL/L (ref 10–20)
POC BLOOD, URINE: NEGATIVE
POC BUN: 15 MMOL/L (ref 8–26)
POC CHLORIDE: 102 MMOL/L (ref 98–109)
POC CREATININE: 1.2 MG/DL (ref 0.6–1.3)
POC HEMATOCRIT: 40 %PCV (ref 42–52)
POC HEMOGLOBIN: 13.6 G/DL (ref 13.5–18)
POC ICA: 1.16 MMOL/L (ref 1.12–1.32)
POC NITRATES, URINE: NEGATIVE
POC POTASSIUM: 4.2 MMOL/L (ref 3.5–4.9)
POC SODIUM: 141 MMOL/L (ref 138–146)
POC TCO2: 25 MMOL/L (ref 24–29)
POTASSIUM SERPL-SCNC: 4.2 MMOL/L (ref 3.5–5.1)
PROT SERPL-MCNC: 8 G/DL (ref 6–8.4)
PROT UR QL STRIP: ABNORMAL
PROT UR QL STRIP: POSITIVE
RBC # BLD AUTO: 4.17 M/UL (ref 4.6–6.2)
RBC #/AREA URNS HPF: 0 /HPF (ref 0–4)
SARS-COV-2 RDRP RESP QL NAA+PROBE: NEGATIVE
SODIUM SERPL-SCNC: 138 MMOL/L (ref 136–145)
SP GR UR STRIP: 1.01 (ref 1–1.03)
SP GR UR STRIP: 1.02 (ref 1–1.03)
TROPONIN I SERPL DL<=0.01 NG/ML-MCNC: <0.006 NG/ML (ref 0–0.03)
URN SPEC COLLECT METH UR: ABNORMAL
UROBILINOGEN UR STRIP-ACNC: ABNORMAL (ref 0.3–2.2)
UROBILINOGEN UR STRIP-ACNC: NEGATIVE EU/DL
WBC # BLD AUTO: 9.1 K/UL (ref 3.9–12.7)
WBC #/AREA URNS HPF: 0 /HPF (ref 0–5)

## 2020-10-26 PROCEDURE — 93005 ELECTROCARDIOGRAM TRACING: CPT

## 2020-10-26 PROCEDURE — 93010 ELECTROCARDIOGRAM REPORT: CPT | Mod: S$GLB,,, | Performed by: INTERNAL MEDICINE

## 2020-10-26 PROCEDURE — 81000 URINALYSIS NONAUTO W/SCOPE: CPT

## 2020-10-26 PROCEDURE — 71046 X-RAY EXAM CHEST 2 VIEWS: CPT | Mod: S$GLB,,, | Performed by: RADIOLOGY

## 2020-10-26 PROCEDURE — 99214 OFFICE O/P EST MOD 30 MIN: CPT | Mod: 25,S$GLB,, | Performed by: INTERNAL MEDICINE

## 2020-10-26 PROCEDURE — 74019 RADEX ABDOMEN 2 VIEWS: CPT | Mod: S$GLB,,, | Performed by: RADIOLOGY

## 2020-10-26 PROCEDURE — 93010 ELECTROCARDIOGRAM REPORT: CPT | Mod: 76,,, | Performed by: INTERNAL MEDICINE

## 2020-10-26 PROCEDURE — U0002 COVID-19 LAB TEST NON-CDC: HCPCS | Mod: QW,S$GLB,, | Performed by: INTERNAL MEDICINE

## 2020-10-26 PROCEDURE — 83690 ASSAY OF LIPASE: CPT

## 2020-10-26 PROCEDURE — 83605 ASSAY OF LACTIC ACID: CPT

## 2020-10-26 PROCEDURE — 81003 URINALYSIS AUTO W/O SCOPE: CPT | Mod: QW,S$GLB,, | Performed by: INTERNAL MEDICINE

## 2020-10-26 PROCEDURE — 96374 THER/PROPH/DIAG INJ IV PUSH: CPT

## 2020-10-26 PROCEDURE — 80053 COMPREHEN METABOLIC PANEL: CPT

## 2020-10-26 PROCEDURE — 93010 EKG 12-LEAD: ICD-10-PCS | Mod: S$GLB,,, | Performed by: INTERNAL MEDICINE

## 2020-10-26 PROCEDURE — 80047 POCT CHEMISTRY PANEL: ICD-10-PCS | Mod: QW,S$GLB,, | Performed by: INTERNAL MEDICINE

## 2020-10-26 PROCEDURE — 84484 ASSAY OF TROPONIN QUANT: CPT

## 2020-10-26 PROCEDURE — C9113 INJ PANTOPRAZOLE SODIUM, VIA: HCPCS | Performed by: EMERGENCY MEDICINE

## 2020-10-26 PROCEDURE — 74019 XR ABDOMEN FLAT AND ERECT: ICD-10-PCS | Mod: S$GLB,,, | Performed by: RADIOLOGY

## 2020-10-26 PROCEDURE — 99284 EMERGENCY DEPT VISIT MOD MDM: CPT | Mod: 25

## 2020-10-26 PROCEDURE — U0002: ICD-10-PCS | Mod: QW,S$GLB,, | Performed by: INTERNAL MEDICINE

## 2020-10-26 PROCEDURE — 81003 POCT URINALYSIS, DIPSTICK, AUTOMATED, W/O SCOPE: ICD-10-PCS | Mod: QW,S$GLB,, | Performed by: INTERNAL MEDICINE

## 2020-10-26 PROCEDURE — 93005 EKG 12-LEAD: ICD-10-PCS | Mod: S$GLB,,, | Performed by: INTERNAL MEDICINE

## 2020-10-26 PROCEDURE — 71046 XR CHEST PA AND LATERAL: ICD-10-PCS | Mod: S$GLB,,, | Performed by: RADIOLOGY

## 2020-10-26 PROCEDURE — 93005 ELECTROCARDIOGRAM TRACING: CPT | Mod: S$GLB,,, | Performed by: INTERNAL MEDICINE

## 2020-10-26 PROCEDURE — 99214 PR OFFICE/OUTPT VISIT, EST, LEVL IV, 30-39 MIN: ICD-10-PCS | Mod: 25,S$GLB,, | Performed by: INTERNAL MEDICINE

## 2020-10-26 PROCEDURE — 80047 BASIC METABLC PNL IONIZED CA: CPT | Mod: QW,S$GLB,, | Performed by: INTERNAL MEDICINE

## 2020-10-26 PROCEDURE — 63600175 PHARM REV CODE 636 W HCPCS: Performed by: EMERGENCY MEDICINE

## 2020-10-26 PROCEDURE — 93010 EKG 12-LEAD: ICD-10-PCS | Mod: 76,,, | Performed by: INTERNAL MEDICINE

## 2020-10-26 PROCEDURE — 85025 COMPLETE CBC W/AUTO DIFF WBC: CPT

## 2020-10-26 PROCEDURE — 96375 TX/PRO/DX INJ NEW DRUG ADDON: CPT

## 2020-10-26 RX ORDER — ONDANSETRON 2 MG/ML
4 INJECTION INTRAMUSCULAR; INTRAVENOUS
Status: COMPLETED | OUTPATIENT
Start: 2020-10-26 | End: 2020-10-26

## 2020-10-26 RX ORDER — PANTOPRAZOLE SODIUM 20 MG/1
40 TABLET, DELAYED RELEASE ORAL DAILY
Qty: 60 TABLET | Refills: 0 | Status: SHIPPED | OUTPATIENT
Start: 2020-10-26 | End: 2020-11-25

## 2020-10-26 RX ORDER — METHOCARBAMOL 750 MG/1
750 TABLET, FILM COATED ORAL 3 TIMES DAILY
Qty: 20 TABLET | Refills: 0 | Status: SHIPPED | OUTPATIENT
Start: 2020-10-26 | End: 2020-10-31

## 2020-10-26 RX ORDER — ONDANSETRON 4 MG/1
4 TABLET, ORALLY DISINTEGRATING ORAL
Status: COMPLETED | OUTPATIENT
Start: 2020-10-26 | End: 2020-10-26

## 2020-10-26 RX ORDER — ASPIRIN 81 MG/1
243 TABLET ORAL
Status: DISCONTINUED | OUTPATIENT
Start: 2020-10-26 | End: 2020-10-26

## 2020-10-26 RX ORDER — PANTOPRAZOLE SODIUM 40 MG/10ML
40 INJECTION, POWDER, LYOPHILIZED, FOR SOLUTION INTRAVENOUS
Status: COMPLETED | OUTPATIENT
Start: 2020-10-26 | End: 2020-10-26

## 2020-10-26 RX ORDER — ONDANSETRON 4 MG/1
4 TABLET, FILM COATED ORAL EVERY 8 HOURS PRN
Qty: 12 TABLET | Refills: 0 | Status: SHIPPED | OUTPATIENT
Start: 2020-10-26 | End: 2021-03-19

## 2020-10-26 RX ADMIN — PANTOPRAZOLE SODIUM 40 MG: 40 INJECTION, POWDER, FOR SOLUTION INTRAVENOUS at 02:10

## 2020-10-26 RX ADMIN — ONDANSETRON 4 MG: 4 TABLET, ORALLY DISINTEGRATING ORAL at 10:10

## 2020-10-26 RX ADMIN — ONDANSETRON 4 MG: 2 INJECTION INTRAMUSCULAR; INTRAVENOUS at 02:10

## 2020-10-26 NOTE — DISCHARGE INSTRUCTIONS
Take your blood pressure at home twice a day for 3 days and write these numbers down, bring with you to your primary care doctor in 2-3 days for blood pressure recheck as you may need your medications changed. Return to the ED for chest pain, shortness of breath, numbness, weakness, loss of vision or any other concerns.     Please return for worsening abdominal pain, vomiting, fever or any other concern for further workup and possible CT scan. Follow up with primary care doctor in 1-2 days for recheck.     Thank you for coming to our Emergency Department today. It is important to remember that some problems are difficult to diagnose and may not be found during your first visit. Be sure to follow up with your primary care doctor and review any labs/imaging that was performed with them. If you do not have a primary care doctor, you may contact the one listed on your discharge paperwork or you may also call the [facility] Appointment Desk at [phone] to schedule an appointment with one.     All medications may potentially have side effects and it is impossible to predict which medications may give you side effects. If you feel that you are having a negative effect of any medication you should immediately stop taking them and seek medical attention.    Return to the ER with any questions/concerns, new/concerning symptoms, worsening or failure to improve. Do not drive or make any important decisions for 24 hours if you have received any pain medications, sedatives or mood altering drugs during your ER visit.

## 2020-10-26 NOTE — PROGRESS NOTES
"Subjective:       Patient ID: Bria Lawson is a 84 y.o. male.    Vitals:  weight is 89.8 kg (198 lb). His temperature is 100 °F (37.8 °C). His blood pressure is 179/80 (abnormal) and his pulse is 70. His oxygen saturation is 97%.     Chief Complaint: Abdominal Pain    84-year-old male with past medical history of hypertension, hyperlipidemia, CAD, DM type 2, CKD stage 3, atrial fibrillation, lymphoma, GERD, Gout, presents to urgent care complaining of gradually worsening epigastric abdominal pain and bloating with associated nausea and vomiting that started 6 days ago. Pain is constant, described as "sharp/burning" non-radiating, and 7/10.  Pain is worse with palpation, after eating, and laying down at night.  Patient denies any recent COVID positive exposure that he is aware of, but states that he just returned from a [place] in [facility] and may have over done it with eating junk food and drinking one too many alcoholic drinks." Pt states he saw his cardiologist Dr. Fontana 2 weeks ago "who said everything looked great." Pt denies taking blood thinners and states that he has never had stents placed or CABG for his 3 vessel disease. Pt denies recent illness/travel, fever, chills, ear pain, sore throat, nasal congestion, loss of smell/taste, cough, SOB, chest pain, leg pain/swelling, diarrhea, blood in vomit/stool, back pain, neck pain, headache, speech changes, numbness or focal weakness.          Abdominal Pain  This is a new problem. The current episode started in the past 7 days. The onset quality is sudden. The problem occurs constantly. The pain is located in the epigastric region. The quality of the pain is aching. Associated symptoms include a fever, headaches, nausea and vomiting. Pertinent negatives include no constipation, diarrhea or dysuria. There is no history of abdominal surgery.       Constitution: Positive for fatigue and fever. Negative for appetite change, chills and sweating.   HENT: Negative " for ear pain and sore throat.    Neck: Negative for neck pain and neck stiffness.   Cardiovascular: Negative for chest pain, leg swelling, palpitations and sob on exertion.   Eyes: Negative for double vision and blurred vision.   Respiratory: Negative for cough, COPD, shortness of breath and asthma.    Gastrointestinal: Positive for abdominal pain, nausea and vomiting. Negative for abdominal trauma, abdominal bloating, history of abdominal surgery, constipation, diarrhea, dark colored stools and heartburn.   Genitourinary: Negative for dysuria and pelvic pain.   Musculoskeletal: Negative for back pain.   Skin: Negative for rash.   Allergic/Immunologic: Negative for asthma.   Neurological: Positive for headaches. Negative for numbness and tingling.       Objective:      Physical Exam   Constitutional: He is oriented to person, place, and time. He appears well-developed.  Non-toxic appearance. He appears ill. No distress.   HENT:   Head: Normocephalic and atraumatic.   Mouth/Throat: Mucous membranes are normal.   Eyes: Lids are normal.   Neck: Trachea normal and full passive range of motion without pain. Neck supple.   Cardiovascular: Normal rate, regular rhythm, normal heart sounds and normal pulses.   Pulmonary/Chest: Effort normal and breath sounds normal. No respiratory distress. He has no wheezes. He has no rhonchi. He has no rales.   Abdominal: Soft. Normal appearance and bowel sounds are normal. He exhibits no distension, no abdominal bruit, no pulsatile midline mass and no mass. There is abdominal tenderness in the epigastric area. There is no rebound, no guarding, no tenderness at McBurney's point, negative Alarcon's sign, no left CVA tenderness, negative Rovsing's sign and no right CVA tenderness.      Comments: mild epigastric abdominal discomfort/tenderness. No rebound, guarding, or CVA tenderness. Negative Alarcon and Rovsing test.   Musculoskeletal: Normal range of motion.      Right lower leg: No edema.       Left lower leg: No edema.   Lymphadenopathy:     He has no cervical adenopathy.   Neurological: He is alert and oriented to person, place, and time. He has normal strength. He displays no weakness. No sensory deficit.   Skin: Skin is warm, dry, intact and not diaphoretic. Psychiatric: His speech is normal and behavior is normal. Mood, judgment and thought content normal.   Nursing note and vitals reviewed.        Assessment:       1. CAD, multiple vessel    2. First degree heart block    3. Epigastric abdominal pain    4. Non-intractable vomiting with nausea, unspecified vomiting type        Plan:         CAD, multiple vessel    First degree heart block  -     Refer to Emergency Dept.    Epigastric abdominal pain  -     IN OFFICE EKG 12-LEAD (to Muse)  -     POCT Chemistry Panel  -     XR CHEST PA AND LATERAL; Future; Expected date: 10/26/2020  -     XR ABDOMEN FLAT AND ERECT; Future; Expected date: 10/26/2020  -     POCT Urinalysis, Dipstick, Automated, W/O Scope  -     POCT COVID-19 Rapid Screening  -     (pyxis) gi cocktail (mylanta 30 mL, lidocaine 2 % viscous 10 mL, dicyclomine 10 mL) 50 mL  -     Discontinue: aspirin EC tablet 243 mg    Non-intractable vomiting with nausea, unspecified vomiting type  -     ondansetron disintegrating tablet 4 mg      Results for orders placed or performed in visit on 10/26/20   POCT Chemistry Panel   Result Value Ref Range    POC Sodium 141 138 - 146 MMOL/L    POC Potassium 4.2 3.5 - 4.9 MMOL/L    POC Chloride 102 98 - 109 MMOL/L    POC BUN 15 8 - 26 MMOL/L    POC Glucose 144 (A) 70 - 110 MG/DL    POC Creatinine 1.2 0.6 - 1.3 mg/dL    POC iCA 1.16 1.12 - 1.32 MMOL/L    POC TCO2 25 24 - 29 MMOL/L    POC Hematocrit 40 (A) 42 - 52 %PCV    POC Hemoglobin 13.6 13.5 - 18 g/dL    POC Anion Gap 20 10.0 - 20 MMOL/L   POCT Urinalysis, Dipstick, Automated, W/O Scope   Result Value Ref Range    POC Blood, Urine Negative Negative    POC Bilirubin, Urine Negative Negative    POC  Urobilinogen, Urine norm 0.3 - 2.2    POC Ketones, Urine Negative Negative    POC Protein, Urine Positive (A) Negative    POC Nitrates, Urine Negative Negative    POC Glucose, Urine Negative Negative    pH, UA 6.0 5 - 8    POC Specific Gravity, Urine 1.020 1.003 - 1.029    POC Leukocytes, Urine Negative Negative   POCT COVID-19 Rapid Screening   Result Value Ref Range    POC Rapid COVID Negative Negative     Acceptable Yes        EKG: Sinus Rhythm with 1st degree AV block. Normal Axis. Biphasic P waves indicative of atrial enlargement. TWI in V1. No ST elevation/depression.       Xr Chest Pa And Lateral    Result Date: 10/26/2020  EXAMINATION: XR CHEST PA AND LATERAL CLINICAL HISTORY: Epigastric pain TECHNIQUE: PA and lateral views of the chest were performed. COMPARISON: Chest radiograph: 05/26/2020. Chest CT: 05/27/2020. FINDINGS: Soft tissues of the patient's arms project over lateral view obscuring some detail of the retrosternal airspace and mediastinal margins.. Mediastinal structures are midline. Cardiac silhouette and pulmonary vascular distribution are normal. Lung volumes are normal and symmetric.  Lungs appear clear.  Multifocal patchy subsegmental pulmonary opacities present on CT of 05/27/2020 are no longer evident suggesting interval resolution. Dependent left and right pleural fluid, greater on the right, also noted on that earlier CT and also not evident on the current study. I detect no lymph node enlargement, cardiac decompensation, pneumothorax, pneumomediastinum, pneumoperitoneum or significant osseous abnormality.     No convincing evidence of disease.  No source for abdominal pain identified. Electronically signed by: Chiara West MD Date:    10/26/2020 Time:    10:15    Xr Abdomen Flat And Erect    Result Date: 10/26/2020  EXAMINATION: XR ABDOMEN FLAT AND ERECT CLINICAL HISTORY: Epigastric pain TECHNIQUE: Flat and erect AP views of the abdomen were performed. COMPARISON: None  FINDINGS: No dilated loops of small bowel identified.No differential air fluid levels.  No findings to suggest free air.  No pathologic calcifications. Multilevel degenerative lumbar spondylosis noted..     Nonobstructive bowel gas pattern. Electronically signed by: Jeffrey Leahy MD Date:    10/26/2020 Time:    10:21      *Rapid COVID and UA negative. Abdominal XR and CXR as above. EKG shows sinus rhythm with 1st degree heart block and TWI in V1. No acute ST changes.   Per reviewing patient's medical records, pt was seen by Dr. Fontana on 10/12/20. Per note, Digital hypertension log shows well-controlled blood pressures though today's values elevated. His initial echo showed normal to low normal LVEF. LVH. Mod MR, mild TR. Mildly elevated PASP. PET 6/11/20. No evidence of active lymphoma. Seen 5/29/20 for evaluation of shortness of breath/hemoptysis. No recurrence. Chest CT scan 5/26/20: showing pulmonary edema, bilateral pleural effusions. Coronary calcification also noted - 3V (no intervention has been done and patient is not currently on blood thinners).     Although there are no active ST changes on EKG today, I am still concerned for active ACS based on patients history and known 3 vessel CAD currently without intervention/not on blood thinners.   Will refer to ED for blood work (cardiac markers, CBC, CMP, lipase, amylase, +/- CT abdomen to r/o pancreatitis, cholelithiasis, other abdominal pathology. Discussed results/diagnosis/plan with patient in clinic. Answered all of patient's questions and concerns. Patient verbally understood and agreed with treatment plan and states he will drive himself to [facility] ED. Spoke and gave report to charge nurse Janae.       Patient Instructions      **GO STRAIGHT TO THE ER**  DO NOT EAT OR DRINK ANYTHING        UNLESS A HEALTHCARE PROVIDER GIVES IT TO YOU      Understanding First-Degree Heart Block    Heart block is a condition in which the electrical system of the heart does not  work properly. Sometimes it can result in a slow heartbeat that is either regular or irregular. This may cause symptoms. There are different types of heart block that can be more or less serious. First-degree heart block is a condition in which the wiring of the heart is slow to send electrical signals but all of the signals are able to pass successfully. There is no electrical block but rather a slowing or delay of the signal. It usually does not cause problems. Often it does not need treatment.  What causes first-degree heart block?  First-degree heart block may be caused by:  · Natural aging process  · Damage to the heart from surgery  · Damage to the heart muscle from a heart attack  · Other types of heart disease that damage the heart muscle  · Low thyroid levels  · Electrolyte abnormalities  · Inflammatory or infectious heart conditions  · Other diseases, including rheumatic fever and sarcoidosis  · Some medicines  In addition, well-conditioned athletes may develop first-degree heart block from heart changes that result from exercising a lot. This is considered normal. Some babies are born with heart block. Heart block may also run in families.  What are the symptoms of first-degree heart block?  First-degree heart block often does not have any symptoms. It may be found when your healthcare provider is examining you for some other reason.  In more severe cases, people may have an uncomfortable awareness of the heartbeat.   How is first-degree heart block treated?  First-degree heart block usually doesnt need treatment. Your healthcare provider may ask you to have regular follow-up visits. You may also be asked to take your own pulse and be alert to changes in your heart rate.  What are the  complications of first-degree heart block?  In rare instances, a first-degree heart block may develop into a more serious type of heart block that results in slower heartbeats. This may cause symptoms.  When should I call  my healthcare provider?  Call your healthcare provider right away if you have any of these:  · Unusual tiredness  · Shortness of breath  · Chest pain  · Weakness, dizziness, or fainting  · Unusual drowsiness or confusion  · Pain that gets worse  · Symptoms that dont get better with treatment, or symptoms that get worse  · New symptoms   Date Last Reviewed: 5/1/2016 © 2000-2017 [employer]. [address] [place] [address], [place] [address]. All rights reserved. This information is not intended as a substitute for professional medical care. Always follow your healthcare professional's instructions.          Understanding Coronary Artery Disease (CAD)    To understand coronary artery disease (CAD), you need to know how your heart works. Your heart is a muscle that pumps blood throughout your body. To work right, your heart needs a steady supply of oxygen. It gets this oxygen from blood supplied by the coronary arteries.     Healthy artery   Healthy artery. When a coronary artery is healthy and has no blockages, blood flows through easily. Healthy arteries can easily supply the oxygen-rich blood your heart needs.     Damaged artery   Damaged artery. Coronary artery disease begins when damage to the artery lining leads to the buildup of fat-like substances and cholesterol along the artery wall. This is called plaque. This damage could be caused by things like high blood pressure or smoking. This plaque buildup begins to narrow the arteries carrying blood to the heart. This is called atherosclerosis,     Narrowed artery   Narrowed artery. As more plaque builds up, your artery has trouble supplying blood to your heart muscle when it needs it most, such as during exercise. You may not feel any symptoms when this happens. Or you may feel angina--pressure, tightness, achiness, or pain in your chest, jaw, neck, back, or arm.     Blocked artery   Blocked artery. A piece of plaque may break off and completely block the  artery. Or a blood clot may plug the narrowed artery. When this happens, blood flow is blocked from reaching the heart. Without oxygen-rich blood, part of the heart muscle becomes damaged and stops working. You may feel crushing pressure or pain in or around your chest. This is a heart attack (acute myocardial infarction, or AMI) and is a medical emergency.     Date Last Reviewed: 3/28/2016  © 1037-7338 [employer]. [address] [place] [address], [place] [id number]. All rights reserved. This information is not intended as a substitute for professional medical care. Always follow your healthcare professional's instructions.

## 2020-10-26 NOTE — ED PROVIDER NOTES
Encounter Date: 10/26/2020    SCRIBE #1 NOTE: I, Darin Luo, am scribing for, and in the presence of, Berta Elena MD.       History     Chief Complaint   Patient presents with    Abdominal Pain     started x 1 week     Bloated     sent from       Bria Lawson is an 84 year old male who presents to the ED with an onset of bilateral upper abdominal burning and bloating lasting six days. Associated symptoms include nausea and an episode of vomiting which was self-induced. He was last seen by Dr. Oleg Fontana (Cardiology) two weeks ago with a normal CT and Echo. Patient reports being in [place] at a casino two days prior to onset, where he recorded a CBG of 200 and had a Crown and coke as well as two martinis. He endorses taking Pepto Bismol for three days with some improvement. Patient arrives today on recommendation of urgent care, where he was seen just prior to arrival, for cardiac workup give 1st degree AV block on EKG (per note). Denies shortness of breath, chest pain, hematemesis, constipation, difficulty urinating, dysuria, or hematuria. Past medical history includes a-fib, CAD, and non-insulin dependent diabetes. Patient is not taking blood thinners. He is taking Nexium for many years. Most recent CBG's have been normal aside from the high measurement taken eight days ago. BP at home WNL. Denies social history of tobacco or other drug use. No known drug allergies.    The history is provided by the patient.     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    Hyperlipidemia  2/26/2015    Hypertension     Lymphoma     Uncontrolled secondary diabetes mellitus with stage 3 CKD (GFR 30-59) 8/28/2015     Past Surgical History:   Procedure Laterality Date    BONE MARROW BIOPSY Left 9/19/2018    Procedure: Biopsy-bone marrow;  Surgeon: Velia Tobias MD;  Location: [facility];  Service: Oncology;  Laterality: Left;    CATARACT EXTRACTION Bilateral 1996    EYE SURGERY      cataracts    JOINT REPLACEMENT      knee replacement    retinal injection s Bilateral     scrotal cyst       Family History   Problem Relation Age of Onset    No Known Problems Mother     No Known Problems Father     No Known Problems Sister     No Known Problems Brother     No Known Problems Maternal Aunt     No Known Problems Maternal Uncle     No Known Problems Paternal Aunt     No Known Problems Paternal Uncle     No Known Problems Maternal Grandmother     No Known Problems Maternal Grandfather     No Known Problems Paternal Grandmother     No Known Problems Paternal Grandfather     Amblyopia Neg Hx     Blindness Neg Hx     Cancer Neg Hx     Cataracts Neg Hx     Diabetes Neg Hx     Glaucoma Neg Hx     Hypertension Neg Hx     Macular degeneration Neg Hx     Retinal detachment Neg Hx     Strabismus Neg Hx     Stroke Neg Hx     Thyroid disease Neg Hx      Social History     Tobacco Use    Smoking status: Former Smoker    Smokeless tobacco: Never Used   Substance Use Topics    Alcohol use: Yes     Alcohol/week: 0.0 standard drinks     Frequency: Monthly or less     Drinks per session: 1 or 2     Binge frequency: Never     Comment: occasionally    Drug use: No     Review of Systems   Constitutional: Negative for chills, diaphoresis and fever.   HENT: Negative for sore throat.    Eyes: Negative for photophobia and visual disturbance.   Respiratory: Negative for cough and shortness of breath.    Cardiovascular: Negative for chest pain and leg swelling.   Gastrointestinal: Positive for  abdominal distention, abdominal pain, nausea and vomiting. Negative for blood in stool, constipation and diarrhea.        Negative hematemesis   Genitourinary: Negative for difficulty urinating, dysuria, frequency, hematuria and urgency.   Musculoskeletal: Negative for neck pain and neck stiffness.   Skin: Negative for rash and wound.   Neurological: Negative for weakness, light-headedness, numbness and headaches.   Hematological: Does not bruise/bleed easily.   Psychiatric/Behavioral: Negative for confusion and suicidal ideas.   All other systems reviewed and are negative.      Physical Exam     Initial Vitals [10/26/20 1254]   BP Pulse Resp Temp SpO2   (!) 189/80 60 18 98 °F (36.7 °C) 97 %      MAP       --         Physical Exam    Nursing note and vitals reviewed.  Constitutional: He appears well-developed and well-nourished. He is not diaphoretic. No distress.   Nontoxic, joking on exam. Appears younger than stated age.    HENT:   Head: Normocephalic and atraumatic.   Right Ear: External ear normal.   Left Ear: External ear normal.   Mouth/Throat: Oropharynx is clear and moist. No oropharyngeal exudate.   Eyes: Conjunctivae and EOM are normal. Pupils are equal, round, and reactive to light. Right eye exhibits no discharge. Left eye exhibits no discharge.   Neck: Normal range of motion. Neck supple. No JVD present.   Cardiovascular: Normal rate, regular rhythm, normal heart sounds and intact distal pulses. Exam reveals no gallop and no friction rub.    No murmur heard.  Pulses:       Radial pulses are 2+ on the right side and 2+ on the left side.        Dorsalis pedis pulses are 2+ on the right side and 2+ on the left side.        Posterior tibial pulses are 2+ on the right side and 2+ on the left side.   Pulmonary/Chest: Effort normal and breath sounds normal. No respiratory distress. He has no wheezes. He has no rhonchi. He has no rales.   Abdominal: Soft. Bowel sounds are normal. He exhibits no distension.  There is no abdominal tenderness. There is no rebound and no guarding.   Mild epigastric discomfort without reproducible tenderness. Negative Alarcon's sign, no CVA tenderness   Musculoskeletal: Normal range of motion. No tenderness or edema.   Lymphadenopathy:     He has no cervical adenopathy.   Neurological: He is alert and oriented to person, place, and time. He has normal strength. No cranial nerve deficit or sensory deficit. GCS score is 15. GCS eye subscore is 4. GCS verbal subscore is 5. GCS motor subscore is 6.   Moves all extremities and carries on conversation. CN- II: PERRL; III/IV/VI: EOMI w/out evidence of nystagmus; V: no deficits appreciated to light touch bilateral face; VII: no facial weakness, no facial asymmetry. Eyebrow raise symmetric. Smile symmetric; IX/X: palate midline, and raises symmetrically; XI: shoulder shrug 5/5 bilaterally; XII: tongue is midline w/out asymmetry. Strength 5/5 to bilateral upper and lower extremities, sensation intact to light touch   Skin: Skin is warm and dry. Capillary refill takes less than 2 seconds. No rash noted.   Psychiatric: He has a normal mood and affect. His behavior is normal. Thought content normal.         ED Course   Procedures  Labs Reviewed   CBC W/ AUTO DIFFERENTIAL - Abnormal; Notable for the following components:       Result Value    RBC 4.17 (*)     Hemoglobin 12.3 (*)     Hematocrit 38.6 (*)     Mean Corpuscular Hemoglobin Conc 31.9 (*)     Platelets 125 (*)     Immature Granulocytes 0.7 (*)     Immature Grans (Abs) 0.06 (*)     Lymph # 0.6 (*)     Mono # 1.7 (*)     Lymph% 7.0 (*)     Mono% 18.5 (*)     All other components within normal limits   COMPREHENSIVE METABOLIC PANEL - Abnormal; Notable for the following components:    Glucose 166 (*)     Total Bilirubin 1.2 (*)     Anion Gap 7 (*)     eGFR if non  56 (*)     All other components within normal limits   URINALYSIS, REFLEX TO URINE CULTURE - Abnormal; Notable for the  following components:    Protein, UA 1+ (*)     All other components within normal limits    Narrative:     Specimen Source->Urine   LIPASE   TROPONIN I   LACTIC ACID, PLASMA   URINALYSIS MICROSCOPIC    Narrative:     Specimen Source->Urine     EKG Readings: (Independently Interpreted)   Sinus bradycardia at a rate of 59 bpm with 1st degree AV block. No ST segment elevation or depression. T wave inversion in V1. Normal axis. QTc interval of 419 ms.       Imaging Results    None          Medical Decision Making:   History:   Old Medical Records: I decided to obtain old medical records.  Old Records Summarized: other records.       <> Summary of Records: Per chart review, patient was seen at urgent care earlier today for these symptoms, at which time the 1st degree AV block was first noted on his EKG. Chest CT was benign. X-ray of the abdomen showed multilevel degenerative lumbar spondylosis with no further findings.  Independently Interpreted Test(s):   I have ordered and independently interpreted EKG Reading(s) - see prior notes  Clinical Tests:   Lab Tests: Ordered and Reviewed  Medical Tests: Ordered and Reviewed  MDM  84 year old patient presenting 2/2 abdominal pain of nonemergent etiology. Patient is non toxic in appearance with normal vitals. Pain is able to be controlled with  medication and abdominal exam is not impressive.     Also considered but less likely:     AAA: location inconsistent, no bruits  Cholecystitis: location inconsistent, no relation with meals, negative hernandezs  SBO: normal BM and flatus. No vomiting, Xray from UC with nonobstructive bowel pattern  Appendicitis: location inconsistent, no fever, no rebound/guarding  Mesenteric ischemia: HPI inconsistent, does not coincide with meals, other dx more likely, lactic WNL  Kidney stone: no radiation to back or cva tenderness, no dysuria, no hematuria  Pyelonephritis: no cva tenderness, no dysuria, no fever  Pancreatitis: no history of alcohol  abuse, unlikely gallstone obstructing lipase WNL  Diverticulitis: location not most common, no history of diverticulitis, no fever, no wbc  UTI: UA negative, no dysuria or increased frequency of urination  Testicular torsion: no testicular pain/swelling.   ACS: given epigastric pain, however atypical, trop negative with ongoing symptoms x 1 week, EKG nonischemic, doubt ACS    Patient symptoms improving over the last few days and occurred after eating heavy meal at casino as well as drinking heavily. Likely 2/2 gastritis, although patient does report lifting heavy luggage at casino and reports pain is worse with certain movement and laying down flat at night. May be component of muscle strain. EKG with 1st degree AV block, not ischemia. nontender on exam and patient reports feeling improved. Offered CT abdomen, however patient would prefer to try at home treatment and will return for worsening symptoms, fever, vomiting, chest pain or any other concerns for further workup. Will discharge with PCP follow up and strict return precautions. Patient in agreement with plan.               Scribe Attestation:   Scribe #1: I performed the above scribed service and the documentation accurately describes the services I performed. I attest to the accuracy of the note.                      Clinical Impression:       ICD-10-CM ICD-9-CM   1. Epigastric pain  R10.13 789.06   2. Anemia, unspecified type  D64.9 285.9   3. Bloating  R14.0 787.3   4. Elevated blood pressure reading  R03.0 796.2                      Disposition:   Disposition: Discharged  Condition: Stable     ED Disposition Condition    Discharge Stable        ED Prescriptions     Medication Sig Dispense Start Date End Date Auth. Provider    pantoprazole (PROTONIX) 20 MG tablet Take 2 tablets (40 mg total) by mouth once daily. 60 tablet 10/26/2020 11/25/2020 Berta Elena MD    methocarbamoL (ROBAXIN) 750 MG Tab Take 1 tablet (750 mg total) by mouth 3 (three) times  daily. As needed for muscle spasm for 5 days 20 tablet 10/26/2020 10/31/2020 Berta Elena MD    ondansetron (ZOFRAN) 4 MG tablet Take 1 tablet (4 mg total) by mouth every 8 (eight) hours as needed for Nausea. 12 tablet 10/26/2020  Berta Elena MD        Follow-up Information     Follow up With Specialties Details Why Contact Info    Adiel Bragg MD Internal Medicine Schedule an appointment as soon as possible for a visit in 2 days to discuss recent ED visit, to establish primary doctor [address] [place] [address]  [place] [address]  [phone]      [facility] Emergency Medicine  As needed, If symptoms worsen [address]  [facility] [address] [phone]                      I, Berta Elena, personally performed the services described in this documentation. All medical record entries made by the scribe were at my direction and in my presence. I have reviewed the chart and agree that the record reflects my personal performance and is accurate and complete.               Berta Elena MD  10/26/20 8830

## 2020-10-26 NOTE — PATIENT INSTRUCTIONS
**GO STRAIGHT TO THE ER**  DO NOT EAT OR DRINK ANYTHING        UNLESS A HEALTHCARE PROVIDER GIVES IT TO YOU      Understanding First-Degree Heart Block    Heart block is a condition in which the electrical system of the heart does not work properly. Sometimes it can result in a slow heartbeat that is either regular or irregular. This may cause symptoms. There are different types of heart block that can be more or less serious. First-degree heart block is a condition in which the wiring of the heart is slow to send electrical signals but all of the signals are able to pass successfully. There is no electrical block but rather a slowing or delay of the signal. It usually does not cause problems. Often it does not need treatment.  What causes first-degree heart block?  First-degree heart block may be caused by:  · Natural aging process  · Damage to the heart from surgery  · Damage to the heart muscle from a heart attack  · Other types of heart disease that damage the heart muscle  · Low thyroid levels  · Electrolyte abnormalities  · Inflammatory or infectious heart conditions  · Other diseases, including rheumatic fever and sarcoidosis  · Some medicines  In addition, well-conditioned athletes may develop first-degree heart block from heart changes that result from exercising a lot. This is considered normal. Some babies are born with heart block. Heart block may also run in families.  What are the symptoms of first-degree heart block?  First-degree heart block often does not have any symptoms. It may be found when your healthcare provider is examining you for some other reason.  In more severe cases, people may have an uncomfortable awareness of the heartbeat.   How is first-degree heart block treated?  First-degree heart block usually doesnt need treatment. Your healthcare provider may ask you to have regular follow-up visits. You may also be asked to take your own pulse and be alert to changes in your heart  rate.  What are the  complications of first-degree heart block?  In rare instances, a first-degree heart block may develop into a more serious type of heart block that results in slower heartbeats. This may cause symptoms.  When should I call my healthcare provider?  Call your healthcare provider right away if you have any of these:  · Unusual tiredness  · Shortness of breath  · Chest pain  · Weakness, dizziness, or fainting  · Unusual drowsiness or confusion  · Pain that gets worse  · Symptoms that dont get better with treatment, or symptoms that get worse  · New symptoms   Date Last Reviewed: 5/1/2016  © 2074-9098 [employer]. [address] [place] [address], [place] [address]. All rights reserved. This information is not intended as a substitute for professional medical care. Always follow your healthcare professional's instructions.          Understanding Coronary Artery Disease (CAD)    To understand coronary artery disease (CAD), you need to know how your heart works. Your heart is a muscle that pumps blood throughout your body. To work right, your heart needs a steady supply of oxygen. It gets this oxygen from blood supplied by the coronary arteries.     Healthy artery   Healthy artery. When a coronary artery is healthy and has no blockages, blood flows through easily. Healthy arteries can easily supply the oxygen-rich blood your heart needs.     Damaged artery   Damaged artery. Coronary artery disease begins when damage to the artery lining leads to the buildup of fat-like substances and cholesterol along the artery wall. This is called plaque. This damage could be caused by things like high blood pressure or smoking. This plaque buildup begins to narrow the arteries carrying blood to the heart. This is called atherosclerosis,     Narrowed artery   Narrowed artery. As more plaque builds up, your artery has trouble supplying blood to your heart muscle when it needs it most, such as during exercise. You  may not feel any symptoms when this happens. Or you may feel angina--pressure, tightness, achiness, or pain in your chest, jaw, neck, back, or arm.     Blocked artery   Blocked artery. A piece of plaque may break off and completely block the artery. Or a blood clot may plug the narrowed artery. When this happens, blood flow is blocked from reaching the heart. Without oxygen-rich blood, part of the heart muscle becomes damaged and stops working. You may feel crushing pressure or pain in or around your chest. This is a heart attack (acute myocardial infarction, or AMI) and is a medical emergency.     Date Last Reviewed: 3/28/2016  © 6636-2609 [employer]. [address] [place] [address], [place] [address]. All rights reserved. This information is not intended as a substitute for professional medical care. Always follow your healthcare professional's instructions.

## 2020-10-26 NOTE — ED TRIAGE NOTES
The patient reports generalized abdominal pain and bloating x 1 week. The patient patient denies nausea, vomiting, diarrhea, constipation. Patient reports that tylenol temporarily helps the pain. Patient last BM yesterday, normal per patient.

## 2020-10-27 ENCOUNTER — PATIENT OUTREACH (OUTPATIENT)
Dept: ADMINISTRATIVE | Facility: OTHER | Age: 85
End: 2020-10-27

## 2020-10-27 ENCOUNTER — TELEPHONE (OUTPATIENT)
Dept: FAMILY MEDICINE | Facility: CLINIC | Age: 85
End: 2020-10-27

## 2020-10-27 NOTE — TELEPHONE ENCOUNTER
----- Message from Marina Laguna sent at 10/27/2020  8:03 AM CDT -----  Regarding: self [phone]  .Type:  Patient Requesting Referral    Who Called: self     Referral to What Specialty: Gastro     Reason for Referral: stomach pain     Does the patient want the referral with a specific physician? no    [facility]    Would the patient rather a call back or a response via [facility]?  Call back     Best Call Back Number [phone]

## 2020-10-27 NOTE — PROGRESS NOTES
Health Maintenance Due   Topic Date Due    Shingles Vaccine (1 of 2) 11/19/1986    TETANUS VACCINE  11/18/2015    Pneumococcal Vaccine (65+ High/Highest Risk) (2 of 2 - PPSV23) 08/22/2017     Updates were requested from care everywhere.  Chart was reviewed for overdue [facility]active [facility] Encounters ([facility]) topics (CRS, Breast Cancer Screening, Eye exam)  Health Maintenance has been updated.  LINKS not responding.

## 2020-10-27 NOTE — TELEPHONE ENCOUNTER
Patient states  He went to ED on yesterday , c/o bloated stomach with pain that started last Tuesday. States he was given medication by ED (protonix, zofran) requesting referral to [facility]

## 2020-10-28 ENCOUNTER — OFFICE VISIT (OUTPATIENT)
Dept: GASTROENTEROLOGY | Facility: CLINIC | Age: 85
End: 2020-10-28
Payer: MEDICARE

## 2020-10-28 ENCOUNTER — TELEPHONE (OUTPATIENT)
Dept: GASTROENTEROLOGY | Facility: CLINIC | Age: 85
End: 2020-10-28

## 2020-10-28 VITALS
WEIGHT: 191.38 LBS | HEIGHT: 71 IN | BODY MASS INDEX: 26.79 KG/M2 | SYSTOLIC BLOOD PRESSURE: 137 MMHG | DIASTOLIC BLOOD PRESSURE: 62 MMHG | HEART RATE: 64 BPM

## 2020-10-28 DIAGNOSIS — R10.84 GENERALIZED ABDOMINAL PAIN: ICD-10-CM

## 2020-10-28 DIAGNOSIS — Z12.11 SCREENING FOR COLON CANCER: ICD-10-CM

## 2020-10-28 DIAGNOSIS — R14.0 ABDOMINAL BLOATING: Primary | ICD-10-CM

## 2020-10-28 PROCEDURE — 99214 OFFICE O/P EST MOD 30 MIN: CPT | Mod: S$PBB,,, | Performed by: NURSE PRACTITIONER

## 2020-10-28 PROCEDURE — 99215 OFFICE O/P EST HI 40 MIN: CPT | Mod: PBBFAC,PO | Performed by: NURSE PRACTITIONER

## 2020-10-28 PROCEDURE — 99999 PR PBB SHADOW E&M-EST. PATIENT-LVL V: CPT | Mod: PBBFAC,,, | Performed by: NURSE PRACTITIONER

## 2020-10-28 PROCEDURE — 99999 PR PBB SHADOW E&M-EST. PATIENT-LVL V: ICD-10-PCS | Mod: PBBFAC,,, | Performed by: NURSE PRACTITIONER

## 2020-10-28 PROCEDURE — 99214 PR OFFICE/OUTPT VISIT, EST, LEVL IV, 30-39 MIN: ICD-10-PCS | Mod: S$PBB,,, | Performed by: NURSE PRACTITIONER

## 2020-10-28 RX ORDER — POLYETHYLENE GLYCOL 3350, SODIUM SULFATE ANHYDROUS, SODIUM BICARBONATE, SODIUM CHLORIDE, POTASSIUM CHLORIDE 236; 22.74; 6.74; 5.86; 2.97 G/4L; G/4L; G/4L; G/4L; G/4L
4 POWDER, FOR SOLUTION ORAL ONCE
Qty: 1 BOTTLE | Refills: 0 | Status: SHIPPED | OUTPATIENT
Start: 2020-10-28 | End: 2020-10-28

## 2020-10-28 NOTE — PROGRESS NOTES
GASTROENTEROLOGY CLINIC NOTE    Chief Complaint: The primary encounter diagnosis was Abdominal bloating. A diagnosis of Generalized abdominal pain was also pertinent to this visit.  Referring provider/PCP: Adiel Bragg MD    HPI:  Bria Lawson is a 84 y.o. male who is a new patient to me with a PMH that is significant for Arthritis, Atrial fibrillation, CKD stage 3 due to type 2 diabetes mellitus, Colon polyp, Coronary artery disease, Diabetic retinopathy, GERD (gastroesophageal reflux disease), Gout, HDL lipoprotein deficiency, Hyperlipidemia, Hypertension, Lymphoma, and Uncontrolled secondary diabetes mellitus with stage 3 CKD and is accompanied by his wife.  She is here today to establish care for abdominal bloating and abdominal pain.  These are new problems that began 7-10 days ago.  [place] meals over two days when the bloating and generalized abdominal cramping started. He reports the symptoms improved after two days and then returned one day later. This prompted him to seek care at Urgent Care and the emergency room where he was noted to have 1st degree AV block.  He reported his symptoms were improving and he was discharged with Protonix, Robaxin, and Zofran.  Patient reports to me a generalized abdominal cramping accompanied by bloating that waxes and wanes.  It is gradual in onset and does not radiate.  He describes the pain as a 3 out of 10.  His symptoms are worse after meals.  Denies any changes in bowel habits.  His BMs are daily. Denies diarrhea, constipation, hematochezia, melena, vomiting, or any genitourinary symptoms. He has tried Tylenol and Pepto Bismol for his symptoms and he is currently taking Protonix as prescribed to him in the ER.     Prior Endoscopy: 2004. Esophageal dilation was performed.  Esophagitis and small hiatal hernia noted in report. H.pylori biopsy was negative  Prior Colonoscopy: 2007. Diverticulosis. Tubular  adenoma; 5 year recall  Family h/o Colon Cancer: No  Family h/o Crohn's Disease or Ulcerative Colitis: No  Abdominal Surgeries: No    GI ROS:  Reflux: No  Dysphagia: No   Constipation: No  Diarrhea: No  Rectal bleeding/Melena/hematemesis: No  NSAIDs: No  Anticoagulation or Antiplatelet: ASA      Review of Systems   Constitutional: Negative for weight loss.   HENT: Negative for sore throat.    Eyes: Negative for blurred vision.   Respiratory: Negative for cough.    Cardiovascular: Negative for chest pain.   Gastrointestinal: Positive for abdominal pain and nausea. Negative for blood in stool, constipation, diarrhea, heartburn, melena and vomiting.        Bloating   Genitourinary: Negative for dysuria.   Musculoskeletal: Negative for myalgias.   Skin: Negative for rash.   Neurological: Negative for headaches.   Endo/Heme/Allergies: Negative for environmental allergies.   Psychiatric/Behavioral: Negative for suicidal ideas. The patient is not nervous/anxious.        Past Medical History: has a past medical history of Arthritis, Atrial fibrillation, CKD stage 3 due to type 2 diabetes mellitus, Colon polyp, Coronary artery disease, Diabetes mellitus with renal manifestations, uncontrolled, Diabetic retinopathy, GERD (gastroesophageal reflux disease), Gout, Gout, chronic, HDL lipoprotein deficiency, Hyperlipidemia, Hypertension, Lymphoma, and Uncontrolled secondary diabetes mellitus with stage 3 CKD (GFR 30-59).    Past Surgical History: has a past surgical history that includes Joint replacement; Eye surgery; scrotal cyst; Bone marrow biopsy (Left, 9/19/2018); Cataract extraction (Bilateral, 1996); and retinal injection s (Bilateral).    Family History:family history includes No Known Problems in his brother, father, maternal aunt, maternal grandfather, maternal grandmother, maternal uncle, mother, paternal aunt, paternal grandfather, paternal grandmother, paternal uncle, and sister.    Allergies: Review of patient's  allergies indicates:  No Known Allergies    Social History: reports that he has quit smoking. He has never used smokeless tobacco. He reports current alcohol use. He reports that he does not use drugs.    Home medications:   Current Outpatient Medications on File Prior to Visit   Medication Sig Dispense Refill    allopurinoL (ZYLOPRIM) 100 MG tablet TAKE 1 TABLET(100 MG) BY MOUTH EVERY DAY 90 tablet 12    amLODIPine (NORVASC) 10 MG tablet TAKE 1 TABLET(10 MG) BY MOUTH EVERY DAY 90 tablet 12    amoxicillin (AMOXIL) 500 MG capsule       ascorbic acid (VITAMIN C) 1000 MG tablet Take 1,000 mg by mouth once daily.      aspirin 81 MG Chew CHEW AND SWALLOW 1 TABLET BY MOUTH DAILY 90 tablet 12    atorvastatin (LIPITOR) 20 MG tablet TAKE 1 TABLET(20 MG) BY MOUTH EVERY DAY 90 tablet 12    blood sugar diagnostic Strp 1 strip by Misc.(Non-Drug; Combo Route) route 2 (two) times daily. Disp glucometer and lancets as well 100 strip 12    carvediloL (COREG) 6.25 MG tablet Take 1 tablet (6.25 mg total) by mouth 2 (two) times daily with meals. 180 tablet 12    folic acid (FOLVITE) 800 MCG Tab Take 800 mcg by mouth once daily.      furosemide (LASIX) 20 MG tablet Take 0.5 tablets (10 mg total) by mouth once daily. 30 tablet 2    gabapentin (NEURONTIN) 300 MG capsule TAKE 1 CAPSULE(300 MG) BY MOUTH THREE TIMES DAILY 270 capsule 3    glipiZIDE (GLUCOTROL) 10 MG TR24 TAKE 1 TABLET(10 MG) BY MOUTH EVERY DAY 90 tablet 0    hydrocortisone 2.5 % cream APPLY UP TO ONE HALF GRAM TO THE AFFECTED AREA TWICE DAILY      lidocaine (XYLOCAINE) 5 % Oint ointment APPLY 1.5 GRAMS TO THE AFFECTED AREAS TWICE DAILY.      methocarbamoL (ROBAXIN) 750 MG Tab Take 1 tablet (750 mg total) by mouth 3 (three) times daily. As needed for muscle spasm for 5 days 20 tablet 0    mometasone 0.1% (ELOCON) 0.1 % cream APPLY TO AFFECTED AREA QD  1    multivit-min/FA/lycopen/lutein (CENTRUM SILVER MEN ORAL) Take by mouth.      niacin 500 MG Tab Take 100  "mg by mouth every evening.      ondansetron (ZOFRAN) 4 MG tablet Take 1 tablet (4 mg total) by mouth every 8 (eight) hours as needed for Nausea. 12 tablet 0    pantoprazole (PROTONIX) 20 MG tablet Take 2 tablets (40 mg total) by mouth once daily. 60 tablet 0    SITagliptin (JANUVIA) 100 MG Tab Take 1 tablet (100 mg total) by mouth once daily. 90 tablet 3    TRUEPLUS LANCETS 33 gauge Misc USE TO TEST BLOOD SUGAR BID  12    TURMERIC ROOT EXTRACT ORAL Take by mouth.      valsartan (DIOVAN) 320 MG tablet TAKE 1 TABLET ONCE DAILY 90 tablet 3    vitamin E 400 UNIT capsule Take 400 Units by mouth once daily.      [DISCONTINUED] omeprazole (PRILOSEC) 40 MG capsule TAKE 1 CAPSULE DAILY 90 capsule 3    [DISCONTINUED] triamcinolone acetonide 0.1% (KENALOG) 0.1 % ointment        No current facility-administered medications on file prior to visit.        Vital signs:  /62   Pulse 64   Ht 5' 11" (1.803 m)   Wt 86.8 kg (191 lb 5.8 oz)   BMI 26.69 kg/m²     Physical Exam  Vitals signs reviewed.   Constitutional:       General: He is not in acute distress.     Appearance: Normal appearance. He is not ill-appearing.   HENT:      Head: Normocephalic.   Cardiovascular:      Rate and Rhythm: Normal rate and regular rhythm.      Heart sounds: Normal heart sounds. No murmur.   Pulmonary:      Effort: Pulmonary effort is normal. No respiratory distress.      Breath sounds: Normal breath sounds.   Chest:      Chest wall: No tenderness.   Abdominal:      General: Bowel sounds are normal. There is no distension.      Palpations: Abdomen is soft.      Tenderness: There is no abdominal tenderness. Negative signs include Alarcon's sign.      Hernia: No hernia is present.   Skin:     General: Skin is warm.   Neurological:      Mental Status: He is alert and oriented to person, place, and time.   Psychiatric:         Mood and Affect: Mood normal.         Behavior: Behavior normal.         Routine labs:  Lab Results   Component " Value Date    WBC 9.10 10/26/2020    HGB 12.3 (L) 10/26/2020    HCT 38.6 (L) 10/26/2020    MCV 93 10/26/2020     (L) 10/26/2020     No results found for: INR  Lab Results   Component Value Date    IRON 94 03/14/2017    FERRITIN 84 03/14/2017    TIBC 389 03/14/2017    FESATURATED 24 03/14/2017     Lab Results   Component Value Date     10/26/2020    K 4.2 10/26/2020     10/26/2020    CO2 28 10/26/2020    BUN 15 10/26/2020    CREATININE 1.2 10/26/2020     Lab Results   Component Value Date    ALBUMIN 3.7 10/26/2020    ALT 24 10/26/2020    AST 24 10/26/2020    ALKPHOS 78 10/26/2020    BILITOT 1.2 (H) 10/26/2020     No results found for: GLUCOSE  No results found for: TSH  Lab Results   Component Value Date    CALCIUM 9.6 10/26/2020    PHOS 2.6 (L) 09/15/2020       Imaging:  XR ABDOMEN FLAT AND ERECT  Narrative: EXAMINATION:  XR ABDOMEN FLAT AND ERECT    CLINICAL HISTORY:  Epigastric pain    TECHNIQUE:  Flat and erect AP views of the abdomen were performed.    COMPARISON:  None    FINDINGS:  No dilated loops of small bowel identified.No differential air fluid levels.  No findings to suggest free air.  No pathologic calcifications. Multilevel degenerative lumbar spondylosis noted..  Impression: Nonobstructive bowel gas pattern.    Electronically signed by: Jeffrey Leahy MD  Date:    10/26/2020  Time:    10:21    Assessment:  1. Abdominal bloating    2. Generalized abdominal pain    3. Screening for colon cancer        Plan:  Orders Placed This Encounter    COVID-19 Routine Screening    polyethylene glycol (GOLYTELY,NULYTELY) 236-22.74-6.74 -5.86 gram suspension    Case request GI: COLONOSCOPY, EGD (ESOPHAGOGASTRODUODENOSCOPY)     EGD to evaluate bloating and abdominal pain. Consider biopsy for H.pylori.  Colonoscopy for colon cancer screening.     Patient will need cardiac clearance for procedure.       Karina Avalos, APRN,FNP-BC  [facility]

## 2020-10-28 NOTE — PATIENT INSTRUCTIONS
GOLYTELY/ COLYTE/ NULYTELY Instructions    You are scheduled for a colonoscopy with Dr. Hwang on 11/24/20 at [facility] located at [address] [place] [address].  Check in at the admit desk, first floor of the hospital (which is the building on the left).     You will receive a call 2-3 days before your colonoscopy to tell you the time to arrive.  If you have not received a call by the day before your procedure, call the Endoscopy Lab at [phone].    To ensure that your test is accurate and complete, you MUST follow these instructions listed below.  If you have any questions, please call our office at [phone].  Plan on being at the hospital for your procedure for 3-4 hours.    1.  Follow a CLEAR LIQUID DIET for the entire day before your scheduled colonoscopy.  This means no solid food the entire day starting when you wake.  You may have as much of the clear liquids as you want throughout the day.   CLEAR LIQUID DIET:   - Avoid Red, Orange, Purple, and/or Blue food coloring   - NO DAIRY   - You can have:  Coffee with sugar (no creamer), tea, water, soda, apple or white grape juice, chicken or beef broth/bouillon (no meat, noodles, or veggies), green/yellow popsicles, green/yellow Jell-O, lemonade.    2.  MIX GOLYTELY/COLYTE/NULYTELY (all names for same product) WITH ONE (1) GALLON OF WATER.  YOU MAY ADD A FLAVOR PACKET OR YELLOW/GREEN POWDER DRINK MIX TO THIS.  PUT IN REFRIGERATOR.  This is easier to drink if this solution is cold, so you can mix the solution one day ahead of time and place in the refrigerator prior to drinking.  You have to drink the solution within 24-36 hours of mixing it.  Do NOT put this solution over ice.  It IS ok to drink with a straw.    3. AT 5 PM THE DAY BEFORE YOUR COLONOSCOPY, DRINK ONE (1) 8 OUNCE GLASS OF MIXTURE EVERY 10 MINUTES UNTIL HALF OF THE GALLON IS CONSUMED.  Keep this mixture cold and in refrigerator as much as you can while drinking it.  Place the  remaining half of mixture in the refrigerator when you finish the first half.    4.  The endoscopy department will call you 2 days before your colonoscopy to tell you the exact time to arrive, AND to tell you the exact time to drink the 2nd portion of your prep (which will be FIVE HOURS BEFORE YOUR ARRIVAL TIME).  At this time given to you, DRINK ONE (1) 8 OUNCE GLASS OF MIXTURE EVERY 10 MINUTES UNTIL THE OTHER HALF IS CONSUMED. Keep the mixture cold while you are drinking it. Once this is complete, you may not have ANYTHING else by mouth!      5.  You must have someone with you to DRIVE YOU HOME since you will be receiving IV sedation for the colonoscopy.    6.  It is ok to take your heart, blood pressure, and seizure medications in the morning of your test with a SIP of water.  Hold other medications until after your procedure.  Do NOT have anything else to eat or drink the morning of your colonoscopy.  It is ok to brush your teeth.    7.  If you are on blood thinners THAT YOU HAVE BEEN INSTRUCTED TO HOLD BY YOUR DOCTOR FOR THIS PROCEDURE, then do NOT take this the morning of your colonoscopy.  Do NOT stop these medications on your own, they must be approved to be held by your doctor.  Your colonoscopy can NOT be done if you are on these medications.  Examples of blood thinners include: Coumadin, Aggrenox, Plavix, Pradaxa, Reapro, Pletal, Xarelto, Ticagrelor, Brilinta, Eliquis, and high dose aspirin (325 mg).  You do not have to stop baby aspirin 81 mg.    8.  IF YOU ARE DIABETIC:  NO INSULIN OR ORAL MEDICATIONS THE MORNING OF THE COLONOSCOPY.  TAKE ONLY HALF THE DOSE OF YOUR INSULIN THE DAY BEFORE THE COLONOSCOPY.  DO NOT TAKE ANY ORAL DIABETIC MEDICATIONS THE DAY BEFORE THE COLONOSCOPY.  IF YOU ARE AN INSULIN DEPENDENT DIABETIC WITH UNSTABLE BLOOD SUGARS, NOTIFY YOUR PRIMARY CARE PHYSICIAN FOR INSTRUCTIONS.

## 2020-10-28 NOTE — H&P (VIEW-ONLY)
GASTROENTEROLOGY CLINIC NOTE    Chief Complaint: The primary encounter diagnosis was Abdominal bloating. A diagnosis of Generalized abdominal pain was also pertinent to this visit.  Referring provider/PCP: Adiel Bragg MD    HPI:  Bria Lawson is a 84 y.o. male who is a new patient to me with a PMH that is significant for Arthritis, Atrial fibrillation, CKD stage 3 due to type 2 diabetes mellitus, Colon polyp, Coronary artery disease, Diabetic retinopathy, GERD (gastroesophageal reflux disease), Gout, HDL lipoprotein deficiency, Hyperlipidemia, Hypertension, Lymphoma, and Uncontrolled secondary diabetes mellitus with stage 3 CKD and is accompanied by his wife.  She is here today to establish care for abdominal bloating and abdominal pain.  These are new problems that began 7-10 days ago.  Patient [place] large meals over two days when the bloating and generalized abdominal cramping started. He reports the symptoms improved after two days and then returned one day later. This prompted him to seek care at Urgent Care and the emergency room where he was noted to have 1st degree AV block.  He reported his symptoms were improving and he was discharged with Protonix, Robaxin, and Zofran.  Patient reports to me a generalized abdominal cramping accompanied by bloating that waxes and wanes.  It is gradual in onset and does not radiate.  He describes the pain as a 3 out of 10.  His symptoms are worse after meals.  Denies any changes in bowel habits.  His BMs are daily. Denies diarrhea, constipation, hematochezia, melena, vomiting, or any genitourinary symptoms. He has tried Tylenol and Pepto Bismol for his symptoms and he is currently taking Protonix as prescribed to him in the ER.     Prior Endoscopy: 2004. Esophageal dilation was performed.  Esophagitis and small hiatal hernia noted in report. H.pylori biopsy was negative  Prior Colonoscopy: 2007. Diverticulosis. Tubular  adenoma; 5 year recall  Family h/o Colon Cancer: No  Family h/o Crohn's Disease or Ulcerative Colitis: No  Abdominal Surgeries: No    GI ROS:  Reflux: No  Dysphagia: No   Constipation: No  Diarrhea: No  Rectal bleeding/Melena/hematemesis: No  NSAIDs: No  Anticoagulation or Antiplatelet: ASA      Review of Systems   Constitutional: Negative for weight loss.   HENT: Negative for sore throat.    Eyes: Negative for blurred vision.   Respiratory: Negative for cough.    Cardiovascular: Negative for chest pain.   Gastrointestinal: Positive for abdominal pain and nausea. Negative for blood in stool, constipation, diarrhea, heartburn, melena and vomiting.        Bloating   Genitourinary: Negative for dysuria.   Musculoskeletal: Negative for myalgias.   Skin: Negative for rash.   Neurological: Negative for headaches.   Endo/Heme/Allergies: Negative for environmental allergies.   Psychiatric/Behavioral: Negative for suicidal ideas. The patient is not nervous/anxious.        Past Medical History: has a past medical history of Arthritis, Atrial fibrillation, CKD stage 3 due to type 2 diabetes mellitus, Colon polyp, Coronary artery disease, Diabetes mellitus with renal manifestations, uncontrolled, Diabetic retinopathy, GERD (gastroesophageal reflux disease), Gout, Gout, chronic, HDL lipoprotein deficiency, Hyperlipidemia, Hypertension, Lymphoma, and Uncontrolled secondary diabetes mellitus with stage 3 CKD (GFR 30-59).    Past Surgical History: has a past surgical history that includes Joint replacement; Eye surgery; scrotal cyst; Bone marrow biopsy (Left, 9/19/2018); Cataract extraction (Bilateral, 1996); and retinal injection s (Bilateral).    Family History:family history includes No Known Problems in his brother, father, maternal aunt, maternal grandfather, maternal grandmother, maternal uncle, mother, paternal aunt, paternal grandfather, paternal grandmother, paternal uncle, and sister.    Allergies: Review of patient's  allergies indicates:  No Known Allergies    Social History: reports that he has quit smoking. He has never used smokeless tobacco. He reports current alcohol use. He reports that he does not use drugs.    Home medications:   Current Outpatient Medications on File Prior to Visit   Medication Sig Dispense Refill    allopurinoL (ZYLOPRIM) 100 MG tablet TAKE 1 TABLET(100 MG) BY MOUTH EVERY DAY 90 tablet 12    amLODIPine (NORVASC) 10 MG tablet TAKE 1 TABLET(10 MG) BY MOUTH EVERY DAY 90 tablet 12    amoxicillin (AMOXIL) 500 MG capsule       ascorbic acid (VITAMIN C) 1000 MG tablet Take 1,000 mg by mouth once daily.      aspirin 81 MG Chew CHEW AND SWALLOW 1 TABLET BY MOUTH DAILY 90 tablet 12    atorvastatin (LIPITOR) 20 MG tablet TAKE 1 TABLET(20 MG) BY MOUTH EVERY DAY 90 tablet 12    blood sugar diagnostic Strp 1 strip by Misc.(Non-Drug; Combo Route) route 2 (two) times daily. Disp glucometer and lancets as well 100 strip 12    carvediloL (COREG) 6.25 MG tablet Take 1 tablet (6.25 mg total) by mouth 2 (two) times daily with meals. 180 tablet 12    folic acid (FOLVITE) 800 MCG Tab Take 800 mcg by mouth once daily.      furosemide (LASIX) 20 MG tablet Take 0.5 tablets (10 mg total) by mouth once daily. 30 tablet 2    gabapentin (NEURONTIN) 300 MG capsule TAKE 1 CAPSULE(300 MG) BY MOUTH THREE TIMES DAILY 270 capsule 3    glipiZIDE (GLUCOTROL) 10 MG TR24 TAKE 1 TABLET(10 MG) BY MOUTH EVERY DAY 90 tablet 0    hydrocortisone 2.5 % cream APPLY UP TO ONE HALF GRAM TO THE AFFECTED AREA TWICE DAILY      lidocaine (XYLOCAINE) 5 % Oint ointment APPLY 1.5 GRAMS TO THE AFFECTED AREAS TWICE DAILY.      methocarbamoL (ROBAXIN) 750 MG Tab Take 1 tablet (750 mg total) by mouth 3 (three) times daily. As needed for muscle spasm for 5 days 20 tablet 0    mometasone 0.1% (ELOCON) 0.1 % cream APPLY TO AFFECTED AREA QD  1    multivit-min/FA/lycopen/lutein (CENTRUM SILVER MEN ORAL) Take by mouth.      niacin 500 MG Tab Take 100  "mg by mouth every evening.      ondansetron (ZOFRAN) 4 MG tablet Take 1 tablet (4 mg total) by mouth every 8 (eight) hours as needed for Nausea. 12 tablet 0    pantoprazole (PROTONIX) 20 MG tablet Take 2 tablets (40 mg total) by mouth once daily. 60 tablet 0    SITagliptin (JANUVIA) 100 MG Tab Take 1 tablet (100 mg total) by mouth once daily. 90 tablet 3    TRUEPLUS LANCETS 33 gauge Misc USE TO TEST BLOOD SUGAR BID  12    TURMERIC ROOT EXTRACT ORAL Take by mouth.      valsartan (DIOVAN) 320 MG tablet TAKE 1 TABLET ONCE DAILY 90 tablet 3    vitamin E 400 UNIT capsule Take 400 Units by mouth once daily.      [DISCONTINUED] omeprazole (PRILOSEC) 40 MG capsule TAKE 1 CAPSULE DAILY 90 capsule 3    [DISCONTINUED] triamcinolone acetonide 0.1% (KENALOG) 0.1 % ointment        No current facility-administered medications on file prior to visit.        Vital signs:  /62   Pulse 64   Ht 5' 11" (1.803 m)   Wt 86.8 kg (191 lb 5.8 oz)   BMI 26.69 kg/m²     Physical Exam  Vitals signs reviewed.   Constitutional:       General: He is not in acute distress.     Appearance: Normal appearance. He is not ill-appearing.   HENT:      Head: Normocephalic.   Cardiovascular:      Rate and Rhythm: Normal rate and regular rhythm.      Heart sounds: Normal heart sounds. No murmur.   Pulmonary:      Effort: Pulmonary effort is normal. No respiratory distress.      Breath sounds: Normal breath sounds.   Chest:      Chest wall: No tenderness.   Abdominal:      General: Bowel sounds are normal. There is no distension.      Palpations: Abdomen is soft.      Tenderness: There is no abdominal tenderness. Negative signs include Alarcon's sign.      Hernia: No hernia is present.   Skin:     General: Skin is warm.   Neurological:      Mental Status: He is alert and oriented to person, place, and time.   Psychiatric:         Mood and Affect: Mood normal.         Behavior: Behavior normal.         Routine labs:  Lab Results   Component " Value Date    WBC 9.10 10/26/2020    HGB 12.3 (L) 10/26/2020    HCT 38.6 (L) 10/26/2020    MCV 93 10/26/2020     (L) 10/26/2020     No results found for: INR  Lab Results   Component Value Date    IRON 94 03/14/2017    FERRITIN 84 03/14/2017    TIBC 389 03/14/2017    FESATURATED 24 03/14/2017     Lab Results   Component Value Date     10/26/2020    K 4.2 10/26/2020     10/26/2020    CO2 28 10/26/2020    BUN 15 10/26/2020    CREATININE 1.2 10/26/2020     Lab Results   Component Value Date    ALBUMIN 3.7 10/26/2020    ALT 24 10/26/2020    AST 24 10/26/2020    ALKPHOS 78 10/26/2020    BILITOT 1.2 (H) 10/26/2020     No results found for: GLUCOSE  No results found for: TSH  Lab Results   Component Value Date    CALCIUM 9.6 10/26/2020    PHOS 2.6 (L) 09/15/2020       Imaging:  XR ABDOMEN FLAT AND ERECT  Narrative: EXAMINATION:  XR ABDOMEN FLAT AND ERECT    CLINICAL HISTORY:  Epigastric pain    TECHNIQUE:  Flat and erect AP views of the abdomen were performed.    COMPARISON:  None    FINDINGS:  No dilated loops of small bowel identified.No differential air fluid levels.  No findings to suggest free air.  No pathologic calcifications. Multilevel degenerative lumbar spondylosis noted..  Impression: Nonobstructive bowel gas pattern.    Electronically signed by: Jeffrey Leahy MD  Date:    10/26/2020  Time:    10:21    Assessment:  1. Abdominal bloating    2. Generalized abdominal pain    3. Screening for colon cancer        Plan:  Orders Placed This Encounter    COVID-19 Routine Screening    polyethylene glycol (GOLYTELY,NULYTELY) 236-22.74-6.74 -5.86 gram suspension    Case request GI: COLONOSCOPY, EGD (ESOPHAGOGASTRODUODENOSCOPY)     EGD to evaluate bloating and abdominal pain. Consider biopsy for H.pylori.  Colonoscopy for colon cancer screening.     Patient will need cardiac clearance for procedure.       Karina Avalos, APRN,FNP-BC  [facility]

## 2020-10-31 ENCOUNTER — EXTERNAL CHRONIC CARE MANAGEMENT (OUTPATIENT)
Dept: PRIMARY CARE CLINIC | Facility: CLINIC | Age: 85
End: 2020-10-31
Payer: MEDICARE

## 2020-10-31 PROCEDURE — 99490 CHRNC CARE MGMT STAFF 1ST 20: CPT | Mod: S$PBB,,, | Performed by: INTERNAL MEDICINE

## 2020-10-31 PROCEDURE — 99490 CHRNC CARE MGMT STAFF 1ST 20: CPT | Mod: PBBFAC,PO | Performed by: INTERNAL MEDICINE

## 2020-10-31 PROCEDURE — 99490 PR CHRONIC CARE MGMT, 1ST 20 MIN: ICD-10-PCS | Mod: S$PBB,,, | Performed by: INTERNAL MEDICINE

## 2020-11-03 ENCOUNTER — TELEPHONE (OUTPATIENT)
Dept: GASTROENTEROLOGY | Facility: CLINIC | Age: 85
End: 2020-11-03

## 2020-11-03 NOTE — TELEPHONE ENCOUNTER
----- Message from Oleg Fontana MD sent at 11/3/2020  3:41 PM CST -----  Patient can proceed for his colonoscopy.  He is not on any blood thinners.  Low to moderate risk.  Continue beta-blocker lon procedure.

## 2020-11-03 NOTE — TELEPHONE ENCOUNTER
We have received the clearance for EGD/colonoscopy. Left a message on VM to call the clinic back.

## 2020-11-09 ENCOUNTER — PATIENT OUTREACH (OUTPATIENT)
Dept: OTHER | Facility: OTHER | Age: 85
End: 2020-11-09

## 2020-11-09 ENCOUNTER — TELEPHONE (OUTPATIENT)
Dept: RHEUMATOLOGY | Facility: CLINIC | Age: 85
End: 2020-11-09

## 2020-11-09 NOTE — TELEPHONE ENCOUNTER
Called to reschedule canceled appointment due to pt having procedure done   Waiting on a call back

## 2020-11-09 NOTE — PROGRESS NOTES
Digital Medicine: Health  Follow-Up    The history is provided by the patient.             Reason for review: Blood glucose not at goal and Blood pressure at goal        Topics Covered on Call: Diet and stomach problems, health maintenance    Additional Follow-up details: He was evaluated in ED recently for stomach pain and has upcoming test for this. He states pain has improved but still there. He has been taking Tylenol for this. He also reports taking Mucinex for his sinuses. Patient reports frustration with not being able to see his doctors in a timely manner and having to go through central scheduling.    Patient's health maintenance indicates his eye exam is due. Patient states he had his eye exam with Dr. Gibson. This is shown in patient's chart, visit on 8/19/20.             Diet-Change      Dietary Indiscretions:Fried food and bread    Additional diet details: He reports eating lighter than normal due to stomach pain. He did eat fried fish and bread on Friday, this may have contributed to elevated BG Saturday morning.    Physical Activity-Not assessed    Medication Adherence-Medication adherence was assessed.      Substance, Sleep, Stress-Not assessed      Additional monitoring needed.  Continue current diet/physical activity routine.       Addressed patient questions and patient has my contact information if needed prior to next outreach. Patient verbalizes understanding.      Explained the importance of self-monitoring and medication adherence. Encouraged the patient to communicate with their health  for lifestyle modifications to help improve or maintain a healthy lifestyle.               There are no preventive care reminders to display for this patient.      Last 5 Patient Entered Readings                                      Current 30 Day Average: 127/59     Recent Readings 11/9/2020 11/9/2020 11/3/2020 11/3/2020 10/25/2020    SBP (mmHg) 114 117 121 139 124    DBP (mmHg) 53 56 52 60 53    Pulse  55 55 60 61 57        Last 6 Patient Entered Readings                                          Most Recent A1c: 5.4% on 6/8/2020  (Goal: 7%)     Recent Readings 11/9/2020 11/8/2020 11/7/2020 10/25/2020 10/23/2020    Blood Glucose (mg/dL) 156 147 202 124 167

## 2020-11-18 ENCOUNTER — TELEPHONE (OUTPATIENT)
Dept: RHEUMATOLOGY | Facility: CLINIC | Age: 85
End: 2020-11-18

## 2020-11-18 NOTE — TELEPHONE ENCOUNTER
----- Message from Luana Avila sent at 11/18/2020 10:17 AM CST -----  Contact: pt  Pt called to reschedule appt from 11/24/20. Pt called last week to reschedule appt and still have not received a call back    
Left message so we can schedule him for Friday waiting on a call back   
diabetes/cardiovascular/neurologic/gastrointestinal/genitourinary/respiratory

## 2020-11-19 RX ORDER — GABAPENTIN 300 MG/1
CAPSULE ORAL
Qty: 270 CAPSULE | Refills: 0 | Status: SHIPPED | OUTPATIENT
Start: 2020-11-19 | End: 2021-02-18 | Stop reason: SDUPTHER

## 2020-11-20 ENCOUNTER — TELEPHONE (OUTPATIENT)
Dept: ENDOSCOPY | Facility: HOSPITAL | Age: 85
End: 2020-11-20

## 2020-11-20 NOTE — TELEPHONE ENCOUNTER
Left message instructing patient to call dept @ [phone] between 8am-4pm.    Arrival time to be given @ 1030  Colon/GGolytely  (Message sent via [facility] portal)

## 2020-11-21 ENCOUNTER — LAB VISIT (OUTPATIENT)
Dept: URGENT CARE | Facility: CLINIC | Age: 85
End: 2020-11-21
Payer: MEDICARE

## 2020-11-21 DIAGNOSIS — R14.0 ABDOMINAL BLOATING: ICD-10-CM

## 2020-11-21 DIAGNOSIS — R10.84 GENERALIZED ABDOMINAL PAIN: ICD-10-CM

## 2020-11-21 PROCEDURE — 99211 OFF/OP EST MAY X REQ PHY/QHP: CPT | Mod: S$GLB,,, | Performed by: NURSE PRACTITIONER

## 2020-11-21 PROCEDURE — U0003 INFECTIOUS AGENT DETECTION BY NUCLEIC ACID (DNA OR RNA); SEVERE ACUTE RESPIRATORY SYNDROME CORONAVIRUS 2 (SARS-COV-2) (CORONAVIRUS DISEASE [COVID-19]), AMPLIFIED PROBE TECHNIQUE, MAKING USE OF HIGH THROUGHPUT TECHNOLOGIES AS DESCRIBED BY CMS-2020-01-R: HCPCS

## 2020-11-21 PROCEDURE — 99211 PR OFFICE/OUTPT VISIT, EST, LEVL I: ICD-10-PCS | Mod: S$GLB,,, | Performed by: NURSE PRACTITIONER

## 2020-11-22 LAB — SARS-COV-2 RNA RESP QL NAA+PROBE: NOT DETECTED

## 2020-11-23 ENCOUNTER — TELEPHONE (OUTPATIENT)
Dept: ENDOSCOPY | Facility: HOSPITAL | Age: 85
End: 2020-11-23

## 2020-11-23 ENCOUNTER — TELEPHONE (OUTPATIENT)
Dept: NEPHROLOGY | Facility: CLINIC | Age: 85
End: 2020-11-23

## 2020-11-23 PROCEDURE — 99454 REM MNTR PHYSIOL PARAM 16-30: CPT | Mod: PBBFAC,PO | Performed by: INTERNAL MEDICINE

## 2020-11-23 NOTE — TELEPHONE ENCOUNTER
Spoke to pt , informed that schd has not open yet for jan. Pt v/u                             ----- Message from Ebony Taylor sent at 11/23/2020  1:38 PM CST -----  Regarding: Appt Access  Pt called to schedule a 3 month f/up appt in January. I did try to schedule but there was nothing coming up.    Pt can be reached at [phone]    Thank you

## 2020-11-23 NOTE — TELEPHONE ENCOUNTER
Spoke with patient about arrival time @ 1030.   EGD/Colon  Covid test = negative    Prep instructions reviewed: the day before the procedure, follow a clear liquid diet all day, then start the first 1/2 of prep at 5pm and take 2nd 1/2 of prep @ 0530.  Pt must be completely NPO when prep completed @ 0730.              Medications: Do not take Insulin or oral diabetic medications the day of the procedure.  Take as prescribed: heart, seizure and blood pressure medication in the morning with a sip of water (less than an ounce).  Take any breathing medications and bring inhalers to hospital with you Leave all valuables and jewelry at home.     Wear comfortable clothes to procedure to change into hospital gown You cannot drive for 24 hours after your procedure because you will receive sedation for your procedure to make you comfortable.  A ride must be provided at discharge.

## 2020-11-24 ENCOUNTER — HOSPITAL ENCOUNTER (OUTPATIENT)
Facility: HOSPITAL | Age: 85
Discharge: HOME | End: 2020-11-24
Attending: INTERNAL MEDICINE | Admitting: INTERNAL MEDICINE
Payer: MEDICARE

## 2020-11-24 ENCOUNTER — PATIENT MESSAGE (OUTPATIENT)
Dept: RHEUMATOLOGY | Facility: CLINIC | Age: 85
End: 2020-11-24

## 2020-11-24 ENCOUNTER — ANESTHESIA (OUTPATIENT)
Dept: ENDOSCOPY | Facility: HOSPITAL | Age: 85
End: 2020-11-24
Payer: MEDICARE

## 2020-11-24 ENCOUNTER — ANESTHESIA EVENT (OUTPATIENT)
Dept: ENDOSCOPY | Facility: HOSPITAL | Age: 85
End: 2020-11-24
Payer: MEDICARE

## 2020-11-24 VITALS
TEMPERATURE: 99 F | DIASTOLIC BLOOD PRESSURE: 60 MMHG | HEIGHT: 71 IN | OXYGEN SATURATION: 99 % | SYSTOLIC BLOOD PRESSURE: 124 MMHG | HEART RATE: 55 BPM | WEIGHT: 192 LBS | BODY MASS INDEX: 26.88 KG/M2 | RESPIRATION RATE: 20 BRPM

## 2020-11-24 DIAGNOSIS — Z12.11 SCREENING FOR MALIGNANT NEOPLASM OF COLON: ICD-10-CM

## 2020-11-24 LAB
GLUCOSE SERPL-MCNC: 119 MG/DL (ref 70–110)
POCT GLUCOSE: 119 MG/DL (ref 70–110)

## 2020-11-24 PROCEDURE — 37000008 HC ANESTHESIA 1ST 15 MINUTES: Performed by: INTERNAL MEDICINE

## 2020-11-24 PROCEDURE — G0105 COLORECTAL SCRN; HI RISK IND: ICD-10-PCS | Mod: ,,, | Performed by: INTERNAL MEDICINE

## 2020-11-24 PROCEDURE — 43239 PR EGD, FLEX, W/BIOPSY, SGL/MULTI: ICD-10-PCS | Mod: 51,,, | Performed by: INTERNAL MEDICINE

## 2020-11-24 PROCEDURE — 25000003 PHARM REV CODE 250: Performed by: INTERNAL MEDICINE

## 2020-11-24 PROCEDURE — 37000009 HC ANESTHESIA EA ADD 15 MINS: Performed by: INTERNAL MEDICINE

## 2020-11-24 PROCEDURE — 43239 EGD BIOPSY SINGLE/MULTIPLE: CPT | Performed by: INTERNAL MEDICINE

## 2020-11-24 PROCEDURE — 63600175 PHARM REV CODE 636 W HCPCS: Performed by: NURSE ANESTHETIST, CERTIFIED REGISTERED

## 2020-11-24 PROCEDURE — 82962 GLUCOSE BLOOD TEST: CPT | Performed by: INTERNAL MEDICINE

## 2020-11-24 PROCEDURE — 88305 TISSUE EXAM BY PATHOLOGIST: CPT | Mod: 26,,, | Performed by: PATHOLOGY

## 2020-11-24 PROCEDURE — 25000003 PHARM REV CODE 250: Performed by: NURSE ANESTHETIST, CERTIFIED REGISTERED

## 2020-11-24 PROCEDURE — G0105 COLORECTAL SCRN; HI RISK IND: HCPCS | Mod: ,,, | Performed by: INTERNAL MEDICINE

## 2020-11-24 PROCEDURE — 27201012 HC FORCEPS, HOT/COLD, DISP: Performed by: INTERNAL MEDICINE

## 2020-11-24 PROCEDURE — 88305 TISSUE EXAM BY PATHOLOGIST: ICD-10-PCS | Mod: 26,,, | Performed by: PATHOLOGY

## 2020-11-24 PROCEDURE — 88305 TISSUE EXAM BY PATHOLOGIST: CPT | Performed by: PATHOLOGY

## 2020-11-24 PROCEDURE — 43239 EGD BIOPSY SINGLE/MULTIPLE: CPT | Mod: 51,,, | Performed by: INTERNAL MEDICINE

## 2020-11-24 PROCEDURE — G0105 COLORECTAL SCRN; HI RISK IND: HCPCS | Performed by: INTERNAL MEDICINE

## 2020-11-24 RX ORDER — LIDOCAINE HCL/PF 100 MG/5ML
SYRINGE (ML) INTRAVENOUS
Status: DISCONTINUED | OUTPATIENT
Start: 2020-11-24 | End: 2020-11-24

## 2020-11-24 RX ORDER — PROPOFOL 10 MG/ML
VIAL (ML) INTRAVENOUS CONTINUOUS PRN
Status: DISCONTINUED | OUTPATIENT
Start: 2020-11-24 | End: 2020-11-24

## 2020-11-24 RX ORDER — PROPOFOL 10 MG/ML
VIAL (ML) INTRAVENOUS
Status: DISCONTINUED | OUTPATIENT
Start: 2020-11-24 | End: 2020-11-24

## 2020-11-24 RX ORDER — SODIUM CHLORIDE 0.9 % (FLUSH) 0.9 %
10 SYRINGE (ML) INJECTION
Status: DISCONTINUED | OUTPATIENT
Start: 2020-11-24 | End: 2020-11-24 | Stop reason: HOSPADM

## 2020-11-24 RX ORDER — PANTOPRAZOLE SODIUM 40 MG/1
40 TABLET, DELAYED RELEASE ORAL DAILY
Qty: 90 TABLET | Refills: 3 | Status: SHIPPED | OUTPATIENT
Start: 2020-11-24 | End: 2021-09-13

## 2020-11-24 RX ORDER — SODIUM CHLORIDE 9 MG/ML
INJECTION, SOLUTION INTRAVENOUS CONTINUOUS
Status: DISCONTINUED | OUTPATIENT
Start: 2020-11-24 | End: 2020-11-24 | Stop reason: HOSPADM

## 2020-11-24 RX ADMIN — SODIUM CHLORIDE: 0.9 INJECTION, SOLUTION INTRAVENOUS at 11:11

## 2020-11-24 RX ADMIN — PROPOFOL 80 MG: 10 INJECTION, EMULSION INTRAVENOUS at 12:11

## 2020-11-24 RX ADMIN — PROPOFOL 150 MCG/KG/MIN: 10 INJECTION, EMULSION INTRAVENOUS at 12:11

## 2020-11-24 RX ADMIN — LIDOCAINE HYDROCHLORIDE 100 MG: 20 INJECTION, SOLUTION INTRAVENOUS at 12:11

## 2020-11-24 NOTE — PROVATION PATIENT INSTRUCTIONS
Discharge Summary/Instructions after an Endoscopic Procedure  Patient Name: Bria Lawson  Patient MRN: [id number]  Patient YOB: 1935 Tuesday, November 24, 2020  Masoud Hwang MD  Your health is very important to us during the Covid Crisis. Following your   procedure today, you will receive a daily text for 2 weeks asking about   signs or symptoms of Covid 19.  Please respond to this text when you   receive it so we can follow up and keep you as safe as possible.   RESTRICTIONS:  During your procedure today, you received medications for sedation.  These   medications may affect your judgment, balance and coordination.  Therefore,   for 24 hours, you have the following restrictions:   - DO NOT drive a car, operate machinery, make legal/financial decisions,   sign important papers or drink alcohol.    ACTIVITY:  Today: no heavy lifting, straining or running due to procedural   sedation/anesthesia.  The following day: return to full activity including work.  DIET:  Eat and drink normally unless instructed otherwise.     TREATMENT FOR COMMON SIDE EFFECTS:  - Mild abdominal pain, nausea, belching, bloating or excessive gas:  rest,   eat lightly and use a heating pad.  - Sore Throat: treat with throat lozenges and/or gargle with warm salt   water.  - Because air was used during the procedure, expelling large amounts of air   from your rectum or belching is normal.  - If a bowel prep was taken, you may not have a bowel movement for 1-3 days.    This is normal.  SYMPTOMS TO WATCH FOR AND REPORT TO YOUR PHYSICIAN:  1. Abdominal pain or bloating, other than gas cramps.  2. Chest pain.  3. Back pain.  4. Signs of infection such as: chills or fever occurring within 24 hours   after the procedure.  5. Rectal bleeding, which would show as bright red, maroon, or black stools.   (A tablespoon of blood from the rectum is not serious, especially if   hemorrhoids are present.)  6. Vomiting.  7. Weakness or  dizziness.  GO DIRECTLY TO THE NEAREST EMERGENCY ROOM IF YOU HAVE ANY OF THE FOLLOWING:      Difficulty breathing              Chills and/or fever over 101 F   Persistent vomiting and/or vomiting blood   Severe abdominal pain   Severe chest pain   Black, tarry stools   Bleeding- more than one tablespoon   Any other symptom or condition that you feel may need urgent attention  Your doctor recommends these additional instructions:  If any biopsies were taken, your doctors clinic will contact you in 1 to 2   weeks with any results.  - Discharge patient to home (via wheelchair).   - Patient has a contact number available for emergencies.  The signs and   symptoms of potential delayed complications were discussed with the   patient.  Return to normal activities tomorrow.  Written discharge   instructions were provided to the patient.   - Resume previous diet.   - Continue present medications.   - Await pathology results.  For questions, problems or results please call your physician - Masoud Hwang MD.  EMERGENCY PHONE NUMBER: [phone],  LAB RESULTS: [phone]  IF A COMPLICATION OR EMERGENCY SITUATION ARISES AND YOU ARE UNABLE TO REACH   YOUR PHYSICIAN - GO DIRECTLY TO THE EMERGENCY ROOM.  Masoud Hwang MD  11/24/2020 12:47:43 PM  This report has been verified and signed electronically.  PROVATION

## 2020-11-24 NOTE — INTERVAL H&P NOTE
The patient has been examined and the H&P has been reviewed:    I concur with the findings and no changes have occurred since H&P was written.    Surgery risks, benefits and alternative options discussed and understood by patient/family.          Active Hospital Problems    Diagnosis  POA    Screening for malignant neoplasm of colon [Z12.11]  Not Applicable      Resolved Hospital Problems   No resolved problems to display.

## 2020-11-24 NOTE — ANESTHESIA PREPROCEDURE EVALUATION
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    Hyperlipidemia 2/26/2015    Hypertension     Lymphoma     Uncontrolled secondary diabetes mellitus with stage 3 CKD (GFR 30-59) 8/28/2015     Past Surgical History:   Procedure Laterality Date    BONE MARROW BIOPSY Left 9/19/2018    Procedure: Biopsy-bone marrow;  Surgeon: Velia Tobias MD;  Location: [facility];  Service: Oncology;  Laterality: Left;    CATARACT EXTRACTION Bilateral 1996    EYE SURGERY      cataracts    JOINT REPLACEMENT      knee replacement    retinal injection s Bilateral     scrotal cyst           Anesthesia Evaluation    I have reviewed the Patient Summary Reports.   I have reviewed the NPO Status.   I have reviewed the Medications.     Review of Systems  Social:  Former Smoker        Physical Exam  General:  Well nourished    Airway/Jaw/Neck:  Airway Findings: Mallampati: II      Chest/Lungs:  Chest/Lungs Clear    Heart/Vascular:  Heart Findings: Normal        6/2020  · Low normal left ventricular systolic function. The estimated ejection fraction is 50-55%.  · Mild eccentric left ventricular hypertrophy.  · Mild left ventricular enlargement.  · No wall motion abnormalities.  · Normal right ventricular systolic function.  · Moderate mitral regurgitation.  · Mild tricuspid regurgitation.  · The estimated PA systolic pressure is 35 mmHg.    Anesthesia Plan  Type of Anesthesia, risks  & benefits discussed:  Anesthesia Type:  MAC  Patient's Preference:   Intra-op Monitoring Plan: standard ASA monitors  Intra-op Monitoring Plan Comments:   Post Op Pain Control Plan: multimodal analgesia  Post Op Pain Control Plan Comments:   Induction:    Beta Blocker:  Patient is not currently on a Beta-Blocker (No further documentation required).       Informed Consent: Patient understands risks and agrees with Anesthesia plan.  Questions answered. Anesthesia consent signed with patient.  ASA Score: 3     Day of Surgery Review of History & Physical:            Ready For Surgery From Anesthesia Perspective.

## 2020-11-24 NOTE — ANESTHESIA POSTPROCEDURE EVALUATION
Anesthesia Post Evaluation    Patient: Bria Lawson    Procedure(s) Performed: Procedure(s) (LRB):  EGD (ESOPHAGOGASTRODUODENOSCOPY) (N/A)  COLONOSCOPY Golytely (N/A)    Final Anesthesia Type: MAC    Patient location during evaluation: GI PACU  Patient participation: Yes- Able to Participate  Level of consciousness: awake and alert  Post-procedure vital signs: reviewed and stable  Pain management: adequate  Airway patency: patent  LORELEI mitigation strategies: Multimodal analgesia  PONV status at discharge: No PONV  Anesthetic complications: no      Cardiovascular status: hemodynamically stable and blood pressure returned to baseline  Respiratory status: room air, unassisted and spontaneous ventilation  Hydration status: euvolemic  Follow-up not needed.          Vitals Value Taken Time   BP 83/48 11/24/20 1302   Temp 37 °C (98.6 °F) 11/24/20 1302   Pulse 55 11/24/20 1302   Resp 20 11/24/20 1302   SpO2 96 % 11/24/20 1302         No case tracking events are documented in the log.      Pain/Lee Score: Lee Score: 10 (11/24/2020  1:04 PM)

## 2020-11-24 NOTE — TRANSFER OF CARE
"Anesthesia Transfer of Care Note    Patient: Bria Lawson    Procedure(s) Performed: Procedure(s) (LRB):  EGD (ESOPHAGOGASTRODUODENOSCOPY) (N/A)  COLONOSCOPY Golytely (N/A)    Patient location: GI    Anesthesia Type: MAC    Transport from OR: Transported from OR on room air with adequate spontaneous ventilation    Post pain: adequate analgesia    Post assessment: no apparent anesthetic complications and tolerated procedure well    Post vital signs: stable    Level of consciousness: awake, alert and oriented    Nausea/Vomiting: no nausea/vomiting    Complications: none    Transfer of care protocol was followed      Last vitals:   Visit Vitals  BP (!) 175/79 (Patient Position: Lying)   Pulse 69   Temp 37.2 °C (99 °F) (Temporal)   Resp 20   Ht 5' 11" (1.803 m)   Wt 87.1 kg (192 lb)   SpO2 99%   BMI 26.78 kg/m²     "

## 2020-11-24 NOTE — PROVATION PATIENT INSTRUCTIONS
Discharge Summary/Instructions after an Endoscopic Procedure  Patient Name: Bria Lawson  Patient MRN: [id number]  Patient YOB: 1935 Tuesday, November 24, 2020  Masoud Hwang MD  Your health is very important to us during the Covid Crisis. Following your   procedure today, you will receive a daily text for 2 weeks asking about   signs or symptoms of Covid 19.  Please respond to this text when you   receive it so we can follow up and keep you as safe as possible.   RESTRICTIONS:  During your procedure today, you received medications for sedation.  These   medications may affect your judgment, balance and coordination.  Therefore,   for 24 hours, you have the following restrictions:   - DO NOT drive a car, operate machinery, make legal/financial decisions,   sign important papers or drink alcohol.    ACTIVITY:  Today: no heavy lifting, straining or running due to procedural   sedation/anesthesia.  The following day: return to full activity including work.  DIET:  Eat and drink normally unless instructed otherwise.     TREATMENT FOR COMMON SIDE EFFECTS:  - Mild abdominal pain, nausea, belching, bloating or excessive gas:  rest,   eat lightly and use a heating pad.  - Sore Throat: treat with throat lozenges and/or gargle with warm salt   water.  - Because air was used during the procedure, expelling large amounts of air   from your rectum or belching is normal.  - If a bowel prep was taken, you may not have a bowel movement for 1-3 days.    This is normal.  SYMPTOMS TO WATCH FOR AND REPORT TO YOUR PHYSICIAN:  1. Abdominal pain or bloating, other than gas cramps.  2. Chest pain.  3. Back pain.  4. Signs of infection such as: chills or fever occurring within 24 hours   after the procedure.  5. Rectal bleeding, which would show as bright red, maroon, or black stools.   (A tablespoon of blood from the rectum is not serious, especially if   hemorrhoids are present.)  6. Vomiting.  7. Weakness or  dizziness.  GO DIRECTLY TO THE NEAREST EMERGENCY ROOM IF YOU HAVE ANY OF THE FOLLOWING:      Difficulty breathing              Chills and/or fever over 101 F   Persistent vomiting and/or vomiting blood   Severe abdominal pain   Severe chest pain   Black, tarry stools   Bleeding- more than one tablespoon   Any other symptom or condition that you feel may need urgent attention  Your doctor recommends these additional instructions:  If any biopsies were taken, your doctors clinic will contact you in 1 to 2   weeks with any results.  - Discharge patient to home (via wheelchair).   - Patient has a contact number available for emergencies.  The signs and   symptoms of potential delayed complications were discussed with the   patient.  Return to normal activities tomorrow.  Written discharge   instructions were provided to the patient.   - Resume previous diet.   - Continue present medications.   - No repeat colonoscopy due to age.  For questions, problems or results please call your physician - Masoud Hwang MD.  EMERGENCY PHONE NUMBER: [phone],  LAB RESULTS: [phone]  IF A COMPLICATION OR EMERGENCY SITUATION ARISES AND YOU ARE UNABLE TO REACH   YOUR PHYSICIAN - GO DIRECTLY TO THE EMERGENCY ROOM.  Masoud Hwang MD  11/24/2020 12:58:37 PM  This report has been verified and signed electronically.  PROVATION

## 2020-11-24 NOTE — DISCHARGE INSTRUCTIONS

## 2020-11-27 ENCOUNTER — TELEPHONE (OUTPATIENT)
Dept: GASTROENTEROLOGY | Facility: CLINIC | Age: 85
End: 2020-11-27

## 2020-11-27 LAB
FINAL PATHOLOGIC DIAGNOSIS: NORMAL
GROSS: NORMAL
Lab: NORMAL

## 2020-11-30 ENCOUNTER — EXTERNAL CHRONIC CARE MANAGEMENT (OUTPATIENT)
Dept: PRIMARY CARE CLINIC | Facility: CLINIC | Age: 85
End: 2020-11-30
Payer: MEDICARE

## 2020-11-30 PROCEDURE — 99487 PR COMPLX CHRON CARE MGMT, 1ST HR, PER MONTH: ICD-10-PCS | Mod: S$PBB,,, | Performed by: INTERNAL MEDICINE

## 2020-11-30 PROCEDURE — 99489 CPLX CHRNC CARE EA ADDL 30: CPT | Mod: PBBFAC,PO | Performed by: INTERNAL MEDICINE

## 2020-11-30 PROCEDURE — 99489 PR COMPLX CHRON CARE MGMT, EA ADDTL 30 MIN, PER MONTH: ICD-10-PCS | Mod: S$PBB,,, | Performed by: INTERNAL MEDICINE

## 2020-11-30 PROCEDURE — 99489 CPLX CHRNC CARE EA ADDL 30: CPT | Mod: S$PBB,,, | Performed by: INTERNAL MEDICINE

## 2020-11-30 PROCEDURE — 99487 CPLX CHRNC CARE 1ST 60 MIN: CPT | Mod: S$PBB,,, | Performed by: INTERNAL MEDICINE

## 2020-11-30 PROCEDURE — 99487 CPLX CHRNC CARE 1ST 60 MIN: CPT | Mod: PBBFAC,PO | Performed by: INTERNAL MEDICINE

## 2020-12-16 ENCOUNTER — PATIENT OUTREACH (OUTPATIENT)
Dept: OTHER | Facility: OTHER | Age: 85
End: 2020-12-16

## 2020-12-19 PROCEDURE — 99454 REM MNTR PHYSIOL PARAM 16-30: CPT | Mod: PBBFAC,PO | Performed by: INTERNAL MEDICINE

## 2020-12-29 ENCOUNTER — LAB VISIT (OUTPATIENT)
Dept: LAB | Facility: HOSPITAL | Age: 85
End: 2020-12-29
Attending: INTERNAL MEDICINE
Payer: MEDICARE

## 2020-12-29 DIAGNOSIS — E11.22 CKD STAGE 3 DUE TO TYPE 2 DIABETES MELLITUS: ICD-10-CM

## 2020-12-29 DIAGNOSIS — I12.9 HYPERTENSIVE KIDNEY DISEASE WITH STAGE 3 CHRONIC KIDNEY DISEASE: ICD-10-CM

## 2020-12-29 DIAGNOSIS — N18.30 HYPERTENSIVE KIDNEY DISEASE WITH STAGE 3 CHRONIC KIDNEY DISEASE: ICD-10-CM

## 2020-12-29 DIAGNOSIS — N18.30 CKD STAGE 3 DUE TO TYPE 2 DIABETES MELLITUS: ICD-10-CM

## 2020-12-29 DIAGNOSIS — E11.29 DIABETES MELLITUS WITH PROTEINURIA: ICD-10-CM

## 2020-12-29 DIAGNOSIS — R80.9 DIABETES MELLITUS WITH PROTEINURIA: ICD-10-CM

## 2020-12-29 DIAGNOSIS — M1A.00X0 IDIOPATHIC CHRONIC GOUT WITHOUT TOPHUS, UNSPECIFIED SITE: ICD-10-CM

## 2020-12-29 DIAGNOSIS — N18.30 CKD (CHRONIC KIDNEY DISEASE), STAGE III: ICD-10-CM

## 2020-12-29 LAB
25(OH)D3+25(OH)D2 SERPL-MCNC: 49 NG/ML (ref 30–96)
ALBUMIN SERPL BCP-MCNC: 4.3 G/DL (ref 3.5–5.2)
ANION GAP SERPL CALC-SCNC: 9 MMOL/L (ref 8–16)
BASOPHILS # BLD AUTO: 0.03 K/UL (ref 0–0.2)
BASOPHILS NFR BLD: 0.7 % (ref 0–1.9)
BUN SERPL-MCNC: 17 MG/DL (ref 8–23)
CALCIUM SERPL-MCNC: 8.9 MG/DL (ref 8.7–10.5)
CHLORIDE SERPL-SCNC: 103 MMOL/L (ref 95–110)
CO2 SERPL-SCNC: 25 MMOL/L (ref 23–29)
CREAT SERPL-MCNC: 1.4 MG/DL (ref 0.5–1.4)
DIFFERENTIAL METHOD: ABNORMAL
EOSINOPHIL # BLD AUTO: 0.1 K/UL (ref 0–0.5)
EOSINOPHIL NFR BLD: 1.8 % (ref 0–8)
ERYTHROCYTE [DISTWIDTH] IN BLOOD BY AUTOMATED COUNT: 12.6 % (ref 11.5–14.5)
EST. GFR  (AFRICAN AMERICAN): 53 ML/MIN/1.73 M^2
EST. GFR  (NON AFRICAN AMERICAN): 46 ML/MIN/1.73 M^2
GLUCOSE SERPL-MCNC: 273 MG/DL (ref 70–110)
HCT VFR BLD AUTO: 41.4 % (ref 40–54)
HGB BLD-MCNC: 13.3 G/DL (ref 14–18)
IMM GRANULOCYTES # BLD AUTO: 0.06 K/UL (ref 0–0.04)
IMM GRANULOCYTES NFR BLD AUTO: 1.3 % (ref 0–0.5)
LYMPHOCYTES # BLD AUTO: 0.9 K/UL (ref 1–4.8)
LYMPHOCYTES NFR BLD: 20.9 % (ref 18–48)
MCH RBC QN AUTO: 30.1 PG (ref 27–31)
MCHC RBC AUTO-ENTMCNC: 32.1 G/DL (ref 32–36)
MCV RBC AUTO: 94 FL (ref 82–98)
MONOCYTES # BLD AUTO: 0.7 K/UL (ref 0.3–1)
MONOCYTES NFR BLD: 16.2 % (ref 4–15)
NEUTROPHILS # BLD AUTO: 2.7 K/UL (ref 1.8–7.7)
NEUTROPHILS NFR BLD: 59.1 % (ref 38–73)
NRBC BLD-RTO: 0 /100 WBC
PHOSPHATE SERPL-MCNC: 3.3 MG/DL (ref 2.7–4.5)
PLATELET # BLD AUTO: 58 K/UL (ref 150–350)
PMV BLD AUTO: 12.6 FL (ref 9.2–12.9)
POTASSIUM SERPL-SCNC: 3.9 MMOL/L (ref 3.5–5.1)
PTH-INTACT SERPL-MCNC: 52.2 PG/ML (ref 9–77)
RBC # BLD AUTO: 4.42 M/UL (ref 4.6–6.2)
SODIUM SERPL-SCNC: 137 MMOL/L (ref 136–145)
WBC # BLD AUTO: 4.5 K/UL (ref 3.9–12.7)

## 2020-12-29 PROCEDURE — 85025 COMPLETE CBC W/AUTO DIFF WBC: CPT

## 2020-12-29 PROCEDURE — 82306 VITAMIN D 25 HYDROXY: CPT

## 2020-12-29 PROCEDURE — 80069 RENAL FUNCTION PANEL: CPT

## 2020-12-29 PROCEDURE — 83970 ASSAY OF PARATHORMONE: CPT

## 2020-12-31 ENCOUNTER — EXTERNAL CHRONIC CARE MANAGEMENT (OUTPATIENT)
Dept: PRIMARY CARE CLINIC | Facility: CLINIC | Age: 85
End: 2020-12-31
Payer: MEDICARE

## 2020-12-31 PROCEDURE — G2058 PR CHRON CARE MGMT, EA ADDTL 20 MINS: ICD-10-PCS | Mod: S$PBB,,, | Performed by: INTERNAL MEDICINE

## 2020-12-31 PROCEDURE — 99490 CHRNC CARE MGMT STAFF 1ST 20: CPT | Mod: S$PBB,,, | Performed by: INTERNAL MEDICINE

## 2020-12-31 PROCEDURE — 99490 CHRNC CARE MGMT STAFF 1ST 20: CPT | Mod: PBBFAC,PO | Performed by: INTERNAL MEDICINE

## 2020-12-31 PROCEDURE — G2058 CCM ADD 20MIN: HCPCS | Mod: PBBFAC,PO | Performed by: INTERNAL MEDICINE

## 2020-12-31 PROCEDURE — G2058 CCM ADD 20MIN: HCPCS | Mod: S$PBB,,, | Performed by: INTERNAL MEDICINE

## 2020-12-31 PROCEDURE — 99490 PR CHRONIC CARE MGMT, 1ST 20 MIN: ICD-10-PCS | Mod: S$PBB,,, | Performed by: INTERNAL MEDICINE

## 2021-01-07 ENCOUNTER — OFFICE VISIT (OUTPATIENT)
Dept: HEMATOLOGY/ONCOLOGY | Facility: CLINIC | Age: 86
End: 2021-01-07
Payer: MEDICARE

## 2021-01-07 VITALS
DIASTOLIC BLOOD PRESSURE: 76 MMHG | OXYGEN SATURATION: 98 % | BODY MASS INDEX: 28.02 KG/M2 | HEIGHT: 71 IN | SYSTOLIC BLOOD PRESSURE: 175 MMHG | WEIGHT: 200.19 LBS | HEART RATE: 63 BPM | TEMPERATURE: 98 F

## 2021-01-07 DIAGNOSIS — D69.6 THROMBOCYTOPENIA: ICD-10-CM

## 2021-01-07 DIAGNOSIS — C85.19 B-CELL LYMPHOMA OF EXTRANODAL SITE: Primary | ICD-10-CM

## 2021-01-07 PROCEDURE — 99999 PR PBB SHADOW E&M-EST. PATIENT-LVL IV: ICD-10-PCS | Mod: PBBFAC,,, | Performed by: INTERNAL MEDICINE

## 2021-01-07 PROCEDURE — 99214 OFFICE O/P EST MOD 30 MIN: CPT | Mod: PBBFAC | Performed by: INTERNAL MEDICINE

## 2021-01-07 PROCEDURE — 99213 OFFICE O/P EST LOW 20 MIN: CPT | Mod: S$PBB,,, | Performed by: INTERNAL MEDICINE

## 2021-01-07 PROCEDURE — 99999 PR PBB SHADOW E&M-EST. PATIENT-LVL IV: CPT | Mod: PBBFAC,,, | Performed by: INTERNAL MEDICINE

## 2021-01-07 PROCEDURE — 99213 PR OFFICE/OUTPT VISIT, EST, LEVL III, 20-29 MIN: ICD-10-PCS | Mod: S$PBB,,, | Performed by: INTERNAL MEDICINE

## 2021-01-07 RX ORDER — METRONIDAZOLE 500 MG/1
500 TABLET ORAL DAILY
COMMUNITY
Start: 2020-12-18 | End: 2021-03-19

## 2021-01-11 ENCOUNTER — OFFICE VISIT (OUTPATIENT)
Dept: FAMILY MEDICINE | Facility: CLINIC | Age: 86
End: 2021-01-11
Payer: MEDICARE

## 2021-01-11 VITALS
HEIGHT: 71 IN | BODY MASS INDEX: 27.93 KG/M2 | DIASTOLIC BLOOD PRESSURE: 60 MMHG | SYSTOLIC BLOOD PRESSURE: 138 MMHG | WEIGHT: 199.5 LBS | OXYGEN SATURATION: 99 % | HEART RATE: 62 BPM | TEMPERATURE: 98 F

## 2021-01-11 DIAGNOSIS — D64.9 ANEMIA, UNSPECIFIED TYPE: ICD-10-CM

## 2021-01-11 DIAGNOSIS — C85.19 B-CELL LYMPHOMA OF EXTRANODAL SITE: ICD-10-CM

## 2021-01-11 DIAGNOSIS — I12.9 HYPERTENSIVE KIDNEY DISEASE WITH STAGE 3 CHRONIC KIDNEY DISEASE, UNSPECIFIED WHETHER STAGE 3A OR 3B CKD: ICD-10-CM

## 2021-01-11 DIAGNOSIS — I10 ESSENTIAL HYPERTENSION: Primary | ICD-10-CM

## 2021-01-11 DIAGNOSIS — N18.30 STAGE 3 CHRONIC KIDNEY DISEASE, UNSPECIFIED WHETHER STAGE 3A OR 3B CKD: ICD-10-CM

## 2021-01-11 DIAGNOSIS — M06.9 RHEUMATOID ARTHRITIS, INVOLVING UNSPECIFIED SITE, UNSPECIFIED WHETHER RHEUMATOID FACTOR PRESENT: ICD-10-CM

## 2021-01-11 DIAGNOSIS — D61.818 PANCYTOPENIA: ICD-10-CM

## 2021-01-11 DIAGNOSIS — E11.311 DIABETIC RETINOPATHY OF BOTH EYES WITH MACULAR EDEMA ASSOCIATED WITH TYPE 2 DIABETES MELLITUS, UNSPECIFIED RETINOPATHY SEVERITY: ICD-10-CM

## 2021-01-11 DIAGNOSIS — N18.30 HYPERTENSIVE KIDNEY DISEASE WITH STAGE 3 CHRONIC KIDNEY DISEASE, UNSPECIFIED WHETHER STAGE 3A OR 3B CKD: ICD-10-CM

## 2021-01-11 DIAGNOSIS — E11.29 DIABETES MELLITUS WITH PROTEINURIA: ICD-10-CM

## 2021-01-11 DIAGNOSIS — M06.09 SERONEGATIVE ARTHROPATHY OF MULTIPLE SITES: ICD-10-CM

## 2021-01-11 DIAGNOSIS — N18.30 CKD STAGE 3 DUE TO TYPE 2 DIABETES MELLITUS: ICD-10-CM

## 2021-01-11 DIAGNOSIS — I27.21 PAH (PULMONARY ARTERY HYPERTENSION): ICD-10-CM

## 2021-01-11 DIAGNOSIS — K57.90 DIVERTICULOSIS: ICD-10-CM

## 2021-01-11 DIAGNOSIS — E11.21 DIABETES MELLITUS WITH PROTEINURIC DIABETIC NEPHROPATHY: ICD-10-CM

## 2021-01-11 DIAGNOSIS — R80.9 DIABETES MELLITUS WITH PROTEINURIA: ICD-10-CM

## 2021-01-11 DIAGNOSIS — R10.9 ABDOMINAL PAIN, UNSPECIFIED ABDOMINAL LOCATION: ICD-10-CM

## 2021-01-11 DIAGNOSIS — E11.22 CKD STAGE 3 DUE TO TYPE 2 DIABETES MELLITUS: ICD-10-CM

## 2021-01-11 DIAGNOSIS — I70.0 ATHEROSCLEROSIS OF ABDOMINAL AORTA: ICD-10-CM

## 2021-01-11 DIAGNOSIS — I25.10 CORONARY ARTERY DISEASE, ANGINA PRESENCE UNSPECIFIED, UNSPECIFIED VESSEL OR LESION TYPE, UNSPECIFIED WHETHER NATIVE OR TRANSPLANTED HEART: ICD-10-CM

## 2021-01-11 PROCEDURE — 99214 OFFICE O/P EST MOD 30 MIN: CPT | Mod: PBBFAC,PO | Performed by: INTERNAL MEDICINE

## 2021-01-11 PROCEDURE — 99999 PR PBB SHADOW E&M-EST. PATIENT-LVL IV: CPT | Mod: PBBFAC,,, | Performed by: INTERNAL MEDICINE

## 2021-01-11 PROCEDURE — 99214 PR OFFICE/OUTPT VISIT, EST, LEVL IV, 30-39 MIN: ICD-10-PCS | Mod: S$PBB,,, | Performed by: INTERNAL MEDICINE

## 2021-01-11 PROCEDURE — 99214 OFFICE O/P EST MOD 30 MIN: CPT | Mod: S$PBB,,, | Performed by: INTERNAL MEDICINE

## 2021-01-11 PROCEDURE — 99999 PR PBB SHADOW E&M-EST. PATIENT-LVL IV: ICD-10-PCS | Mod: PBBFAC,,, | Performed by: INTERNAL MEDICINE

## 2021-01-20 PROCEDURE — 99454 REM MNTR PHYSIOL PARAM 16-30: CPT | Mod: PBBFAC,PO | Performed by: INTERNAL MEDICINE

## 2021-01-31 ENCOUNTER — EXTERNAL CHRONIC CARE MANAGEMENT (OUTPATIENT)
Dept: PRIMARY CARE CLINIC | Facility: CLINIC | Age: 86
End: 2021-01-31
Payer: MEDICARE

## 2021-01-31 PROCEDURE — 99490 CHRNC CARE MGMT STAFF 1ST 20: CPT | Mod: S$PBB,,, | Performed by: INTERNAL MEDICINE

## 2021-01-31 PROCEDURE — 99490 CHRNC CARE MGMT STAFF 1ST 20: CPT | Mod: PBBFAC,PO | Performed by: INTERNAL MEDICINE

## 2021-01-31 PROCEDURE — 99490 PR CHRONIC CARE MGMT, 1ST 20 MIN: ICD-10-PCS | Mod: S$PBB,,, | Performed by: INTERNAL MEDICINE

## 2021-02-05 ENCOUNTER — TELEPHONE (OUTPATIENT)
Dept: RHEUMATOLOGY | Facility: CLINIC | Age: 86
End: 2021-02-05

## 2021-02-05 ENCOUNTER — TELEPHONE (OUTPATIENT)
Dept: NEPHROLOGY | Facility: CLINIC | Age: 86
End: 2021-02-05

## 2021-02-05 DIAGNOSIS — N18.30 CKD STAGE 3 DUE TO TYPE 2 DIABETES MELLITUS: Primary | ICD-10-CM

## 2021-02-05 DIAGNOSIS — E11.22 CKD STAGE 3 DUE TO TYPE 2 DIABETES MELLITUS: Primary | ICD-10-CM

## 2021-02-08 ENCOUNTER — PES CALL (OUTPATIENT)
Dept: ADMINISTRATIVE | Facility: CLINIC | Age: 86
End: 2021-02-08

## 2021-02-08 ENCOUNTER — TELEPHONE (OUTPATIENT)
Dept: RHEUMATOLOGY | Facility: CLINIC | Age: 86
End: 2021-02-08

## 2021-02-17 PROCEDURE — 99454 REM MNTR PHYSIOL PARAM 16-30: CPT | Mod: PBBFAC,PO | Performed by: INTERNAL MEDICINE

## 2021-02-18 RX ORDER — GABAPENTIN 300 MG/1
CAPSULE ORAL
Qty: 270 CAPSULE | Refills: 0 | Status: SHIPPED | OUTPATIENT
Start: 2021-02-18 | End: 2021-05-24 | Stop reason: SDUPTHER

## 2021-02-22 ENCOUNTER — OFFICE VISIT (OUTPATIENT)
Dept: PODIATRY | Facility: CLINIC | Age: 86
End: 2021-02-22
Payer: MEDICARE

## 2021-02-22 VITALS
DIASTOLIC BLOOD PRESSURE: 75 MMHG | HEIGHT: 71 IN | BODY MASS INDEX: 27.93 KG/M2 | SYSTOLIC BLOOD PRESSURE: 154 MMHG | HEART RATE: 61 BPM | WEIGHT: 199.5 LBS

## 2021-02-22 DIAGNOSIS — M20.5X2 HALLUX LIMITUS, ACQUIRED, LEFT: ICD-10-CM

## 2021-02-22 DIAGNOSIS — M20.5X1 HALLUX LIMITUS, ACQUIRED, RIGHT: ICD-10-CM

## 2021-02-22 DIAGNOSIS — M20.42 HAMMER TOES OF BOTH FEET: ICD-10-CM

## 2021-02-22 DIAGNOSIS — E11.9 ENCOUNTER FOR DIABETIC FOOT EXAM: Primary | ICD-10-CM

## 2021-02-22 DIAGNOSIS — M20.41 HAMMER TOES OF BOTH FEET: ICD-10-CM

## 2021-02-22 PROCEDURE — 99999 PR PBB SHADOW E&M-EST. PATIENT-LVL V: ICD-10-PCS | Mod: PBBFAC,,, | Performed by: PODIATRIST

## 2021-02-22 PROCEDURE — 99999 PR PBB SHADOW E&M-EST. PATIENT-LVL V: CPT | Mod: PBBFAC,,, | Performed by: PODIATRIST

## 2021-02-22 PROCEDURE — 99215 OFFICE O/P EST HI 40 MIN: CPT | Mod: PBBFAC,PO | Performed by: PODIATRIST

## 2021-02-22 PROCEDURE — 99214 PR OFFICE/OUTPT VISIT, EST, LEVL IV, 30-39 MIN: ICD-10-PCS | Mod: S$PBB,,, | Performed by: PODIATRIST

## 2021-02-22 PROCEDURE — 99214 OFFICE O/P EST MOD 30 MIN: CPT | Mod: S$PBB,,, | Performed by: PODIATRIST

## 2021-02-23 ENCOUNTER — PATIENT MESSAGE (OUTPATIENT)
Dept: RHEUMATOLOGY | Facility: CLINIC | Age: 86
End: 2021-02-23

## 2021-02-25 ENCOUNTER — PATIENT OUTREACH (OUTPATIENT)
Dept: ADMINISTRATIVE | Facility: OTHER | Age: 86
End: 2021-02-25

## 2021-02-25 ENCOUNTER — LAB VISIT (OUTPATIENT)
Dept: LAB | Facility: HOSPITAL | Age: 86
End: 2021-02-25
Attending: INTERNAL MEDICINE
Payer: MEDICARE

## 2021-02-25 DIAGNOSIS — E11.22 CKD STAGE 3 DUE TO TYPE 2 DIABETES MELLITUS: ICD-10-CM

## 2021-02-25 DIAGNOSIS — M1A.00X0 IDIOPATHIC CHRONIC GOUT WITHOUT TOPHUS, UNSPECIFIED SITE: ICD-10-CM

## 2021-02-25 DIAGNOSIS — N18.30 CKD STAGE 3 DUE TO TYPE 2 DIABETES MELLITUS: ICD-10-CM

## 2021-02-25 LAB
ALBUMIN SERPL BCP-MCNC: 4.2 G/DL (ref 3.5–5.2)
ANION GAP SERPL CALC-SCNC: 10 MMOL/L (ref 8–16)
BASOPHILS # BLD AUTO: 0.03 K/UL (ref 0–0.2)
BASOPHILS NFR BLD: 0.2 % (ref 0–1.9)
BUN SERPL-MCNC: 18 MG/DL (ref 8–23)
CALCIUM SERPL-MCNC: 8.8 MG/DL (ref 8.7–10.5)
CHLORIDE SERPL-SCNC: 105 MMOL/L (ref 95–110)
CO2 SERPL-SCNC: 25 MMOL/L (ref 23–29)
CREAT SERPL-MCNC: 1.5 MG/DL (ref 0.5–1.4)
DIFFERENTIAL METHOD: ABNORMAL
EOSINOPHIL # BLD AUTO: 0.1 K/UL (ref 0–0.5)
EOSINOPHIL NFR BLD: 0.8 % (ref 0–8)
ERYTHROCYTE [DISTWIDTH] IN BLOOD BY AUTOMATED COUNT: 12.8 % (ref 11.5–14.5)
EST. GFR  (AFRICAN AMERICAN): 49 ML/MIN/1.73 M^2
EST. GFR  (NON AFRICAN AMERICAN): 42 ML/MIN/1.73 M^2
GLUCOSE SERPL-MCNC: 260 MG/DL (ref 70–110)
HCT VFR BLD AUTO: 42.2 % (ref 40–54)
HGB BLD-MCNC: 13.7 G/DL (ref 14–18)
IMM GRANULOCYTES # BLD AUTO: 0.08 K/UL (ref 0–0.04)
IMM GRANULOCYTES NFR BLD AUTO: 0.6 % (ref 0–0.5)
LYMPHOCYTES # BLD AUTO: 1 K/UL (ref 1–4.8)
LYMPHOCYTES NFR BLD: 7.8 % (ref 18–48)
MCH RBC QN AUTO: 30.2 PG (ref 27–31)
MCHC RBC AUTO-ENTMCNC: 32.5 G/DL (ref 32–36)
MCV RBC AUTO: 93 FL (ref 82–98)
MONOCYTES # BLD AUTO: 2.1 K/UL (ref 0.3–1)
MONOCYTES NFR BLD: 15.4 % (ref 4–15)
NEUTROPHILS # BLD AUTO: 10 K/UL (ref 1.8–7.7)
NEUTROPHILS NFR BLD: 75.2 % (ref 38–73)
NRBC BLD-RTO: 0 /100 WBC
PHOSPHATE SERPL-MCNC: 3.3 MG/DL (ref 2.7–4.5)
PLATELET # BLD AUTO: 59 K/UL (ref 150–350)
PMV BLD AUTO: 13.5 FL (ref 9.2–12.9)
POTASSIUM SERPL-SCNC: 4.1 MMOL/L (ref 3.5–5.1)
PTH-INTACT SERPL-MCNC: 86.2 PG/ML (ref 9–77)
RBC # BLD AUTO: 4.54 M/UL (ref 4.6–6.2)
SODIUM SERPL-SCNC: 140 MMOL/L (ref 136–145)
WBC # BLD AUTO: 13.33 K/UL (ref 3.9–12.7)

## 2021-02-25 PROCEDURE — 83970 ASSAY OF PARATHORMONE: CPT

## 2021-02-25 PROCEDURE — 80069 RENAL FUNCTION PANEL: CPT

## 2021-02-25 PROCEDURE — 85025 COMPLETE CBC W/AUTO DIFF WBC: CPT

## 2021-02-25 PROCEDURE — 36415 COLL VENOUS BLD VENIPUNCTURE: CPT

## 2021-02-25 PROCEDURE — 82306 VITAMIN D 25 HYDROXY: CPT

## 2021-02-25 PROCEDURE — 84550 ASSAY OF BLOOD/URIC ACID: CPT

## 2021-02-26 ENCOUNTER — TELEPHONE (OUTPATIENT)
Dept: NEPHROLOGY | Facility: CLINIC | Age: 86
End: 2021-02-26

## 2021-02-26 ENCOUNTER — OFFICE VISIT (OUTPATIENT)
Dept: RHEUMATOLOGY | Facility: CLINIC | Age: 86
End: 2021-02-26
Payer: MEDICARE

## 2021-02-26 VITALS
SYSTOLIC BLOOD PRESSURE: 162 MMHG | BODY MASS INDEX: 28.73 KG/M2 | HEIGHT: 71 IN | DIASTOLIC BLOOD PRESSURE: 65 MMHG | HEART RATE: 60 BPM | WEIGHT: 205.25 LBS

## 2021-02-26 DIAGNOSIS — M15.9 PRIMARY OSTEOARTHRITIS INVOLVING MULTIPLE JOINTS: ICD-10-CM

## 2021-02-26 DIAGNOSIS — M1A.00X0 IDIOPATHIC CHRONIC GOUT WITHOUT TOPHUS, UNSPECIFIED SITE: Primary | ICD-10-CM

## 2021-02-26 LAB
25(OH)D3+25(OH)D2 SERPL-MCNC: 45 NG/ML (ref 30–96)
URATE SERPL-MCNC: 4.9 MG/DL (ref 3.4–7)

## 2021-02-26 PROCEDURE — 99213 PR OFFICE/OUTPT VISIT, EST, LEVL III, 20-29 MIN: ICD-10-PCS | Mod: S$PBB,,, | Performed by: INTERNAL MEDICINE

## 2021-02-26 PROCEDURE — 99999 PR PBB SHADOW E&M-EST. PATIENT-LVL IV: ICD-10-PCS | Mod: PBBFAC,,, | Performed by: INTERNAL MEDICINE

## 2021-02-26 PROCEDURE — 99214 OFFICE O/P EST MOD 30 MIN: CPT | Mod: PBBFAC | Performed by: INTERNAL MEDICINE

## 2021-02-26 PROCEDURE — 99213 OFFICE O/P EST LOW 20 MIN: CPT | Mod: S$PBB,,, | Performed by: INTERNAL MEDICINE

## 2021-02-26 PROCEDURE — 99999 PR PBB SHADOW E&M-EST. PATIENT-LVL IV: CPT | Mod: PBBFAC,,, | Performed by: INTERNAL MEDICINE

## 2021-02-28 ENCOUNTER — EXTERNAL CHRONIC CARE MANAGEMENT (OUTPATIENT)
Dept: PRIMARY CARE CLINIC | Facility: CLINIC | Age: 86
End: 2021-02-28
Payer: MEDICARE

## 2021-02-28 PROCEDURE — 99490 CHRNC CARE MGMT STAFF 1ST 20: CPT | Mod: PBBFAC,PO | Performed by: INTERNAL MEDICINE

## 2021-02-28 PROCEDURE — 99490 CHRNC CARE MGMT STAFF 1ST 20: CPT | Mod: S$PBB,,, | Performed by: INTERNAL MEDICINE

## 2021-02-28 PROCEDURE — 99439 CHRNC CARE MGMT STAF EA ADDL: CPT | Mod: PBBFAC,PO | Performed by: INTERNAL MEDICINE

## 2021-02-28 PROCEDURE — 99439 CHRNC CARE MGMT STAF EA ADDL: CPT | Mod: S$PBB,,, | Performed by: INTERNAL MEDICINE

## 2021-02-28 PROCEDURE — 99490 PR CHRONIC CARE MGMT, 1ST 20 MIN: ICD-10-PCS | Mod: S$PBB,,, | Performed by: INTERNAL MEDICINE

## 2021-02-28 PROCEDURE — 99439 PR CHRONIC CARE MGMT, EA ADDTL 20 MIN: ICD-10-PCS | Mod: S$PBB,,, | Performed by: INTERNAL MEDICINE

## 2021-03-01 ENCOUNTER — OFFICE VISIT (OUTPATIENT)
Dept: CARDIOLOGY | Facility: CLINIC | Age: 86
End: 2021-03-01
Payer: MEDICARE

## 2021-03-01 VITALS
OXYGEN SATURATION: 98 % | HEIGHT: 71 IN | HEART RATE: 57 BPM | SYSTOLIC BLOOD PRESSURE: 154 MMHG | BODY MASS INDEX: 27.82 KG/M2 | DIASTOLIC BLOOD PRESSURE: 82 MMHG | WEIGHT: 198.75 LBS

## 2021-03-01 DIAGNOSIS — E11.69 HYPERLIPIDEMIA ASSOCIATED WITH TYPE 2 DIABETES MELLITUS: ICD-10-CM

## 2021-03-01 DIAGNOSIS — I27.21 PAH (PULMONARY ARTERY HYPERTENSION): ICD-10-CM

## 2021-03-01 DIAGNOSIS — I34.0 NONRHEUMATIC MITRAL VALVE REGURGITATION: ICD-10-CM

## 2021-03-01 DIAGNOSIS — I11.9 LEFT VENTRICULAR HYPERTROPHY DUE TO HYPERTENSIVE DISEASE, WITHOUT HEART FAILURE: ICD-10-CM

## 2021-03-01 DIAGNOSIS — I25.10 CORONARY ARTERY CALCIFICATION SEEN ON CAT SCAN: Primary | ICD-10-CM

## 2021-03-01 DIAGNOSIS — I10 ESSENTIAL HYPERTENSION: ICD-10-CM

## 2021-03-01 DIAGNOSIS — I44.0 FIRST DEGREE AV BLOCK: ICD-10-CM

## 2021-03-01 DIAGNOSIS — E78.5 HYPERLIPIDEMIA ASSOCIATED WITH TYPE 2 DIABETES MELLITUS: ICD-10-CM

## 2021-03-01 DIAGNOSIS — I70.0 ATHEROSCLEROSIS OF ABDOMINAL AORTA: ICD-10-CM

## 2021-03-01 PROCEDURE — 99999 PR PBB SHADOW E&M-EST. PATIENT-LVL V: ICD-10-PCS | Mod: PBBFAC,,, | Performed by: INTERNAL MEDICINE

## 2021-03-01 PROCEDURE — 93010 ELECTROCARDIOGRAM REPORT: CPT | Mod: S$PBB,,, | Performed by: INTERNAL MEDICINE

## 2021-03-01 PROCEDURE — 93010 EKG 12-LEAD: ICD-10-PCS | Mod: S$PBB,,, | Performed by: INTERNAL MEDICINE

## 2021-03-01 PROCEDURE — 99215 OFFICE O/P EST HI 40 MIN: CPT | Mod: PBBFAC | Performed by: INTERNAL MEDICINE

## 2021-03-01 PROCEDURE — 99999 PR PBB SHADOW E&M-EST. PATIENT-LVL V: CPT | Mod: PBBFAC,,, | Performed by: INTERNAL MEDICINE

## 2021-03-01 PROCEDURE — 99214 OFFICE O/P EST MOD 30 MIN: CPT | Mod: S$PBB,,, | Performed by: INTERNAL MEDICINE

## 2021-03-01 PROCEDURE — 93005 ELECTROCARDIOGRAM TRACING: CPT | Mod: PBBFAC | Performed by: INTERNAL MEDICINE

## 2021-03-01 PROCEDURE — 99214 PR OFFICE/OUTPT VISIT, EST, LEVL IV, 30-39 MIN: ICD-10-PCS | Mod: S$PBB,,, | Performed by: INTERNAL MEDICINE

## 2021-03-02 ENCOUNTER — OFFICE VISIT (OUTPATIENT)
Dept: NEPHROLOGY | Facility: CLINIC | Age: 86
End: 2021-03-02
Payer: MEDICARE

## 2021-03-02 VITALS
DIASTOLIC BLOOD PRESSURE: 68 MMHG | HEIGHT: 71 IN | WEIGHT: 198.63 LBS | OXYGEN SATURATION: 98 % | SYSTOLIC BLOOD PRESSURE: 134 MMHG | HEART RATE: 56 BPM | BODY MASS INDEX: 27.81 KG/M2

## 2021-03-02 DIAGNOSIS — I12.9 HYPERTENSIVE KIDNEY DISEASE WITH STAGE 3B CHRONIC KIDNEY DISEASE: Primary | ICD-10-CM

## 2021-03-02 DIAGNOSIS — N18.30 CKD STAGE 3 DUE TO TYPE 2 DIABETES MELLITUS: ICD-10-CM

## 2021-03-02 DIAGNOSIS — R80.9 DIABETES MELLITUS WITH PROTEINURIA: ICD-10-CM

## 2021-03-02 DIAGNOSIS — E11.29 DIABETES MELLITUS WITH PROTEINURIA: ICD-10-CM

## 2021-03-02 DIAGNOSIS — N18.32 HYPERTENSIVE KIDNEY DISEASE WITH STAGE 3B CHRONIC KIDNEY DISEASE: Primary | ICD-10-CM

## 2021-03-02 DIAGNOSIS — E11.21 DIABETES MELLITUS WITH PROTEINURIC DIABETIC NEPHROPATHY: ICD-10-CM

## 2021-03-02 DIAGNOSIS — E11.22 CKD STAGE 3 DUE TO TYPE 2 DIABETES MELLITUS: ICD-10-CM

## 2021-03-02 PROCEDURE — 99999 PR PBB SHADOW E&M-EST. PATIENT-LVL V: CPT | Mod: PBBFAC,,, | Performed by: INTERNAL MEDICINE

## 2021-03-02 PROCEDURE — 99999 PR PBB SHADOW E&M-EST. PATIENT-LVL V: ICD-10-PCS | Mod: PBBFAC,,, | Performed by: INTERNAL MEDICINE

## 2021-03-02 PROCEDURE — 99214 OFFICE O/P EST MOD 30 MIN: CPT | Mod: S$PBB,,, | Performed by: INTERNAL MEDICINE

## 2021-03-02 PROCEDURE — 99215 OFFICE O/P EST HI 40 MIN: CPT | Mod: PBBFAC | Performed by: INTERNAL MEDICINE

## 2021-03-02 PROCEDURE — 99214 PR OFFICE/OUTPT VISIT, EST, LEVL IV, 30-39 MIN: ICD-10-PCS | Mod: S$PBB,,, | Performed by: INTERNAL MEDICINE

## 2021-03-19 ENCOUNTER — LAB VISIT (OUTPATIENT)
Dept: LAB | Facility: HOSPITAL | Age: 86
End: 2021-03-19
Attending: FAMILY MEDICINE
Payer: MEDICARE

## 2021-03-19 ENCOUNTER — OFFICE VISIT (OUTPATIENT)
Dept: FAMILY MEDICINE | Facility: CLINIC | Age: 86
End: 2021-03-19
Payer: MEDICARE

## 2021-03-19 DIAGNOSIS — D69.6 THROMBOCYTOPENIA: Primary | ICD-10-CM

## 2021-03-19 DIAGNOSIS — R53.81 MALAISE AND FATIGUE: Primary | ICD-10-CM

## 2021-03-19 DIAGNOSIS — R53.83 MALAISE AND FATIGUE: Primary | ICD-10-CM

## 2021-03-19 DIAGNOSIS — I10 ESSENTIAL HYPERTENSION: ICD-10-CM

## 2021-03-19 DIAGNOSIS — R53.83 MALAISE AND FATIGUE: ICD-10-CM

## 2021-03-19 DIAGNOSIS — R53.81 MALAISE AND FATIGUE: ICD-10-CM

## 2021-03-19 DIAGNOSIS — E11.21 DIABETES MELLITUS WITH PROTEINURIC DIABETIC NEPHROPATHY: ICD-10-CM

## 2021-03-19 DIAGNOSIS — D64.9 NORMOCYTIC ANEMIA: ICD-10-CM

## 2021-03-19 DIAGNOSIS — N18.30 STAGE 3 CHRONIC KIDNEY DISEASE, UNSPECIFIED WHETHER STAGE 3A OR 3B CKD: ICD-10-CM

## 2021-03-19 LAB
ALBUMIN SERPL BCP-MCNC: 3.2 G/DL (ref 3.5–5.2)
ALP SERPL-CCNC: 102 U/L (ref 55–135)
ALT SERPL W/O P-5'-P-CCNC: 34 U/L (ref 10–44)
ANION GAP SERPL CALC-SCNC: 10 MMOL/L (ref 8–16)
AST SERPL-CCNC: 27 U/L (ref 10–40)
BASOPHILS # BLD AUTO: 0.03 K/UL (ref 0–0.2)
BASOPHILS NFR BLD: 0.2 % (ref 0–1.9)
BILIRUB SERPL-MCNC: 2.7 MG/DL (ref 0.1–1)
BUN SERPL-MCNC: 28 MG/DL (ref 8–23)
CALCIUM SERPL-MCNC: 8.7 MG/DL (ref 8.7–10.5)
CHLORIDE SERPL-SCNC: 102 MMOL/L (ref 95–110)
CO2 SERPL-SCNC: 26 MMOL/L (ref 23–29)
CREAT SERPL-MCNC: 1.6 MG/DL (ref 0.5–1.4)
DIFFERENTIAL METHOD: ABNORMAL
EOSINOPHIL # BLD AUTO: 0.1 K/UL (ref 0–0.5)
EOSINOPHIL NFR BLD: 0.9 % (ref 0–8)
ERYTHROCYTE [DISTWIDTH] IN BLOOD BY AUTOMATED COUNT: 12.8 % (ref 11.5–14.5)
EST. GFR  (AFRICAN AMERICAN): 45 ML/MIN/1.73 M^2
EST. GFR  (NON AFRICAN AMERICAN): 39 ML/MIN/1.73 M^2
GLUCOSE SERPL-MCNC: 265 MG/DL (ref 70–110)
HCT VFR BLD AUTO: 35.4 % (ref 40–54)
HGB BLD-MCNC: 11.3 G/DL (ref 14–18)
IMM GRANULOCYTES # BLD AUTO: 0.09 K/UL (ref 0–0.04)
IMM GRANULOCYTES NFR BLD AUTO: 0.7 % (ref 0–0.5)
LYMPHOCYTES # BLD AUTO: 0.5 K/UL (ref 1–4.8)
LYMPHOCYTES NFR BLD: 3.7 % (ref 18–48)
MCH RBC QN AUTO: 29.4 PG (ref 27–31)
MCHC RBC AUTO-ENTMCNC: 31.9 G/DL (ref 32–36)
MCV RBC AUTO: 92 FL (ref 82–98)
MONOCYTES # BLD AUTO: 3.2 K/UL (ref 0.3–1)
MONOCYTES NFR BLD: 26.1 % (ref 4–15)
NEUTROPHILS # BLD AUTO: 8.5 K/UL (ref 1.8–7.7)
NEUTROPHILS NFR BLD: 68.4 % (ref 38–73)
NRBC BLD-RTO: 0 /100 WBC
PLATELET # BLD AUTO: 51 K/UL (ref 150–350)
PLATELET BLD QL SMEAR: ABNORMAL
PMV BLD AUTO: 14 FL (ref 9.2–12.9)
POTASSIUM SERPL-SCNC: 3.6 MMOL/L (ref 3.5–5.1)
PROT SERPL-MCNC: 7 G/DL (ref 6–8.4)
RBC # BLD AUTO: 3.84 M/UL (ref 4.6–6.2)
SODIUM SERPL-SCNC: 138 MMOL/L (ref 136–145)
WBC # BLD AUTO: 12.42 K/UL (ref 3.9–12.7)

## 2021-03-19 PROCEDURE — 85025 COMPLETE CBC W/AUTO DIFF WBC: CPT | Performed by: FAMILY MEDICINE

## 2021-03-19 PROCEDURE — 36415 COLL VENOUS BLD VENIPUNCTURE: CPT | Performed by: FAMILY MEDICINE

## 2021-03-19 PROCEDURE — 99211 OFF/OP EST MAY X REQ PHY/QHP: CPT | Mod: PBBFAC,PN | Performed by: FAMILY MEDICINE

## 2021-03-19 PROCEDURE — 99214 PR OFFICE/OUTPT VISIT, EST, LEVL IV, 30-39 MIN: ICD-10-PCS | Mod: S$PBB,,, | Performed by: FAMILY MEDICINE

## 2021-03-19 PROCEDURE — 99214 OFFICE O/P EST MOD 30 MIN: CPT | Mod: S$PBB,,, | Performed by: FAMILY MEDICINE

## 2021-03-19 PROCEDURE — 99999 PR PBB SHADOW E&M-EST. PATIENT-LVL I: CPT | Mod: PBBFAC,,, | Performed by: FAMILY MEDICINE

## 2021-03-19 PROCEDURE — 80053 COMPREHEN METABOLIC PANEL: CPT | Performed by: FAMILY MEDICINE

## 2021-03-19 PROCEDURE — 99999 PR PBB SHADOW E&M-EST. PATIENT-LVL I: ICD-10-PCS | Mod: PBBFAC,,, | Performed by: FAMILY MEDICINE

## 2021-03-22 ENCOUNTER — TELEPHONE (OUTPATIENT)
Dept: FAMILY MEDICINE | Facility: CLINIC | Age: 86
End: 2021-03-22

## 2021-03-22 RX ORDER — VALSARTAN 320 MG/1
TABLET ORAL
Qty: 90 TABLET | Refills: 12 | Status: SHIPPED | OUTPATIENT
Start: 2021-03-22 | End: 2022-03-08

## 2021-03-23 ENCOUNTER — LAB VISIT (OUTPATIENT)
Dept: LAB | Facility: HOSPITAL | Age: 86
End: 2021-03-23
Attending: FAMILY MEDICINE
Payer: MEDICARE

## 2021-03-23 ENCOUNTER — TELEPHONE (OUTPATIENT)
Dept: FAMILY MEDICINE | Facility: CLINIC | Age: 86
End: 2021-03-23

## 2021-03-23 DIAGNOSIS — D50.9 IRON DEFICIENCY ANEMIA, UNSPECIFIED IRON DEFICIENCY ANEMIA TYPE: Primary | ICD-10-CM

## 2021-03-23 DIAGNOSIS — D64.9 NORMOCYTIC ANEMIA: ICD-10-CM

## 2021-03-23 DIAGNOSIS — R53.83 MALAISE AND FATIGUE: ICD-10-CM

## 2021-03-23 DIAGNOSIS — D69.6 THROMBOCYTOPENIA: ICD-10-CM

## 2021-03-23 DIAGNOSIS — R53.81 MALAISE AND FATIGUE: ICD-10-CM

## 2021-03-23 LAB
BASOPHILS # BLD AUTO: 0.05 K/UL (ref 0–0.2)
BASOPHILS NFR BLD: 0.5 % (ref 0–1.9)
DIFFERENTIAL METHOD: ABNORMAL
EOSINOPHIL # BLD AUTO: 0.2 K/UL (ref 0–0.5)
EOSINOPHIL NFR BLD: 1.5 % (ref 0–8)
ERYTHROCYTE [DISTWIDTH] IN BLOOD BY AUTOMATED COUNT: 13.1 % (ref 11.5–14.5)
FERRITIN SERPL-MCNC: 523 NG/ML (ref 20–300)
HCT VFR BLD AUTO: 30.6 % (ref 40–54)
HGB BLD-MCNC: 9.6 G/DL (ref 14–18)
IMM GRANULOCYTES # BLD AUTO: 0.18 K/UL (ref 0–0.04)
IMM GRANULOCYTES NFR BLD AUTO: 1.6 % (ref 0–0.5)
IRON SERPL-MCNC: 25 UG/DL (ref 45–160)
LYMPHOCYTES # BLD AUTO: 0.6 K/UL (ref 1–4.8)
LYMPHOCYTES NFR BLD: 5.3 % (ref 18–48)
MCH RBC QN AUTO: 29.5 PG (ref 27–31)
MCHC RBC AUTO-ENTMCNC: 31.4 G/DL (ref 32–36)
MCV RBC AUTO: 94 FL (ref 82–98)
MONOCYTES # BLD AUTO: 2.3 K/UL (ref 0.3–1)
MONOCYTES NFR BLD: 21.1 % (ref 4–15)
NEUTROPHILS # BLD AUTO: 7.8 K/UL (ref 1.8–7.7)
NEUTROPHILS NFR BLD: 70 % (ref 38–73)
NRBC BLD-RTO: 0 /100 WBC
PLATELET # BLD AUTO: 161 K/UL (ref 150–350)
PLATELET BLD QL SMEAR: ABNORMAL
PMV BLD AUTO: 12.6 FL (ref 9.2–12.9)
RBC # BLD AUTO: 3.25 M/UL (ref 4.6–6.2)
RETICS/RBC NFR AUTO: 2 % (ref 0.4–2)
SATURATED IRON: 11 % (ref 20–50)
TOTAL IRON BINDING CAPACITY: 235 UG/DL (ref 250–450)
TRANSFERRIN SERPL-MCNC: 159 MG/DL (ref 200–375)
TSH SERPL DL<=0.005 MIU/L-ACNC: 1.36 UIU/ML (ref 0.4–4)
WBC # BLD AUTO: 11.09 K/UL (ref 3.9–12.7)

## 2021-03-23 PROCEDURE — 85045 AUTOMATED RETICULOCYTE COUNT: CPT | Performed by: FAMILY MEDICINE

## 2021-03-23 PROCEDURE — 36415 COLL VENOUS BLD VENIPUNCTURE: CPT | Mod: PN | Performed by: FAMILY MEDICINE

## 2021-03-23 PROCEDURE — 83540 ASSAY OF IRON: CPT | Performed by: FAMILY MEDICINE

## 2021-03-23 PROCEDURE — 99454 REM MNTR PHYSIOL PARAM 16-30: CPT | Mod: PBBFAC,PO | Performed by: INTERNAL MEDICINE

## 2021-03-23 PROCEDURE — 82607 VITAMIN B-12: CPT | Performed by: FAMILY MEDICINE

## 2021-03-23 PROCEDURE — 85025 COMPLETE CBC W/AUTO DIFF WBC: CPT | Performed by: FAMILY MEDICINE

## 2021-03-23 PROCEDURE — 82728 ASSAY OF FERRITIN: CPT | Performed by: FAMILY MEDICINE

## 2021-03-23 PROCEDURE — 84443 ASSAY THYROID STIM HORMONE: CPT | Performed by: FAMILY MEDICINE

## 2021-03-23 RX ORDER — FERROUS SULFATE 325(65) MG
325 TABLET ORAL
Qty: 90 TABLET | Refills: 0 | Status: SHIPPED | OUTPATIENT
Start: 2021-03-23 | End: 2021-06-15 | Stop reason: SDUPTHER

## 2021-03-24 ENCOUNTER — TELEPHONE (OUTPATIENT)
Dept: FAMILY MEDICINE | Facility: CLINIC | Age: 86
End: 2021-03-24

## 2021-03-24 LAB — VIT B12 SERPL-MCNC: 617 PG/ML (ref 210–950)

## 2021-03-25 ENCOUNTER — TELEPHONE (OUTPATIENT)
Dept: FAMILY MEDICINE | Facility: CLINIC | Age: 86
End: 2021-03-25

## 2021-03-25 ENCOUNTER — TELEPHONE (OUTPATIENT)
Dept: ADMINISTRATIVE | Facility: HOSPITAL | Age: 86
End: 2021-03-25

## 2021-03-29 ENCOUNTER — PATIENT OUTREACH (OUTPATIENT)
Dept: ADMINISTRATIVE | Facility: OTHER | Age: 86
End: 2021-03-29

## 2021-03-29 ENCOUNTER — OFFICE VISIT (OUTPATIENT)
Dept: GASTROENTEROLOGY | Facility: CLINIC | Age: 86
End: 2021-03-29
Payer: MEDICARE

## 2021-03-29 VITALS — WEIGHT: 194.69 LBS | HEIGHT: 71 IN | BODY MASS INDEX: 27.26 KG/M2

## 2021-03-29 DIAGNOSIS — D64.9 NORMOCYTIC ANEMIA: ICD-10-CM

## 2021-03-29 PROCEDURE — 99214 OFFICE O/P EST MOD 30 MIN: CPT | Mod: PBBFAC,PO | Performed by: NURSE PRACTITIONER

## 2021-03-29 PROCEDURE — 99999 PR PBB SHADOW E&M-EST. PATIENT-LVL IV: CPT | Mod: PBBFAC,,, | Performed by: NURSE PRACTITIONER

## 2021-03-29 PROCEDURE — 99213 PR OFFICE/OUTPT VISIT, EST, LEVL III, 20-29 MIN: ICD-10-PCS | Mod: S$PBB,,, | Performed by: NURSE PRACTITIONER

## 2021-03-29 PROCEDURE — 99999 PR PBB SHADOW E&M-EST. PATIENT-LVL IV: ICD-10-PCS | Mod: PBBFAC,,, | Performed by: NURSE PRACTITIONER

## 2021-03-29 PROCEDURE — 99213 OFFICE O/P EST LOW 20 MIN: CPT | Mod: S$PBB,,, | Performed by: NURSE PRACTITIONER

## 2021-03-31 ENCOUNTER — EXTERNAL CHRONIC CARE MANAGEMENT (OUTPATIENT)
Dept: PRIMARY CARE CLINIC | Facility: CLINIC | Age: 86
End: 2021-03-31
Payer: MEDICARE

## 2021-03-31 PROCEDURE — 99487 PR COMPLX CHRON CARE MGMT, 1ST HR, PER MONTH: ICD-10-PCS | Mod: S$PBB,,, | Performed by: INTERNAL MEDICINE

## 2021-03-31 PROCEDURE — 99489 CPLX CHRNC CARE EA ADDL 30: CPT | Mod: S$PBB,,, | Performed by: INTERNAL MEDICINE

## 2021-03-31 PROCEDURE — 99487 CPLX CHRNC CARE 1ST 60 MIN: CPT | Mod: PBBFAC,PO | Performed by: INTERNAL MEDICINE

## 2021-03-31 PROCEDURE — 99487 CPLX CHRNC CARE 1ST 60 MIN: CPT | Mod: S$PBB,,, | Performed by: INTERNAL MEDICINE

## 2021-03-31 PROCEDURE — 99489 PR COMPLX CHRON CARE MGMT, EA ADDTL 30 MIN, PER MONTH: ICD-10-PCS | Mod: S$PBB,,, | Performed by: INTERNAL MEDICINE

## 2021-03-31 PROCEDURE — 99489 CPLX CHRNC CARE EA ADDL 30: CPT | Mod: PBBFAC,PO | Performed by: INTERNAL MEDICINE

## 2021-04-06 ENCOUNTER — LAB VISIT (OUTPATIENT)
Dept: LAB | Facility: HOSPITAL | Age: 86
End: 2021-04-06
Attending: INTERNAL MEDICINE
Payer: MEDICARE

## 2021-04-06 DIAGNOSIS — D69.6 THROMBOCYTOPENIA: ICD-10-CM

## 2021-04-06 DIAGNOSIS — C85.19 B-CELL LYMPHOMA OF EXTRANODAL SITE: ICD-10-CM

## 2021-04-06 LAB
ALBUMIN SERPL BCP-MCNC: 3.9 G/DL (ref 3.5–5.2)
ALP SERPL-CCNC: 62 U/L (ref 55–135)
ALT SERPL W/O P-5'-P-CCNC: 14 U/L (ref 10–44)
ANION GAP SERPL CALC-SCNC: 10 MMOL/L (ref 8–16)
AST SERPL-CCNC: 13 U/L (ref 10–40)
BASOPHILS # BLD AUTO: 0.02 K/UL (ref 0–0.2)
BASOPHILS NFR BLD: 0.5 % (ref 0–1.9)
BILIRUB SERPL-MCNC: 0.7 MG/DL (ref 0.1–1)
BUN SERPL-MCNC: 16 MG/DL (ref 8–23)
CALCIUM SERPL-MCNC: 9.1 MG/DL (ref 8.7–10.5)
CHLORIDE SERPL-SCNC: 105 MMOL/L (ref 95–110)
CO2 SERPL-SCNC: 26 MMOL/L (ref 23–29)
CREAT SERPL-MCNC: 1.3 MG/DL (ref 0.5–1.4)
DIFFERENTIAL METHOD: ABNORMAL
EOSINOPHIL # BLD AUTO: 0.1 K/UL (ref 0–0.5)
EOSINOPHIL NFR BLD: 1.9 % (ref 0–8)
ERYTHROCYTE [DISTWIDTH] IN BLOOD BY AUTOMATED COUNT: 13.8 % (ref 11.5–14.5)
EST. GFR  (AFRICAN AMERICAN): 58 ML/MIN/1.73 M^2
EST. GFR  (NON AFRICAN AMERICAN): 50 ML/MIN/1.73 M^2
GLUCOSE SERPL-MCNC: 196 MG/DL (ref 70–110)
HCT VFR BLD AUTO: 36.3 % (ref 40–54)
HGB BLD-MCNC: 11.4 G/DL (ref 14–18)
IMM GRANULOCYTES # BLD AUTO: 0.03 K/UL (ref 0–0.04)
IMM GRANULOCYTES NFR BLD AUTO: 0.8 % (ref 0–0.5)
LYMPHOCYTES # BLD AUTO: 0.9 K/UL (ref 1–4.8)
LYMPHOCYTES NFR BLD: 25.3 % (ref 18–48)
MCH RBC QN AUTO: 29.5 PG (ref 27–31)
MCHC RBC AUTO-ENTMCNC: 31.4 G/DL (ref 32–36)
MCV RBC AUTO: 94 FL (ref 82–98)
MONOCYTES # BLD AUTO: 0.8 K/UL (ref 0.3–1)
MONOCYTES NFR BLD: 20.2 % (ref 4–15)
NEUTROPHILS # BLD AUTO: 1.9 K/UL (ref 1.8–7.7)
NEUTROPHILS NFR BLD: 51.3 % (ref 38–73)
NRBC BLD-RTO: 0 /100 WBC
PLATELET # BLD AUTO: 98 K/UL (ref 150–450)
PMV BLD AUTO: 11.4 FL (ref 9.2–12.9)
POTASSIUM SERPL-SCNC: 4 MMOL/L (ref 3.5–5.1)
PROT SERPL-MCNC: 7.3 G/DL (ref 6–8.4)
RBC # BLD AUTO: 3.86 M/UL (ref 4.6–6.2)
SODIUM SERPL-SCNC: 141 MMOL/L (ref 136–145)
WBC # BLD AUTO: 3.72 K/UL (ref 3.9–12.7)

## 2021-04-06 PROCEDURE — 80053 COMPREHEN METABOLIC PANEL: CPT | Performed by: INTERNAL MEDICINE

## 2021-04-06 PROCEDURE — 85025 COMPLETE CBC W/AUTO DIFF WBC: CPT | Performed by: INTERNAL MEDICINE

## 2021-04-06 PROCEDURE — 36415 COLL VENOUS BLD VENIPUNCTURE: CPT | Performed by: INTERNAL MEDICINE

## 2021-04-08 ENCOUNTER — OFFICE VISIT (OUTPATIENT)
Dept: HEMATOLOGY/ONCOLOGY | Facility: CLINIC | Age: 86
End: 2021-04-08
Payer: MEDICARE

## 2021-04-08 VITALS
OXYGEN SATURATION: 98 % | DIASTOLIC BLOOD PRESSURE: 80 MMHG | WEIGHT: 194.25 LBS | BODY MASS INDEX: 27.19 KG/M2 | SYSTOLIC BLOOD PRESSURE: 189 MMHG | HEART RATE: 59 BPM | HEIGHT: 71 IN | TEMPERATURE: 98 F

## 2021-04-08 DIAGNOSIS — D50.9 IRON DEFICIENCY ANEMIA, UNSPECIFIED IRON DEFICIENCY ANEMIA TYPE: ICD-10-CM

## 2021-04-08 DIAGNOSIS — C85.19 B-CELL LYMPHOMA OF EXTRANODAL SITE: Primary | ICD-10-CM

## 2021-04-08 DIAGNOSIS — K57.30 COLON, DIVERTICULOSIS: ICD-10-CM

## 2021-04-08 DIAGNOSIS — D69.6 THROMBOCYTOPENIA: ICD-10-CM

## 2021-04-08 PROCEDURE — 99999 PR PBB SHADOW E&M-EST. PATIENT-LVL V: CPT | Mod: PBBFAC,,, | Performed by: INTERNAL MEDICINE

## 2021-04-08 PROCEDURE — 99999 PR PBB SHADOW E&M-EST. PATIENT-LVL V: ICD-10-PCS | Mod: PBBFAC,,, | Performed by: INTERNAL MEDICINE

## 2021-04-08 PROCEDURE — 99215 OFFICE O/P EST HI 40 MIN: CPT | Mod: PBBFAC | Performed by: INTERNAL MEDICINE

## 2021-04-08 PROCEDURE — 99213 PR OFFICE/OUTPT VISIT, EST, LEVL III, 20-29 MIN: ICD-10-PCS | Mod: S$PBB,,, | Performed by: INTERNAL MEDICINE

## 2021-04-08 PROCEDURE — 99213 OFFICE O/P EST LOW 20 MIN: CPT | Mod: S$PBB,,, | Performed by: INTERNAL MEDICINE

## 2021-04-12 ENCOUNTER — PES CALL (OUTPATIENT)
Dept: ADMINISTRATIVE | Facility: CLINIC | Age: 86
End: 2021-04-12

## 2021-04-19 PROCEDURE — 99454 REM MNTR PHYSIOL PARAM 16-30: CPT | Mod: PBBFAC,PO | Performed by: INTERNAL MEDICINE

## 2021-04-30 ENCOUNTER — EXTERNAL CHRONIC CARE MANAGEMENT (OUTPATIENT)
Dept: PRIMARY CARE CLINIC | Facility: CLINIC | Age: 86
End: 2021-04-30
Payer: MEDICARE

## 2021-04-30 PROCEDURE — 99490 CHRNC CARE MGMT STAFF 1ST 20: CPT | Mod: S$PBB,,, | Performed by: INTERNAL MEDICINE

## 2021-04-30 PROCEDURE — 99490 CHRNC CARE MGMT STAFF 1ST 20: CPT | Mod: PBBFAC,PO | Performed by: INTERNAL MEDICINE

## 2021-04-30 PROCEDURE — 99490 PR CHRONIC CARE MGMT, 1ST 20 MIN: ICD-10-PCS | Mod: S$PBB,,, | Performed by: INTERNAL MEDICINE

## 2021-05-24 RX ORDER — GABAPENTIN 300 MG/1
CAPSULE ORAL
Qty: 270 CAPSULE | Refills: 3 | Status: SHIPPED | OUTPATIENT
Start: 2021-05-24 | End: 2022-06-03 | Stop reason: SDUPTHER

## 2021-05-25 PROCEDURE — 99454 REM MNTR PHYSIOL PARAM 16-30: CPT | Mod: PBBFAC,PO | Performed by: INTERNAL MEDICINE

## 2021-05-31 ENCOUNTER — EXTERNAL CHRONIC CARE MANAGEMENT (OUTPATIENT)
Dept: PRIMARY CARE CLINIC | Facility: CLINIC | Age: 86
End: 2021-05-31
Payer: MEDICARE

## 2021-05-31 ENCOUNTER — PES CALL (OUTPATIENT)
Dept: ADMINISTRATIVE | Facility: CLINIC | Age: 86
End: 2021-05-31

## 2021-05-31 PROCEDURE — 99490 PR CHRONIC CARE MGMT, 1ST 20 MIN: ICD-10-PCS | Mod: S$PBB,,, | Performed by: INTERNAL MEDICINE

## 2021-05-31 PROCEDURE — 99490 CHRNC CARE MGMT STAFF 1ST 20: CPT | Mod: PBBFAC,PO | Performed by: INTERNAL MEDICINE

## 2021-05-31 PROCEDURE — 99490 CHRNC CARE MGMT STAFF 1ST 20: CPT | Mod: S$PBB,,, | Performed by: INTERNAL MEDICINE

## 2021-05-31 PROCEDURE — 99439 PR CHRONIC CARE MGMT, EA ADDTL 20 MIN: ICD-10-PCS | Mod: S$PBB,,, | Performed by: INTERNAL MEDICINE

## 2021-05-31 PROCEDURE — 99439 CHRNC CARE MGMT STAF EA ADDL: CPT | Mod: S$PBB,,, | Performed by: INTERNAL MEDICINE

## 2021-05-31 PROCEDURE — 99439 CHRNC CARE MGMT STAF EA ADDL: CPT | Mod: PBBFAC,PO | Performed by: INTERNAL MEDICINE

## 2021-06-03 ENCOUNTER — PATIENT OUTREACH (OUTPATIENT)
Dept: ADMINISTRATIVE | Facility: HOSPITAL | Age: 86
End: 2021-06-03

## 2021-06-03 ENCOUNTER — PATIENT MESSAGE (OUTPATIENT)
Dept: ADMINISTRATIVE | Facility: HOSPITAL | Age: 86
End: 2021-06-03

## 2021-06-03 DIAGNOSIS — E78.5 HYPERLIPIDEMIA, UNSPECIFIED HYPERLIPIDEMIA TYPE: Primary | ICD-10-CM

## 2021-06-14 DIAGNOSIS — D50.9 IRON DEFICIENCY ANEMIA, UNSPECIFIED IRON DEFICIENCY ANEMIA TYPE: ICD-10-CM

## 2021-06-14 RX ORDER — FERROUS SULFATE 325(65) MG
325 TABLET ORAL
Qty: 90 TABLET | Refills: 0 | OUTPATIENT
Start: 2021-06-14

## 2021-06-15 ENCOUNTER — OFFICE VISIT (OUTPATIENT)
Dept: FAMILY MEDICINE | Facility: CLINIC | Age: 86
End: 2021-06-15
Payer: MEDICARE

## 2021-06-15 VITALS
TEMPERATURE: 98 F | BODY MASS INDEX: 27.56 KG/M2 | DIASTOLIC BLOOD PRESSURE: 54 MMHG | HEART RATE: 54 BPM | HEIGHT: 71 IN | SYSTOLIC BLOOD PRESSURE: 152 MMHG | WEIGHT: 196.88 LBS | OXYGEN SATURATION: 96 %

## 2021-06-15 DIAGNOSIS — R80.9 DIABETES MELLITUS WITH PROTEINURIA: ICD-10-CM

## 2021-06-15 DIAGNOSIS — I25.10 CORONARY ARTERY DISEASE, ANGINA PRESENCE UNSPECIFIED, UNSPECIFIED VESSEL OR LESION TYPE, UNSPECIFIED WHETHER NATIVE OR TRANSPLANTED HEART: ICD-10-CM

## 2021-06-15 DIAGNOSIS — N18.30 HYPERTENSIVE KIDNEY DISEASE WITH STAGE 3 CHRONIC KIDNEY DISEASE, UNSPECIFIED WHETHER STAGE 3A OR 3B CKD: ICD-10-CM

## 2021-06-15 DIAGNOSIS — M06.09 SERONEGATIVE ARTHROPATHY OF MULTIPLE SITES: ICD-10-CM

## 2021-06-15 DIAGNOSIS — C85.19 B-CELL LYMPHOMA OF EXTRANODAL SITE: ICD-10-CM

## 2021-06-15 DIAGNOSIS — E78.5 HYPERLIPIDEMIA, UNSPECIFIED HYPERLIPIDEMIA TYPE: ICD-10-CM

## 2021-06-15 DIAGNOSIS — I10 ESSENTIAL HYPERTENSION: ICD-10-CM

## 2021-06-15 DIAGNOSIS — E11.29 DIABETES MELLITUS WITH PROTEINURIA: ICD-10-CM

## 2021-06-15 DIAGNOSIS — I70.0 ATHEROSCLEROSIS OF ABDOMINAL AORTA: ICD-10-CM

## 2021-06-15 DIAGNOSIS — D64.9 ANEMIA, UNSPECIFIED TYPE: ICD-10-CM

## 2021-06-15 DIAGNOSIS — E11.311 DIABETIC RETINOPATHY OF BOTH EYES WITH MACULAR EDEMA ASSOCIATED WITH TYPE 2 DIABETES MELLITUS, UNSPECIFIED RETINOPATHY SEVERITY: ICD-10-CM

## 2021-06-15 DIAGNOSIS — M06.9 RHEUMATOID ARTHRITIS, INVOLVING UNSPECIFIED SITE, UNSPECIFIED WHETHER RHEUMATOID FACTOR PRESENT: ICD-10-CM

## 2021-06-15 DIAGNOSIS — N18.32 HYPERTENSIVE KIDNEY DISEASE WITH STAGE 3B CHRONIC KIDNEY DISEASE: ICD-10-CM

## 2021-06-15 DIAGNOSIS — D69.6 THROMBOCYTOPENIA: ICD-10-CM

## 2021-06-15 DIAGNOSIS — I27.21 PAH (PULMONARY ARTERY HYPERTENSION): ICD-10-CM

## 2021-06-15 DIAGNOSIS — E11.22 CKD STAGE 3 DUE TO TYPE 2 DIABETES MELLITUS: ICD-10-CM

## 2021-06-15 DIAGNOSIS — D50.9 IRON DEFICIENCY ANEMIA, UNSPECIFIED IRON DEFICIENCY ANEMIA TYPE: Primary | ICD-10-CM

## 2021-06-15 DIAGNOSIS — N18.30 CKD STAGE 3 DUE TO TYPE 2 DIABETES MELLITUS: ICD-10-CM

## 2021-06-15 DIAGNOSIS — N18.30 STAGE 3 CHRONIC KIDNEY DISEASE, UNSPECIFIED WHETHER STAGE 3A OR 3B CKD: ICD-10-CM

## 2021-06-15 DIAGNOSIS — L50.9 URTICARIA: ICD-10-CM

## 2021-06-15 DIAGNOSIS — I12.9 HYPERTENSIVE KIDNEY DISEASE WITH STAGE 3 CHRONIC KIDNEY DISEASE, UNSPECIFIED WHETHER STAGE 3A OR 3B CKD: ICD-10-CM

## 2021-06-15 DIAGNOSIS — E11.21 DIABETES MELLITUS WITH PROTEINURIC DIABETIC NEPHROPATHY: ICD-10-CM

## 2021-06-15 DIAGNOSIS — I12.9 HYPERTENSIVE KIDNEY DISEASE WITH STAGE 3B CHRONIC KIDNEY DISEASE: ICD-10-CM

## 2021-06-15 PROCEDURE — 99214 OFFICE O/P EST MOD 30 MIN: CPT | Mod: PBBFAC,PO | Performed by: INTERNAL MEDICINE

## 2021-06-15 PROCEDURE — 99999 PR PBB SHADOW E&M-EST. PATIENT-LVL IV: CPT | Mod: PBBFAC,,, | Performed by: INTERNAL MEDICINE

## 2021-06-15 PROCEDURE — 99214 OFFICE O/P EST MOD 30 MIN: CPT | Mod: S$PBB,,, | Performed by: INTERNAL MEDICINE

## 2021-06-15 PROCEDURE — 99214 PR OFFICE/OUTPT VISIT, EST, LEVL IV, 30-39 MIN: ICD-10-PCS | Mod: S$PBB,,, | Performed by: INTERNAL MEDICINE

## 2021-06-15 PROCEDURE — 99999 PR PBB SHADOW E&M-EST. PATIENT-LVL IV: ICD-10-PCS | Mod: PBBFAC,,, | Performed by: INTERNAL MEDICINE

## 2021-06-15 RX ORDER — FERROUS SULFATE 325(65) MG
325 TABLET ORAL
Qty: 90 TABLET | Refills: 0 | Status: SHIPPED | OUTPATIENT
Start: 2021-06-15 | End: 2021-07-01

## 2021-06-15 RX ORDER — LORATADINE 10 MG/1
10 TABLET ORAL 2 TIMES DAILY
Qty: 60 TABLET | Refills: 12 | Status: SHIPPED | OUTPATIENT
Start: 2021-06-15 | End: 2022-07-13

## 2021-06-17 PROCEDURE — 99454 REM MNTR PHYSIOL PARAM 16-30: CPT | Mod: PBBFAC,PO | Performed by: INTERNAL MEDICINE

## 2021-06-30 ENCOUNTER — EXTERNAL CHRONIC CARE MANAGEMENT (OUTPATIENT)
Dept: PRIMARY CARE CLINIC | Facility: CLINIC | Age: 86
End: 2021-06-30
Payer: MEDICARE

## 2021-06-30 PROCEDURE — 99490 CHRNC CARE MGMT STAFF 1ST 20: CPT | Mod: S$PBB,,, | Performed by: INTERNAL MEDICINE

## 2021-06-30 PROCEDURE — 99490 PR CHRONIC CARE MGMT, 1ST 20 MIN: ICD-10-PCS | Mod: S$PBB,,, | Performed by: INTERNAL MEDICINE

## 2021-06-30 PROCEDURE — 99490 CHRNC CARE MGMT STAFF 1ST 20: CPT | Mod: PBBFAC,PO | Performed by: INTERNAL MEDICINE

## 2021-07-01 ENCOUNTER — OFFICE VISIT (OUTPATIENT)
Dept: CARDIOLOGY | Facility: CLINIC | Age: 86
End: 2021-07-01
Payer: MEDICARE

## 2021-07-01 VITALS
DIASTOLIC BLOOD PRESSURE: 78 MMHG | WEIGHT: 198.88 LBS | SYSTOLIC BLOOD PRESSURE: 176 MMHG | BODY MASS INDEX: 27.84 KG/M2 | HEART RATE: 59 BPM | HEIGHT: 71 IN | OXYGEN SATURATION: 99 %

## 2021-07-01 DIAGNOSIS — I70.0 ATHEROSCLEROSIS OF ABDOMINAL AORTA: ICD-10-CM

## 2021-07-01 DIAGNOSIS — D69.6 THROMBOCYTOPENIA: ICD-10-CM

## 2021-07-01 DIAGNOSIS — I25.10 CORONARY ARTERY CALCIFICATION SEEN ON CAT SCAN: Primary | ICD-10-CM

## 2021-07-01 DIAGNOSIS — E11.69 HYPERLIPIDEMIA ASSOCIATED WITH TYPE 2 DIABETES MELLITUS: ICD-10-CM

## 2021-07-01 DIAGNOSIS — I34.0 NONRHEUMATIC MITRAL VALVE REGURGITATION: ICD-10-CM

## 2021-07-01 DIAGNOSIS — E11.21 DIABETES MELLITUS WITH PROTEINURIC DIABETIC NEPHROPATHY: ICD-10-CM

## 2021-07-01 DIAGNOSIS — I10 ESSENTIAL HYPERTENSION: ICD-10-CM

## 2021-07-01 DIAGNOSIS — D64.9 ANEMIA, UNSPECIFIED TYPE: ICD-10-CM

## 2021-07-01 DIAGNOSIS — E78.5 HYPERLIPIDEMIA ASSOCIATED WITH TYPE 2 DIABETES MELLITUS: ICD-10-CM

## 2021-07-01 DIAGNOSIS — I27.21 PAH (PULMONARY ARTERY HYPERTENSION): ICD-10-CM

## 2021-07-01 DIAGNOSIS — I11.9 LEFT VENTRICULAR HYPERTROPHY DUE TO HYPERTENSIVE DISEASE, WITHOUT HEART FAILURE: ICD-10-CM

## 2021-07-01 PROCEDURE — 99214 OFFICE O/P EST MOD 30 MIN: CPT | Mod: S$PBB,,, | Performed by: INTERNAL MEDICINE

## 2021-07-01 PROCEDURE — 99215 OFFICE O/P EST HI 40 MIN: CPT | Mod: PBBFAC | Performed by: INTERNAL MEDICINE

## 2021-07-01 PROCEDURE — 99214 PR OFFICE/OUTPT VISIT, EST, LEVL IV, 30-39 MIN: ICD-10-PCS | Mod: S$PBB,,, | Performed by: INTERNAL MEDICINE

## 2021-07-01 PROCEDURE — 99999 PR PBB SHADOW E&M-EST. PATIENT-LVL V: CPT | Mod: PBBFAC,,, | Performed by: INTERNAL MEDICINE

## 2021-07-01 PROCEDURE — 99999 PR PBB SHADOW E&M-EST. PATIENT-LVL V: ICD-10-PCS | Mod: PBBFAC,,, | Performed by: INTERNAL MEDICINE

## 2021-07-02 ENCOUNTER — LAB VISIT (OUTPATIENT)
Dept: LAB | Facility: HOSPITAL | Age: 86
End: 2021-07-02
Attending: INTERNAL MEDICINE
Payer: MEDICARE

## 2021-07-02 DIAGNOSIS — C85.19 B-CELL LYMPHOMA OF EXTRANODAL SITE: ICD-10-CM

## 2021-07-02 DIAGNOSIS — D69.6 THROMBOCYTOPENIA: ICD-10-CM

## 2021-07-02 DIAGNOSIS — E11.21 DIABETES MELLITUS WITH PROTEINURIC DIABETIC NEPHROPATHY: ICD-10-CM

## 2021-07-02 DIAGNOSIS — E78.5 HYPERLIPIDEMIA, UNSPECIFIED HYPERLIPIDEMIA TYPE: ICD-10-CM

## 2021-07-02 DIAGNOSIS — K57.30 COLON, DIVERTICULOSIS: ICD-10-CM

## 2021-07-02 DIAGNOSIS — D50.9 IRON DEFICIENCY ANEMIA, UNSPECIFIED IRON DEFICIENCY ANEMIA TYPE: ICD-10-CM

## 2021-07-02 LAB
ALBUMIN SERPL BCP-MCNC: 4.1 G/DL (ref 3.5–5.2)
ALP SERPL-CCNC: 53 U/L (ref 55–135)
ALT SERPL W/O P-5'-P-CCNC: 15 U/L (ref 10–44)
ANION GAP SERPL CALC-SCNC: 8 MMOL/L (ref 8–16)
AST SERPL-CCNC: 15 U/L (ref 10–40)
BASOPHILS # BLD AUTO: 0.01 K/UL (ref 0–0.2)
BASOPHILS NFR BLD: 0.2 % (ref 0–1.9)
BILIRUB SERPL-MCNC: 1.2 MG/DL (ref 0.1–1)
BUN SERPL-MCNC: 19 MG/DL (ref 8–23)
CALCIUM SERPL-MCNC: 9 MG/DL (ref 8.7–10.5)
CHLORIDE SERPL-SCNC: 106 MMOL/L (ref 95–110)
CHOLEST SERPL-MCNC: 106 MG/DL (ref 120–199)
CHOLEST/HDLC SERPL: 3.1 {RATIO} (ref 2–5)
CO2 SERPL-SCNC: 26 MMOL/L (ref 23–29)
CREAT SERPL-MCNC: 1.3 MG/DL (ref 0.5–1.4)
DIFFERENTIAL METHOD: ABNORMAL
EOSINOPHIL # BLD AUTO: 0 K/UL (ref 0–0.5)
EOSINOPHIL NFR BLD: 1 % (ref 0–8)
ERYTHROCYTE [DISTWIDTH] IN BLOOD BY AUTOMATED COUNT: 13.2 % (ref 11.5–14.5)
EST. GFR  (AFRICAN AMERICAN): 58 ML/MIN/1.73 M^2
EST. GFR  (NON AFRICAN AMERICAN): 50 ML/MIN/1.73 M^2
ESTIMATED AVG GLUCOSE: 134 MG/DL (ref 68–131)
GLUCOSE SERPL-MCNC: 182 MG/DL (ref 70–110)
HBA1C MFR BLD: 6.3 % (ref 4–5.6)
HCT VFR BLD AUTO: 40.2 % (ref 40–54)
HDLC SERPL-MCNC: 34 MG/DL (ref 40–75)
HDLC SERPL: 32.1 % (ref 20–50)
HGB BLD-MCNC: 13 G/DL (ref 14–18)
IMM GRANULOCYTES # BLD AUTO: 0.04 K/UL (ref 0–0.04)
IMM GRANULOCYTES NFR BLD AUTO: 1 % (ref 0–0.5)
IRON SERPL-MCNC: 94 UG/DL (ref 45–160)
LDH SERPL L TO P-CCNC: 167 U/L (ref 110–260)
LDLC SERPL CALC-MCNC: 47.8 MG/DL (ref 63–159)
LYMPHOCYTES # BLD AUTO: 0.9 K/UL (ref 1–4.8)
LYMPHOCYTES NFR BLD: 22.1 % (ref 18–48)
MCH RBC QN AUTO: 30.1 PG (ref 27–31)
MCHC RBC AUTO-ENTMCNC: 32.3 G/DL (ref 32–36)
MCV RBC AUTO: 93 FL (ref 82–98)
MONOCYTES # BLD AUTO: 0.8 K/UL (ref 0.3–1)
MONOCYTES NFR BLD: 19.5 % (ref 4–15)
NEUTROPHILS # BLD AUTO: 2.3 K/UL (ref 1.8–7.7)
NEUTROPHILS NFR BLD: 56.2 % (ref 38–73)
NONHDLC SERPL-MCNC: 72 MG/DL
NRBC BLD-RTO: 0 /100 WBC
PLATELET # BLD AUTO: 65 K/UL (ref 150–450)
PMV BLD AUTO: 11.4 FL (ref 9.2–12.9)
POTASSIUM SERPL-SCNC: 4 MMOL/L (ref 3.5–5.1)
PROT SERPL-MCNC: 7.5 G/DL (ref 6–8.4)
RBC # BLD AUTO: 4.32 M/UL (ref 4.6–6.2)
SATURATED IRON: 25 % (ref 20–50)
SODIUM SERPL-SCNC: 140 MMOL/L (ref 136–145)
TOTAL IRON BINDING CAPACITY: 373 UG/DL (ref 250–450)
TRANSFERRIN SERPL-MCNC: 252 MG/DL (ref 200–375)
TRIGL SERPL-MCNC: 121 MG/DL (ref 30–150)
WBC # BLD AUTO: 4.11 K/UL (ref 3.9–12.7)

## 2021-07-02 PROCEDURE — 36415 COLL VENOUS BLD VENIPUNCTURE: CPT | Performed by: INTERNAL MEDICINE

## 2021-07-02 PROCEDURE — 83615 LACTATE (LD) (LDH) ENZYME: CPT | Performed by: INTERNAL MEDICINE

## 2021-07-02 PROCEDURE — 80053 COMPREHEN METABOLIC PANEL: CPT | Performed by: INTERNAL MEDICINE

## 2021-07-02 PROCEDURE — 80061 LIPID PANEL: CPT | Performed by: INTERNAL MEDICINE

## 2021-07-02 PROCEDURE — 85025 COMPLETE CBC W/AUTO DIFF WBC: CPT | Performed by: INTERNAL MEDICINE

## 2021-07-02 PROCEDURE — 83036 HEMOGLOBIN GLYCOSYLATED A1C: CPT | Performed by: INTERNAL MEDICINE

## 2021-07-02 PROCEDURE — 83540 ASSAY OF IRON: CPT | Performed by: INTERNAL MEDICINE

## 2021-07-06 ENCOUNTER — OFFICE VISIT (OUTPATIENT)
Dept: HEMATOLOGY/ONCOLOGY | Facility: CLINIC | Age: 86
End: 2021-07-06
Payer: MEDICARE

## 2021-07-06 ENCOUNTER — TELEPHONE (OUTPATIENT)
Dept: HEMATOLOGY/ONCOLOGY | Facility: CLINIC | Age: 86
End: 2021-07-06

## 2021-07-06 VITALS
WEIGHT: 198 LBS | OXYGEN SATURATION: 98 % | SYSTOLIC BLOOD PRESSURE: 151 MMHG | HEIGHT: 71 IN | BODY MASS INDEX: 27.72 KG/M2 | DIASTOLIC BLOOD PRESSURE: 68 MMHG | TEMPERATURE: 98 F | HEART RATE: 60 BPM

## 2021-07-06 DIAGNOSIS — N18.30 CKD STAGE 3 DUE TO TYPE 2 DIABETES MELLITUS: ICD-10-CM

## 2021-07-06 DIAGNOSIS — C85.19 B-CELL LYMPHOMA OF EXTRANODAL SITE: Primary | ICD-10-CM

## 2021-07-06 DIAGNOSIS — E11.22 CKD STAGE 3 DUE TO TYPE 2 DIABETES MELLITUS: ICD-10-CM

## 2021-07-06 DIAGNOSIS — D50.0 IRON DEFICIENCY ANEMIA DUE TO CHRONIC BLOOD LOSS: ICD-10-CM

## 2021-07-06 DIAGNOSIS — E11.21 DIABETES MELLITUS WITH PROTEINURIC DIABETIC NEPHROPATHY: ICD-10-CM

## 2021-07-06 PROCEDURE — 99213 PR OFFICE/OUTPT VISIT, EST, LEVL III, 20-29 MIN: ICD-10-PCS | Mod: S$PBB,,, | Performed by: INTERNAL MEDICINE

## 2021-07-06 PROCEDURE — 99999 PR PBB SHADOW E&M-EST. PATIENT-LVL IV: ICD-10-PCS | Mod: PBBFAC,,, | Performed by: INTERNAL MEDICINE

## 2021-07-06 PROCEDURE — 99999 PR PBB SHADOW E&M-EST. PATIENT-LVL IV: CPT | Mod: PBBFAC,,, | Performed by: INTERNAL MEDICINE

## 2021-07-06 PROCEDURE — 99213 OFFICE O/P EST LOW 20 MIN: CPT | Mod: S$PBB,,, | Performed by: INTERNAL MEDICINE

## 2021-07-06 PROCEDURE — 99214 OFFICE O/P EST MOD 30 MIN: CPT | Mod: PBBFAC | Performed by: INTERNAL MEDICINE

## 2021-07-20 PROCEDURE — 99454 REM MNTR PHYSIOL PARAM 16-30: CPT | Mod: PBBFAC,PO | Performed by: INTERNAL MEDICINE

## 2021-07-22 ENCOUNTER — TELEPHONE (OUTPATIENT)
Dept: NEPHROLOGY | Facility: CLINIC | Age: 86
End: 2021-07-22

## 2021-07-31 ENCOUNTER — EXTERNAL CHRONIC CARE MANAGEMENT (OUTPATIENT)
Dept: PRIMARY CARE CLINIC | Facility: CLINIC | Age: 86
End: 2021-07-31
Payer: MEDICARE

## 2021-07-31 PROCEDURE — 99457 RPM TX MGMT 1ST 20 MIN: CPT | Mod: S$PBB,,, | Performed by: INTERNAL MEDICINE

## 2021-07-31 PROCEDURE — 99490 CHRNC CARE MGMT STAFF 1ST 20: CPT | Mod: PBBFAC,PO | Performed by: INTERNAL MEDICINE

## 2021-07-31 PROCEDURE — 99490 PR CHRONIC CARE MGMT, 1ST 20 MIN: ICD-10-PCS | Mod: S$PBB,,, | Performed by: INTERNAL MEDICINE

## 2021-07-31 PROCEDURE — 99490 CHRNC CARE MGMT STAFF 1ST 20: CPT | Mod: S$PBB,,, | Performed by: INTERNAL MEDICINE

## 2021-07-31 PROCEDURE — 99457 PR MONITORING, PHYSIOL PARAM, REMOTE, 1ST 20 MINS, PER MONTH: ICD-10-PCS | Mod: S$PBB,,, | Performed by: INTERNAL MEDICINE

## 2021-08-18 PROCEDURE — 99454 REM MNTR PHYSIOL PARAM 16-30: CPT | Mod: PBBFAC,PO | Performed by: INTERNAL MEDICINE

## 2021-08-24 ENCOUNTER — PES CALL (OUTPATIENT)
Dept: ADMINISTRATIVE | Facility: CLINIC | Age: 86
End: 2021-08-24

## 2021-08-31 ENCOUNTER — EXTERNAL CHRONIC CARE MANAGEMENT (OUTPATIENT)
Dept: PRIMARY CARE CLINIC | Facility: CLINIC | Age: 86
End: 2021-08-31
Payer: MEDICARE

## 2021-08-31 PROCEDURE — 99490 CHRNC CARE MGMT STAFF 1ST 20: CPT | Mod: S$PBB,,, | Performed by: INTERNAL MEDICINE

## 2021-08-31 PROCEDURE — 99490 CHRNC CARE MGMT STAFF 1ST 20: CPT | Mod: PBBFAC,PO | Performed by: INTERNAL MEDICINE

## 2021-08-31 PROCEDURE — 99490 PR CHRONIC CARE MGMT, 1ST 20 MIN: ICD-10-PCS | Mod: S$PBB,,, | Performed by: INTERNAL MEDICINE

## 2021-09-20 PROCEDURE — 99454 REM MNTR PHYSIOL PARAM 16-30: CPT | Mod: PBBFAC,PO | Performed by: INTERNAL MEDICINE

## 2021-09-30 ENCOUNTER — PATIENT MESSAGE (OUTPATIENT)
Dept: FAMILY MEDICINE | Facility: CLINIC | Age: 86
End: 2021-09-30

## 2021-09-30 ENCOUNTER — EXTERNAL CHRONIC CARE MANAGEMENT (OUTPATIENT)
Dept: PRIMARY CARE CLINIC | Facility: CLINIC | Age: 86
End: 2021-09-30
Payer: MEDICARE

## 2021-09-30 DIAGNOSIS — E11.21 DIABETES MELLITUS WITH PROTEINURIC DIABETIC NEPHROPATHY: Primary | ICD-10-CM

## 2021-09-30 PROCEDURE — 99490 CHRNC CARE MGMT STAFF 1ST 20: CPT | Mod: S$PBB,,, | Performed by: INTERNAL MEDICINE

## 2021-09-30 PROCEDURE — 99490 PR CHRONIC CARE MGMT, 1ST 20 MIN: ICD-10-PCS | Mod: S$PBB,,, | Performed by: INTERNAL MEDICINE

## 2021-09-30 PROCEDURE — 99490 CHRNC CARE MGMT STAFF 1ST 20: CPT | Mod: PBBFAC,PO | Performed by: INTERNAL MEDICINE

## 2021-10-08 ENCOUNTER — LAB VISIT (OUTPATIENT)
Dept: LAB | Facility: HOSPITAL | Age: 86
End: 2021-10-08
Attending: INTERNAL MEDICINE
Payer: MEDICARE

## 2021-10-08 DIAGNOSIS — E11.29 DIABETES MELLITUS WITH PROTEINURIA: ICD-10-CM

## 2021-10-08 DIAGNOSIS — E11.22 CKD STAGE 3 DUE TO TYPE 2 DIABETES MELLITUS: ICD-10-CM

## 2021-10-08 DIAGNOSIS — N18.32 HYPERTENSIVE KIDNEY DISEASE WITH STAGE 3B CHRONIC KIDNEY DISEASE: ICD-10-CM

## 2021-10-08 DIAGNOSIS — I12.9 HYPERTENSIVE KIDNEY DISEASE WITH STAGE 3B CHRONIC KIDNEY DISEASE: ICD-10-CM

## 2021-10-08 DIAGNOSIS — N18.30 CKD STAGE 3 DUE TO TYPE 2 DIABETES MELLITUS: ICD-10-CM

## 2021-10-08 DIAGNOSIS — R80.9 DIABETES MELLITUS WITH PROTEINURIA: ICD-10-CM

## 2021-10-08 DIAGNOSIS — E11.21 DIABETES MELLITUS WITH PROTEINURIC DIABETIC NEPHROPATHY: ICD-10-CM

## 2021-10-08 LAB
25(OH)D3+25(OH)D2 SERPL-MCNC: 47 NG/ML (ref 30–96)
ALBUMIN SERPL BCP-MCNC: 4.1 G/DL (ref 3.5–5.2)
ANION GAP SERPL CALC-SCNC: 8 MMOL/L (ref 8–16)
BASOPHILS # BLD AUTO: 0.02 K/UL (ref 0–0.2)
BASOPHILS NFR BLD: 0.6 % (ref 0–1.9)
BUN SERPL-MCNC: 13 MG/DL (ref 8–23)
CALCIUM SERPL-MCNC: 9.9 MG/DL (ref 8.7–10.5)
CHLORIDE SERPL-SCNC: 106 MMOL/L (ref 95–110)
CO2 SERPL-SCNC: 28 MMOL/L (ref 23–29)
CREAT SERPL-MCNC: 1.4 MG/DL (ref 0.5–1.4)
DIFFERENTIAL METHOD: ABNORMAL
EOSINOPHIL # BLD AUTO: 0.1 K/UL (ref 0–0.5)
EOSINOPHIL NFR BLD: 1.4 % (ref 0–8)
ERYTHROCYTE [DISTWIDTH] IN BLOOD BY AUTOMATED COUNT: 12.8 % (ref 11.5–14.5)
EST. GFR  (AFRICAN AMERICAN): 53 ML/MIN/1.73 M^2
EST. GFR  (NON AFRICAN AMERICAN): 46 ML/MIN/1.73 M^2
GLUCOSE SERPL-MCNC: 139 MG/DL (ref 70–110)
HCT VFR BLD AUTO: 42.1 % (ref 40–54)
HGB BLD-MCNC: 13.6 G/DL (ref 14–18)
IMM GRANULOCYTES # BLD AUTO: 0.02 K/UL (ref 0–0.04)
IMM GRANULOCYTES NFR BLD AUTO: 0.6 % (ref 0–0.5)
LYMPHOCYTES # BLD AUTO: 0.9 K/UL (ref 1–4.8)
LYMPHOCYTES NFR BLD: 24.9 % (ref 18–48)
MCH RBC QN AUTO: 30.9 PG (ref 27–31)
MCHC RBC AUTO-ENTMCNC: 32.3 G/DL (ref 32–36)
MCV RBC AUTO: 96 FL (ref 82–98)
MONOCYTES # BLD AUTO: 0.7 K/UL (ref 0.3–1)
MONOCYTES NFR BLD: 18.6 % (ref 4–15)
NEUTROPHILS # BLD AUTO: 2 K/UL (ref 1.8–7.7)
NEUTROPHILS NFR BLD: 53.9 % (ref 38–73)
NRBC BLD-RTO: 0 /100 WBC
PHOSPHATE SERPL-MCNC: 3.3 MG/DL (ref 2.7–4.5)
PLATELET # BLD AUTO: 68 K/UL (ref 150–450)
PMV BLD AUTO: 13.5 FL (ref 9.2–12.9)
POTASSIUM SERPL-SCNC: 4.4 MMOL/L (ref 3.5–5.1)
PTH-INTACT SERPL-MCNC: 58.6 PG/ML (ref 9–77)
RBC # BLD AUTO: 4.4 M/UL (ref 4.6–6.2)
SODIUM SERPL-SCNC: 142 MMOL/L (ref 136–145)
WBC # BLD AUTO: 3.61 K/UL (ref 3.9–12.7)

## 2021-10-08 PROCEDURE — 80069 RENAL FUNCTION PANEL: CPT | Performed by: INTERNAL MEDICINE

## 2021-10-08 PROCEDURE — 36415 COLL VENOUS BLD VENIPUNCTURE: CPT | Performed by: INTERNAL MEDICINE

## 2021-10-08 PROCEDURE — 83970 ASSAY OF PARATHORMONE: CPT | Performed by: INTERNAL MEDICINE

## 2021-10-08 PROCEDURE — 85025 COMPLETE CBC W/AUTO DIFF WBC: CPT | Performed by: INTERNAL MEDICINE

## 2021-10-08 PROCEDURE — 82306 VITAMIN D 25 HYDROXY: CPT | Performed by: INTERNAL MEDICINE

## 2021-10-14 ENCOUNTER — TELEPHONE (OUTPATIENT)
Dept: FAMILY MEDICINE | Facility: CLINIC | Age: 86
End: 2021-10-14

## 2021-10-15 ENCOUNTER — OFFICE VISIT (OUTPATIENT)
Dept: NEPHROLOGY | Facility: CLINIC | Age: 86
End: 2021-10-15
Payer: MEDICARE

## 2021-10-15 ENCOUNTER — LAB VISIT (OUTPATIENT)
Dept: LAB | Facility: HOSPITAL | Age: 86
End: 2021-10-15
Attending: INTERNAL MEDICINE
Payer: MEDICARE

## 2021-10-15 VITALS
SYSTOLIC BLOOD PRESSURE: 140 MMHG | WEIGHT: 199.5 LBS | BODY MASS INDEX: 27.93 KG/M2 | OXYGEN SATURATION: 96 % | HEART RATE: 60 BPM | HEIGHT: 71 IN | DIASTOLIC BLOOD PRESSURE: 80 MMHG

## 2021-10-15 DIAGNOSIS — E11.29 DIABETES MELLITUS WITH PROTEINURIA: ICD-10-CM

## 2021-10-15 DIAGNOSIS — R80.9 DIABETES MELLITUS WITH PROTEINURIA: ICD-10-CM

## 2021-10-15 DIAGNOSIS — C85.19 B-CELL LYMPHOMA OF EXTRANODAL SITE: ICD-10-CM

## 2021-10-15 DIAGNOSIS — D69.6 THROMBOCYTOPENIA: ICD-10-CM

## 2021-10-15 DIAGNOSIS — N18.30 CKD STAGE 3 DUE TO TYPE 2 DIABETES MELLITUS: ICD-10-CM

## 2021-10-15 DIAGNOSIS — I12.9 HYPERTENSIVE KIDNEY DISEASE WITH STAGE 3B CHRONIC KIDNEY DISEASE: Primary | ICD-10-CM

## 2021-10-15 DIAGNOSIS — N18.32 HYPERTENSIVE KIDNEY DISEASE WITH STAGE 3B CHRONIC KIDNEY DISEASE: Primary | ICD-10-CM

## 2021-10-15 DIAGNOSIS — E11.21 DIABETES MELLITUS WITH PROTEINURIC DIABETIC NEPHROPATHY: ICD-10-CM

## 2021-10-15 DIAGNOSIS — E11.22 CKD STAGE 3 DUE TO TYPE 2 DIABETES MELLITUS: ICD-10-CM

## 2021-10-15 LAB
ESTIMATED AVG GLUCOSE: 128 MG/DL (ref 68–131)
HBA1C MFR BLD: 6.1 % (ref 4–5.6)

## 2021-10-15 PROCEDURE — 83036 HEMOGLOBIN GLYCOSYLATED A1C: CPT | Performed by: INTERNAL MEDICINE

## 2021-10-15 PROCEDURE — 99214 PR OFFICE/OUTPT VISIT, EST, LEVL IV, 30-39 MIN: ICD-10-PCS | Mod: S$PBB,,, | Performed by: INTERNAL MEDICINE

## 2021-10-15 PROCEDURE — 99214 OFFICE O/P EST MOD 30 MIN: CPT | Mod: S$PBB,,, | Performed by: INTERNAL MEDICINE

## 2021-10-15 PROCEDURE — 99999 PR PBB SHADOW E&M-EST. PATIENT-LVL IV: ICD-10-PCS | Mod: PBBFAC,,, | Performed by: INTERNAL MEDICINE

## 2021-10-15 PROCEDURE — 99999 PR PBB SHADOW E&M-EST. PATIENT-LVL IV: CPT | Mod: PBBFAC,,, | Performed by: INTERNAL MEDICINE

## 2021-10-15 PROCEDURE — 36415 COLL VENOUS BLD VENIPUNCTURE: CPT | Performed by: INTERNAL MEDICINE

## 2021-10-15 PROCEDURE — 99214 OFFICE O/P EST MOD 30 MIN: CPT | Mod: PBBFAC | Performed by: INTERNAL MEDICINE

## 2021-10-19 ENCOUNTER — OFFICE VISIT (OUTPATIENT)
Dept: FAMILY MEDICINE | Facility: CLINIC | Age: 86
End: 2021-10-19
Payer: MEDICARE

## 2021-10-19 VITALS
BODY MASS INDEX: 27.75 KG/M2 | SYSTOLIC BLOOD PRESSURE: 120 MMHG | DIASTOLIC BLOOD PRESSURE: 60 MMHG | WEIGHT: 198.19 LBS | TEMPERATURE: 99 F | HEIGHT: 71 IN | OXYGEN SATURATION: 97 % | HEART RATE: 56 BPM

## 2021-10-19 DIAGNOSIS — N18.30 STAGE 3 CHRONIC KIDNEY DISEASE, UNSPECIFIED WHETHER STAGE 3A OR 3B CKD: ICD-10-CM

## 2021-10-19 DIAGNOSIS — R80.9 DIABETES MELLITUS WITH PROTEINURIA: ICD-10-CM

## 2021-10-19 DIAGNOSIS — I10 ESSENTIAL HYPERTENSION: ICD-10-CM

## 2021-10-19 DIAGNOSIS — E11.29 DIABETES MELLITUS WITH PROTEINURIA: ICD-10-CM

## 2021-10-19 DIAGNOSIS — M06.9 RHEUMATOID ARTHRITIS, INVOLVING UNSPECIFIED SITE, UNSPECIFIED WHETHER RHEUMATOID FACTOR PRESENT: ICD-10-CM

## 2021-10-19 DIAGNOSIS — M06.09 SERONEGATIVE ARTHROPATHY OF MULTIPLE SITES: ICD-10-CM

## 2021-10-19 DIAGNOSIS — E11.311 DIABETIC RETINOPATHY OF BOTH EYES WITH MACULAR EDEMA ASSOCIATED WITH TYPE 2 DIABETES MELLITUS, UNSPECIFIED RETINOPATHY SEVERITY: ICD-10-CM

## 2021-10-19 DIAGNOSIS — E11.22 CKD STAGE 3 DUE TO TYPE 2 DIABETES MELLITUS: ICD-10-CM

## 2021-10-19 DIAGNOSIS — C85.19 B-CELL LYMPHOMA OF EXTRANODAL SITE: ICD-10-CM

## 2021-10-19 DIAGNOSIS — E11.21 DIABETES MELLITUS WITH PROTEINURIC DIABETIC NEPHROPATHY: Primary | ICD-10-CM

## 2021-10-19 DIAGNOSIS — D69.6 THROMBOCYTOPENIA: ICD-10-CM

## 2021-10-19 DIAGNOSIS — N18.30 CKD STAGE 3 DUE TO TYPE 2 DIABETES MELLITUS: ICD-10-CM

## 2021-10-19 DIAGNOSIS — I70.0 ATHEROSCLEROSIS OF ABDOMINAL AORTA: ICD-10-CM

## 2021-10-19 DIAGNOSIS — D64.9 ANEMIA, UNSPECIFIED TYPE: ICD-10-CM

## 2021-10-19 DIAGNOSIS — L50.9 URTICARIA: ICD-10-CM

## 2021-10-19 DIAGNOSIS — I27.21 PAH (PULMONARY ARTERY HYPERTENSION): ICD-10-CM

## 2021-10-19 DIAGNOSIS — D50.9 IRON DEFICIENCY ANEMIA, UNSPECIFIED IRON DEFICIENCY ANEMIA TYPE: ICD-10-CM

## 2021-10-19 DIAGNOSIS — D61.818 PANCYTOPENIA: ICD-10-CM

## 2021-10-19 PROCEDURE — 99214 OFFICE O/P EST MOD 30 MIN: CPT | Mod: PBBFAC,PO | Performed by: INTERNAL MEDICINE

## 2021-10-19 PROCEDURE — 99999 PR PBB SHADOW E&M-EST. PATIENT-LVL IV: CPT | Mod: PBBFAC,,, | Performed by: INTERNAL MEDICINE

## 2021-10-19 PROCEDURE — 99999 PR PBB SHADOW E&M-EST. PATIENT-LVL IV: ICD-10-PCS | Mod: PBBFAC,,, | Performed by: INTERNAL MEDICINE

## 2021-10-19 PROCEDURE — 99214 PR OFFICE/OUTPT VISIT, EST, LEVL IV, 30-39 MIN: ICD-10-PCS | Mod: S$PBB,,, | Performed by: INTERNAL MEDICINE

## 2021-10-19 PROCEDURE — 99214 OFFICE O/P EST MOD 30 MIN: CPT | Mod: S$PBB,,, | Performed by: INTERNAL MEDICINE

## 2021-10-19 RX ORDER — TRIAMCINOLONE ACETONIDE 1 MG/G
OINTMENT TOPICAL 2 TIMES DAILY
Qty: 80 G | Refills: 12 | Status: SHIPPED | OUTPATIENT
Start: 2021-10-19 | End: 2022-05-03

## 2021-10-19 RX ORDER — PANTOPRAZOLE SODIUM 40 MG/1
40 TABLET, DELAYED RELEASE ORAL DAILY
Qty: 90 TABLET | Refills: 3 | Status: SHIPPED | OUTPATIENT
Start: 2021-10-19 | End: 2022-10-31

## 2021-10-20 PROCEDURE — 99454 REM MNTR PHYSIOL PARAM 16-30: CPT | Mod: PBBFAC,PO | Performed by: INTERNAL MEDICINE

## 2021-10-29 ENCOUNTER — HOSPITAL ENCOUNTER (OUTPATIENT)
Dept: CARDIOLOGY | Facility: HOSPITAL | Age: 86
Discharge: HOME | End: 2021-10-29
Attending: INTERNAL MEDICINE
Payer: MEDICARE

## 2021-10-29 VITALS — BODY MASS INDEX: 27.72 KG/M2 | HEIGHT: 71 IN | WEIGHT: 198 LBS

## 2021-10-29 DIAGNOSIS — I10 ESSENTIAL HYPERTENSION: ICD-10-CM

## 2021-10-29 DIAGNOSIS — E11.69 HYPERLIPIDEMIA ASSOCIATED WITH TYPE 2 DIABETES MELLITUS: ICD-10-CM

## 2021-10-29 DIAGNOSIS — E78.5 HYPERLIPIDEMIA ASSOCIATED WITH TYPE 2 DIABETES MELLITUS: ICD-10-CM

## 2021-10-29 DIAGNOSIS — I11.9 LEFT VENTRICULAR HYPERTROPHY DUE TO HYPERTENSIVE DISEASE, WITHOUT HEART FAILURE: ICD-10-CM

## 2021-10-29 DIAGNOSIS — I25.10 CORONARY ARTERY CALCIFICATION SEEN ON CAT SCAN: ICD-10-CM

## 2021-10-29 DIAGNOSIS — I70.0 ATHEROSCLEROSIS OF ABDOMINAL AORTA: ICD-10-CM

## 2021-10-29 DIAGNOSIS — I27.21 PAH (PULMONARY ARTERY HYPERTENSION): ICD-10-CM

## 2021-10-29 DIAGNOSIS — I34.0 NONRHEUMATIC MITRAL VALVE REGURGITATION: ICD-10-CM

## 2021-10-29 LAB
AV INDEX (PROSTH): 0.68
AV MEAN GRADIENT: 7 MMHG
AV PEAK GRADIENT: 13 MMHG
AV VALVE AREA: 1.74 CM2
AV VELOCITY RATIO: 0.65
BSA FOR ECHO PROCEDURE: 2.12 M2
CV ECHO LV RWT: 0.44 CM
DOP CALC AO PEAK VEL: 1.8 M/S
DOP CALC AO VTI: 41.54 CM
DOP CALC LVOT AREA: 2.6 CM2
DOP CALC LVOT DIAMETER: 1.81 CM
DOP CALC LVOT PEAK VEL: 1.17 M/S
DOP CALC LVOT STROKE VOLUME: 72.29 CM3
DOP CALC MV VTI: 29.74 CM
DOP CALCLVOT PEAK VEL VTI: 28.11 CM
E WAVE DECELERATION TIME: 317.49 MSEC
E/A RATIO: 1.01
E/E' RATIO: 13.54 M/S
ECHO LV POSTERIOR WALL: 1.31 CM (ref 0.6–1.1)
EJECTION FRACTION: 60 %
FRACTIONAL SHORTENING: 39 % (ref 28–44)
INTERVENTRICULAR SEPTUM: 0.97 CM (ref 0.6–1.1)
IVRT: 95.15 MSEC
LA MAJOR: 5.55 CM
LA MINOR: 6.23 CM
LA WIDTH: 5.75 CM
LEFT ATRIUM SIZE: 5.15 CM
LEFT ATRIUM VOLUME INDEX: 70.4 ML/M2
LEFT ATRIUM VOLUME: 147.76 CM3
LEFT INTERNAL DIMENSION IN SYSTOLE: 3.62 CM (ref 2.1–4)
LEFT VENTRICLE DIASTOLIC VOLUME INDEX: 85.01 ML/M2
LEFT VENTRICLE DIASTOLIC VOLUME: 178.52 ML
LEFT VENTRICLE MASS INDEX: 139 G/M2
LEFT VENTRICLE SYSTOLIC VOLUME INDEX: 26.2 ML/M2
LEFT VENTRICLE SYSTOLIC VOLUME: 55.07 ML
LEFT VENTRICULAR INTERNAL DIMENSION IN DIASTOLE: 5.98 CM (ref 3.5–6)
LEFT VENTRICULAR MASS: 291.56 G
LV LATERAL E/E' RATIO: 11 M/S
LV SEPTAL E/E' RATIO: 17.6 M/S
MV MEAN GRADIENT: 1 MMHG
MV PEAK A VEL: 0.87 M/S
MV PEAK E VEL: 0.88 M/S
MV PEAK GRADIENT: 4 MMHG
MV STENOSIS PRESSURE HALF TIME: 92.07 MS
MV VALVE AREA BY CONTINUITY EQUATION: 2.43 CM2
MV VALVE AREA P 1/2 METHOD: 2.39 CM2
PISA MRMAX VEL: 0.06 M/S
PISA TR MAX VEL: 2.47 M/S
PULM VEIN S/D RATIO: 1.6
PV PEAK D VEL: 0.48 M/S
PV PEAK S VEL: 0.77 M/S
PV PEAK VELOCITY: 1.42 CM/S
RA MAJOR: 4.91 CM
RA PRESSURE: 3 MMHG
RA WIDTH: 4.02 CM
RIGHT VENTRICULAR END-DIASTOLIC DIMENSION: 3.6 CM
RV TISSUE DOPPLER FREE WALL SYSTOLIC VELOCITY 1 (APICAL 4 CHAMBER VIEW): 10.48 CM/S
SINUS: 3.5 CM
STJ: 2.27 CM
TDI LATERAL: 0.08 M/S
TDI SEPTAL: 0.05 M/S
TDI: 0.07 M/S
TR MAX PG: 24 MMHG
TRICUSPID ANNULAR PLANE SYSTOLIC EXCURSION: 2.17 CM
TV REST PULMONARY ARTERY PRESSURE: 27 MMHG

## 2021-10-29 PROCEDURE — 93306 TTE W/DOPPLER COMPLETE: CPT | Mod: 26,,, | Performed by: INTERNAL MEDICINE

## 2021-10-29 PROCEDURE — 93306 ECHO (CUPID ONLY): ICD-10-PCS | Mod: 26,,, | Performed by: INTERNAL MEDICINE

## 2021-10-29 PROCEDURE — 93306 TTE W/DOPPLER COMPLETE: CPT

## 2021-10-31 ENCOUNTER — EXTERNAL CHRONIC CARE MANAGEMENT (OUTPATIENT)
Dept: PRIMARY CARE CLINIC | Facility: CLINIC | Age: 86
End: 2021-10-31
Payer: MEDICARE

## 2021-10-31 PROCEDURE — 99439 PR CHRONIC CARE MGMT, EA ADDTL 20 MIN: ICD-10-PCS | Mod: S$PBB,,, | Performed by: INTERNAL MEDICINE

## 2021-10-31 PROCEDURE — 99439 CHRNC CARE MGMT STAF EA ADDL: CPT | Mod: S$PBB,,, | Performed by: INTERNAL MEDICINE

## 2021-10-31 PROCEDURE — 99490 PR CHRONIC CARE MGMT, 1ST 20 MIN: ICD-10-PCS | Mod: S$PBB,,, | Performed by: INTERNAL MEDICINE

## 2021-10-31 PROCEDURE — 99490 CHRNC CARE MGMT STAFF 1ST 20: CPT | Mod: PBBFAC,25,PO | Performed by: INTERNAL MEDICINE

## 2021-10-31 PROCEDURE — 99439 CHRNC CARE MGMT STAF EA ADDL: CPT | Mod: PBBFAC,25,27,PO | Performed by: INTERNAL MEDICINE

## 2021-10-31 PROCEDURE — 99490 CHRNC CARE MGMT STAFF 1ST 20: CPT | Mod: S$PBB,,, | Performed by: INTERNAL MEDICINE

## 2021-11-09 ENCOUNTER — OFFICE VISIT (OUTPATIENT)
Dept: CARDIOLOGY | Facility: CLINIC | Age: 86
End: 2021-11-09
Payer: MEDICARE

## 2021-11-09 VITALS
HEART RATE: 60 BPM | DIASTOLIC BLOOD PRESSURE: 68 MMHG | BODY MASS INDEX: 27.44 KG/M2 | HEIGHT: 71 IN | SYSTOLIC BLOOD PRESSURE: 156 MMHG | WEIGHT: 196 LBS

## 2021-11-09 DIAGNOSIS — I70.0 ATHEROSCLEROSIS OF ABDOMINAL AORTA: ICD-10-CM

## 2021-11-09 DIAGNOSIS — I34.0 NONRHEUMATIC MITRAL VALVE REGURGITATION: ICD-10-CM

## 2021-11-09 DIAGNOSIS — E78.5 HYPERLIPIDEMIA ASSOCIATED WITH TYPE 2 DIABETES MELLITUS: ICD-10-CM

## 2021-11-09 DIAGNOSIS — D69.6 THROMBOCYTOPENIA: ICD-10-CM

## 2021-11-09 DIAGNOSIS — I10 ESSENTIAL HYPERTENSION: ICD-10-CM

## 2021-11-09 DIAGNOSIS — I25.10 CORONARY ARTERY CALCIFICATION SEEN ON CAT SCAN: Primary | ICD-10-CM

## 2021-11-09 DIAGNOSIS — E11.21 DIABETES MELLITUS WITH PROTEINURIC DIABETIC NEPHROPATHY: ICD-10-CM

## 2021-11-09 DIAGNOSIS — E11.69 HYPERLIPIDEMIA ASSOCIATED WITH TYPE 2 DIABETES MELLITUS: ICD-10-CM

## 2021-11-09 PROCEDURE — 99999 PR PBB SHADOW E&M-EST. PATIENT-LVL IV: ICD-10-PCS | Mod: PBBFAC,,, | Performed by: INTERNAL MEDICINE

## 2021-11-09 PROCEDURE — 99214 OFFICE O/P EST MOD 30 MIN: CPT | Mod: S$PBB,,, | Performed by: INTERNAL MEDICINE

## 2021-11-09 PROCEDURE — 99999 PR PBB SHADOW E&M-EST. PATIENT-LVL IV: CPT | Mod: PBBFAC,,, | Performed by: INTERNAL MEDICINE

## 2021-11-09 PROCEDURE — 99214 PR OFFICE/OUTPT VISIT, EST, LEVL IV, 30-39 MIN: ICD-10-PCS | Mod: S$PBB,,, | Performed by: INTERNAL MEDICINE

## 2021-11-09 PROCEDURE — 99214 OFFICE O/P EST MOD 30 MIN: CPT | Mod: PBBFAC | Performed by: INTERNAL MEDICINE

## 2021-11-15 ENCOUNTER — LAB VISIT (OUTPATIENT)
Dept: LAB | Facility: HOSPITAL | Age: 86
End: 2021-11-15
Attending: INTERNAL MEDICINE
Payer: MEDICARE

## 2021-11-15 DIAGNOSIS — C85.19 B-CELL LYMPHOMA OF EXTRANODAL SITE: ICD-10-CM

## 2021-11-15 DIAGNOSIS — D50.0 IRON DEFICIENCY ANEMIA DUE TO CHRONIC BLOOD LOSS: ICD-10-CM

## 2021-11-15 LAB
ALBUMIN SERPL BCP-MCNC: 4.2 G/DL (ref 3.5–5.2)
ALP SERPL-CCNC: 51 U/L (ref 55–135)
ALT SERPL W/O P-5'-P-CCNC: 11 U/L (ref 10–44)
ANION GAP SERPL CALC-SCNC: 7 MMOL/L (ref 8–16)
AST SERPL-CCNC: 14 U/L (ref 10–40)
BILIRUB SERPL-MCNC: 1.2 MG/DL (ref 0.1–1)
BUN SERPL-MCNC: 16 MG/DL (ref 8–23)
CALCIUM SERPL-MCNC: 9.7 MG/DL (ref 8.7–10.5)
CHLORIDE SERPL-SCNC: 107 MMOL/L (ref 95–110)
CO2 SERPL-SCNC: 29 MMOL/L (ref 23–29)
CREAT SERPL-MCNC: 1.5 MG/DL (ref 0.5–1.4)
ERYTHROCYTE [DISTWIDTH] IN BLOOD BY AUTOMATED COUNT: 13.4 % (ref 11.5–14.5)
EST. GFR  (AFRICAN AMERICAN): 49 ML/MIN/1.73 M^2
EST. GFR  (NON AFRICAN AMERICAN): 42 ML/MIN/1.73 M^2
FERRITIN SERPL-MCNC: 199 NG/ML (ref 20–300)
GLUCOSE SERPL-MCNC: 173 MG/DL (ref 70–110)
HCT VFR BLD AUTO: 41 % (ref 40–54)
HGB BLD-MCNC: 13 G/DL (ref 14–18)
IMM GRANULOCYTES # BLD AUTO: 0.02 K/UL (ref 0–0.04)
IRON SERPL-MCNC: 96 UG/DL (ref 45–160)
MCH RBC QN AUTO: 30.5 PG (ref 27–31)
MCHC RBC AUTO-ENTMCNC: 31.7 G/DL (ref 32–36)
MCV RBC AUTO: 96 FL (ref 82–98)
NEUTROPHILS # BLD AUTO: 2 K/UL (ref 1.8–7.7)
PLATELET # BLD AUTO: 64 K/UL (ref 150–450)
PMV BLD AUTO: 12.8 FL (ref 9.2–12.9)
POTASSIUM SERPL-SCNC: 4.6 MMOL/L (ref 3.5–5.1)
PROT SERPL-MCNC: 7.1 G/DL (ref 6–8.4)
RBC # BLD AUTO: 4.26 M/UL (ref 4.6–6.2)
SATURATED IRON: 28 % (ref 20–50)
SODIUM SERPL-SCNC: 143 MMOL/L (ref 136–145)
TOTAL IRON BINDING CAPACITY: 346 UG/DL (ref 250–450)
TRANSFERRIN SERPL-MCNC: 234 MG/DL (ref 200–375)
WBC # BLD AUTO: 3.75 K/UL (ref 3.9–12.7)

## 2021-11-15 PROCEDURE — 80053 COMPREHEN METABOLIC PANEL: CPT | Performed by: INTERNAL MEDICINE

## 2021-11-15 PROCEDURE — 85027 COMPLETE CBC AUTOMATED: CPT | Performed by: INTERNAL MEDICINE

## 2021-11-15 PROCEDURE — 84466 ASSAY OF TRANSFERRIN: CPT | Performed by: INTERNAL MEDICINE

## 2021-11-15 PROCEDURE — 82728 ASSAY OF FERRITIN: CPT | Performed by: INTERNAL MEDICINE

## 2021-11-15 PROCEDURE — 36415 COLL VENOUS BLD VENIPUNCTURE: CPT | Performed by: INTERNAL MEDICINE

## 2021-11-16 ENCOUNTER — OFFICE VISIT (OUTPATIENT)
Dept: HEMATOLOGY/ONCOLOGY | Facility: CLINIC | Age: 86
End: 2021-11-16
Payer: MEDICARE

## 2021-11-16 VITALS
WEIGHT: 198.63 LBS | HEIGHT: 71 IN | DIASTOLIC BLOOD PRESSURE: 70 MMHG | SYSTOLIC BLOOD PRESSURE: 154 MMHG | TEMPERATURE: 98 F | BODY MASS INDEX: 27.81 KG/M2 | OXYGEN SATURATION: 96 % | HEART RATE: 63 BPM

## 2021-11-16 DIAGNOSIS — E11.21 DIABETES MELLITUS WITH PROTEINURIC DIABETIC NEPHROPATHY: ICD-10-CM

## 2021-11-16 DIAGNOSIS — C85.19 B-CELL LYMPHOMA OF EXTRANODAL SITE: Primary | ICD-10-CM

## 2021-11-16 DIAGNOSIS — E11.22 CKD STAGE 3 DUE TO TYPE 2 DIABETES MELLITUS: ICD-10-CM

## 2021-11-16 DIAGNOSIS — N18.30 CKD STAGE 3 DUE TO TYPE 2 DIABETES MELLITUS: ICD-10-CM

## 2021-11-16 PROCEDURE — 99999 PR PBB SHADOW E&M-EST. PATIENT-LVL IV: CPT | Mod: PBBFAC,,, | Performed by: INTERNAL MEDICINE

## 2021-11-16 PROCEDURE — 99214 OFFICE O/P EST MOD 30 MIN: CPT | Mod: PBBFAC | Performed by: INTERNAL MEDICINE

## 2021-11-16 PROCEDURE — 99999 PR PBB SHADOW E&M-EST. PATIENT-LVL IV: ICD-10-PCS | Mod: PBBFAC,,, | Performed by: INTERNAL MEDICINE

## 2021-11-16 PROCEDURE — 99214 PR OFFICE/OUTPT VISIT, EST, LEVL IV, 30-39 MIN: ICD-10-PCS | Mod: S$PBB,,, | Performed by: INTERNAL MEDICINE

## 2021-11-16 PROCEDURE — 99214 OFFICE O/P EST MOD 30 MIN: CPT | Mod: S$PBB,,, | Performed by: INTERNAL MEDICINE

## 2021-11-19 PROCEDURE — 99454 REM MNTR PHYSIOL PARAM 16-30: CPT | Mod: PBBFAC,PO | Performed by: INTERNAL MEDICINE

## 2021-11-30 ENCOUNTER — EXTERNAL CHRONIC CARE MANAGEMENT (OUTPATIENT)
Dept: PRIMARY CARE CLINIC | Facility: CLINIC | Age: 86
End: 2021-11-30
Payer: MEDICARE

## 2021-11-30 PROCEDURE — 99490 PR CHRONIC CARE MGMT, 1ST 20 MIN: ICD-10-PCS | Mod: S$PBB,,, | Performed by: INTERNAL MEDICINE

## 2021-11-30 PROCEDURE — 99490 CHRNC CARE MGMT STAFF 1ST 20: CPT | Mod: S$PBB,,, | Performed by: INTERNAL MEDICINE

## 2021-11-30 PROCEDURE — 99490 CHRNC CARE MGMT STAFF 1ST 20: CPT | Mod: PBBFAC,PO | Performed by: INTERNAL MEDICINE

## 2021-12-18 PROCEDURE — 99454 REM MNTR PHYSIOL PARAM 16-30: CPT | Mod: PBBFAC,PO | Performed by: INTERNAL MEDICINE

## 2021-12-31 ENCOUNTER — EXTERNAL CHRONIC CARE MANAGEMENT (OUTPATIENT)
Dept: PRIMARY CARE CLINIC | Facility: CLINIC | Age: 86
End: 2021-12-31
Payer: MEDICARE

## 2021-12-31 PROCEDURE — 99490 CHRNC CARE MGMT STAFF 1ST 20: CPT | Mod: S$PBB,,, | Performed by: INTERNAL MEDICINE

## 2021-12-31 PROCEDURE — 99490 CHRNC CARE MGMT STAFF 1ST 20: CPT | Mod: PBBFAC,PO | Performed by: INTERNAL MEDICINE

## 2021-12-31 PROCEDURE — 99490 PR CHRONIC CARE MGMT, 1ST 20 MIN: ICD-10-PCS | Mod: S$PBB,,, | Performed by: INTERNAL MEDICINE

## 2022-01-18 PROCEDURE — 99454 REM MNTR PHYSIOL PARAM 16-30: CPT | Mod: PBBFAC,PO | Performed by: INTERNAL MEDICINE

## 2022-01-31 ENCOUNTER — EXTERNAL CHRONIC CARE MANAGEMENT (OUTPATIENT)
Dept: PRIMARY CARE CLINIC | Facility: CLINIC | Age: 87
End: 2022-01-31
Payer: MEDICARE

## 2022-01-31 ENCOUNTER — OFFICE VISIT (OUTPATIENT)
Dept: PODIATRY | Facility: CLINIC | Age: 87
End: 2022-01-31
Payer: MEDICARE

## 2022-01-31 VITALS
BODY MASS INDEX: 27.72 KG/M2 | HEIGHT: 71 IN | WEIGHT: 198 LBS | RESPIRATION RATE: 18 BRPM | HEART RATE: 63 BPM | SYSTOLIC BLOOD PRESSURE: 188 MMHG | DIASTOLIC BLOOD PRESSURE: 81 MMHG

## 2022-01-31 DIAGNOSIS — L60.0 INGROWN NAIL: ICD-10-CM

## 2022-01-31 DIAGNOSIS — E11.9 ENCOUNTER FOR DIABETIC FOOT EXAM: Primary | ICD-10-CM

## 2022-01-31 DIAGNOSIS — M20.5X2 HALLUX LIMITUS, ACQUIRED, LEFT: ICD-10-CM

## 2022-01-31 DIAGNOSIS — B35.1 DERMATOPHYTOSIS OF NAIL: ICD-10-CM

## 2022-01-31 DIAGNOSIS — E11.49 TYPE II DIABETES MELLITUS WITH NEUROLOGICAL MANIFESTATIONS: ICD-10-CM

## 2022-01-31 DIAGNOSIS — M20.5X1 HALLUX LIMITUS, ACQUIRED, RIGHT: ICD-10-CM

## 2022-01-31 PROCEDURE — 99213 PR OFFICE/OUTPT VISIT, EST, LEVL III, 20-29 MIN: ICD-10-PCS | Mod: S$PBB,,, | Performed by: PODIATRIST

## 2022-01-31 PROCEDURE — 99999 PR PBB SHADOW E&M-EST. PATIENT-LVL V: CPT | Mod: PBBFAC,,, | Performed by: PODIATRIST

## 2022-01-31 PROCEDURE — 99490 PR CHRONIC CARE MGMT, 1ST 20 MIN: ICD-10-PCS | Mod: S$PBB,,, | Performed by: INTERNAL MEDICINE

## 2022-01-31 PROCEDURE — 99213 OFFICE O/P EST LOW 20 MIN: CPT | Mod: S$PBB,,, | Performed by: PODIATRIST

## 2022-01-31 PROCEDURE — 99490 CHRNC CARE MGMT STAFF 1ST 20: CPT | Mod: PBBFAC,PO | Performed by: INTERNAL MEDICINE

## 2022-01-31 PROCEDURE — 99490 CHRNC CARE MGMT STAFF 1ST 20: CPT | Mod: S$PBB,,, | Performed by: INTERNAL MEDICINE

## 2022-01-31 PROCEDURE — 99215 OFFICE O/P EST HI 40 MIN: CPT | Mod: PBBFAC | Performed by: PODIATRIST

## 2022-01-31 PROCEDURE — 99999 PR PBB SHADOW E&M-EST. PATIENT-LVL V: ICD-10-PCS | Mod: PBBFAC,,, | Performed by: PODIATRIST

## 2022-01-31 RX ORDER — CLINDAMYCIN PHOSPHATE 10 UG/ML
LOTION TOPICAL
COMMUNITY
Start: 2021-11-08 | End: 2022-08-25

## 2022-01-31 NOTE — PATIENT INSTRUCTIONS
"  Ingrown Toenail, Excised  An ingrown toenail occurs when the nail grows sideways into the skin alongside the nail. This can cause pain, especially when wearing tight shoes. It can also lead to an infection with redness and swelling.  The side of the nail will need to be removed in order to stop the pain and release any infection present. If there is a lot of redness and swelling, then an antibiotic may also be used. The redness and pain should begin to go away within 48 hours. It will take about two weeks for the exposed nail bed to become dry and all of the swelling to go down.  If only the side of the nail was removed it will begin to grow back in a few months. To prevent recurrence, that side of nail bed may be treated with a strong chemical to prevent the nail from regrowing ("ablation").  Home care  The following guidelines will help you care for your toe at home:  · Once a day beginning in 24 hours:  ¨ Clean the toe separate from your body with warm running water and antibacterial soap such as dial soap or saline wound spray  ¨ Clean any remaining crust away with soap and water using a cotton-tipped applicator.  ¨ Apply betadine to the toe.  ¨ Cover with a breathable bandage or until the exposed nail bed is dry and there is no more drainage.  · Change the dressing daily, or whenever it becomes wet or dirty.  · If you were prescribed antibiotics, take them as directed until they are all gone.  · Wear comfortable shoes with a lot of toe room, or open-toe sandals, while your toe is healing.  · You may use acetaminophen or ibuprofen to control pain, unless another medicine was prescribed. If you have chronic liver or kidney disease or ever had a stomach ulcer or GI bleeding, talk with your doctor before using these medicines.  Prevention  To prevent ingrown toenails:  · Wear shoes that fit well. Avoid shoes that pinch the toes together.  · When you trim your toenails, do not cut them too short. Cut straight " across at the top and do not round the edges.  · Do not use a sharp object to clean under your nail since this might cause an infection.  · At first signs of a recurrence, insert a small piece of cotton under that side of the nail to help it grow out straight.  Follow-up care  Follow up with your doctor or this facility as advised by our staff. If the ingrown toenail recurs, follow up with a podiatrist for nail bed ablation.  When to seek medical advice  Call your health care provider right away if any of the following occur:  · Increasing redness, pain or swelling of the toe  · Red streaks in the skin leading away from the wound  · Continued pus or fluid drainage for more than 24 hours  · Fever of 100.4º F (38º C) or higher, or as directed by your health care provider  © 6631-0612 [employer]. [address] [place] [address]. All rights reserved. This information is not intended as a substitute for professional medical care. Always follow your healthcare professional's instructions.      Over the counter pain creams: Voltaren Gel, Biofreeze, Bengay, tiger balm, two old goat, lidocaine gel,  Absorbine Veterinary Liniment Gel Topical Analgesic Sore Muscle and Joint Pain Relief    Recommend lotions: eucerin, eucerin for diabetics, aquaphor, A&D ointment, gold bond for diabetics, sween, [place]'s Bees all purpose baby ointment,  urea 40 with aloe (found on amazon.com)    Shoe recommendations: (try 6pm.[employer], zappos.[employer] , Fliptu, or shoes.[employer] for discounted prices) you can visit DSW shoes in [place]  or [employer]linx [facility] in the [facility] (there are also several shoe brand outlets in the [facility])    Asics (GT 2000 or gel foundations), new balance stability type shoes (such as the 940 series), sajareth (stabil c3),  Warren (GTS or Beast or transcend), propet (tennis shoe)    Sofft Brand (women) Kelli&Dorian (men), clarks, crocs, aerosoles, naturalizers, SAS, ecco, born, fran quinonez, [facility]  (dress shoes)    Vionic, burkenstocks, fitflops, propet (sandals)  Nike comfort thong sandals, crocs, propet (house shoes)    Nail Home remedy:  Vicks Vapor rub or Emuaid to nails for easier manageability    Diabetes: Inspecting Your Feet  Diabetes increases your chances of developing foot problems. So inspect your feet every day. This helps you find small skin irritations before they become serious infections. If you have trouble seeing the bottoms of your feet, use a mirror or ask a family member or friend to help.     Pressure spots on the bottom of the foot are common areas where problems develop.   How to check your feet  Below are tips to help you look for foot problems. Try to check your feet at the same time each day, such as when you get out of bed in the morning:  · Check the top of each foot. The tops of toes, back of the heel, and outer edge of the foot can get a lot of rubbing from poor-fitting shoes.  · Check the bottom of each foot. Daily wear and tear often leads to problems at pressure spots.  · Check the toes and nails. Fungal infections often occur between toes. Toenail problems can also be a sign of fungal infections or lead to breaks in the skin.  · Check your shoes, too. Loose objects inside a shoe can injure the foot. Use your hand to feel inside your shoes for things like lane, loose stitching, or rough areas that could irritate your skin.  Warning signs  Look for any color changes in the foot. Redness with streaks can signal a severe infection, which needs immediate medical attention. Tell your doctor right away if you have any of these problems:  · Swelling, sometimes with color changes, may be a sign of poor blood flow or infection. Symptoms include tenderness and an increase in the size of your foot.  · Warm or hot areas on your feet may be signs of infection. A foot that is cold may not be getting enough blood.  · Sensations such as burning, tingling, or pins and needles can be  signs of a problem. Also check for areas that may be numb.  · Hot spots are caused by friction or pressure. Look for hot spots in areas that get a lot of rubbing. Hot spots can turn into blisters, calluses, or sores.  · Cracks and sores are caused by dry or irritated skin. They are a sign that the skin is breaking down, which can lead to infection.  · Toenail problems to watch for include nails growing into the skin (ingrown toenail) and causing redness or pain. Thick, yellow, or discolored nails can signal a fungal infection.  · Drainage and odor can develop from untreated sores and ulcers. Call your doctor right away if you notice white or yellow drainage, bleeding, or unpleasant odor.   © 1820-5503 [employer]. [address] [place] [address]. All rights reserved. This information is not intended as a substitute for professional medical care. Always follow your healthcare professional's instructions.        Step-by-Step:  Inspecting Your Feet (Diabetes)    Date Last Reviewed: 10/1/2016  © 7184-5512 [employer]. [address] [place] [address]. All rights reserved. This information is not intended as a substitute for professional medical care. Always follow your healthcare professional's instructions.

## 2022-01-31 NOTE — PROGRESS NOTES
Subjective:      Patient ID: Bria Lawson is a 86 y.o. male.    Chief Complaint: Ingrown Toenail (Left great toe ), Toe Pain, and Diabetes Mellitus    Bria is a 86 y.o. male who presents to the clinic for evaluation and treatment of high risk feet. 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  The patient has no major complaints with feet. Chief concern is how to care for feet as a diabetic.    1/31/2022 presents to the clinic complaining of painful ingrown toenail, medial border of the left hallux since last week.  Relates he has been cleaning out his attic in old worn shoes. Patient has attempted self treatment with peroxide and betadine.  Nonpainful.  Saw some clear drainage initially but has not seen any since. This is a recurrent problem.  Patient  denies purulent drainage.        This patient has documented high risk feet requiring routine maintenance secondary to diabetes mellitis and those secondary complications of diabetes, as mentioned..    PCP: Adiel Bragg MD    Date Last Seen by PCP:   Chief Complaint   Patient presents with    Ingrown Toenail     Left great toe     Toe Pain    Diabetes Mellitus     Current shoe gear:  rx diab shoes    Hemoglobin A1C   Date Value Ref Range Status   10/15/2021 6.1 (H) 4.0 - 5.6 % Final     Comment:     ADA Screening Guidelines:  5.7-6.4%  Consistent with prediabetes  >or=6.5%  Consistent with diabetes    High levels of fetal hemoglobin interfere with the HbA1C  assay. Heterozygous  hemoglobin variants (HbS, HgC, etc)do  not significantly interfere with this assay.   However, presence of multiple variants may affect accuracy.     07/02/2021 6.3 (H) 4.0 - 5.6 % Final     Comment:     ADA Screening Guidelines:  5.7-6.4%  Consistent with prediabetes  >or=6.5%  Consistent with diabetes    High levels of fetal hemoglobin interfere with the HbA1C  assay. Heterozygous hemoglobin variants (HbS, HgC, etc)do  not significantly interfere with this assay.   However, presence of multiple variants may affect accuracy.     12/29/2020 6.1 (H) 4.0 - 5.6 % Final     Comment:     ADA Screening Guidelines:  5.7-6.4%  Consistent with prediabetes  >or=6.5%  Consistent with diabetes  High levels of fetal hemoglobin interfere with the HbA1C  assay. Heterozygous hemoglobin variants (HbS, HgC, etc)do  not significantly interfere with this assay.   However, presence of multiple variants may affect accuracy.           Patient Active Problem List   Diagnosis    GERD (gastroesophageal reflux disease)    Gout, chronic    HTN (hypertension)    Hyperlipidemia    CKD stage 3 due to type 2 diabetes mellitus    HDL lipoprotein deficiency    Cervical radiculopathy, acute    CN (constipation)    Seronegative arthropathy of multiple sites    Anemia    Gout of foot    CKD (chronic kidney disease), stage III    Primary osteoarthritis involving multiple joints    Diabetes mellitus with proteinuria    Diabetes mellitus with proteinuric diabetic nephropathy    Hypertensive CKD (chronic kidney disease)    Leukopenia    Thrombocytopenia    Rheumatoid arthritis    B-cell lymphoma of extranodal site    Neutropenia    Refractive error    Pseudophakia    Screening for malignant neoplasm of colon    PAH (pulmonary artery hypertension)    Atherosclerosis of abdominal aorta    Colon, diverticulosis       Current Outpatient Medications on File Prior to Visit   Medication Sig Dispense Refill    allopurinoL (ZYLOPRIM) 100  MG tablet TAKE 1 TABLET(100 MG) BY MOUTH EVERY DAY 90 tablet 12    amLODIPine (NORVASC) 10 MG tablet TAKE 1 TABLET(10 MG) BY MOUTH EVERY DAY 90 tablet 12    ascorbic acid, vitamin C, (VITAMIN C) 1000 MG tablet Take 1,000 mg by mouth once daily.      aspirin 81 MG Chew CHEW AND SWALLOW 1 TABLET BY MOUTH DAILY 90 tablet 12    atorvastatin (LIPITOR) 20 MG tablet TAKE 1 TABLET(20 MG) BY MOUTH EVERY DAY 90 tablet 12    blood sugar diagnostic Strp 1 strip by Misc.(Non-Drug; Combo Route) route 2 (two) times daily. Disp glucometer and lancets as well 100 strip 12    carvediloL (COREG) 6.25 MG tablet TAKE 1 TABLET(6.25 MG) BY MOUTH TWICE DAILY WITH MEALS 180 tablet 12    clindamycin (CLEOCIN T) 1 % lotion APPLY ON THE SKIN RASH ON FACE TWICE DAILY      FEROSUL 325 mg (65 mg iron) Tab tablet TAKE 1 TABLET BY MOUTH EVERY DAY WITH BREAKFAST 90 tablet 0    folic acid (FOLVITE) 800 MCG Tab Take 800 mcg by mouth once daily.      gabapentin (NEURONTIN) 300 MG capsule TAKE 1 CAPSULE(300 MG) BY MOUTH THREE TIMES DAILY 270 capsule 3    glipiZIDE (GLUCOTROL) 10 MG TR24 TAKE 1 TABLET(10 MG) BY MOUTH EVERY DAY 90 tablet 0    hydrocortisone 2.5 % cream APPLY UP TO ONE HALF GRAM TO THE AFFECTED AREA TWICE DAILY      JANUVIA 100 mg Tab TAKE 1 TABLET ONCE DAILY 90 tablet 0    loratadine (CLARITIN) 10 mg tablet Take 1 tablet (10 mg total) by mouth 2 (two) times a day. 60 tablet 12    multivit-min/FA/lycopen/lutein (CENTRUM SILVER MEN ORAL) Take by mouth.      niacin 500 MG Tab Take 100 mg by mouth every evening.      pantoprazole (PROTONIX) 40 MG tablet Take 1 tablet (40 mg total) by mouth once daily. 90 tablet 3    triamcinolone acetonide 0.1% (KENALOG) 0.1 % ointment Apply topically 2 (two) times daily. 80 g 12    TRUEPLUS LANCETS 33 gauge Misc USE TO TEST BLOOD SUGAR BID  12    TURMERIC ROOT EXTRACT ORAL Take by mouth.      valsartan (DIOVAN) 320 MG tablet TAKE 1 TABLET ONCE DAILY 90 tablet 12    vitamin E 400 UNIT  capsule Take 400 Units by mouth once daily.      [DISCONTINUED] omeprazole (PRILOSEC) 40 MG capsule TAKE 1 CAPSULE DAILY 90 capsule 3     No current facility-administered medications on file prior to visit.       Review of patient's allergies indicates:  No Known Allergies    Past Surgical History:   Procedure Laterality Date    BONE MARROW BIOPSY Left 9/19/2018    Procedure: Biopsy-bone marrow;  Surgeon: Velia Tobias MD;  Location: [facility];  Service: Oncology;  Laterality: Left;    CATARACT EXTRACTION Bilateral 1996    COLONOSCOPY N/A 11/24/2020    Procedure: COLONOSCOPY Golytely;  Surgeon: Masoud Hwang MD;  Location: [facility];  Service: Endoscopy;  Laterality: N/A;    ESOPHAGOGASTRODUODENOSCOPY N/A 11/24/2020    Procedure: EGD (ESOPHAGOGASTRODUODENOSCOPY);  Surgeon: Masoud Hwang MD;  Location: [facility];  Service: Endoscopy;  Laterality: N/A;    EYE SURGERY      cataracts    JOINT REPLACEMENT      knee replacement    retinal injection s Bilateral     scrotal cyst         Family History   Problem Relation Age of Onset    No Known Problems Mother     No Known Problems Father     No Known Problems Sister     No Known Problems Brother     No Known Problems Maternal Aunt     No Known Problems Maternal Uncle     No Known Problems Paternal Aunt     No Known Problems Paternal Uncle     No Known Problems Maternal Grandmother     No Known Problems Maternal Grandfather     No Known Problems Paternal Grandmother     No Known Problems Paternal Grandfather     Amblyopia Neg Hx     Blindness Neg Hx     Cancer Neg Hx     Cataracts Neg Hx     Diabetes Neg Hx     Glaucoma Neg Hx     Hypertension Neg Hx     Macular degeneration Neg Hx     Retinal detachment Neg Hx     Strabismus Neg Hx     Stroke Neg Hx     Thyroid disease Neg Hx        Social History     Socioeconomic History    Marital status:    Tobacco Use    Smoking status: Former Smoker    Smokeless tobacco: Never  "Used   Substance and Sexual Activity    Alcohol use: Yes     Alcohol/week: 0.0 standard drinks     Comment: occasionally    Drug use: No           Review of Systems   Constitutional: Negative for chills and fever.   Cardiovascular: Positive for leg swelling. Negative for claudication.   Respiratory: Negative for cough and shortness of breath.    Skin: Positive for dry skin, itching, nail changes and rash.        Lichen planus   Musculoskeletal: Positive for arthritis (RA), back pain, joint pain and myalgias. Negative for falls, joint swelling and muscle weakness.   Gastrointestinal: Negative for diarrhea, nausea and vomiting.   Neurological: Positive for paresthesias. Negative for numbness, tremors and weakness.   Psychiatric/Behavioral: Negative for altered mental status and hallucinations.           Objective:       Vitals:    01/31/22 0853   BP: (!) 188/81   Pulse: 63   Resp: 18   Weight: 89.8 kg (198 lb)   Height: 5' 11" (1.803 m)   PainSc:   3   PainLoc: Toe       Physical Exam  Nursing note reviewed.   Constitutional:       General: He is not in acute distress.     Appearance: He is not toxic-appearing or diaphoretic.      Comments: Pt. is well-developed, well-nourished, appears stated age, in no acute distress, alert and oriented x 3. No evidence of depression, anxiety, or agitation. Calm, cooperative, and communicative. Appropriate interactions and affect.   Cardiovascular:      Pulses:           Dorsalis pedis pulses are 1+ on the right side and 1+ on the left side.        Posterior tibial pulses are 1+ on the right side and 1+ on the left side.      Comments:  Dorsalis pedis and posterior tibial pulses are diminished Bilaterally. Toes are cool to touch. Feet are warm proximally.There is decreased digital hair. Skin is atrophic  Pulmonary:      Effort: No respiratory distress.   Musculoskeletal:      Right ankle: No tenderness. No lateral malleolus, medial malleolus, AITF ligament, CF ligament or " posterior TF ligament tenderness.      Right Achilles Tendon: No defects. Dahl's test negative.      Left ankle: No tenderness. No lateral malleolus, medial malleolus, AITF ligament, CF ligament or posterior TF ligament tenderness.      Left Achilles Tendon: No defects. Dahl's test negative.      Right foot: No tenderness or bony tenderness.      Left foot: No tenderness or bony tenderness.      Comments: Fat pad atrophy to heels and met heads bilateral    Decreased stride, station of gait.  apropulsive toe off.  Increased angle and base of gait.    Patient has hammertoes of digits 2-5 bilateral partially reducible without symptom today.    Decreased first MPJ range of motion both weightbearing and nonweightbearing, no crepitus observed the first MP joint, + dorsal flag sign.Mild  bunion deformity is observed .     Lymphadenopathy:      Comments: No lymphatic streaking    Negative lymphadenopathy bilateral popliteal fossa and tarsal tunnel.     Skin:     General: Skin is warm and dry.      Coloration: Skin is not pale.      Findings: No abrasion, bruising, burn, ecchymosis or rash.      Nails: There is no clubbing.      Comments: medial border of the left hallux with ingrown nail plate. Surrounding erythema and minimal edema is noted there is no granuloma formation noted. no malodor     Skin is thin, atrophic    Toenails 1-2 bilaterally are discolored/yellowed, dystrophic, brittle with subungual debris.    Neurological:      Sensory: Sensory deficit present.      Comments: [place] 5.07 monofilament is intact bilateral feet. Sharp/dull sensation is also intact Bilateral feet. Proprioception is grossly intact.     Decreased/absent vibratory sensation bilateral feet to 128Hz tuning fork.    Paresthesias, and hyperesthesia bilateral feet with no clearly identified trigger or source.           Psychiatric:         Attention and Perception: He is attentive.         Mood and Affect: Mood is not anxious.  Affect is not inappropriate.         Speech: He is communicative. Speech is not slurred.         Behavior: Behavior is not combative.             Assessment:       Encounter Diagnoses   Name Primary?    Encounter for diabetic foot exam Yes    Ingrown nail - Left Foot     Type II diabetes mellitus with neurological manifestations     Hallux limitus, acquired, left     Hallux limitus, acquired, right     Dermatophytosis of nail          Plan:       Bria was seen today for ingrown toenail, toe pain and diabetes mellitus.    Diagnoses and all orders for this visit:    Encounter for diabetic foot exam    Ingrown nail - Left Foot    Type II diabetes mellitus with neurological manifestations    Hallux limitus, acquired, left    Hallux limitus, acquired, right    Dermatophytosis of nail      I counseled the patient on his conditions, their implications and medical management.    Education about the diabetic foot, neuropathy, and prevention of limb loss.    Shoe inspection. Diabetic Foot Education. Patient reminded of the importance of good nutrition and blood sugar control to help prevent podiatric complications of diabetes. Patient instructed on proper foot hygeine. We discussed wearing proper shoe gear, daily foot inspections, never walking without protective shoe gear, never putting sharp instruments to feet.      Informed patient that many nail problems can be prevented by wearing the right shoes and trimming your nails properly.   The right shoes: Feet were measured.  Patient is to wear shoes that are supportive and roomy enough for toes to wiggle. Look for shoes made of natural materials such as leather, which allow  feet to breathe.   Proper trimming: To avoid problems, she was instructed to trim toenails straight across without cutting down into the corners.       He will continue to monitor the areas daily, inspect his feet, wear protective shoe gear when ambulatory, moisturizer to maintain skin integrity  and follow in this office in approximately 7 months, sooner if he wishes to have nail procedure

## 2022-02-10 ENCOUNTER — TELEPHONE (OUTPATIENT)
Dept: FAMILY MEDICINE | Facility: CLINIC | Age: 87
End: 2022-02-10
Payer: MEDICARE

## 2022-02-11 ENCOUNTER — TELEPHONE (OUTPATIENT)
Dept: FAMILY MEDICINE | Facility: CLINIC | Age: 87
End: 2022-02-11
Payer: MEDICARE

## 2022-02-18 PROCEDURE — 99454 REM MNTR PHYSIOL PARAM 16-30: CPT | Mod: PBBFAC,PO | Performed by: INTERNAL MEDICINE

## 2022-02-23 DIAGNOSIS — D84.9 IMMUNOSUPPRESSED STATUS: ICD-10-CM

## 2022-02-28 ENCOUNTER — EXTERNAL CHRONIC CARE MANAGEMENT (OUTPATIENT)
Dept: PRIMARY CARE CLINIC | Facility: CLINIC | Age: 87
End: 2022-02-28
Payer: MEDICARE

## 2022-02-28 PROCEDURE — 99490 CHRNC CARE MGMT STAFF 1ST 20: CPT | Mod: PBBFAC,PO | Performed by: INTERNAL MEDICINE

## 2022-02-28 PROCEDURE — 99490 PR CHRONIC CARE MGMT, 1ST 20 MIN: ICD-10-PCS | Mod: S$PBB,,, | Performed by: INTERNAL MEDICINE

## 2022-02-28 PROCEDURE — 99490 CHRNC CARE MGMT STAFF 1ST 20: CPT | Mod: S$PBB,,, | Performed by: INTERNAL MEDICINE

## 2022-03-06 NOTE — TELEPHONE ENCOUNTER
Care Due:                  Date            Visit Type   Department     Provider  --------------------------------------------------------------------------------                                [facility]                              FOLLOWUP/OF  MED/ INTERNAL  [facility]  Last Visit: 10-      FICE VISIT   MED/ PEDS      Ehrensing                              [facility]/OF  MED/ INTERNAL  [facility]  Next Visit: 04-      FICE VISIT   MED/ PEDS      Ehrensing                                                            Last  Test          Frequency    Reason                     Performed    Due Date  --------------------------------------------------------------------------------    HBA1C.......  6 months...  JANUVIA, glipiZIDE.......  10-   04-    Uric Acid...  12 months..  allopurinoL..............  02- 02-    Powered by [employer]. Reference number: [id number].   3/06/2022 9:17:21 AM CST

## 2022-03-07 ENCOUNTER — TELEPHONE (OUTPATIENT)
Dept: FAMILY MEDICINE | Facility: CLINIC | Age: 87
End: 2022-03-07
Payer: MEDICARE

## 2022-03-07 NOTE — TELEPHONE ENCOUNTER
Poke with the Patient and he requested a call on tomorrow after 11:00am.  Patient did not have his calendar at the time to schedule an EAWV appointment.

## 2022-03-08 ENCOUNTER — TELEPHONE (OUTPATIENT)
Dept: FAMILY MEDICINE | Facility: CLINIC | Age: 87
End: 2022-03-08
Payer: MEDICARE

## 2022-03-08 RX ORDER — SITAGLIPTIN 100 MG/1
TABLET, FILM COATED ORAL
Qty: 90 TABLET | Refills: 0 | Status: SHIPPED | OUTPATIENT
Start: 2022-03-08 | End: 2022-05-30

## 2022-03-08 RX ORDER — VALSARTAN 320 MG/1
TABLET ORAL
Qty: 90 TABLET | Refills: 3 | Status: SHIPPED | OUTPATIENT
Start: 2022-03-08 | End: 2023-03-08

## 2022-03-08 NOTE — TELEPHONE ENCOUNTER
Spoke to the Patient and scheduled him for an EAWV appointment with KACIE Callaway on 04/05/22 @ 10:00am.  Patient verbally understands.

## 2022-03-09 ENCOUNTER — LAB VISIT (OUTPATIENT)
Dept: LAB | Facility: HOSPITAL | Age: 87
End: 2022-03-09
Attending: INTERNAL MEDICINE
Payer: MEDICARE

## 2022-03-09 DIAGNOSIS — N18.30 CKD STAGE 3 DUE TO TYPE 2 DIABETES MELLITUS: ICD-10-CM

## 2022-03-09 DIAGNOSIS — E11.22 CKD STAGE 3 DUE TO TYPE 2 DIABETES MELLITUS: ICD-10-CM

## 2022-03-09 LAB
ALBUMIN SERPL BCP-MCNC: 4.2 G/DL (ref 3.5–5.2)
ALP SERPL-CCNC: 53 U/L (ref 55–135)
ALT SERPL W/O P-5'-P-CCNC: 14 U/L (ref 10–44)
ANION GAP SERPL CALC-SCNC: 7 MMOL/L (ref 8–16)
AST SERPL-CCNC: 18 U/L (ref 10–40)
BASOPHILS # BLD AUTO: 0.02 K/UL (ref 0–0.2)
BASOPHILS NFR BLD: 0.5 % (ref 0–1.9)
BILIRUB SERPL-MCNC: 1.3 MG/DL (ref 0.1–1)
BUN SERPL-MCNC: 16 MG/DL (ref 8–23)
CALCIUM SERPL-MCNC: 9.3 MG/DL (ref 8.7–10.5)
CHLORIDE SERPL-SCNC: 107 MMOL/L (ref 95–110)
CO2 SERPL-SCNC: 26 MMOL/L (ref 23–29)
CREAT SERPL-MCNC: 1.3 MG/DL (ref 0.5–1.4)
DIFFERENTIAL METHOD: ABNORMAL
EOSINOPHIL # BLD AUTO: 0.1 K/UL (ref 0–0.5)
EOSINOPHIL NFR BLD: 1.7 % (ref 0–8)
ERYTHROCYTE [DISTWIDTH] IN BLOOD BY AUTOMATED COUNT: 13.9 % (ref 11.5–14.5)
EST. GFR  (AFRICAN AMERICAN): 58 ML/MIN/1.73 M^2
EST. GFR  (NON AFRICAN AMERICAN): 50 ML/MIN/1.73 M^2
GLUCOSE SERPL-MCNC: 132 MG/DL (ref 70–110)
HCT VFR BLD AUTO: 40 % (ref 40–54)
HGB BLD-MCNC: 12.7 G/DL (ref 14–18)
IMM GRANULOCYTES # BLD AUTO: 0.04 K/UL (ref 0–0.04)
IMM GRANULOCYTES NFR BLD AUTO: 1 % (ref 0–0.5)
LYMPHOCYTES # BLD AUTO: 1.1 K/UL (ref 1–4.8)
LYMPHOCYTES NFR BLD: 27.7 % (ref 18–48)
MCH RBC QN AUTO: 31.2 PG (ref 27–31)
MCHC RBC AUTO-ENTMCNC: 31.8 G/DL (ref 32–36)
MCV RBC AUTO: 98 FL (ref 82–98)
MONOCYTES # BLD AUTO: 0.8 K/UL (ref 0.3–1)
MONOCYTES NFR BLD: 20.2 % (ref 4–15)
NEUTROPHILS # BLD AUTO: 2 K/UL (ref 1.8–7.7)
NEUTROPHILS NFR BLD: 48.9 % (ref 38–73)
NRBC BLD-RTO: 0 /100 WBC
PLATELET # BLD AUTO: 66 K/UL (ref 150–450)
PMV BLD AUTO: 12.9 FL (ref 9.2–12.9)
POTASSIUM SERPL-SCNC: 4.4 MMOL/L (ref 3.5–5.1)
PROT SERPL-MCNC: 7.6 G/DL (ref 6–8.4)
RBC # BLD AUTO: 4.07 M/UL (ref 4.6–6.2)
SODIUM SERPL-SCNC: 140 MMOL/L (ref 136–145)
WBC # BLD AUTO: 4.05 K/UL (ref 3.9–12.7)

## 2022-03-09 PROCEDURE — 85025 COMPLETE CBC W/AUTO DIFF WBC: CPT | Performed by: INTERNAL MEDICINE

## 2022-03-09 PROCEDURE — 36415 COLL VENOUS BLD VENIPUNCTURE: CPT | Performed by: INTERNAL MEDICINE

## 2022-03-09 PROCEDURE — 80053 COMPREHEN METABOLIC PANEL: CPT | Performed by: INTERNAL MEDICINE

## 2022-03-10 DIAGNOSIS — D50.9 IRON DEFICIENCY ANEMIA, UNSPECIFIED IRON DEFICIENCY ANEMIA TYPE: ICD-10-CM

## 2022-03-10 RX ORDER — GLIPIZIDE 10 MG/1
TABLET, FILM COATED, EXTENDED RELEASE ORAL
Qty: 90 TABLET | Refills: 0 | Status: SHIPPED | OUTPATIENT
Start: 2022-03-10 | End: 2022-06-08

## 2022-03-10 RX ORDER — FERROUS SULFATE TAB 325 MG (65 MG ELEMENTAL FE) 325 (65 FE) MG
TAB ORAL
Qty: 90 TABLET | Refills: 0 | Status: SHIPPED | OUTPATIENT
Start: 2022-03-10 | End: 2022-06-08

## 2022-03-10 RX ORDER — DUPILUMAB 300 MG/2ML
INJECTION, SOLUTION SUBCUTANEOUS
COMMUNITY
Start: 2022-02-27

## 2022-03-10 NOTE — TELEPHONE ENCOUNTER
No new care gaps identified.  Powered by [employer]. Reference number: [id number].   3/10/2022 5:27:51 AM CST

## 2022-03-14 ENCOUNTER — PATIENT MESSAGE (OUTPATIENT)
Dept: OTHER | Facility: OTHER | Age: 87
End: 2022-03-14
Payer: MEDICARE

## 2022-03-16 ENCOUNTER — OFFICE VISIT (OUTPATIENT)
Dept: HEMATOLOGY/ONCOLOGY | Facility: CLINIC | Age: 87
End: 2022-03-16
Payer: MEDICARE

## 2022-03-16 ENCOUNTER — LAB VISIT (OUTPATIENT)
Dept: LAB | Facility: HOSPITAL | Age: 87
End: 2022-03-16
Attending: INTERNAL MEDICINE
Payer: MEDICARE

## 2022-03-16 VITALS
BODY MASS INDEX: 27.11 KG/M2 | SYSTOLIC BLOOD PRESSURE: 185 MMHG | HEART RATE: 71 BPM | OXYGEN SATURATION: 96 % | DIASTOLIC BLOOD PRESSURE: 82 MMHG | WEIGHT: 200.19 LBS | HEIGHT: 72 IN | TEMPERATURE: 98 F

## 2022-03-16 DIAGNOSIS — D64.9 ANEMIA, UNSPECIFIED TYPE: ICD-10-CM

## 2022-03-16 DIAGNOSIS — N18.30 CKD STAGE 3 DUE TO TYPE 2 DIABETES MELLITUS: ICD-10-CM

## 2022-03-16 DIAGNOSIS — E11.22 CKD STAGE 3 DUE TO TYPE 2 DIABETES MELLITUS: ICD-10-CM

## 2022-03-16 DIAGNOSIS — C85.19 B-CELL LYMPHOMA OF EXTRANODAL SITE: Primary | ICD-10-CM

## 2022-03-16 LAB
FERRITIN SERPL-MCNC: 287 NG/ML (ref 20–300)
IRON SERPL-MCNC: 79 UG/DL (ref 45–160)
SATURATED IRON: 24 % (ref 20–50)
TOTAL IRON BINDING CAPACITY: 323 UG/DL (ref 250–450)
TRANSFERRIN SERPL-MCNC: 218 MG/DL (ref 200–375)

## 2022-03-16 PROCEDURE — 99214 OFFICE O/P EST MOD 30 MIN: CPT | Mod: S$PBB,,, | Performed by: INTERNAL MEDICINE

## 2022-03-16 PROCEDURE — 82728 ASSAY OF FERRITIN: CPT | Performed by: INTERNAL MEDICINE

## 2022-03-16 PROCEDURE — 36415 COLL VENOUS BLD VENIPUNCTURE: CPT | Performed by: INTERNAL MEDICINE

## 2022-03-16 PROCEDURE — 99214 PR OFFICE/OUTPT VISIT, EST, LEVL IV, 30-39 MIN: ICD-10-PCS | Mod: S$PBB,,, | Performed by: INTERNAL MEDICINE

## 2022-03-16 PROCEDURE — 99999 PR PBB SHADOW E&M-EST. PATIENT-LVL V: CPT | Mod: PBBFAC,,, | Performed by: INTERNAL MEDICINE

## 2022-03-16 PROCEDURE — 99215 OFFICE O/P EST HI 40 MIN: CPT | Mod: PBBFAC | Performed by: INTERNAL MEDICINE

## 2022-03-16 PROCEDURE — 99999 PR PBB SHADOW E&M-EST. PATIENT-LVL V: ICD-10-PCS | Mod: PBBFAC,,, | Performed by: INTERNAL MEDICINE

## 2022-03-16 PROCEDURE — 84466 ASSAY OF TRANSFERRIN: CPT | Performed by: INTERNAL MEDICINE

## 2022-03-16 NOTE — PROGRESS NOTES
"Subjective:       Patient ID: Bria Lawson is a 86 y.o. male.    Chief Complaint: Lymphoma  Patient is a 86 y.o. year old male who was evaluated for Thrombocytopenia. 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   Has completed one four week course of rituxan Monotherapy. Completed 10/2018   He also has anemia     HPI Former pt of Dr. BELTRÁN  Doing well   Appetite and weight stable  No fevers, night sweats  No SOB/CP  He is UTD with flu and COVID booster immunizations    Review of Systems   Constitutional: Negative for appetite change, fatigue and unexpected weight change.   HENT: Negative for mouth sores.    Eyes: Negative for visual disturbance.   Respiratory: Negative for cough and shortness of breath.    Cardiovascular: Negative for chest pain.   Gastrointestinal: Negative for abdominal pain and diarrhea.   Genitourinary: Negative for frequency.   Musculoskeletal: Negative for back pain.   Integumentary:  Negative for rash.   Neurological: Negative for headaches.   Hematological: Negative for adenopathy.   Psychiatric/Behavioral: The patient is not nervous/anxious.    All other systems reviewed and are negative.        Objective:       Vitals:    03/16/22 1044   BP: (!) 185/82   BP Location: Left arm   Patient Position: Sitting   Pulse: 71   Temp: 97.7 °F (36.5 °C)   TempSrc: Oral   SpO2: 96%   Weight: 90.8 kg (200 lb 2.8 oz)   Height: 5' 11.5" (1.816 m)       Physical Exam  Vitals reviewed.   Constitutional:       Appearance: He is well-developed.   Cardiovascular:      Rate and Rhythm: Normal rate.      Heart sounds: Normal heart sounds.   Pulmonary:      Effort: Pulmonary effort is normal.      Breath sounds: Normal breath sounds. No wheezing.   Abdominal:      General: Bowel sounds are normal.      Palpations: Abdomen is soft.      Tenderness: There is no abdominal tenderness.   Musculoskeletal:         General: No tenderness.   Lymphadenopathy:      Cervical: No cervical adenopathy.   Skin:     General: Skin is " warm and dry.      Findings: No rash.   Neurological:      Mental Status: He is alert and oriented to person, place, and time.      Coordination: Coordination normal.   Psychiatric:         Thought Content: Thought content normal.         Judgment: Judgment normal.           LABS:  WBC   Date Value Ref Range Status   03/09/2022 4.05 3.90 - 12.70 K/uL Final     Hemoglobin   Date Value Ref Range Status   03/09/2022 12.7 (L) 14.0 - 18.0 g/dL Final     Hematocrit   Date Value Ref Range Status   03/09/2022 40.0 40.0 - 54.0 % Final     Platelets   Date Value Ref Range Status   03/09/2022 66 (L) 150 - 450 K/uL Final     Gran # (ANC)   Date Value Ref Range Status   03/09/2022 2.0 1.8 - 7.7 K/uL Final     Gran %   Date Value Ref Range Status   03/09/2022 48.9 38.0 - 73.0 % Final       Chemistry        Component Value Date/Time     03/09/2022 0916    K 4.4 03/09/2022 0916     03/09/2022 0916    CO2 26 03/09/2022 0916    BUN 16 03/09/2022 0916    CREATININE 1.3 03/09/2022 0916     (H) 03/09/2022 0916        Component Value Date/Time    CALCIUM 9.3 03/09/2022 0916    ALKPHOS 53 (L) 03/09/2022 0916    AST 18 03/09/2022 0916    ALT 14 03/09/2022 0916    BILITOT 1.3 (H) 03/09/2022 0916    ESTGFRAFRICA 58 (A) 03/09/2022 0916    EGFRNONAA 50 (A) 03/09/2022 0916      ,    Iron   Date Value Ref Range Status   03/16/2022 79 45 - 160 ug/dL Final     Ferritin   Date Value Ref Range Status   03/16/2022 287 20.0 - 300.0 ng/mL Final       Assessment:       1. B-cell lymphoma of extranodal site    2. CKD stage 3 due to type 2 diabetes mellitus    3. Anemia, unspecified type        Plan:        1. He is doing well clinically, labs are stable, He will return in 4 months with labs.  2. Cr 1.3mg/dL-stable  F/u with Nephrology   3. 12.7g/dl     Ferritin, TIBC and FE TODAY   Cbc, Fe studies , Cmp prior to f/u

## 2022-03-16 NOTE — Clinical Note
F/u 4mos I called patient today 08/08/19 at 11:59 am to schedule pap machine setup appointment. No answer, left message for patient to call me back here in [place].

## 2022-03-20 ENCOUNTER — PATIENT MESSAGE (OUTPATIENT)
Dept: ADMINISTRATIVE | Facility: OTHER | Age: 87
End: 2022-03-20
Payer: MEDICARE

## 2022-03-25 NOTE — TELEPHONE ENCOUNTER
Corrina Jaramillo Staff  Caller: pt (Today,  1:58 PM)  Pt requesting call back to get f/u appt scheduled     Pt @ [phone]         Spoke with patient and scheduled labs and follow up appointment, reminder mailed to patient.

## 2022-03-28 ENCOUNTER — TELEPHONE (OUTPATIENT)
Dept: HEMATOLOGY/ONCOLOGY | Facility: CLINIC | Age: 87
End: 2022-03-28
Payer: MEDICARE

## 2022-03-28 NOTE — TELEPHONE ENCOUNTER
----- Message from Velia Tobias MD sent at 3/27/2022  7:14 PM CDT -----  Notify pt Fe studies okay and document

## 2022-03-29 ENCOUNTER — PATIENT MESSAGE (OUTPATIENT)
Dept: OTHER | Facility: OTHER | Age: 87
End: 2022-03-29
Payer: MEDICARE

## 2022-03-31 ENCOUNTER — EXTERNAL CHRONIC CARE MANAGEMENT (OUTPATIENT)
Dept: PRIMARY CARE CLINIC | Facility: CLINIC | Age: 87
End: 2022-03-31
Payer: MEDICARE

## 2022-03-31 PROCEDURE — 99490 PR CHRONIC CARE MGMT, 1ST 20 MIN: ICD-10-PCS | Mod: S$PBB,,, | Performed by: INTERNAL MEDICINE

## 2022-03-31 PROCEDURE — 99490 CHRNC CARE MGMT STAFF 1ST 20: CPT | Mod: PBBFAC,PO | Performed by: INTERNAL MEDICINE

## 2022-03-31 PROCEDURE — 99490 CHRNC CARE MGMT STAFF 1ST 20: CPT | Mod: S$PBB,,, | Performed by: INTERNAL MEDICINE

## 2022-04-01 ENCOUNTER — TELEPHONE (OUTPATIENT)
Dept: ADMINISTRATIVE | Facility: CLINIC | Age: 87
End: 2022-04-01
Payer: MEDICARE

## 2022-04-05 ENCOUNTER — OFFICE VISIT (OUTPATIENT)
Dept: FAMILY MEDICINE | Facility: CLINIC | Age: 87
End: 2022-04-05
Payer: MEDICARE

## 2022-04-05 ENCOUNTER — LAB VISIT (OUTPATIENT)
Dept: LAB | Facility: HOSPITAL | Age: 87
End: 2022-04-05
Attending: INTERNAL MEDICINE
Payer: MEDICARE

## 2022-04-05 VITALS
HEART RATE: 64 BPM | OXYGEN SATURATION: 99 % | SYSTOLIC BLOOD PRESSURE: 150 MMHG | TEMPERATURE: 99 F | BODY MASS INDEX: 26.88 KG/M2 | WEIGHT: 198.44 LBS | HEIGHT: 72 IN | DIASTOLIC BLOOD PRESSURE: 58 MMHG

## 2022-04-05 VITALS
BODY MASS INDEX: 27.78 KG/M2 | OXYGEN SATURATION: 99 % | TEMPERATURE: 99 F | HEIGHT: 71 IN | SYSTOLIC BLOOD PRESSURE: 123 MMHG | DIASTOLIC BLOOD PRESSURE: 54 MMHG | WEIGHT: 198.44 LBS | HEART RATE: 64 BPM

## 2022-04-05 DIAGNOSIS — C85.19 B-CELL LYMPHOMA OF EXTRANODAL SITE: ICD-10-CM

## 2022-04-05 DIAGNOSIS — E78.5 HYPERLIPIDEMIA, UNSPECIFIED HYPERLIPIDEMIA TYPE: ICD-10-CM

## 2022-04-05 DIAGNOSIS — I10 ESSENTIAL HYPERTENSION: ICD-10-CM

## 2022-04-05 DIAGNOSIS — D61.818 PANCYTOPENIA: ICD-10-CM

## 2022-04-05 DIAGNOSIS — D69.6 THROMBOCYTOPENIA: ICD-10-CM

## 2022-04-05 DIAGNOSIS — E11.29 DIABETES MELLITUS WITH PROTEINURIA: ICD-10-CM

## 2022-04-05 DIAGNOSIS — I12.9 HYPERTENSIVE KIDNEY DISEASE WITH STAGE 3A CHRONIC KIDNEY DISEASE: ICD-10-CM

## 2022-04-05 DIAGNOSIS — I27.21 PAH (PULMONARY ARTERY HYPERTENSION): ICD-10-CM

## 2022-04-05 DIAGNOSIS — M1A.00X0 IDIOPATHIC CHRONIC GOUT WITHOUT TOPHUS, UNSPECIFIED SITE: ICD-10-CM

## 2022-04-05 DIAGNOSIS — N18.30 CKD STAGE 3 DUE TO TYPE 2 DIABETES MELLITUS: ICD-10-CM

## 2022-04-05 DIAGNOSIS — E11.22 CKD STAGE 3 DUE TO TYPE 2 DIABETES MELLITUS: ICD-10-CM

## 2022-04-05 DIAGNOSIS — R80.9 DIABETES MELLITUS WITH PROTEINURIA: ICD-10-CM

## 2022-04-05 DIAGNOSIS — M06.9 RHEUMATOID ARTHRITIS, INVOLVING UNSPECIFIED SITE, UNSPECIFIED WHETHER RHEUMATOID FACTOR PRESENT: ICD-10-CM

## 2022-04-05 DIAGNOSIS — M06.09 SERONEGATIVE ARTHROPATHY OF MULTIPLE SITES: ICD-10-CM

## 2022-04-05 DIAGNOSIS — D70.9 NEUTROPENIA, UNSPECIFIED TYPE: ICD-10-CM

## 2022-04-05 DIAGNOSIS — K57.30 COLON, DIVERTICULOSIS: ICD-10-CM

## 2022-04-05 DIAGNOSIS — E11.21 DIABETES MELLITUS WITH PROTEINURIC DIABETIC NEPHROPATHY: Primary | ICD-10-CM

## 2022-04-05 DIAGNOSIS — E11.311 DIABETIC RETINOPATHY OF BOTH EYES WITH MACULAR EDEMA ASSOCIATED WITH TYPE 2 DIABETES MELLITUS, UNSPECIFIED RETINOPATHY SEVERITY: ICD-10-CM

## 2022-04-05 DIAGNOSIS — Z00.00 ENCOUNTER FOR PREVENTIVE HEALTH EXAMINATION: Primary | ICD-10-CM

## 2022-04-05 DIAGNOSIS — I70.0 ATHEROSCLEROSIS OF ABDOMINAL AORTA: ICD-10-CM

## 2022-04-05 DIAGNOSIS — N18.31 STAGE 3A CHRONIC KIDNEY DISEASE: ICD-10-CM

## 2022-04-05 DIAGNOSIS — E11.21 DIABETES MELLITUS WITH PROTEINURIC DIABETIC NEPHROPATHY: ICD-10-CM

## 2022-04-05 DIAGNOSIS — N18.32 HYPERTENSIVE KIDNEY DISEASE WITH STAGE 3B CHRONIC KIDNEY DISEASE: ICD-10-CM

## 2022-04-05 DIAGNOSIS — N18.31 HYPERTENSIVE KIDNEY DISEASE WITH STAGE 3A CHRONIC KIDNEY DISEASE: ICD-10-CM

## 2022-04-05 DIAGNOSIS — N18.30 STAGE 3 CHRONIC KIDNEY DISEASE, UNSPECIFIED WHETHER STAGE 3A OR 3B CKD: ICD-10-CM

## 2022-04-05 DIAGNOSIS — I12.9 HYPERTENSIVE KIDNEY DISEASE WITH STAGE 3B CHRONIC KIDNEY DISEASE: ICD-10-CM

## 2022-04-05 DIAGNOSIS — M15.9 PRIMARY OSTEOARTHRITIS INVOLVING MULTIPLE JOINTS: ICD-10-CM

## 2022-04-05 DIAGNOSIS — K21.9 GASTROESOPHAGEAL REFLUX DISEASE, UNSPECIFIED WHETHER ESOPHAGITIS PRESENT: ICD-10-CM

## 2022-04-05 DIAGNOSIS — D64.9 ANEMIA, UNSPECIFIED TYPE: ICD-10-CM

## 2022-04-05 LAB
ALBUMIN SERPL BCP-MCNC: 4.2 G/DL (ref 3.5–5.2)
ANION GAP SERPL CALC-SCNC: 11 MMOL/L (ref 8–16)
BUN SERPL-MCNC: 17 MG/DL (ref 8–23)
CALCIUM SERPL-MCNC: 9.4 MG/DL (ref 8.7–10.5)
CHLORIDE SERPL-SCNC: 103 MMOL/L (ref 95–110)
CO2 SERPL-SCNC: 25 MMOL/L (ref 23–29)
CREAT SERPL-MCNC: 1.4 MG/DL (ref 0.5–1.4)
EST. GFR  (AFRICAN AMERICAN): 52.6 ML/MIN/1.73 M^2
EST. GFR  (NON AFRICAN AMERICAN): 45.5 ML/MIN/1.73 M^2
ESTIMATED AVG GLUCOSE: 117 MG/DL (ref 68–131)
GLUCOSE SERPL-MCNC: 176 MG/DL (ref 70–110)
HBA1C MFR BLD: 5.7 % (ref 4–5.6)
PHOSPHATE SERPL-MCNC: 2.7 MG/DL (ref 2.7–4.5)
POTASSIUM SERPL-SCNC: 4.2 MMOL/L (ref 3.5–5.1)
PTH-INTACT SERPL-MCNC: 53.3 PG/ML (ref 9–77)
SODIUM SERPL-SCNC: 139 MMOL/L (ref 136–145)

## 2022-04-05 PROCEDURE — 99999 PR PBB SHADOW E&M-EST. PATIENT-LVL IV: ICD-10-PCS | Mod: PBBFAC,,, | Performed by: INTERNAL MEDICINE

## 2022-04-05 PROCEDURE — G0439 PPPS, SUBSEQ VISIT: HCPCS | Mod: ,,, | Performed by: NURSE PRACTITIONER

## 2022-04-05 PROCEDURE — 99999 PR PBB SHADOW E&M-EST. PATIENT-LVL IV: CPT | Mod: PBBFAC,,, | Performed by: INTERNAL MEDICINE

## 2022-04-05 PROCEDURE — 99214 OFFICE O/P EST MOD 30 MIN: CPT | Mod: S$PBB,,, | Performed by: INTERNAL MEDICINE

## 2022-04-05 PROCEDURE — 83036 HEMOGLOBIN GLYCOSYLATED A1C: CPT | Performed by: INTERNAL MEDICINE

## 2022-04-05 PROCEDURE — 99999 PR PBB SHADOW E&M-EST. PATIENT-LVL V: ICD-10-PCS | Mod: PBBFAC,,, | Performed by: NURSE PRACTITIONER

## 2022-04-05 PROCEDURE — 83970 ASSAY OF PARATHORMONE: CPT | Performed by: INTERNAL MEDICINE

## 2022-04-05 PROCEDURE — 80069 RENAL FUNCTION PANEL: CPT | Performed by: INTERNAL MEDICINE

## 2022-04-05 PROCEDURE — G0439 PR MEDICARE ANNUAL WELLNESS SUBSEQUENT VISIT: ICD-10-PCS | Mod: ,,, | Performed by: NURSE PRACTITIONER

## 2022-04-05 PROCEDURE — 99215 OFFICE O/P EST HI 40 MIN: CPT | Mod: 25,PBBFAC,PO | Performed by: NURSE PRACTITIONER

## 2022-04-05 PROCEDURE — 99214 PR OFFICE/OUTPT VISIT, EST, LEVL IV, 30-39 MIN: ICD-10-PCS | Mod: S$PBB,,, | Performed by: INTERNAL MEDICINE

## 2022-04-05 PROCEDURE — 99999 PR PBB SHADOW E&M-EST. PATIENT-LVL V: CPT | Mod: PBBFAC,,, | Performed by: NURSE PRACTITIONER

## 2022-04-05 PROCEDURE — 36415 COLL VENOUS BLD VENIPUNCTURE: CPT | Mod: PO | Performed by: INTERNAL MEDICINE

## 2022-04-05 PROCEDURE — 99214 OFFICE O/P EST MOD 30 MIN: CPT | Mod: PBBFAC,PO | Performed by: INTERNAL MEDICINE

## 2022-04-05 NOTE — PROGRESS NOTES
"  Bria Lawson presented for a  Medicare AWV and comprehensive Health Risk Assessment today. The following components were reviewed and updated:    · Medical history  · Family History  · Social history  · Allergies and Current Medications  · Health Risk Assessment  · Health Maintenance  · Care Team       ** See Completed Assessments for Annual Wellness Visit within the encounter summary.**       The following assessments were completed:  · Living Situation  · CAGE  · Depression Screening  · Timed Get Up and Go  · Whisper Test  · Cognitive Function Screening  · Nutrition Screening  · ADL Screening  · PAQ Screening              Vitals:    04/05/22 1010   BP: (!) 150/58   BP Location: Left arm   Patient Position: Sitting   BP Method: Large (Manual)   Pulse: 64   Temp: 98.7 °F (37.1 °C)   TempSrc: Oral   SpO2: 99%   Weight: 90 kg (198 lb 6.6 oz)   Height: 5' 11.5" (1.816 m)     Body mass index is 27.29 kg/m².  Physical Exam  Vitals and nursing note reviewed.   Constitutional:       Appearance: Normal appearance.   Cardiovascular:      Rate and Rhythm: Normal rate.      Pulses: Normal pulses.      Heart sounds: Normal heart sounds.   Pulmonary:      Effort: Pulmonary effort is normal.      Breath sounds: Normal breath sounds.   Abdominal:      General: Bowel sounds are normal.      Palpations: Abdomen is soft.   Musculoskeletal:         General: Normal range of motion.   Neurological:      Mental Status: He is alert and oriented to person, place, and time.   Psychiatric:         Mood and Affect: Mood normal.         Behavior: Behavior normal.               Diagnoses and health risks identified today and associated recommendations/orders:    1. Encounter for preventive health examination  Pt was seen today for an Annual Wellness visit. Healthcare maintenance and screening recommendations were discussed and updated as indicated. Return in one year for AWV.    Review current opioid prescriptions: n/a  Screen for potential " Substance Use Disorders: n/a    2. Atherosclerosis of abdominal aorta  Complete Abd US 8/29/18. The current medical regimen is effective;  continue present plan and medications.    3. PAH (pulmonary artery hypertension)  The current medical regimen is effective;  continue present plan and medications.    4. Diabetes mellitus with proteinuria  5. 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  Diabetes mellitus with proteinuric diabetic nephropathy  7. CKD stage 3 due to type 2 diabetes mellitus  Last noted Hgb A1C 6.1 on 10/15/21. The current medical regimen is effective;  continue present plan and medications.    8. Stage 3a chronic kidney disease  The current medical regimen is effective;  continue present plan and medications.    9. Seronegative arthropathy of multiple sites  The current medical regimen is effective;  continue present plan and medications.    10. B-cell lymphoma of extranodal site  The current medical regimen is effective;  continue present plan and medications.    11. Thrombocytopenia  The current medical regimen is effective;  continue present plan and medications.    12. Neutropenia, unspecified type  The current medical regimen is effective;  continue present plan and medications.    13. Pancytopenia  The current medical regimen is effective;  continue present plan and medications.    14. Rheumatoid arthritis, involving unspecified site, unspecified whether rheumatoid factor present  The current medical regimen is effective;  continue present plan and medications.    15. Hypertensive kidney disease with stage 3a chronic kidney disease  The current medical regimen is effective;  continue present plan and medications.    16. Hyperlipidemia, unspecified hyperlipidemia type  The current medical regimen is effective;  continue present plan and medications.    17. Gastroesophageal reflux disease, unspecified  whether esophagitis present  The current medical regimen is effective;  continue present plan and medications.    18. Colon, diverticulosis  The current medical regimen is effective;  continue present plan and medications.    19. Idiopathic chronic gout without tophus, unspecified site  The current medical regimen is effective;  continue present plan and medications.    20. Primary osteoarthritis involving multiple joints  The current medical regimen is effective;  continue present plan and medications.        Provided Bria with a 5-10 year written screening schedule and personal prevention plan. Recommendations were developed using the USPSTF age appropriate recommendations. Education, counseling, and referrals were provided as needed. After Visit Summary printed and given to patient which includes a list of additional screenings\tests needed.    Follow up today (on 4/5/2022) with provider.    SOURAV Lane    I offered to discuss advanced care planning, including how to pick a person who would make decisions for you if you were unable to make them for yourself, called a health care power of , and what kind of decisions you might make such as use of life sustaining treatments such as ventilators and tube feeding when faced with a life limiting illness recorded on a living will that they will need to know. (How you want to be cared for as you near the end of your natural life)     X Patient is interested in learning more about how to make advanced directives.  I provided them paperwork and offered to discuss this with them.

## 2022-04-05 NOTE — PROGRESS NOTES
Chief complaint: Follow-up on diabetes, anemia,     86-year-old black male.  here to follow-up on diabetes although did  not have A1c prior. Reviewed all his labs and abnormalities.   his A1c was 7.4 in 2/19 then 6.6, 6.8, 5.4 , 6.1, 6.3, 6.1..    Was Eating poorly.  He has some chronic renal insufficiency. Seen renal .   He had a slight hemoglobin reduction which appears chronic and fluctuating clinical of last year or so with  thrombocytopenia.  We discussed all those issues and the need to monitor them over time.  Is otherwise feeling well.  He is being followed by Hematology.  On iron pill daily and improving.  Somewhere along the way he was put on 81 mg aspirin and is currently off of it I encouraged him to stay off of it as there really is no obvious cardiac reason for him to need the aspirin and he does have thrombocytopenia which is not severe but could become severe and being on aspirin could increase his bleeding risk.    No further abdominal pain as he had before.    He has labs today for me and appears for an upcoming renal appointment.  Labs for Hematology looks like there set for July    He has seen GI.  Reviewed his last EGD which showed gastritis.  I reviewed the GI noted it sounds like he had some generalized abdominal discomfort after excessive eating.  Symptoms have since resolved.    He does continue to get hives and itching in areas where he applies pressure.  It has been ongoing for years.  He is not on an antihistamine and discussed using Claritin daily neg going to twice daily if that does not suppressed. Now on a shot with GREAT control of eczema    Recent lab shows hemoglobin drop from 11.4-9.6 then up to 13.6.  No obvious clinical bleeding in the plan by GI will be to do a capsule study if the hemoglobin level drops. Seen by Heme and ferritin improving on iron pill daily    Recurrent issues with an ingrowing toenail which is just red around the base of the nail.  He has had removed before  and is in care of Podiatry.  We discussed that if her should get more red eye and progressed we would probably put him on antibiotics.  We discussed local care.      Reviewed blood pressures which appear to be running normal.    .  He did see cardiology ordered an echo     · Low normal left ventricular systolic function. The estimated ejection fraction is 50-55%.  · Mild eccentric left ventricular hypertrophy.  · Mild left ventricular enlargement.  · No wall motion abnormalities.  · Normal right ventricular systolic function.  · Moderate mitral regurgitation.  · Mild tricuspid regurgitation.  · The estimated PA systolic pressure is 35 mmHg.     He is on digital hypertension and we reviewed his blood pressure and pulse readings.  His pulses mostly in the 40s and 50s occasionally up to 60.  He had his carvedilol reduced from 12.5-6.25 twice a day.  His pulse still appears to be mostly in the 50s.  He is asymptomatic.  We discussed that if symptomatic we would need to eliminate the carvedilol and assess response.  Reviewed all his other labs.  With the Lasix he did not have any worsening renal insufficiency.  He has an appointment with Hematology  His platelet count appears stable.          Chest CT  Impression       1. CT findings favoring sequela of cardiac decompensation causing pulmonary edema and bilateral pleural effusions.  Superimposed COVID-19 pneumonia cannot be entirely excluded noting that pleural effusions is not a commonly reported finding and therefore would be atypical.  2. Persistent soft tissue attenuation nodule within the prevascular mediastinum, presumably an enlarged lymph node and unchanged when compared to previous PET-CTs.  3. Slight interval increased size of multiple mediastinal lymph nodes, nonspecific.  4. Cardiomegaly with severe dense coronary artery atherosclerosis in a 3 vessel distribution.                        Labs, x-ray reports and interval endoscopy reports reviewed          Bone  Marrow + for Low grade Lymphoma      ROS:   CONST: weight stable. EYES: no vision change. ENT: no sore throat. CV: no chest pain w/ exertion. RESP: no shortness of breath. GI: no nausea, vomiting, diarrhea. No dysphagia. : no urinary issues. MUSCULOSKELETAL: no new myalgias or arthralgias. SKIN: no new changes. NEURO: no focal deficits. PSYCH: no new issues. ENDOCRINE: no polyuria. HEME: no lymph nodes. ALLERGY: no general pruritis.       Past medical history:  1.  Diabetes with renal disease and retinopathy  2.  Hypertension  3.  Hyperlipidemia  4.  Chronic renal failure stage III  5.  History of colon polyp, normal scope March 2012, normal 2017-- 5 yrs----GI at [facility]- Dr Kumari -EGD/colon done 11/2020  6.  Hyperuricemia and gout  7.  History of sciatica and lumbar disc disease remotely  8.  Erectile dysfunction  9.  Macular degeneration, followed by outside retina specialist  10.  GERD  11.  Low HDL  12.  Inflammatory arthritis of the hands, seeing rheumatology  13.  Bilateral cervical radiculopathy and axonal neuropathy, to have surgery 2015    14.  Followed by outside urology at [facility] Dr. noriega   15.  TKA  -angie Han  who is at [facility]  16.  Prevnar given 2017  17. 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    Family history: Mother with hypertension and diabetes.  Father's history is unknown.  One brother who is okay.    Social history: Retired, quit smoking 1974.   with 3 children.  Drinks socially.    Vital signs as above  Gen: no distress, exam otherwise deferred    Bria was seen today for follow-up.    Diagnoses and all orders for this visit:    Diabetes mellitus with proteinuric diabetic nephropathy, typically under good control, keep follow-up with Nephrology, recheck A1c, continue current medications    Essential hypertension, monitors at home and numerous  readings reviewed, all normal    Stage 3 chronic kidney disease, unspecified whether stage 3a or 3b CKD, labs to be updated by Nephrology    Anemia, unspecified type, chronic and stable, previously possibly some iron deficiency for which he is responding to iron therapy and also likely partly due to the bone marrow disorder, renal insufficiency and so forth    Diabetes mellitus with proteinuria    CKD stage 3 due to type 2 diabetes mellitus    Thrombocytopenia, appears to be chronic and stable    Rheumatoid arthritis, involving unspecified site, unspecified whether rheumatoid factor present, chronic and stable    PAH (pulmonary artery hypertension)    Atherosclerosis of abdominal aorta    Diabetes mellitus with renal manifestations, uncontrolled    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

## 2022-04-05 NOTE — PATIENT INSTRUCTIONS
Counseling and Referral of Other Preventative  (Italic type indicates deductible and co-insurance are waived)    Patient Name: Bria Lawson  Today's Date: 4/5/2022    Health Maintenance       Date Due Completion Date    Aspirin/Antiplatelet Therapy Never done ---    Shingles Vaccine (1 of 2) Never done ---    TETANUS VACCINE 11/18/2015 11/18/2005    Pneumococcal Vaccines (Age 65+) (2 of 4 - PPSV23) 08/22/2017 6/27/2017    Hemoglobin A1c 04/15/2022 10/15/2021    Diabetes Urine Screening 07/02/2022 7/2/2021    Lipid Panel 07/02/2022 7/2/2021        No orders of the defined types were placed in this encounter.    The following information is provided to all patients.  This information is to help you find resources for any of the problems found today that may be affecting your health:                Living healthy guide: www.[email or web address].[place].[email or web address]      Understanding Diabetes: www.diabetes.org      Eating healthy: www.cdc.gov/healthyweight      [facility] home safety checklist: www.cdc.gov/steadi/patient.html      Agency on Aging: www.goea.[place].[facility]      Alcoholics anonymous (AA): www.aa.org      Physical Activity: www.joaquin.nih.gov/fw1dbwz      Tobacco use: www.quitwithusla.org

## 2022-04-06 NOTE — PROGRESS NOTES
Kidney function is around the same. No major change which is a good news. Hope you're doing well. Thanks.

## 2022-04-26 ENCOUNTER — PATIENT OUTREACH (OUTPATIENT)
Dept: ADMINISTRATIVE | Facility: OTHER | Age: 87
End: 2022-04-26
Payer: MEDICARE

## 2022-04-26 NOTE — PROGRESS NOTES
Health Maintenance Due   Topic Date Due    Aspirin/Antiplatelet Therapy  Never done    Shingles Vaccine (1 of 2) Never done    Pneumococcal Vaccines (Age 65+) (2 - PPSV23 or PCV20) 06/27/2018     Updates were requested from care everywhere.  Chart was reviewed for overdue Proactive [facility] Encounters ([facility]) topics (CRS, Breast Cancer Screening, Eye exam)  Health Maintenance has been updated.  LINKS immunization registry triggered.  Immunizations were reconciled.

## 2022-04-27 ENCOUNTER — OFFICE VISIT (OUTPATIENT)
Dept: NEPHROLOGY | Facility: CLINIC | Age: 87
End: 2022-04-27
Payer: MEDICARE

## 2022-04-27 VITALS
DIASTOLIC BLOOD PRESSURE: 74 MMHG | OXYGEN SATURATION: 99 % | HEIGHT: 71 IN | BODY MASS INDEX: 27.75 KG/M2 | HEART RATE: 55 BPM | WEIGHT: 198.19 LBS | SYSTOLIC BLOOD PRESSURE: 154 MMHG

## 2022-04-27 DIAGNOSIS — M1A.00X0 IDIOPATHIC CHRONIC GOUT WITHOUT TOPHUS, UNSPECIFIED SITE: ICD-10-CM

## 2022-04-27 DIAGNOSIS — C85.19 B-CELL LYMPHOMA OF EXTRANODAL SITE: ICD-10-CM

## 2022-04-27 DIAGNOSIS — N18.31 HYPERTENSIVE KIDNEY DISEASE WITH STAGE 3A CHRONIC KIDNEY DISEASE: Primary | ICD-10-CM

## 2022-04-27 DIAGNOSIS — I12.9 HYPERTENSIVE KIDNEY DISEASE WITH STAGE 3A CHRONIC KIDNEY DISEASE: Primary | ICD-10-CM

## 2022-04-27 DIAGNOSIS — R31.29 MICROHEMATURIA: ICD-10-CM

## 2022-04-27 DIAGNOSIS — N18.30 CKD STAGE 3 DUE TO TYPE 2 DIABETES MELLITUS: ICD-10-CM

## 2022-04-27 DIAGNOSIS — R80.9 DIABETES MELLITUS WITH PROTEINURIA: ICD-10-CM

## 2022-04-27 DIAGNOSIS — E11.22 CKD STAGE 3 DUE TO TYPE 2 DIABETES MELLITUS: ICD-10-CM

## 2022-04-27 DIAGNOSIS — D69.6 THROMBOCYTOPENIA: ICD-10-CM

## 2022-04-27 DIAGNOSIS — E11.29 DIABETES MELLITUS WITH PROTEINURIA: ICD-10-CM

## 2022-04-27 PROCEDURE — 99214 PR OFFICE/OUTPT VISIT, EST, LEVL IV, 30-39 MIN: ICD-10-PCS | Mod: S$PBB,,, | Performed by: INTERNAL MEDICINE

## 2022-04-27 PROCEDURE — 99215 OFFICE O/P EST HI 40 MIN: CPT | Mod: PBBFAC | Performed by: INTERNAL MEDICINE

## 2022-04-27 PROCEDURE — 99999 PR PBB SHADOW E&M-EST. PATIENT-LVL V: CPT | Mod: PBBFAC,,, | Performed by: INTERNAL MEDICINE

## 2022-04-27 PROCEDURE — 99999 PR PBB SHADOW E&M-EST. PATIENT-LVL V: ICD-10-PCS | Mod: PBBFAC,,, | Performed by: INTERNAL MEDICINE

## 2022-04-27 PROCEDURE — 99214 OFFICE O/P EST MOD 30 MIN: CPT | Mod: S$PBB,,, | Performed by: INTERNAL MEDICINE

## 2022-04-27 NOTE — PROGRESS NOTES
Subjective:       Patient ID: Bria Lawson is a 86 y.o. Black or  male who presents for follow-up evaluation of CKD.    HPI:    Mr. Lawson is seen in nephrology clinic for follow up on CKD. He was last followed on 10/15/21.    He has CKD III, diabetes type 2, hypertension since age 38, chronic knee pain, DJD, rheumatoid arthritis. He had prior nephrology care at [facility] when he was told of eGFR in the range of 40's in 2013.     Interim no recent ER visits/ hospitalization. His BP is being monitored by the Digital medicine. He denies any postural symptoms. Noted he has not sent BP readings to the program for a few days, he has been reminded by the Digital team.    For now, he is denying any acute symptoms. He denies urinary symptoms/ acute illness recently.      Per prior notes:  Due to persistent thrombocytopenia he was referred to oncology/ hematology. He received work up including a bone marrow study when he was diagnosed with B cell lymphoma, low grade. He received 4 infusions of Rituxan by his oncologist. He does not have any urinary symptoms. He has chronic leg edema. He does not feel it has worsened.     He also takes Omeprazole. He states he does not tolerate GERD symptoms if he skips taking Omeprazole.     Prior labs include normal C4, hep B and C screen negative, no evidence of paraproteinemia. US from 1/16 showed normal kidney size and no mass or obstruction.     Renal Function:  Lab Results   Component Value Date     (H) 04/05/2022     (H) 03/09/2022     04/05/2022     03/09/2022    K 4.2 04/05/2022    K 4.4 03/09/2022     04/05/2022     03/09/2022    CO2 25 04/05/2022    CO2 26 03/09/2022    BUN 17 04/05/2022    BUN 16 03/09/2022    CALCIUM 9.4 04/05/2022    CALCIUM 9.3 03/09/2022    CREATININE 1.4 04/05/2022    CREATININE 1.3 03/09/2022    ALBUMIN 4.2 04/05/2022    ALBUMIN 4.2 03/09/2022    PHOS 2.7 04/05/2022    PHOS 3.3 10/08/2021    SCOTTIE  52.6 (A) 04/05/2022    ESTGFRAFRICA 58 (A) 03/09/2022    EGFRNONAA 45.5 (A) 04/05/2022    EGFRNONAA 50 (A) 03/09/2022       Urinalysis:  Lab Results   Component Value Date    APPEARANCEUA Clear 04/05/2022    PHUR 7.0 04/05/2022    SPECGRAV 1.015 04/05/2022    PROTEINUA Negative 04/05/2022    GLUCUA 1+ (A) 04/05/2022    OCCULTUA Negative 04/05/2022    NITRITE Negative 04/05/2022    LEUKOCYTESUR Negative 04/05/2022       Protein/Creatinine Ratio:  Lab Results   Component Value Date    PROTEINURINE 14 04/05/2022    CREATRANDUR 62.0 04/05/2022    UTPCR 0.23 (H) 04/05/2022       CBC:  Lab Results   Component Value Date    WBC 4.05 03/09/2022    HGB 12.7 (L) 03/09/2022    HCT 40.0 03/09/2022     vit D 47, PTH 53    Constitutional: Negative for fever, chills, activity change, appetite change and fatigue.   HENT: Negative for hearing loss and nosebleeds.    Respiratory: Negative for cough, shortness of breath and wheezing.   Cardiovascular: Negative for chest pain and palpitations.   Gastrointestinal: Negative for nausea, vomiting, abdominal pain and diarrhea.   Endocrine: Negative for polydipsia and polyuria.   Genitourinary: Negative for dysuria, frequency, hematuria and flank pain.   Musculoskeletal: Negative for myalgias.   Skin: Negative for pallor.  Neurological: Negative for dizziness, light-headedness and headaches.   Psychiatric/Behavioral: Negative for behavioral problems. The patient is not nervous/anxious    Objective:      Physical Exam     Constitutional: He is oriented to person, place, and time. He appears well-developed and well-nourished. No distress.   Neck: Normal range of motion. Neck supple.   Cardiovascular: Normal rate, regular rhythm  Pulmonary/Chest: Effort normal. No respiratory distress. He has no audible wheezes.   Abdominal: Soft. There is no tenderness.   Musculoskeletal: He exhibits trace edema.   Neurological: He is alert and oriented to person, place, and time.   Skin: Skin is warm and dry.  He is not diaphoretic.    Psychiatric: He has a normal mood and affect. His behavior is normal.   Vitals reviewed.    Assessment:       1. Hypertensive kidney disease with stage 3a chronic kidney disease    2. CKD stage 3 due to type 2 diabetes mellitus    3. Diabetes mellitus with proteinuria    4. Idiopathic chronic gout without tophus, unspecified site    5. B-cell lymphoma of extranodal site    6. Thrombocytopenia    7. Microhematuria        Plan:     Mr. Lawson has longstanding type 2 diabetes, hypertension, occasional NSAID use, has CKD due to diabetic, hypertensive nephropathy and occasional NSAID use in the past, he has CKD III.    CKD IIIb with sub nephrotic proteinuria remain at baseline    Continue with Digital medicine/ PCP for hypertension management.    Continue to follow with heme for management of lymphoma and thrombocytopenia.     CKD III  Stable renal labs  Continue current regimen of antihypertensive including ARB containing regimen for RAAS blockade  Low salt diet  avoid NSAID/ bactrim/ IV contrast/ gadolinium/ aminoglycoside/ Fleet enema where possible    hypertension  Strict low salt diet  continue ARB containing regimen  home BP monitoring, goal less than 130-140/80     Pt encouraged to follow BP more closely at home and send readings periodically                                                                                                                                                                                                                                                                                                                                                                 Chronic gout  Continue allopurinol  Follow uric acid level, stable per last check    Leukopenia  Thrombocytopenia  B cell lymphoma  Management per oncologist    He has higher risk of contrast induced NITIN. avoid NSAID/ bactrim/ IV contrast/ gadolinium/ aminoglycoside where possible.    Continue to follow  with cardiology for HTN management, LVH, follow up with echo.    Microhematuria  Asymptomatic  Persistent  Refer to urology  Obtain US kidneys, previously known to have small cysts on kidneys     Labs discussed with patient, periodic follow up on renal function stressed. His questions were answered to his satisfaction. Stressed importance of hydration, weight loss and strict low salt diet.    RTC 4 months

## 2022-04-30 ENCOUNTER — EXTERNAL CHRONIC CARE MANAGEMENT (OUTPATIENT)
Dept: PRIMARY CARE CLINIC | Facility: CLINIC | Age: 87
End: 2022-04-30
Payer: MEDICARE

## 2022-04-30 PROCEDURE — 99490 CHRNC CARE MGMT STAFF 1ST 20: CPT | Mod: PBBFAC,PO | Performed by: INTERNAL MEDICINE

## 2022-04-30 PROCEDURE — 99490 PR CHRONIC CARE MGMT, 1ST 20 MIN: ICD-10-PCS | Mod: S$PBB,,, | Performed by: INTERNAL MEDICINE

## 2022-04-30 PROCEDURE — 99490 CHRNC CARE MGMT STAFF 1ST 20: CPT | Mod: S$PBB,,, | Performed by: INTERNAL MEDICINE

## 2022-05-03 ENCOUNTER — OFFICE VISIT (OUTPATIENT)
Dept: CARDIOLOGY | Facility: CLINIC | Age: 87
End: 2022-05-03
Payer: MEDICARE

## 2022-05-03 VITALS
BODY MASS INDEX: 27.95 KG/M2 | HEART RATE: 57 BPM | OXYGEN SATURATION: 98 % | RESPIRATION RATE: 15 BRPM | HEIGHT: 71 IN | SYSTOLIC BLOOD PRESSURE: 166 MMHG | DIASTOLIC BLOOD PRESSURE: 70 MMHG | WEIGHT: 199.63 LBS

## 2022-05-03 DIAGNOSIS — E11.69 HYPERLIPIDEMIA ASSOCIATED WITH TYPE 2 DIABETES MELLITUS: ICD-10-CM

## 2022-05-03 DIAGNOSIS — I25.10 CORONARY ARTERY CALCIFICATION SEEN ON CAT SCAN: Primary | ICD-10-CM

## 2022-05-03 DIAGNOSIS — I27.21 PAH (PULMONARY ARTERY HYPERTENSION): ICD-10-CM

## 2022-05-03 DIAGNOSIS — I70.0 ATHEROSCLEROSIS OF ABDOMINAL AORTA: ICD-10-CM

## 2022-05-03 DIAGNOSIS — E78.5 HYPERLIPIDEMIA ASSOCIATED WITH TYPE 2 DIABETES MELLITUS: ICD-10-CM

## 2022-05-03 DIAGNOSIS — E11.21 DIABETES MELLITUS WITH PROTEINURIC DIABETIC NEPHROPATHY: ICD-10-CM

## 2022-05-03 DIAGNOSIS — N18.30 CKD STAGE 3 DUE TO TYPE 2 DIABETES MELLITUS: ICD-10-CM

## 2022-05-03 DIAGNOSIS — I10 ESSENTIAL HYPERTENSION: ICD-10-CM

## 2022-05-03 DIAGNOSIS — I34.0 NONRHEUMATIC MITRAL VALVE REGURGITATION: ICD-10-CM

## 2022-05-03 DIAGNOSIS — I11.9 LEFT VENTRICULAR HYPERTROPHY DUE TO HYPERTENSIVE DISEASE, WITHOUT HEART FAILURE: ICD-10-CM

## 2022-05-03 DIAGNOSIS — E11.22 CKD STAGE 3 DUE TO TYPE 2 DIABETES MELLITUS: ICD-10-CM

## 2022-05-03 PROCEDURE — 99214 PR OFFICE/OUTPT VISIT, EST, LEVL IV, 30-39 MIN: ICD-10-PCS | Mod: S$PBB,,, | Performed by: INTERNAL MEDICINE

## 2022-05-03 PROCEDURE — 99214 OFFICE O/P EST MOD 30 MIN: CPT | Mod: S$PBB,,, | Performed by: INTERNAL MEDICINE

## 2022-05-03 PROCEDURE — 99999 PR PBB SHADOW E&M-EST. PATIENT-LVL V: ICD-10-PCS | Mod: PBBFAC,,, | Performed by: INTERNAL MEDICINE

## 2022-05-03 PROCEDURE — 93010 ELECTROCARDIOGRAM REPORT: CPT | Mod: S$PBB,,, | Performed by: INTERNAL MEDICINE

## 2022-05-03 PROCEDURE — 93010 EKG 12-LEAD: ICD-10-PCS | Mod: S$PBB,,, | Performed by: INTERNAL MEDICINE

## 2022-05-03 PROCEDURE — 99999 PR PBB SHADOW E&M-EST. PATIENT-LVL V: CPT | Mod: PBBFAC,,, | Performed by: INTERNAL MEDICINE

## 2022-05-03 PROCEDURE — 99215 OFFICE O/P EST HI 40 MIN: CPT | Mod: PBBFAC | Performed by: INTERNAL MEDICINE

## 2022-05-03 PROCEDURE — 93005 ELECTROCARDIOGRAM TRACING: CPT | Mod: PBBFAC | Performed by: INTERNAL MEDICINE

## 2022-05-03 NOTE — PROGRESS NOTES
CARDIOLOGY CLINIC VISIT        HISTORY OF PRESENT ILLNESS:     Bria Lawson presents for continued care.  Last seen 11/09/2021.  Echocardiogram from 10/29/2021 shows normal ejection fraction estimated 60%.  Mild to moderate mitral regurgitation.  Normal pulmonary pressure.  Prior study had showed a normal to low normal left ventricular systolic function.  Moderate mitral regurgitation.  Mildly elevated pulmonary pressure.  Seen 05/29/2020 for evaluation of shortness of breath. CT scan on 5/26/20 showing pulmonary edema, bilateral pleural effusions. Coronary calcification noted. He is without complaints. No chest pain.  No shortness of breath. Digital hypertension reviewed.    05/03/2022:  No complaints.  EKG shows sinus bradycardia with first-degree AV block.  Home blood pressure logs reviewed.  Normal values.    CT Chest 5/26/20:     1. CT findings favoring sequela of cardiac decompensation causing pulmonary edema and bilateral pleural effusions.  Superimposed COVID-19 pneumonia cannot be entirely excluded noting that pleural effusions is not a commonly reported finding and therefore would be atypical.  2. Persistent soft tissue attenuation nodule within the prevascular mediastinum, presumably an enlarged lymph node and unchanged when compared to previous PET-CTs.  3. Slight interval increased size of multiple mediastinal lymph nodes, nonspecific.  4. Cardiomegaly with severe dense coronary artery atherosclerosis in a 3 vessel distribution.  5. Additional findings as detailed in the body of the report.    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    Hyperlipidemia 2/26/2015    Hypertension     Lymphoma     Uncontrolled secondary diabetes mellitus with stage 3 CKD (GFR 30-59) 8/28/2015       PAST SURGICAL HISTORY:     Past Surgical History:   Procedure Laterality Date    BONE MARROW BIOPSY Left 9/19/2018    Procedure: Biopsy-bone marrow;  Surgeon: Velia Tobias MD;  Location: [facility];  Service: Oncology;  Laterality: Left;    CATARACT EXTRACTION Bilateral 1996    COLONOSCOPY N/A 11/24/2020    Procedure: COLONOSCOPY Golytely;  Surgeon: Masoud Hwang MD;  Location: [facility];  Service: Endoscopy;  Laterality: N/A;    ESOPHAGOGASTRODUODENOSCOPY N/A 11/24/2020    Procedure: EGD (ESOPHAGOGASTRODUODENOSCOPY);  Surgeon: Masoud Hwang MD;  Location: [facility];  Service: Endoscopy;  Laterality: N/A;    EYE SURGERY      cataracts    JOINT REPLACEMENT      knee replacement    retinal injection s Bilateral     scrotal cyst         ALLERGIES AND MEDICATION:   Review of patient's allergies indicates:  No Known Allergies     Medication List          Accurate as of May 3, 2022 11:28 AM. If you have any questions, ask your nurse or doctor.            CONTINUE taking these medications    allopurinoL 100 MG tablet  Commonly known as: ZYLOPRIM  TAKE 1 TABLET(100 MG) BY MOUTH EVERY DAY     amLODIPine 10 MG tablet  Commonly known as: NORVASC  TAKE 1 TABLET(10 MG) BY MOUTH EVERY DAY     ascorbic acid (vitamin C) 1000 MG tablet  Commonly known as: VITAMIN C     atorvastatin 20 MG tablet  Commonly known as: LIPITOR  TAKE 1 TABLET(20 MG) BY MOUTH EVERY DAY     blood sugar diagnostic Strp  1 strip by Misc.(Non-Drug; Combo Route) route 2 (two) times daily. Disp glucometer and lancets as well     carvediloL 6.25 MG tablet  Commonly known as: COREG  TAKE 1 TABLET(6.25 MG) BY MOUTH TWICE  DAILY WITH MEALS     CENTRUM SILVER MEN ORAL     clindamycin 1 % lotion  Commonly known as: CLEOCIN T     DUPIXENT  mg/2 mL Pnij  Generic drug: dupilumab     FeroSuL 325 mg (65 mg iron) Tab tablet  Generic drug: ferrous sulfate  TAKE 1 TABLET BY MOUTH EVERY DAY WITH BREAKFAST     folic acid 800 MCG Tab  Commonly known as: FOLVITE     gabapentin 300 MG capsule  Commonly known as: NEURONTIN  TAKE 1 CAPSULE(300 MG) BY MOUTH THREE TIMES DAILY     glipiZIDE 10 MG Tr24  Commonly known as: GLUCOTROL  TAKE 1 TABLET(10 MG) BY MOUTH EVERY DAY     hydrocortisone 2.5 % cream     JANUVIA 100 MG Tab  Generic drug: SITagliptin  TAKE 1 TABLET ONCE DAILY     loratadine 10 mg tablet  Commonly known as: CLARITIN  Take 1 tablet (10 mg total) by mouth 2 (two) times a day.     niacin 500 MG Tab     pantoprazole 40 MG tablet  Commonly known as: PROTONIX  Take 1 tablet (40 mg total) by mouth once daily.     triamcinolone acetonide 0.1% 0.1 % ointment  Commonly known as: KENALOG  Apply topically 2 (two) times daily.     TRUEPLUS LANCETS 33 gauge Misc  Generic drug: lancets     TURMERIC ROOT EXTRACT ORAL     valsartan 320 MG tablet  Commonly known as: DIOVAN  TAKE 1 TABLET ONCE DAILY     vitamin E 400 UNIT capsule            SOCIAL HISTORY:     Social History     Socioeconomic History    Marital status:    Tobacco Use    Smoking status: Former Smoker    Smokeless tobacco: Never Used   Substance and Sexual Activity    Alcohol use: Yes     Comment: socially - maybe few times a week    Drug use: No    Sexual activity: Yes     Partners: Female     Social Determinants of Health     Financial Resource Strain: Low Risk     Difficulty of Paying Living Expenses: Not hard at all   Food Insecurity: No Food Insecurity    Worried About Running Out of Food in the Last Year: Never true    Ran Out of Food in the Last Year: Never true   Transportation Needs: No Transportation Needs    Lack of Transportation (Medical): No    Lack of  Transportation (Non-Medical): No   Physical Activity: Insufficiently Active    Days of Exercise per Week: 1 day    Minutes of Exercise per Session: 20 min   Stress: No Stress Concern Present    Feeling of Stress : Only a little   Social Connections: Unknown    Frequency of Communication with Friends and Family: Twice a week    Frequency of Social Gatherings with Friends and Family: Patient refused    Active Member of Clubs or Organizations: No    Attends Club or Organization Meetings: Never    Marital Status:    Housing Stability: Low Risk     Unable to Pay for Housing in the Last Year: No    Number of Places Lived in the Last Year: 1    Unstable Housing in the Last Year: No       FAMILY HISTORY:     Family History   Problem Relation Age of Onset    Hypertension Mother     Diabetes Father     No Known Problems Brother     No Known Problems Daughter     No Known Problems Son     No Known Problems Son     No Known Problems Maternal Aunt     No Known Problems Maternal Uncle     No Known Problems Paternal Aunt     No Known Problems Paternal Uncle     No Known Problems Maternal Grandmother     No Known Problems Maternal Grandfather     No Known Problems Paternal Grandmother     No Known Problems Paternal Grandfather     Amblyopia Neg Hx     Blindness Neg Hx     Cancer Neg Hx     Cataracts Neg Hx     Glaucoma Neg Hx     Macular degeneration Neg Hx     Retinal detachment Neg Hx     Strabismus Neg Hx     Stroke Neg Hx     Thyroid disease Neg Hx        REVIEW OF SYSTEMS:   Review of Systems   Respiratory: Negative for sputum production, shortness of breath and wheezing.    Cardiovascular: Negative for chest pain, palpitations, orthopnea, claudication, leg swelling and PND.   Gastrointestinal: Negative for abdominal pain, heartburn, nausea and vomiting.   Neurological: Negative for dizziness, sensory change, speech change, seizures, loss of consciousness, weakness and headaches.  "      PHYSICAL EXAM:     Vitals:    05/03/22 1111   BP: (!) 166/70   Pulse: (!) 57   Resp: 15    Body mass index is 27.84 kg/m².  Weight: 90.5 kg (199 lb 10 oz)   Height: 5' 11" (180.3 cm)     Physical Exam  Vitals reviewed.   Constitutional:       General: He is not in acute distress.     Appearance: He is well-developed. He is not diaphoretic.   Neck:      Vascular: No carotid bruit or JVD.   Cardiovascular:      Rate and Rhythm: Regular rhythm. Bradycardia present.      Pulses: Normal pulses.      Heart sounds: Murmur heard.    Systolic murmur is present with a grade of 3/6.  Pulmonary:      Effort: Pulmonary effort is normal.      Breath sounds: Normal breath sounds.   Abdominal:      General: Bowel sounds are normal.      Palpations: Abdomen is soft.      Tenderness: There is no abdominal tenderness.   Musculoskeletal:      Right lower leg: No edema.      Left lower leg: No edema.   Neurological:      Mental Status: He is alert and oriented to person, place, and time.   Psychiatric:         Speech: Speech normal.         Behavior: Behavior normal.         DATA:     Laboratory:  CBC:  Recent Labs   Lab 10/08/21  0902 11/15/21  0846 03/09/22  0916   WBC 3.61 L 3.75 L 4.05   Hemoglobin 13.6 L 13.0 L 12.7 L   Hematocrit 42.1 41.0 40.0   Platelets 68 L 64 L 66 L       CHEMISTRIES:  Recent Labs   Lab 11/15/21  0846 03/09/22  0916 04/05/22  1131   Glucose 173 H 132 H 176 H   Sodium 143 140 139   Potassium 4.6 4.4 4.2   BUN 16 16 17   Creatinine 1.5 H 1.3 1.4   eGFR if African American 49 A 58 A 52.6 A   eGFR if non  42 A 50 A 45.5 A   Calcium 9.7 9.3 9.4       CARDIAC BIOMARKERS:  Recent Labs   Lab 10/26/20  1401   Troponin I <0.006       COAGS:        LIPIDS/LFTS:  Recent Labs   Lab 01/23/20  1154 03/09/20  0905 07/02/21  0814 11/15/21  0846 03/09/22  0916   Cholesterol 112 L  --  106 L  --   --    Triglycerides 170 H  --  121  --   --    HDL 34 L  --  34 L  --   --    LDL Cholesterol 44.0 L  --  47.8 " L  --   --    Non-HDL Cholesterol 78  --  72  --   --    AST  --    < > 15 14 18   ALT  --    < > 15 11 14    < > = values in this interval not displayed.       Cardiovascular Testing:    Echocardiogram 10/29/2021:    · The left ventricle is mildly enlarged with eccentric hypertrophy and normal systolic function.  · The estimated ejection fraction is 60%.  · Indeterminate left ventricular diastolic function.  · Normal right ventricular size with normal right ventricular systolic function.  · Moderate left atrial enlargement.  · Mild-to-moderate mitral regurgitation.  · Mild tricuspid regurgitation.  · Normal central venous pressure (3 mmHg).  · The estimated PA systolic pressure is 27 mmHg.    Echo 6/19/20:     · Low normal left ventricular systolic function. The estimated ejection fraction is 50-55%.  · Mild eccentric left ventricular hypertrophy.  · Mild left ventricular enlargement.  · No wall motion abnormalities.  · Normal right ventricular systolic function.  · Moderate mitral regurgitation.  · Mild tricuspid regurgitation.  · The estimated PA systolic pressure is 35 mmHg.    ASSESSMENT:     1. Abnormal CT scan/coronary calcification:  No symptoms suggestive of angina  2. Hypertension  3. Hyperlipidemia: LDL 47  4. Diabetes mellitus  5. Lymphoma:  No recurrence on PET  6. Pulmonary hypertension  7. Mitral regurgitation:  Mild/Moderate by last echo  8. Atherosclerosis of abdominal aorta  9. Left ventricular hypertrophy without heart failure  10. Thrombocytopenia  11. Chronic kidney disease    PLAN:     1. Hypertension:  Continue current management  2. Hyperlipidemia:  Continue current management  3. Mitral regurgitation:  Echo showed mild to moderate  4. Pulmonary hypertension:  Follow-up echocardiogram showed normal pulmonary pressure.  5. Return to clinic 6 months.          Oleg Fontana MD, MPH, FACC, [facility]

## 2022-05-09 ENCOUNTER — LAB VISIT (OUTPATIENT)
Dept: LAB | Facility: HOSPITAL | Age: 87
End: 2022-05-09
Attending: UROLOGY
Payer: MEDICARE

## 2022-05-09 ENCOUNTER — OFFICE VISIT (OUTPATIENT)
Dept: UROLOGY | Facility: CLINIC | Age: 87
End: 2022-05-09
Payer: MEDICARE

## 2022-05-09 VITALS
WEIGHT: 194.25 LBS | BODY MASS INDEX: 27.19 KG/M2 | HEIGHT: 71 IN | SYSTOLIC BLOOD PRESSURE: 159 MMHG | DIASTOLIC BLOOD PRESSURE: 73 MMHG | HEART RATE: 56 BPM

## 2022-05-09 DIAGNOSIS — R31.29 MICROHEMATURIA: ICD-10-CM

## 2022-05-09 DIAGNOSIS — N18.31 HYPERTENSIVE KIDNEY DISEASE WITH STAGE 3A CHRONIC KIDNEY DISEASE: ICD-10-CM

## 2022-05-09 DIAGNOSIS — I12.9 HYPERTENSIVE KIDNEY DISEASE WITH STAGE 3A CHRONIC KIDNEY DISEASE: ICD-10-CM

## 2022-05-09 LAB
CREAT SERPL-MCNC: 1.2 MG/DL (ref 0.5–1.4)
EST. GFR  (AFRICAN AMERICAN): >60 ML/MIN/1.73 M^2
EST. GFR  (NON AFRICAN AMERICAN): 54.8 ML/MIN/1.73 M^2

## 2022-05-09 PROCEDURE — 88112 CYTOPATH CELL ENHANCE TECH: CPT | Mod: 26,,, | Performed by: PATHOLOGY

## 2022-05-09 PROCEDURE — 99999 PR PBB SHADOW E&M-EST. PATIENT-LVL V: CPT | Mod: PBBFAC,,, | Performed by: UROLOGY

## 2022-05-09 PROCEDURE — 99215 OFFICE O/P EST HI 40 MIN: CPT | Mod: PBBFAC | Performed by: UROLOGY

## 2022-05-09 PROCEDURE — 36415 COLL VENOUS BLD VENIPUNCTURE: CPT | Performed by: UROLOGY

## 2022-05-09 PROCEDURE — 82565 ASSAY OF CREATININE: CPT | Performed by: UROLOGY

## 2022-05-09 PROCEDURE — 99999 PR PBB SHADOW E&M-EST. PATIENT-LVL V: ICD-10-PCS | Mod: PBBFAC,,, | Performed by: UROLOGY

## 2022-05-09 PROCEDURE — 88112 PR  CYTOPATH, CELL ENHANCE TECH: ICD-10-PCS | Mod: 26,,, | Performed by: PATHOLOGY

## 2022-05-09 PROCEDURE — 99204 OFFICE O/P NEW MOD 45 MIN: CPT | Mod: S$PBB,,, | Performed by: UROLOGY

## 2022-05-09 PROCEDURE — 88112 CYTOPATH CELL ENHANCE TECH: CPT | Performed by: PATHOLOGY

## 2022-05-09 PROCEDURE — 99204 PR OFFICE/OUTPT VISIT, NEW, LEVL IV, 45-59 MIN: ICD-10-PCS | Mod: S$PBB,,, | Performed by: UROLOGY

## 2022-05-09 NOTE — PROGRESS NOTES
Subjective:       Patient ID: Bria Lawson is a 86 y.o. male.    Chief Complaint: Hematuria    HPI patient is here with microscopi hematuria at 5 7 red cells per high-power field.  He is voiding well.  He needs  A workup.  His creatinine is normal.  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    Hyperlipidemia 2/26/2015    Hypertension     Lymphoma     Uncontrolled secondary diabetes mellitus with stage 3 CKD (GFR 30-59) 8/28/2015       Past Surgical History:   Procedure Laterality Date    BONE MARROW BIOPSY Left 9/19/2018    Procedure: Biopsy-bone marrow;  Surgeon: Velia Tobias MD;  Location: [facility];  Service: Oncology;  Laterality: Left;    CATARACT EXTRACTION Bilateral 1996    COLONOSCOPY N/A 11/24/2020    Procedure: COLONOSCOPY Golytely;  Surgeon: Masoud Hwang MD;  Location: [facility];  Service: Endoscopy;  Laterality: N/A;    ESOPHAGOGASTRODUODENOSCOPY N/A 11/24/2020    Procedure: EGD (ESOPHAGOGASTRODUODENOSCOPY);  Surgeon: Masoud Hwang MD;  Location: [facility];  Service: Endoscopy;  Laterality: N/A;    EYE SURGERY      cataracts    JOINT REPLACEMENT      knee replacement    retinal injection s Bilateral     scrotal cyst         Family History   Problem Relation Age of Onset    Hypertension Mother     Diabetes Father     No Known Problems Brother     No Known Problems Daughter     No Known Problems Son     No Known Problems Son     No Known Problems Maternal Aunt     No Known Problems Maternal Uncle     No Known Problems Paternal Aunt     No Known Problems Paternal Uncle     No Known Problems Maternal  Grandmother     No Known Problems Maternal Grandfather     No Known Problems Paternal Grandmother     No Known Problems Paternal Grandfather     Amblyopia Neg Hx     Blindness Neg Hx     Cancer Neg Hx     Cataracts Neg Hx     Glaucoma Neg Hx     Macular degeneration Neg Hx     Retinal detachment Neg Hx     Strabismus Neg Hx     Stroke Neg Hx     Thyroid disease Neg Hx        Social History     Socioeconomic History    Marital status:    Tobacco Use    Smoking status: Former Smoker    Smokeless tobacco: Never Used   Substance and Sexual Activity    Alcohol use: Yes     Comment: socially - maybe few times a week    Drug use: No    Sexual activity: Yes     Partners: Female     Social Determinants of Health     Financial Resource Strain: Low Risk     Difficulty of Paying Living Expenses: Not hard at all   Food Insecurity: No Food Insecurity    Worried About Running Out of Food in the Last Year: Never true    Ran Out of Food in the Last Year: Never true   Transportation Needs: No Transportation Needs    Lack of Transportation (Medical): No    Lack of Transportation (Non-Medical): No   Physical Activity: Insufficiently Active    Days of Exercise per Week: 1 day    Minutes of Exercise per Session: 20 min   Stress: No Stress Concern Present    Feeling of Stress : Only a little   Social Connections: Unknown    Frequency of Communication with Friends and Family: Twice a week    Frequency of Social Gatherings with Friends and Family: Patient refused    Active Member of Clubs or Organizations: No    Attends Club or Organization Meetings: Never    Marital Status:    Housing Stability: Low Risk     Unable to Pay for Housing in the Last Year: No    Number of Places Lived in the Last Year: 1    Unstable Housing in the Last Year: No       Allergies:  Patient has no known allergies.    Medications:    Current Outpatient Medications:     allopurinoL (ZYLOPRIM) 100 MG tablet, TAKE 1  TABLET(100 MG) BY MOUTH EVERY DAY, Disp: 90 tablet, Rfl: 12    amLODIPine (NORVASC) 10 MG tablet, TAKE 1 TABLET(10 MG) BY MOUTH EVERY DAY, Disp: 90 tablet, Rfl: 12    ascorbic acid, vitamin C, (VITAMIN C) 1000 MG tablet, Take 1,000 mg by mouth once daily., Disp: , Rfl:     atorvastatin (LIPITOR) 20 MG tablet, TAKE 1 TABLET(20 MG) BY MOUTH EVERY DAY, Disp: 90 tablet, Rfl: 12    blood sugar diagnostic Strp, 1 strip by Misc.(Non-Drug; Combo Route) route 2 (two) times daily. Disp glucometer and lancets as well, Disp: 100 strip, Rfl: 12    carvediloL (COREG) 6.25 MG tablet, TAKE 1 TABLET(6.25 MG) BY MOUTH TWICE DAILY WITH MEALS, Disp: 180 tablet, Rfl: 12    clindamycin (CLEOCIN T) 1 % lotion, APPLY ON THE SKIN RASH ON FACE TWICE DAILY, Disp: , Rfl:     DUPIXENT  mg/2 mL PnIj, Inject into the skin., Disp: , Rfl:     FEROSUL 325 mg (65 mg iron) Tab tablet, TAKE 1 TABLET BY MOUTH EVERY DAY WITH BREAKFAST, Disp: 90 tablet, Rfl: 0    folic acid (FOLVITE) 800 MCG Tab, Take 800 mcg by mouth once daily., Disp: , Rfl:     gabapentin (NEURONTIN) 300 MG capsule, TAKE 1 CAPSULE(300 MG) BY MOUTH THREE TIMES DAILY, Disp: 270 capsule, Rfl: 3    glipiZIDE (GLUCOTROL) 10 MG TR24, TAKE 1 TABLET(10 MG) BY MOUTH EVERY DAY, Disp: 90 tablet, Rfl: 0    JANUVIA 100 mg Tab, TAKE 1 TABLET ONCE DAILY, Disp: 90 tablet, Rfl: 0    loratadine (CLARITIN) 10 mg tablet, Take 1 tablet (10 mg total) by mouth 2 (two) times a day., Disp: 60 tablet, Rfl: 12    multivit-min/FA/lycopen/lutein (CENTRUM SILVER MEN ORAL), Take by mouth., Disp: , Rfl:     niacin 500 MG Tab, Take 100 mg by mouth every evening., Disp: , Rfl:     pantoprazole (PROTONIX) 40 MG tablet, Take 1 tablet (40 mg total) by mouth once daily., Disp: 90 tablet, Rfl: 3    TRUEPLUS LANCETS 33 gauge Misc, USE TO TEST BLOOD SUGAR BID, Disp: , Rfl: 12    TURMERIC ROOT EXTRACT ORAL, Take by mouth., Disp: , Rfl:     valsartan (DIOVAN) 320 MG tablet, TAKE 1 TABLET ONCE DAILY,  Disp: 90 tablet, Rfl: 3    vitamin E 400 UNIT capsule, Take 400 Units by mouth once daily., Disp: , Rfl:     Review of Systems   Constitutional: Negative for activity change, appetite change, chills, diaphoresis, fatigue, fever and unexpected weight change.   HENT: Negative for congestion, dental problem, hearing loss, mouth sores, postnasal drip, rhinorrhea, sinus pressure and trouble swallowing.    Eyes: Negative for pain, discharge and itching.   Respiratory: Negative for apnea, cough, choking, chest tightness, shortness of breath and wheezing.    Cardiovascular: Negative for chest pain, palpitations and leg swelling.   Gastrointestinal: Negative for abdominal distention, abdominal pain, anal bleeding, blood in stool, constipation, diarrhea, nausea, rectal pain and vomiting.   Endocrine: Negative for polydipsia and polyuria.   Genitourinary: Negative for decreased urine volume, difficulty urinating, dysuria, enuresis, flank pain, frequency, genital sores, hematuria, penile discharge, penile pain, penile swelling, scrotal swelling, testicular pain and urgency.   Musculoskeletal: Negative for arthralgias, back pain and myalgias.   Skin: Negative for color change, rash and wound.   Neurological: Negative for dizziness, syncope, speech difficulty, light-headedness and headaches.   Hematological: Negative for adenopathy. Does not bruise/bleed easily.   Psychiatric/Behavioral: Negative for behavioral problems, confusion, hallucinations and sleep disturbance.       Objective:      Physical Exam  Constitutional:       Appearance: He is well-developed.   HENT:      Head: Normocephalic.   Cardiovascular:      Rate and Rhythm: Normal rate.   Pulmonary:      Effort: Pulmonary effort is normal.   Abdominal:      Palpations: Abdomen is soft.   Genitourinary:     Prostate: Normal.   Skin:     General: Skin is warm.   Neurological:      Mental Status: He is alert.         Assessment:       1. Hypertensive kidney disease with  stage 3a chronic kidney disease    2. Microhematuria        Plan:       Bria was seen today for hematuria.    Diagnoses and all orders for this visit:    Hypertensive kidney disease with stage 3a chronic kidney disease  -     Ambulatory referral/consult to Urology    Microhematuria  -     Ambulatory referral/consult to Urology  -     Cystoscopy; Future  -     CT Urogram Abd Pelvis W WO; Future  -     Creatinine, serum; Future  -     Creatinine, Serum; Future  -     Cytology, Urine

## 2022-05-10 ENCOUNTER — HOSPITAL ENCOUNTER (OUTPATIENT)
Dept: RADIOLOGY | Facility: HOSPITAL | Age: 87
Discharge: HOME | End: 2022-05-10
Attending: UROLOGY
Payer: MEDICARE

## 2022-05-10 DIAGNOSIS — R31.29 MICROHEMATURIA: ICD-10-CM

## 2022-05-10 PROCEDURE — 74178 CT ABD&PLV WO CNTR FLWD CNTR: CPT | Mod: 26,,, | Performed by: STUDENT IN AN ORGANIZED HEALTH CARE EDUCATION/TRAINING PROGRAM

## 2022-05-10 PROCEDURE — 74178 CT UROGRAM ABD PELVIS W WO: ICD-10-PCS | Mod: 26,,, | Performed by: STUDENT IN AN ORGANIZED HEALTH CARE EDUCATION/TRAINING PROGRAM

## 2022-05-10 PROCEDURE — 74178 CT ABD&PLV WO CNTR FLWD CNTR: CPT | Mod: TC

## 2022-05-10 PROCEDURE — 25500020 PHARM REV CODE 255: Performed by: UROLOGY

## 2022-05-10 RX ADMIN — IOHEXOL 125 ML: 350 INJECTION, SOLUTION INTRAVENOUS at 05:05

## 2022-05-12 LAB
FINAL PATHOLOGIC DIAGNOSIS: NORMAL
Lab: NORMAL

## 2022-05-16 ENCOUNTER — HOSPITAL ENCOUNTER (OUTPATIENT)
Dept: RADIOLOGY | Facility: OTHER | Age: 87
Discharge: HOME | End: 2022-05-16
Attending: INTERNAL MEDICINE
Payer: MEDICARE

## 2022-05-16 DIAGNOSIS — R31.29 MICROHEMATURIA: ICD-10-CM

## 2022-05-16 PROCEDURE — 76770 US RETROPERITONEAL COMPLETE: ICD-10-PCS | Mod: 26,,, | Performed by: RADIOLOGY

## 2022-05-16 PROCEDURE — 76770 US EXAM ABDO BACK WALL COMP: CPT | Mod: TC

## 2022-05-16 PROCEDURE — 76770 US EXAM ABDO BACK WALL COMP: CPT | Mod: 26,,, | Performed by: RADIOLOGY

## 2022-05-31 ENCOUNTER — EXTERNAL CHRONIC CARE MANAGEMENT (OUTPATIENT)
Dept: PRIMARY CARE CLINIC | Facility: CLINIC | Age: 87
End: 2022-05-31
Payer: MEDICARE

## 2022-05-31 PROCEDURE — 99490 CHRNC CARE MGMT STAFF 1ST 20: CPT | Mod: PBBFAC,PO | Performed by: INTERNAL MEDICINE

## 2022-05-31 PROCEDURE — 99439 CHRNC CARE MGMT STAF EA ADDL: CPT | Mod: S$PBB,,, | Performed by: INTERNAL MEDICINE

## 2022-05-31 PROCEDURE — 99490 PR CHRONIC CARE MGMT, 1ST 20 MIN: ICD-10-PCS | Mod: S$PBB,,, | Performed by: INTERNAL MEDICINE

## 2022-05-31 PROCEDURE — 99439 CHRNC CARE MGMT STAF EA ADDL: CPT | Mod: PBBFAC,PO | Performed by: INTERNAL MEDICINE

## 2022-05-31 PROCEDURE — 99439 PR CHRONIC CARE MGMT, EA ADDTL 20 MIN: ICD-10-PCS | Mod: S$PBB,,, | Performed by: INTERNAL MEDICINE

## 2022-05-31 PROCEDURE — 99490 CHRNC CARE MGMT STAFF 1ST 20: CPT | Mod: S$PBB,,, | Performed by: INTERNAL MEDICINE

## 2022-05-31 NOTE — TELEPHONE ENCOUNTER
Care Due:                  Date            Visit Type   Department     Provider  --------------------------------------------------------------------------------                                [facility]                              FOLLOWUP/OF  MED/ INTERNAL  Adiel Coleman  Last Visit: 04-      FICE VISIT   MED/ PEDS      Ehrensing  Next Visit: None Scheduled  None         None Found                                                            Last  Test          Frequency    Reason                     Performed    Due Date  --------------------------------------------------------------------------------    Lipid Panel.  12 months..  atorvastatin.............  07- 06-    Uric Acid...  12 months..  allopurinoL..............  02- 02-    [employer] Embedded Care Gaps. Reference number: [id number]. 5/30/2022   8:20:31 PM CDT

## 2022-05-31 NOTE — TELEPHONE ENCOUNTER
Refill Routing Note   Medication(s) are not appropriate for processing by [facility] for the following reason(s):      Drug-Disease: JANUVIA and CKD stage 3 due to type 2 diabetes mellitus    [facility] action(s):  Defer Medication-related problems identified:   Requires labs  Drug-disease interaction     Medication Therapy Plan: Drug-Disease: JANUVIA and CKD stage 3 due to type 2 diabetes mellitus; Defer  Medication reconciliation completed: No     Appointments  past 12m or future 3m with PCP    Date Provider   Last Visit   4/5/2022 Adiel Bragg MD   Next Visit   Visit date not found Adiel Bragg MD   ED visits in past 90 days: 0        Note composed:8:30 PM 05/30/2022

## 2022-06-01 RX ORDER — SITAGLIPTIN 100 MG/1
TABLET, FILM COATED ORAL
Qty: 90 TABLET | Refills: 1 | Status: SHIPPED | OUTPATIENT
Start: 2022-06-01 | End: 2022-11-30

## 2022-06-04 RX ORDER — GABAPENTIN 300 MG/1
CAPSULE ORAL
Qty: 270 CAPSULE | Refills: 0 | Status: SHIPPED | OUTPATIENT
Start: 2022-06-04 | End: 2022-08-31 | Stop reason: SDUPTHER

## 2022-06-08 ENCOUNTER — OFFICE VISIT (OUTPATIENT)
Dept: RHEUMATOLOGY | Facility: CLINIC | Age: 87
End: 2022-06-08
Payer: MEDICARE

## 2022-06-08 VITALS
HEART RATE: 62 BPM | WEIGHT: 203.5 LBS | DIASTOLIC BLOOD PRESSURE: 64 MMHG | SYSTOLIC BLOOD PRESSURE: 168 MMHG | BODY MASS INDEX: 28.38 KG/M2

## 2022-06-08 DIAGNOSIS — D50.9 IRON DEFICIENCY ANEMIA, UNSPECIFIED IRON DEFICIENCY ANEMIA TYPE: ICD-10-CM

## 2022-06-08 DIAGNOSIS — M1A.00X0 IDIOPATHIC CHRONIC GOUT WITHOUT TOPHUS, UNSPECIFIED SITE: Primary | ICD-10-CM

## 2022-06-08 DIAGNOSIS — C85.19 B-CELL LYMPHOMA OF EXTRANODAL SITE: ICD-10-CM

## 2022-06-08 DIAGNOSIS — E11.21 DIABETES MELLITUS WITH PROTEINURIC DIABETIC NEPHROPATHY: ICD-10-CM

## 2022-06-08 DIAGNOSIS — M15.9 PRIMARY OSTEOARTHRITIS INVOLVING MULTIPLE JOINTS: ICD-10-CM

## 2022-06-08 PROBLEM — M06.9 RHEUMATOID ARTHRITIS, UNSPECIFIED: Status: RESOLVED | Noted: 2018-08-16 | Resolved: 2022-06-08

## 2022-06-08 PROCEDURE — 99999 PR PBB SHADOW E&M-EST. PATIENT-LVL IV: ICD-10-PCS | Mod: PBBFAC,,, | Performed by: INTERNAL MEDICINE

## 2022-06-08 PROCEDURE — 99213 OFFICE O/P EST LOW 20 MIN: CPT | Mod: S$PBB,,, | Performed by: INTERNAL MEDICINE

## 2022-06-08 PROCEDURE — 99213 PR OFFICE/OUTPT VISIT, EST, LEVL III, 20-29 MIN: ICD-10-PCS | Mod: S$PBB,,, | Performed by: INTERNAL MEDICINE

## 2022-06-08 PROCEDURE — 99214 OFFICE O/P EST MOD 30 MIN: CPT | Mod: PBBFAC | Performed by: INTERNAL MEDICINE

## 2022-06-08 PROCEDURE — 99999 PR PBB SHADOW E&M-EST. PATIENT-LVL IV: CPT | Mod: PBBFAC,,, | Performed by: INTERNAL MEDICINE

## 2022-06-08 RX ORDER — FERROUS SULFATE TAB 325 MG (65 MG ELEMENTAL FE) 325 (65 FE) MG
TAB ORAL
Qty: 90 TABLET | Refills: 0 | Status: SHIPPED | OUTPATIENT
Start: 2022-06-08 | End: 2022-09-07

## 2022-06-08 RX ORDER — GLIPIZIDE 10 MG/1
TABLET, FILM COATED, EXTENDED RELEASE ORAL
Qty: 90 TABLET | Refills: 0 | Status: SHIPPED | OUTPATIENT
Start: 2022-06-08 | End: 2022-09-07

## 2022-06-08 NOTE — TELEPHONE ENCOUNTER
No new care gaps identified.  [facility] Embedded Care Gaps. Reference number: [id number]. 6/08/2022   7:38:54 AM FAITHT

## 2022-06-09 NOTE — PROGRESS NOTES
History of present illness:  86-year-old gentleman with a long history of gouty arthritis.  He has been on allopurinol 100 mg daily and has not had a gout attack for many years.  I saw him initially in 2015.  At that time he had an acute inflammatory arthritis of the hands and wrist.  He had been diagnosed previously as osteoarthritis and has had a previous total knee replacement on the left.  He was seronegative.  I treated him with prednisone for 18 months.  On stopping the prednisone he had no recurrence of his symptoms.  He did have some paresthesias and I placed him on gabapentin.  He continues to take it and has had no recent symptoms.  He developed a low-grade lymphoma.  He was treated with Rituxan.  His lymphoma is under control.  He has not needed treatment recently.      He has some trouble using his hands.  He remains on gabapentin for his numbness.  He was started on Dupixent for eczema.  This has been helping and he tolerates it.  He denies other recent medical problems.    Physical examination was not performed, the entire time was counseling.    Assessment:  1. Inter critical gout  2. Osteoarthritis    Plans:  1. Continue allopurinol as before  2. He will have a uric acid level with his next blood work  3. Return to see me in 12 months.

## 2022-06-21 ENCOUNTER — PROCEDURE VISIT (OUTPATIENT)
Dept: UROLOGY | Facility: CLINIC | Age: 87
End: 2022-06-21
Payer: MEDICARE

## 2022-06-21 VITALS
RESPIRATION RATE: 18 BRPM | HEIGHT: 71 IN | HEART RATE: 60 BPM | DIASTOLIC BLOOD PRESSURE: 74 MMHG | TEMPERATURE: 97 F | SYSTOLIC BLOOD PRESSURE: 163 MMHG | WEIGHT: 199.31 LBS | BODY MASS INDEX: 27.9 KG/M2

## 2022-06-21 DIAGNOSIS — R31.29 MICROHEMATURIA: ICD-10-CM

## 2022-06-21 PROCEDURE — 52000 CYSTOSCOPY: ICD-10-PCS | Mod: S$PBB,,, | Performed by: UROLOGY

## 2022-06-21 PROCEDURE — 52000 CYSTOURETHROSCOPY: CPT | Mod: PBBFAC | Performed by: UROLOGY

## 2022-06-21 RX ORDER — LIDOCAINE HYDROCHLORIDE 20 MG/ML
JELLY TOPICAL
Status: COMPLETED | OUTPATIENT
Start: 2022-06-21 | End: 2022-06-21

## 2022-06-21 RX ADMIN — LIDOCAINE HYDROCHLORIDE: 20 JELLY TOPICAL at 02:06

## 2022-06-21 NOTE — PATIENT INSTRUCTIONS
What to Expect After a Cystoscopy  For the next 24-48 hours, you may feel a mild burning when you urinate. This burning is normal and expected. Drink plenty of water to dilute the urine to help relieve the burning sensation. You may also see a small amount of blood in your urine after the procedure.    Unless you are already taking antibiotics, you may be given an antibiotic after the test to prevent infection.    Signs and Symptoms to Report  Call the [facility] at [phone] if you develop any of the following:  Fever of 101 degrees or higher  Chills or persistent bleeding  Inability to urinate

## 2022-06-21 NOTE — PROCEDURES
Cystoscopy    Date/Time: 6/21/2022 2:15 PM  Performed by: Ernie Nunn Jr., MD  Authorized by: Ernie Nunn Jr., MD     Consent Done?:  Yes (Written)  Timeout: prior to procedure the correct patient, procedure, and site was verified    Prep: patient was prepped and draped in usual sterile fashion    Local anesthesia used?: Yes    Anesthesia:  Lidocaine jelly  Indications: hematuria    Position:  Supine  Anesthesia:  Lidocaine jelly  Patient sedated?: No    Preparation: Patient was prepped and draped in usual sterile fashion    Scope type:  Flexible cystoscope  Stent inserted: No    Stent removed: No    External exam normal: Yes    Digital exam performed: No    Urethra normal: Yes    Prostate normal: bilobar bph.    Bladder neck normal: Yes    Bladder normal: Yes     patient tolerated the procedure well with no immediate complications  Comments:      rtc 6 mos w ua    Small lesion near prostate  rec repeat MRI w pcp in 2 years

## 2022-06-27 ENCOUNTER — TELEPHONE (OUTPATIENT)
Dept: UROLOGY | Facility: CLINIC | Age: 87
End: 2022-06-27
Payer: MEDICARE

## 2022-06-27 NOTE — TELEPHONE ENCOUNTER
Returned pts call. Pt has elevated temp, over 101. Chills, no appetite, has been urinating every 20 minutes. He also reports no bowel movement in 6 days. Instructed pt he needed to contact pcp for bowels. Instructed pt if he had fever chills he should go to emergency room. Pt verbalized understanding.

## 2022-06-27 NOTE — TELEPHONE ENCOUNTER
----- Message from Levi Purcell sent at 6/27/2022  9:11 AM CDT -----  Contact: pt  Pt requesting call back RE: Following procedure pt states that he has concerns he would like to address, Pt states that he has all symptoms listed on the paper that was given to him      Confirmed contact below:  Contact Name:Bria Lawson  Phone Number: [phone]

## 2022-06-30 ENCOUNTER — EXTERNAL CHRONIC CARE MANAGEMENT (OUTPATIENT)
Dept: PRIMARY CARE CLINIC | Facility: CLINIC | Age: 87
End: 2022-06-30
Payer: MEDICARE

## 2022-06-30 PROCEDURE — 99439 PR CHRONIC CARE MGMT, EA ADDTL 20 MIN: ICD-10-PCS | Mod: S$PBB,,, | Performed by: INTERNAL MEDICINE

## 2022-06-30 PROCEDURE — 99439 CHRNC CARE MGMT STAF EA ADDL: CPT | Mod: PBBFAC,PO | Performed by: INTERNAL MEDICINE

## 2022-06-30 PROCEDURE — 99490 CHRNC CARE MGMT STAFF 1ST 20: CPT | Mod: PBBFAC,PO | Performed by: INTERNAL MEDICINE

## 2022-06-30 PROCEDURE — 99490 PR CHRONIC CARE MGMT, 1ST 20 MIN: ICD-10-PCS | Mod: S$PBB,,, | Performed by: INTERNAL MEDICINE

## 2022-06-30 PROCEDURE — 99439 CHRNC CARE MGMT STAF EA ADDL: CPT | Mod: S$PBB,,, | Performed by: INTERNAL MEDICINE

## 2022-06-30 PROCEDURE — 99490 CHRNC CARE MGMT STAFF 1ST 20: CPT | Mod: S$PBB,,, | Performed by: INTERNAL MEDICINE

## 2022-07-02 ENCOUNTER — HOSPITAL ENCOUNTER (EMERGENCY)
Facility: HOSPITAL | Age: 87
Discharge: HOME | End: 2022-07-02
Attending: EMERGENCY MEDICINE
Payer: MEDICARE

## 2022-07-02 VITALS
SYSTOLIC BLOOD PRESSURE: 155 MMHG | OXYGEN SATURATION: 97 % | DIASTOLIC BLOOD PRESSURE: 69 MMHG | TEMPERATURE: 98 F | HEART RATE: 69 BPM | RESPIRATION RATE: 18 BRPM | WEIGHT: 194 LBS | BODY MASS INDEX: 27.06 KG/M2

## 2022-07-02 DIAGNOSIS — N39.0 LOWER URINARY TRACT INFECTIOUS DISEASE: Primary | ICD-10-CM

## 2022-07-02 LAB
ALBUMIN SERPL BCP-MCNC: 3.5 G/DL (ref 3.5–5.2)
ALP SERPL-CCNC: 69 U/L (ref 55–135)
ALT SERPL W/O P-5'-P-CCNC: 23 U/L (ref 10–44)
ANION GAP SERPL CALC-SCNC: 8 MMOL/L (ref 8–16)
AST SERPL-CCNC: 20 U/L (ref 10–40)
BASOPHILS # BLD AUTO: 0.01 K/UL (ref 0–0.2)
BASOPHILS NFR BLD: 0.1 % (ref 0–1.9)
BILIRUB SERPL-MCNC: 1.1 MG/DL (ref 0.1–1)
BILIRUB UR QL STRIP: NEGATIVE
BUN SERPL-MCNC: 15 MG/DL (ref 8–23)
CALCIUM SERPL-MCNC: 9.3 MG/DL (ref 8.7–10.5)
CHLORIDE SERPL-SCNC: 104 MMOL/L (ref 95–110)
CLARITY UR: CLEAR
CO2 SERPL-SCNC: 27 MMOL/L (ref 23–29)
COLOR UR: YELLOW
CREAT SERPL-MCNC: 1.3 MG/DL (ref 0.5–1.4)
CTP QC/QA: YES
DIFFERENTIAL METHOD: ABNORMAL
EOSINOPHIL # BLD AUTO: 0.1 K/UL (ref 0–0.5)
EOSINOPHIL NFR BLD: 0.7 % (ref 0–8)
ERYTHROCYTE [DISTWIDTH] IN BLOOD BY AUTOMATED COUNT: 14.3 % (ref 11.5–14.5)
EST. GFR  (AFRICAN AMERICAN): 58 ML/MIN/1.73 M^2
EST. GFR  (NON AFRICAN AMERICAN): 50 ML/MIN/1.73 M^2
GLUCOSE SERPL-MCNC: 238 MG/DL (ref 70–110)
GLUCOSE UR QL STRIP: ABNORMAL
HCT VFR BLD AUTO: 34.4 % (ref 40–54)
HGB BLD-MCNC: 11.2 G/DL (ref 14–18)
HGB UR QL STRIP: NEGATIVE
IMM GRANULOCYTES # BLD AUTO: 0.08 K/UL (ref 0–0.04)
IMM GRANULOCYTES NFR BLD AUTO: 1.1 % (ref 0–0.5)
KETONES UR QL STRIP: NEGATIVE
LEUKOCYTE ESTERASE UR QL STRIP: ABNORMAL
LYMPHOCYTES # BLD AUTO: 1 K/UL (ref 1–4.8)
LYMPHOCYTES NFR BLD: 13.4 % (ref 18–48)
MCH RBC QN AUTO: 30.8 PG (ref 27–31)
MCHC RBC AUTO-ENTMCNC: 32.6 G/DL (ref 32–36)
MCV RBC AUTO: 95 FL (ref 82–98)
MICROSCOPIC COMMENT: ABNORMAL
MONOCYTES # BLD AUTO: 1 K/UL (ref 0.3–1)
MONOCYTES NFR BLD: 14.4 % (ref 4–15)
NEUTROPHILS # BLD AUTO: 5 K/UL (ref 1.8–7.7)
NEUTROPHILS NFR BLD: 70.3 % (ref 38–73)
NITRITE UR QL STRIP: NEGATIVE
NRBC BLD-RTO: 0 /100 WBC
PH UR STRIP: 6 [PH] (ref 5–8)
PLATELET # BLD AUTO: 136 K/UL (ref 150–450)
PMV BLD AUTO: 12.7 FL (ref 9.2–12.9)
POTASSIUM SERPL-SCNC: 4.5 MMOL/L (ref 3.5–5.1)
PROT SERPL-MCNC: 7.8 G/DL (ref 6–8.4)
PROT UR QL STRIP: ABNORMAL
RBC # BLD AUTO: 3.64 M/UL (ref 4.6–6.2)
RBC #/AREA URNS HPF: 4 /HPF (ref 0–4)
SARS-COV-2 RDRP RESP QL NAA+PROBE: NEGATIVE
SODIUM SERPL-SCNC: 139 MMOL/L (ref 136–145)
SP GR UR STRIP: 1.02 (ref 1–1.03)
URN SPEC COLLECT METH UR: ABNORMAL
UROBILINOGEN UR STRIP-ACNC: ABNORMAL EU/DL
WBC # BLD AUTO: 7.08 K/UL (ref 3.9–12.7)
WBC #/AREA URNS HPF: 17 /HPF (ref 0–5)

## 2022-07-02 PROCEDURE — 87077 CULTURE AEROBIC IDENTIFY: CPT | Performed by: EMERGENCY MEDICINE

## 2022-07-02 PROCEDURE — 87088 URINE BACTERIA CULTURE: CPT | Performed by: EMERGENCY MEDICINE

## 2022-07-02 PROCEDURE — 25500020 PHARM REV CODE 255: Performed by: EMERGENCY MEDICINE

## 2022-07-02 PROCEDURE — 87086 URINE CULTURE/COLONY COUNT: CPT | Performed by: EMERGENCY MEDICINE

## 2022-07-02 PROCEDURE — 96361 HYDRATE IV INFUSION ADD-ON: CPT

## 2022-07-02 PROCEDURE — 85025 COMPLETE CBC W/AUTO DIFF WBC: CPT | Performed by: EMERGENCY MEDICINE

## 2022-07-02 PROCEDURE — 99285 EMERGENCY DEPT VISIT HI MDM: CPT | Mod: 25

## 2022-07-02 PROCEDURE — 80053 COMPREHEN METABOLIC PANEL: CPT | Performed by: EMERGENCY MEDICINE

## 2022-07-02 PROCEDURE — U0002 COVID-19 LAB TEST NON-CDC: HCPCS | Performed by: EMERGENCY MEDICINE

## 2022-07-02 PROCEDURE — 81000 URINALYSIS NONAUTO W/SCOPE: CPT | Performed by: EMERGENCY MEDICINE

## 2022-07-02 PROCEDURE — 87186 SC STD MICRODIL/AGAR DIL: CPT | Performed by: EMERGENCY MEDICINE

## 2022-07-02 PROCEDURE — 63600175 PHARM REV CODE 636 W HCPCS: Performed by: EMERGENCY MEDICINE

## 2022-07-02 PROCEDURE — 96365 THER/PROPH/DIAG IV INF INIT: CPT | Mod: 59

## 2022-07-02 RX ORDER — CEPHALEXIN 500 MG/1
500 CAPSULE ORAL 4 TIMES DAILY
Qty: 28 CAPSULE | Refills: 0 | Status: SHIPPED | OUTPATIENT
Start: 2022-07-02 | End: 2022-07-07 | Stop reason: ALTCHOICE

## 2022-07-02 RX ADMIN — CEFTRIAXONE 1 G: 1 INJECTION, SOLUTION INTRAVENOUS at 06:07

## 2022-07-02 RX ADMIN — IOHEXOL 75 ML: 350 INJECTION, SOLUTION INTRAVENOUS at 03:07

## 2022-07-02 NOTE — DISCHARGE INSTRUCTIONS
As we discussed, it is important that you return to the ER for any new concerns or symptoms, worsening of your existing symptoms, if you do not completely improve, or if you are unable to be seen by your primary care provider.  An ER visit cannot replace and a evaluation by your primary care provider!!    Some medical conditions are difficult to diagnose and are almost impossible to be identified during one ER visit, and can only be diagnosed on follow up visits with a primary care physician or specialist.  At this time we believe that we have ruled-out life threatening conditions and that you are safe to be discharged home, but please remember that medical conditions can change, and we cannot predict how you will be feeling tomorrow or the next day, so if you have any worsening or new symptoms, you should not hesitate to return to the emergency department for reevaluation.        Be sure to follow up with your primary care doctor for a recheck and to review any labs/imaging that were performed today.  It is very common for us to identify non-emergent incidental findings on labs and imaging which must be followed up with your primary care physician. If you do not have a primary care doctor, you may contact the one listed on your discharge paperwork or you may also call the [facility] Appointment Desk at [phone] to schedule an appointment with one.       All medications have side effects.  We have done our best to select a medication for you that will treat your health problem and have the least amount of side effects.  If at any time while or after you take this medication you develops new symptoms or have any concerns, it is important you stop taking the medication and call your primary care provider or return to the emergency department for evaluation.

## 2022-07-02 NOTE — ED PROVIDER NOTES
SCRIBE #1 NOTE: IArmaan, am scribing for, and in the presence of, Rod Murray MD.           EM PHYSICIAN NOTE    HPI  This patient presents with a complaint of   Chief Complaint   Patient presents with    Dysuria     Pt reporting cystoscopy on June 21, 2022. Pt reporting he had fevers last weekend. Pt reporting difficulty urinating and constipation. Pt last BM this morning.        HPI:   Bria Lawson is a 86 y.o. male with a PMHx of DM, CKD, HLD, HTN, atrial fibrillation, s/p cystoscopy (6/21/2022) who presents to the Emergency Department for evaluation of a 1 week history of difficulty urinating with associated increased urinary frequency, increased urinary urgency, and decreased urine output. He explains he will have urinary incontinence if he does not make it to the toilet in time secondary to his urgency. Patient reports he developed a fever of 101.9 approximately four days after his cystoscopy on 6/21. He states he underwent this procedure with no immediate complications. Patient clarifies his fevers have resolved. He notes that he has woken up in profuse amounts of sweat several times over the past week following the use of Tylenol. Patient reports he feels constipated, despite the use of miralax and having loose stools every morning. He denies any vomiting, dysuria, or other associated symptoms.    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  GENERAL/CONSTITUTIONAL: There is no report of fever, fatigue, weakness, or unexplained weight loss.  CARDIOVASCULAR: There is no report of chest pain   RESPIRATORY: There is no report of cough or SOB  GASTROINTESTINAL: SEE HPI  GENITOURINARY: SEE HPI  MUSCULOSKELETAL: There is no report of joint or muscle pain. No muscle weakness or tenderness.  SKIN AND BREASTS: There is no report of easy bruising, skin redness, skin rash.  HEMATOLOGIC/LYMPHATIC: There is no report of anemia, bleeding or clotting defects. There is no report of anticoagulant use.  The remainder of the ROS is negative.        PHYSICAL EXAMINATION    ED Triage Vitals [07/02/22 1321]   Enc Vitals Group      BP (!) 159/74      Pulse 67      Resp 18      Temp 98.8 °F (37.1 °C)      Temp src Oral      SpO2 97 %      Weight 194 lb      Height       Head Circumference       Peak Flow       Pain Score       Pain Loc       Pain Edu?       Excl. in GC?      Vital signs and Pulse Ox reviewed in clinical context. Abnormalities noted: Hypertension noted and patient will be referred to primary care physician for close follow-up  Pt's level of consciousness is alert, and the patient is in mild distress.  Skin: warm, pink and dry.  Capillary refill is less than 2 seconds.  Mucosa: slightly dry.  Head and Neck: WNL  Cardiac exam: RRR  Pulmonary exam: unlabored and clear  Abd Exam: soft nontender except mild suprapubic ttp  Musculoskeletal: no joint tenderness, deformity or swelling   Neurologic: GCS: GCS 15; 5 over 5 strength, cranial nerves intact, neck supple       Initial Impression:  Dysuria, recent cystoscope  Plan:  Labs, imaging, reassess  Rod Murray MD, 1:59 PM 7/2/2022      Medical decision making:   Nurses notes  and Vital Signs reviewed.  Orders Placed This Encounter   Procedures    Urine culture    CT Abdomen Pelvis With Contrast    Urinalysis, Reflex to Urine Culture Urine, Clean Catch    CBC auto differential    Comprehensive metabolic panel    Urinalysis Microscopic    POCT COVID-19 Rapid Screening    Saline lock IV       ED Course as of 07/02/22 1858   Sat Jul 02, 2022   1521 CMP reveals a glucose of 238. Anion gap is normal.   []   1521 CBC reveals mild anemia of 11 and 34. Platelets are low at 136. White count is normal but there is mild bandemia []   1523 Due to the history of chills and a mild bandemia I have ordered a CT of abdomen with contrast. [MH]   1716 UA concerning for UTI. [MH]   1717 Impression:     Wall thickening of the urinary bladder along with wall thickening of the ureters.  The findings probably represent uroepithelial infection.  Suggest correlation with urinalysis.     Heterogeneous appearance of the prostate gland.  Component of prostatitis may be present.  Suggest clinical correlation.     Diverticulosis coli without evidence of acute diverticulitis. []      ED Course User Index  [MH] Rod Murray MD       Diagnoses that have been ruled out:   None   Diagnoses that are still under consideration:   None   Final diagnoses:   Lower urinary tract infectious disease            Follow-up Information     Follow up With Specialties Details Why Contact Info    Adiel Bragg MD Internal Medicine Schedule an appointment as soon as possible for a visit in 2 days Call to schedule an appointment [id number] [facility]  [place] [id number]  [phone]      [facility]  In 2 days As needed Visit [email or web address] to schedule an appointment or have a same day ONLINE checkup.        ED Prescriptions     Medication Sig Dispense Start Date End Date Auth. Provider    cephALEXin (KEFLEX) 500 MG capsule Take 1 capsule (500 mg total) by mouth 4 (four) times daily. for 7 days 28  capsule 7/2/2022 7/9/2022 Rod Murray MD          This note was created using Dictation Software.  This program may occasionally misinterpret certain words and phrases.      SCRIBE ATTESTATION NOTE:   I attest that I personally performed the services documented by the scribe and acknowledged and confirm the content of the note.   Nurses notes were reviewed.  Rod Murray        Nurses notes were reviewed.      CRITICAL CARE TIME:  These services included the following: chart data review, reviewing nursing notes and researching old charts from internal and external sources, documentation time, consultant collaboration regarding findings and treatment options, medication orders and management, direct patient care, vital sign assessments, physical exam reassessments, and ordering, interpreting and reviewing diagnostic studies/lab tests.    Aggregate critical care time was [id number] minutes, which includes only time during which I was engaged in work directly related to the patient's care, as described above, whether at the bedside or elsewhere in the Emergency Department.  It did not include time spent performing other reported procedures or the services of residents, students, nurses or physician assistants.                   Rod Murray MD  07/02/22 7280

## 2022-07-04 LAB — BACTERIA UR CULT: ABNORMAL

## 2022-07-07 ENCOUNTER — TELEPHONE (OUTPATIENT)
Dept: EMERGENCY MEDICINE | Facility: HOSPITAL | Age: 87
End: 2022-07-07
Payer: MEDICARE

## 2022-07-07 RX ORDER — AMOXICILLIN 500 MG/1
500 CAPSULE ORAL EVERY 12 HOURS
Qty: 14 CAPSULE | Refills: 0 | Status: SHIPPED | OUTPATIENT
Start: 2022-07-07 | End: 2022-07-14

## 2022-07-07 NOTE — TELEPHONE ENCOUNTER
Patient returned the call -  CrCl: 51.71 mL/min.    Review of patient's allergies indicates:  No Known Allergies    Urine Culture: Urine Culture positive. Discharged home with Keflex. The organism is not sensitive to the original treatment. The patient was notified. Amox will be sent to [facility].  He was told to begin the new prescription and follow up with Primary care. ED return precautions were given.

## 2022-07-11 NOTE — TELEPHONE ENCOUNTER
Care Due:                  Date            Visit Type   Department     Provider  --------------------------------------------------------------------------------                                [facility]                              FOLLOWUP/OF  MED/ INTERNAL  Adiel Coleman  Last Visit: 04-      FICE VISIT   MED/ PEDS      Ehrensing  Next Visit: None Scheduled  None         None Found                                                            Last  Test          Frequency    Reason                     Performed    Due Date  --------------------------------------------------------------------------------    HBA1C.......  6 months...  karuna OHODZIDE.......  04-   10-    [facility] Embedded Care Gaps. Reference number: [id number]. 7/11/2022   3:11:39 AM CDT

## 2022-07-13 RX ORDER — LORATADINE 10 MG/1
TABLET ORAL
Qty: 60 TABLET | Refills: 12 | Status: SHIPPED | OUTPATIENT
Start: 2022-07-13 | End: 2023-05-24

## 2022-07-14 ENCOUNTER — LAB VISIT (OUTPATIENT)
Dept: LAB | Facility: HOSPITAL | Age: 87
End: 2022-07-14
Attending: INTERNAL MEDICINE
Payer: MEDICARE

## 2022-07-14 DIAGNOSIS — D64.9 ANEMIA, UNSPECIFIED TYPE: ICD-10-CM

## 2022-07-14 DIAGNOSIS — M1A.00X0 IDIOPATHIC CHRONIC GOUT WITHOUT TOPHUS, UNSPECIFIED SITE: ICD-10-CM

## 2022-07-14 DIAGNOSIS — C85.19 B-CELL LYMPHOMA OF EXTRANODAL SITE: ICD-10-CM

## 2022-07-14 LAB
ALBUMIN SERPL BCP-MCNC: 3.7 G/DL (ref 3.5–5.2)
ALP SERPL-CCNC: 62 U/L (ref 55–135)
ALT SERPL W/O P-5'-P-CCNC: 14 U/L (ref 10–44)
ANION GAP SERPL CALC-SCNC: 9 MMOL/L (ref 8–16)
AST SERPL-CCNC: 15 U/L (ref 10–40)
BASOPHILS # BLD AUTO: 0.01 K/UL (ref 0–0.2)
BASOPHILS NFR BLD: 0.2 % (ref 0–1.9)
BILIRUB SERPL-MCNC: 0.9 MG/DL (ref 0.1–1)
BUN SERPL-MCNC: 19 MG/DL (ref 8–23)
CALCIUM SERPL-MCNC: 8.7 MG/DL (ref 8.7–10.5)
CHLORIDE SERPL-SCNC: 104 MMOL/L (ref 95–110)
CO2 SERPL-SCNC: 23 MMOL/L (ref 23–29)
CREAT SERPL-MCNC: 1.4 MG/DL (ref 0.5–1.4)
DIFFERENTIAL METHOD: ABNORMAL
EOSINOPHIL # BLD AUTO: 0 K/UL (ref 0–0.5)
EOSINOPHIL NFR BLD: 0 % (ref 0–8)
ERYTHROCYTE [DISTWIDTH] IN BLOOD BY AUTOMATED COUNT: 15.1 % (ref 11.5–14.5)
EST. GFR  (AFRICAN AMERICAN): 53 ML/MIN/1.73 M^2
EST. GFR  (NON AFRICAN AMERICAN): 45 ML/MIN/1.73 M^2
FERRITIN SERPL-MCNC: 368 NG/ML (ref 20–300)
GLUCOSE SERPL-MCNC: 309 MG/DL (ref 70–110)
HCT VFR BLD AUTO: 34.3 % (ref 40–54)
HGB BLD-MCNC: 11.3 G/DL (ref 14–18)
IMM GRANULOCYTES # BLD AUTO: 0.04 K/UL (ref 0–0.04)
IMM GRANULOCYTES NFR BLD AUTO: 0.9 % (ref 0–0.5)
IRON SERPL-MCNC: 110 UG/DL (ref 45–160)
LYMPHOCYTES # BLD AUTO: 1.1 K/UL (ref 1–4.8)
LYMPHOCYTES NFR BLD: 25.5 % (ref 18–48)
MCH RBC QN AUTO: 31.6 PG (ref 27–31)
MCHC RBC AUTO-ENTMCNC: 32.9 G/DL (ref 32–36)
MCV RBC AUTO: 96 FL (ref 82–98)
MONOCYTES # BLD AUTO: 0.6 K/UL (ref 0.3–1)
MONOCYTES NFR BLD: 12.5 % (ref 4–15)
NEUTROPHILS # BLD AUTO: 2.7 K/UL (ref 1.8–7.7)
NEUTROPHILS NFR BLD: 60.9 % (ref 38–73)
NRBC BLD-RTO: 0 /100 WBC
PLATELET # BLD AUTO: 80 K/UL (ref 150–450)
PMV BLD AUTO: 12.2 FL (ref 9.2–12.9)
POTASSIUM SERPL-SCNC: 4.3 MMOL/L (ref 3.5–5.1)
PROT SERPL-MCNC: 7.2 G/DL (ref 6–8.4)
RBC # BLD AUTO: 3.58 M/UL (ref 4.6–6.2)
SATURATED IRON: 37 % (ref 20–50)
SODIUM SERPL-SCNC: 136 MMOL/L (ref 136–145)
TOTAL IRON BINDING CAPACITY: 297 UG/DL (ref 250–450)
TRANSFERRIN SERPL-MCNC: 201 MG/DL (ref 200–375)
URATE SERPL-MCNC: 5.3 MG/DL (ref 3.4–7)
WBC # BLD AUTO: 4.47 K/UL (ref 3.9–12.7)

## 2022-07-14 PROCEDURE — 84466 ASSAY OF TRANSFERRIN: CPT | Performed by: INTERNAL MEDICINE

## 2022-07-14 PROCEDURE — 80053 COMPREHEN METABOLIC PANEL: CPT | Performed by: INTERNAL MEDICINE

## 2022-07-14 PROCEDURE — 36415 COLL VENOUS BLD VENIPUNCTURE: CPT | Performed by: INTERNAL MEDICINE

## 2022-07-14 PROCEDURE — 82728 ASSAY OF FERRITIN: CPT | Performed by: INTERNAL MEDICINE

## 2022-07-14 PROCEDURE — 84550 ASSAY OF BLOOD/URIC ACID: CPT | Performed by: INTERNAL MEDICINE

## 2022-07-14 PROCEDURE — 85025 COMPLETE CBC W/AUTO DIFF WBC: CPT | Performed by: INTERNAL MEDICINE

## 2022-07-15 ENCOUNTER — TELEPHONE (OUTPATIENT)
Dept: EMERGENCY MEDICINE | Facility: HOSPITAL | Age: 87
End: 2022-07-15
Payer: MEDICARE

## 2022-07-18 ENCOUNTER — OFFICE VISIT (OUTPATIENT)
Dept: HEMATOLOGY/ONCOLOGY | Facility: CLINIC | Age: 87
End: 2022-07-18
Payer: MEDICARE

## 2022-07-18 ENCOUNTER — LAB VISIT (OUTPATIENT)
Dept: LAB | Facility: HOSPITAL | Age: 87
End: 2022-07-18
Attending: INTERNAL MEDICINE
Payer: MEDICARE

## 2022-07-18 VITALS
TEMPERATURE: 98 F | SYSTOLIC BLOOD PRESSURE: 170 MMHG | BODY MASS INDEX: 26.96 KG/M2 | DIASTOLIC BLOOD PRESSURE: 79 MMHG | WEIGHT: 199.06 LBS | HEIGHT: 72 IN | HEART RATE: 67 BPM | OXYGEN SATURATION: 98 %

## 2022-07-18 DIAGNOSIS — D69.6 THROMBOCYTOPENIA: ICD-10-CM

## 2022-07-18 DIAGNOSIS — E11.22 CKD STAGE 3 DUE TO TYPE 2 DIABETES MELLITUS: ICD-10-CM

## 2022-07-18 DIAGNOSIS — N39.0 URINARY TRACT INFECTION WITHOUT HEMATURIA, SITE UNSPECIFIED: ICD-10-CM

## 2022-07-18 DIAGNOSIS — C85.19 B-CELL LYMPHOMA OF EXTRANODAL SITE: Primary | ICD-10-CM

## 2022-07-18 DIAGNOSIS — D64.9 ANEMIA, UNSPECIFIED TYPE: ICD-10-CM

## 2022-07-18 DIAGNOSIS — N18.30 CKD STAGE 3 DUE TO TYPE 2 DIABETES MELLITUS: ICD-10-CM

## 2022-07-18 LAB
BACTERIA #/AREA URNS HPF: NORMAL /HPF
BILIRUB UR QL STRIP: NEGATIVE
CLARITY UR: CLEAR
COLOR UR: YELLOW
GLUCOSE UR QL STRIP: ABNORMAL
HGB UR QL STRIP: NEGATIVE
KETONES UR QL STRIP: NEGATIVE
LEUKOCYTE ESTERASE UR QL STRIP: NEGATIVE
MICROSCOPIC COMMENT: NORMAL
NITRITE UR QL STRIP: NEGATIVE
PH UR STRIP: 6 [PH] (ref 5–8)
PROT UR QL STRIP: ABNORMAL
SP GR UR STRIP: 1.01 (ref 1–1.03)
URN SPEC COLLECT METH UR: ABNORMAL
UROBILINOGEN UR STRIP-ACNC: NEGATIVE EU/DL
YEAST URNS QL MICRO: NORMAL

## 2022-07-18 PROCEDURE — 81000 URINALYSIS NONAUTO W/SCOPE: CPT | Performed by: INTERNAL MEDICINE

## 2022-07-18 PROCEDURE — 99214 PR OFFICE/OUTPT VISIT, EST, LEVL IV, 30-39 MIN: ICD-10-PCS | Mod: S$PBB,,, | Performed by: INTERNAL MEDICINE

## 2022-07-18 PROCEDURE — 99999 PR PBB SHADOW E&M-EST. PATIENT-LVL V: ICD-10-PCS | Mod: PBBFAC,,, | Performed by: INTERNAL MEDICINE

## 2022-07-18 PROCEDURE — 99214 OFFICE O/P EST MOD 30 MIN: CPT | Mod: S$PBB,,, | Performed by: INTERNAL MEDICINE

## 2022-07-18 PROCEDURE — 99999 PR PBB SHADOW E&M-EST. PATIENT-LVL V: CPT | Mod: PBBFAC,,, | Performed by: INTERNAL MEDICINE

## 2022-07-18 PROCEDURE — 99215 OFFICE O/P EST HI 40 MIN: CPT | Mod: PBBFAC | Performed by: INTERNAL MEDICINE

## 2022-07-18 NOTE — PROGRESS NOTES
"Subjective:       Patient ID: Bria Lawson is a 86 y.o. male.    Chief Complaint: Lymphoma  Patient is a 86 y.o. year old male who was evaluated for Thrombocytopenia. 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   Has completed one four week course of rituxan Monotherapy. Completed 10/2018   He also has anemia     HPI Former pt of Dr. BELTRÁN  Pt recently visited ED with fever and difficulty urinating and diagnosed with UTI  He completed 2 courses of abs ( cephalexin, followed by amoxicillin) with resolution in symptoms  Doing well   No hematuria  No dysuria  Appetite and weight stable  No fevers, night sweats  No SOB/CP  He is UTD with flu and COVID booster immunizations    Review of Systems   Constitutional: Negative for appetite change, fatigue and unexpected weight change.   HENT: Negative for mouth sores.    Eyes: Negative for visual disturbance.   Respiratory: Negative for cough and shortness of breath.    Cardiovascular: Negative for chest pain.   Gastrointestinal: Negative for abdominal pain and diarrhea.   Genitourinary: Negative for frequency.   Musculoskeletal: Negative for back pain.   Integumentary:  Negative for rash.   Neurological: Negative for headaches.   Hematological: Negative for adenopathy.   Psychiatric/Behavioral: The patient is not nervous/anxious.    All other systems reviewed and are negative.        Objective:       Vitals:    07/18/22 1015   BP: (!) 170/79   BP Location: Left arm   Patient Position: Sitting   BP Method: Large (Automatic)   Pulse: 67   Temp: 97.8 °F (36.6 °C)   TempSrc: Oral   SpO2: 98%   Weight: 90.3 kg (199 lb 1.2 oz)   Height: 5' 11.5" (1.816 m)       Physical Exam  Vitals reviewed.   Constitutional:       Appearance: He is well-developed.   Cardiovascular:      Rate and Rhythm: Normal rate.      Heart sounds: Normal heart sounds.   Pulmonary:      Effort: Pulmonary effort is normal.      Breath sounds: Normal breath sounds. No wheezing.   Abdominal:      General: Bowel " sounds are normal.      Palpations: Abdomen is soft.      Tenderness: There is no abdominal tenderness.   Musculoskeletal:         General: No tenderness.   Lymphadenopathy:      Cervical: No cervical adenopathy.   Skin:     General: Skin is warm and dry.      Findings: No rash.   Neurological:      Mental Status: He is alert and oriented to person, place, and time.      Coordination: Coordination normal.   Psychiatric:         Thought Content: Thought content normal.         Judgment: Judgment normal.           LABS:  WBC   Date Value Ref Range Status   07/14/2022 4.47 3.90 - 12.70 K/uL Final     Hemoglobin   Date Value Ref Range Status   07/14/2022 11.3 (L) 14.0 - 18.0 g/dL Final     Hematocrit   Date Value Ref Range Status   07/14/2022 34.3 (L) 40.0 - 54.0 % Final     Platelets   Date Value Ref Range Status   07/14/2022 80 (L) 150 - 450 K/uL Final     Gran # (ANC)   Date Value Ref Range Status   07/14/2022 2.7 1.8 - 7.7 K/uL Final     Gran %   Date Value Ref Range Status   07/14/2022 60.9 38.0 - 73.0 % Final       Chemistry        Component Value Date/Time     07/14/2022 1025    K 4.3 07/14/2022 1025     07/14/2022 1025    CO2 23 07/14/2022 1025    BUN 19 07/14/2022 1025    CREATININE 1.4 07/14/2022 1025     (H) 07/14/2022 1025        Component Value Date/Time    CALCIUM 8.7 07/14/2022 1025    ALKPHOS 62 07/14/2022 1025    AST 15 07/14/2022 1025    ALT 14 07/14/2022 1025    BILITOT 0.9 07/14/2022 1025    ESTGFRAFRICA 53 (A) 07/14/2022 1025    EGFRNONAA 45 (A) 07/14/2022 1025      ,    Iron   Date Value Ref Range Status   07/14/2022 110 45 - 160 ug/dL Final     Ferritin   Date Value Ref Range Status   07/14/2022 368 (H) 20.0 - 300.0 ng/mL Final       Assessment:       1. B-cell lymphoma of extranodal site    2. CKD stage 3 due to type 2 diabetes mellitus    3. Anemia, unspecified type    4. Urinary tract infection without hematuria, site unspecified    5. Thrombocytopenia        Plan:        1.  He is doing well clinically, labs are stable, He will return in 4 months with labs.  2. Cr 1.4mg/dL-stable  F/u with Nephrology   3. 11.3g/dl -stable  Fe studies reviewed   Check SPEP/GIOVANNI  4. Treated with abx  Repeat U/A TODAY   5. Stable  asymptomatic    Cbc, Fe studies , Cmp, SPEP, GIOVANNI prior to f/u 4mos

## 2022-07-19 ENCOUNTER — PATIENT MESSAGE (OUTPATIENT)
Dept: RESEARCH | Facility: CLINIC | Age: 87
End: 2022-07-19
Payer: MEDICARE

## 2022-07-31 ENCOUNTER — EXTERNAL CHRONIC CARE MANAGEMENT (OUTPATIENT)
Dept: PRIMARY CARE CLINIC | Facility: CLINIC | Age: 87
End: 2022-07-31
Payer: MEDICARE

## 2022-07-31 PROCEDURE — 99490 PR CHRONIC CARE MGMT, 1ST 20 MIN: ICD-10-PCS | Mod: S$PBB,,, | Performed by: INTERNAL MEDICINE

## 2022-07-31 PROCEDURE — 99490 CHRNC CARE MGMT STAFF 1ST 20: CPT | Mod: PBBFAC,PO | Performed by: INTERNAL MEDICINE

## 2022-07-31 PROCEDURE — 99490 CHRNC CARE MGMT STAFF 1ST 20: CPT | Mod: S$PBB,,, | Performed by: INTERNAL MEDICINE

## 2022-08-02 ENCOUNTER — TELEPHONE (OUTPATIENT)
Dept: NEPHROLOGY | Facility: CLINIC | Age: 87
End: 2022-08-02
Payer: MEDICARE

## 2022-08-04 ENCOUNTER — OFFICE VISIT (OUTPATIENT)
Dept: FAMILY MEDICINE | Facility: CLINIC | Age: 87
End: 2022-08-04
Payer: MEDICARE

## 2022-08-04 VITALS
BODY MASS INDEX: 27.75 KG/M2 | DIASTOLIC BLOOD PRESSURE: 66 MMHG | OXYGEN SATURATION: 97 % | TEMPERATURE: 99 F | HEIGHT: 71 IN | SYSTOLIC BLOOD PRESSURE: 126 MMHG | HEART RATE: 90 BPM | WEIGHT: 198.19 LBS

## 2022-08-04 DIAGNOSIS — I12.9 HYPERTENSIVE KIDNEY DISEASE WITH STAGE 3A CHRONIC KIDNEY DISEASE: ICD-10-CM

## 2022-08-04 DIAGNOSIS — D70.9 NEUTROPENIA, UNSPECIFIED TYPE: ICD-10-CM

## 2022-08-04 DIAGNOSIS — N18.31 HYPERTENSIVE KIDNEY DISEASE WITH STAGE 3A CHRONIC KIDNEY DISEASE: ICD-10-CM

## 2022-08-04 DIAGNOSIS — N18.30 CKD STAGE 3 DUE TO TYPE 2 DIABETES MELLITUS: ICD-10-CM

## 2022-08-04 DIAGNOSIS — E78.5 HYPERLIPIDEMIA, UNSPECIFIED HYPERLIPIDEMIA TYPE: ICD-10-CM

## 2022-08-04 DIAGNOSIS — D61.818 PANCYTOPENIA: ICD-10-CM

## 2022-08-04 DIAGNOSIS — E11.22 CKD STAGE 3 DUE TO TYPE 2 DIABETES MELLITUS: ICD-10-CM

## 2022-08-04 DIAGNOSIS — C85.19 B-CELL LYMPHOMA OF EXTRANODAL SITE: ICD-10-CM

## 2022-08-04 DIAGNOSIS — D50.9 IRON DEFICIENCY ANEMIA, UNSPECIFIED IRON DEFICIENCY ANEMIA TYPE: ICD-10-CM

## 2022-08-04 DIAGNOSIS — I27.21 PAH (PULMONARY ARTERY HYPERTENSION): ICD-10-CM

## 2022-08-04 DIAGNOSIS — M06.9 RHEUMATOID ARTHRITIS, INVOLVING UNSPECIFIED SITE, UNSPECIFIED WHETHER RHEUMATOID FACTOR PRESENT: ICD-10-CM

## 2022-08-04 DIAGNOSIS — M06.09 SERONEGATIVE ARTHROPATHY OF MULTIPLE SITES: ICD-10-CM

## 2022-08-04 DIAGNOSIS — E11.29 DIABETES MELLITUS WITH PROTEINURIA: Primary | ICD-10-CM

## 2022-08-04 DIAGNOSIS — D64.9 ANEMIA, UNSPECIFIED TYPE: ICD-10-CM

## 2022-08-04 DIAGNOSIS — I70.0 ATHEROSCLEROSIS OF ABDOMINAL AORTA: ICD-10-CM

## 2022-08-04 DIAGNOSIS — E11.21 DIABETES MELLITUS WITH PROTEINURIC DIABETIC NEPHROPATHY: ICD-10-CM

## 2022-08-04 DIAGNOSIS — R80.9 DIABETES MELLITUS WITH PROTEINURIA: Primary | ICD-10-CM

## 2022-08-04 DIAGNOSIS — I10 ESSENTIAL HYPERTENSION: ICD-10-CM

## 2022-08-04 DIAGNOSIS — N18.31 STAGE 3A CHRONIC KIDNEY DISEASE: ICD-10-CM

## 2022-08-04 DIAGNOSIS — E11.311 DIABETIC RETINOPATHY OF BOTH EYES WITH MACULAR EDEMA ASSOCIATED WITH TYPE 2 DIABETES MELLITUS, UNSPECIFIED RETINOPATHY SEVERITY: ICD-10-CM

## 2022-08-04 DIAGNOSIS — K21.9 GASTROESOPHAGEAL REFLUX DISEASE, UNSPECIFIED WHETHER ESOPHAGITIS PRESENT: ICD-10-CM

## 2022-08-04 DIAGNOSIS — D69.6 THROMBOCYTOPENIA: ICD-10-CM

## 2022-08-04 PROCEDURE — 99214 OFFICE O/P EST MOD 30 MIN: CPT | Mod: S$PBB,,, | Performed by: INTERNAL MEDICINE

## 2022-08-04 PROCEDURE — 99214 PR OFFICE/OUTPT VISIT, EST, LEVL IV, 30-39 MIN: ICD-10-PCS | Mod: S$PBB,,, | Performed by: INTERNAL MEDICINE

## 2022-08-04 PROCEDURE — 99999 PR PBB SHADOW E&M-EST. PATIENT-LVL IV: ICD-10-PCS | Mod: PBBFAC,,, | Performed by: INTERNAL MEDICINE

## 2022-08-04 PROCEDURE — 99999 PR PBB SHADOW E&M-EST. PATIENT-LVL IV: CPT | Mod: PBBFAC,,, | Performed by: INTERNAL MEDICINE

## 2022-08-04 PROCEDURE — 99214 OFFICE O/P EST MOD 30 MIN: CPT | Mod: PBBFAC,PO | Performed by: INTERNAL MEDICINE

## 2022-08-04 NOTE — PROGRESS NOTES
Chief complaint: Follow-up on diabetes, anemia,  , follow-up on UTI    86-year-old black male.  here to follow-up on diabetes although did  not have A1c prior. Reviewed all his labs and abnormalities.   his A1c was 7.4 in 2/19 then 6.6, 6.8, 5.4 , 6.1, 6.3, 6.1, 5.7 April..    Was Eating poorly.  He has some chronic renal insufficiency. Seen renal .   He had a slight hemoglobin reduction which appears chronic and fluctuating clinical of last year or so with  thrombocytopenia.  We discussed all those issues and the need to monitor them over time.  Is otherwise feeling well.  He is being followed by Hematology.  On iron pill daily and improving.  Somewhere along the way he was put on 81 mg aspirin and is currently off of it I encouraged him to stay off of it as there really is no obvious cardiac reason for him to need the aspirin and he does have thrombocytopenia which is not severe but could become severe and being on aspirin could increase his bleeding risk.    No further abdominal pain as he had before. Had uti post cysto, all better.  Follow-up urinalysis looked good and I reassured patient.  He apparently has a lab order for his microalbumin today so will add a urinalysis although not having any UTI symptoms we can double check.  No history of recurrent UTIs and discuss this was probably expected and related to the cystoscope.    He has had labs  and appears for an upcoming renal appointment.  Labs for Hematology looks like there set for November and we will add an A1c at that time and patient will remind the lab to do the standing A1c.    He has seen GI.  Reviewed his last EGD which showed gastritis.  I reviewed the GI noted it sounds like he had some generalized abdominal discomfort after excessive eating.  Symptoms have since resolved.    He does continue to get hives and itching in areas where he applies pressure.  It has been ongoing for years.  He is not on an antihistamine and discussed using Claritin  daily neg going to twice daily if that does not suppressed. Now on a shot with GREAT control of eczema    Recent lab shows hemoglobin drop from 11.4-9.6 then up to 13.6, 11.3.  No obvious clinical bleeding in the plan by GI will be to do a capsule study if the hemoglobin level drops. Seen by Heme and ferritin improving on iron pill daily          Reviewed blood pressures which appear to be running normal.    .  He did see cardiology ordered an echo     · Low normal left ventricular systolic function. The estimated ejection fraction is 50-55%.  · Mild eccentric left ventricular hypertrophy.  · Mild left ventricular enlargement.  · No wall motion abnormalities.  · Normal right ventricular systolic function.  · Moderate mitral regurgitation.  · Mild tricuspid regurgitation.  · The estimated PA systolic pressure is 35 mmHg.     He is on digital hypertension and we reviewed his blood pressure and pulse readings.  His pulses mostly in the 40s and 50s occasionally up to 60.  He had his carvedilol reduced from 12.5-6.25 twice a day.  His pulse still appears to be mostly in the 50s.  He is asymptomatic.  We discussed that if symptomatic we would need to eliminate the carvedilol and assess response.  Reviewed all his other labs.  With the Lasix he did not have any worsening renal insufficiency.  He has an appointment with Hematology  His platelet count appears stable.          Chest CT  Impression       1. CT findings favoring sequela of cardiac decompensation causing pulmonary edema and bilateral pleural effusions.  Superimposed COVID-19 pneumonia cannot be entirely excluded noting that pleural effusions is not a commonly reported finding and therefore would be atypical.  2. Persistent soft tissue attenuation nodule within the prevascular mediastinum, presumably an enlarged lymph node and unchanged when compared to previous PET-CTs.  3. Slight interval increased size of multiple mediastinal lymph nodes, nonspecific.  4.  Cardiomegaly with severe dense coronary artery atherosclerosis in a 3 vessel distribution.                        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 EYES: no vision change. ENT: no sore throat. CV: no chest pain w/ exertion. RESP: no shortness of breath. GI: no nausea, vomiting, diarrhea. No dysphagia. : no urinary issues. MUSCULOSKELETAL: no new myalgias or arthralgias. SKIN: no new changes. NEURO: no focal deficits. PSYCH: no new issues. ENDOCRINE: no polyuria. HEME: no lymph nodes. ALLERGY: no general pruritis.       Past medical history:  1.  Diabetes with renal disease and retinopathy  2.  Hypertension  3.  Hyperlipidemia  4.  Chronic renal failure stage III  5.  History of colon polyp, normal scope March 2012, normal 2017-- 5 yrs----[facility]- Dr Kumari -EGD/colon done 11/2020  6.  Hyperuricemia and gout  7.  History of sciatica and lumbar disc disease remotely  8.  Erectile dysfunction  9.  Macular degeneration, followed by outside retina specialist  10.  GERD  11.  Low HDL  12.  Inflammatory arthritis of the hands, seeing rheumatology  13.  Bilateral cervical radiculopathy and axonal neuropathy, to have surgery 2015    14.  Followed by outside urology at [facility] Dr. noriega   15.  TKA  -angie Han  who is at [facility]  16.  Prevnar given 2017  17. 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 Neg cysto 2022    Past surgical history: Right knee replacement, left knee arthroscopy, bilateral cataracts, scrotal cyst removed.    Family history: Mother with hypertension and diabetes.  Father's history is unknown.  One brother who is okay.    Social history: Retired, quit smoking 1974.   with 3 children.  Drinks socially.    Vital signs as above  Gen: no distress, exam otherwise deferred    Bria was seen today for annual exam.    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    B-cell lymphoma of extranodal site    Thrombocytopenia, follows with Hematology    Neutropenia, unspecified type    Pancytopenia    Rheumatoid arthritis, involving unspecified site, unspecified whether rheumatoid factor present    Hypertensive kidney disease with stage 3a chronic kidney disease    Hyperlipidemia, unspecified hyperlipidemia type    Essential hypertension, chronic and stable    Gastroesophageal reflux disease, unspecified whether esophagitis present    Anemia, unspecified type    Diabetes mellitus with renal manifestations, uncontrolled    Iron deficiency anemia, unspecified iron deficiency anemia type    PAH (pulmonary artery hypertension)    Atherosclerosis of abdominal aorta

## 2022-08-08 ENCOUNTER — TELEPHONE (OUTPATIENT)
Dept: NEPHROLOGY | Facility: CLINIC | Age: 87
End: 2022-08-08
Payer: MEDICARE

## 2022-08-08 NOTE — TELEPHONE ENCOUNTER
----- Message from Radha Dawn sent at 8/8/2022 12:13 PM CDT -----  Regarding: return call  Contact: pt @ [phone]  Message is for al Gamble missed call and asking for a call back

## 2022-08-25 ENCOUNTER — OFFICE VISIT (OUTPATIENT)
Dept: URGENT CARE | Facility: CLINIC | Age: 87
End: 2022-08-25
Payer: MEDICARE

## 2022-08-25 VITALS
SYSTOLIC BLOOD PRESSURE: 171 MMHG | HEIGHT: 71 IN | TEMPERATURE: 99 F | WEIGHT: 198 LBS | RESPIRATION RATE: 18 BRPM | BODY MASS INDEX: 27.72 KG/M2 | OXYGEN SATURATION: 95 % | DIASTOLIC BLOOD PRESSURE: 71 MMHG | HEART RATE: 61 BPM

## 2022-08-25 DIAGNOSIS — U07.1 COVID-19: Primary | ICD-10-CM

## 2022-08-25 DIAGNOSIS — U07.1 COVID-19 VIRUS DETECTED: ICD-10-CM

## 2022-08-25 DIAGNOSIS — R05.9 COUGH: ICD-10-CM

## 2022-08-25 LAB
CTP QC/QA: YES
SARS-COV-2 RDRP RESP QL NAA+PROBE: POSITIVE

## 2022-08-25 PROCEDURE — 99214 OFFICE O/P EST MOD 30 MIN: CPT | Mod: ICN,CMP,S$GLB,

## 2022-08-25 PROCEDURE — 99214 PR OFFICE/OUTPT VISIT, EST, LEVL IV, 30-39 MIN: ICD-10-PCS | Mod: ICN,CMP,S$GLB,

## 2022-08-25 PROCEDURE — U0002 COVID-19 LAB TEST NON-CDC: HCPCS | Mod: QW,CR,ICN,CMP

## 2022-08-25 PROCEDURE — U0002: ICD-10-PCS | Mod: QW,CR,ICN,CMP

## 2022-08-25 RX ORDER — BENZONATATE 100 MG/1
100 CAPSULE ORAL 3 TIMES DAILY PRN
Qty: 30 CAPSULE | Refills: 0 | Status: SHIPPED | OUTPATIENT
Start: 2022-08-25 | End: 2022-09-04

## 2022-08-25 RX ORDER — PROMETHAZINE HYDROCHLORIDE AND DEXTROMETHORPHAN HYDROBROMIDE 6.25; 15 MG/5ML; MG/5ML
5 SYRUP ORAL EVERY 4 HOURS PRN
Qty: 118 ML | Refills: 0 | Status: SHIPPED | OUTPATIENT
Start: 2022-08-25 | End: 2022-09-04

## 2022-08-25 NOTE — PROGRESS NOTES
"Subjective:       Patient ID: Bria Lawson is a 86 y.o. male.    Vitals:  height is 5' 11" (1.803 m) and weight is 89.8 kg (198 lb). His temperature is 98.8 °F (37.1 °C). His blood pressure is 171/71 (abnormal) and his pulse is 61. His respiration is 18 and oxygen saturation is 95%.     Chief Complaint: Cough    85 yo male with pmhx of HTN, DM2, CKD stage 3, Afib complains of rhinorrhea that started 4 days ago. He states he was returning from vacation and noticed the runny nose. He states 2 days ago he began with cough, body aches, and fever of 101.3, and scratchy throat. He has been taking delsym and tylenol for symptomatic relief.     Cough  This is a new problem. The current episode started in the past 7 days. The problem has been gradually worsening. The problem occurs hourly. The cough is non-productive. Associated symptoms include a fever and a sore throat. Pertinent negatives include no chills, postnasal drip or shortness of breath. Nothing aggravates the symptoms. He has tried OTC cough suppressant for the symptoms. The treatment provided mild relief.       Constitution: Positive for fatigue and fever. Negative for chills and sweating.   HENT: Positive for sore throat. Negative for congestion, postnasal drip, sinus pain and sinus pressure.    Respiratory: Positive for cough. Negative for shortness of breath.    Musculoskeletal:        Body aches        Objective:      Physical Exam   Constitutional: He is oriented to person, place, and time. He appears well-developed. He is cooperative.  Non-toxic appearance. He does not appear ill. No distress.   HENT:   Head: Normocephalic and atraumatic.   Ears:   Right Ear: Hearing, tympanic membrane, external ear and ear canal normal.   Left Ear: Hearing, tympanic membrane, external ear and ear canal normal.   Nose: Nose normal. No mucosal edema, rhinorrhea, nasal deformity or congestion. No epistaxis. Right sinus exhibits no maxillary sinus tenderness and no frontal " sinus tenderness. Left sinus exhibits no maxillary sinus tenderness and no frontal sinus tenderness.   Mouth/Throat: Uvula is midline, oropharynx is clear and moist and mucous membranes are normal. Mucous membranes are moist. No trismus in the jaw. Normal dentition. No uvula swelling. No oropharyngeal exudate or posterior oropharyngeal erythema.   Eyes: Conjunctivae and lids are normal. Right eye exhibits no discharge. Left eye exhibits no discharge. No scleral icterus.   Neck: Trachea normal and phonation normal. Neck supple.   Cardiovascular: Normal rate, regular rhythm, normal heart sounds and normal pulses.   Pulmonary/Chest: Effort normal and breath sounds normal. No respiratory distress.   Abdominal: Normal appearance and bowel sounds are normal. He exhibits no distension and no mass. Soft. There is no abdominal tenderness.   Musculoskeletal: Normal range of motion.         General: No deformity. Normal range of motion.   Neurological: He is alert and oriented to person, place, and time. He exhibits normal muscle tone. Coordination normal.   Skin: Skin is warm, dry, intact, not diaphoretic and not pale.   Psychiatric: His speech is normal and behavior is normal. Judgment and thought content normal.   Nursing note and vitals reviewed.        Results for orders placed or performed in visit on 08/25/22   POCT COVID-19 Rapid Screening   Result Value Ref Range    POC Rapid COVID Positive (A) Negative     Acceptable Yes        Assessment:       1. COVID-19    2. Cough          Plan:         Risk of complication score:7; patient prescribed paxlovid. He was advised to stop taking his crestor medication for 5 days while taking this medication.     Discussed results with patient and proper quarantine based on CDC guidelines.   Discussed use of OTC medications for symptom control as this is a viral disease.   All ER precautions covered including but not limited to shortness of breath, intractable fever, or  chest pain.  Discussed RTC if symptoms worsen, change, or persist.   Patient verbalized understanding and agreed with the plan.         COVID-19  -     nirmatrelvir-ritonavir 300 mg (150 mg x 2)-100 mg copackaged tablets (EUA); Take 3 tablets by mouth 2 (two) times daily for 5 days. Each dose contains 2 nirmatrelvir (pink tablets) and 1 ritonavir (white tablet). Take all 3 tablets together  Dispense: 30 tablet; Refill: 0  -     promethazine-dextromethorphan (PROMETHAZINE-DM) 6.25-15 mg/5 mL Syrp; Take 5 mLs by mouth every 4 (four) hours as needed (cough).  Dispense: 118 mL; Refill: 0  -     benzonatate (TESSALON) 100 MG capsule; Take 1 capsule (100 mg total) by mouth 3 (three) times daily as needed for Cough.  Dispense: 30 capsule; Refill: 0    Cough  -     POCT COVID-19 Rapid Screening

## 2022-08-31 ENCOUNTER — EXTERNAL CHRONIC CARE MANAGEMENT (OUTPATIENT)
Dept: PRIMARY CARE CLINIC | Facility: CLINIC | Age: 87
End: 2022-08-31
Payer: MEDICARE

## 2022-08-31 ENCOUNTER — TELEPHONE (OUTPATIENT)
Dept: RHEUMATOLOGY | Facility: CLINIC | Age: 87
End: 2022-08-31
Payer: MEDICARE

## 2022-08-31 PROCEDURE — 99487 PR COMPLX CHRON CARE MGMT, 1ST HR, PER MONTH: ICD-10-PCS | Mod: S$PBB,,, | Performed by: INTERNAL MEDICINE

## 2022-08-31 PROCEDURE — 99489 CPLX CHRNC CARE EA ADDL 30: CPT | Mod: S$PBB,,, | Performed by: INTERNAL MEDICINE

## 2022-08-31 PROCEDURE — 99487 CPLX CHRNC CARE 1ST 60 MIN: CPT | Mod: PBBFAC,25,PO | Performed by: INTERNAL MEDICINE

## 2022-08-31 PROCEDURE — 99489 PR COMPLX CHRON CARE MGMT, EA ADDTL 30 MIN, PER MONTH: ICD-10-PCS | Mod: S$PBB,,, | Performed by: INTERNAL MEDICINE

## 2022-08-31 PROCEDURE — 99487 CPLX CHRNC CARE 1ST 60 MIN: CPT | Mod: S$PBB,,, | Performed by: INTERNAL MEDICINE

## 2022-08-31 PROCEDURE — 99489 CPLX CHRNC CARE EA ADDL 30: CPT | Mod: PBBFAC,PO | Performed by: INTERNAL MEDICINE

## 2022-08-31 RX ORDER — GABAPENTIN 300 MG/1
CAPSULE ORAL
Qty: 270 CAPSULE | Refills: 3 | Status: SHIPPED | OUTPATIENT
Start: 2022-08-31 | End: 2023-09-06 | Stop reason: SDUPTHER

## 2022-08-31 NOTE — TELEPHONE ENCOUNTER
----- Message from Juju Aguilar MA sent at 8/31/2022  3:36 PM CDT -----  Regarding: FW: Refill  Contact: pt @[phone]    ----- Message -----  From: Alyson Henley  Sent: 8/31/2022   3:35 PM CDT  To: Primo DENNISON Staff  Subject: Refill                                           Medication Refill:gabapentin (NEURONTIN) 300 MG capsule.       [id number] [place] - [address] AT [facility] [address]  [address]  [place] [address]  Phone: [phone] Fax: [phone]

## 2022-09-06 ENCOUNTER — OFFICE VISIT (OUTPATIENT)
Dept: PODIATRY | Facility: CLINIC | Age: 87
End: 2022-09-06
Payer: MEDICARE

## 2022-09-06 VITALS — HEIGHT: 71 IN | WEIGHT: 198 LBS | BODY MASS INDEX: 27.72 KG/M2

## 2022-09-06 DIAGNOSIS — E11.49 TYPE II DIABETES MELLITUS WITH NEUROLOGICAL MANIFESTATIONS: ICD-10-CM

## 2022-09-06 DIAGNOSIS — E11.9 ENCOUNTER FOR DIABETIC FOOT EXAM: Primary | ICD-10-CM

## 2022-09-06 DIAGNOSIS — M20.41 HAMMER TOES OF BOTH FEET: ICD-10-CM

## 2022-09-06 DIAGNOSIS — M20.5X2 HALLUX LIMITUS, ACQUIRED, LEFT: ICD-10-CM

## 2022-09-06 DIAGNOSIS — B35.1 DERMATOPHYTOSIS OF NAIL: ICD-10-CM

## 2022-09-06 DIAGNOSIS — M20.42 HAMMER TOES OF BOTH FEET: ICD-10-CM

## 2022-09-06 DIAGNOSIS — M20.5X1 HALLUX LIMITUS, ACQUIRED, RIGHT: ICD-10-CM

## 2022-09-06 PROCEDURE — 99214 OFFICE O/P EST MOD 30 MIN: CPT | Mod: S$PBB,,, | Performed by: PODIATRIST

## 2022-09-06 PROCEDURE — 99214 PR OFFICE/OUTPT VISIT, EST, LEVL IV, 30-39 MIN: ICD-10-PCS | Mod: S$PBB,,, | Performed by: PODIATRIST

## 2022-09-06 PROCEDURE — 99999 PR PBB SHADOW E&M-EST. PATIENT-LVL IV: ICD-10-PCS | Mod: PBBFAC,,, | Performed by: PODIATRIST

## 2022-09-06 PROCEDURE — 99214 OFFICE O/P EST MOD 30 MIN: CPT | Mod: PBBFAC,PO | Performed by: PODIATRIST

## 2022-09-06 PROCEDURE — 99999 PR PBB SHADOW E&M-EST. PATIENT-LVL IV: CPT | Mod: PBBFAC,,, | Performed by: PODIATRIST

## 2022-09-06 NOTE — PATIENT INSTRUCTIONS
Over the counter pain creams: Voltaren Gel, Biofreeze, Bengay, tiger balm, two old goat, lidocaine gel,  Absorbine Veterinary Liniment Gel Topical Analgesic Sore Muscle and Joint Pain Relief    Recommend lotions: eucerin, eucerin for diabetics, aquaphor, A&D ointment, gold bond for diabetics, sween, Strafford's Bees all purpose baby ointment,  urea 40 with aloe (found on amazon.com)    Shoe recommendations: (try 6pm.com, zappos.com , nordstromrack.OpenLabel, or shoes.com for discounted prices) you can visit [employer] shoes in [place]  or [employer] [facility] in the [facility] (there are also several shoe brand outlets in the [facility])    Asics (GT 2000 or gel foundations), new balance stability type shoes (such as the 940 series), saucony (stabil c3),  Warren (GTS or Beast or transcend), propet (tennis shoe)    Nando Pardo (women) Kelli&Dorian (men), clarks, crocs, aerosoles, naturalizers, SAS, ecco, born, fran quinonez, rockports (dress shoes)    Vionic, burkenstocks, fitflops, propet (sandals)  Nike comfort thong sandals, crocs, propet (house shoes)    Nail Home remedy:  Vicks Vapor rub or Emuaid to nails for easier manageability    Diabetes: Inspecting Your Feet  Diabetes increases your chances of developing foot problems. So inspect your feet every day. This helps you find small skin irritations before they become serious infections. If you have trouble seeing the bottoms of your feet, use a mirror or ask a family member or friend to help.     Pressure spots on the bottom of the foot are common areas where problems develop.   How to check your feet  Below are tips to help you look for foot problems. Try to check your feet at the same time each day, such as when you get out of bed in the morning:  Check the top of each foot. The tops of toes, back of the heel, and outer edge of the foot can get a lot of rubbing from poor-fitting shoes.  Check the bottom of each foot. Daily wear and tear often leads to problems at pressure spots.  Check  the toes and nails. Fungal infections often occur between toes. Toenail problems can also be a sign of fungal infections or lead to breaks in the skin.  Check your shoes, too. Loose objects inside a shoe can injure the foot. Use your hand to feel inside your shoes for things like lane, loose stitching, or rough areas that could irritate your skin.  Warning signs  Look for any color changes in the foot. Redness with streaks can signal a severe infection, which needs immediate medical attention. Tell your doctor right away if you have any of these problems:  Swelling, sometimes with color changes, may be a sign of poor blood flow or infection. Symptoms include tenderness and an increase in the size of your foot.  Warm or hot areas on your feet may be signs of infection. A foot that is cold may not be getting enough blood.  Sensations such as burning, tingling, or pins and needles can be signs of a problem. Also check for areas that may be numb.  Hot spots are caused by friction or pressure. Look for hot spots in areas that get a lot of rubbing. Hot spots can turn into blisters, calluses, or sores.  Cracks and sores are caused by dry or irritated skin. They are a sign that the skin is breaking down, which can lead to infection.  Toenail problems to watch for include nails growing into the skin (ingrown toenail) and causing redness or pain. Thick, yellow, or discolored nails can signal a fungal infection.  Drainage and odor can develop from untreated sores and ulcers. Call your doctor right away if you notice white or yellow drainage, bleeding, or unpleasant odor.   © 2772-8970 [employer]. [address] [place] [address]. All rights reserved. This information is not intended as a substitute for professional medical care. Always follow your healthcare professional's instructions.        Step-by-Step:  Inspecting Your Feet (Diabetes)    Date Last Reviewed: 10/1/2016  © 1547-1457 [employer]. [address] [place] [address], [place] [address]. All rights reserved. This information is not intended as a substitute for professional medical care. Always follow your healthcare professional's instructions.

## 2022-09-08 NOTE — PROGRESS NOTES
Subjective:      Patient ID: Bria Lawson is a 86 y.o. male.    Chief Complaint: Diabetes Mellitus (PCP  08/04/2022) and Follow-up    Bria is a 86 y.o. male who presents to the clinic for evaluation and treatment of high risk feet. 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  The patient has no major complaints with feet. Chief concern is how to care for feet as a diabetic.      This patient has documented high risk feet requiring routine maintenance secondary to diabetes mellitis and those secondary complications of diabetes, as mentioned..    PCP: Adiel Bragg MD    Date Last Seen by PCP:   Chief Complaint   Patient presents with    Diabetes Mellitus     PCP  08/04/2022    Follow-up     Current shoe gear:  rx diab shoes    Hemoglobin A1C   Date Value Ref Range Status   04/05/2022 5.7 (H) 4.0 - 5.6 % Final     Comment:     ADA Screening Guidelines:  5.7-6.4%  Consistent with prediabetes  >or=6.5%  Consistent with diabetes    High levels of fetal hemoglobin interfere with the HbA1C  assay. Heterozygous hemoglobin variants (HbS, HgC, etc)do  not significantly interfere with this assay.   However, presence of multiple variants may affect accuracy.     10/15/2021 6.1 (H) 4.0 - 5.6 % Final     Comment:     ADA Screening Guidelines:  5.7-6.4%  Consistent with prediabetes  >or=6.5%  Consistent with diabetes    High levels of fetal hemoglobin interfere with the HbA1C  assay. Heterozygous hemoglobin variants (HbS, HgC,  etc)do  not significantly interfere with this assay.   However, presence of multiple variants may affect accuracy.     07/02/2021 6.3 (H) 4.0 - 5.6 % Final     Comment:     ADA Screening Guidelines:  5.7-6.4%  Consistent with prediabetes  >or=6.5%  Consistent with diabetes    High levels of fetal hemoglobin interfere with the HbA1C  assay. Heterozygous hemoglobin variants (HbS, HgC, etc)do  not significantly interfere with this assay.   However, presence of multiple variants may affect accuracy.           Patient Active Problem List   Diagnosis    GERD (gastroesophageal reflux disease)    Gout, chronic    HTN (hypertension)    Hyperlipidemia    CKD stage 3 due to type 2 diabetes mellitus    HDL lipoprotein deficiency    Cervical radiculopathy, acute    CN (constipation)    Pancytopenia    Gout of foot    Stage 3a chronic kidney disease    Primary osteoarthritis involving multiple joints    Diabetes mellitus with proteinuria    Diabetes mellitus with proteinuric diabetic nephropathy    Hypertensive CKD (chronic kidney disease)    Leukopenia    Thrombocytopenia    B-cell lymphoma of extranodal site    Neutropenia    Refractive error    Pseudophakia    Screening for malignant neoplasm of colon    PAH (pulmonary artery hypertension)    Atherosclerosis of abdominal aorta    Colon, diverticulosis    Microhematuria       Current Outpatient Medications on File Prior to Visit   Medication Sig Dispense Refill    allopurinoL (ZYLOPRIM) 100 MG tablet TAKE 1 TABLET(100 MG) BY MOUTH EVERY DAY 90 tablet 12    ascorbic acid, vitamin C, (VITAMIN C) 1000 MG tablet Take 1,000 mg by mouth once daily.      atorvastatin (LIPITOR) 20 MG tablet TAKE 1 TABLET(20 MG) BY MOUTH EVERY DAY 90 tablet 12    blood sugar diagnostic Strp 1 strip by Misc.(Non-Drug; Combo Route) route 2 (two) times daily. Disp glucometer and lancets as well 100 strip 12    DUPIXENT  mg/2 mL PnIj Inject into the skin.      folic acid (FOLVITE) 800 MCG Tab Take  800 mcg by mouth once daily.      gabapentin (NEURONTIN) 300 MG capsule TAKE 1 CAPSULE(300 MG) BY MOUTH THREE TIMES DAILY 270 capsule 3    JANUVIA 100 mg Tab TAKE 1 TABLET ONCE DAILY 90 tablet 1    loratadine (CLARITIN) 10 mg tablet TAKE 1 TABLET(10 MG) BY MOUTH TWICE DAILY 60 tablet 12    multivit-min/FA/lycopen/lutein (CENTRUM SILVER MEN ORAL) Take by mouth.      niacin 500 MG Tab Take 100 mg by mouth every evening.      pantoprazole (PROTONIX) 40 MG tablet Take 1 tablet (40 mg total) by mouth once daily. 90 tablet 3    TRUEPLUS LANCETS 33 gauge Misc USE TO TEST BLOOD SUGAR BID  12    TURMERIC ROOT EXTRACT ORAL Take by mouth.      valsartan (DIOVAN) 320 MG tablet TAKE 1 TABLET ONCE DAILY 90 tablet 3    vitamin E 400 UNIT capsule Take 400 Units by mouth once daily.      [DISCONTINUED] omeprazole (PRILOSEC) 40 MG capsule TAKE 1 CAPSULE DAILY 90 capsule 3     No current facility-administered medications on file prior to visit.       Review of patient's allergies indicates:  No Known Allergies    Past Surgical History:   Procedure Laterality Date    BONE MARROW BIOPSY Left 9/19/2018    Procedure: Biopsy-bone marrow;  Surgeon: Velia Tobias MD;  Location: [facility];  Service: Oncology;  Laterality: Left;    CATARACT EXTRACTION Bilateral 1996    COLONOSCOPY N/A 11/24/2020    Procedure: COLONOSCOPY Golytely;  Surgeon: Masoud Hwang MD;  Location: [facility];  Service: Endoscopy;  Laterality: N/A;    ESOPHAGOGASTRODUODENOSCOPY N/A 11/24/2020    Procedure: EGD (ESOPHAGOGASTRODUODENOSCOPY);  Surgeon: Masoud Hwang MD;  Location: [facility];  Service: Endoscopy;  Laterality: N/A;    EYE SURGERY      cataracts    JOINT REPLACEMENT      knee replacement    retinal injection s Bilateral     scrotal cyst         Family History   Problem Relation Age of Onset    Hypertension Mother     Diabetes Father     No Known Problems Brother     No Known Problems Daughter     No Known Problems Son     No Known Problems Son     No  Known Problems Maternal Aunt     No Known Problems Maternal Uncle     No Known Problems Paternal Aunt     No Known Problems Paternal Uncle     No Known Problems Maternal Grandmother     No Known Problems Maternal Grandfather     No Known Problems Paternal Grandmother     No Known Problems Paternal Grandfather     Amblyopia Neg Hx     Blindness Neg Hx     Cancer Neg Hx     Cataracts Neg Hx     Glaucoma Neg Hx     Macular degeneration Neg Hx     Retinal detachment Neg Hx     Strabismus Neg Hx     Stroke Neg Hx     Thyroid disease Neg Hx        Social History     Socioeconomic History    Marital status:    Tobacco Use    Smoking status: Former    Smokeless tobacco: Never   Substance and Sexual Activity    Alcohol use: Yes     Comment: socially - maybe few times a week    Drug use: No    Sexual activity: Yes     Partners: Female     Social Determinants of Health     Financial Resource Strain: Low Risk     Difficulty of Paying Living Expenses: Not hard at all   Food Insecurity: No Food Insecurity    Worried About Running Out of Food in the Last Year: Never true    Ran Out of Food in the Last Year: Never true   Transportation Needs: No Transportation Needs    Lack of Transportation (Medical): No    Lack of Transportation (Non-Medical): No   Physical Activity: Inactive    Days of Exercise per Week: 0 days    Minutes of Exercise per Session: 0 min   Stress: No Stress Concern Present    Feeling of Stress : Only a little   Social Connections: Unknown    Frequency of Communication with Friends and Family: Once a week    Frequency of Social Gatherings with Friends and Family: Never    Active Member of Clubs or Organizations: No    Attends Club or Organization Meetings: Never    Marital Status:    Housing Stability: Low Risk     Unable to Pay for Housing in the Last Year: No    Number of Places Lived in the Last Year: 1    Unstable Housing in the Last Year: No           Review of Systems   Constitutional: Negative  "for chills and fever.   Cardiovascular:  Positive for leg swelling. Negative for claudication.   Respiratory:  Negative for cough and shortness of breath.    Skin:  Positive for dry skin, itching, nail changes and rash.        Lichen planus   Musculoskeletal:  Positive for arthritis (RA), back pain, joint pain and myalgias. Negative for falls, joint swelling and muscle weakness.   Gastrointestinal:  Negative for diarrhea, nausea and vomiting.   Neurological:  Positive for paresthesias. Negative for numbness, tremors and weakness.   Psychiatric/Behavioral:  Negative for altered mental status and hallucinations.          Objective:       Vitals:    09/06/22 1136   Weight: 89.8 kg (198 lb)   Height: 5' 11" (1.803 m)   PainSc: 0-No pain       Physical Exam  Nursing note reviewed.   Constitutional:       General: He is not in acute distress.     Appearance: He is not toxic-appearing or diaphoretic.      Comments: Pt. is well-developed, well-nourished, appears stated age, in no acute distress, alert and oriented x 3. No evidence of depression, anxiety, or agitation. Calm, cooperative, and communicative. Appropriate interactions and affect.   Cardiovascular:      Pulses:           Dorsalis pedis pulses are 2+ on the right side and 2+ on the left side.        Posterior tibial pulses are 1+ on the right side and 1+ on the left side.      Comments: There is decreased digital hair. Skin is atrophic  Pulmonary:      Effort: No respiratory distress.   Musculoskeletal:      Right ankle: No tenderness. No lateral malleolus, medial malleolus, AITF ligament, CF ligament or posterior TF ligament tenderness.      Right Achilles Tendon: No defects. Dahl's test negative.      Left ankle: No tenderness. No lateral malleolus, medial malleolus, AITF ligament, CF ligament or posterior TF ligament tenderness.      Left Achilles Tendon: No defects. Dahl's test negative.      Right foot: No tenderness or bony tenderness.      Left " foot: No tenderness or bony tenderness.      Comments: Fat pad atrophy to heels and met heads bilateral    Decreased stride, station of gait.  apropulsive toe off.  Increased angle and base of gait.    Patient has hammertoes of digits 2-5 bilateral partially reducible without symptom today.    Decreased first MPJ range of motion both weightbearing and nonweightbearing, no crepitus observed the first MP joint, + dorsal flag sign.Mild  bunion deformity is observed .     Lymphadenopathy:      Comments: No lymphatic streaking    Negative lymphadenopathy bilateral popliteal fossa and tarsal tunnel.     Skin:     General: Skin is warm and dry.      Coloration: Skin is not pale.      Findings: No abrasion, bruising, burn, ecchymosis or rash.      Nails: There is no clubbing.      Comments: medial border of the left hallux with ingrown nail plate. Surrounding erythema and minimal edema is noted there is no granuloma formation noted. no malodor     Skin is thin, atrophic    Toenails 1-2 bilaterally are discolored/yellowed, dystrophic, brittle with subungual debris.    Neurological:      Sensory: Sensory deficit present.      Comments: [place] 5.07 monofilament is intact bilateral feet. Sharp/dull sensation is also intact Bilateral feet. Proprioception is grossly intact.     Decreased/absent vibratory sensation bilateral feet to 128Hz tuning fork.    Paresthesias, and hyperesthesia bilateral feet with no clearly identified trigger or source.           Psychiatric:         Attention and Perception: He is attentive.         Mood and Affect: Mood is not anxious. Affect is not inappropriate.         Speech: He is communicative. Speech is not slurred.         Behavior: Behavior is not combative.           Assessment:       Encounter Diagnoses   Name Primary?    Encounter for diabetic foot exam Yes    Type II diabetes mellitus with neurological manifestations     Hallux limitus, acquired, left     Hallux limitus, acquired,  right     Hammer toes of both feet     Dermatophytosis of nail          Plan:       Bria was seen today for diabetes mellitus and follow-up.    Diagnoses and all orders for this visit:    Encounter for diabetic foot exam  -     DIABETIC SHOES FOR HOME USE    Type II diabetes mellitus with neurological manifestations  -     DIABETIC SHOES FOR HOME USE    Hallux limitus, acquired, left  -     DIABETIC SHOES FOR HOME USE    Hallux limitus, acquired, right  -     DIABETIC SHOES FOR HOME USE    Hammer toes of both feet  -     DIABETIC SHOES FOR HOME USE    Dermatophytosis of nail    I counseled the patient on his conditions, their implications and medical management.      Greater than 50% of this visit spent on counseling and coordination of care.    Education about the diabetic foot, neuropathy, and prevention of limb loss.    Shoe inspection. Diabetic Foot Education. Patient reminded of the importance of good nutrition/healthy diet/weight management and blood sugar control to help prevent podiatric complications of diabetes. Patient instructed on proper foot hygeine. Wear comfortable, proper fitting shoes. Wash feet daily. Dry well. After drying, apply moisturizer to feet (no lotion to webspaces). Inspect feet daily for skin breaks, blisters, swelling, or redness. Wear cotton socks (preferably white)  Change socks every day. Do NOT walk barefoot. Do NOT use heating pads or hot water soaks. We discussed wearing proper shoe gear, daily foot inspections, never walking without protective shoe gear.     Discussed edema control and the importance of daily moisturizer to the feet such as Gold bonds diabetic foot cream    Recommend applying vicks vaporub to thick abnormal toenails daily x 6 months to treat fungal nail infection.    Rx diabetic shoes for protection and support    Based on chart review this patient does not qualify for nail care (Patients who qualify for nail care are usually as follows: diabetic with  neurological manifestations, PVD, pernicious anemia, or CKD with appropriate modifiers that indicate high amputation risk).     He will continue to monitor the areas daily, inspect his feet, wear protective shoe gear when ambulatory, moisturizer to maintain skin integrity and follow in this office in approximately 12 months, sooner p.r.n.

## 2022-09-12 ENCOUNTER — TELEPHONE (OUTPATIENT)
Dept: NEPHROLOGY | Facility: CLINIC | Age: 87
End: 2022-09-12
Payer: MEDICARE

## 2022-09-12 ENCOUNTER — LAB VISIT (OUTPATIENT)
Dept: LAB | Facility: HOSPITAL | Age: 87
End: 2022-09-12
Attending: INTERNAL MEDICINE
Payer: MEDICARE

## 2022-09-12 DIAGNOSIS — N18.30 CKD STAGE 3 DUE TO TYPE 2 DIABETES MELLITUS: ICD-10-CM

## 2022-09-12 DIAGNOSIS — N18.30 CKD STAGE 3 DUE TO TYPE 2 DIABETES MELLITUS: Primary | ICD-10-CM

## 2022-09-12 DIAGNOSIS — E11.22 CKD STAGE 3 DUE TO TYPE 2 DIABETES MELLITUS: Primary | ICD-10-CM

## 2022-09-12 DIAGNOSIS — E11.22 CKD STAGE 3 DUE TO TYPE 2 DIABETES MELLITUS: ICD-10-CM

## 2022-09-12 LAB
ALBUMIN SERPL BCP-MCNC: 3.9 G/DL (ref 3.5–5.2)
ANION GAP SERPL CALC-SCNC: 10 MMOL/L (ref 8–16)
BASOPHILS # BLD AUTO: 0.01 K/UL (ref 0–0.2)
BASOPHILS NFR BLD: 0.3 % (ref 0–1.9)
BUN SERPL-MCNC: 15 MG/DL (ref 8–23)
CALCIUM SERPL-MCNC: 9 MG/DL (ref 8.7–10.5)
CHLORIDE SERPL-SCNC: 105 MMOL/L (ref 95–110)
CO2 SERPL-SCNC: 23 MMOL/L (ref 23–29)
CREAT SERPL-MCNC: 1.3 MG/DL (ref 0.5–1.4)
DIFFERENTIAL METHOD: ABNORMAL
EOSINOPHIL # BLD AUTO: 0 K/UL (ref 0–0.5)
EOSINOPHIL NFR BLD: 0.3 % (ref 0–8)
ERYTHROCYTE [DISTWIDTH] IN BLOOD BY AUTOMATED COUNT: 14.9 % (ref 11.5–14.5)
EST. GFR  (NO RACE VARIABLE): 54 ML/MIN/1.73 M^2
GLUCOSE SERPL-MCNC: 233 MG/DL (ref 70–110)
HCT VFR BLD AUTO: 35.2 % (ref 40–54)
HGB BLD-MCNC: 11.4 G/DL (ref 14–18)
IMM GRANULOCYTES # BLD AUTO: 0.03 K/UL (ref 0–0.04)
IMM GRANULOCYTES NFR BLD AUTO: 0.8 % (ref 0–0.5)
LYMPHOCYTES # BLD AUTO: 1.1 K/UL (ref 1–4.8)
LYMPHOCYTES NFR BLD: 29.6 % (ref 18–48)
MCH RBC QN AUTO: 30.7 PG (ref 27–31)
MCHC RBC AUTO-ENTMCNC: 32.4 G/DL (ref 32–36)
MCV RBC AUTO: 95 FL (ref 82–98)
MONOCYTES # BLD AUTO: 0.7 K/UL (ref 0.3–1)
MONOCYTES NFR BLD: 18.5 % (ref 4–15)
NEUTROPHILS # BLD AUTO: 1.9 K/UL (ref 1.8–7.7)
NEUTROPHILS NFR BLD: 50.5 % (ref 38–73)
NRBC BLD-RTO: 0 /100 WBC
PHOSPHATE SERPL-MCNC: 2.7 MG/DL (ref 2.7–4.5)
PLATELET # BLD AUTO: 75 K/UL (ref 150–450)
PMV BLD AUTO: 12.4 FL (ref 9.2–12.9)
POTASSIUM SERPL-SCNC: 4 MMOL/L (ref 3.5–5.1)
RBC # BLD AUTO: 3.71 M/UL (ref 4.6–6.2)
SODIUM SERPL-SCNC: 138 MMOL/L (ref 136–145)
WBC # BLD AUTO: 3.79 K/UL (ref 3.9–12.7)

## 2022-09-12 PROCEDURE — 82570 ASSAY OF URINE CREATININE: CPT | Performed by: INTERNAL MEDICINE

## 2022-09-12 PROCEDURE — 85025 COMPLETE CBC W/AUTO DIFF WBC: CPT | Performed by: INTERNAL MEDICINE

## 2022-09-12 PROCEDURE — 84156 ASSAY OF PROTEIN URINE: CPT | Performed by: INTERNAL MEDICINE

## 2022-09-12 PROCEDURE — 80069 RENAL FUNCTION PANEL: CPT | Performed by: INTERNAL MEDICINE

## 2022-09-12 PROCEDURE — 82043 UR ALBUMIN QUANTITATIVE: CPT | Performed by: INTERNAL MEDICINE

## 2022-09-12 PROCEDURE — 36415 COLL VENOUS BLD VENIPUNCTURE: CPT | Performed by: INTERNAL MEDICINE

## 2022-09-13 ENCOUNTER — OFFICE VISIT (OUTPATIENT)
Dept: NEPHROLOGY | Facility: CLINIC | Age: 87
End: 2022-09-13
Payer: MEDICARE

## 2022-09-13 VITALS
WEIGHT: 196.19 LBS | DIASTOLIC BLOOD PRESSURE: 75 MMHG | OXYGEN SATURATION: 98 % | BODY MASS INDEX: 27.47 KG/M2 | SYSTOLIC BLOOD PRESSURE: 165 MMHG | HEIGHT: 71 IN | HEART RATE: 60 BPM

## 2022-09-13 DIAGNOSIS — E11.22 CKD STAGE 3 DUE TO TYPE 2 DIABETES MELLITUS: Primary | ICD-10-CM

## 2022-09-13 DIAGNOSIS — N18.30 CKD STAGE 3 DUE TO TYPE 2 DIABETES MELLITUS: Primary | ICD-10-CM

## 2022-09-13 LAB
ALBUMIN/CREAT UR: 71.1 UG/MG (ref 0–30)
CREAT UR-MCNC: 90 MG/DL (ref 23–375)
CREAT UR-MCNC: 90 MG/DL (ref 23–375)
MICROALBUMIN UR DL<=1MG/L-MCNC: 64 UG/ML
PROT UR-MCNC: 21 MG/DL
PROT/CREAT UR: 0.23 MG/G{CREAT} (ref 0–0.2)

## 2022-09-13 PROCEDURE — 99999 PR PBB SHADOW E&M-EST. PATIENT-LVL IV: CPT | Mod: PBBFAC,,, | Performed by: INTERNAL MEDICINE

## 2022-09-13 PROCEDURE — 99213 OFFICE O/P EST LOW 20 MIN: CPT | Mod: S$PBB,,, | Performed by: INTERNAL MEDICINE

## 2022-09-13 PROCEDURE — 99999 PR PBB SHADOW E&M-EST. PATIENT-LVL IV: ICD-10-PCS | Mod: PBBFAC,,, | Performed by: INTERNAL MEDICINE

## 2022-09-13 PROCEDURE — 99213 PR OFFICE/OUTPT VISIT, EST, LEVL III, 20-29 MIN: ICD-10-PCS | Mod: S$PBB,,, | Performed by: INTERNAL MEDICINE

## 2022-09-13 PROCEDURE — 99214 OFFICE O/P EST MOD 30 MIN: CPT | Mod: PBBFAC | Performed by: INTERNAL MEDICINE

## 2022-09-13 NOTE — PROGRESS NOTES
Progress Note  Nephrology      Referring physician: Adiel Bragg MD    Reason for visit: CKD     SUBJECTIVE:   86 y.o. male  has a past medical history of Arthritis, Atrial fibrillation, CKD stage 3 due to type 2 diabetes mellitus (5/26/2015), Colon polyp, Coronary artery disease, Diabetes mellitus with renal manifestations, uncontrolled (2/26/2015), Diabetic retinopathy, GERD (gastroesophageal reflux disease) (2/26/2015), Gout, Gout, chronic (2/26/2015), HDL lipoprotein deficiency (5/26/2015), Hyperlipidemia (2/26/2015), Hypertension, Lymphoma, and Uncontrolled secondary diabetes mellitus with stage 3 CKD (GFR 30-59) (8/28/2015). who has been following up in renal clinic for ckd. Patient feels well today, no urinary or cardiac symptoms, no NSAIDs intake.          OBJECTIVE:     Vitals:    09/13/22 1310   BP: (!) 165/75   Pulse: 60          Physical Exam:  General: no distress, well nourished  HENT: PERRLA, Normal mouth nose and ears.  Neck: no JVD and thyroid not enlarged, symmetric, no tenderness/mass/nodules  Lungs: clear to auscultation bilaterally and normal respiratory effort  Cardiovascular: regular rate and rhythm, S1, S2 normal, no murmur, click, rub or gallop.   Abdomen: soft, non-tender non-distented; bowel sounds normal  Skin: No rashes or lesions  Musculoskeletal:no edema in LE, no deformities.   Lymph Nodes: No cervical or supraclavicular adenopathy  Neurologic: Normal strength and tone. No focal numbness or weakness    Dialysis Access: Not applicable.        Medications:    Current Outpatient Medications:     allopurinoL (ZYLOPRIM) 100 MG tablet, TAKE 1 TABLET(100 MG) BY MOUTH EVERY DAY, Disp: 90 tablet, Rfl: 12    amLODIPine (NORVASC) 10 MG tablet, TAKE 1 TABLET(10 MG) BY MOUTH EVERY DAY, Disp: 90 tablet, Rfl: 3    ascorbic acid, vitamin C, (VITAMIN C) 1000 MG tablet, Take 1,000 mg by mouth once daily., Disp: , Rfl:     atorvastatin (LIPITOR) 20 MG tablet, TAKE 1 TABLET(20 MG) BY MOUTH EVERY  DAY, Disp: 90 tablet, Rfl: 12    blood sugar diagnostic Strp, 1 strip by Misc.(Non-Drug; Combo Route) route 2 (two) times daily. Disp glucometer and lancets as well, Disp: 100 strip, Rfl: 12    carvediloL (COREG) 6.25 MG tablet, TAKE 1 TABLET(6.25 MG) BY MOUTH TWICE DAILY WITH MEALS, Disp: 180 tablet, Rfl: 3    DUPIXENT  mg/2 mL PnIj, Inject into the skin., Disp: , Rfl:     FEROSUL 325 mg (65 mg iron) Tab tablet, TAKE 1 TABLET BY MOUTH EVERY DAY WITH BREAKFAST, Disp: 90 tablet, Rfl: 0    folic acid (FOLVITE) 800 MCG Tab, Take 800 mcg by mouth once daily., Disp: , Rfl:     gabapentin (NEURONTIN) 300 MG capsule, TAKE 1 CAPSULE(300 MG) BY MOUTH THREE TIMES DAILY, Disp: 270 capsule, Rfl: 3    glipiZIDE (GLUCOTROL) 10 MG TR24, TAKE 1 TABLET(10 MG) BY MOUTH EVERY DAY, Disp: 90 tablet, Rfl: 0    JANUVIA 100 mg Tab, TAKE 1 TABLET ONCE DAILY, Disp: 90 tablet, Rfl: 1    loratadine (CLARITIN) 10 mg tablet, TAKE 1 TABLET(10 MG) BY MOUTH TWICE DAILY, Disp: 60 tablet, Rfl: 12    multivit-min/FA/lycopen/lutein (CENTRUM SILVER MEN ORAL), Take by mouth., Disp: , Rfl:     niacin 500 MG Tab, Take 100 mg by mouth every evening., Disp: , Rfl:     pantoprazole (PROTONIX) 40 MG tablet, Take 1 tablet (40 mg total) by mouth once daily., Disp: 90 tablet, Rfl: 3    TRUEPLUS LANCETS 33 gauge Misc, USE TO TEST BLOOD SUGAR BID, Disp: , Rfl: 12    TURMERIC ROOT EXTRACT ORAL, Take by mouth., Disp: , Rfl:     valsartan (DIOVAN) 320 MG tablet, TAKE 1 TABLET ONCE DAILY, Disp: 90 tablet, Rfl: 3    vitamin E 400 UNIT capsule, Take 400 Units by mouth once daily., Disp: , Rfl:          Laboratory:  Lab Results   Component Value Date    CREATININE 1.3 09/12/2022       Prot/Creat Ratio, Urine   Date Value Ref Range Status   09/12/2022 0.23 (H) 0.00 - 0.20 Final   08/04/2022 0.34 (H) 0.00 - 0.20 Final   04/05/2022 0.23 (H) 0.00 - 0.20 Final       Lab Results   Component Value Date     09/12/2022    K 4.0 09/12/2022    CO2 23 09/12/2022        last PTH   Lab Results   Component Value Date    PTH 51.6 08/04/2022    CALCIUM 9.0 09/12/2022    PHOS 2.7 09/12/2022       Lab Results   Component Value Date    HGB 11.4 (L) 09/12/2022        Lab Results   Component Value Date    HGBA1C 5.7 (H) 04/05/2022       Lab Results   Component Value Date    LDLCALC 47.8 (L) 07/02/2021       Other Labs were reviewed      ASSESSMENT/PLAN:     CKD IIIA/A2  -likely from DMII/HTN  -Cr baseline ~ 1.2-1.5  -UPCR 200 mg/g      HTN  Controlled in 120s at home    Anemia  -from ckd  -Hgb goal ~ 10  -Iron stores not available    Secondary Hyperparathyroidism  -Phos/Ca acceptable  -PTH na      Acid/Base  -No Metabolic acidosis              PLAN:  -Continue current BP meds regimen  -Avoid NSAIDs intake        RTC in 10 months with labs      SENA MCCANN MD  NEPHROLOGY ATTENDING

## 2022-09-30 ENCOUNTER — EXTERNAL CHRONIC CARE MANAGEMENT (OUTPATIENT)
Dept: PRIMARY CARE CLINIC | Facility: CLINIC | Age: 87
End: 2022-09-30
Payer: MEDICARE

## 2022-09-30 PROCEDURE — 99439 CHRNC CARE MGMT STAF EA ADDL: CPT | Mod: S$PBB,,, | Performed by: INTERNAL MEDICINE

## 2022-09-30 PROCEDURE — 99490 CHRNC CARE MGMT STAFF 1ST 20: CPT | Mod: S$PBB,,, | Performed by: INTERNAL MEDICINE

## 2022-09-30 PROCEDURE — 99490 CHRNC CARE MGMT STAFF 1ST 20: CPT | Mod: PBBFAC,PO | Performed by: INTERNAL MEDICINE

## 2022-09-30 PROCEDURE — 99439 PR CHRONIC CARE MGMT, EA ADDTL 20 MIN: ICD-10-PCS | Mod: S$PBB,,, | Performed by: INTERNAL MEDICINE

## 2022-09-30 PROCEDURE — 99439 CHRNC CARE MGMT STAF EA ADDL: CPT | Mod: PBBFAC,PO | Performed by: INTERNAL MEDICINE

## 2022-09-30 PROCEDURE — 99490 PR CHRONIC CARE MGMT, 1ST 20 MIN: ICD-10-PCS | Mod: S$PBB,,, | Performed by: INTERNAL MEDICINE

## 2022-10-18 ENCOUNTER — OFFICE VISIT (OUTPATIENT)
Dept: CARDIOLOGY | Facility: CLINIC | Age: 87
End: 2022-10-18
Payer: MEDICARE

## 2022-10-18 VITALS
BODY MASS INDEX: 27.77 KG/M2 | WEIGHT: 199.06 LBS | SYSTOLIC BLOOD PRESSURE: 171 MMHG | HEART RATE: 56 BPM | DIASTOLIC BLOOD PRESSURE: 75 MMHG | OXYGEN SATURATION: 98 %

## 2022-10-18 DIAGNOSIS — I10 ESSENTIAL HYPERTENSION: ICD-10-CM

## 2022-10-18 DIAGNOSIS — E11.21 DIABETES MELLITUS WITH PROTEINURIC DIABETIC NEPHROPATHY: ICD-10-CM

## 2022-10-18 DIAGNOSIS — D69.6 THROMBOCYTOPENIA: ICD-10-CM

## 2022-10-18 DIAGNOSIS — I25.10 CORONARY ARTERY CALCIFICATION SEEN ON CAT SCAN: Primary | ICD-10-CM

## 2022-10-18 DIAGNOSIS — I34.0 NONRHEUMATIC MITRAL VALVE REGURGITATION: ICD-10-CM

## 2022-10-18 DIAGNOSIS — E11.69 HYPERLIPIDEMIA ASSOCIATED WITH TYPE 2 DIABETES MELLITUS: ICD-10-CM

## 2022-10-18 DIAGNOSIS — D64.9 ANEMIA, UNSPECIFIED TYPE: ICD-10-CM

## 2022-10-18 DIAGNOSIS — I44.0 FIRST DEGREE AV BLOCK: ICD-10-CM

## 2022-10-18 DIAGNOSIS — I70.0 ATHEROSCLEROSIS OF ABDOMINAL AORTA: ICD-10-CM

## 2022-10-18 DIAGNOSIS — E78.5 HYPERLIPIDEMIA ASSOCIATED WITH TYPE 2 DIABETES MELLITUS: ICD-10-CM

## 2022-10-18 PROCEDURE — 99213 OFFICE O/P EST LOW 20 MIN: CPT | Mod: PBBFAC | Performed by: INTERNAL MEDICINE

## 2022-10-18 PROCEDURE — 99214 PR OFFICE/OUTPT VISIT, EST, LEVL IV, 30-39 MIN: ICD-10-PCS | Mod: S$PBB,,, | Performed by: INTERNAL MEDICINE

## 2022-10-18 PROCEDURE — 99999 PR PBB SHADOW E&M-EST. PATIENT-LVL III: ICD-10-PCS | Mod: PBBFAC,,, | Performed by: INTERNAL MEDICINE

## 2022-10-18 PROCEDURE — 99999 PR PBB SHADOW E&M-EST. PATIENT-LVL III: CPT | Mod: PBBFAC,,, | Performed by: INTERNAL MEDICINE

## 2022-10-18 PROCEDURE — 99214 OFFICE O/P EST MOD 30 MIN: CPT | Mod: S$PBB,,, | Performed by: INTERNAL MEDICINE

## 2022-10-18 NOTE — PROGRESS NOTES
CARDIOLOGY CLINIC VISIT        HISTORY OF PRESENT ILLNESS:     Bria Lawson presents for continued care.  Last seen 05/03/2022.      Echocardiogram from 10/29/2021 shows normal ejection fraction estimated 60%.  Mild to moderate mitral regurgitation.  Normal pulmonary pressure.  Prior study had showed a normal to low normal left ventricular systolic function.  Moderate mitral regurgitation.  Mildly elevated pulmonary pressure.  Seen 05/29/2020 for evaluation of shortness of breath. CT scan on 5/26/20 showing pulmonary edema, bilateral pleural effusions. Coronary calcification noted. He is without complaints. No chest pain.  No shortness of breath. Digital hypertension reviewed.    05/03/2022:  No complaints.  EKG shows sinus bradycardia with first-degree AV block.  Home blood pressure logs reviewed.  Normal values.    CT Chest 5/26/20:     1. CT findings favoring sequela of cardiac decompensation causing pulmonary edema and bilateral pleural effusions.  Superimposed COVID-19 pneumonia cannot be entirely excluded noting that pleural effusions is not a commonly reported finding and therefore would be atypical.  2. Persistent soft tissue attenuation nodule within the prevascular mediastinum, presumably an enlarged lymph node and unchanged when compared to previous PET-CTs.  3. Slight interval increased size of multiple mediastinal lymph nodes, nonspecific.  4. Cardiomegaly with severe dense coronary artery atherosclerosis in a 3 vessel distribution.  5. Additional findings as detailed in the body of the report.    10/18/2022: Had COVID after a trip to [place]. Mild case. Overall he feels good. No complaints. Blood pressure is high but usual for him. (White coat). EKG today shows sinus bradycardia with first degree AVB.    CARDIOVASCULAR HISTORY:     Coronary calcifications noted on CT scan  Pulmonary hypertension  Moderate mitral regurgitation  Aortic atherosclerosis    PAST MEDICAL HISTORY:     Past Medical History:    Diagnosis Date    Arthritis     Atrial fibrillation     CKD stage 3 due to type 2 diabetes mellitus 5/26/2015    Colon polyp     Coronary artery disease     Diabetes mellitus with renal manifestations, uncontrolled 2/26/2015    Diabetic retinopathy     GERD (gastroesophageal reflux disease) 2/26/2015    Gout     Gout, chronic 2/26/2015    HDL lipoprotein deficiency 5/26/2015    Hyperlipidemia 2/26/2015    Hypertension     Lymphoma     Uncontrolled secondary diabetes mellitus with stage 3 CKD (GFR 30-59) 8/28/2015       PAST SURGICAL HISTORY:     Past Surgical History:   Procedure Laterality Date    BONE MARROW BIOPSY Left 9/19/2018    Procedure: Biopsy-bone marrow;  Surgeon: Velia Tobias MD;  Location: [facility] ENDO;  Service: Oncology;  Laterality: Left;    CATARACT EXTRACTION Bilateral 1996    COLONOSCOPY N/A 11/24/2020    Procedure: COLONOSCOPY Gol[place];  Surgeon: Masoud Hwang MD;  Location: [facility];  Service: Endoscopy;  Laterality: N/A;    ESOPHAGOGASTRODUODENOSCOPY N/A 11/24/2020    Procedure: EGD (ESOPHAGOGASTRODUODENOSCOPY);  Surgeon: aMsoud Hwang MD;  Location: [facility];  Service: Endoscopy;  Laterality: N/A;    EYE SURGERY      cataracts    JOINT REPLACEMENT      knee replacement    retinal injection s Bilateral     scrotal cyst         ALLERGIES AND MEDICATION:   Review of patient's allergies indicates:  No Known Allergies     Medication List            Accurate as of October 18, 2022  2:09 PM. If you have any questions, ask your nurse or doctor.                CONTINUE taking these medications      allopurinoL 100 MG tablet  Commonly known as: ZYLOPRIM  TAKE 1 TABLET(100 MG) BY MOUTH EVERY DAY     amLODIPine 10 MG tablet  Commonly known as: NORVASC  TAKE 1 TABLET(10 MG) BY MOUTH EVERY DAY     ascorbic acid (vitamin C) 1000 MG tablet  Commonly known as: VITAMIN C     atorvastatin 20 MG tablet  Commonly known as: LIPITOR  TAKE 1 TABLET(20 MG) BY MOUTH EVERY DAY     blood sugar diagnostic  Strp  1 strip by Misc.(Non-Drug; Combo Route) route 2 (two) times daily. Disp glucometer and lancets as well     carvediloL 6.25 MG tablet  Commonly known as: COREG  TAKE 1 TABLET(6.25 MG) BY MOUTH TWICE DAILY WITH MEALS     CENTRUM SILVER MEN ORAL     DUPIXENT  mg/2 mL Pnij  Generic drug: dupilumab     FeroSuL 325 mg (65 mg iron) Tab tablet  Generic drug: ferrous sulfate  TAKE 1 TABLET BY MOUTH EVERY DAY WITH BREAKFAST     folic acid 800 MCG Tab  Commonly known as: FOLVITE     gabapentin 300 MG capsule  Commonly known as: NEURONTIN  TAKE 1 CAPSULE(300 MG) BY MOUTH THREE TIMES DAILY     glipiZIDE 10 MG Tr24  Commonly known as: GLUCOTROL  TAKE 1 TABLET(10 MG) BY MOUTH EVERY DAY     JANUVIA 100 MG Tab  Generic drug: SITagliptin  TAKE 1 TABLET ONCE DAILY     loratadine 10 mg tablet  Commonly known as: CLARITIN  TAKE 1 TABLET(10 MG) BY MOUTH TWICE DAILY     niacin 500 MG Tab     pantoprazole 40 MG tablet  Commonly known as: PROTONIX  Take 1 tablet (40 mg total) by mouth once daily.     TRUEPLUS LANCETS 33 gauge Misc  Generic drug: lancets     TURMERIC ROOT EXTRACT ORAL     valsartan 320 MG tablet  Commonly known as: DIOVAN  TAKE 1 TABLET ONCE DAILY     vitamin E 400 UNIT capsule              SOCIAL HISTORY:     Social History     Socioeconomic History    Marital status:    Tobacco Use    Smoking status: Former    Smokeless tobacco: Never   Substance and Sexual Activity    Alcohol use: Yes     Comment: socially - maybe few times a week    Drug use: No    Sexual activity: Yes     Partners: Female     Social Determinants of Health     Financial Resource Strain: Low Risk     Difficulty of Paying Living Expenses: Not hard at all   Food Insecurity: No Food Insecurity    Worried About Running Out of Food in the Last Year: Never true    Ran Out of Food in the Last Year: Never true   Transportation Needs: No Transportation Needs    Lack of Transportation (Medical): No    Lack of Transportation (Non-Medical): No    Physical Activity: Inactive    Days of Exercise per Week: 0 days    Minutes of Exercise per Session: 0 min   Stress: No Stress Concern Present    Feeling of Stress : Only a little   Social Connections: Unknown    Frequency of Communication with Friends and Family: Once a week    Frequency of Social Gatherings with Friends and Family: Never    Active Member of Clubs or Organizations: No    Attends Club or Organization Meetings: Never    Marital Status:    Housing Stability: Low Risk     Unable to Pay for Housing in the Last Year: No    Number of Places Lived in the Last Year: 1    Unstable Housing in the Last Year: No       FAMILY HISTORY:     Family History   Problem Relation Age of Onset    Hypertension Mother     Diabetes Father     No Known Problems Brother     No Known Problems Daughter     No Known Problems Son     No Known Problems Son     No Known Problems Maternal Aunt     No Known Problems Maternal Uncle     No Known Problems Paternal Aunt     No Known Problems Paternal Uncle     No Known Problems Maternal Grandmother     No Known Problems Maternal Grandfather     No Known Problems Paternal Grandmother     No Known Problems Paternal Grandfather     Amblyopia Neg Hx     Blindness Neg Hx     Cancer Neg Hx     Cataracts Neg Hx     Glaucoma Neg Hx     Macular degeneration Neg Hx     Retinal detachment Neg Hx     Strabismus Neg Hx     Stroke Neg Hx     Thyroid disease Neg Hx        REVIEW OF SYSTEMS:   Review of Systems   Constitutional:  Negative for chills, diaphoresis, fever, malaise/fatigue and weight loss.   Eyes:  Negative for blurred vision and pain.   Respiratory:  Negative for sputum production, shortness of breath and wheezing.    Cardiovascular:  Negative for chest pain, palpitations, orthopnea, claudication, leg swelling and PND.   Gastrointestinal:  Negative for abdominal pain, heartburn, nausea and vomiting.   Musculoskeletal:  Negative for back pain, falls, joint pain, myalgias and neck  pain.   Neurological:  Negative for dizziness, sensory change, speech change, focal weakness, seizures, loss of consciousness, weakness and headaches.   Endo/Heme/Allergies:  Does not bruise/bleed easily.   Psychiatric/Behavioral:  Negative for depression, memory loss and substance abuse. The patient is not nervous/anxious.      PHYSICAL EXAM:     Vitals:    10/18/22 1345   BP: (!) 171/75   Pulse: (!) 56    Body mass index is 27.77 kg/m².  Weight: 90.3 kg (199 lb 1.2 oz)         Physical Exam  Vitals reviewed.   Constitutional:       General: He is not in acute distress.     Appearance: He is well-developed. He is not diaphoretic.   Neck:      Vascular: No carotid bruit or JVD.   Cardiovascular:      Rate and Rhythm: Regular rhythm. Bradycardia present.      Pulses: Normal pulses.      Heart sounds: Murmur heard.   Systolic murmur is present with a grade of 3/6.   Pulmonary:      Effort: Pulmonary effort is normal.      Breath sounds: Normal breath sounds.   Abdominal:      General: Bowel sounds are normal.      Palpations: Abdomen is soft.      Tenderness: There is no abdominal tenderness.   Musculoskeletal:      Right lower leg: No edema.      Left lower leg: No edema.   Neurological:      Mental Status: He is alert and oriented to person, place, and time.   Psychiatric:         Speech: Speech normal.         Behavior: Behavior normal.       DATA:     EKG (personally reviewed tracing):  10/18/2022 - sinus bradycardia with first degree AVB    Laboratory:  CBC:  Recent Labs   Lab 07/02/22  1413 07/14/22  1025 09/12/22  1319   WBC 7.08 4.47 3.79 L   Hemoglobin 11.2 L 11.3 L 11.4 L   Hematocrit 34.4 L 34.3 L 35.2 L   Platelets 136 L 80 L 75 L       CHEMISTRIES:  Recent Labs   Lab 05/09/22  1459 07/02/22  1413 07/14/22  1025 08/04/22  1126 09/12/22  1319   Glucose  --  238 H 309 H 232 H  232 H 233 H   Sodium  --  139 136 140  140 138   Potassium  --  4.5 4.3 4.3  4.3 4.0   BUN  --  15 19 15  15 15   Creatinine 1.2  1.3 1.4 1.3  1.3 1.3   eGFR if  >60.0 58 A 53 A  --   --    eGFR if non  54.8 A 50 A 45 A  --   --    Calcium  --  9.3 8.7 9.6  9.6 9.0       CARDIAC BIOMARKERS:  Recent Labs   Lab 10/26/20  1401   Troponin I <0.006       COAGS:        LIPIDS/LFTS:  Recent Labs   Lab 01/23/20  1154 03/09/20  0905 07/02/21  0814 11/15/21  0846 03/09/22  0916 07/02/22  1413 07/14/22  1025   Cholesterol 112 L  --  106 L  --   --   --   --    Triglycerides 170 H  --  121  --   --   --   --    HDL 34 L  --  34 L  --   --   --   --    LDL Cholesterol 44.0 L  --  47.8 L  --   --   --   --    Non-HDL Cholesterol 78  --  72  --   --   --   --    AST  --    < > 15   < > 18 20 15   ALT  --    < > 15   < > 14 23 14    < > = values in this interval not displayed.       Cardiovascular Testing:    Echocardiogram 10/29/2021:    The left ventricle is mildly enlarged with eccentric hypertrophy and normal systolic function.  The estimated ejection fraction is 60%.  Indeterminate left ventricular diastolic function.  Normal right ventricular size with normal right ventricular systolic function.  Moderate left atrial enlargement.  Mild-to-moderate mitral regurgitation.  Mild tricuspid regurgitation.  Normal central venous pressure (3 mmHg).  The estimated PA systolic pressure is 27 mmHg.    Echo 6/19/20:     Low normal left ventricular systolic function. The estimated ejection fraction is 50-55%.  Mild eccentric left ventricular hypertrophy.  Mild left ventricular enlargement.  No wall motion abnormalities.  Normal right ventricular systolic function.  Moderate mitral regurgitation.  Mild tricuspid regurgitation.  The estimated PA systolic pressure is 35 mmHg.    ASSESSMENT:     1. Abnormal CT scan/coronary calcification:  No symptoms suggestive of angina  2. Hypertension  3. Hyperlipidemia: LDL 47  4. Diabetes mellitus  5. Lymphoma:  No recurrence on PET  6. Pulmonary hypertension  7. Mitral regurgitation:   Mild/Moderate by last echo  8. Atherosclerosis of abdominal aorta  9. Anemia  10. Thrombocytopenia      PLAN:     1. Hypertension:  Continue current management  2. Hyperlipidemia:  Continue current management  3. Mitral regurgitation:  Echo showed mild to moderate  4. Pulmonary hypertension:  Follow-up echocardiogram showed normal pulmonary pressure.  5. Return to clinic 6 months.          Oleg Fontana MD, MPH, FACC, [facility]

## 2022-10-23 ENCOUNTER — PATIENT MESSAGE (OUTPATIENT)
Dept: OTHER | Facility: OTHER | Age: 87
End: 2022-10-23
Payer: MEDICARE

## 2022-10-31 ENCOUNTER — EXTERNAL CHRONIC CARE MANAGEMENT (OUTPATIENT)
Dept: PRIMARY CARE CLINIC | Facility: CLINIC | Age: 87
End: 2022-10-31
Payer: MEDICARE

## 2022-10-31 PROCEDURE — 99490 PR CHRONIC CARE MGMT, 1ST 20 MIN: ICD-10-PCS | Mod: S$PBB,,, | Performed by: INTERNAL MEDICINE

## 2022-10-31 PROCEDURE — 99490 CHRNC CARE MGMT STAFF 1ST 20: CPT | Mod: S$PBB,,, | Performed by: INTERNAL MEDICINE

## 2022-10-31 PROCEDURE — 99490 CHRNC CARE MGMT STAFF 1ST 20: CPT | Mod: PBBFAC,PO | Performed by: INTERNAL MEDICINE

## 2022-11-14 ENCOUNTER — TELEPHONE (OUTPATIENT)
Dept: HEMATOLOGY/ONCOLOGY | Facility: CLINIC | Age: 87
End: 2022-11-14
Payer: MEDICARE

## 2022-11-16 ENCOUNTER — LAB VISIT (OUTPATIENT)
Dept: LAB | Facility: HOSPITAL | Age: 87
End: 2022-11-16
Attending: INTERNAL MEDICINE
Payer: MEDICARE

## 2022-11-16 DIAGNOSIS — C85.19 B-CELL LYMPHOMA OF EXTRANODAL SITE: ICD-10-CM

## 2022-11-16 DIAGNOSIS — E11.21 DIABETES MELLITUS WITH PROTEINURIC DIABETIC NEPHROPATHY: ICD-10-CM

## 2022-11-16 LAB
ALBUMIN SERPL BCP-MCNC: 4.2 G/DL (ref 3.5–5.2)
ALBUMIN/CREAT UR: 441.4 UG/MG (ref 0–30)
ALP SERPL-CCNC: 54 U/L (ref 55–135)
ALT SERPL W/O P-5'-P-CCNC: 13 U/L (ref 10–44)
ANION GAP SERPL CALC-SCNC: 7 MMOL/L (ref 8–16)
AST SERPL-CCNC: 14 U/L (ref 10–40)
BASOPHILS # BLD AUTO: 0.01 K/UL (ref 0–0.2)
BASOPHILS NFR BLD: 0.3 % (ref 0–1.9)
BILIRUB SERPL-MCNC: 0.9 MG/DL (ref 0.1–1)
BUN SERPL-MCNC: 15 MG/DL (ref 8–23)
CALCIUM SERPL-MCNC: 9.5 MG/DL (ref 8.7–10.5)
CHLORIDE SERPL-SCNC: 106 MMOL/L (ref 95–110)
CO2 SERPL-SCNC: 27 MMOL/L (ref 23–29)
CREAT SERPL-MCNC: 1.3 MG/DL (ref 0.5–1.4)
CREAT UR-MCNC: 29 MG/DL (ref 23–375)
DIFFERENTIAL METHOD: ABNORMAL
EOSINOPHIL # BLD AUTO: 0 K/UL (ref 0–0.5)
EOSINOPHIL NFR BLD: 0.5 % (ref 0–8)
ERYTHROCYTE [DISTWIDTH] IN BLOOD BY AUTOMATED COUNT: 14.5 % (ref 11.5–14.5)
EST. GFR  (NO RACE VARIABLE): 54 ML/MIN/1.73 M^2
ESTIMATED AVG GLUCOSE: 134 MG/DL (ref 68–131)
GLUCOSE SERPL-MCNC: 210 MG/DL (ref 70–110)
HBA1C MFR BLD: 6.3 % (ref 4–5.6)
HCT VFR BLD AUTO: 40.1 % (ref 40–54)
HGB BLD-MCNC: 13.3 G/DL (ref 14–18)
IMM GRANULOCYTES # BLD AUTO: 0.03 K/UL (ref 0–0.04)
IMM GRANULOCYTES NFR BLD AUTO: 0.8 % (ref 0–0.5)
LYMPHOCYTES # BLD AUTO: 1.1 K/UL (ref 1–4.8)
LYMPHOCYTES NFR BLD: 30.1 % (ref 18–48)
MCH RBC QN AUTO: 31.4 PG (ref 27–31)
MCHC RBC AUTO-ENTMCNC: 33.2 G/DL (ref 32–36)
MCV RBC AUTO: 95 FL (ref 82–98)
MICROALBUMIN UR DL<=1MG/L-MCNC: 128 UG/ML
MONOCYTES # BLD AUTO: 0.7 K/UL (ref 0.3–1)
MONOCYTES NFR BLD: 17.8 % (ref 4–15)
NEUTROPHILS # BLD AUTO: 1.9 K/UL (ref 1.8–7.7)
NEUTROPHILS NFR BLD: 50.5 % (ref 38–73)
NRBC BLD-RTO: 0 /100 WBC
PLATELET # BLD AUTO: 44 K/UL (ref 150–450)
PMV BLD AUTO: 12.7 FL (ref 9.2–12.9)
POTASSIUM SERPL-SCNC: 4.1 MMOL/L (ref 3.5–5.1)
PROT SERPL-MCNC: 7.5 G/DL (ref 6–8.4)
RBC # BLD AUTO: 4.24 M/UL (ref 4.6–6.2)
SODIUM SERPL-SCNC: 140 MMOL/L (ref 136–145)
WBC # BLD AUTO: 3.66 K/UL (ref 3.9–12.7)

## 2022-11-16 PROCEDURE — 80053 COMPREHEN METABOLIC PANEL: CPT | Performed by: INTERNAL MEDICINE

## 2022-11-16 PROCEDURE — 82043 UR ALBUMIN QUANTITATIVE: CPT | Performed by: INTERNAL MEDICINE

## 2022-11-16 PROCEDURE — 85025 COMPLETE CBC W/AUTO DIFF WBC: CPT | Performed by: INTERNAL MEDICINE

## 2022-11-16 PROCEDURE — 84165 PROTEIN E-PHORESIS SERUM: CPT | Performed by: INTERNAL MEDICINE

## 2022-11-16 PROCEDURE — 86334 PATHOLOGIST INTERPRETATION IFE: ICD-10-PCS | Mod: 26,,, | Performed by: PATHOLOGY

## 2022-11-16 PROCEDURE — 82570 ASSAY OF URINE CREATININE: CPT | Performed by: INTERNAL MEDICINE

## 2022-11-16 PROCEDURE — 84165 PATHOLOGIST INTERPRETATION SPE: ICD-10-PCS | Mod: 26,,, | Performed by: PATHOLOGY

## 2022-11-16 PROCEDURE — 36415 COLL VENOUS BLD VENIPUNCTURE: CPT | Performed by: INTERNAL MEDICINE

## 2022-11-16 PROCEDURE — 83036 HEMOGLOBIN GLYCOSYLATED A1C: CPT | Performed by: INTERNAL MEDICINE

## 2022-11-16 PROCEDURE — 84165 PROTEIN E-PHORESIS SERUM: CPT | Mod: 26,,, | Performed by: PATHOLOGY

## 2022-11-16 PROCEDURE — 86334 IMMUNOFIX E-PHORESIS SERUM: CPT | Mod: 26,,, | Performed by: PATHOLOGY

## 2022-11-16 PROCEDURE — 86334 IMMUNOFIX E-PHORESIS SERUM: CPT | Performed by: INTERNAL MEDICINE

## 2022-11-17 LAB
ALBUMIN SERPL ELPH-MCNC: 4.4 G/DL (ref 3.35–5.55)
ALPHA1 GLOB SERPL ELPH-MCNC: 0.27 G/DL (ref 0.17–0.41)
ALPHA2 GLOB SERPL ELPH-MCNC: 0.53 G/DL (ref 0.43–0.99)
B-GLOBULIN SERPL ELPH-MCNC: 0.87 G/DL (ref 0.5–1.1)
GAMMA GLOB SERPL ELPH-MCNC: 1.13 G/DL (ref 0.67–1.58)
INTERPRETATION SERPL IFE-IMP: NORMAL
PATHOLOGIST INTERPRETATION IFE: NORMAL
PATHOLOGIST INTERPRETATION SPE: NORMAL
PROT SERPL-MCNC: 7.2 G/DL (ref 6–8.4)

## 2022-11-21 ENCOUNTER — OFFICE VISIT (OUTPATIENT)
Dept: HEMATOLOGY/ONCOLOGY | Facility: CLINIC | Age: 87
End: 2022-11-21
Payer: MEDICARE

## 2022-11-21 DIAGNOSIS — D69.6 THROMBOCYTOPENIA: ICD-10-CM

## 2022-11-21 DIAGNOSIS — E11.22 CKD STAGE 3 DUE TO TYPE 2 DIABETES MELLITUS: ICD-10-CM

## 2022-11-21 DIAGNOSIS — C85.19 B-CELL LYMPHOMA OF EXTRANODAL SITE: Primary | ICD-10-CM

## 2022-11-21 DIAGNOSIS — N18.30 CKD STAGE 3 DUE TO TYPE 2 DIABETES MELLITUS: ICD-10-CM

## 2022-11-21 PROCEDURE — 99442 PR PHYSICIAN TELEPHONE EVALUATION 11-20 MIN: CPT | Mod: 95,,, | Performed by: INTERNAL MEDICINE

## 2022-11-21 PROCEDURE — 99442 PR PHYSICIAN TELEPHONE EVALUATION 11-20 MIN: ICD-10-PCS | Mod: 95,,, | Performed by: INTERNAL MEDICINE

## 2022-11-21 NOTE — PROGRESS NOTES
Established Patient - Audio Only Telehealth Visit     The patient location is: home  The chief complaint leading to consultation is: f/u lymphoma  Visit type: Virtual visit with audio only (telephone)  Total time spent with patient: 20 min       The reason for the audio only service rather than synchronous audio and video virtual visit was related to technical difficulties or patient preference/necessity.     Each patient to whom I provide medical services by telemedicine is:  (1) informed of the relationship between the physician and patient and the respective role of any other health care provider with respect to management of the patient; and (2) notified that they may decline to receive medical services by telemedicine and may withdraw from such care at any time. Patient verbally consented to receive this service via voice-only telephone call.       HPI: Lymphoma  Patient is a 86 y.o. year old male who was evaluated for Thrombocytopenia. 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   Has completed one four week course of rituxan Monotherapy. Completed 10/2018   He also has anemia      HPI Former pt of Dr. BELTRÁN  Doing well   No easy bruisability  No bleeding-rectal/nasal/urinary  Appetite and weight stable  No fevers  He has a couple of episodes of night sweats  No SOB/CP  He is UTD with flu and COVID booster immunizations    Lab Results   Component Value Date    WBC 3.66 (L) 11/16/2022    HGB 13.3 (L) 11/16/2022    HCT 40.1 11/16/2022    MCV 95 11/16/2022    PLT 44 (L) 11/16/2022            Assessment and plan:  1. He is doing well clinically, labs reveal worsening thrombocytopenia,asymptomatic  Plan follow-up CT imaging and repeat labs   Consider bmbx   2. Pt with fluctuating plt cts  Recent cbc reveals worsening thrombocytpenia, asymptomatic  3. Cr 1.3mg/dL-stable  F/u with Nephrology                LABS and CT prior to televisit follow-up in 3 wks           This service was not originating from a related E/M  service provided within the previous 7 days nor will  to an E/M service or procedure within the next 24 hours or my soonest available appointment.  Prevailing standard of care was able to be met in this audio-only visit.

## 2022-11-25 ENCOUNTER — HOSPITAL ENCOUNTER (OUTPATIENT)
Dept: RADIOLOGY | Facility: HOSPITAL | Age: 87
Discharge: HOME | End: 2022-11-25
Attending: INTERNAL MEDICINE
Payer: MEDICARE

## 2022-11-25 DIAGNOSIS — C85.19 B-CELL LYMPHOMA OF EXTRANODAL SITE: ICD-10-CM

## 2022-11-25 PROCEDURE — A9698 NON-RAD CONTRAST MATERIALNOC: HCPCS | Performed by: INTERNAL MEDICINE

## 2022-11-25 PROCEDURE — 71260 CT CHEST ABDOMEN PELVIS WITH CONTRAST (XPD): ICD-10-PCS | Mod: 26,,, | Performed by: RADIOLOGY

## 2022-11-25 PROCEDURE — 74177 CT CHEST ABDOMEN PELVIS WITH CONTRAST (XPD): ICD-10-PCS | Mod: 26,,, | Performed by: RADIOLOGY

## 2022-11-25 PROCEDURE — 25500020 PHARM REV CODE 255: Performed by: INTERNAL MEDICINE

## 2022-11-25 PROCEDURE — 71260 CT THORAX DX C+: CPT | Mod: TC

## 2022-11-25 PROCEDURE — 71260 CT THORAX DX C+: CPT | Mod: 26,,, | Performed by: RADIOLOGY

## 2022-11-25 PROCEDURE — 74177 CT ABD & PELVIS W/CONTRAST: CPT | Mod: 26,,, | Performed by: RADIOLOGY

## 2022-11-25 PROCEDURE — 74177 CT ABD & PELVIS W/CONTRAST: CPT | Mod: TC

## 2022-11-25 RX ADMIN — IOHEXOL 100 ML: 350 INJECTION, SOLUTION INTRAVENOUS at 09:11

## 2022-11-25 RX ADMIN — BARIUM SULFATE 450 ML: 20 SUSPENSION ORAL at 09:11

## 2022-11-29 ENCOUNTER — PATIENT MESSAGE (OUTPATIENT)
Dept: FAMILY MEDICINE | Facility: CLINIC | Age: 87
End: 2022-11-29
Payer: MEDICARE

## 2022-11-29 ENCOUNTER — TELEPHONE (OUTPATIENT)
Dept: FAMILY MEDICINE | Facility: CLINIC | Age: 87
End: 2022-11-29
Payer: MEDICARE

## 2022-11-29 NOTE — TELEPHONE ENCOUNTER
----- Message from Bhupendra Hawley sent at 11/29/2022  2:11 PM CST -----  Regarding: Results  Contact: BARTOLO CARRILLO [[id number]]  Name of Who is Calling: BARTOLO CARRILLO [[id number]]      What is the request in detail: Would like to speak with staff in regards to urine results. Please advise      Can the clinic reply by [facility]: no      What Number to Call Back [facility]: [phone]

## 2022-11-29 NOTE — TELEPHONE ENCOUNTER
----- Message from Bhupendra Hawley sent at 11/29/2022  2:11 PM CST -----  Regarding: Results  Contact: BARTOLO CARRILLO [[id number]]  Name of Who is Calling: BARTOLO CARRILLO [[id number]]      What is the request in detail: Would like to speak with staff in regards to urine results. Please advise      Can the clinic reply by [facility]: no      [facility]: [phone]

## 2022-11-30 ENCOUNTER — PATIENT MESSAGE (OUTPATIENT)
Dept: FAMILY MEDICINE | Facility: CLINIC | Age: 87
End: 2022-11-30
Payer: MEDICARE

## 2022-11-30 ENCOUNTER — PATIENT MESSAGE (OUTPATIENT)
Dept: FAMILY MEDICINE | Facility: CLINIC | Age: 87
End: 2022-11-30

## 2022-11-30 ENCOUNTER — EXTERNAL CHRONIC CARE MANAGEMENT (OUTPATIENT)
Dept: PRIMARY CARE CLINIC | Facility: CLINIC | Age: 87
End: 2022-11-30
Payer: MEDICARE

## 2022-11-30 PROCEDURE — 99439 PR CHRONIC CARE MGMT, EA ADDTL 20 MIN: ICD-10-PCS | Mod: S$PBB,,, | Performed by: INTERNAL MEDICINE

## 2022-11-30 PROCEDURE — 99490 CHRNC CARE MGMT STAFF 1ST 20: CPT | Mod: S$PBB,,, | Performed by: INTERNAL MEDICINE

## 2022-11-30 PROCEDURE — 99490 PR CHRONIC CARE MGMT, 1ST 20 MIN: ICD-10-PCS | Mod: S$PBB,,, | Performed by: INTERNAL MEDICINE

## 2022-11-30 PROCEDURE — 99439 CHRNC CARE MGMT STAF EA ADDL: CPT | Mod: PBBFAC,PO | Performed by: INTERNAL MEDICINE

## 2022-11-30 PROCEDURE — 99490 CHRNC CARE MGMT STAFF 1ST 20: CPT | Mod: PBBFAC,PO | Performed by: INTERNAL MEDICINE

## 2022-11-30 PROCEDURE — 99439 CHRNC CARE MGMT STAF EA ADDL: CPT | Mod: S$PBB,,, | Performed by: INTERNAL MEDICINE

## 2022-11-30 NOTE — TELEPHONE ENCOUNTER
Patient is on my [facility] and obviously he looked at his results already so always encouraged them to send a my [facility] message rather than a phone message so that I can communicate directly with him.    I will send him a message but please encourage people to use the my [facility] and so forth to reduce messages

## 2022-12-05 ENCOUNTER — PATIENT MESSAGE (OUTPATIENT)
Dept: NEPHROLOGY | Facility: CLINIC | Age: 87
End: 2022-12-05
Payer: MEDICARE

## 2022-12-09 ENCOUNTER — PATIENT OUTREACH (OUTPATIENT)
Dept: ADMINISTRATIVE | Facility: HOSPITAL | Age: 87
End: 2022-12-09
Payer: MEDICARE

## 2022-12-09 ENCOUNTER — LAB VISIT (OUTPATIENT)
Dept: LAB | Facility: HOSPITAL | Age: 87
End: 2022-12-09
Attending: INTERNAL MEDICINE
Payer: MEDICARE

## 2022-12-09 ENCOUNTER — PATIENT MESSAGE (OUTPATIENT)
Dept: ADMINISTRATIVE | Facility: HOSPITAL | Age: 87
End: 2022-12-09
Payer: MEDICARE

## 2022-12-09 DIAGNOSIS — D69.6 THROMBOCYTOPENIA: ICD-10-CM

## 2022-12-09 LAB
BASOPHILS # BLD AUTO: 0.01 K/UL (ref 0–0.2)
BASOPHILS NFR BLD: 0.3 % (ref 0–1.9)
DIFFERENTIAL METHOD: ABNORMAL
EOSINOPHIL # BLD AUTO: 0 K/UL (ref 0–0.5)
EOSINOPHIL NFR BLD: 0.6 % (ref 0–8)
ERYTHROCYTE [DISTWIDTH] IN BLOOD BY AUTOMATED COUNT: 14.7 % (ref 11.5–14.5)
FOLATE SERPL-MCNC: 16.1 NG/ML (ref 4–24)
HCT VFR BLD AUTO: 36.8 % (ref 40–54)
HGB BLD-MCNC: 12.2 G/DL (ref 14–18)
IMM GRANULOCYTES # BLD AUTO: 0.01 K/UL (ref 0–0.04)
IMM GRANULOCYTES NFR BLD AUTO: 0.3 % (ref 0–0.5)
LYMPHOCYTES # BLD AUTO: 1.1 K/UL (ref 1–4.8)
LYMPHOCYTES NFR BLD: 33.8 % (ref 18–48)
MCH RBC QN AUTO: 31.6 PG (ref 27–31)
MCHC RBC AUTO-ENTMCNC: 33.2 G/DL (ref 32–36)
MCV RBC AUTO: 95 FL (ref 82–98)
MONOCYTES # BLD AUTO: 0.5 K/UL (ref 0.3–1)
MONOCYTES NFR BLD: 17 % (ref 4–15)
NEUTROPHILS # BLD AUTO: 1.5 K/UL (ref 1.8–7.7)
NEUTROPHILS NFR BLD: 48 % (ref 38–73)
NRBC BLD-RTO: 0 /100 WBC
PATH REV BLD -IMP: NORMAL
PATH REV BLD -IMP: NORMAL
PLATELET # BLD AUTO: 52 K/UL (ref 150–450)
PMV BLD AUTO: 13.3 FL (ref 9.2–12.9)
RBC # BLD AUTO: 3.86 M/UL (ref 4.6–6.2)
VIT B12 SERPL-MCNC: 370 PG/ML (ref 210–950)
WBC # BLD AUTO: 3.17 K/UL (ref 3.9–12.7)

## 2022-12-09 PROCEDURE — 83921 ORGANIC ACID SINGLE QUANT: CPT | Performed by: INTERNAL MEDICINE

## 2022-12-09 PROCEDURE — 85060 PATHOLOGIST REVIEW: ICD-10-PCS | Mod: ,,, | Performed by: PATHOLOGY

## 2022-12-09 PROCEDURE — 85025 COMPLETE CBC W/AUTO DIFF WBC: CPT | Performed by: INTERNAL MEDICINE

## 2022-12-09 PROCEDURE — 82746 ASSAY OF FOLIC ACID SERUM: CPT | Performed by: INTERNAL MEDICINE

## 2022-12-09 PROCEDURE — 86038 ANTINUCLEAR ANTIBODIES: CPT | Performed by: INTERNAL MEDICINE

## 2022-12-09 PROCEDURE — 85060 BLOOD SMEAR INTERPRETATION: CPT | Mod: ,,, | Performed by: PATHOLOGY

## 2022-12-09 PROCEDURE — 82607 VITAMIN B-12: CPT | Performed by: INTERNAL MEDICINE

## 2022-12-09 PROCEDURE — 36415 COLL VENOUS BLD VENIPUNCTURE: CPT | Performed by: INTERNAL MEDICINE

## 2022-12-12 ENCOUNTER — OFFICE VISIT (OUTPATIENT)
Dept: OPTOMETRY | Facility: CLINIC | Age: 87
End: 2022-12-12
Payer: MEDICARE

## 2022-12-12 DIAGNOSIS — E11.9 TYPE 2 DIABETES MELLITUS WITHOUT RETINOPATHY: Primary | ICD-10-CM

## 2022-12-12 DIAGNOSIS — Z96.1 PSEUDOPHAKIA: ICD-10-CM

## 2022-12-12 DIAGNOSIS — H52.7 REFRACTIVE ERROR: ICD-10-CM

## 2022-12-12 LAB — ANA SER QL IF: NORMAL

## 2022-12-12 PROCEDURE — 92015 DETERMINE REFRACTIVE STATE: CPT | Mod: ,,, | Performed by: OPTOMETRIST

## 2022-12-12 PROCEDURE — 92014 COMPRE OPH EXAM EST PT 1/>: CPT | Mod: S$PBB,,, | Performed by: OPTOMETRIST

## 2022-12-12 PROCEDURE — 92014 PR EYE EXAM, EST PATIENT,COMPREHESV: ICD-10-PCS | Mod: S$PBB,,, | Performed by: OPTOMETRIST

## 2022-12-12 PROCEDURE — 92015 PR REFRACTION: ICD-10-PCS | Mod: ,,, | Performed by: OPTOMETRIST

## 2022-12-12 PROCEDURE — 99999 PR PBB SHADOW E&M-EST. PATIENT-LVL III: ICD-10-PCS | Mod: PBBFAC,,, | Performed by: OPTOMETRIST

## 2022-12-12 PROCEDURE — 99213 OFFICE O/P EST LOW 20 MIN: CPT | Mod: PBBFAC,PO | Performed by: OPTOMETRIST

## 2022-12-12 PROCEDURE — 99999 PR PBB SHADOW E&M-EST. PATIENT-LVL III: CPT | Mod: PBBFAC,,, | Performed by: OPTOMETRIST

## 2022-12-12 NOTE — PROGRESS NOTES
Subjective:       Patient ID: Bria Lawson is a 87 y.o. male      Chief Complaint   Patient presents with    Concerns About Ocular Health    Diabetic Eye Exam     History of Present Illness  Dls: 8/19/20 Dr. Gibson     86 y/o male presents today for diabetic eye exam.   Pt states no changes in vision. Pt wears trifocal glasses.     LBS ? 200's x 2 days     + ou  tearing  No itching   No burning  No pain  No ha's  + ou off/on floaters    Eye meds  None    Hemoglobin A1C       Date                     Value               Ref Range             Status                11/16/2022               6.3 (H)             4.0 - 5.6 %           Final                  04/05/2022               5.7 (H)             4.0 - 5.6 %           Final                  10/15/2021               6.1 (H)             4.0 - 5.6 %           Final                   Assessment/Plan:     1. Type 2 diabetes mellitus without retinopathy  No diabetic retinopathy. Discussed with pt the effects of diabetes on vision, importance of good blood sugar control, compliance with meds, and follow up care with PCP. Return in 1 year for dilated eye exam, sooner PRN.    2. Pseudophakia  Well-centered. Clear.     3. Refractive error  Educated patient on refractive error and discussed lens options. Dispensed updated spectacle Rx. Educated about adaptation period to new specs.    Eyeglass Final Rx       Eyeglass Final Rx         Sphere Cylinder Add    Right [place] Sphere +2.75    Left -0.25 Sphere +2.75      Expiration Date: 12/12/2023                      Follow up in about 1 year (around 12/12/2023).

## 2022-12-13 LAB — METHYLMALONATE SERPL-SCNC: 0.48 UMOL/L

## 2022-12-15 ENCOUNTER — OFFICE VISIT (OUTPATIENT)
Dept: HEMATOLOGY/ONCOLOGY | Facility: CLINIC | Age: 87
End: 2022-12-15
Payer: MEDICARE

## 2022-12-15 DIAGNOSIS — R93.89 ABNORMAL CT OF THE CHEST: ICD-10-CM

## 2022-12-15 DIAGNOSIS — D64.9 ANEMIA, UNSPECIFIED TYPE: ICD-10-CM

## 2022-12-15 DIAGNOSIS — C85.19 B-CELL LYMPHOMA OF EXTRANODAL SITE: Primary | ICD-10-CM

## 2022-12-15 DIAGNOSIS — D69.6 THROMBOCYTOPENIA: ICD-10-CM

## 2022-12-15 PROCEDURE — 99214 OFFICE O/P EST MOD 30 MIN: CPT | Mod: 95,,, | Performed by: INTERNAL MEDICINE

## 2022-12-15 PROCEDURE — 99214 PR OFFICE/OUTPT VISIT, EST, LEVL IV, 30-39 MIN: ICD-10-PCS | Mod: 95,,, | Performed by: INTERNAL MEDICINE

## 2022-12-15 NOTE — PROGRESS NOTES
The patient location is: home  The chief complaint leading to consultation is: lymphoma    Visit type: audiovisual    Face to Face time with patient: 6 min  20 minutes of total time spent on the encounter, which includes face to face time and non-face to face time preparing to see the patient (eg, review of tests), Obtaining and/or reviewing separately obtained history, Documenting clinical information in the electronic or other health record, Independently interpreting results (not separately reported) and communicating results to the patient/family/caregiver, or Care coordination (not separately reported).         Each patient to whom he or she provides medical services by telemedicine is:  (1) informed of the relationship between the physician and patient and the respective role of any other health care provider with respect to management of the patient; and (2) notified that he or she may decline to receive medical services by telemedicine and may withdraw from such care at any time.    Notes:        \  Chief Complaint: Lymphoma  Patient is a 86 y.o. year old male who was evaluated for Thrombocytopenia. 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   Has completed one four week course of rituxan Monotherapy. Completed 10/2018   He also has anemia      HPI Former pt of Dr. Rouse  Doing well   No hematuria  No dysuria  Appetite and weight stable  No fevers, night sweats  No SOB/CP  He is UTD with flu and COVID booster immunizations     Review of Systems   Constitutional: Negative for appetite change, fatigue and unexpected weight change.   HENT: Negative for mouth sores.    Eyes: Negative for visual disturbance.   Respiratory: Negative for cough and shortness of breath.    Cardiovascular: Negative for chest pain.   Gastrointestinal: Negative for abdominal pain and diarrhea.   Genitourinary: Negative for frequency.   Musculoskeletal: Negative for back pain.   Integumentary:  Negative for rash.   Neurological: Negative for  headaches.   Hematological: Negative for adenopathy.   Psychiatric/Behavioral: The patient is not nervous/anxious.    All other systems reviewed and are negative.    PE  Televisit      Lab Results   Component Value Date    WBC 3.17 (L) 12/09/2022    HGB 12.2 (L) 12/09/2022    HCT 36.8 (L) 12/09/2022    MCV 95 12/09/2022    PLT 52 (L) 12/09/2022       Platelets- thrombocytopenia   Red blood cells- decreased, normocytic normochromic anemia with   anisopoikilocytosis   White blood cells-leukopenia with relative monocytosis, absolute   neutropenia (see comment )   No morphologic abnormalities nor blasts on scanning.   Manual differential count   Neutrophils - 48%, lymphocytes-33 %, monocytes - 17%, eosinophils   -1%, basophils -1%.      CT c/a/p w/contrast 11/25/22   Impression:     Anterior mediastinal nodule, slightly larger than the 05/27/2020 exam.  Decreased size of previously noted mediastinal lymph nodes.     Moderate scattered calcific atherosclerosis.     Cholelithiasis.     Small cystic lesion in the pancreatic neck, unchanged.       1. B-cell lymphoma of extranodal site  1. He is doing well clinically  Recent  follow-up CT imaging shows Anterior mediastinal nodule, slightly larger than the 05/27/2020 exam.  Decreased size of previously noted mediastinal lymph nodes.  Plan follow-up PET imaging for further eval   - CBC Auto Differential; Future  - Comprehensive Metabolic Panel; Future  - Lactate Dehydrogenase; Future  - NM PET CT Routine Skull to Mid Thigh; Future  Cbc,cmp, LDH piror to f/u   LABS and PET/CT  prior to televisit follow-up in 6 week  2. Abnormal CT of the chest    - NM PET CT Routine Skull to Mid Thigh; Future    3. Anemia, unspecified type  Hb 12.2g/dL  Cont to monitor    4. Thrombocytopenia  Pt with fluctuating plt cts  Recent cbc reveals worsening thrombocytpenia, asymptomatic  Previously discussed possible bmbx             Cbc,cmp, LDH piror to f/u   LABS and PET/CT  prior to televisit  follow-up in 6 week

## 2022-12-21 ENCOUNTER — OFFICE VISIT (OUTPATIENT)
Dept: UROLOGY | Facility: CLINIC | Age: 87
End: 2022-12-21
Payer: MEDICARE

## 2022-12-21 VITALS
DIASTOLIC BLOOD PRESSURE: 78 MMHG | SYSTOLIC BLOOD PRESSURE: 172 MMHG | BODY MASS INDEX: 27.26 KG/M2 | HEART RATE: 55 BPM | WEIGHT: 194.69 LBS | HEIGHT: 71 IN

## 2022-12-21 DIAGNOSIS — N39.42 URINARY INCONTINENCE WITHOUT SENSORY AWARENESS: Primary | ICD-10-CM

## 2022-12-21 DIAGNOSIS — R31.29 HEMATURIA, MICROSCOPIC: ICD-10-CM

## 2022-12-21 PROCEDURE — 99214 OFFICE O/P EST MOD 30 MIN: CPT | Mod: PBBFAC | Performed by: UROLOGY

## 2022-12-21 PROCEDURE — 99999 PR PBB SHADOW E&M-EST. PATIENT-LVL IV: ICD-10-PCS | Mod: PBBFAC,,, | Performed by: UROLOGY

## 2022-12-21 PROCEDURE — 99213 OFFICE O/P EST LOW 20 MIN: CPT | Mod: S$PBB,,, | Performed by: UROLOGY

## 2022-12-21 PROCEDURE — 99213 PR OFFICE/OUTPT VISIT, EST, LEVL III, 20-29 MIN: ICD-10-PCS | Mod: S$PBB,,, | Performed by: UROLOGY

## 2022-12-21 PROCEDURE — 99999 PR PBB SHADOW E&M-EST. PATIENT-LVL IV: CPT | Mod: PBBFAC,,, | Performed by: UROLOGY

## 2022-12-21 NOTE — PROGRESS NOTES
Subjective:       Patient ID: Bria Lawson is a 87 y.o. male.    Chief Complaint: hppertensive kidney disease with stage 3 a chronic kidney d (6 month follow up with cystoscopy . He state he doing well on today visit.)    HPI patient with low-grade lymphoma who is being treated by [facility] is here to follow-up for microscopic hematuria.  Today his urine is negative.  He is feeling well.    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    Hyperlipidemia 2/26/2015    Hypertension     Lymphoma     Uncontrolled secondary diabetes mellitus with stage 3 CKD (GFR 30-59) 8/28/2015       Past Surgical History:   Procedure Laterality Date    BONE MARROW BIOPSY Left 09/19/2018    Procedure: Biopsy-bone marrow;  Surgeon: Velia Tobias MD;  Location: [facility];  Service: Oncology;  Laterality: Left;    CATARACT EXTRACTION Bilateral 1996    COLONOSCOPY N/A 11/24/2020    Procedure: COLONOSCOPY [place];  Surgeon: Masoud Hwang MD;  Location: [facility];  Service: Endoscopy;  Laterality: N/A;    ESOPHAGOGASTRODUODENOSCOPY N/A 11/24/2020    Procedure: EGD (ESOPHAGOGASTRODUODENOSCOPY);  Surgeon: Masoud Hwang MD;  Location: [facility];  Service: Endoscopy;  Laterality: N/A;    eye lid sx Bilateral 2017    EYE SURGERY      cataracts    JOINT REPLACEMENT      knee replacement    retinal injection s Bilateral     scrotal cyst         Family History   Problem Relation Age of Onset    Hypertension Mother     Diabetes Father     No Known Problems Sister     No Known Problems Brother     No Known Problems Maternal Aunt     No Known Problems Maternal Uncle     No Known Problems  Paternal Aunt     No Known Problems Paternal Uncle     No Known Problems Maternal Grandmother     No Known Problems Maternal Grandfather     No Known Problems Paternal Grandmother     No Known Problems Paternal Grandfather     No Known Problems Daughter     No Known Problems Son     No Known Problems Son     No Known Problems Other     Amblyopia Neg Hx     Blindness Neg Hx     Cancer Neg Hx     Cataracts Neg Hx     Glaucoma Neg Hx     Macular degeneration Neg Hx     Retinal detachment Neg Hx     Strabismus Neg Hx     Stroke Neg Hx     Thyroid disease Neg Hx        Social History     Socioeconomic History    Marital status:    Tobacco Use    Smoking status: Former    Smokeless tobacco: Never   Substance and Sexual Activity    Alcohol use: Yes     Comment: socially - maybe few times a week    Drug use: No    Sexual activity: Yes     Partners: Female     Social Determinants of Health     Financial Resource Strain: Low Risk     Difficulty of Paying Living Expenses: Not hard at all   Food Insecurity: No Food Insecurity    Worried About Running Out of Food in the Last Year: Never true    Ran Out of Food in the Last Year: Never true   Transportation Needs: No Transportation Needs    Lack of Transportation (Medical): No    Lack of Transportation (Non-Medical): No   Physical Activity: Inactive    Days of Exercise per Week: 0 days    Minutes of Exercise per Session: 0 min   Stress: No Stress Concern Present    Feeling of Stress : Not at all   Social Connections: Unknown    Frequency of Communication with Friends and Family: Once a week    Frequency of Social Gatherings with Friends and Family: Patient refused    Active Member of Clubs or Organizations: No    Attends Club or Organization Meetings: Never    Marital Status:    Housing Stability: Low Risk     Unable to Pay for Housing in the Last Year: No    Number of Places Lived in the Last Year: 1    Unstable Housing in the Last Year: No       Allergies:  Patient  has no known allergies.    Medications:    Current Outpatient Medications:     allopurinoL (ZYLOPRIM) 100 MG tablet, TAKE 1 TABLET(100 MG) BY MOUTH EVERY DAY, Disp: 90 tablet, Rfl: 12    amLODIPine (NORVASC) 10 MG tablet, TAKE 1 TABLET(10 MG) BY MOUTH EVERY DAY, Disp: 90 tablet, Rfl: 3    ascorbic acid, vitamin C, (VITAMIN C) 1000 MG tablet, Take 1,000 mg by mouth once daily., Disp: , Rfl:     atorvastatin (LIPITOR) 20 MG tablet, TAKE 1 TABLET(20 MG) BY MOUTH EVERY DAY, Disp: 90 tablet, Rfl: 12    blood sugar diagnostic Strp, 1 strip by Misc.(Non-Drug; Combo Route) route 2 (two) times daily. Disp glucometer and lancets as well, Disp: 100 strip, Rfl: 12    carvediloL (COREG) 6.25 MG tablet, TAKE 1 TABLET(6.25 MG) BY MOUTH TWICE DAILY WITH MEALS, Disp: 180 tablet, Rfl: 3    DUPIXENT  mg/2 mL PnIj, Inject into the skin., Disp: , Rfl:     FEROSUL 325 mg (65 mg iron) Tab tablet, TAKE 1 TABLET BY MOUTH EVERY DAY WITH BREAKFAST, Disp: 90 tablet, Rfl: 0    folic acid (FOLVITE) 800 MCG Tab, Take 800 mcg by mouth once daily., Disp: , Rfl:     gabapentin (NEURONTIN) 300 MG capsule, TAKE 1 CAPSULE(300 MG) BY MOUTH THREE TIMES DAILY, Disp: 270 capsule, Rfl: 3    glipiZIDE (GLUCOTROL) 10 MG TR24, TAKE 1 TABLET(10 MG) BY MOUTH EVERY DAY, Disp: 90 tablet, Rfl: 0    JANUVIA 100 mg Tab, TAKE 1 TABLET ONCE DAILY, Disp: 90 tablet, Rfl: 1    multivit-min/FA/lycopen/lutein (CENTRUM SILVER MEN ORAL), Take by mouth., Disp: , Rfl:     niacin 500 MG Tab, Take 100 mg by mouth every evening., Disp: , Rfl:     pantoprazole (PROTONIX) 40 MG tablet, TAKE 1 TABLET(40 MG) BY MOUTH EVERY DAY, Disp: 90 tablet, Rfl: 0    TRUEPLUS LANCETS 33 gauge Misc, USE TO TEST BLOOD SUGAR BID, Disp: , Rfl: 12    TURMERIC ROOT EXTRACT ORAL, Take by mouth., Disp: , Rfl:     valsartan (DIOVAN) 320 MG tablet, TAKE 1 TABLET ONCE DAILY, Disp: 90 tablet, Rfl: 3    vitamin E 400 UNIT capsule, Take 400 Units by mouth once daily., Disp: , Rfl:     loratadine  (CLARITIN) 10 mg tablet, TAKE 1 TABLET(10 MG) BY MOUTH TWICE DAILY (Patient not taking: Reported on 12/21/2022), Disp: 60 tablet, Rfl: 12    Review of Systems   Constitutional:  Negative for activity change, appetite change, chills, diaphoresis, fatigue, fever and unexpected weight change.   HENT:  Negative for congestion, dental problem, hearing loss, mouth sores, postnasal drip, rhinorrhea, sinus pressure and trouble swallowing.    Eyes:  Negative for pain, discharge and itching.   Respiratory:  Negative for apnea, cough, choking, chest tightness, shortness of breath and wheezing.    Cardiovascular:  Negative for chest pain, palpitations and leg swelling.   Gastrointestinal:  Negative for abdominal distention, abdominal pain, anal bleeding, blood in stool, constipation, diarrhea, nausea, rectal pain and vomiting.   Endocrine: Negative for polydipsia and polyuria.   Genitourinary:  Negative for decreased urine volume, difficulty urinating, dysuria, enuresis, flank pain, frequency, genital sores, hematuria, penile discharge, penile pain, penile swelling, scrotal swelling, testicular pain and urgency.   Musculoskeletal:  Negative for arthralgias, back pain and myalgias.   Skin:  Negative for color change, rash and wound.   Neurological:  Negative for dizziness, syncope, speech difficulty, light-headedness and headaches.   Hematological:  Negative for adenopathy. Does not bruise/bleed easily.   Psychiatric/Behavioral:  Negative for behavioral problems, confusion, hallucinations and sleep disturbance.      Objective:      Physical Exam    Assessment:       1. Neurogenic bladder    2. Urinary incontinence without sensory awareness          Plan:       Bria was seen today for hppertensive kidney disease with stage 3 a chronic kidney d.    Diagnoses and all orders for this visit:    Neurogenic bladder    Urinary incontinence without sensory awareness    The above to diagnoses are incorrect.  Patient has history of  microscopic hematuria but today he is clear.  He will return to clinic 1 year

## 2022-12-31 ENCOUNTER — EXTERNAL CHRONIC CARE MANAGEMENT (OUTPATIENT)
Dept: PRIMARY CARE CLINIC | Facility: CLINIC | Age: 87
End: 2022-12-31
Payer: MEDICARE

## 2022-12-31 PROCEDURE — 99439 PR CHRONIC CARE MGMT, EA ADDTL 20 MIN: ICD-10-PCS | Mod: S$PBB,,, | Performed by: INTERNAL MEDICINE

## 2022-12-31 PROCEDURE — 99439 CHRNC CARE MGMT STAF EA ADDL: CPT | Mod: PBBFAC,PO,27 | Performed by: INTERNAL MEDICINE

## 2022-12-31 PROCEDURE — 99490 PR CHRONIC CARE MGMT, 1ST 20 MIN: ICD-10-PCS | Mod: S$PBB,,, | Performed by: INTERNAL MEDICINE

## 2022-12-31 PROCEDURE — 99439 CHRNC CARE MGMT STAF EA ADDL: CPT | Mod: S$PBB,,, | Performed by: INTERNAL MEDICINE

## 2022-12-31 PROCEDURE — 99490 CHRNC CARE MGMT STAFF 1ST 20: CPT | Mod: S$PBB,,, | Performed by: INTERNAL MEDICINE

## 2022-12-31 PROCEDURE — 99490 CHRNC CARE MGMT STAFF 1ST 20: CPT | Mod: PBBFAC,PO | Performed by: INTERNAL MEDICINE

## 2023-01-25 ENCOUNTER — HOSPITAL ENCOUNTER (OUTPATIENT)
Dept: RADIOLOGY | Facility: HOSPITAL | Age: 88
Discharge: HOME | End: 2023-01-25
Attending: INTERNAL MEDICINE
Payer: MEDICARE

## 2023-01-25 DIAGNOSIS — R93.89 ABNORMAL CT OF THE CHEST: ICD-10-CM

## 2023-01-25 DIAGNOSIS — C85.19 B-CELL LYMPHOMA OF EXTRANODAL SITE: ICD-10-CM

## 2023-01-25 LAB — POCT GLUCOSE: 117 MG/DL (ref 70–110)

## 2023-01-25 PROCEDURE — 78815 PET IMAGE W/CT SKULL-THIGH: CPT | Mod: 26,PS,, | Performed by: STUDENT IN AN ORGANIZED HEALTH CARE EDUCATION/TRAINING PROGRAM

## 2023-01-25 PROCEDURE — A9552 F18 FDG: HCPCS

## 2023-01-25 PROCEDURE — 78815 PET IMAGE W/CT SKULL-THIGH: CPT | Mod: TC

## 2023-01-25 PROCEDURE — 78815 NM PET CT ROUTINE: ICD-10-PCS | Mod: 26,PS,, | Performed by: STUDENT IN AN ORGANIZED HEALTH CARE EDUCATION/TRAINING PROGRAM

## 2023-01-26 ENCOUNTER — TELEPHONE (OUTPATIENT)
Dept: FAMILY MEDICINE | Facility: CLINIC | Age: 88
End: 2023-01-26
Payer: MEDICARE

## 2023-01-30 ENCOUNTER — OFFICE VISIT (OUTPATIENT)
Dept: HEMATOLOGY/ONCOLOGY | Facility: CLINIC | Age: 88
End: 2023-01-30
Payer: MEDICARE

## 2023-01-30 DIAGNOSIS — D64.9 ANEMIA, UNSPECIFIED TYPE: ICD-10-CM

## 2023-01-30 DIAGNOSIS — D69.6 THROMBOCYTOPENIA: ICD-10-CM

## 2023-01-30 DIAGNOSIS — C85.19 B-CELL LYMPHOMA OF EXTRANODAL SITE: Primary | ICD-10-CM

## 2023-01-30 PROCEDURE — 99213 PR OFFICE/OUTPT VISIT, EST, LEVL III, 20-29 MIN: ICD-10-PCS | Mod: 95,,, | Performed by: INTERNAL MEDICINE

## 2023-01-30 PROCEDURE — 99213 OFFICE O/P EST LOW 20 MIN: CPT | Mod: 95,,, | Performed by: INTERNAL MEDICINE

## 2023-01-30 NOTE — PROGRESS NOTES
The patient location is: home  The chief complaint leading to consultation is: lymphoma    Visit type: audiovisual    Face to Face time with patient: 6 min  10 minutes of total time spent on the encounter, which includes face to face time and non-face to face time preparing to see the patient (eg, review of tests), Obtaining and/or reviewing separately obtained history, Documenting clinical information in the electronic or other health record, Independently interpreting results (not separately reported) and communicating results to the patient/family/caregiver, or Care coordination (not separately reported).         Each patient to whom he or she provides medical services by telemedicine is:  (1) informed of the relationship between the physician and patient and the respective role of any other health care provider with respect to management of the patient; and (2) notified that he or she may decline to receive medical services by telemedicine and may withdraw from such care at any time.    Notes:        \  Chief Complaint: Lymphoma  Patient is a 87 y.o. year old male who was evaluated for Thrombocytopenia. 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   Has completed one four week course of rituxan Monotherapy. Completed 10/2018   He also has anemia      HPI Former pt of Dr. Rouse  Doing well   No new issues   No hematuria  No dysuria  Appetite and weight stable  No fevers, night sweats  No SOB/CP/cough  He is UTD with flu and COVID booster immunizations     Review of Systems   Constitutional: Negative for appetite change, fatigue and unexpected weight change.   HENT: Negative for mouth sores.    Eyes: Negative for visual disturbance.   Respiratory: Negative for cough and shortness of breath.    Cardiovascular: Negative for chest pain.   Gastrointestinal: Negative for abdominal pain and diarrhea.   Genitourinary: Negative for frequency.   Musculoskeletal: Negative for back pain.   Integumentary:  Negative for rash.    Neurological: Negative for headaches.   Hematological: Negative for adenopathy.   Psychiatric/Behavioral: The patient is not nervous/anxious.    All other systems reviewed and are negative.    PE  Televisit      Lab Results   Component Value Date    WBC 4.29 01/25/2023    HGB 13.0 (L) 01/25/2023    HCT 39.1 (L) 01/25/2023    MCV 95 01/25/2023    PLT 59 (L) 01/25/2023       Platelets- thrombocytopenia   Red blood cells- decreased, normocytic normochromic anemia with   anisopoikilocytosis   White blood cells-leukopenia with relative monocytosis, absolute   neutropenia (see comment )   No morphologic abnormalities nor blasts on scanning.   Manual differential count   Neutrophils - 48%, lymphocytes-33 %, monocytes - 17%, eosinophils   -1%, basophils -1%.      CT c/a/p w/contrast 11/25/22   Impression:     Anterior mediastinal nodule, slightly larger than the 05/27/2020 exam.  Decreased size of previously noted mediastinal lymph nodes.     Moderate scattered calcific atherosclerosis.     Cholelithiasis.     Small cystic lesion in the pancreatic neck, unchanged.     PET/CT 1/27/23  Impression:     1. No definite hypermetabolic tumor or new lymphadenopathy.  2. Relatively stable low-density anterior mediastinal structure with background level radiotracer uptake less than blood pool.  Etiology remains uncertain.  The Deauville Score is: 2    1. B-cell lymphoma of extranodal site  1. He is doing well clinically  Recent  follow-up CT imaging shows Anterior mediastinal nodule, slightly larger than the 05/27/2020 exam.  Decreased size of previously noted mediastinal lymph nodes.  Follow-up PET imaging  1/27/23 No definite hypermetabolic tumor or new lymphadenopathy.  - CBC Auto Differential; Future  - Comprehensive Metabolic Panel; Future  - Lactate Dehydrogenase; Future      2. Anemia, unspecified type  Hb 13.0g/dL  Cont to monitor    3. Thrombocytopenia  Pt with fluctuating plt cts  Previously discussed possible bmbx  Cont  to monitor           Cbc,cmp, LDH piror to f/u 3mo

## 2023-01-31 ENCOUNTER — EXTERNAL CHRONIC CARE MANAGEMENT (OUTPATIENT)
Dept: PRIMARY CARE CLINIC | Facility: CLINIC | Age: 88
End: 2023-01-31
Payer: MEDICARE

## 2023-01-31 PROCEDURE — 99490 PR CHRONIC CARE MGMT, 1ST 20 MIN: ICD-10-PCS | Mod: S$PBB,,, | Performed by: INTERNAL MEDICINE

## 2023-01-31 PROCEDURE — 99490 CHRNC CARE MGMT STAFF 1ST 20: CPT | Mod: PBBFAC,PO | Performed by: INTERNAL MEDICINE

## 2023-01-31 PROCEDURE — 99490 CHRNC CARE MGMT STAFF 1ST 20: CPT | Mod: S$PBB,,, | Performed by: INTERNAL MEDICINE

## 2023-02-09 ENCOUNTER — TELEPHONE (OUTPATIENT)
Dept: FAMILY MEDICINE | Facility: CLINIC | Age: 88
End: 2023-02-09
Payer: MEDICARE

## 2023-02-10 ENCOUNTER — OFFICE VISIT (OUTPATIENT)
Dept: FAMILY MEDICINE | Facility: CLINIC | Age: 88
End: 2023-02-10
Payer: MEDICARE

## 2023-02-10 VITALS
BODY MASS INDEX: 28.05 KG/M2 | OXYGEN SATURATION: 96 % | HEIGHT: 71 IN | TEMPERATURE: 98 F | WEIGHT: 200.38 LBS | HEART RATE: 55 BPM | SYSTOLIC BLOOD PRESSURE: 122 MMHG | DIASTOLIC BLOOD PRESSURE: 48 MMHG

## 2023-02-10 DIAGNOSIS — E11.21 DIABETES MELLITUS WITH PROTEINURIC DIABETIC NEPHROPATHY: ICD-10-CM

## 2023-02-10 DIAGNOSIS — D64.9 ANEMIA, UNSPECIFIED TYPE: ICD-10-CM

## 2023-02-10 DIAGNOSIS — D69.6 THROMBOCYTOPENIA: ICD-10-CM

## 2023-02-10 DIAGNOSIS — E78.5 HYPERLIPIDEMIA, UNSPECIFIED HYPERLIPIDEMIA TYPE: ICD-10-CM

## 2023-02-10 DIAGNOSIS — D61.818 PANCYTOPENIA: ICD-10-CM

## 2023-02-10 DIAGNOSIS — I70.0 ATHEROSCLEROSIS OF ABDOMINAL AORTA: ICD-10-CM

## 2023-02-10 DIAGNOSIS — I12.9 HYPERTENSIVE KIDNEY DISEASE WITH STAGE 3A CHRONIC KIDNEY DISEASE: ICD-10-CM

## 2023-02-10 DIAGNOSIS — D50.9 IRON DEFICIENCY ANEMIA, UNSPECIFIED IRON DEFICIENCY ANEMIA TYPE: ICD-10-CM

## 2023-02-10 DIAGNOSIS — C85.19 B-CELL LYMPHOMA OF EXTRANODAL SITE: ICD-10-CM

## 2023-02-10 DIAGNOSIS — E11.29 DIABETES MELLITUS WITH PROTEINURIA: Primary | ICD-10-CM

## 2023-02-10 DIAGNOSIS — M06.9 RHEUMATOID ARTHRITIS, INVOLVING UNSPECIFIED SITE, UNSPECIFIED WHETHER RHEUMATOID FACTOR PRESENT: ICD-10-CM

## 2023-02-10 DIAGNOSIS — N18.30 CKD STAGE 3 DUE TO TYPE 2 DIABETES MELLITUS: ICD-10-CM

## 2023-02-10 DIAGNOSIS — L50.9 URTICARIA: ICD-10-CM

## 2023-02-10 DIAGNOSIS — N25.81 SECONDARY HYPERPARATHYROIDISM OF RENAL ORIGIN: ICD-10-CM

## 2023-02-10 DIAGNOSIS — D70.9 NEUTROPENIA, UNSPECIFIED TYPE: ICD-10-CM

## 2023-02-10 DIAGNOSIS — I27.21 PAH (PULMONARY ARTERY HYPERTENSION): ICD-10-CM

## 2023-02-10 DIAGNOSIS — M06.09 SERONEGATIVE ARTHROPATHY OF MULTIPLE SITES: ICD-10-CM

## 2023-02-10 DIAGNOSIS — I10 ESSENTIAL HYPERTENSION: ICD-10-CM

## 2023-02-10 DIAGNOSIS — E11.22 CKD STAGE 3 DUE TO TYPE 2 DIABETES MELLITUS: ICD-10-CM

## 2023-02-10 DIAGNOSIS — E11.311 DIABETIC RETINOPATHY OF BOTH EYES WITH MACULAR EDEMA ASSOCIATED WITH TYPE 2 DIABETES MELLITUS, UNSPECIFIED RETINOPATHY SEVERITY: ICD-10-CM

## 2023-02-10 DIAGNOSIS — M1A.00X0 IDIOPATHIC CHRONIC GOUT WITHOUT TOPHUS, UNSPECIFIED SITE: ICD-10-CM

## 2023-02-10 DIAGNOSIS — N18.31 STAGE 3A CHRONIC KIDNEY DISEASE: ICD-10-CM

## 2023-02-10 DIAGNOSIS — N18.31 HYPERTENSIVE KIDNEY DISEASE WITH STAGE 3A CHRONIC KIDNEY DISEASE: ICD-10-CM

## 2023-02-10 DIAGNOSIS — R80.9 DIABETES MELLITUS WITH PROTEINURIA: Primary | ICD-10-CM

## 2023-02-10 PROCEDURE — 99999 PR PBB SHADOW E&M-EST. PATIENT-LVL IV: CPT | Mod: PBBFAC,,, | Performed by: INTERNAL MEDICINE

## 2023-02-10 PROCEDURE — 99999 PR PBB SHADOW E&M-EST. PATIENT-LVL IV: ICD-10-PCS | Mod: PBBFAC,,, | Performed by: INTERNAL MEDICINE

## 2023-02-10 PROCEDURE — 99214 OFFICE O/P EST MOD 30 MIN: CPT | Mod: S$PBB,,, | Performed by: INTERNAL MEDICINE

## 2023-02-10 PROCEDURE — 99214 OFFICE O/P EST MOD 30 MIN: CPT | Mod: PBBFAC,PO | Performed by: INTERNAL MEDICINE

## 2023-02-10 PROCEDURE — 99214 PR OFFICE/OUTPT VISIT, EST, LEVL IV, 30-39 MIN: ICD-10-PCS | Mod: S$PBB,,, | Performed by: INTERNAL MEDICINE

## 2023-02-10 NOTE — PROGRESS NOTES
Chief complaint: Follow-up on diabetes, anemia,  , follow-up  last     87-year-old black male.  here to follow-up on diabetes although did  not have A1c prior. Reviewed all his labs and abnormalities.   his A1c was 7.4 in  then 6.6, 6.8, 5.4 , 6.1, 6.3, 6.1, 5.7, 6.3 -Nov ..    Was Eating poorly.  He has some chronic renal insufficiency. Seen renal .   He had a slight hemoglobin reduction which appears chronic and fluctuating clinical of last year or so with  thrombocytopenia.  We discussed all those issues and the need to monitor them over time.  Is otherwise feeling well.  He is being followed by Hematology.  On iron pill daily and improving.  Somewhere along the way he was put on 81 mg aspirin and is currently off of it I encouraged him to stay off of it as there really is no obvious cardiac reason for him to need the aspirin and he does have thrombocytopenia which is not severe but could become severe and being on aspirin could increase his bleeding risk.    Reason diarrhea today secondary to eating some  [place] in his wife has the same thing.  Does not appear to be excessive and should work its way out.  No fevers or chills or abdominal pain.    No further abdominal pain as he had before. Had uti post cysto, all better.  Follow-up urinalysis  looked good and I reassured patient.  He apparently has a lab order for his microalbumin today so will add a urinalysis although not having any UTI symptoms we can double check.  No history of recurrent UTIs and discuss this was probably expected and related to the cystoscope.    He has had labs  and appears for an upcoming renal appointment.  Labs for Hematology and we will add an A1c at that time and patient will remind the lab to do the standing A1c.    He has seen GI.  Reviewed his last EGD which showed gastritis.  I reviewed the GI noted it sounds like he had some generalized abdominal discomfort after excessive eating.  Symptoms have since  resolved.    He does continue to get hives and itching in areas where he applies pressure.  It has been ongoing for years.  He is not on an antihistamine and discussed using Claritin daily neg going to twice daily if that does not suppressed. Now on a shot with GREAT control of eczema    Recent lab shows hemoglobin drop from 11.4-9.6 then up to 13.6, 11.3, 12, 13.  No obvious clinical bleeding in the plan by GI will be to do a capsule study if the hemoglobin level drops. Seen by Heme and ferritin improving on iron pill daily          Reviewed blood pressures which appear to be running normal.    .  He did see cardiology ordered an echo     Low normal left ventricular systolic function. The estimated ejection fraction is 50-55%.  Mild eccentric left ventricular hypertrophy.  Mild left ventricular enlargement.  No wall motion abnormalities.  Normal right ventricular systolic function.  Moderate mitral regurgitation.  Mild tricuspid regurgitation.  The estimated PA systolic pressure is 35 mmHg.     He is on digital hypertension and we reviewed his blood pressure and pulse readings.  His pulses mostly in the 40s and 50s occasionally up to 60.  He had his carvedilol reduced from 12.5-6.25 twice a day.  His pulse still appears to be mostly in the 50s.  He is asymptomatic.  We discussed that if symptomatic we would need to eliminate the carvedilol and assess response.  Reviewed all his other labs.  With the Lasix he did not have any worsening renal insufficiency.  He has an appointment with Hematology  His platelet count appears stable.          Chest CT  Impression       1. CT findings favoring sequela of cardiac decompensation causing pulmonary edema and bilateral pleural effusions.  Superimposed COVID-19 pneumonia cannot be entirely excluded noting that pleural effusions is not a commonly reported finding and therefore would be atypical.  2. Persistent soft tissue attenuation nodule within the prevascular mediastinum,  presumably an enlarged lymph node and unchanged when compared to previous PET-CTs.  3. Slight interval increased size of multiple mediastinal lymph nodes, nonspecific.  4. Cardiomegaly with severe dense coronary artery atherosclerosis in a 3 vessel distribution.                        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 EYES: no vision change. ENT: no sore throat. CV: no chest pain w/ exertion. RESP: no shortness of breath. GI: no nausea, vomiting, diarrhea. No dysphagia. : no urinary issues. MUSCULOSKELETAL: no new myalgias or arthralgias. SKIN: no new changes. NEURO: no focal deficits. PSYCH: no new issues. ENDOCRINE: no polyuria. HEME: no lymph nodes. ALLERGY: no general pruritis.       Past medical history:  1.  Diabetes with renal disease and retinopathy  2.  Hypertension  3.  Hyperlipidemia  4.  Chronic renal failure stage III  5.  History of colon polyp, normal scope March 2012, normal 2017-- 5 yrs----GI at [facility]- Dr Kumari -EGD/colon done 11/2020  6.  Hyperuricemia and gout  7.  History of sciatica and lumbar disc disease remotely  8.  Erectile dysfunction  9.  Macular degeneration, followed by outside retina specialist  10.  GERD  11.  Low HDL  12.  Inflammatory arthritis of the hands, seeing rheumatology  13.  Bilateral cervical radiculopathy and axonal neuropathy, to have surgery 2015    14.  Followed by outside urology at [facility] Dr. noriega   15.  TKA  -angie Han  who is at [facility]  16.  Prevnar given 2017  17. 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 Neg cysto 2022    Past surgical history: Right knee replacement, left knee arthroscopy, bilateral cataracts, scrotal cyst removed.    Family history: Mother with hypertension and diabetes.  Father's history is unknown.  One brother who is okay.    Social history: Retired, quit smoking 1974.   with 3 children.  Drinks  socially.    Vital signs as above  Gen: no distress, exam otherwise deferred    Diagnoses and all orders for this visit:    Diabetes mellitus with proteinuria, chronic and stable, will add A1c to his next blood work  -     Hemoglobin A1C; Standing    Diabetes mellitus with proteinuric diabetic nephropathy    Diabetic retinopathy of both eyes with macular edema associated with type 2 diabetes mellitus, unspecified retinopathy severity    CKD stage 3 due to type 2 diabetes mellitus, chronic and stable    Stage 3a chronic kidney disease    B-cell lymphoma of extranodal site    Seronegative arthropathy of multiple sites    Thrombocytopenia, chronic and stable    Pancytopenia    Neutropenia, unspecified type    Essential hypertension, chronic and stable    Hyperlipidemia, unspecified hyperlipidemia type    Hypertensive kidney disease with stage 3a chronic kidney disease    Rheumatoid arthritis, involving unspecified site, unspecified whether rheumatoid factor present    Anemia, unspecified type    Iron deficiency anemia, unspecified iron deficiency anemia type    PAH (pulmonary artery hypertension)    Atherosclerosis of abdominal aorta    Urticaria, resolved with injection    Idiopathic chronic gout without tophus, unspecified site    Secondary hyperparathyroidism of renal origin

## 2023-02-24 DIAGNOSIS — E11.22 CKD STAGE 3 DUE TO TYPE 2 DIABETES MELLITUS: Primary | ICD-10-CM

## 2023-02-24 DIAGNOSIS — N18.30 CKD STAGE 3 DUE TO TYPE 2 DIABETES MELLITUS: Primary | ICD-10-CM

## 2023-02-28 ENCOUNTER — EXTERNAL CHRONIC CARE MANAGEMENT (OUTPATIENT)
Dept: PRIMARY CARE CLINIC | Facility: CLINIC | Age: 88
End: 2023-02-28
Payer: MEDICARE

## 2023-02-28 ENCOUNTER — TELEPHONE (OUTPATIENT)
Dept: HEMATOLOGY/ONCOLOGY | Facility: CLINIC | Age: 88
End: 2023-02-28
Payer: MEDICARE

## 2023-02-28 PROCEDURE — 99490 CHRNC CARE MGMT STAFF 1ST 20: CPT | Mod: S$PBB,,, | Performed by: INTERNAL MEDICINE

## 2023-02-28 PROCEDURE — 99490 CHRNC CARE MGMT STAFF 1ST 20: CPT | Mod: PBBFAC,PO | Performed by: INTERNAL MEDICINE

## 2023-02-28 PROCEDURE — 99490 PR CHRONIC CARE MGMT, 1ST 20 MIN: ICD-10-PCS | Mod: S$PBB,,, | Performed by: INTERNAL MEDICINE

## 2023-02-28 NOTE — TELEPHONE ENCOUNTER
To Please call pt and advise him that Dr weiss says he can see Dr Guzmán . Tks  ===View-only below this line===  ----- Message -----  From: Antonietta Roque MA  Sent: 2/24/2023   3:51 PM CST  To: Sav Shaw RN    Pt would like to transfer care to dr. Jennings, he says he needs approval before they will let him make appointment. Edelmira carranza

## 2023-03-06 ENCOUNTER — LAB VISIT (OUTPATIENT)
Dept: LAB | Facility: HOSPITAL | Age: 88
End: 2023-03-06
Attending: INTERNAL MEDICINE
Payer: MEDICARE

## 2023-03-06 DIAGNOSIS — E11.29 DIABETES MELLITUS WITH PROTEINURIA: ICD-10-CM

## 2023-03-06 DIAGNOSIS — R80.9 DIABETES MELLITUS WITH PROTEINURIA: ICD-10-CM

## 2023-03-06 DIAGNOSIS — N18.30 CKD STAGE 3 DUE TO TYPE 2 DIABETES MELLITUS: ICD-10-CM

## 2023-03-06 DIAGNOSIS — E11.22 CKD STAGE 3 DUE TO TYPE 2 DIABETES MELLITUS: ICD-10-CM

## 2023-03-06 LAB
ALBUMIN SERPL BCP-MCNC: 3.9 G/DL (ref 3.5–5.2)
ANION GAP SERPL CALC-SCNC: 10 MMOL/L (ref 8–16)
BACTERIA #/AREA URNS HPF: NORMAL /HPF
BASOPHILS # BLD AUTO: 0.01 K/UL (ref 0–0.2)
BASOPHILS NFR BLD: 0.3 % (ref 0–1.9)
BILIRUB UR QL STRIP: NEGATIVE
BUN SERPL-MCNC: 17 MG/DL (ref 8–23)
CALCIUM SERPL-MCNC: 9.1 MG/DL (ref 8.7–10.5)
CHLORIDE SERPL-SCNC: 105 MMOL/L (ref 95–110)
CLARITY UR: CLEAR
CO2 SERPL-SCNC: 24 MMOL/L (ref 23–29)
COLOR UR: YELLOW
CREAT SERPL-MCNC: 1.3 MG/DL (ref 0.5–1.4)
CREAT UR-MCNC: 54 MG/DL (ref 23–375)
DIFFERENTIAL METHOD: ABNORMAL
EOSINOPHIL # BLD AUTO: 0 K/UL (ref 0–0.5)
EOSINOPHIL NFR BLD: 0.6 % (ref 0–8)
ERYTHROCYTE [DISTWIDTH] IN BLOOD BY AUTOMATED COUNT: 15.6 % (ref 11.5–14.5)
EST. GFR  (NO RACE VARIABLE): 54 ML/MIN/1.73 M^2
ESTIMATED AVG GLUCOSE: 120 MG/DL (ref 68–131)
GLUCOSE SERPL-MCNC: 248 MG/DL (ref 70–110)
GLUCOSE UR QL STRIP: ABNORMAL
HBA1C MFR BLD: 5.8 % (ref 4–5.6)
HCT VFR BLD AUTO: 36.3 % (ref 40–54)
HGB BLD-MCNC: 11.9 G/DL (ref 14–18)
HGB UR QL STRIP: NEGATIVE
IMM GRANULOCYTES # BLD AUTO: 0.01 K/UL (ref 0–0.04)
IMM GRANULOCYTES NFR BLD AUTO: 0.3 % (ref 0–0.5)
KETONES UR QL STRIP: NEGATIVE
LEUKOCYTE ESTERASE UR QL STRIP: NEGATIVE
LYMPHOCYTES # BLD AUTO: 1 K/UL (ref 1–4.8)
LYMPHOCYTES NFR BLD: 30.4 % (ref 18–48)
MCH RBC QN AUTO: 31.5 PG (ref 27–31)
MCHC RBC AUTO-ENTMCNC: 32.8 G/DL (ref 32–36)
MCV RBC AUTO: 96 FL (ref 82–98)
MICROSCOPIC COMMENT: NORMAL
MONOCYTES # BLD AUTO: 0.5 K/UL (ref 0.3–1)
MONOCYTES NFR BLD: 16 % (ref 4–15)
NEUTROPHILS # BLD AUTO: 1.7 K/UL (ref 1.8–7.7)
NEUTROPHILS NFR BLD: 52.4 % (ref 38–73)
NITRITE UR QL STRIP: NEGATIVE
NRBC BLD-RTO: 0 /100 WBC
PH UR STRIP: 6 [PH] (ref 5–8)
PHOSPHATE SERPL-MCNC: 2.8 MG/DL (ref 2.7–4.5)
PLATELET # BLD AUTO: 46 K/UL (ref 150–450)
PMV BLD AUTO: 12.4 FL (ref 9.2–12.9)
POTASSIUM SERPL-SCNC: 4.2 MMOL/L (ref 3.5–5.1)
PROT UR QL STRIP: ABNORMAL
PROT UR-MCNC: 16 MG/DL
PROT/CREAT UR: 0.3 MG/G{CREAT} (ref 0–0.2)
PTH-INTACT SERPL-MCNC: 61.8 PG/ML (ref 9–77)
RBC # BLD AUTO: 3.78 M/UL (ref 4.6–6.2)
RBC #/AREA URNS HPF: 2 /HPF (ref 0–4)
SODIUM SERPL-SCNC: 139 MMOL/L (ref 136–145)
SP GR UR STRIP: 1.01 (ref 1–1.03)
URN SPEC COLLECT METH UR: ABNORMAL
UROBILINOGEN UR STRIP-ACNC: NEGATIVE EU/DL
WBC # BLD AUTO: 3.26 K/UL (ref 3.9–12.7)
YEAST URNS QL MICRO: NORMAL

## 2023-03-06 PROCEDURE — 81000 URINALYSIS NONAUTO W/SCOPE: CPT | Performed by: INTERNAL MEDICINE

## 2023-03-06 PROCEDURE — 80069 RENAL FUNCTION PANEL: CPT | Performed by: INTERNAL MEDICINE

## 2023-03-06 PROCEDURE — 82570 ASSAY OF URINE CREATININE: CPT | Performed by: INTERNAL MEDICINE

## 2023-03-06 PROCEDURE — 83970 ASSAY OF PARATHORMONE: CPT | Performed by: INTERNAL MEDICINE

## 2023-03-06 PROCEDURE — 85025 COMPLETE CBC W/AUTO DIFF WBC: CPT | Performed by: INTERNAL MEDICINE

## 2023-03-06 PROCEDURE — 83036 HEMOGLOBIN GLYCOSYLATED A1C: CPT | Performed by: INTERNAL MEDICINE

## 2023-03-09 ENCOUNTER — OFFICE VISIT (OUTPATIENT)
Dept: NEPHROLOGY | Facility: CLINIC | Age: 88
End: 2023-03-09
Payer: MEDICARE

## 2023-03-09 VITALS
OXYGEN SATURATION: 99 % | HEART RATE: 52 BPM | DIASTOLIC BLOOD PRESSURE: 68 MMHG | BODY MASS INDEX: 26.75 KG/M2 | WEIGHT: 191.81 LBS | SYSTOLIC BLOOD PRESSURE: 173 MMHG

## 2023-03-09 DIAGNOSIS — E11.29 DIABETES MELLITUS WITH PROTEINURIA: ICD-10-CM

## 2023-03-09 DIAGNOSIS — N18.30 CKD STAGE 3 DUE TO TYPE 2 DIABETES MELLITUS: Primary | ICD-10-CM

## 2023-03-09 DIAGNOSIS — R80.9 DIABETES MELLITUS WITH PROTEINURIA: ICD-10-CM

## 2023-03-09 DIAGNOSIS — N25.81 SECONDARY HYPERPARATHYROIDISM OF RENAL ORIGIN: ICD-10-CM

## 2023-03-09 DIAGNOSIS — E11.22 CKD STAGE 3 DUE TO TYPE 2 DIABETES MELLITUS: Primary | ICD-10-CM

## 2023-03-09 DIAGNOSIS — I10 PRIMARY HYPERTENSION: ICD-10-CM

## 2023-03-09 PROCEDURE — 99214 OFFICE O/P EST MOD 30 MIN: CPT | Mod: S$PBB,,, | Performed by: INTERNAL MEDICINE

## 2023-03-09 PROCEDURE — 99214 PR OFFICE/OUTPT VISIT, EST, LEVL IV, 30-39 MIN: ICD-10-PCS | Mod: S$PBB,,, | Performed by: INTERNAL MEDICINE

## 2023-03-09 PROCEDURE — 99215 OFFICE O/P EST HI 40 MIN: CPT | Mod: PBBFAC | Performed by: INTERNAL MEDICINE

## 2023-03-09 PROCEDURE — 99999 PR PBB SHADOW E&M-EST. PATIENT-LVL V: ICD-10-PCS | Mod: PBBFAC,,, | Performed by: INTERNAL MEDICINE

## 2023-03-09 PROCEDURE — 99999 PR PBB SHADOW E&M-EST. PATIENT-LVL V: CPT | Mod: PBBFAC,,, | Performed by: INTERNAL MEDICINE

## 2023-03-09 RX ORDER — DAPAGLIFLOZIN 10 MG/1
10 TABLET, FILM COATED ORAL DAILY
Qty: 30 TABLET | Refills: 4 | Status: SHIPPED | OUTPATIENT
Start: 2023-03-09 | End: 2023-06-05 | Stop reason: SDUPTHER

## 2023-03-09 NOTE — PATIENT INSTRUCTIONS
Please make sure you stay hydrated (drink plenty of water). Let the doctor know if you have a condition associated with dehydration (diarrhea). Please also let the doctor know if you have a urinary tact infection or groin rash.

## 2023-03-09 NOTE — PROGRESS NOTES
Subjective:       Patient ID: Bria Lawson is a 87 y.o. 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 who has been following up in renal clinic for ckd. Patient feels well today, no urinary or cardiac symptoms, no NSAIDs intake. Not really following a low salt diet.    HPI  Review of Systems   Constitutional: Negative.    HENT: Negative.     Eyes: Negative.    Respiratory: Negative.     Cardiovascular: Negative.    Gastrointestinal: Negative.  Negative for diarrhea, nausea and vomiting.   Genitourinary:  Negative for decreased urine volume, difficulty urinating, dysuria, hematuria and urgency.   Musculoskeletal: Negative.    Skin: Negative.    Neurological: Negative.    Psychiatric/Behavioral: Negative.       Objective:      Physical Exam  Vitals and nursing note reviewed.   Constitutional:       Appearance: He is well-developed.   HENT:      Head: Normocephalic and atraumatic.      Right Ear: External ear normal.      Left Ear: External ear normal.      Nose: Nose normal.   Eyes:      Conjunctiva/sclera: Conjunctivae normal.      Pupils: Pupils are equal, round, and reactive to light.   Neck:      Vascular: No JVD.   Cardiovascular:      Rate and Rhythm: Normal rate and regular rhythm.      Heart sounds: Murmur heard.   Pulmonary:      Effort: Pulmonary effort is normal. No respiratory distress.      Breath sounds: Normal breath  sounds. No stridor. No rales.   Chest:      Chest wall: No tenderness.   Abdominal:      General: Bowel sounds are normal. There is no distension.      Palpations: Abdomen is soft. There is no mass.      Tenderness: There is no abdominal tenderness. There is no guarding or rebound.   Musculoskeletal:         General: Normal range of motion.      Cervical back: Normal range of motion and neck supple.      Comments: Trace edema comfort   Lymphadenopathy:      Cervical: No cervical adenopathy.   Skin:     General: Skin is warm and dry.   Neurological:      Mental Status: He is alert and oriented to person, place, and time.      Cranial Nerves: No cranial nerve deficit.      Coordination: Coordination normal.      Deep Tendon Reflexes: Reflexes are normal and symmetric.   Psychiatric:         Behavior: Behavior normal.         Thought Content: Thought content normal.         Judgment: Judgment normal.       Assessment:       1. CKD stage 3 due to type 2 diabetes mellitus    2. Primary hypertension    3. Diabetes mellitus with proteinuria    4. Secondary hyperparathyroidism of renal origin        Plan:       1. CKD3a: With low grade proteinuria, stable for > 15 yrs, likely from DMII/HTN   - UPCR 300 mg/g   On Valsartan  - will start Dapagliflozin   Lab Results   Component Value Date    CREATININE 1.3 03/06/2023     Protein Creatinine Ratios:    Prot/Creat Ratio, Urine   Date Value Ref Range Status   03/06/2023 0.30 (H) 0.00 - 0.20 Final   09/12/2022 0.23 (H) 0.00 - 0.20 Final   08/04/2022 0.34 (H) 0.00 - 0.20 Final         Acid-Base:   Lab Results   Component Value Date     03/06/2023    K 4.2 03/06/2023    CO2 24 03/06/2023     2. HTN: Blood pressures at home decently controlled. Did not take his meds this am.  On Valsartan    3. Secondary (renal) hyperparathyroidism: last PTH within target  Lab Results   Component Value Date    PTH 61.8 03/06/2023    CALCIUM 9.1 03/06/2023    PHOS 2.8 03/06/2023       4. Anemia:  mildly worse from last labs, follows with hem/onc for his lymphoma  Lab Results   Component Value Date    HGB 11.9 (L) 03/06/2023        5. DM:  Last HbA1C: perfectly controlled with medication  Lab Results   Component Value Date    HGBA1C 5.8 (H) 03/06/2023       6. Lipid management: On a statin  Lab Results   Component Value Date    LDLCALC 47.8 (L) 07/02/2021      7. Lymphoma: follow with hem/onc, recent PET scan was ok    See back in 4 month with labs    Renal profile and pro/crea ratio in 1 month.

## 2023-03-13 ENCOUNTER — TELEPHONE (OUTPATIENT)
Dept: PHARMACY | Facility: CLINIC | Age: 88
End: 2023-03-13
Payer: MEDICARE

## 2023-03-16 ENCOUNTER — PATIENT MESSAGE (OUTPATIENT)
Dept: NEPHROLOGY | Facility: CLINIC | Age: 88
End: 2023-03-16
Payer: MEDICARE

## 2023-03-31 ENCOUNTER — EXTERNAL CHRONIC CARE MANAGEMENT (OUTPATIENT)
Dept: PRIMARY CARE CLINIC | Facility: CLINIC | Age: 88
End: 2023-03-31
Payer: MEDICARE

## 2023-03-31 PROCEDURE — 99490 CHRNC CARE MGMT STAFF 1ST 20: CPT | Mod: PBBFAC,PO | Performed by: INTERNAL MEDICINE

## 2023-03-31 PROCEDURE — 99490 PR CHRONIC CARE MGMT, 1ST 20 MIN: ICD-10-PCS | Mod: S$PBB,,, | Performed by: INTERNAL MEDICINE

## 2023-03-31 PROCEDURE — 99490 CHRNC CARE MGMT STAFF 1ST 20: CPT | Mod: S$PBB,,, | Performed by: INTERNAL MEDICINE

## 2023-04-17 ENCOUNTER — LAB VISIT (OUTPATIENT)
Dept: LAB | Facility: HOSPITAL | Age: 88
End: 2023-04-17
Attending: INTERNAL MEDICINE
Payer: MEDICARE

## 2023-04-17 DIAGNOSIS — N18.30 CKD STAGE 3 DUE TO TYPE 2 DIABETES MELLITUS: ICD-10-CM

## 2023-04-17 DIAGNOSIS — E11.22 CKD STAGE 3 DUE TO TYPE 2 DIABETES MELLITUS: ICD-10-CM

## 2023-04-17 LAB
ALBUMIN SERPL BCP-MCNC: 4 G/DL (ref 3.5–5.2)
ANION GAP SERPL CALC-SCNC: 9 MMOL/L (ref 8–16)
BUN SERPL-MCNC: 18 MG/DL (ref 8–23)
CALCIUM SERPL-MCNC: 9.2 MG/DL (ref 8.7–10.5)
CHLORIDE SERPL-SCNC: 109 MMOL/L (ref 95–110)
CO2 SERPL-SCNC: 24 MMOL/L (ref 23–29)
CREAT SERPL-MCNC: 1.4 MG/DL (ref 0.5–1.4)
EST. GFR  (NO RACE VARIABLE): 49 ML/MIN/1.73 M^2
GLUCOSE SERPL-MCNC: 169 MG/DL (ref 70–110)
PHOSPHATE SERPL-MCNC: 3.4 MG/DL (ref 2.7–4.5)
POTASSIUM SERPL-SCNC: 4.2 MMOL/L (ref 3.5–5.1)
SODIUM SERPL-SCNC: 142 MMOL/L (ref 136–145)

## 2023-04-17 PROCEDURE — 80069 RENAL FUNCTION PANEL: CPT | Performed by: INTERNAL MEDICINE

## 2023-04-17 PROCEDURE — 36415 COLL VENOUS BLD VENIPUNCTURE: CPT | Performed by: INTERNAL MEDICINE

## 2023-04-20 ENCOUNTER — PES CALL (OUTPATIENT)
Dept: ADMINISTRATIVE | Facility: CLINIC | Age: 88
End: 2023-04-20
Payer: MEDICARE

## 2023-04-21 ENCOUNTER — PES CALL (OUTPATIENT)
Dept: ADMINISTRATIVE | Facility: CLINIC | Age: 88
End: 2023-04-21
Payer: MEDICARE

## 2023-04-26 ENCOUNTER — OFFICE VISIT (OUTPATIENT)
Dept: RHEUMATOLOGY | Facility: CLINIC | Age: 88
End: 2023-04-26
Payer: MEDICARE

## 2023-04-26 VITALS
HEIGHT: 71 IN | HEART RATE: 51 BPM | SYSTOLIC BLOOD PRESSURE: 170 MMHG | DIASTOLIC BLOOD PRESSURE: 75 MMHG | BODY MASS INDEX: 28.21 KG/M2 | WEIGHT: 201.5 LBS

## 2023-04-26 DIAGNOSIS — M15.9 PRIMARY OSTEOARTHRITIS INVOLVING MULTIPLE JOINTS: ICD-10-CM

## 2023-04-26 DIAGNOSIS — M1A.00X0 IDIOPATHIC CHRONIC GOUT WITHOUT TOPHUS, UNSPECIFIED SITE: Primary | ICD-10-CM

## 2023-04-26 DIAGNOSIS — G62.9 NEUROPATHY: ICD-10-CM

## 2023-04-26 PROCEDURE — 99214 OFFICE O/P EST MOD 30 MIN: CPT | Mod: PBBFAC | Performed by: INTERNAL MEDICINE

## 2023-04-26 PROCEDURE — 99213 OFFICE O/P EST LOW 20 MIN: CPT | Mod: S$PBB,,, | Performed by: INTERNAL MEDICINE

## 2023-04-26 PROCEDURE — 99999 PR PBB SHADOW E&M-EST. PATIENT-LVL IV: ICD-10-PCS | Mod: PBBFAC,,, | Performed by: INTERNAL MEDICINE

## 2023-04-26 PROCEDURE — 99213 PR OFFICE/OUTPT VISIT, EST, LEVL III, 20-29 MIN: ICD-10-PCS | Mod: S$PBB,,, | Performed by: INTERNAL MEDICINE

## 2023-04-26 PROCEDURE — 99999 PR PBB SHADOW E&M-EST. PATIENT-LVL IV: CPT | Mod: PBBFAC,,, | Performed by: INTERNAL MEDICINE

## 2023-04-27 NOTE — PROGRESS NOTES
History of present illness:  87-year-old gentleman with a long history of gouty arthritis.  He has been on allopurinol 100 mg daily and has not had a gout attack for many years.  I saw him initially in 2015.  At that time he had an acute inflammatory arthritis of the hands and wrist.  He had been diagnosed previously as osteoarthritis and has had a previous total knee replacement on the left.  He was seronegative.  I treated him with prednisone for 18 months.  On stopping the prednisone he had no recurrence of his symptoms.  He did have some paresthesias and I placed him on gabapentin.  He continues to take it and has had no recent symptoms.  He developed a low-grade lymphoma.  He was treated with Rituxan.  His lymphoma is under control.  He has not needed treatment recently.      He is had no gout attacks.  He remains on allopurinol.  He is tolerating the medication.  His paresthesias are doing well.  He remains on gabapentin.  He is had no significant joint pain.  He is on a new diabetic medication.  He is had no other recent medical problems.      Physical examination:  Musculoskeletal:  He has no tophi.  Has bony hypertrophy of the PIP.  Has no synovitis on examination of peripheral joints.      Assessment:  1.  Inter critical gout  2.  Osteoarthritis  3.  Peripheral neuropathy    Plans:  1. Continue allopurinol 100 mg daily.  He will have a uric acid level with his next blood work  2.  Symptomatic therapy only  3.  Continue gabapentin.  Return in 12 months.

## 2023-04-30 ENCOUNTER — EXTERNAL CHRONIC CARE MANAGEMENT (OUTPATIENT)
Dept: PRIMARY CARE CLINIC | Facility: CLINIC | Age: 88
End: 2023-04-30
Payer: MEDICARE

## 2023-04-30 PROCEDURE — 99490 CHRNC CARE MGMT STAFF 1ST 20: CPT | Mod: S$PBB,,, | Performed by: INTERNAL MEDICINE

## 2023-04-30 PROCEDURE — 99490 PR CHRONIC CARE MGMT, 1ST 20 MIN: ICD-10-PCS | Mod: S$PBB,,, | Performed by: INTERNAL MEDICINE

## 2023-04-30 PROCEDURE — 99490 CHRNC CARE MGMT STAFF 1ST 20: CPT | Mod: PBBFAC,PO | Performed by: INTERNAL MEDICINE

## 2023-05-15 ENCOUNTER — LAB VISIT (OUTPATIENT)
Dept: LAB | Facility: HOSPITAL | Age: 88
End: 2023-05-15
Attending: INTERNAL MEDICINE
Payer: MEDICARE

## 2023-05-15 DIAGNOSIS — M1A.00X0 IDIOPATHIC CHRONIC GOUT WITHOUT TOPHUS, UNSPECIFIED SITE: ICD-10-CM

## 2023-05-15 LAB — URATE SERPL-MCNC: 3.9 MG/DL (ref 3.4–7)

## 2023-05-15 PROCEDURE — 36415 COLL VENOUS BLD VENIPUNCTURE: CPT | Performed by: INTERNAL MEDICINE

## 2023-05-15 PROCEDURE — 84550 ASSAY OF BLOOD/URIC ACID: CPT | Performed by: INTERNAL MEDICINE

## 2023-05-19 ENCOUNTER — OFFICE VISIT (OUTPATIENT)
Dept: HEMATOLOGY/ONCOLOGY | Facility: CLINIC | Age: 88
End: 2023-05-19
Payer: MEDICARE

## 2023-05-19 ENCOUNTER — LAB VISIT (OUTPATIENT)
Dept: LAB | Facility: HOSPITAL | Age: 88
End: 2023-05-19
Attending: INTERNAL MEDICINE
Payer: MEDICARE

## 2023-05-19 VITALS
TEMPERATURE: 98 F | HEIGHT: 71 IN | HEART RATE: 50 BPM | SYSTOLIC BLOOD PRESSURE: 165 MMHG | OXYGEN SATURATION: 99 % | BODY MASS INDEX: 27.53 KG/M2 | DIASTOLIC BLOOD PRESSURE: 70 MMHG | RESPIRATION RATE: 18 BRPM | WEIGHT: 196.63 LBS

## 2023-05-19 DIAGNOSIS — N18.30 CKD STAGE 3 DUE TO TYPE 2 DIABETES MELLITUS: ICD-10-CM

## 2023-05-19 DIAGNOSIS — C85.19 B-CELL LYMPHOMA OF EXTRANODAL SITE: ICD-10-CM

## 2023-05-19 DIAGNOSIS — C85.10 LOW GRADE B-CELL LYMPHOMA: ICD-10-CM

## 2023-05-19 DIAGNOSIS — E11.22 CKD STAGE 3 DUE TO TYPE 2 DIABETES MELLITUS: ICD-10-CM

## 2023-05-19 DIAGNOSIS — D69.6 THROMBOCYTOPENIA: ICD-10-CM

## 2023-05-19 DIAGNOSIS — C85.10 LOW GRADE B-CELL LYMPHOMA: Primary | ICD-10-CM

## 2023-05-19 LAB
BASOPHILS # BLD AUTO: 0.02 K/UL (ref 0–0.2)
BASOPHILS NFR BLD: 0.4 % (ref 0–1.9)
DIFFERENTIAL METHOD: ABNORMAL
EOSINOPHIL # BLD AUTO: 0 K/UL (ref 0–0.5)
EOSINOPHIL NFR BLD: 0.7 % (ref 0–8)
ERYTHROCYTE [DISTWIDTH] IN BLOOD BY AUTOMATED COUNT: 15.2 % (ref 11.5–14.5)
HCT VFR BLD AUTO: 41.3 % (ref 40–54)
HGB BLD-MCNC: 13.4 G/DL (ref 14–18)
IMM GRANULOCYTES # BLD AUTO: 0.02 K/UL (ref 0–0.04)
IMM GRANULOCYTES NFR BLD AUTO: 0.4 % (ref 0–0.5)
LYMPHOCYTES # BLD AUTO: 1.4 K/UL (ref 1–4.8)
LYMPHOCYTES NFR BLD: 31.4 % (ref 18–48)
MCH RBC QN AUTO: 31.6 PG (ref 27–31)
MCHC RBC AUTO-ENTMCNC: 32.4 G/DL (ref 32–36)
MCV RBC AUTO: 97 FL (ref 82–98)
MONOCYTES # BLD AUTO: 0.7 K/UL (ref 0.3–1)
MONOCYTES NFR BLD: 14.8 % (ref 4–15)
NEUTROPHILS # BLD AUTO: 2.3 K/UL (ref 1.8–7.7)
NEUTROPHILS NFR BLD: 52.3 % (ref 38–73)
NRBC BLD-RTO: 0 /100 WBC
PLATELET # BLD AUTO: 61 K/UL (ref 150–450)
PMV BLD AUTO: 12.5 FL (ref 9.2–12.9)
RBC # BLD AUTO: 4.24 M/UL (ref 4.6–6.2)
WBC # BLD AUTO: 4.46 K/UL (ref 3.9–12.7)

## 2023-05-19 PROCEDURE — 85025 COMPLETE CBC W/AUTO DIFF WBC: CPT | Performed by: INTERNAL MEDICINE

## 2023-05-19 PROCEDURE — 36415 COLL VENOUS BLD VENIPUNCTURE: CPT | Performed by: INTERNAL MEDICINE

## 2023-05-19 PROCEDURE — 99214 OFFICE O/P EST MOD 30 MIN: CPT | Mod: PBBFAC | Performed by: INTERNAL MEDICINE

## 2023-05-19 PROCEDURE — 99215 OFFICE O/P EST HI 40 MIN: CPT | Mod: S$PBB,,, | Performed by: INTERNAL MEDICINE

## 2023-05-19 PROCEDURE — 99999 PR PBB SHADOW E&M-EST. PATIENT-LVL IV: CPT | Mod: PBBFAC,,, | Performed by: INTERNAL MEDICINE

## 2023-05-19 PROCEDURE — 99999 PR PBB SHADOW E&M-EST. PATIENT-LVL IV: ICD-10-PCS | Mod: PBBFAC,,, | Performed by: INTERNAL MEDICINE

## 2023-05-19 PROCEDURE — 99215 PR OFFICE/OUTPT VISIT, EST, LEVL V, 40-54 MIN: ICD-10-PCS | Mod: S$PBB,,, | Performed by: INTERNAL MEDICINE

## 2023-05-19 NOTE — PROGRESS NOTES
Hematology and Medical Oncology   New Patient Consult     05/19/2023  Referred by:  Dr. 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     He was initially evaluated for Thrombocytopenia. 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   He has completed one four week course of rituxan Monotherapy. Completed 10/2018       --11/25/22 CT Chest/abd/pelvis: Anterior mediastinal nodule, slightly larger than the 05/27/2020 exam.  Decreased size of previously noted mediastinal lymph nodes.  Moderate scattered calcific atherosclerosis.  Cholelithiasis Small cystic lesion in the pancreatic neck, unchanged.    --PET on 1/25/23: 1. No definite hypermetabolic tumor or new lymphadenopathy.  2. Relatively stable low-density anterior mediastinal structure with background level radiotracer uptake less than blood pool.  Etiology remains uncertain.  The Deauville Score is: 2    Mr. Lawson is doing very well. Remains active. He and his wife travel regularly. Has a trip to [place] planned this summer.     Denies all B symptoms.       PAST MEDICAL HISTORY:   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    Hyperlipidemia 2/26/2015    Hypertension     Lymphoma     Uncontrolled secondary diabetes mellitus with stage 3 CKD (GFR 30-59) 8/28/2015       PAST SURGICAL HISTORY:   Past Surgical History:   Procedure Laterality Date    BONE MARROW BIOPSY Left 09/19/2018    Procedure: Biopsy-bone marrow;  Surgeon: Velia  Shelby Tobias MD;  Location[facility] ENDO;  Service: Oncology;  Laterality: Left;    CATARACT EXTRACTION Bilateral 1996    COLONOSCOPY N/A 11/24/2020    Procedure: COLONOSCOPY Golytely;  Surgeon: Masoud Hwang MD;  Location: [facility] ENDO;  Service: Endoscopy;  Laterality: N/A;    ESOPHAGOGASTRODUODENOSCOPY N/A 11/24/2020    Procedure: EGD (ESOPHAGOGASTRODUODENOSCOPY);  Surgeon: Masoud Hwang MD;  Location: [facility] ENDO;  Service: Endoscopy;  Laterality: N/A;    eye lid sx Bilateral 2017    EYE SURGERY      cataracts    JOINT REPLACEMENT      knee replacement    retinal injection s Bilateral     scrotal cyst         PAST SOCIAL HISTORY:   reports that he has quit smoking. He has never used smokeless tobacco. He reports current alcohol use. He reports that he does not use drugs.    FAMILY HISTORY:  Family History   Problem Relation Age of Onset    Hypertension Mother     Diabetes Father     No Known Problems Sister     No Known Problems Brother     No Known Problems Maternal Aunt     No Known Problems Maternal Uncle     No Known Problems Paternal Aunt     No Known Problems Paternal Uncle     No Known Problems Maternal Grandmother     No Known Problems Maternal Grandfather     No Known Problems Paternal Grandmother     No Known Problems Paternal Grandfather     No Known Problems Daughter     No Known Problems Son     No Known Problems Son     No Known Problems Other     Amblyopia Neg Hx     Blindness Neg Hx     Cancer Neg Hx     Cataracts Neg Hx     Glaucoma Neg Hx     Macular degeneration Neg Hx     Retinal detachment Neg Hx     Strabismus Neg Hx     Stroke Neg Hx     Thyroid disease Neg Hx        CURRENT MEDICATIONS:   Current Outpatient Medications   Medication Sig    allopurinoL (ZYLOPRIM) 100 MG tablet TAKE 1 TABLET(100 MG) BY MOUTH EVERY DAY    amLODIPine (NORVASC) 10 MG tablet TAKE 1 TABLET(10 MG) BY MOUTH EVERY DAY    ascorbic acid, vitamin C, (VITAMIN C) 1000 MG tablet Take  1,000 mg by mouth once daily.    atorvastatin (LIPITOR) 20 MG tablet TAKE 1 TABLET(20 MG) BY MOUTH EVERY DAY    blood sugar diagnostic Strp 1 strip by Misc.(Non-Drug; Combo Route) route 2 (two) times daily. Disp glucometer and lancets as well    carvediloL (COREG) 6.25 MG tablet TAKE 1 TABLET(6.25 MG) BY MOUTH TWICE DAILY WITH MEALS    dapagliflozin (FARXIGA) 10 mg tablet Take 1 tablet (10 mg total) by mouth once daily.    DUPIXENT  mg/2 mL PnIj Inject into the skin.    FEROSUL 325 mg (65 mg iron) Tab tablet TAKE 1 TABLET BY MOUTH EVERY DAY WITH BREAKFAST    folic acid (FOLVITE) 800 MCG Tab Take 800 mcg by mouth once daily.    gabapentin (NEURONTIN) 300 MG capsule TAKE 1 CAPSULE(300 MG) BY MOUTH THREE TIMES DAILY    glipiZIDE (GLUCOTROL) 10 MG TR24 TAKE 1 TABLET(10 MG) BY MOUTH EVERY DAY    JANUVIA 100 mg Tab TAKE 1 TABLET ONCE DAILY    multivit-min/FA/lycopen/lutein (CENTRUM SILVER MEN ORAL) Take by mouth.    niacin 500 MG Tab Take 100 mg by mouth every evening.    pantoprazole (PROTONIX) 40 MG tablet TAKE 1 TABLET(40 MG) BY MOUTH EVERY DAY    TRUEPLUS LANCETS 33 gauge Misc USE TO TEST BLOOD SUGAR BID    TURMERIC ROOT EXTRACT ORAL Take by mouth.    valsartan (DIOVAN) 320 MG tablet TAKE 1 TABLET ONCE DAILY    vitamin E 400 UNIT capsule Take 400 Units by mouth once daily.    loratadine (CLARITIN) 10 mg tablet TAKE 1 TABLET(10 MG) BY MOUTH TWICE DAILY     No current facility-administered medications for this visit.     ALLERGIES:   Review of patient's allergies indicates:  No Known Allergies      Review of Systems:     Review of Systems   Constitutional:  Negative for appetite change, chills, diaphoresis, fatigue, fever and unexpected weight change.   HENT:   Negative for hearing loss, mouth sores, nosebleeds, sore throat, trouble swallowing and voice change.    Eyes:  Negative for eye problems and icterus.   Respiratory:  Negative for chest tightness, cough, hemoptysis, shortness of breath  and wheezing.    Cardiovascular:  Negative for chest pain, leg swelling and palpitations.   Gastrointestinal:  Negative for abdominal distention, abdominal pain, blood in stool, diarrhea, nausea and vomiting.   Endocrine: Negative for hot flashes.   Genitourinary:  Negative for bladder incontinence, difficulty urinating, dysuria, frequency and hematuria.    Musculoskeletal:  Negative for arthralgias, back pain, flank pain, gait problem, myalgias, neck pain and neck stiffness.   Skin:  Negative for itching, rash and wound.   Neurological:  Negative for dizziness, extremity weakness, gait problem, headaches, numbness, seizures and speech difficulty.   Hematological:  Negative for adenopathy. Does not bruise/bleed easily.   Psychiatric/Behavioral:  Negative for confusion, depression and sleep disturbance. The patient is not nervous/anxious.         Physical Exam:     Vitals:    05/19/23 1049   BP: (!) 165/70   Pulse: (!) 50   Resp: 18   Temp: 97.9 °F (36.6 °C)     Physical Exam  Constitutional:       General: He is not in acute distress.     Appearance: He is well-developed. He is not diaphoretic.   HENT:      Head: Normocephalic and atraumatic.      Mouth/Throat:      Pharynx: No oropharyngeal exudate.   Eyes:      Conjunctiva/sclera: Conjunctivae normal.      Pupils: Pupils are equal, round, and reactive to light.   Neck:      Thyroid: No thyromegaly.      Vascular: No JVD.      Trachea: No tracheal deviation.   Cardiovascular:      Rate and Rhythm: Normal rate and regular rhythm.      Heart sounds: Normal heart sounds. No murmur heard.    No friction rub.   Pulmonary:      Effort: Pulmonary effort is normal. No respiratory distress.      Breath sounds: Normal breath sounds. No stridor. No wheezing or rales.   Chest:      Chest wall: No tenderness.   Abdominal:      General: Bowel sounds are normal. There is no distension.      Palpations: Abdomen is soft.      Tenderness: There is no abdominal tenderness. There is  no guarding or rebound.   Musculoskeletal:         General: No tenderness or deformity. Normal range of motion.      Cervical back: Normal range of motion and neck supple.   Skin:     General: Skin is warm and dry.      Capillary Refill: Capillary refill takes less than 2 seconds.      Coloration: Skin is not pale.      Findings: No erythema or rash.   Neurological:      Mental Status: He is alert and oriented to person, place, and time.      Cranial Nerves: No cranial nerve deficit.      Sensory: No sensory deficit.      Motor: No abnormal muscle tone.      Coordination: Coordination normal.      Deep Tendon Reflexes: Reflexes normal.   Psychiatric:         Behavior: Behavior normal.         Thought Content: Thought content normal.         Judgment: Judgment normal.       ECOG Performance Status: (foot note - ECOG PS provided by [employer]cology [employer]) 0 - Asymptomatic    Karnofsky Performance Score:  100%- Normal, No Complaints, No Evidence of Disease    Labs:     Imaging: Previous imaging has been reviewed     Assessment and Plan:     Mr. Lawson is a pleasant 87 year old male with low Grade B Cell Lymphoma    Low Grade B Cell Lymphoma  --Treated with 4 weekly infusions of RItuxan  --Most recent PET had a dauville score of 2  --Will plan for q3 month labs/physical and yearly imaging    60 minutes were spent face to face with the patient to discuss the disease, natural history, and treatment options. I have provided the patient with an opportunity to ask questions and have all questions answered to his satisfaction.       he will return to clinic in 3 months, but knows to call in the interim if symptoms change or should a problem arise.        Jumana Guzmán MD  Hematology and Medical Oncology  Bone Marrow Transplant  [facility]        BMT Chart Routing      Follow up with physician 3 months. 1. see me in 3 months with cbc,cmp, ldh   Follow up with BOYD    Provider visit type    Infusion  scheduling note    Injection scheduling note    Labs    Imaging    Pharmacy appointment    Other referrals

## 2023-05-24 ENCOUNTER — OFFICE VISIT (OUTPATIENT)
Dept: CARDIOLOGY | Facility: CLINIC | Age: 88
End: 2023-05-24
Payer: MEDICARE

## 2023-05-24 VITALS
SYSTOLIC BLOOD PRESSURE: 175 MMHG | HEIGHT: 71 IN | BODY MASS INDEX: 26.91 KG/M2 | HEART RATE: 67 BPM | OXYGEN SATURATION: 96 % | DIASTOLIC BLOOD PRESSURE: 74 MMHG | WEIGHT: 192.25 LBS | RESPIRATION RATE: 18 BRPM

## 2023-05-24 DIAGNOSIS — I70.0 ATHEROSCLEROSIS OF ABDOMINAL AORTA: ICD-10-CM

## 2023-05-24 DIAGNOSIS — E78.2 MIXED HYPERLIPIDEMIA: ICD-10-CM

## 2023-05-24 DIAGNOSIS — I27.21 PAH (PULMONARY ARTERY HYPERTENSION): ICD-10-CM

## 2023-05-24 DIAGNOSIS — E11.21 DIABETES MELLITUS WITH PROTEINURIC DIABETIC NEPHROPATHY: ICD-10-CM

## 2023-05-24 DIAGNOSIS — C85.19 B-CELL LYMPHOMA OF EXTRANODAL SITE: ICD-10-CM

## 2023-05-24 DIAGNOSIS — I10 PRIMARY HYPERTENSION: Primary | ICD-10-CM

## 2023-05-24 PROCEDURE — 99999 PR PBB SHADOW E&M-EST. PATIENT-LVL V: CPT | Mod: PBBFAC,,, | Performed by: INTERNAL MEDICINE

## 2023-05-24 PROCEDURE — 99214 OFFICE O/P EST MOD 30 MIN: CPT | Mod: S$PBB,,, | Performed by: INTERNAL MEDICINE

## 2023-05-24 PROCEDURE — 99999 PR PBB SHADOW E&M-EST. PATIENT-LVL V: ICD-10-PCS | Mod: PBBFAC,,, | Performed by: INTERNAL MEDICINE

## 2023-05-24 PROCEDURE — 99214 PR OFFICE/OUTPT VISIT, EST, LEVL IV, 30-39 MIN: ICD-10-PCS | Mod: S$PBB,,, | Performed by: INTERNAL MEDICINE

## 2023-05-24 PROCEDURE — 99215 OFFICE O/P EST HI 40 MIN: CPT | Mod: PBBFAC | Performed by: INTERNAL MEDICINE

## 2023-05-24 NOTE — PROGRESS NOTES
CARDIOLOGY CLINIC VISIT        HISTORY OF PRESENT ILLNESS:     Bria Lawson presents for continued care.      Echocardiogram from 10/29/2021 shows normal ejection fraction estimated 60%.  Mild to moderate mitral regurgitation.  Normal pulmonary pressure.  Prior study had showed a normal to low normal left ventricular systolic function.  Moderate mitral regurgitation.  Mildly elevated pulmonary pressure.  Seen 05/29/2020 for evaluation of shortness of breath. CT scan on 5/26/20 showing pulmonary edema, bilateral pleural effusions. Coronary calcification noted. He is without complaints. No chest pain.  No shortness of breath. Digital hypertension reviewed.    05/03/2022:  No complaints.  EKG shows sinus bradycardia with first-degree AV block.  Home blood pressure logs reviewed.  Normal values.    CT Chest 5/26/20:     1. CT findings favoring sequela of cardiac decompensation causing pulmonary edema and bilateral pleural effusions.  Superimposed COVID-19 pneumonia cannot be entirely excluded noting that pleural effusions is not a commonly reported finding and therefore would be atypical.  2. Persistent soft tissue attenuation nodule within the prevascular mediastinum, presumably an enlarged lymph node and unchanged when compared to previous PET-CTs.  3. Slight interval increased size of multiple mediastinal lymph nodes, nonspecific.  4. Cardiomegaly with severe dense coronary artery atherosclerosis in a 3 vessel distribution.  5. Additional findings as detailed in the body of the report.    10/18/2022: Had COVID after a trip to [place]. Mild case. Overall he feels good. No complaints. Blood pressure is high but usual for him. (White coat). EKG today shows sinus bradycardia with first degree AVB.    05/24/2023: Feels good.  No complaints.  Has not taken his medications this morning.  Home blood pressure logs reviewed.  Plans to go to [facility] this summer.    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    Hyperlipidemia 2/26/2015    Hypertension     Lymphoma     Uncontrolled secondary diabetes mellitus with stage 3 CKD (GFR 30-59) 8/28/2015       PAST SURGICAL HISTORY:     Past Surgical History:   Procedure Laterality Date    BONE MARROW BIOPSY Left 09/19/2018    Procedure: Biopsy-bone marrow;  Surgeon: Velia Tobias MD;  Location: [facility] ENDO;  Service: Oncology;  Laterality: Left;    CATARACT EXTRACTION Bilateral 1996    COLONOSCOPY N/A 11/24/2020    Procedure: COLONOSCOPY Golytely;  Surgeon: Masoud Hwang MD;  Location: [facility] ENDO;  Service: Endoscopy;  Laterality: N/A;    ESOPHAGOGASTRODUODENOSCOPY N/A 11/24/2020    Procedure: EGD (ESOPHAGOGASTRODUODENOSCOPY);  Surgeon: Masoud Hwang MD;  Location: [facility] ENDO;  Service: Endoscopy;  Laterality: N/A;    eye lid sx Bilateral 2017    EYE SURGERY      cataracts    JOINT REPLACEMENT      knee replacement    retinal injection s Bilateral     scrotal cyst         ALLERGIES AND MEDICATION:   Review of patient's allergies indicates:  No Known Allergies     Medication List            Accurate as of May 24, 2023 10:30 AM. If you have any questions, ask your nurse or doctor.                CONTINUE taking these medications      allopurinoL 100 MG tablet  Commonly known as: ZYLOPRIM  TAKE 1 TABLET(100 MG) BY MOUTH EVERY DAY     amLODIPine 10 MG tablet  Commonly known as: NORVASC  TAKE 1 TABLET(10 MG) BY MOUTH EVERY DAY     ascorbic acid (vitamin C) 1000 MG  tablet  Commonly known as: VITAMIN C     atorvastatin 20 MG tablet  Commonly known as: LIPITOR  TAKE 1 TABLET(20 MG) BY MOUTH EVERY DAY     blood sugar diagnostic Strp  1 strip by Misc.(Non-Drug; Combo Route) route 2 (two) times daily. Disp glucometer and lancets as well     carvediloL 6.25 MG tablet  Commonly known as: COREG  TAKE 1 TABLET(6.25 MG) BY MOUTH TWICE DAILY WITH MEALS     CENTRUM SILVER MEN ORAL     DUPIXENT  mg/2 mL Pnij  Generic drug: dupilumab     FARXIGA 10 mg tablet  Generic drug: dapagliflozin  Take 1 tablet (10 mg total) by mouth once daily.     FeroSuL 325 mg (65 mg iron) Tab tablet  Generic drug: ferrous sulfate  TAKE 1 TABLET BY MOUTH EVERY DAY WITH BREAKFAST     folic acid 800 MCG Tab  Commonly known as: FOLVITE     gabapentin 300 MG capsule  Commonly known as: NEURONTIN  TAKE 1 CAPSULE(300 MG) BY MOUTH THREE TIMES DAILY     glipiZIDE 10 MG Tr24  Commonly known as: GLUCOTROL  TAKE 1 TABLET(10 MG) BY MOUTH EVERY DAY     JANUVIA 100 MG Tab  Generic drug: SITagliptin phosphate  TAKE 1 TABLET ONCE DAILY     loratadine 10 mg tablet  Commonly known as: CLARITIN  TAKE 1 TABLET(10 MG) BY MOUTH TWICE DAILY     niacin 500 MG Tab     pantoprazole 40 MG tablet  Commonly known as: PROTONIX  TAKE 1 TABLET(40 MG) BY MOUTH EVERY DAY     TRUEPLUS LANCETS 33 gauge Misc  Generic drug: lancets     TURMERIC ROOT EXTRACT ORAL     valsartan 320 MG tablet  Commonly known as: DIOVAN  TAKE 1 TABLET ONCE DAILY     vitamin E 400 UNIT capsule              SOCIAL HISTORY:     Social History     Socioeconomic History    Marital status:    Tobacco Use    Smoking status: Former    Smokeless tobacco: Never   Substance and Sexual Activity    Alcohol use: Yes     Comment: socially - maybe few times a week    Drug use: No    Sexual activity: Yes     Partners: Female     Social Determinants of Health     Financial Resource Strain: Low Risk     Difficulty of Paying Living Expenses: Not hard at all   Food Insecurity:  No Food Insecurity    Worried About Running Out of Food in the Last Year: Never true    Ran Out of Food in the Last Year: Never true   Transportation Needs: No Transportation Needs    Lack of Transportation (Medical): No    Lack of Transportation (Non-Medical): No   Physical Activity: Inactive    Days of Exercise per Week: 0 days    Minutes of Exercise per Session: 0 min   Stress: No Stress Concern Present    Feeling of Stress : Not at all   Social Connections: Unknown    Frequency of Communication with Friends and Family: Once a week    Frequency of Social Gatherings with Friends and Family: Patient refused    Active Member of Clubs or Organizations: No    Attends Club or Organization Meetings: Never    Marital Status:    Housing Stability: Low Risk     Unable to Pay for Housing in the Last Year: No    Number of Places Lived in the Last Year: 1    Unstable Housing in the Last Year: No       FAMILY HISTORY:     Family History   Problem Relation Age of Onset    Hypertension Mother     Diabetes Father     No Known Problems Sister     No Known Problems Brother     No Known Problems Maternal Aunt     No Known Problems Maternal Uncle     No Known Problems Paternal Aunt     No Known Problems Paternal Uncle     No Known Problems Maternal Grandmother     No Known Problems Maternal Grandfather     No Known Problems Paternal Grandmother     No Known Problems Paternal Grandfather     No Known Problems Daughter     No Known Problems Son     No Known Problems Son     No Known Problems Other     Amblyopia Neg Hx     Blindness Neg Hx     Cancer Neg Hx     Cataracts Neg Hx     Glaucoma Neg Hx     Macular degeneration Neg Hx     Retinal detachment Neg Hx     Strabismus Neg Hx     Stroke Neg Hx     Thyroid disease Neg Hx        REVIEW OF SYSTEMS:   Review of Systems   Constitutional:  Negative for chills, diaphoresis, fever, malaise/fatigue and weight loss.   HENT:  Negative for congestion, hearing loss, sinus pain, sore  "throat and tinnitus.    Eyes:  Negative for blurred vision, double vision, photophobia and pain.   Respiratory:  Negative for cough, hemoptysis, sputum production, shortness of breath, wheezing and stridor.    Cardiovascular:  Negative for chest pain, palpitations, orthopnea, claudication, leg swelling and PND.   Gastrointestinal:  Negative for abdominal pain, blood in stool, heartburn, melena, nausea and vomiting.   Musculoskeletal:  Negative for back pain, falls, joint pain, myalgias and neck pain.   Neurological:  Negative for dizziness, tingling, tremors, sensory change, speech change, focal weakness, seizures, loss of consciousness, weakness and headaches.   Endo/Heme/Allergies:  Does not bruise/bleed easily.   Psychiatric/Behavioral:  Negative for depression, memory loss and substance abuse. The patient is not nervous/anxious.      PHYSICAL EXAM:     Vitals:    05/24/23 1016   BP: (!) 175/74   Pulse: 67   Resp: 18    Body mass index is 26.81 kg/m².  Weight: 87.2 kg (192 lb 3.9 oz)   Height: 5' 11" (180.3 cm)     Physical Exam  Vitals reviewed.   Constitutional:       General: He is not in acute distress.     Appearance: He is well-developed. He is not diaphoretic.   Neck:      Vascular: No carotid bruit or JVD.   Cardiovascular:      Rate and Rhythm: Regular rhythm. Bradycardia present.      Pulses: Normal pulses.      Heart sounds: Murmur heard.   Systolic murmur is present with a grade of 3/6.   Pulmonary:      Effort: Pulmonary effort is normal.      Breath sounds: Normal breath sounds.   Abdominal:      General: Bowel sounds are normal.      Palpations: Abdomen is soft.      Tenderness: There is no abdominal tenderness.   Musculoskeletal:      Right lower leg: No edema.      Left lower leg: No edema.   Neurological:      Mental Status: He is alert and oriented to person, place, and time.   Psychiatric:         Speech: Speech normal.         Behavior: Behavior normal.       DATA:     EKG (personally " reviewed tracing):  10/18/2022 - sinus bradycardia with first degree AVB    Laboratory:  CBC:  Recent Labs   Lab 01/25/23  0920 03/06/23  0948 05/19/23  1222   WBC 4.29 3.26 L 4.46   Hemoglobin 13.0 L 11.9 L 13.4 L   Hematocrit 39.1 L 36.3 L 41.3   Platelets 59 L 46 L 61 L       CHEMISTRIES:  Recent Labs   Lab 05/09/22  1459 07/02/22  1413 07/14/22  1025 08/04/22  1126 01/25/23  0920 03/06/23  0948 04/17/23  0954   Glucose  --  238 H 309 H   < > 120 H 248 H 169 H   Sodium  --  139 136   < > 142 139 142   Potassium  --  4.5 4.3   < > 4.3 4.2 4.2   BUN  --  15 19   < > 17 17 18   Creatinine 1.2 1.3 1.4   < > 1.4 1.3 1.4   eGFR if African American >60.0 58 A 53 A  --   --   --   --    eGFR if non  54.8 A 50 A 45 A  --   --   --   --    Calcium  --  9.3 8.7   < > 9.6 9.1 9.2    < > = values in this interval not displayed.       CARDIAC BIOMARKERS:  Recent Labs   Lab 10/26/20  1401   Troponin I <0.006       COAGS:        LIPIDS/LFTS:  Recent Labs   Lab 07/02/21  0814 11/15/21  0846 07/14/22  1025 11/16/22  0904 01/25/23  0920   Cholesterol 106 L  --   --   --   --    Triglycerides 121  --   --   --   --    HDL 34 L  --   --   --   --    LDL Cholesterol 47.8 L  --   --   --   --    Non-HDL Cholesterol 72  --   --   --   --    AST 15   < > 15 14 16   ALT 15   < > 14 13 8 L    < > = values in this interval not displayed.       Cardiovascular Testing:    Echocardiogram 10/29/2021:    The left ventricle is mildly enlarged with eccentric hypertrophy and normal systolic function.  The estimated ejection fraction is 60%.  Indeterminate left ventricular diastolic function.  Normal right ventricular size with normal right ventricular systolic function.  Moderate left atrial enlargement.  Mild-to-moderate mitral regurgitation.  Mild tricuspid regurgitation.  Normal central venous pressure (3 mmHg).  The estimated PA systolic pressure is 27 mmHg.    Echo 6/19/20:     Low normal left ventricular systolic function. The  estimated ejection fraction is 50-55%.  Mild eccentric left ventricular hypertrophy.  Mild left ventricular enlargement.  No wall motion abnormalities.  Normal right ventricular systolic function.  Moderate mitral regurgitation.  Mild tricuspid regurgitation.  The estimated PA systolic pressure is 35 mmHg.    ASSESSMENT:     Abnormal CT scan/coronary calcification:  No symptoms suggestive of angina  Hypertension  Hyperlipidemia: LDL 47  Diabetes mellitus  Lymphoma  Pulmonary hypertension: normal by last echo.  Mitral regurgitation:  Mild/Moderate by last echo  Atherosclerosis of abdominal aorta    PLAN:     Hypertension:  Continue current management  Hyperlipidemia:  Continue current management  Mitral regurgitation:  Last echo showed mild to moderate  Pulmonary hypertension:  Follow-up echocardiogram showed normal pulmonary pressure.  Return to clinic 6 months.            Oleg Fontana MD, MPH, FACC, [facility]

## 2023-05-27 DIAGNOSIS — D50.9 IRON DEFICIENCY ANEMIA, UNSPECIFIED IRON DEFICIENCY ANEMIA TYPE: ICD-10-CM

## 2023-05-27 RX ORDER — GLIPIZIDE 10 MG/1
TABLET, FILM COATED, EXTENDED RELEASE ORAL
Qty: 90 TABLET | Refills: 0 | Status: SHIPPED | OUTPATIENT
Start: 2023-05-27 | End: 2023-08-25

## 2023-05-27 RX ORDER — PANTOPRAZOLE SODIUM 40 MG/1
TABLET, DELAYED RELEASE ORAL
Qty: 90 TABLET | Refills: 2 | Status: SHIPPED | OUTPATIENT
Start: 2023-05-27 | End: 2023-12-14 | Stop reason: SDUPTHER

## 2023-05-27 NOTE — TELEPHONE ENCOUNTER
Refill Routing Note   Medication(s) are not appropriate for processing by [facility] for the following reason(s):      Medication outside of protocol    [facility] action(s):  Route  Approve Care Due:  Labs due   Medication Therapy Plan: Lipid panel outdated;   Glipizide: No dose adjustment recommended per renal fxn.      Appointments  past 12m or future 3m with PCP    Date Provider   Last Visit   2/10/2023 Adiel Bragg MD   Next Visit   Visit date not found Adiel Bragg MD   ED visits in past 90 days: 0        Note composed:3:03 PM 05/27/2023

## 2023-05-27 NOTE — TELEPHONE ENCOUNTER
Care Due:                  Date            Visit Type   Department     Provider  --------------------------------------------------------------------------------                                [facility]                              FOLLOWUP/OF  MED/ INTERNAL  Adiel Coleman  Last Visit: 02-      FICE VISIT   MED/ PEDS      Ehrensing  Next Visit: None Scheduled  None         None Found                                                            Last  Test          Frequency    Reason                     Performed    Due Date  --------------------------------------------------------------------------------    Lipid Panel.  12 months..  atorvastatin.............  07- 06-    [facility] Embedded Care Due Messages. Reference number: [id number].   5/27/2023 5:27:51 AM CDT

## 2023-05-28 RX ORDER — FERROUS SULFATE TAB 325 MG (65 MG ELEMENTAL FE) 325 (65 FE) MG
TAB ORAL
Qty: 90 TABLET | Refills: 0 | Status: SHIPPED | OUTPATIENT
Start: 2023-05-28 | End: 2023-08-25

## 2023-05-30 ENCOUNTER — PES CALL (OUTPATIENT)
Dept: ADMINISTRATIVE | Facility: CLINIC | Age: 88
End: 2023-05-30
Payer: MEDICARE

## 2023-05-31 ENCOUNTER — EXTERNAL CHRONIC CARE MANAGEMENT (OUTPATIENT)
Dept: PRIMARY CARE CLINIC | Facility: CLINIC | Age: 88
End: 2023-05-31
Payer: MEDICARE

## 2023-05-31 PROCEDURE — 99490 PR CHRONIC CARE MGMT, 1ST 20 MIN: ICD-10-PCS | Mod: S$PBB,,, | Performed by: INTERNAL MEDICINE

## 2023-05-31 PROCEDURE — 99490 CHRNC CARE MGMT STAFF 1ST 20: CPT | Mod: PBBFAC,PO | Performed by: INTERNAL MEDICINE

## 2023-05-31 PROCEDURE — 99490 CHRNC CARE MGMT STAFF 1ST 20: CPT | Mod: S$PBB,,, | Performed by: INTERNAL MEDICINE

## 2023-06-06 RX ORDER — DAPAGLIFLOZIN 10 MG/1
10 TABLET, FILM COATED ORAL DAILY
Qty: 30 TABLET | Refills: 4 | Status: SHIPPED | OUTPATIENT
Start: 2023-06-06 | End: 2023-09-14 | Stop reason: SDUPTHER

## 2023-06-21 DIAGNOSIS — N18.31 STAGE 3A CHRONIC KIDNEY DISEASE: Primary | ICD-10-CM

## 2023-06-23 ENCOUNTER — TELEPHONE (OUTPATIENT)
Dept: NEPHROLOGY | Facility: CLINIC | Age: 88
End: 2023-06-23
Payer: MEDICARE

## 2023-06-26 ENCOUNTER — LAB VISIT (OUTPATIENT)
Dept: LAB | Facility: HOSPITAL | Age: 88
End: 2023-06-26
Attending: INTERNAL MEDICINE
Payer: MEDICARE

## 2023-06-26 DIAGNOSIS — E11.22 CKD STAGE 3 DUE TO TYPE 2 DIABETES MELLITUS: ICD-10-CM

## 2023-06-26 DIAGNOSIS — N18.30 CKD STAGE 3 DUE TO TYPE 2 DIABETES MELLITUS: ICD-10-CM

## 2023-06-26 DIAGNOSIS — N25.81 SECONDARY HYPERPARATHYROIDISM OF RENAL ORIGIN: ICD-10-CM

## 2023-06-26 DIAGNOSIS — N18.31 STAGE 3A CHRONIC KIDNEY DISEASE: ICD-10-CM

## 2023-06-26 LAB
25(OH)D3+25(OH)D2 SERPL-MCNC: 49 NG/ML (ref 30–96)
ALBUMIN SERPL BCP-MCNC: 3.9 G/DL (ref 3.5–5.2)
ALBUMIN/CREAT UR: 69.4 UG/MG (ref 0–30)
ANION GAP SERPL CALC-SCNC: 9 MMOL/L (ref 8–16)
BACTERIA #/AREA URNS HPF: NORMAL /HPF
BASOPHILS # BLD AUTO: 0.02 K/UL (ref 0–0.2)
BASOPHILS NFR BLD: 0.4 % (ref 0–1.9)
BILIRUB UR QL STRIP: NEGATIVE
BUN SERPL-MCNC: 18 MG/DL (ref 8–23)
CALCIUM SERPL-MCNC: 9.2 MG/DL (ref 8.7–10.5)
CHLORIDE SERPL-SCNC: 106 MMOL/L (ref 95–110)
CLARITY UR: CLEAR
CO2 SERPL-SCNC: 23 MMOL/L (ref 23–29)
COLOR UR: YELLOW
CREAT SERPL-MCNC: 1.4 MG/DL (ref 0.5–1.4)
CREAT UR-MCNC: 36 MG/DL (ref 23–375)
CREAT UR-MCNC: 37 MG/DL (ref 23–375)
DIFFERENTIAL METHOD: ABNORMAL
EOSINOPHIL # BLD AUTO: 0 K/UL (ref 0–0.5)
EOSINOPHIL NFR BLD: 0 % (ref 0–8)
ERYTHROCYTE [DISTWIDTH] IN BLOOD BY AUTOMATED COUNT: 15.6 % (ref 11.5–14.5)
EST. GFR  (NO RACE VARIABLE): 49 ML/MIN/1.73 M^2
GLUCOSE SERPL-MCNC: 196 MG/DL (ref 70–110)
GLUCOSE UR QL STRIP: ABNORMAL
HCT VFR BLD AUTO: 38.9 % (ref 40–54)
HGB BLD-MCNC: 12.8 G/DL (ref 14–18)
HGB UR QL STRIP: NEGATIVE
IMM GRANULOCYTES # BLD AUTO: 0.07 K/UL (ref 0–0.04)
IMM GRANULOCYTES NFR BLD AUTO: 1.5 % (ref 0–0.5)
KETONES UR QL STRIP: NEGATIVE
LEUKOCYTE ESTERASE UR QL STRIP: NEGATIVE
LYMPHOCYTES # BLD AUTO: 1.2 K/UL (ref 1–4.8)
LYMPHOCYTES NFR BLD: 25.7 % (ref 18–48)
MCH RBC QN AUTO: 31.3 PG (ref 27–31)
MCHC RBC AUTO-ENTMCNC: 32.9 G/DL (ref 32–36)
MCV RBC AUTO: 95 FL (ref 82–98)
MICROALBUMIN UR DL<=1MG/L-MCNC: 25 UG/ML
MICROSCOPIC COMMENT: NORMAL
MONOCYTES # BLD AUTO: 0.6 K/UL (ref 0.3–1)
MONOCYTES NFR BLD: 13.8 % (ref 4–15)
NEUTROPHILS # BLD AUTO: 2.7 K/UL (ref 1.8–7.7)
NEUTROPHILS NFR BLD: 58.6 % (ref 38–73)
NITRITE UR QL STRIP: NEGATIVE
NRBC BLD-RTO: 0 /100 WBC
PH UR STRIP: 6 [PH] (ref 5–8)
PHOSPHATE SERPL-MCNC: 3.2 MG/DL (ref 2.7–4.5)
PLATELET # BLD AUTO: 75 K/UL (ref 150–450)
PLATELET BLD QL SMEAR: ABNORMAL
PMV BLD AUTO: 12.9 FL (ref 9.2–12.9)
POTASSIUM SERPL-SCNC: 4.1 MMOL/L (ref 3.5–5.1)
PROT UR QL STRIP: NEGATIVE
PROT UR-MCNC: 10 MG/DL
PROT/CREAT UR: 0.27 MG/G{CREAT} (ref 0–0.2)
PTH-INTACT SERPL-MCNC: 67.6 PG/ML (ref 9–77)
RBC # BLD AUTO: 4.09 M/UL (ref 4.6–6.2)
SODIUM SERPL-SCNC: 138 MMOL/L (ref 136–145)
SP GR UR STRIP: 1.01 (ref 1–1.03)
URN SPEC COLLECT METH UR: ABNORMAL
UROBILINOGEN UR STRIP-ACNC: NEGATIVE EU/DL
WBC # BLD AUTO: 4.56 K/UL (ref 3.9–12.7)
YEAST URNS QL MICRO: NORMAL

## 2023-06-26 PROCEDURE — 85025 COMPLETE CBC W/AUTO DIFF WBC: CPT | Performed by: INTERNAL MEDICINE

## 2023-06-26 PROCEDURE — 36415 COLL VENOUS BLD VENIPUNCTURE: CPT | Performed by: INTERNAL MEDICINE

## 2023-06-26 PROCEDURE — 82570 ASSAY OF URINE CREATININE: CPT | Performed by: INTERNAL MEDICINE

## 2023-06-26 PROCEDURE — 82306 VITAMIN D 25 HYDROXY: CPT | Performed by: INTERNAL MEDICINE

## 2023-06-26 PROCEDURE — 81000 URINALYSIS NONAUTO W/SCOPE: CPT | Performed by: INTERNAL MEDICINE

## 2023-06-26 PROCEDURE — 82570 ASSAY OF URINE CREATININE: CPT | Mod: 91 | Performed by: INTERNAL MEDICINE

## 2023-06-26 PROCEDURE — 80069 RENAL FUNCTION PANEL: CPT | Performed by: INTERNAL MEDICINE

## 2023-06-26 PROCEDURE — 83970 ASSAY OF PARATHORMONE: CPT | Performed by: INTERNAL MEDICINE

## 2023-06-30 ENCOUNTER — EXTERNAL CHRONIC CARE MANAGEMENT (OUTPATIENT)
Dept: PRIMARY CARE CLINIC | Facility: CLINIC | Age: 88
End: 2023-06-30
Payer: MEDICARE

## 2023-06-30 PROCEDURE — 99490 PR CHRONIC CARE MGMT, 1ST 20 MIN: ICD-10-PCS | Mod: S$PBB,,, | Performed by: INTERNAL MEDICINE

## 2023-06-30 PROCEDURE — 99490 CHRNC CARE MGMT STAFF 1ST 20: CPT | Mod: PBBFAC,PO | Performed by: INTERNAL MEDICINE

## 2023-06-30 PROCEDURE — 99490 CHRNC CARE MGMT STAFF 1ST 20: CPT | Mod: S$PBB,,, | Performed by: INTERNAL MEDICINE

## 2023-07-10 ENCOUNTER — TELEPHONE (OUTPATIENT)
Dept: ADMINISTRATIVE | Facility: CLINIC | Age: 88
End: 2023-07-10
Payer: MEDICARE

## 2023-07-10 NOTE — TELEPHONE ENCOUNTER
Called pt; no answer; could not confirm appt or leave message due to line kept ringing; I was calling to confirm pt's in office EAWV appt on 7/12/23

## 2023-07-12 ENCOUNTER — OFFICE VISIT (OUTPATIENT)
Dept: FAMILY MEDICINE | Facility: CLINIC | Age: 88
End: 2023-07-12
Payer: MEDICARE

## 2023-07-12 VITALS
TEMPERATURE: 98 F | HEART RATE: 55 BPM | OXYGEN SATURATION: 95 % | SYSTOLIC BLOOD PRESSURE: 136 MMHG | BODY MASS INDEX: 27.78 KG/M2 | DIASTOLIC BLOOD PRESSURE: 54 MMHG | HEIGHT: 71 IN | WEIGHT: 198.44 LBS | RESPIRATION RATE: 16 BRPM

## 2023-07-12 DIAGNOSIS — Z00.00 ENCOUNTER FOR PREVENTIVE HEALTH EXAMINATION: Primary | ICD-10-CM

## 2023-07-12 DIAGNOSIS — N18.30 CKD STAGE 3 DUE TO TYPE 2 DIABETES MELLITUS: ICD-10-CM

## 2023-07-12 DIAGNOSIS — I70.0 ATHEROSCLEROSIS OF ABDOMINAL AORTA: ICD-10-CM

## 2023-07-12 DIAGNOSIS — M1A.00X0 IDIOPATHIC CHRONIC GOUT WITHOUT TOPHUS, UNSPECIFIED SITE: ICD-10-CM

## 2023-07-12 DIAGNOSIS — N25.81 SECONDARY HYPERPARATHYROIDISM OF RENAL ORIGIN: ICD-10-CM

## 2023-07-12 DIAGNOSIS — E11.22 CKD STAGE 3 DUE TO TYPE 2 DIABETES MELLITUS: ICD-10-CM

## 2023-07-12 PROCEDURE — 99999 PR PBB SHADOW E&M-EST. PATIENT-LVL V: CPT | Mod: PBBFAC,,, | Performed by: NURSE PRACTITIONER

## 2023-07-12 PROCEDURE — 99999 PR PBB SHADOW E&M-EST. PATIENT-LVL V: ICD-10-PCS | Mod: PBBFAC,,, | Performed by: NURSE PRACTITIONER

## 2023-07-12 PROCEDURE — G0439 PR MEDICARE ANNUAL WELLNESS SUBSEQUENT VISIT: ICD-10-PCS | Mod: ,,, | Performed by: NURSE PRACTITIONER

## 2023-07-12 PROCEDURE — G0439 PPPS, SUBSEQ VISIT: HCPCS | Mod: ,,, | Performed by: NURSE PRACTITIONER

## 2023-07-12 PROCEDURE — 99215 OFFICE O/P EST HI 40 MIN: CPT | Mod: PBBFAC,PN | Performed by: NURSE PRACTITIONER

## 2023-07-12 NOTE — PATIENT INSTRUCTIONS
Counseling and Referral of Other Preventative  (Italic type indicates deductible and co-insurance are waived)    Patient Name: Bria Lawson  Today's Date: 7/12/2023    Health Maintenance       Date Due Completion Date    Aspirin/Antiplatelet Therapy Never done ---    Shingles Vaccine (1 of 2) Never done ---    Pneumococcal Vaccines (Age 65+) (2 - PPSV23 if available, else PCV20) 08/22/2017 6/27/2017    COVID-19 Vaccine (6 - Pfizer series) 01/29/2023 9/29/2022    Influenza Vaccine (1) 09/01/2023 10/20/2022    Hemoglobin A1c 09/06/2023 3/6/2023    TETANUS VACCINE 05/12/2024 5/12/2014    Diabetes Urine Screening 06/26/2024 6/26/2023        No orders of the defined types were placed in this encounter.      The following information is provided to all patients.  This information is to help you find resources for any of the problems found today that may be affecting your health:                Living healthy guide: www.[facility].[place].gov      Understanding Diabetes: www.diabetes.org      Eating healthy: www.cdc.gov/healthyweight      CDC home safety checklist: www.cdc.gov/steadi/patient.html      Agency on Aging: www.goea.[place].gov      Alcoholics anonymous (AA): www.aa.org      Physical Activity: www.[place].nih.gov/ke5xelh      Tobacco use: www.quitwithusla.org

## 2023-07-12 NOTE — PROGRESS NOTES
"  Bria Lawson presented for a  Medicare AWV and comprehensive Health Risk Assessment today. The following components were reviewed and updated:    Medical history  Family History  Social history  Allergies and Current Medications  Health Risk Assessment  Health Maintenance  Care Team         ** See Completed Assessments for Annual Wellness Visit within the encounter summary.**         The following assessments were completed:  Living Situation  CAGE  Depression Screening  Timed Get Up and Go  Whisper Test  Cognitive Function Screening  Nutrition Screening  ADL Screening  PAQ Screening        Vitals:    07/12/23 1325   BP: (!) 136/54   BP Location: Right arm   Patient Position: Sitting   BP Method: Medium (Manual)   Pulse: (!) 55   Resp: 16   Temp: 98.2 °F (36.8 °C)   TempSrc: Oral   SpO2: 95%   Weight: 90 kg (198 lb 6.6 oz)   Height: 5' 11" (1.803 m)     Body mass index is 27.67 kg/m².  Physical Exam  Vitals reviewed.   Constitutional:       General: He is not in acute distress.     Appearance: Normal appearance. He is not ill-appearing, toxic-appearing or diaphoretic.   HENT:      Head: Normocephalic and atraumatic.   Cardiovascular:      Pulses: Normal pulses.   Pulmonary:      Effort: Pulmonary effort is normal. No respiratory distress.      Breath sounds: No wheezing.   Skin:     General: Skin is warm and dry.   Neurological:      Mental Status: He is alert and oriented to person, place, and time.   Psychiatric:         Mood and Affect: Mood normal.         Behavior: Behavior normal.               Diagnoses and health risks identified today and associated recommendations/orders:    1. Encounter for preventive health examination  The patient was seen today for an annual Medicare wellness exam.  Health maintenance and screening topics were discussed.  Proper diet and exercise recommendations were reviewed.    2. Atherosclerosis of abdominal aorta  No acute concerns.    3. CKD stage 3 due to type 2 diabetes " mellitus  No acute concerns.    4. Secondary hyperparathyroidism of renal origin  No acute concerns.    5. Idiopathic chronic gout without tophus, unspecified site  No acute concerns.    Review current opioid prescriptions: NA  Screen for potential Substance Use Disorders: NA    Provided Bria with a 5-10 year written screening schedule and personal prevention plan. Recommendations were developed using the USPSTF age appropriate recommendations. Education, counseling, and referrals were provided as needed. After Visit Summary printed and given to patient which includes a list of additional screenings\tests needed.    Follow up in about 1 year (around 7/12/2024) for AWV.    Royce Tapia, KACIE  I offered to discuss advanced care planning, including how to pick a person who would make decisions for you if you were unable to make them for yourself, called a health care power of , and what kind of decisions you might make such as use of life sustaining treatments such as ventilators and tube feeding when faced with a life limiting illness recorded on a living will that they will need to know. (How you want to be cared for as you near the end of your natural life)     X Patient is interested in learning more about how to make advanced directives.  I provided them paperwork and offered to discuss this with them.

## 2023-07-31 ENCOUNTER — EXTERNAL CHRONIC CARE MANAGEMENT (OUTPATIENT)
Dept: PRIMARY CARE CLINIC | Facility: CLINIC | Age: 88
End: 2023-07-31
Payer: MEDICARE

## 2023-07-31 PROCEDURE — 99439 CHRNC CARE MGMT STAF EA ADDL: CPT | Mod: PBBFAC,PO | Performed by: INTERNAL MEDICINE

## 2023-07-31 PROCEDURE — 99490 CHRNC CARE MGMT STAFF 1ST 20: CPT | Mod: S$PBB,,, | Performed by: INTERNAL MEDICINE

## 2023-07-31 PROCEDURE — 99490 CHRNC CARE MGMT STAFF 1ST 20: CPT | Mod: PBBFAC,25,PO | Performed by: INTERNAL MEDICINE

## 2023-07-31 PROCEDURE — 99439 PR CHRONIC CARE MGMT, EA ADDTL 20 MIN: ICD-10-PCS | Mod: S$PBB,,, | Performed by: INTERNAL MEDICINE

## 2023-07-31 PROCEDURE — 99439 CHRNC CARE MGMT STAF EA ADDL: CPT | Mod: S$PBB,,, | Performed by: INTERNAL MEDICINE

## 2023-07-31 PROCEDURE — 99490 PR CHRONIC CARE MGMT, 1ST 20 MIN: ICD-10-PCS | Mod: S$PBB,,, | Performed by: INTERNAL MEDICINE

## 2023-08-11 ENCOUNTER — OFFICE VISIT (OUTPATIENT)
Dept: RHEUMATOLOGY | Facility: CLINIC | Age: 88
End: 2023-08-11
Payer: MEDICARE

## 2023-08-11 VITALS
SYSTOLIC BLOOD PRESSURE: 155 MMHG | BODY MASS INDEX: 28.05 KG/M2 | DIASTOLIC BLOOD PRESSURE: 70 MMHG | HEIGHT: 71 IN | HEART RATE: 50 BPM | WEIGHT: 200.38 LBS

## 2023-08-11 DIAGNOSIS — M15.9 PRIMARY OSTEOARTHRITIS INVOLVING MULTIPLE JOINTS: ICD-10-CM

## 2023-08-11 DIAGNOSIS — M25.50 POLYARTHRALGIA: Primary | ICD-10-CM

## 2023-08-11 DIAGNOSIS — M1A.00X0 IDIOPATHIC CHRONIC GOUT WITHOUT TOPHUS, UNSPECIFIED SITE: ICD-10-CM

## 2023-08-11 DIAGNOSIS — L30.9 ECZEMA, UNSPECIFIED TYPE: ICD-10-CM

## 2023-08-11 PROCEDURE — 99213 PR OFFICE/OUTPT VISIT, EST, LEVL III, 20-29 MIN: ICD-10-PCS | Mod: S$PBB,,, | Performed by: INTERNAL MEDICINE

## 2023-08-11 PROCEDURE — 99214 OFFICE O/P EST MOD 30 MIN: CPT | Mod: PBBFAC | Performed by: INTERNAL MEDICINE

## 2023-08-11 PROCEDURE — 99999 PR PBB SHADOW E&M-EST. PATIENT-LVL IV: ICD-10-PCS | Mod: PBBFAC,,, | Performed by: INTERNAL MEDICINE

## 2023-08-11 PROCEDURE — 99999 PR PBB SHADOW E&M-EST. PATIENT-LVL IV: CPT | Mod: PBBFAC,,, | Performed by: INTERNAL MEDICINE

## 2023-08-11 PROCEDURE — 99213 OFFICE O/P EST LOW 20 MIN: CPT | Mod: S$PBB,,, | Performed by: INTERNAL MEDICINE

## 2023-08-11 RX ORDER — PREDNISONE 20 MG/1
20 TABLET ORAL EVERY MORNING
COMMUNITY
Start: 2023-08-08 | End: 2024-02-26

## 2023-08-11 RX ORDER — METHYLPREDNISOLONE 4 MG/1
TABLET ORAL
Qty: 21 TABLET | Refills: 0 | Status: SHIPPED | OUTPATIENT
Start: 2023-08-11 | End: 2024-02-26

## 2023-08-12 NOTE — TELEPHONE ENCOUNTER
Care Due:                  Date            Visit Type   Department     Provider  --------------------------------------------------------------------------------                                [facility]                              FOLLOWUP/OF  MED/ INTERNAL  Adiel Coleman  Last Visit: 02-      FICE VISIT   MED/ PEDS      Ehrensing  Next Visit: None Scheduled  None         None Found                                                            Last  Test          Frequency    Reason                     Performed    Due Date  --------------------------------------------------------------------------------    Lipid Panel.  12 months..  atorvastatin.............  Not Found    Overdue    [employer] Embedded Care Due Messages. Reference number: [id number].   8/12/2023 11:03:21 AM CDT

## 2023-08-12 NOTE — PROGRESS NOTES
History of present illness:  87-year-old gentleman with a long history of gouty arthritis.  He has been on allopurinol 100 mg daily and has not had a gout attack for many years.  I saw him initially in 2015.  At that time he had an acute inflammatory arthritis of the hands and wrist.  He had been diagnosed previously as osteoarthritis and has had a previous total knee replacement on the left.  He was seronegative.  I treated him with prednisone for 18 months.  On stopping the prednisone he had no recurrence of his symptoms.  He did have some paresthesias and I placed him on gabapentin.  He continues to take it and has had no recent symptoms.  He developed a low-grade lymphoma.  He was treated with Rituxan.  His lymphoma is under control.  He has not needed treatment recently.    He comes in now because of bilateral shoulder pain.  It began about 6 weeks ago.  He had no antecedent URI or vaccine.  It was of gradual onset.  It is similar to pain he is had in the past.  The pain is worse when he is inactive.  It is bad in the morning.  He has no significant morning stiffness.  It does not wake him up at night.  It was bad for about 4 weeks.  He then developed an outbreak of eczema.  He was placed on prednisone but he was already better before starting the prednisone.  He is going on vacation and wants to make sure he has something to take care of a flare.    Physical examination:  Musculoskeletal:  Cervical spine, shoulders, elbows, wrists are unremarkable.  He has bony hypertrophy of the PIP but no synovitis in the hands.    He has small effusion in both knees.  He has no tenderness to palpation.  He has no erythema or increased warmth.  Ankles and small joints the feet are unremarkable.    Assessment:  Increased arthralgia, uncertain etiology.    Plans:  1.  I am not ordering laboratory studies because he still on prednisone   2. I gave him a Medrol Dosepak to take with him while he is in your  3.  If he has a  recurrence of his symptoms, I will repeat laboratory studies at that time  4.  Otherwise he is to keep his appointment for next April.      Answers submitted by the patient for this visit:  Rheumatology Questionnaire (Submitted on 8/10/2023)  fever: No  eye redness: No  mouth sores: No  headaches: No  shortness of breath: No  chest pain: No  trouble swallowing: No  diarrhea: No  constipation: No  unexpected weight change: No  genital sore: No  During the last 3 days, have you had a skin rash?: Yes  Bruises or bleeds easily: No  cough: Yes

## 2023-08-13 NOTE — TELEPHONE ENCOUNTER
Refill Routing Note   Medication(s) are not appropriate for processing by [facility] for the following reason(s):      Required labs outdated    [facility] action(s):  Defer Care Due:  Labs due            Appointments  past 12m or future 3m with PCP    Date Provider   Last Visit   2/10/2023 Adiel Bragg MD   Next Visit   8/13/2023 Adiel Bragg MD   ED visits in past 90 days: 0        Note composed:3:13 PM 08/13/2023

## 2023-08-13 NOTE — TELEPHONE ENCOUNTER
No care due was identified.  [facility] Embedded Care Due Messages. Reference number: [id number].   8/13/2023 11:20:11 AM CDT

## 2023-08-13 NOTE — TELEPHONE ENCOUNTER
No care due was identified.  [facility] Embedded Care Due Messages. Reference number: [id number].   8/13/2023 11:19:33 AM CDT

## 2023-08-14 ENCOUNTER — TELEPHONE (OUTPATIENT)
Dept: FAMILY MEDICINE | Facility: CLINIC | Age: 88
End: 2023-08-14
Payer: MEDICARE

## 2023-08-14 RX ORDER — ATORVASTATIN CALCIUM 20 MG/1
TABLET, FILM COATED ORAL
Qty: 90 TABLET | Refills: 12 | Status: SHIPPED | OUTPATIENT
Start: 2023-08-14

## 2023-08-14 RX ORDER — ATORVASTATIN CALCIUM 20 MG/1
TABLET, FILM COATED ORAL
Qty: 90 TABLET | Refills: 12 | OUTPATIENT
Start: 2023-08-14

## 2023-08-14 RX ORDER — ATORVASTATIN CALCIUM 20 MG/1
TABLET, FILM COATED ORAL
Qty: 90 TABLET | Refills: 0 | Status: SHIPPED | OUTPATIENT
Start: 2023-08-14 | End: 2024-02-26

## 2023-08-14 NOTE — TELEPHONE ENCOUNTER
Refill Routing Note     Refill Routing Note   Medication(s) are not appropriate for processing by [facility] for the following reason(s):      Required labs outdated    [facility] action(s):  Defer Care Due:  None identified            Appointments  past 12m or future 3m with PCP    Date Provider   Last Visit   2/10/2023 Adiel Bragg MD   Next Visit   8/13/2023 Adiel Bragg MD   ED visits in past 90 days: 0        Note composed:5:41 AM 08/14/2023

## 2023-08-14 NOTE — TELEPHONE ENCOUNTER
----- Message from Blake Dawn sent at 8/14/2023 10:19 AM CDT -----  Type: Patient Call Back    Who called:Self    What is the request in detail:[place] is going out of the country and wants to know why is his medication denied refill.     Can the clinic reply by [facility]?No    Would the patient rather a call back or a response via [facility]? Call    Best call back number:.[phone]    Additional Information:

## 2023-08-14 NOTE — TELEPHONE ENCOUNTER
Refill Decision Note   Bria Lawson  is requesting a refill authorization.  Brief Assessment and Rationale for Refill:  Quick Discontinue     Medication Therapy Plan:  duplicate      Comments:     Note composed:5:41 AM 08/14/2023             Appointments     Last Visit   2/10/2023 Adiel Bragg MD   Next Visit   Visit date not found Adiel Bragg MD

## 2023-08-25 DIAGNOSIS — D50.9 IRON DEFICIENCY ANEMIA, UNSPECIFIED IRON DEFICIENCY ANEMIA TYPE: ICD-10-CM

## 2023-08-25 DIAGNOSIS — I10 ESSENTIAL HYPERTENSION: ICD-10-CM

## 2023-08-25 RX ORDER — CARVEDILOL 6.25 MG/1
TABLET ORAL
Qty: 180 TABLET | Refills: 3 | Status: SHIPPED | OUTPATIENT
Start: 2023-08-25

## 2023-08-25 RX ORDER — GLIPIZIDE 10 MG/1
TABLET, FILM COATED, EXTENDED RELEASE ORAL
Qty: 90 TABLET | Refills: 0 | Status: SHIPPED | OUTPATIENT
Start: 2023-08-25 | End: 2023-11-21

## 2023-08-25 RX ORDER — AMLODIPINE BESYLATE 10 MG/1
TABLET ORAL
Qty: 90 TABLET | Refills: 3 | Status: SHIPPED | OUTPATIENT
Start: 2023-08-25

## 2023-08-25 RX ORDER — ALLOPURINOL 100 MG/1
TABLET ORAL
Qty: 90 TABLET | Refills: 1 | Status: SHIPPED | OUTPATIENT
Start: 2023-08-25 | End: 2024-02-15

## 2023-08-25 RX ORDER — FERROUS SULFATE TAB 325 MG (65 MG ELEMENTAL FE) 325 (65 FE) MG
TAB ORAL
Qty: 90 TABLET | Refills: 0 | Status: SHIPPED | OUTPATIENT
Start: 2023-08-25 | End: 2023-11-21

## 2023-08-25 NOTE — TELEPHONE ENCOUNTER
Refill Routing Note   Medication(s) are not appropriate for processing by [facility] for the following reason(s):      Medication outside of protocol  Required labs abnormal  Required vitals abnormal    [facility] action(s):  Defer  Route  Approve Care Due:  None identified          Pharmacist review requested: Yes     Appointments  past 12m or future 3m with PCP    Date Provider   Last Visit   2/10/2023 Adiel Bragg MD   Next Visit   Visit date not found Adiel Bragg MD   ED visits in past 90 days: 0        Note composed:1:33 PM 08/25/2023

## 2023-08-25 NOTE — TELEPHONE ENCOUNTER
Care Due:                  Date            Visit Type   Department     Provider  --------------------------------------------------------------------------------                                [facility]                              FOLLOWUP/OF  MED/ INTERNAL  Adiel Coleman  Last Visit: 02-      FICE VISIT   MED/ PEDS      Ehrensing  Next Visit: None Scheduled  None         None Found                                                            Last  Test          Frequency    Reason                     Performed    Due Date  --------------------------------------------------------------------------------    HBA1C.......  6 months...  karuna HOODZIDE.......  03- 09-    [facility] Embedded Care Due Messages. Reference number: [id number].   8/25/2023 5:31:45 AM CDT

## 2023-08-25 NOTE — TELEPHONE ENCOUNTER
Refill Routing Note   Medication(s) are not appropriate for processing by [facility] for the following reason(s):      Medication outside of protocol  Required labs abnormal  Required vitals abnormal    [facility] action(s):  Defer  Route  Approve Care Due:  Labs due          Pharmacist review requested: Yes     Appointments  past 12m or future 3m with PCP    Date Provider   Last Visit   2/10/2023 Adiel Bragg MD   Next Visit   Visit date not found Adiel Bragg MD   ED visits in past 90 days: 0        Note composed:2:24 PM 08/25/2023

## 2023-08-29 RX ORDER — SITAGLIPTIN 100 MG/1
TABLET, FILM COATED ORAL
Qty: 90 TABLET | Refills: 0 | Status: SHIPPED | OUTPATIENT
Start: 2023-08-29 | End: 2023-09-29

## 2023-08-29 NOTE — TELEPHONE ENCOUNTER
No care due was identified.  [facility] Embedded Care Due Messages. Reference number: [id number].   8/28/2023 9:40:12 PM CDT

## 2023-08-29 NOTE — TELEPHONE ENCOUNTER
Refill Routing Note   Medication(s) are not appropriate for processing by [facility] for the following reason(s):      Drug-disease interaction    [facility] action(s):  Defer Care Due:  None identified     Medication Therapy Plan: JANUVIA and CKD stage 3 due to type 2 diabetes mellitus    Pharmacist review requested: Yes     Appointments  past 12m or future 3m with PCP    Date Provider   Last Visit   2/10/2023 Adiel Bragg MD   Next Visit   Visit date not found Adiel Bragg MD   ED visits in past 90 days: 0        Note composed:12:14 PM 08/29/2023

## 2023-08-29 NOTE — TELEPHONE ENCOUNTER
Refill Decision Note   Bria Lawson  is requesting a refill authorization.  Brief Assessment and Rationale for Refill:  Approve     Medication Therapy Plan:         Pharmacist review requested: Yes   Extended chart review required: Yes   Comments:     No Care Gaps recommended.     Note composed:12:24 PM 08/29/2023

## 2023-08-31 ENCOUNTER — OFFICE VISIT (OUTPATIENT)
Dept: HEMATOLOGY/ONCOLOGY | Facility: CLINIC | Age: 88
End: 2023-08-31
Payer: MEDICARE

## 2023-08-31 ENCOUNTER — LAB VISIT (OUTPATIENT)
Dept: LAB | Facility: HOSPITAL | Age: 88
End: 2023-08-31
Attending: INTERNAL MEDICINE
Payer: MEDICARE

## 2023-08-31 ENCOUNTER — EXTERNAL CHRONIC CARE MANAGEMENT (OUTPATIENT)
Dept: PRIMARY CARE CLINIC | Facility: CLINIC | Age: 88
End: 2023-08-31
Payer: MEDICARE

## 2023-08-31 VITALS
HEART RATE: 52 BPM | SYSTOLIC BLOOD PRESSURE: 153 MMHG | RESPIRATION RATE: 18 BRPM | BODY MASS INDEX: 27.6 KG/M2 | TEMPERATURE: 98 F | OXYGEN SATURATION: 98 % | HEIGHT: 71 IN | WEIGHT: 197.19 LBS | DIASTOLIC BLOOD PRESSURE: 70 MMHG

## 2023-08-31 DIAGNOSIS — C82.93 NODULAR LYMPHOMA OF INTRA-ABDOMINAL LYMPH NODES: Primary | ICD-10-CM

## 2023-08-31 DIAGNOSIS — C85.10 LOW GRADE B-CELL LYMPHOMA: ICD-10-CM

## 2023-08-31 DIAGNOSIS — N18.31 STAGE 3A CHRONIC KIDNEY DISEASE: ICD-10-CM

## 2023-08-31 DIAGNOSIS — D69.6 THROMBOCYTOPENIA: ICD-10-CM

## 2023-08-31 DIAGNOSIS — C85.19 B-CELL LYMPHOMA OF EXTRANODAL SITE: ICD-10-CM

## 2023-08-31 LAB
ALBUMIN SERPL BCP-MCNC: 4.2 G/DL (ref 3.5–5.2)
ALP SERPL-CCNC: 53 U/L (ref 55–135)
ALT SERPL W/O P-5'-P-CCNC: 15 U/L (ref 10–44)
ANION GAP SERPL CALC-SCNC: 9 MMOL/L (ref 8–16)
ANISOCYTOSIS BLD QL SMEAR: SLIGHT
AST SERPL-CCNC: 16 U/L (ref 10–40)
BASOPHILS # BLD AUTO: 0.02 K/UL (ref 0–0.2)
BASOPHILS NFR BLD: 0.6 % (ref 0–1.9)
BILIRUB SERPL-MCNC: 0.8 MG/DL (ref 0.1–1)
BUN SERPL-MCNC: 21 MG/DL (ref 8–23)
CALCIUM SERPL-MCNC: 9.6 MG/DL (ref 8.7–10.5)
CHLORIDE SERPL-SCNC: 105 MMOL/L (ref 95–110)
CO2 SERPL-SCNC: 23 MMOL/L (ref 23–29)
CREAT SERPL-MCNC: 1.4 MG/DL (ref 0.5–1.4)
DIFFERENTIAL METHOD: ABNORMAL
EOSINOPHIL # BLD AUTO: 0 K/UL (ref 0–0.5)
EOSINOPHIL NFR BLD: 0 % (ref 0–8)
ERYTHROCYTE [DISTWIDTH] IN BLOOD BY AUTOMATED COUNT: 15.9 % (ref 11.5–14.5)
EST. GFR  (NO RACE VARIABLE): 48.9 ML/MIN/1.73 M^2
GLUCOSE SERPL-MCNC: 276 MG/DL (ref 70–110)
HCT VFR BLD AUTO: 39.6 % (ref 40–54)
HGB BLD-MCNC: 12.8 G/DL (ref 14–18)
IMM GRANULOCYTES # BLD AUTO: 0.05 K/UL (ref 0–0.04)
IMM GRANULOCYTES NFR BLD AUTO: 1.5 % (ref 0–0.5)
LDH SERPL L TO P-CCNC: 214 U/L (ref 110–260)
LYMPHOCYTES # BLD AUTO: 0.9 K/UL (ref 1–4.8)
LYMPHOCYTES NFR BLD: 28.3 % (ref 18–48)
MCH RBC QN AUTO: 30.8 PG (ref 27–31)
MCHC RBC AUTO-ENTMCNC: 32.3 G/DL (ref 32–36)
MCV RBC AUTO: 95 FL (ref 82–98)
MONOCYTES # BLD AUTO: 0.2 K/UL (ref 0.3–1)
MONOCYTES NFR BLD: 5.5 % (ref 4–15)
NEUTROPHILS # BLD AUTO: 2.1 K/UL (ref 1.8–7.7)
NEUTROPHILS NFR BLD: 64.1 % (ref 38–73)
NRBC BLD-RTO: 0 /100 WBC
PLATELET # BLD AUTO: 65 K/UL (ref 150–450)
PMV BLD AUTO: 12.6 FL (ref 9.2–12.9)
POLYCHROMASIA BLD QL SMEAR: ABNORMAL
POTASSIUM SERPL-SCNC: 4.7 MMOL/L (ref 3.5–5.1)
PROT SERPL-MCNC: 7.8 G/DL (ref 6–8.4)
RBC # BLD AUTO: 4.15 M/UL (ref 4.6–6.2)
SODIUM SERPL-SCNC: 137 MMOL/L (ref 136–145)
WBC # BLD AUTO: 3.25 K/UL (ref 3.9–12.7)

## 2023-08-31 PROCEDURE — 99999 PR PBB SHADOW E&M-EST. PATIENT-LVL IV: ICD-10-PCS | Mod: PBBFAC,,, | Performed by: INTERNAL MEDICINE

## 2023-08-31 PROCEDURE — 99215 OFFICE O/P EST HI 40 MIN: CPT | Mod: S$PBB,,, | Performed by: INTERNAL MEDICINE

## 2023-08-31 PROCEDURE — 85025 COMPLETE CBC W/AUTO DIFF WBC: CPT | Performed by: INTERNAL MEDICINE

## 2023-08-31 PROCEDURE — 36415 COLL VENOUS BLD VENIPUNCTURE: CPT | Performed by: INTERNAL MEDICINE

## 2023-08-31 PROCEDURE — 99490 CHRNC CARE MGMT STAFF 1ST 20: CPT | Mod: S$PBB,,, | Performed by: INTERNAL MEDICINE

## 2023-08-31 PROCEDURE — 99215 PR OFFICE/OUTPT VISIT, EST, LEVL V, 40-54 MIN: ICD-10-PCS | Mod: S$PBB,,, | Performed by: INTERNAL MEDICINE

## 2023-08-31 PROCEDURE — 99490 PR CHRONIC CARE MGMT, 1ST 20 MIN: ICD-10-PCS | Mod: S$PBB,,, | Performed by: INTERNAL MEDICINE

## 2023-08-31 PROCEDURE — 99214 OFFICE O/P EST MOD 30 MIN: CPT | Mod: PBBFAC,25 | Performed by: INTERNAL MEDICINE

## 2023-08-31 PROCEDURE — 80053 COMPREHEN METABOLIC PANEL: CPT | Performed by: INTERNAL MEDICINE

## 2023-08-31 PROCEDURE — 99490 CHRNC CARE MGMT STAFF 1ST 20: CPT | Mod: PBBFAC,PO | Performed by: INTERNAL MEDICINE

## 2023-08-31 PROCEDURE — 99999 PR PBB SHADOW E&M-EST. PATIENT-LVL IV: CPT | Mod: PBBFAC,,, | Performed by: INTERNAL MEDICINE

## 2023-08-31 PROCEDURE — 83615 LACTATE (LD) (LDH) ENZYME: CPT | Performed by: INTERNAL MEDICINE

## 2023-08-31 NOTE — PROGRESS NOTES
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     He was initially evaluated for Thrombocytopenia. 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   He has completed one four week course of rituxan Monotherapy. Completed 10/2018       --11/25/22 CT Chest/abd/pelvis: Anterior mediastinal nodule, slightly larger than the 05/27/2020 exam.  Decreased size of previously noted mediastinal lymph nodes.  Moderate scattered calcific atherosclerosis.  Cholelithiasis Small cystic lesion in the pancreatic neck, unchanged.    --PET on 1/25/23: 1. No definite hypermetabolic tumor or new lymphadenopathy.  2. Relatively stable low-density anterior mediastinal structure with background level radiotracer uptake less than blood pool.  Etiology remains uncertain.  The Deauville Score is: 2    Interval History:  Went to [place] this summer. Wife had surgery and COVID immedietely prior to the trip, but recovered without significant issue.   Denies all B symptoms.     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    Hyperlipidemia 02/26/2015    Hypertension     Lymphoma     Uncontrolled secondary diabetes mellitus with stage 3 CKD (GFR 30-59) 08/28/2015       PAST SURGICAL HISTORY:   Past Surgical History:   Procedure Laterality Date    BONE MARROW BIOPSY Left 09/19/2018     Procedure: Biopsy-bone marrow;  Surgeon: Velia Tobias MD;  Location: [facility] ENDO;  Service: Oncology;  Laterality: Left;    CATARACT EXTRACTION Bilateral 1996    COLONOSCOPY N/A 11/24/2020    Procedure: COLONOSCOPY Golytely;  Surgeon: Masoud Hwang MD;  Location: [facility] ENDO;  Service: Endoscopy;  Laterality: N/A;    ESOPHAGOGASTRODUODENOSCOPY N/A 11/24/2020    Procedure: EGD (ESOPHAGOGASTRODUODENOSCOPY);  Surgeon: Masoud Hwang MD;  Location: [facility] ENDO;  Service: Endoscopy;  Laterality: N/A;    eye lid sx Bilateral 2017    EYE SURGERY      cataracts    JOINT REPLACEMENT      knee replacement    retinal injection s Bilateral     scrotal cyst         PAST SOCIAL HISTORY:   reports that he has quit smoking. He has never used smokeless tobacco. He reports current alcohol use. He reports that he does not use drugs.    FAMILY HISTORY:  Family History   Problem Relation Age of Onset    Hypertension Mother     Diabetes Father     No Known Problems Sister     No Known Problems Brother     No Known Problems Maternal Aunt     No Known Problems Maternal Uncle     No Known Problems Paternal Aunt     No Known Problems Paternal Uncle     No Known Problems Maternal Grandmother     No Known Problems Maternal Grandfather     No Known Problems Paternal Grandmother     No Known Problems Paternal Grandfather     No Known Problems Daughter     No Known Problems Son     No Known Problems Son     No Known Problems Other     Amblyopia Neg Hx     Blindness Neg Hx     Cancer Neg Hx     Cataracts Neg Hx     Glaucoma Neg Hx     Macular degeneration Neg Hx     Retinal detachment Neg Hx     Strabismus Neg Hx     Stroke Neg Hx     Thyroid disease Neg Hx        CURRENT MEDICATIONS:   Current Outpatient Medications   Medication Sig    allopurinoL (ZYLOPRIM) 100 MG tablet TAKE 1 TABLET(100 MG) BY MOUTH EVERY DAY    amLODIPine (NORVASC) 10 MG tablet TAKE 1 TABLET(10 MG) BY MOUTH EVERY DAY    ascorbic acid, vitamin C, (VITAMIN C) 1000 MG  tablet Take 1,000 mg by mouth once daily.    atorvastatin (LIPITOR) 20 MG tablet TAKE 1 TABLET(20 MG) BY MOUTH EVERY DAY    atorvastatin (LIPITOR) 20 MG tablet TAKE 1 TABLET(20 MG) BY MOUTH EVERY DAY    blood sugar diagnostic Strp 1 strip by Misc.(Non-Drug; Combo Route) route 2 (two) times daily. Disp glucometer and lancets as well    carvediloL (COREG) 6.25 MG tablet TAKE 1 TABLET(6.25 MG) BY MOUTH TWICE DAILY WITH MEALS    dapagliflozin propanediol (FARXIGA) 10 mg tablet Take 1 tablet (10 mg total) by mouth once daily.    DUPIXENT  mg/2 mL PnIj Inject into the skin.    FEROSUL 325 mg (65 mg iron) Tab tablet TAKE 1 TABLET BY MOUTH EVERY DAY WITH BREAKFAST    folic acid (FOLVITE) 800 MCG Tab Take 800 mcg by mouth once daily.    gabapentin (NEURONTIN) 300 MG capsule TAKE 1 CAPSULE(300 MG) BY MOUTH THREE TIMES DAILY    glipiZIDE (GLUCOTROL) 10 MG TR24 TAKE 1 TABLET(10 MG) BY MOUTH EVERY DAY    JANUVIA 100 mg Tab TAKE 1 TABLET ONCE DAILY    methylPREDNISolone (MEDROL DOSEPACK) 4 mg tablet use as directed    multivit-min/FA/lycopen/lutein (CENTRUM SILVER MEN ORAL) Take by mouth.    niacin 500 MG Tab Take 100 mg by mouth every evening.    pantoprazole (PROTONIX) 40 MG tablet TAKE 1 TABLET(40 MG) BY MOUTH EVERY DAY    predniSONE (DELTASONE) 20 MG tablet Take 20 mg by mouth every morning.    TRUEPLUS LANCETS 33 gauge Misc USE TO TEST BLOOD SUGAR BID    TURMERIC ROOT EXTRACT ORAL Take by mouth.    valsartan (DIOVAN) 320 MG tablet TAKE 1 TABLET ONCE DAILY    vitamin E 400 UNIT capsule Take 400 Units by mouth once daily.     No current facility-administered medications for this visit.     ALLERGIES:   Review of patient's allergies indicates:  No Known Allergies      Review of Systems:     Review of Systems   Constitutional:  Negative for appetite change, chills, diaphoresis, fatigue, fever and unexpected weight change.   HENT:   Negative for hearing loss, mouth sores, nosebleeds, sore throat, trouble swallowing and  voice change.    Eyes:  Negative for eye problems and icterus.   Respiratory:  Negative for chest tightness, cough, hemoptysis, shortness of breath and wheezing.    Cardiovascular:  Negative for chest pain, leg swelling and palpitations.   Gastrointestinal:  Negative for abdominal distention, abdominal pain, blood in stool, diarrhea, nausea and vomiting.   Endocrine: Negative for hot flashes.   Genitourinary:  Negative for bladder incontinence, difficulty urinating, dysuria, frequency and hematuria.    Musculoskeletal:  Negative for arthralgias, back pain, flank pain, gait problem, myalgias, neck pain and neck stiffness.   Skin:  Negative for itching, rash and wound.   Neurological:  Negative for dizziness, extremity weakness, gait problem, headaches, numbness, seizures and speech difficulty.   Hematological:  Negative for adenopathy. Does not bruise/bleed easily.   Psychiatric/Behavioral:  Negative for confusion, depression and sleep disturbance. The patient is not nervous/anxious.           Physical Exam:     Vitals:    08/31/23 1034   BP: (!) 153/70   Pulse: (!) 52   Resp: 18   Temp: 97.9 °F (36.6 °C)     Physical Exam  Constitutional:       General: He is not in acute distress.     Appearance: He is well-developed. He is not diaphoretic.   HENT:      Head: Normocephalic and atraumatic.      Mouth/Throat:      Pharynx: No oropharyngeal exudate.   Eyes:      Conjunctiva/sclera: Conjunctivae normal.      Pupils: Pupils are equal, round, and reactive to light.   Neck:      Thyroid: No thyromegaly.      Vascular: No JVD.      Trachea: No tracheal deviation.   Cardiovascular:      Rate and Rhythm: Normal rate and regular rhythm.      Heart sounds: Normal heart sounds. No murmur heard.     No friction rub.   Pulmonary:      Effort: Pulmonary effort is normal. No respiratory distress.      Breath sounds: Normal breath sounds. No stridor. No wheezing or rales.   Chest:      Chest wall: No tenderness.   Abdominal:       General: Bowel sounds are normal. There is no distension.      Palpations: Abdomen is soft.      Tenderness: There is no abdominal tenderness. There is no guarding or rebound.   Musculoskeletal:         General: No tenderness or deformity. Normal range of motion.      Cervical back: Normal range of motion and neck supple.   Skin:     General: Skin is warm and dry.      Capillary Refill: Capillary refill takes less than 2 seconds.      Coloration: Skin is not pale.      Findings: No erythema or rash.   Neurological:      Mental Status: He is alert and oriented to person, place, and time.      Cranial Nerves: No cranial nerve deficit.      Sensory: No sensory deficit.      Motor: No abnormal muscle tone.      Coordination: Coordination normal.      Deep Tendon Reflexes: Reflexes normal.   Psychiatric:         Behavior: Behavior normal.         Thought Content: Thought content normal.         Judgment: Judgment normal.         ECOG Performance Status: (foot note - ECOG PS provided by Eastern Cooperative Oncology Group) 0 - Asymptomatic    Karnofsky Performance Score:  100%- Normal, No Complaints, No Evidence of Disease    Labs:   Lab Results   Component Value Date    WBC 3.25 (L) 08/31/2023    HGB 12.8 (L) 08/31/2023    HCT 39.6 (L) 08/31/2023    PLT 65 (L) 08/31/2023    CHOL 106 (L) 07/02/2021    TRIG 121 07/02/2021    HDL 34 (L) 07/02/2021    ALT 15 08/31/2023    AST 16 08/31/2023     08/31/2023    K 4.7 08/31/2023     08/31/2023    CREATININE 1.4 08/31/2023    BUN 21 08/31/2023    CO2 23 08/31/2023    TSH 1.358 03/23/2021    HGBA1C 5.8 (H) 03/06/2023     LDH: 214    Imaging: Previous imaging has been reviewed     Assessment and Plan:     Mr. Lawson is a pleasant 87 year old male with low Grade B Cell Lymphoma    Low Grade B Cell Lymphoma  --Treated with 4 weekly infusions of RItuxan  --Most recent PET had a dauville score of 2  --Will plan for q3 month labs/physical and yearly imaging    30 minutes were  spent face to face with the patient to discuss the disease, natural history, and treatment options. I have provided the patient with an opportunity to ask questions and have all questions answered to his satisfaction.       he will return to clinic in 3 months, but knows to call in the interim if symptoms change or should a problem arise.        Jumana Guzmán MD  Hematology and Medical Oncology  Bone Marrow Transplant  [facility]        BMT Chart Routing      Follow up with physician 3 months. 1. see me in 3 months [okay to over book me if needed] with cbc,cmp, ldh   Follow up with BOYD    Provider visit type    Infusion scheduling note    Injection scheduling note    Labs    Imaging    Pharmacy appointment    Other referrals

## 2023-09-06 DIAGNOSIS — E11.22 CKD STAGE 3 DUE TO TYPE 2 DIABETES MELLITUS: Primary | ICD-10-CM

## 2023-09-06 DIAGNOSIS — N18.30 CKD STAGE 3 DUE TO TYPE 2 DIABETES MELLITUS: Primary | ICD-10-CM

## 2023-09-06 RX ORDER — GABAPENTIN 300 MG/1
CAPSULE ORAL
Qty: 270 CAPSULE | Refills: 3 | Status: SHIPPED | OUTPATIENT
Start: 2023-09-06

## 2023-09-07 NOTE — PATIENT INSTRUCTIONS
Recommend lotions: eucerin, eucerin for diabetics, aquaphor, A&D ointment, gold bond for diabetics, sween, [place]'s Bees all purpose baby ointment,  urea 40 with aloe (found on amazon.com)    Shoe recommendations: (try 6pm.com, zappos.com , nordstromrack.Likez, or shoes.Likez for discounted prices) you can visit DSW shoes in [place]  or [employer] in the [facility] (there are also several shoe brand outlets in the [facility])    Asics (GT 2000 or gel foundations), new balance stability type shoes, saucony (stabil c3),  Warren (GTS or Beast or transcend), propet (tennis shoe)    Sofft Brand or axxiom (women) Kelli&Dorian (men), clarks, crocs, aerosoles, naturalizers, SAS, ecco, born, fran quinonez, rockports (dress shoes)    Vionic, burkenstocks, fitflops, propet (sandals)  Nike comfort thong sandals, crocs, propet (house shoes)    Nail Home remedy:  Vicks Vapor rub to nails for easier manageability    Diabetes: Inspecting Your Feet  Diabetes increases your chances of developing foot problems. So inspect your feet every day. This helps you find small skin irritations before they become serious infections. If you have trouble seeing the bottoms of your feet, use a mirror or ask a family member or friend to help.     Pressure spots on the bottom of the foot are common areas where problems develop.   How to check your feet  Below are tips to help you look for foot problems. Try to check your feet at the same time each day, such as when you get out of bed in the morning:  · Check the top of each foot. The tops of toes, back of the heel, and outer edge of the foot can get a lot of rubbing from poor-fitting shoes.  · Check the bottom of each foot. Daily wear and tear often leads to problems at pressure spots.  · Check the toes and nails. Fungal infections often occur between toes. Toenail problems can also be a sign of fungal infections or lead to breaks in the skin.  · Check your shoes, too. Loose objects inside a shoe can  injure the foot. Use your hand to feel inside your shoes for things like lane, loose stitching, or rough areas that could irritate your skin.  Warning signs  Look for any color changes in the foot. Redness with streaks can signal a severe infection, which needs immediate medical attention. Tell your doctor right away if you have any of these problems:  · Swelling, sometimes with color changes, may be a sign of poor blood flow or infection. Symptoms include tenderness and an increase in the size of your foot.  · Warm or hot areas on your feet may be signs of infection. A foot that is cold may not be getting enough blood.  · Sensations such as burning, tingling, or pins and needles can be signs of a problem. Also check for areas that may be numb.  · Hot spots are caused by friction or pressure. Look for hot spots in areas that get a lot of rubbing. Hot spots can turn into blisters, calluses, or sores.  · Cracks and sores are caused by dry or irritated skin. They are a sign that the skin is breaking down, which can lead to infection.  · Toenail problems to watch for include nails growing into the skin (ingrown toenail) and causing redness or pain. Thick, yellow, or discolored nails can signal a fungal infection.  · Drainage and odor can develop from untreated sores and ulcers. Call your doctor right away if you notice white or yellow drainage, bleeding, or unpleasant odor.   © 2696-8663 itzbig. [address] [place] [address]. All rights reserved. This information is not intended as a substitute for professional medical care. Always follow your healthcare professional's instructions.        Step-by-Step:  Inspecting Your Feet (Diabetes)    Date Last Reviewed: 10/1/2016  © 1817-4358 [employer]. [address] [place] [address]. All rights reserved. This information is not intended as a substitute for professional medical care. Always follow your healthcare  professional's instructions.             Yes

## 2023-09-18 ENCOUNTER — LAB VISIT (OUTPATIENT)
Dept: LAB | Facility: HOSPITAL | Age: 88
End: 2023-09-18
Attending: INTERNAL MEDICINE
Payer: MEDICARE

## 2023-09-18 DIAGNOSIS — N18.30 CKD STAGE 3 DUE TO TYPE 2 DIABETES MELLITUS: ICD-10-CM

## 2023-09-18 DIAGNOSIS — E11.22 CKD STAGE 3 DUE TO TYPE 2 DIABETES MELLITUS: ICD-10-CM

## 2023-09-18 LAB
ALBUMIN SERPL BCP-MCNC: 3.6 G/DL (ref 3.5–5.2)
ANION GAP SERPL CALC-SCNC: 9 MMOL/L (ref 8–16)
BASOPHILS # BLD AUTO: 0.01 K/UL (ref 0–0.2)
BASOPHILS NFR BLD: 0.2 % (ref 0–1.9)
BUN SERPL-MCNC: 27 MG/DL (ref 8–23)
CALCIUM SERPL-MCNC: 8.9 MG/DL (ref 8.7–10.5)
CHLORIDE SERPL-SCNC: 106 MMOL/L (ref 95–110)
CO2 SERPL-SCNC: 24 MMOL/L (ref 23–29)
CREAT SERPL-MCNC: 1.8 MG/DL (ref 0.5–1.4)
DIFFERENTIAL METHOD: ABNORMAL
EOSINOPHIL # BLD AUTO: 0 K/UL (ref 0–0.5)
EOSINOPHIL NFR BLD: 0.6 % (ref 0–8)
ERYTHROCYTE [DISTWIDTH] IN BLOOD BY AUTOMATED COUNT: 15.4 % (ref 11.5–14.5)
EST. GFR  (NO RACE VARIABLE): 36 ML/MIN/1.73 M^2
GLUCOSE SERPL-MCNC: 241 MG/DL (ref 70–110)
HCT VFR BLD AUTO: 36.8 % (ref 40–54)
HGB BLD-MCNC: 11.9 G/DL (ref 14–18)
IMM GRANULOCYTES # BLD AUTO: 0.04 K/UL (ref 0–0.04)
IMM GRANULOCYTES NFR BLD AUTO: 0.8 % (ref 0–0.5)
LYMPHOCYTES # BLD AUTO: 1.1 K/UL (ref 1–4.8)
LYMPHOCYTES NFR BLD: 21.2 % (ref 18–48)
MCH RBC QN AUTO: 31 PG (ref 27–31)
MCHC RBC AUTO-ENTMCNC: 32.3 G/DL (ref 32–36)
MCV RBC AUTO: 96 FL (ref 82–98)
MONOCYTES # BLD AUTO: 1.1 K/UL (ref 0.3–1)
MONOCYTES NFR BLD: 22 % (ref 4–15)
NEUTROPHILS # BLD AUTO: 2.9 K/UL (ref 1.8–7.7)
NEUTROPHILS NFR BLD: 55.2 % (ref 38–73)
NRBC BLD-RTO: 0 /100 WBC
PHOSPHATE SERPL-MCNC: 2.8 MG/DL (ref 2.7–4.5)
PLATELET # BLD AUTO: 65 K/UL (ref 150–450)
PMV BLD AUTO: 13.1 FL (ref 9.2–12.9)
POTASSIUM SERPL-SCNC: 4.1 MMOL/L (ref 3.5–5.1)
PTH-INTACT SERPL-MCNC: 57.5 PG/ML (ref 9–77)
RBC # BLD AUTO: 3.84 M/UL (ref 4.6–6.2)
SODIUM SERPL-SCNC: 139 MMOL/L (ref 136–145)
WBC # BLD AUTO: 5.18 K/UL (ref 3.9–12.7)

## 2023-09-18 PROCEDURE — 83970 ASSAY OF PARATHORMONE: CPT | Performed by: INTERNAL MEDICINE

## 2023-09-18 PROCEDURE — 80069 RENAL FUNCTION PANEL: CPT | Performed by: INTERNAL MEDICINE

## 2023-09-18 PROCEDURE — 85025 COMPLETE CBC W/AUTO DIFF WBC: CPT | Performed by: INTERNAL MEDICINE

## 2023-09-18 RX ORDER — DAPAGLIFLOZIN 10 MG/1
10 TABLET, FILM COATED ORAL DAILY
Qty: 30 TABLET | Refills: 4 | Status: SHIPPED | OUTPATIENT
Start: 2023-09-18 | End: 2024-03-05 | Stop reason: SDUPTHER

## 2023-09-20 ENCOUNTER — OFFICE VISIT (OUTPATIENT)
Dept: NEPHROLOGY | Facility: CLINIC | Age: 88
End: 2023-09-20
Payer: MEDICARE

## 2023-09-20 VITALS
DIASTOLIC BLOOD PRESSURE: 64 MMHG | SYSTOLIC BLOOD PRESSURE: 163 MMHG | HEART RATE: 56 BPM | BODY MASS INDEX: 26.44 KG/M2 | WEIGHT: 189.63 LBS

## 2023-09-20 DIAGNOSIS — N18.30 CKD STAGE 3 DUE TO TYPE 2 DIABETES MELLITUS: ICD-10-CM

## 2023-09-20 DIAGNOSIS — I10 PRIMARY HYPERTENSION: ICD-10-CM

## 2023-09-20 DIAGNOSIS — E11.22 CKD STAGE 3 DUE TO TYPE 2 DIABETES MELLITUS: ICD-10-CM

## 2023-09-20 DIAGNOSIS — N18.31 STAGE 3A CHRONIC KIDNEY DISEASE: Primary | ICD-10-CM

## 2023-09-20 PROCEDURE — 99999 PR PBB SHADOW E&M-EST. PATIENT-LVL IV: ICD-10-PCS | Mod: PBBFAC,,, | Performed by: INTERNAL MEDICINE

## 2023-09-20 PROCEDURE — 99999 PR PBB SHADOW E&M-EST. PATIENT-LVL IV: CPT | Mod: PBBFAC,,, | Performed by: INTERNAL MEDICINE

## 2023-09-20 PROCEDURE — 99214 OFFICE O/P EST MOD 30 MIN: CPT | Mod: PBBFAC | Performed by: INTERNAL MEDICINE

## 2023-09-20 PROCEDURE — 99214 OFFICE O/P EST MOD 30 MIN: CPT | Mod: S$PBB,,, | Performed by: INTERNAL MEDICINE

## 2023-09-20 PROCEDURE — 99214 PR OFFICE/OUTPT VISIT, EST, LEVL IV, 30-39 MIN: ICD-10-PCS | Mod: S$PBB,,, | Performed by: INTERNAL MEDICINE

## 2023-09-20 NOTE — PROGRESS NOTES
Subjective:       Patient ID: Bria Lawson is a 87 y.o. Black or  male who presents for follow-up evaluation of No chief complaint on file.  Known to have Arthritis, Atrial fibrillation, CKD stage 3 due to type 2 diabetes mellitus (5/26/2015), Colon polyp, Coronary artery disease, Diabetes mellitus with renal manifestations, uncontrolled (2/26/2015), Diabetic retinopathy, GERD (gastroesophageal reflux disease) (2/26/2015), Gout, Gout, chronic (2/26/2015), HDL lipoprotein deficiency (5/26/2015), Hyperlipidemia (2/26/2015), Hypertension, Lymphoma (in 2018 4 x Rituximab) with thrombocytopenia, and Uncontrolled secondary diabetes mellitus with stage 3 CKD (GFR 30-59) (8/28/2015). who has been following up in renal clinic for ckd. Patient feels well today, no urinary or cardiac symptoms, no NSAIDs intake. Not really following a low salt diet. Admitted to have overdone it during his vacation in [place].     HPI  Review of Systems   Constitutional: Negative.    HENT: Negative.     Eyes: Negative.    Respiratory: Negative.     Cardiovascular: Negative.    Gastrointestinal: Negative.  Negative for diarrhea, nausea and vomiting.   Genitourinary:  Negative for decreased urine volume, difficulty urinating, dysuria, hematuria and urgency.   Musculoskeletal: Negative.    Skin: Negative.    Neurological: Negative.    Psychiatric/Behavioral: Negative.         Objective:      Physical Exam  Vitals and nursing note reviewed.   Constitutional:       Appearance: He is well-developed.   HENT:      Head: Normocephalic and atraumatic.      Right Ear: External ear normal.      Left Ear: External ear normal.      Nose: Nose normal.   Eyes:      Conjunctiva/sclera: Conjunctivae normal.      Pupils: Pupils are equal, round, and reactive to light.   Neck:      Vascular: No JVD.   Cardiovascular:      Rate and Rhythm: Normal rate and regular rhythm.      Heart sounds: Murmur heard.   Pulmonary:      Effort: Pulmonary effort is  normal. No respiratory distress.      Breath sounds: Normal breath sounds. No stridor. No rales.   Chest:      Chest wall: No tenderness.   Abdominal:      General: Bowel sounds are normal. There is no distension.      Palpations: Abdomen is soft. There is no mass.      Tenderness: There is no abdominal tenderness. There is no guarding or rebound.   Musculoskeletal:         General: Normal range of motion.      Cervical back: Normal range of motion and neck supple.      Comments: Trace edema comfort   Lymphadenopathy:      Cervical: No cervical adenopathy.   Skin:     General: Skin is warm and dry.   Neurological:      Mental Status: He is alert and oriented to person, place, and time.      Cranial Nerves: No cranial nerve deficit.      Coordination: Coordination normal.      Deep Tendon Reflexes: Reflexes are normal and symmetric.   Psychiatric:         Behavior: Behavior normal.         Thought Content: Thought content normal.         Judgment: Judgment normal.         Assessment:       No diagnosis found.      Plan:       1. CKD3a: With low grade proteinuria, stable for > 15 yrs, likely from DMII/HTN   - UPCR 300 mg/g   On Valsartan    -  started on Dapagliflozin     Lab Results   Component Value Date    CREATININE 1.8 (H) 09/18/2023     Protein Creatinine Ratios:    Prot/Creat Ratio, Urine   Date Value Ref Range Status   09/18/2023 0.25 (H) 0.00 - 0.20 Final   06/26/2023 0.27 (H) 0.00 - 0.20 Final   04/17/2023 0.34 (H) 0.00 - 0.20 Final         Acid-Base:   Lab Results   Component Value Date     09/18/2023    K 4.1 09/18/2023    CO2 24 09/18/2023     2. HTN: Blood pressures at home decently controlled. Did not take his meds this am.  On Valsartan and amlodipine - forgot to take all his med this am!    3. Secondary (renal) hyperparathyroidism: last PTH within target  Lab Results   Component Value Date    PTH 57.5 09/18/2023    CALCIUM 8.9 09/18/2023    PHOS 2.8 09/18/2023       4. Anemia: mildly worse from last  labs, follows with hem/onc for his lymphoma  Lab Results   Component Value Date    HGB 11.9 (L) 09/18/2023        5. DM:  Last HbA1C: well controlled with medication  Lab Results   Component Value Date    HGBA1C 6.5 (H) 09/18/2023       6. Lipid management: On a statin  Lab Results   Component Value Date    LDLCALC 47.8 (L) 07/02/2021      7. Lymphoma: follow with hem/onc, recent PET scan was ok    See back in 3 month with labs  Labs today

## 2023-09-21 ENCOUNTER — HOSPITAL ENCOUNTER (OUTPATIENT)
Dept: RADIOLOGY | Facility: HOSPITAL | Age: 88
Discharge: HOME | End: 2023-09-21
Attending: INTERNAL MEDICINE
Payer: MEDICARE

## 2023-09-21 DIAGNOSIS — N18.30 CKD STAGE 3 DUE TO TYPE 2 DIABETES MELLITUS: ICD-10-CM

## 2023-09-21 DIAGNOSIS — N18.31 STAGE 3A CHRONIC KIDNEY DISEASE: ICD-10-CM

## 2023-09-21 DIAGNOSIS — E11.22 CKD STAGE 3 DUE TO TYPE 2 DIABETES MELLITUS: ICD-10-CM

## 2023-09-21 PROCEDURE — 76770 US RETROPERITONEAL COMPLETE: ICD-10-PCS | Mod: 26,,, | Performed by: RADIOLOGY

## 2023-09-21 PROCEDURE — 76770 US EXAM ABDO BACK WALL COMP: CPT | Mod: 26,,, | Performed by: RADIOLOGY

## 2023-09-21 PROCEDURE — 76770 US EXAM ABDO BACK WALL COMP: CPT | Mod: TC

## 2023-09-22 ENCOUNTER — LAB VISIT (OUTPATIENT)
Dept: LAB | Facility: HOSPITAL | Age: 88
End: 2023-09-22
Attending: INTERNAL MEDICINE
Payer: MEDICARE

## 2023-09-22 DIAGNOSIS — N18.31 STAGE 3A CHRONIC KIDNEY DISEASE: ICD-10-CM

## 2023-09-22 DIAGNOSIS — E11.22 CKD STAGE 3 DUE TO TYPE 2 DIABETES MELLITUS: ICD-10-CM

## 2023-09-22 DIAGNOSIS — N18.30 CKD STAGE 3 DUE TO TYPE 2 DIABETES MELLITUS: ICD-10-CM

## 2023-09-22 LAB
ALBUMIN SERPL BCP-MCNC: 3.6 G/DL (ref 3.5–5.2)
ANION GAP SERPL CALC-SCNC: 11 MMOL/L (ref 8–16)
BASOPHILS # BLD AUTO: 0.02 K/UL (ref 0–0.2)
BASOPHILS NFR BLD: 0.5 % (ref 0–1.9)
BUN SERPL-MCNC: 20 MG/DL (ref 8–23)
CALCIUM SERPL-MCNC: 8.6 MG/DL (ref 8.7–10.5)
CHLORIDE SERPL-SCNC: 106 MMOL/L (ref 95–110)
CO2 SERPL-SCNC: 23 MMOL/L (ref 23–29)
CREAT SERPL-MCNC: 1.5 MG/DL (ref 0.5–1.4)
DIFFERENTIAL METHOD: ABNORMAL
EOSINOPHIL # BLD AUTO: 0.1 K/UL (ref 0–0.5)
EOSINOPHIL NFR BLD: 1.5 % (ref 0–8)
ERYTHROCYTE [DISTWIDTH] IN BLOOD BY AUTOMATED COUNT: 15.4 % (ref 11.5–14.5)
EST. GFR  (NO RACE VARIABLE): 45 ML/MIN/1.73 M^2
GLUCOSE SERPL-MCNC: 237 MG/DL (ref 70–110)
HCT VFR BLD AUTO: 34.7 % (ref 40–54)
HGB BLD-MCNC: 11.5 G/DL (ref 14–18)
IMM GRANULOCYTES # BLD AUTO: 0.02 K/UL (ref 0–0.04)
IMM GRANULOCYTES NFR BLD AUTO: 0.5 % (ref 0–0.5)
LYMPHOCYTES # BLD AUTO: 1.1 K/UL (ref 1–4.8)
LYMPHOCYTES NFR BLD: 27.4 % (ref 18–48)
MCH RBC QN AUTO: 31.3 PG (ref 27–31)
MCHC RBC AUTO-ENTMCNC: 33.1 G/DL (ref 32–36)
MCV RBC AUTO: 94 FL (ref 82–98)
MONOCYTES # BLD AUTO: 0.9 K/UL (ref 0.3–1)
MONOCYTES NFR BLD: 23 % (ref 4–15)
NEUTROPHILS # BLD AUTO: 1.9 K/UL (ref 1.8–7.7)
NEUTROPHILS NFR BLD: 47.1 % (ref 38–73)
NRBC BLD-RTO: 0 /100 WBC
PHOSPHATE SERPL-MCNC: 4.2 MG/DL (ref 2.7–4.5)
PLATELET # BLD AUTO: 72 K/UL (ref 150–450)
PMV BLD AUTO: 13.6 FL (ref 9.2–12.9)
POTASSIUM SERPL-SCNC: 4.2 MMOL/L (ref 3.5–5.1)
RBC # BLD AUTO: 3.68 M/UL (ref 4.6–6.2)
SODIUM SERPL-SCNC: 140 MMOL/L (ref 136–145)
WBC # BLD AUTO: 4.05 K/UL (ref 3.9–12.7)

## 2023-09-22 PROCEDURE — 36415 COLL VENOUS BLD VENIPUNCTURE: CPT | Performed by: INTERNAL MEDICINE

## 2023-09-22 PROCEDURE — 85025 COMPLETE CBC W/AUTO DIFF WBC: CPT | Performed by: INTERNAL MEDICINE

## 2023-09-22 PROCEDURE — 80069 RENAL FUNCTION PANEL: CPT | Performed by: INTERNAL MEDICINE

## 2023-09-26 ENCOUNTER — OFFICE VISIT (OUTPATIENT)
Dept: FAMILY MEDICINE | Facility: CLINIC | Age: 88
End: 2023-09-26
Payer: MEDICARE

## 2023-09-26 VITALS
BODY MASS INDEX: 26.73 KG/M2 | HEIGHT: 71 IN | OXYGEN SATURATION: 98 % | SYSTOLIC BLOOD PRESSURE: 126 MMHG | HEART RATE: 68 BPM | WEIGHT: 190.94 LBS | TEMPERATURE: 98 F | DIASTOLIC BLOOD PRESSURE: 60 MMHG

## 2023-09-26 DIAGNOSIS — D50.9 IRON DEFICIENCY ANEMIA, UNSPECIFIED IRON DEFICIENCY ANEMIA TYPE: ICD-10-CM

## 2023-09-26 DIAGNOSIS — N18.31 STAGE 3A CHRONIC KIDNEY DISEASE: ICD-10-CM

## 2023-09-26 DIAGNOSIS — I10 ESSENTIAL HYPERTENSION: ICD-10-CM

## 2023-09-26 DIAGNOSIS — Z00.00 ROUTINE MEDICAL EXAM: ICD-10-CM

## 2023-09-26 DIAGNOSIS — E11.311 DIABETIC RETINOPATHY OF BOTH EYES WITH MACULAR EDEMA ASSOCIATED WITH TYPE 2 DIABETES MELLITUS, UNSPECIFIED RETINOPATHY SEVERITY: ICD-10-CM

## 2023-09-26 DIAGNOSIS — E11.21 DIABETES MELLITUS WITH PROTEINURIC DIABETIC NEPHROPATHY: Primary | ICD-10-CM

## 2023-09-26 PROCEDURE — 99999 PR PBB SHADOW E&M-EST. PATIENT-LVL IV: CPT | Mod: PBBFAC,,, | Performed by: INTERNAL MEDICINE

## 2023-09-26 PROCEDURE — 99215 PR OFFICE/OUTPT VISIT, EST, LEVL V, 40-54 MIN: ICD-10-PCS | Mod: S$PBB,,, | Performed by: INTERNAL MEDICINE

## 2023-09-26 PROCEDURE — 99999 PR PBB SHADOW E&M-EST. PATIENT-LVL IV: ICD-10-PCS | Mod: PBBFAC,,, | Performed by: INTERNAL MEDICINE

## 2023-09-26 PROCEDURE — 99215 OFFICE O/P EST HI 40 MIN: CPT | Mod: S$PBB,,, | Performed by: INTERNAL MEDICINE

## 2023-09-26 PROCEDURE — 99214 OFFICE O/P EST MOD 30 MIN: CPT | Mod: PBBFAC,PO | Performed by: INTERNAL MEDICINE

## 2023-09-26 NOTE — PROGRESS NOTES
Chief complaint:  Physical exam, Follow-up on diabetes, anemia,    87-year-old black male.  here to follow-up on diabetes although did  have A1c prior. Reviewed all his labs and abnormalities.   his A1c was 7.4 in 2/19 then 6.6, 6.8, 5.4 , 6.1, 6.3, 6.1, 5.7, 6.3, 6.5..    Was Eating poorly.  He has some chronic renal insufficiency. Seen renal .   He had a slight hemoglobin reduction which appears chronic and fluctuating clinical of last year or so with  thrombocytopenia.  We discussed all those issues and the need to monitor them over time.  Is otherwise feeling well.  He is being followed by Hematology.  On iron pill daily and improving.  Somewhere along the way he was put on 81 mg aspirin and is currently off of it I encouraged him to stay off of it as there really is no obvious cardiac reason for him to need the aspirin and he does have thrombocytopenia which is not severe but could become severe and being on aspirin could increase his bleeding risk.    Patient also presents today for his physical although has a Medicare insurance does not formally cover physical all of his assessment and cancer screening and risk assessment will be done either way.  Based on his age he does not need to continue colon and prostate cancer screening.  He is otherwise very active.      No further abdominal pain as he had before. Had uti post cysto, all better.  Follow-up urinalysis 8/22 looked good and I reassured patient.  He apparently has a lab order for his microalbumin today so will add a urinalysis although not having any UTI symptoms we can double check.  No history of recurrent UTIs and discuss this was probably expected and related to the cystoscope.    He has had labs  and appears for an upcoming renal appointment.  Labs for Hematology and we will add an A1c at that time and patient will remind the lab to do the standing A1c.    He has seen GI.  Reviewed his last EGD which showed gastritis.  I reviewed the GI noted it  sounds like he had some generalized abdominal discomfort after excessive eating.  Symptoms have since resolved.    He does continue to get hives and itching in areas where he applies pressure.  It has been ongoing for years.  He is not on an antihistamine and discussed using Claritin daily neg going to twice daily if that does not suppressed. Now on a shot with GREAT control of eczema.  He did have a slight flare when he missed is injection while traveling to [facility]    Recent lab shows hemoglobin drop from 11.4-9.6 then up to 13.6, 11.3, 12, 13, 11.5.  No obvious clinical bleeding in the plan by GI will be to do a capsule study if the hemoglobin level drops. Seen by Heme and ferritin improving on iron pill daily          Reviewed blood pressures which appear to be running normal.    .  He did see cardiology ordered an echo     Low normal left ventricular systolic function. The estimated ejection fraction is 50-55%.  Mild eccentric left ventricular hypertrophy.  Mild left ventricular enlargement.  No wall motion abnormalities.  Normal right ventricular systolic function.  Moderate mitral regurgitation.  Mild tricuspid regurgitation.  The estimated PA systolic pressure is 35 mmHg.     He is on digital hypertension and we reviewed his blood pressure and pulse readings.  His pulses mostly in the 40s and 50s occasionally up to 60.  He had his carvedilol reduced from 12.5-6.25 twice a day.  His pulse still appears to be mostly in the 50s.  He is asymptomatic.  We discussed that if symptomatic we would need to eliminate the carvedilol and assess response.  Reviewed all his other labs.  With the Lasix he did not have any worsening renal insufficiency.  He has an appointment with Hematology  His platelet count appears stable.          Chest CT  Impression       1. CT findings favoring sequela of cardiac decompensation causing pulmonary edema and bilateral pleural effusions.  Superimposed COVID-19 pneumonia cannot be entirely  excluded noting that pleural effusions is not a commonly reported finding and therefore would be atypical.  2. Persistent soft tissue attenuation nodule within the prevascular mediastinum, presumably an enlarged lymph node and unchanged when compared to previous PET-CTs.  3. Slight interval increased size of multiple mediastinal lymph nodes, nonspecific.  4. Cardiomegaly with severe dense coronary artery atherosclerosis in a 3 vessel distribution.                        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 EYES: no vision change. ENT: no sore throat. CV: no chest pain w/ exertion. RESP: no shortness of breath. GI: no nausea, vomiting, diarrhea. No dysphagia. : no urinary issues. MUSCULOSKELETAL: no new myalgias or arthralgias. SKIN: no new changes. NEURO: no focal deficits. PSYCH: no new issues. ENDOCRINE: no polyuria. HEME: no lymph nodes. ALLERGY: no general pruritis.       Past medical history:  1.  Diabetes with renal disease and retinopathy  2.  Hypertension  3.  Hyperlipidemia  4.  Chronic renal failure stage III  5.  History of colon polyp, normal scope March 2012, normal 2017-- 5 yrs----[facility]- [facility] Kumari -EGD/colon done 11/2020  6.  Hyperuricemia and gout  7.  History of sciatica and lumbar disc disease remotely  8.  Erectile dysfunction  9.  Macular degeneration, followed by outside retina specialist  10.  GERD  11.  Low HDL  12.  Inflammatory arthritis of the hands, seeing rheumatology  13.  Bilateral cervical radiculopathy and axonal neuropathy, to have surgery 2015    14.  Followed by outside urology at [facility] Dr. noriega   15.  TKA  -angie Han  who is at [facility]  16.  Prevnar given 2017  17. 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 Neg cysto 2022    Past surgical history: Right knee replacement, left knee arthroscopy, bilateral cataracts, scrotal cyst removed.    Family  history: Mother with hypertension and diabetes.  Father's history is unknown.  One brother who is okay.    Social history: Retired, quit smoking 1974.   with 3 children.  Drinks socially.    Vital signs as above  Gen: no distress  EYES: conjunctiva clear, non-icteric, PERRL  ENT: nose clear, nasal mucosa normal, oropharynx clear and moist, teeth good  NECK:supple, thyroid non-palpable  RESP: effort is good, lungs clear  CV: heart RRR w/o murmur, gallops or rubs; no carotid bruits, no edema  GI: abdomen soft, non-distended, non-tender, no hepatosplenomegaly  MS: gait normal, no clubbing or cyanosis of the digits  SKIN: no rashes, warm to touch      Over 70 min  minutes of total time spent on the encounter, which includes face to face time and non-face to face time preparing to see the patient (eg, review of tests), Obtaining and/or reviewing separately obtained history, Documenting clinical information in the electronic or other health record, Independently interpreting results (not separately reported) and communicating results to the patient/family/caregiver, or Care coordination (not separately reported).       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

## 2023-09-28 NOTE — TELEPHONE ENCOUNTER
Refill Routing Note   Medication(s) are not appropriate for processing by [facility] for the following reason(s):      Required labs abnormal: Patient may benefit from dose adjustment due to renal impairment; recommended adjustment to 50 mg QD    [facility] action(s):  Defer Care Due:  None identified        Pharmacist review requested: Yes     Appointments  past 12m or future 3m with PCP    Date Provider   Last Visit   9/26/2023 Adiel Bragg MD   Next Visit   Visit date not found Adiel Bragg MD   ED visits in past 90 days: 0        Note composed:4:07 PM 09/28/2023

## 2023-09-28 NOTE — TELEPHONE ENCOUNTER
Refill Routing Note   Medication(s) are not appropriate for processing by [facility] for the following reason(s):      Required labs abnormal  CREATININE 1.5 (H) 09/22/2023       [facility] action(s):  Defer Care Due:  None identified            Pharmacist review requested: Yes     Appointments  past 12m or future 3m with PCP    Date Provider   Last Visit   9/26/2023 Adiel Bragg MD   Next Visit   Visit date not found Adiel Bragg MD   ED visits in past 90 days: 0        Note composed:3:36 PM 09/28/2023

## 2023-09-29 RX ORDER — SITAGLIPTIN 100 MG/1
TABLET, FILM COATED ORAL
Qty: 90 TABLET | Refills: 12 | Status: SHIPPED | OUTPATIENT
Start: 2023-09-29

## 2023-09-30 ENCOUNTER — EXTERNAL CHRONIC CARE MANAGEMENT (OUTPATIENT)
Dept: PRIMARY CARE CLINIC | Facility: CLINIC | Age: 88
End: 2023-09-30
Payer: MEDICARE

## 2023-09-30 PROCEDURE — 99490 PR CHRONIC CARE MGMT, 1ST 20 MIN: ICD-10-PCS | Mod: S$PBB,,, | Performed by: INTERNAL MEDICINE

## 2023-09-30 PROCEDURE — 99490 CHRNC CARE MGMT STAFF 1ST 20: CPT | Mod: PBBFAC,PO | Performed by: INTERNAL MEDICINE

## 2023-09-30 PROCEDURE — 99490 CHRNC CARE MGMT STAFF 1ST 20: CPT | Mod: S$PBB,,, | Performed by: INTERNAL MEDICINE

## 2023-10-10 ENCOUNTER — PATIENT MESSAGE (OUTPATIENT)
Dept: OTHER | Facility: OTHER | Age: 88
End: 2023-10-10
Payer: MEDICARE

## 2023-10-31 ENCOUNTER — EXTERNAL CHRONIC CARE MANAGEMENT (OUTPATIENT)
Dept: PRIMARY CARE CLINIC | Facility: CLINIC | Age: 88
End: 2023-10-31
Payer: MEDICARE

## 2023-10-31 PROCEDURE — 99490 PR CHRONIC CARE MGMT, 1ST 20 MIN: ICD-10-PCS | Mod: S$PBB,,, | Performed by: INTERNAL MEDICINE

## 2023-10-31 PROCEDURE — 99490 CHRNC CARE MGMT STAFF 1ST 20: CPT | Mod: S$PBB,,, | Performed by: INTERNAL MEDICINE

## 2023-10-31 PROCEDURE — 99490 CHRNC CARE MGMT STAFF 1ST 20: CPT | Mod: PBBFAC,PO | Performed by: INTERNAL MEDICINE

## 2023-11-21 DIAGNOSIS — D50.9 IRON DEFICIENCY ANEMIA, UNSPECIFIED IRON DEFICIENCY ANEMIA TYPE: ICD-10-CM

## 2023-11-21 RX ORDER — FERROUS SULFATE TAB 325 MG (65 MG ELEMENTAL FE) 325 (65 FE) MG
TAB ORAL
Qty: 90 TABLET | Refills: 12 | Status: SHIPPED | OUTPATIENT
Start: 2023-11-21

## 2023-11-21 RX ORDER — GLIPIZIDE 10 MG/1
TABLET, FILM COATED, EXTENDED RELEASE ORAL
Qty: 90 TABLET | Refills: 1 | Status: SHIPPED | OUTPATIENT
Start: 2023-11-21

## 2023-11-21 NOTE — TELEPHONE ENCOUNTER
Care Due:                  Date            Visit Type   Department     Provider  --------------------------------------------------------------------------------                                [facility]                              FOLLOWUP/OF  MED/ INTERNAL  Adiel Coleman  Last Visit: 09-      FICE VISIT   MED/ PEDS      [place]  Next Visit: None Scheduled  None         None Found                                                            Last  Test          Frequency    Reason                     Performed    Due Date  --------------------------------------------------------------------------------    Lipid Panel.  12 months..  atorvastatin.............  Not Found    Overdue    [employer] Embedded Care Due Messages. Reference number: [id number].   11/21/2023 5:31:50 AM CST

## 2023-11-21 NOTE — TELEPHONE ENCOUNTER
Refill Routing Note   Medication(s) are not appropriate for processing by [facility] for the following reason(s):        Outside of protocol    [facility] action(s):  Route  Approve     Requires labs : Yes -Lipid Panel overdue     Medication Therapy Plan: Dose appropiate for renal fxn-GLipizide      Appointments  past 12m or future 3m with PCP    Date Provider   Last Visit   9/26/2023 Adiel Bragg MD   Next Visit   Visit date not found Adiel Bragg MD   ED visits in past 90 days: 0        Note composed:7:50 AM 11/21/2023

## 2023-11-30 ENCOUNTER — EXTERNAL CHRONIC CARE MANAGEMENT (OUTPATIENT)
Dept: PRIMARY CARE CLINIC | Facility: CLINIC | Age: 88
End: 2023-11-30
Payer: MEDICARE

## 2023-11-30 PROCEDURE — 99439 PR CHRONIC CARE MGMT, EA ADDTL 20 MIN: ICD-10-PCS | Mod: S$PBB,,, | Performed by: INTERNAL MEDICINE

## 2023-11-30 PROCEDURE — 99439 CHRNC CARE MGMT STAF EA ADDL: CPT | Mod: PBBFAC,PO | Performed by: INTERNAL MEDICINE

## 2023-11-30 PROCEDURE — 99490 PR CHRONIC CARE MGMT, 1ST 20 MIN: ICD-10-PCS | Mod: S$PBB,,, | Performed by: INTERNAL MEDICINE

## 2023-11-30 PROCEDURE — 99439 CHRNC CARE MGMT STAF EA ADDL: CPT | Mod: S$PBB,,, | Performed by: INTERNAL MEDICINE

## 2023-11-30 PROCEDURE — 99490 CHRNC CARE MGMT STAFF 1ST 20: CPT | Mod: S$PBB,,, | Performed by: INTERNAL MEDICINE

## 2023-11-30 PROCEDURE — 99490 CHRNC CARE MGMT STAFF 1ST 20: CPT | Mod: PBBFAC,PO | Performed by: INTERNAL MEDICINE

## 2023-12-06 DIAGNOSIS — N18.31 STAGE 3A CHRONIC KIDNEY DISEASE: Primary | ICD-10-CM

## 2023-12-14 RX ORDER — PANTOPRAZOLE SODIUM 40 MG/1
40 TABLET, DELAYED RELEASE ORAL DAILY
Qty: 90 TABLET | Refills: 2 | Status: SHIPPED | OUTPATIENT
Start: 2023-12-14 | End: 2024-03-13

## 2023-12-14 NOTE — TELEPHONE ENCOUNTER
According to [employer], a PA is required on pantoprazole and this needs to be obtained prior to 01/26/2024.

## 2023-12-14 NOTE — TELEPHONE ENCOUNTER
No care due was identified.  [facility] Embedded Care Due Messages. Reference number: [id number].   12/14/2023 4:17:43 PM CST

## 2023-12-31 ENCOUNTER — EXTERNAL CHRONIC CARE MANAGEMENT (OUTPATIENT)
Dept: PRIMARY CARE CLINIC | Facility: CLINIC | Age: 88
End: 2023-12-31
Payer: MEDICARE

## 2023-12-31 PROCEDURE — 99439 CHRNC CARE MGMT STAF EA ADDL: CPT | Mod: PBBFAC,27,PO | Performed by: INTERNAL MEDICINE

## 2023-12-31 PROCEDURE — 99490 CHRNC CARE MGMT STAFF 1ST 20: CPT | Mod: S$PBB,,, | Performed by: INTERNAL MEDICINE

## 2023-12-31 PROCEDURE — 99490 CHRNC CARE MGMT STAFF 1ST 20: CPT | Mod: PBBFAC,PO | Performed by: INTERNAL MEDICINE

## 2023-12-31 PROCEDURE — 99439 CHRNC CARE MGMT STAF EA ADDL: CPT | Mod: S$PBB,,, | Performed by: INTERNAL MEDICINE

## 2024-01-02 ENCOUNTER — TELEPHONE (OUTPATIENT)
Dept: NEPHROLOGY | Facility: CLINIC | Age: 89
End: 2024-01-02
Payer: MEDICARE

## 2024-01-03 ENCOUNTER — OFFICE VISIT (OUTPATIENT)
Dept: PODIATRY | Facility: CLINIC | Age: 89
End: 2024-01-03
Payer: MEDICARE

## 2024-01-03 DIAGNOSIS — M20.5X2 HALLUX LIMITUS, ACQUIRED, LEFT: ICD-10-CM

## 2024-01-03 DIAGNOSIS — E11.9 ENCOUNTER FOR DIABETIC FOOT EXAM: Primary | ICD-10-CM

## 2024-01-03 DIAGNOSIS — M20.42 HAMMER TOES OF BOTH FEET: ICD-10-CM

## 2024-01-03 DIAGNOSIS — M20.41 HAMMER TOES OF BOTH FEET: ICD-10-CM

## 2024-01-03 DIAGNOSIS — E11.49 TYPE II DIABETES MELLITUS WITH NEUROLOGICAL MANIFESTATIONS: ICD-10-CM

## 2024-01-03 DIAGNOSIS — M20.5X1 HALLUX LIMITUS, ACQUIRED, RIGHT: ICD-10-CM

## 2024-01-03 DIAGNOSIS — B35.1 DERMATOPHYTOSIS OF NAIL: ICD-10-CM

## 2024-01-03 PROCEDURE — 99999 PR PBB SHADOW E&M-EST. PATIENT-LVL II: CPT | Mod: PBBFAC,,, | Performed by: PODIATRIST

## 2024-01-03 PROCEDURE — 99212 OFFICE O/P EST SF 10 MIN: CPT | Mod: PBBFAC,PO | Performed by: PODIATRIST

## 2024-01-03 PROCEDURE — 99214 OFFICE O/P EST MOD 30 MIN: CPT | Mod: S$PBB,,, | Performed by: PODIATRIST

## 2024-01-03 NOTE — PATIENT INSTRUCTIONS
Over the counter pain creams: Voltaren Gel, Biofreeze, Bengay, tiger balm, two old goat, lidocaine gel,  Absorbine Veterinary Liniment Gel Topical Analgesic Sore Muscle and Joint Pain Relief    Recommend lotions: eucerin, eucerin for diabetics, aquaphor, A&D ointment, gold bond for diabetics, sween, [place]'s Bees all purpose baby ointment,  urea 40 with aloe or SkinIntegra rapid crack repair (found on amazon.com)    Shoe recommendations: (try 6pm.com, zappos.com , nordstromrack.Heverest.ru, or shoes.com for discounted prices) you can visit varsity shoes in [place], DSW shoes in [place]  or abhishek rack in the [facility] (there are also several shoe brand outlets in the [facility])    ONLY purchase stability style tennis shoes NO flex, foam, free, yoga mat style shoes    Shoe examples:    Asics (GT 4000 or gel foundations), new balance stability type shoes (such as the 940 series), saucony (stabil c3),  Warren (GTS or Beast or   transcend), propet, HokaOne (tennis shoe) Sudeep (tennis shoes and boots)    Lizabethft Edvin (women) Kelli&Dorian (men), clarks, crocs, aerosoles, naturalizers, SAS, ecco, born, fran quinonez, rockports (dress shoes)    Vionic, burkenstocks, fitflops, propet, taos, baretraps (sandals)    HokaOne sandals, crocs, propet (house shoes)      Nail Home remedy:  Vicks Vapor rub or Emuaid to nails for easier manageability    Diabetes: Inspecting Your Feet  Diabetes increases your chances of developing foot problems. So inspect your feet every day. This helps you find small skin irritations before they become serious infections. If you have trouble seeing the bottoms of your feet, use a mirror or ask a family member or friend to help.     Pressure spots on the bottom of the foot are common areas where problems develop.   How to check your feet  Below are tips to help you look for foot problems. Try to check your feet at the same time each day, such as when you get out of bed in the morning:  Check the top of  each foot. The tops of toes, back of the heel, and outer edge of the foot can get a lot of rubbing from poor-fitting shoes.  Check the bottom of each foot. Daily wear and tear often leads to problems at pressure spots.  Check the toes and nails. Fungal infections often occur between toes. Toenail problems can also be a sign of fungal infections or lead to breaks in the skin.  Check your shoes, too. Loose objects inside a shoe can injure the foot. Use your hand to feel inside your shoes for things like lane, loose stitching, or rough areas that could irritate your skin.  Warning signs  Look for any color changes in the foot. Redness with streaks can signal a severe infection, which needs immediate medical attention. Tell your doctor right away if you have any of these problems:  Swelling, sometimes with color changes, may be a sign of poor blood flow or infection. Symptoms include tenderness and an increase in the size of your foot.  Warm or hot areas on your feet may be signs of infection. A foot that is cold may not be getting enough blood.  Sensations such as burning, tingling, or pins and needles can be signs of a problem. Also check for areas that may be numb.  Hot spots are caused by friction or pressure. Look for hot spots in areas that get a lot of rubbing. Hot spots can turn into blisters, calluses, or sores.  Cracks and sores are caused by dry or irritated skin. They are a sign that the skin is breaking down, which can lead to infection.  Toenail problems to watch for include nails growing into the skin (ingrown toenail) and causing redness or pain. Thick, yellow, or discolored nails can signal a fungal infection.  Drainage and odor can develop from untreated sores and ulcers. Call your doctor right away if you notice white or yellow drainage, bleeding, or unpleasant odor.   © 0330-8332 [employer]. [address] [place] [address], [place] [address]. All rights reserved. This information is not  intended as a substitute for professional medical care. Always follow your healthcare professional's instructions.        Step-by-Step:  Inspecting Your Feet (Diabetes)    Date Last Reviewed: 10/1/2016  © 2793-3359 [employer]. [address] [place] [address], [place] [address]. All rights reserved. This information is not intended as a substitute for professional medical care. Always follow your healthcare professional's instructions.

## 2024-01-03 NOTE — PROGRESS NOTES
Subjective:      Patient ID: Bria Lawson is a 88 y.o. male.    Chief Complaint: No chief complaint on file.      Bria is a 88 y.o. male who presents to the clinic for evaluation and treatment of high risk feet. 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  The patient has no major complaints with feet. Chief concern is how to care for feet as a diabetic.    Admits to wearing crocs in the home without socks and getting abrasions to the toes      This patient has documented high risk feet requiring routine maintenance secondary to diabetes mellitis and those secondary complications of diabetes, as mentioned..    PCP: Adiel Bragg MD    Date Last Seen by PCP:   No chief complaint on file.    Current shoe gear:  Tennis shoes    Hemoglobin A1C   Date Value Ref Range Status   09/18/2023 6.5 (H) 4.0 - 5.6 % Final     Comment:     ADA Screening Guidelines:  5.7-6.4%  Consistent with prediabetes  >or=6.5%  Consistent with diabetes    High levels of fetal hemoglobin interfere with the HbA1C  assay. Heterozygous hemoglobin variants (HbS, HgC, etc)do  not significantly interfere with this assay.   However, presence of multiple variants may affect accuracy.     03/06/2023 5.8 (H) 4.0 - 5.6 % Final     Comment:     ADA Screening Guidelines:  5.7-6.4%  Consistent with prediabetes  >or=6.5%  Consistent with diabetes    High levels of fetal hemoglobin interfere with the HbA1C  assay. Heterozygous hemoglobin variants (HbS, HgC, etc)do  not  significantly interfere with this assay.   However, presence of multiple variants may affect accuracy.     11/16/2022 6.3 (H) 4.0 - 5.6 % Final     Comment:     ADA Screening Guidelines:  5.7-6.4%  Consistent with prediabetes  >or=6.5%  Consistent with diabetes    High levels of fetal hemoglobin interfere with the HbA1C  assay. Heterozygous hemoglobin variants (HbS, HgC, etc)do  not significantly interfere with this assay.   However, presence of multiple variants may affect accuracy.           Patient Active Problem List   Diagnosis    GERD (gastroesophageal reflux disease)    Gout, chronic    HTN (hypertension)    Hyperlipidemia    CKD stage 3 due to type 2 diabetes mellitus    HDL lipoprotein deficiency    Cervical radiculopathy, acute    CN (constipation)    Pancytopenia    Stage 3a chronic kidney disease    Primary osteoarthritis involving multiple joints    Diabetes mellitus with proteinuria    Diabetes mellitus with proteinuric diabetic nephropathy    Hypertensive CKD (chronic kidney disease)    Leukopenia    Thrombocytopenia    B-cell lymphoma of extranodal site    Neutropenia    Refractive error    Pseudophakia    Screening for malignant neoplasm of colon    PAH (pulmonary artery hypertension)    Atherosclerosis of abdominal aorta    Colon, diverticulosis    Microhematuria    Secondary hyperparathyroidism of renal origin    Neuropathy       Current Outpatient Medications on File Prior to Visit   Medication Sig Dispense Refill    allopurinoL (ZYLOPRIM) 100 MG tablet TAKE 1 TABLET(100 MG) BY MOUTH EVERY DAY 90 tablet 1    amLODIPine (NORVASC) 10 MG tablet TAKE 1 TABLET(10 MG) BY MOUTH EVERY DAY 90 tablet 3    ascorbic acid, vitamin C, (VITAMIN C) 1000 MG tablet Take 1,000 mg by mouth once daily.      atorvastatin (LIPITOR) 20 MG tablet TAKE 1 TABLET(20 MG) BY MOUTH EVERY DAY 90 tablet 12    atorvastatin (LIPITOR) 20 MG tablet TAKE 1 TABLET(20 MG) BY MOUTH EVERY DAY (Patient not taking: Reported on 9/26/2023)  90 tablet 0    blood sugar diagnostic Strp 1 strip by Misc.(Non-Drug; Combo Route) route 2 (two) times daily. Disp glucometer and lancets as well 100 strip 12    carvediloL (COREG) 6.25 MG tablet TAKE 1 TABLET(6.25 MG) BY MOUTH TWICE DAILY WITH MEALS 180 tablet 3    dapagliflozin propanediol (FARXIGA) 10 mg tablet Take 1 tablet (10 mg total) by mouth once daily. 30 tablet 4    DUPIXENT  mg/2 mL PnIj Inject into the skin.      FEROSUL 325 mg (65 mg iron) Tab tablet TAKE 1 TABLET BY MOUTH EVERY DAY WITH BREAKFAST 90 tablet 12    folic acid (FOLVITE) 800 MCG Tab Take 800 mcg by mouth once daily.      gabapentin (NEURONTIN) 300 MG capsule TAKE 1 CAPSULE(300 MG) BY MOUTH THREE TIMES DAILY 270 capsule 3    glipiZIDE (GLUCOTROL) 10 MG TR24 TAKE 1 TABLET(10 MG) BY MOUTH EVERY DAY 90 tablet 1    JANUVIA 100 mg Tab TAKE 1 TABLET ONCE DAILY 90 tablet 12    methylPREDNISolone (MEDROL DOSEPACK) 4 mg tablet use as directed 21 tablet 0    multivit-min/FA/lycopen/lutein (CENTRUM SILVER MEN ORAL) Take by mouth.      niacin 500 MG Tab Take 100 mg by mouth every evening.      pantoprazole (PROTONIX) 40 MG tablet Take 1 tablet (40 mg total) by mouth once daily. 90 tablet 2    predniSONE (DELTASONE) 20 MG tablet Take 20 mg by mouth every morning.      TRUEPLUS LANCETS 33 gauge Misc USE TO TEST BLOOD SUGAR BID  12    TURMERIC ROOT EXTRACT ORAL Take by mouth.      valsartan (DIOVAN) 320 MG tablet TAKE 1 TABLET ONCE DAILY 90 tablet 3    vitamin E 400 UNIT capsule Take 400 Units by mouth once daily.      [DISCONTINUED] omeprazole (PRILOSEC) 40 MG capsule TAKE 1 CAPSULE DAILY 90 capsule 3     No current facility-administered medications on file prior to visit.       Review of patient's allergies indicates:  No Known Allergies    Past Surgical History:   Procedure Laterality Date    BONE MARROW BIOPSY Left 09/19/2018    Procedure: Biopsy-bone marrow;  Surgeon: Velia Tobias MD;  Location: [facility];  Service: Oncology;   Laterality: Left;    CATARACT EXTRACTION Bilateral 1996    COLONOSCOPY N/A 11/24/2020    Procedure: COLONOSCOPY Gol[place];  Surgeon: Masoud Hwang MD;  Location: [facility] ENDO;  Service: Endoscopy;  Laterality: N/A;    ESOPHAGOGASTRODUODENOSCOPY N/A 11/24/2020    Procedure: EGD (ESOPHAGOGASTRODUODENOSCOPY);  Surgeon: Masoud Hwang MD;  Location: [facility] ENDO;  Service: Endoscopy;  Laterality: N/A;    eye lid sx Bilateral 2017    EYE SURGERY      cataracts    JOINT REPLACEMENT      knee replacement    retinal injection s Bilateral     scrotal cyst         Family History   Problem Relation Age of Onset    Hypertension Mother     Diabetes Father     No Known Problems Sister     No Known Problems Brother     No Known Problems Maternal Aunt     No Known Problems Maternal Uncle     No Known Problems Paternal Aunt     No Known Problems Paternal Uncle     No Known Problems Maternal Grandmother     No Known Problems Maternal Grandfather     No Known Problems Paternal Grandmother     No Known Problems Paternal Grandfather     No Known Problems Daughter     No Known Problems Son     No Known Problems Son     No Known Problems Other     Amblyopia Neg Hx     Blindness Neg Hx     Cancer Neg Hx     Cataracts Neg Hx     Glaucoma Neg Hx     Macular degeneration Neg Hx     Retinal detachment Neg Hx     Strabismus Neg Hx     Stroke Neg Hx     Thyroid disease Neg Hx        Social History     Socioeconomic History    Marital status:    Tobacco Use    Smoking status: Former    Smokeless tobacco: Never    Tobacco comments:     09/26/23 Patient Quit Smoking At The Age of 38 - Over 50 Years Ago   Substance and Sexual Activity    Alcohol use: Yes     Comment: socially - maybe few times a week    Drug use: No    Sexual activity: Yes     Partners: Female     Social Determinants of Health     Financial Resource Strain: Low Risk  (7/12/2023)    Overall Financial Resource Strain (CARDIA)     Difficulty of Paying Living Expenses: Not hard at  all   Food Insecurity: No Food Insecurity (8/10/2023)    Hunger Vital Sign     Worried About Running Out of Food in the Last Year: Never true     Ran Out of Food in the Last Year: Never true   Transportation Needs: No Transportation Needs (8/10/2023)    PRAPARE - Transportation     Lack of Transportation (Medical): No     Lack of Transportation (Non-Medical): No   Physical Activity: Inactive (8/10/2023)    Exercise Vital Sign     Days of Exercise per Week: 0 days     Minutes of Exercise per Session: 0 min   Stress: No Stress Concern Present (8/10/2023)    [place] [employer] - Occupational Stress Questionnaire     Feeling of Stress : Only a little   Social Connections: Socially Isolated (8/10/2023)    Social Connection and Isolation Panel [NHANES]     Frequency of Communication with Friends and Family: Once a week     Frequency of Social Gatherings with Friends and Family: Never     Attends [employer] Services: Never     Active Member of Clubs or Organizations: No     Attends Club or Organization Meetings: Never     Marital Status:    Housing Stability: Low Risk  (8/10/2023)    Housing Stability Vital Sign     Unable to Pay for Housing in the Last Year: No     Number of Places Lived in the Last Year: 1     Unstable Housing in the Last Year: No           Review of Systems   Constitutional: Negative for chills and fever.   Cardiovascular:  Positive for leg swelling. Negative for claudication.   Respiratory:  Negative for cough and shortness of breath.    Skin:  Positive for dry skin, itching, nail changes and rash.        Lichen planus   Musculoskeletal:  Positive for arthritis (RA), back pain, joint pain and myalgias. Negative for falls, joint swelling and muscle weakness.   Gastrointestinal:  Negative for diarrhea, nausea and vomiting.   Neurological:  Positive for paresthesias. Negative for numbness, tremors and weakness.   Psychiatric/Behavioral:  Negative for altered mental status and  hallucinations.            Objective:       There were no vitals filed for this visit.      Physical Exam  Nursing note reviewed.   Constitutional:       General: He is not in acute distress.     Appearance: He is not toxic-appearing or diaphoretic.      Comments: Pt. is well-developed, well-nourished, appears stated age, in no acute distress, alert and oriented x 3. No evidence of depression, anxiety, or agitation. Calm, cooperative, and communicative. Appropriate interactions and affect.   Cardiovascular:      Pulses:           Dorsalis pedis pulses are 2+ on the right side and 2+ on the left side.        Posterior tibial pulses are 1+ on the right side and 1+ on the left side.      Comments: There is decreased digital hair. Skin is atrophic  Pulmonary:      Effort: No respiratory distress.   Musculoskeletal:      Right ankle: No tenderness. No lateral malleolus, medial malleolus, AITF ligament, CF ligament or posterior TF ligament tenderness.      Right Achilles Tendon: No defects. Dahl's test negative.      Left ankle: No tenderness. No lateral malleolus, medial malleolus, AITF ligament, CF ligament or posterior TF ligament tenderness.      Left Achilles Tendon: No defects. Dahl's test negative.      Right foot: No tenderness or bony tenderness.      Left foot: No tenderness or bony tenderness.      Comments: Fat pad atrophy to heels and met heads bilateral    Decreased stride, station of gait.  apropulsive toe off.  Increased angle and base of gait.    Patient has hammertoes of digits 2-5 bilateral partially reducible without symptom today.    Decreased first MPJ range of motion both weightbearing and nonweightbearing, no crepitus observed the first MP joint, + dorsal flag sign.Mild  bunion deformity is observed .     Lymphadenopathy:      Comments: No lymphatic streaking    Negative lymphadenopathy bilateral popliteal fossa and tarsal tunnel.     Skin:     General: Skin is warm and dry.       Coloration: Skin is not pale.      Findings: Abrasion (right hallux, left 2nd digit) present. No bruising, burn, ecchymosis or rash.      Nails: There is no clubbing.      Comments: Skin is thin, atrophic    Toenails 1-2 bilaterally are discolored/yellowed, dystrophic, brittle with subungual debris.    Neurological:      Sensory: Sensory deficit present.      Comments: [place] 5.07 monofilament is intact bilateral feet. Sharp/dull sensation is also intact Bilateral feet. Proprioception is grossly intact.     Decreased/absent vibratory sensation bilateral feet to 128Hz tuning fork.    Paresthesias, and hyperesthesia bilateral feet with no clearly identified trigger or source.           Psychiatric:         Attention and Perception: He is attentive.         Mood and Affect: Mood is not anxious. Affect is not inappropriate.         Speech: He is communicative. Speech is not slurred.         Behavior: Behavior is not combative.             Assessment:       Encounter Diagnoses   Name Primary?    Encounter for diabetic foot exam Yes    Type II diabetes mellitus with neurological manifestations     Hallux limitus, acquired, left     Hallux limitus, acquired, right     Hammer toes of both feet     Dermatophytosis of nail            Plan:       Diagnoses and all orders for this visit:    Encounter for diabetic foot exam    Type II diabetes mellitus with neurological manifestations  -     DIABETIC SHOES FOR HOME USE    Hallux limitus, acquired, left  -     DIABETIC SHOES FOR HOME USE    Hallux limitus, acquired, right  -     DIABETIC SHOES FOR HOME USE    Hammer toes of both feet  -     DIABETIC SHOES FOR HOME USE    Dermatophytosis of nail        I counseled the patient on his conditions, their implications and medical management.      Greater than 50% of this visit spent on counseling and coordination of care.    Education about the diabetic foot, neuropathy, and prevention of limb loss.    Shoe inspection.  Diabetic Foot Education. Patient reminded of the importance of good nutrition/healthy diet/weight management and blood sugar control to help prevent podiatric complications of diabetes. Patient instructed on proper foot hygeine. Wear comfortable, proper fitting shoes. Wash feet daily. Dry well. After drying, apply moisturizer to feet (no lotion to webspaces). Inspect feet daily for skin breaks, blisters, swelling, or redness. Wear cotton socks (preferably white)  Change socks every day. Do NOT walk barefoot. Do NOT use heating pads or hot water soaks. We discussed wearing proper shoe gear, daily foot inspections, never walking without protective shoe gear.     Discussed edema control and the importance of daily moisturizer to the feet such as Gold bonds diabetic foot cream    Recommend applying vicks vaporub to thick abnormal toenails daily x 6 months to treat fungal nail infection.    Rx diabetic shoes for protection and support    Based on chart review this patient does not qualify for nail care (Patients who qualify for nail care are usually as follows: diabetic with neurological manifestations, PVD, pernicious anemia, or CKD with appropriate modifiers that indicate high amputation risk).     He will continue to monitor the areas daily, inspect his feet, wear protective shoe gear when ambulatory, moisturizer to maintain skin integrity and follow in this office in approximately 12 months, sooner p.r.n.

## 2024-01-22 ENCOUNTER — LAB VISIT (OUTPATIENT)
Dept: LAB | Facility: HOSPITAL | Age: 89
End: 2024-01-22
Attending: INTERNAL MEDICINE
Payer: MEDICARE

## 2024-01-22 DIAGNOSIS — E11.22 CKD STAGE 3 DUE TO TYPE 2 DIABETES MELLITUS: ICD-10-CM

## 2024-01-22 DIAGNOSIS — N18.30 CKD STAGE 3 DUE TO TYPE 2 DIABETES MELLITUS: ICD-10-CM

## 2024-01-22 DIAGNOSIS — N18.31 STAGE 3A CHRONIC KIDNEY DISEASE: ICD-10-CM

## 2024-01-22 DIAGNOSIS — E11.21 DIABETES MELLITUS WITH PROTEINURIC DIABETIC NEPHROPATHY: ICD-10-CM

## 2024-01-22 LAB
ALBUMIN SERPL BCP-MCNC: 4 G/DL (ref 3.5–5.2)
ANION GAP SERPL CALC-SCNC: 8 MMOL/L (ref 8–16)
BASOPHILS # BLD AUTO: 0.01 K/UL (ref 0–0.2)
BASOPHILS NFR BLD: 0.3 % (ref 0–1.9)
BUN SERPL-MCNC: 19 MG/DL (ref 8–23)
CALCIUM SERPL-MCNC: 9.2 MG/DL (ref 8.7–10.5)
CHLORIDE SERPL-SCNC: 107 MMOL/L (ref 95–110)
CO2 SERPL-SCNC: 27 MMOL/L (ref 23–29)
CREAT SERPL-MCNC: 1.5 MG/DL (ref 0.5–1.4)
DIFFERENTIAL METHOD BLD: ABNORMAL
EOSINOPHIL # BLD AUTO: 0 K/UL (ref 0–0.5)
EOSINOPHIL NFR BLD: 0.5 % (ref 0–8)
ERYTHROCYTE [DISTWIDTH] IN BLOOD BY AUTOMATED COUNT: 15.2 % (ref 11.5–14.5)
EST. GFR  (NO RACE VARIABLE): 45 ML/MIN/1.73 M^2
ESTIMATED AVG GLUCOSE: 128 MG/DL (ref 68–131)
GLUCOSE SERPL-MCNC: 183 MG/DL (ref 70–110)
HBA1C MFR BLD: 6.1 % (ref 4–5.6)
HCT VFR BLD AUTO: 41.8 % (ref 40–54)
HGB BLD-MCNC: 13.4 G/DL (ref 14–18)
IMM GRANULOCYTES # BLD AUTO: 0.03 K/UL (ref 0–0.04)
IMM GRANULOCYTES NFR BLD AUTO: 0.8 % (ref 0–0.5)
LYMPHOCYTES # BLD AUTO: 1.3 K/UL (ref 1–4.8)
LYMPHOCYTES NFR BLD: 34.1 % (ref 18–48)
MAGNESIUM SERPL-MCNC: 2.1 MG/DL (ref 1.6–2.6)
MCH RBC QN AUTO: 30.9 PG (ref 27–31)
MCHC RBC AUTO-ENTMCNC: 32.1 G/DL (ref 32–36)
MCV RBC AUTO: 97 FL (ref 82–98)
MONOCYTES # BLD AUTO: 0.6 K/UL (ref 0.3–1)
MONOCYTES NFR BLD: 14.8 % (ref 4–15)
NEUTROPHILS # BLD AUTO: 1.9 K/UL (ref 1.8–7.7)
NEUTROPHILS NFR BLD: 49.5 % (ref 38–73)
NRBC BLD-RTO: 0 /100 WBC
PHOSPHATE SERPL-MCNC: 3.3 MG/DL (ref 2.7–4.5)
PLATELET # BLD AUTO: 55 K/UL (ref 150–450)
PMV BLD AUTO: 12.1 FL (ref 9.2–12.9)
POTASSIUM SERPL-SCNC: 4.4 MMOL/L (ref 3.5–5.1)
PTH-INTACT SERPL-MCNC: 53.7 PG/ML (ref 9–77)
RBC # BLD AUTO: 4.33 M/UL (ref 4.6–6.2)
SODIUM SERPL-SCNC: 142 MMOL/L (ref 136–145)
URATE SERPL-MCNC: 4.3 MG/DL (ref 3.4–7)
WBC # BLD AUTO: 3.78 K/UL (ref 3.9–12.7)

## 2024-01-22 PROCEDURE — 85025 COMPLETE CBC W/AUTO DIFF WBC: CPT | Performed by: INTERNAL MEDICINE

## 2024-01-22 PROCEDURE — 84550 ASSAY OF BLOOD/URIC ACID: CPT | Performed by: INTERNAL MEDICINE

## 2024-01-22 PROCEDURE — 86039 ANTINUCLEAR ANTIBODIES (ANA): CPT | Performed by: INTERNAL MEDICINE

## 2024-01-22 PROCEDURE — 83735 ASSAY OF MAGNESIUM: CPT | Performed by: INTERNAL MEDICINE

## 2024-01-22 PROCEDURE — 86235 NUCLEAR ANTIGEN ANTIBODY: CPT | Mod: 59 | Performed by: INTERNAL MEDICINE

## 2024-01-22 PROCEDURE — 36415 COLL VENOUS BLD VENIPUNCTURE: CPT | Performed by: INTERNAL MEDICINE

## 2024-01-22 PROCEDURE — 83970 ASSAY OF PARATHORMONE: CPT | Performed by: INTERNAL MEDICINE

## 2024-01-22 PROCEDURE — 83036 HEMOGLOBIN GLYCOSYLATED A1C: CPT | Performed by: INTERNAL MEDICINE

## 2024-01-22 PROCEDURE — 80069 RENAL FUNCTION PANEL: CPT | Performed by: INTERNAL MEDICINE

## 2024-01-22 PROCEDURE — 86038 ANTINUCLEAR ANTIBODIES: CPT | Performed by: INTERNAL MEDICINE

## 2024-01-23 LAB
ANA PATTERN 1: NORMAL
ANA SER QL IF: POSITIVE
ANA TITR SER IF: NORMAL {TITER}

## 2024-01-24 ENCOUNTER — OFFICE VISIT (OUTPATIENT)
Dept: CARDIOLOGY | Facility: CLINIC | Age: 89
End: 2024-01-24
Payer: MEDICARE

## 2024-01-24 ENCOUNTER — PATIENT OUTREACH (OUTPATIENT)
Dept: ADMINISTRATIVE | Facility: HOSPITAL | Age: 89
End: 2024-01-24
Payer: MEDICARE

## 2024-01-24 VITALS
DIASTOLIC BLOOD PRESSURE: 74 MMHG | HEIGHT: 71 IN | BODY MASS INDEX: 27.33 KG/M2 | WEIGHT: 195.25 LBS | OXYGEN SATURATION: 96 % | SYSTOLIC BLOOD PRESSURE: 146 MMHG | RESPIRATION RATE: 18 BRPM | HEART RATE: 54 BPM

## 2024-01-24 DIAGNOSIS — E11.69 HYPERLIPIDEMIA ASSOCIATED WITH TYPE 2 DIABETES MELLITUS: ICD-10-CM

## 2024-01-24 DIAGNOSIS — I27.21 PAH (PULMONARY ARTERY HYPERTENSION): ICD-10-CM

## 2024-01-24 DIAGNOSIS — I10 PRIMARY HYPERTENSION: ICD-10-CM

## 2024-01-24 DIAGNOSIS — I25.10 CORONARY ARTERY CALCIFICATION SEEN ON CAT SCAN: ICD-10-CM

## 2024-01-24 DIAGNOSIS — I70.0 ATHEROSCLEROSIS OF ABDOMINAL AORTA: ICD-10-CM

## 2024-01-24 DIAGNOSIS — D69.6 THROMBOCYTOPENIA: ICD-10-CM

## 2024-01-24 DIAGNOSIS — E78.2 MIXED HYPERLIPIDEMIA: Primary | ICD-10-CM

## 2024-01-24 DIAGNOSIS — E78.5 HYPERLIPIDEMIA ASSOCIATED WITH TYPE 2 DIABETES MELLITUS: ICD-10-CM

## 2024-01-24 DIAGNOSIS — I10 ESSENTIAL HYPERTENSION: ICD-10-CM

## 2024-01-24 DIAGNOSIS — E11.21 DIABETES MELLITUS WITH PROTEINURIC DIABETIC NEPHROPATHY: ICD-10-CM

## 2024-01-24 LAB
ANTI SM ANTIBODY: 0.09 RATIO (ref 0–0.99)
ANTI SM/RNP ANTIBODY: 0.08 RATIO (ref 0–0.99)
ANTI-SM INTERPRETATION: NEGATIVE
ANTI-SM/RNP INTERPRETATION: NEGATIVE
ANTI-SSA ANTIBODY: 0.07 RATIO (ref 0–0.99)
ANTI-SSA INTERPRETATION: NEGATIVE
ANTI-SSB ANTIBODY: 0.06 RATIO (ref 0–0.99)
ANTI-SSB INTERPRETATION: NEGATIVE
DSDNA AB SER-ACNC: NORMAL [IU]/ML

## 2024-01-24 PROCEDURE — 99214 OFFICE O/P EST MOD 30 MIN: CPT | Mod: S$PBB,,, | Performed by: INTERNAL MEDICINE

## 2024-01-24 PROCEDURE — 93005 ELECTROCARDIOGRAM TRACING: CPT | Mod: PBBFAC | Performed by: INTERNAL MEDICINE

## 2024-01-24 PROCEDURE — 93010 ELECTROCARDIOGRAM REPORT: CPT | Mod: S$PBB,,, | Performed by: INTERNAL MEDICINE

## 2024-01-24 PROCEDURE — 99999 PR PBB SHADOW E&M-EST. PATIENT-LVL V: CPT | Mod: PBBFAC,,, | Performed by: INTERNAL MEDICINE

## 2024-01-24 PROCEDURE — 99215 OFFICE O/P EST HI 40 MIN: CPT | Mod: PBBFAC | Performed by: INTERNAL MEDICINE

## 2024-01-24 NOTE — PROGRESS NOTES
CARDIOLOGY CLINIC VISIT        HISTORY OF PRESENT ILLNESS:     Bria Lawson presents for continued care.      Echocardiogram from 10/29/2021 shows normal ejection fraction estimated 60%.  Mild to moderate mitral regurgitation.  Normal pulmonary pressure.  Prior study had showed a normal to low normal left ventricular systolic function.  Moderate mitral regurgitation.  Mildly elevated pulmonary pressure.  Seen 05/29/2020 for evaluation of shortness of breath. CT scan on 5/26/20 showing pulmonary edema, bilateral pleural effusions. Coronary calcification noted. He is without complaints. No chest pain.  No shortness of breath. Digital hypertension reviewed.    05/03/2022:  No complaints.  EKG shows sinus bradycardia with first-degree AV block.  Home blood pressure logs reviewed.  Normal values.    CT Chest 5/26/20:     1. CT findings favoring sequela of cardiac decompensation causing pulmonary edema and bilateral pleural effusions.  Superimposed COVID-19 pneumonia cannot be entirely excluded noting that pleural effusions is not a commonly reported finding and therefore would be atypical.  2. Persistent soft tissue attenuation nodule within the prevascular mediastinum, presumably an enlarged lymph node and unchanged when compared to previous PET-CTs.  3. Slight interval increased size of multiple mediastinal lymph nodes, nonspecific.  4. Cardiomegaly with severe dense coronary artery atherosclerosis in a 3 vessel distribution.  5. Additional findings as detailed in the body of the report.    10/18/2022: Had COVID after a trip to [place]. Mild case. Overall he feels good. No complaints. Blood pressure is high but usual for him. (White coat). EKG today shows sinus bradycardia with first degree AVB.    05/24/2023: Feels good.  No complaints.  Has not taken his medications this morning.  Home blood pressure logs reviewed.  Plans to go to [facility] this summer.    Notes from January 24:  Patient here for follow-up.  Denies  any chest pains at rest on exertion, orthopnea, PND.    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    Hyperlipidemia 02/26/2015    Hypertension     Lymphoma     Uncontrolled secondary diabetes mellitus with stage 3 CKD (GFR 30-59) 08/28/2015       PAST SURGICAL HISTORY:     Past Surgical History:   Procedure Laterality Date    BONE MARROW BIOPSY Left 09/19/2018    Procedure: Biopsy-bone marrow;  Surgeon: Velia Tobias MD;  Location: [facility];  Service: Oncology;  Laterality: Left;    CATARACT EXTRACTION Bilateral 1996    COLONOSCOPY N/A 11/24/2020    Procedure: COLONOSCOPY Gol[place];  Surgeon: Masoud Hwang MD;  Location: [facility];  Service: Endoscopy;  Laterality: N/A;    ESOPHAGOGASTRODUODENOSCOPY N/A 11/24/2020    Procedure: EGD (ESOPHAGOGASTRODUODENOSCOPY);  Surgeon: Masoud Hwang MD;  Location: [facility];  Service: Endoscopy;  Laterality: N/A;    eye lid sx Bilateral 2017    EYE SURGERY      cataracts    JOINT REPLACEMENT      knee replacement    retinal injection s Bilateral     scrotal cyst         ALLERGIES AND MEDICATION:   Review of patient's allergies indicates:  No Known Allergies     Medication List            Accurate as of January 24, 2024  4:06 PM. If you have any questions, ask your nurse or doctor.                CONTINUE taking these medications      allopurinoL 100 MG tablet  Commonly known as: ZYLOPRIM  TAKE 1 TABLET(100 MG) BY MOUTH EVERY DAY      amLODIPine 10 MG tablet  Commonly known as: NORVASC  TAKE 1 TABLET(10 MG) BY MOUTH EVERY DAY     ascorbic acid (vitamin C) 1000 MG tablet  Commonly known as: VITAMIN C     * atorvastatin 20 MG tablet  Commonly known as: LIPITOR  TAKE 1 TABLET(20 MG) BY MOUTH EVERY DAY     * atorvastatin 20 MG tablet  Commonly known as: LIPITOR  TAKE 1 TABLET(20 MG) BY MOUTH EVERY DAY     blood sugar diagnostic Strp  1 strip by Misc.(Non-Drug; Combo Route) route 2 (two) times daily. Disp glucometer and lancets as well     carvediloL 6.25 MG tablet  Commonly known as: COREG  TAKE 1 TABLET(6.25 MG) BY MOUTH TWICE DAILY WITH MEALS     CENTRUM SILVER MEN ORAL     DUPIXENT  mg/2 mL Pnij  Generic drug: dupilumab     FARXIGA 10 mg tablet  Generic drug: dapagliflozin propanediol  Take 1 tablet (10 mg total) by mouth once daily.     FeroSuL 325 mg (65 mg iron) Tab tablet  Generic drug: ferrous sulfate  TAKE 1 TABLET BY MOUTH EVERY DAY WITH BREAKFAST     folic acid 800 MCG Tab  Commonly known as: FOLVITE     gabapentin 300 MG capsule  Commonly known as: NEURONTIN  TAKE 1 CAPSULE(300 MG) BY MOUTH THREE TIMES DAILY     glipiZIDE 10 MG Tr24  Commonly known as: GLUCOTROL  TAKE 1 TABLET(10 MG) BY MOUTH EVERY DAY     JANUVIA 100 MG Tab  Generic drug: SITagliptin phosphate  TAKE 1 TABLET ONCE DAILY     methylPREDNISolone 4 mg tablet  Commonly known as: MEDROL DOSEPACK  use as directed     niacin 500 MG Tab     pantoprazole 40 MG tablet  Commonly known as: PROTONIX  Take 1 tablet (40 mg total) by mouth once daily.     predniSONE 20 MG tablet  Commonly known as: DELTASONE     TRUEPLUS LANCETS 33 gauge Misc  Generic drug: lancets     TURMERIC ROOT EXTRACT ORAL     valsartan 320 MG tablet  Commonly known as: DIOVAN  TAKE 1 TABLET ONCE DAILY     vitamin E 400 UNIT capsule           * This list has 2 medication(s) that are the same as other medications prescribed for you. Read the directions carefully, and ask your doctor or other care provider to  review them with you.                  SOCIAL HISTORY:     Social History     Socioeconomic History    Marital status:    Tobacco Use    Smoking status: Former    Smokeless tobacco: Never    Tobacco comments:     09/26/23 Patient Quit Smoking At The Age of 38 - Over 50 Years Ago   Substance and Sexual Activity    Alcohol use: Yes     Comment: socially - maybe few times a week    Drug use: No    Sexual activity: Yes     Partners: Female     Social Determinants of Health     Financial Resource Strain: Low Risk  (1/22/2024)    Overall Financial Resource Strain (CARDIA)     Difficulty of Paying Living Expenses: Not hard at all   Food Insecurity: No Food Insecurity (1/22/2024)    Hunger Vital Sign     Worried About Running Out of Food in the Last Year: Never true     Ran Out of Food in the Last Year: Never true   Transportation Needs: No Transportation Needs (1/22/2024)    PRAPARE - Transportation     Lack of Transportation (Medical): No     Lack of Transportation (Non-Medical): No   Physical Activity: Inactive (1/22/2024)    Exercise Vital Sign     Days of Exercise per Week: 0 days     Minutes of Exercise per Session: 0 min   Stress: No Stress Concern Present (1/22/2024)    [employer] [place] [employer] - Occupational Stress Questionnaire     Feeling of Stress : Only a little   Social Connections: Socially Isolated (1/22/2024)    Social Connection and Isolation Panel [NHANES]     Frequency of Communication with Friends and Family: Once a week     Frequency of Social Gatherings with Friends and Family: Once a week     Attends Sabianism Services: Never     Active Member of Clubs or Organizations: No     Attends Club or Organization Meetings: Never     Marital Status:    Housing Stability: Low Risk  (1/22/2024)    Housing Stability Vital Sign     Unable to Pay for Housing in the Last Year: No     Number of Places Lived in the Last Year: 1     Unstable Housing in the Last Year: No       FAMILY  HISTORY:     Family History   Problem Relation Age of Onset    Hypertension Mother     Diabetes Father     No Known Problems Sister     No Known Problems Brother     No Known Problems Maternal Aunt     No Known Problems Maternal Uncle     No Known Problems Paternal Aunt     No Known Problems Paternal Uncle     No Known Problems Maternal Grandmother     No Known Problems Maternal Grandfather     No Known Problems Paternal Grandmother     No Known Problems Paternal Grandfather     No Known Problems Daughter     No Known Problems Son     No Known Problems Son     No Known Problems Other     Amblyopia Neg Hx     Blindness Neg Hx     Cancer Neg Hx     Cataracts Neg Hx     Glaucoma Neg Hx     Macular degeneration Neg Hx     Retinal detachment Neg Hx     Strabismus Neg Hx     Stroke Neg Hx     Thyroid disease Neg Hx        REVIEW OF SYSTEMS:   Review of Systems   Constitutional:  Negative for chills, diaphoresis, fever, malaise/fatigue and weight loss.   HENT:  Negative for congestion, hearing loss, sinus pain, sore throat and tinnitus.    Eyes:  Negative for blurred vision, double vision, photophobia and pain.   Respiratory:  Negative for cough, hemoptysis, sputum production, shortness of breath, wheezing and stridor.    Cardiovascular:  Negative for chest pain, palpitations, orthopnea, claudication, leg swelling and PND.   Gastrointestinal:  Negative for abdominal pain, blood in stool, heartburn, melena, nausea and vomiting.   Musculoskeletal:  Negative for back pain, falls, joint pain, myalgias and neck pain.   Neurological:  Negative for dizziness, tingling, tremors, sensory change, speech change, focal weakness, seizures, loss of consciousness, weakness and headaches.   Endo/Heme/Allergies:  Does not bruise/bleed easily.   Psychiatric/Behavioral:  Negative for depression, memory loss and substance abuse. The patient is not nervous/anxious.        PHYSICAL EXAM:     Vitals:    01/24/24 1458   BP: (!) 146/74   Pulse:  "(!) 54   Resp: 18    Body mass index is 27.23 kg/m².  Weight: 88.6 kg (195 lb 3.8 oz)   Height: 5' 11" (180.3 cm)     Physical Exam  Vitals reviewed.   Constitutional:       General: He is not in acute distress.     Appearance: He is well-developed. He is not diaphoretic.   Neck:      Vascular: No carotid bruit or JVD.   Cardiovascular:      Rate and Rhythm: Regular rhythm. Bradycardia present.      Pulses: Normal pulses.      Heart sounds: Murmur heard.      Systolic murmur is present with a grade of 3/6.   Pulmonary:      Effort: Pulmonary effort is normal.      Breath sounds: Normal breath sounds.   Abdominal:      General: Bowel sounds are normal.      Palpations: Abdomen is soft.      Tenderness: There is no abdominal tenderness.   Musculoskeletal:      Right lower leg: No edema.      Left lower leg: No edema.   Neurological:      Mental Status: He is alert and oriented to person, place, and time.   Psychiatric:         Speech: Speech normal.         Behavior: Behavior normal.         DATA:     EKG (personally reviewed tracing):  10/18/2022 - sinus bradycardia with first degree AVB    Laboratory:  CBC:  Recent Labs   Lab 09/18/23  1024 09/22/23  1126 01/22/24  1120   WBC 5.18 4.05 3.78 L   Hemoglobin 11.9 L 11.5 L 13.4 L   Hematocrit 36.8 L 34.7 L 41.8   Platelets 65 L 72 L 55 L       CHEMISTRIES:  Recent Labs   Lab 05/09/22  1459 07/02/22  1413 07/14/22  1025 08/04/22  1126 09/18/23  1024 09/22/23  1126 01/22/24  1120   Glucose  --  238 H 309 H   < > 241 H 237 H 183 H   Sodium  --  139 136   < > 139 140 142   Potassium  --  4.5 4.3   < > 4.1 4.2 4.4   BUN  --  15 19   < > 27 H 20 19   Creatinine 1.2 1.3 1.4   < > 1.8 H 1.5 H 1.5 H   eGFR if  >60.0 58 A 53 A  --   --   --   --    eGFR if non  54.8 A 50 A 45 A  --   --   --   --    Calcium  --  9.3 8.7   < > 8.9 8.6 L 9.2   Magnesium  --   --   --   --   --   --  2.1    < > = values in this interval not displayed.       CARDIAC " BIOMARKERS:          COAGS:        LIPIDS/LFTS:  Recent Labs   Lab 07/02/21  0814 11/15/21  0846 11/16/22  0904 01/25/23  0920 08/31/23  1005   Cholesterol 106 L  --   --   --   --    Triglycerides 121  --   --   --   --    HDL 34 L  --   --   --   --    LDL Cholesterol 47.8 L  --   --   --   --    Non-HDL Cholesterol 72  --   --   --   --    AST 15   < > 14 16 16   ALT 15   < > 13 8 L 15    < > = values in this interval not displayed.       Cardiovascular Testing:    Echocardiogram 10/29/2021:    The left ventricle is mildly enlarged with eccentric hypertrophy and normal systolic function.  The estimated ejection fraction is 60%.  Indeterminate left ventricular diastolic function.  Normal right ventricular size with normal right ventricular systolic function.  Moderate left atrial enlargement.  Mild-to-moderate mitral regurgitation.  Mild tricuspid regurgitation.  Normal central venous pressure (3 mmHg).  The estimated PA systolic pressure is 27 mmHg.    Echo 6/19/20:     Low normal left ventricular systolic function. The estimated ejection fraction is 50-55%.  Mild eccentric left ventricular hypertrophy.  Mild left ventricular enlargement.  No wall motion abnormalities.  Normal right ventricular systolic function.  Moderate mitral regurgitation.  Mild tricuspid regurgitation.  The estimated PA systolic pressure is 35 mmHg.    ASSESSMENT:     Abnormal CT scan/coronary calcification:  No symptoms suggestive of angina  Hypertension  Hyperlipidemia: LDL 47  Diabetes mellitus  Lymphoma  Pulmonary hypertension: normal by last echo.  Mitral regurgitation:  Mild/Moderate by last echo  Atherosclerosis of abdominal aorta    PLAN:     Hypertension:  Continue current management  Hyperlipidemia:  Continue current management  Mitral regurgitation:  Last echo showed mild to moderate  Pulmonary hypertension:  Follow-up echocardiogram showed normal pulmonary pressure.  Return to clinic 6 months with Dr Marizol Melton  Buster TALAVERA, FAC, [facility]  Interventional Cardiologist  [facility] ([facility])

## 2024-01-24 NOTE — PROGRESS NOTES
Updates were requested from care everywhere.  Health Maintenance has been updated.  LINKS immunization registry triggered.  Immunizations were reconciled.  Per TUCKER in basket message, pt declined flu vaccine 01/24/2024.

## 2024-01-31 ENCOUNTER — EXTERNAL CHRONIC CARE MANAGEMENT (OUTPATIENT)
Dept: PRIMARY CARE CLINIC | Facility: CLINIC | Age: 89
End: 2024-01-31
Payer: MEDICARE

## 2024-01-31 PROCEDURE — 99490 CHRNC CARE MGMT STAFF 1ST 20: CPT | Mod: PBBFAC,PO | Performed by: INTERNAL MEDICINE

## 2024-01-31 PROCEDURE — 99490 CHRNC CARE MGMT STAFF 1ST 20: CPT | Mod: S$PBB,,, | Performed by: INTERNAL MEDICINE

## 2024-02-26 ENCOUNTER — OFFICE VISIT (OUTPATIENT)
Dept: OPTOMETRY | Facility: CLINIC | Age: 89
End: 2024-02-26
Payer: MEDICARE

## 2024-02-26 DIAGNOSIS — Z96.1 PSEUDOPHAKIA: ICD-10-CM

## 2024-02-26 DIAGNOSIS — H52.7 REFRACTIVE ERROR: ICD-10-CM

## 2024-02-26 DIAGNOSIS — E11.9 TYPE 2 DIABETES MELLITUS WITHOUT RETINOPATHY: Primary | ICD-10-CM

## 2024-02-26 PROCEDURE — 92014 COMPRE OPH EXAM EST PT 1/>: CPT | Mod: S$PBB,,, | Performed by: OPTOMETRIST

## 2024-02-26 PROCEDURE — 92015 DETERMINE REFRACTIVE STATE: CPT | Mod: ,,, | Performed by: OPTOMETRIST

## 2024-02-26 PROCEDURE — 99999 PR PBB SHADOW E&M-EST. PATIENT-LVL III: CPT | Mod: PBBFAC,,, | Performed by: OPTOMETRIST

## 2024-02-26 PROCEDURE — 99213 OFFICE O/P EST LOW 20 MIN: CPT | Mod: PBBFAC,PO | Performed by: OPTOMETRIST

## 2024-02-26 NOTE — PROGRESS NOTES
Subjective:       Patient ID: Bria Lawson is a 88 y.o. male      Chief Complaint   Patient presents with    Concerns About Ocular Health    Diabetic Eye Exam     History of Present Illness  Dls: 12/12/22 Dr. Gibson     89 y/o male presents today for diabetic eye exam.   Pt states no changes in vision.  Pt wears trifocal glasses.     LBS ?     + ou tearing  + ou itching  No burning  No pain  No ha's  No floaters  No flashes     Eye meds  Otc gtts ou prn     Pohx:   S/p CE Bilateral 1996  S/p eye lids sx   Retinal injections     Fohx:   None    Hemoglobin A1C       Date                     Value               Ref Range             Status                01/22/2024               6.1 (H)             4.0 - 5.6 %           Final                   09/18/2023               6.5 (H)             4.0 - 5.6 %           Final                   03/06/2023               5.8 (H)             4.0 - 5.6 %           Final                 Assessment/Plan:     1. Type 2 diabetes mellitus without retinopathy  No diabetic retinopathy. Discussed with pt the effects of diabetes on vision, importance of good blood sugar control, compliance with meds, and follow up care with PCP. Return in 1 year for dilated eye exam, sooner PRN.    2. Pseudophakia  Well-centered. Clear.     3. Refractive error  Educated patient on refractive error and discussed lens options. Dispensed updated spectacle Rx. Educated about adaptation period to new specs.    Eyeglass Final Rx       Eyeglass Final Rx         Sphere Cylinder Add    Right +0.25 Sphere +2.75    Left [place] Sphere +2.75      Expiration Date: 2/26/2025                      Follow up in about 1 year (around 2/26/2025) for Diabetic Eye Exam.

## 2024-02-29 ENCOUNTER — EXTERNAL CHRONIC CARE MANAGEMENT (OUTPATIENT)
Dept: PRIMARY CARE CLINIC | Facility: CLINIC | Age: 89
End: 2024-02-29
Payer: MEDICARE

## 2024-02-29 PROCEDURE — 99490 CHRNC CARE MGMT STAFF 1ST 20: CPT | Mod: PBBFAC,PO | Performed by: INTERNAL MEDICINE

## 2024-02-29 PROCEDURE — 99490 CHRNC CARE MGMT STAFF 1ST 20: CPT | Mod: S$PBB,,, | Performed by: INTERNAL MEDICINE

## 2024-03-05 ENCOUNTER — OFFICE VISIT (OUTPATIENT)
Dept: NEPHROLOGY | Facility: CLINIC | Age: 89
End: 2024-03-05
Payer: MEDICARE

## 2024-03-05 VITALS
HEART RATE: 52 BPM | OXYGEN SATURATION: 97 % | SYSTOLIC BLOOD PRESSURE: 167 MMHG | DIASTOLIC BLOOD PRESSURE: 67 MMHG | BODY MASS INDEX: 26.75 KG/M2 | WEIGHT: 191.81 LBS

## 2024-03-05 DIAGNOSIS — E11.29 DIABETES MELLITUS WITH PROTEINURIA: ICD-10-CM

## 2024-03-05 DIAGNOSIS — R80.9 DIABETES MELLITUS WITH PROTEINURIA: ICD-10-CM

## 2024-03-05 DIAGNOSIS — N18.32 HYPERTENSIVE KIDNEY DISEASE WITH STAGE 3B CHRONIC KIDNEY DISEASE: ICD-10-CM

## 2024-03-05 DIAGNOSIS — E11.22 CKD STAGE 3 DUE TO TYPE 2 DIABETES MELLITUS: Primary | ICD-10-CM

## 2024-03-05 DIAGNOSIS — N18.30 CKD STAGE 3 DUE TO TYPE 2 DIABETES MELLITUS: Primary | ICD-10-CM

## 2024-03-05 DIAGNOSIS — I12.9 HYPERTENSIVE KIDNEY DISEASE WITH STAGE 3B CHRONIC KIDNEY DISEASE: ICD-10-CM

## 2024-03-05 DIAGNOSIS — N18.31 STAGE 3A CHRONIC KIDNEY DISEASE: ICD-10-CM

## 2024-03-05 PROCEDURE — 99214 OFFICE O/P EST MOD 30 MIN: CPT | Mod: S$PBB,,, | Performed by: INTERNAL MEDICINE

## 2024-03-05 PROCEDURE — 99999 PR PBB SHADOW E&M-EST. PATIENT-LVL IV: CPT | Mod: PBBFAC,,, | Performed by: INTERNAL MEDICINE

## 2024-03-05 PROCEDURE — 99214 OFFICE O/P EST MOD 30 MIN: CPT | Mod: PBBFAC | Performed by: INTERNAL MEDICINE

## 2024-03-05 PROCEDURE — G2211 COMPLEX E/M VISIT ADD ON: HCPCS | Mod: S$PBB,,, | Performed by: INTERNAL MEDICINE

## 2024-03-05 RX ORDER — CHLORTHALIDONE 25 MG/1
12.5 TABLET ORAL DAILY
Qty: 45 TABLET | Refills: 4 | Status: SHIPPED | OUTPATIENT
Start: 2024-03-05 | End: 2025-05-29

## 2024-03-05 RX ORDER — DAPAGLIFLOZIN 10 MG/1
10 TABLET, FILM COATED ORAL DAILY
Qty: 90 TABLET | Refills: 4 | Status: SHIPPED | OUTPATIENT
Start: 2024-03-05

## 2024-03-05 NOTE — PROGRESS NOTES
Subjective:       Patient ID: Bria Lawson is a 88 y.o. 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 who has been following up in renal clinic for ckd.   Patient feels well today.      HPI  Review of Systems   Constitutional: Negative.    HENT: Negative.     Eyes: Negative.    Respiratory: Negative.     Cardiovascular: Negative.    Gastrointestinal: Negative.  Negative for diarrhea, nausea and vomiting.   Genitourinary:  Negative for decreased urine volume, difficulty urinating, dysuria, hematuria and urgency.   Musculoskeletal: Negative.    Skin: Negative.    Neurological: Negative.    Psychiatric/Behavioral: Negative.         Objective:      Physical Exam  Vitals and nursing note reviewed.   Constitutional:       Appearance: He is well-developed.   HENT:      Head: Normocephalic and atraumatic.      Right Ear: External ear normal.      Left Ear: External ear normal.   Neck:      Vascular: No JVD.   Cardiovascular:      Rate and Rhythm: Normal rate and regular rhythm.      Heart sounds: Murmur heard.   Pulmonary:      Effort: Pulmonary effort is normal. No respiratory distress.      Breath sounds: Normal breath sounds. No stridor. No rales.   Chest:      Chest wall: No tenderness.   Abdominal:      General: Bowel sounds are normal. There is no distension.      Palpations: Abdomen is soft. There is no mass.      Tenderness: There  is no abdominal tenderness. There is no guarding or rebound.   Musculoskeletal:         General: Normal range of motion.      Cervical back: Normal range of motion and neck supple.      Comments: Trace edema comfort   Lymphadenopathy:      Cervical: No cervical adenopathy.   Skin:     General: Skin is warm and dry.      Comments: Rash lower back.   Neurological:      Mental Status: He is alert and oriented to person, place, and time.      Cranial Nerves: No cranial nerve deficit.      Coordination: Coordination normal.      Deep Tendon Reflexes: Reflexes are normal and symmetric.   Psychiatric:         Behavior: Behavior normal.         Thought Content: Thought content normal.         Judgment: Judgment normal.       Assessment:       1. Stage 3a chronic kidney disease    2. 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 CKD3a: With low grade proteinuria, stable for > 15 yrs, likely from DMII/HTN   - UPCR 300 mg/g  on Valsartan  on Dapagliflozin (had a small creatinine increase with initiation, now back to 1.5 mg/dl)      Lab Results   Component Value Date    CREATININE 1.5 (H) 01/22/2024     Protein Creatinine Ratios:    Prot/Creat Ratio, Urine   Date Value Ref Range Status   01/22/2024 0.38 (H) 0.00 - 0.20 Final   09/18/2023 0.25 (H) 0.00 - 0.20 Final   06/26/2023 0.27 (H) 0.00 - 0.20 Final         Acid-Base:   Lab Results   Component Value Date     01/22/2024    K 4.4 01/22/2024    CO2 27 01/22/2024     2. HTN: Hypertensive in the office 160/65. Blood pressures at home decently controlled.    On Valsartan and amlodipine, on coreg. Is doing ambulatory BP measuring.   - will add low dose chlorthalidone for BP control.      3. Secondary (renal) hyperparathyroidism: last PTH within target  Lab Results   Component Value Date    PTH 53.7 01/22/2024    CALCIUM 9.2 01/22/2024    PHOS 3.3 01/22/2024       4. Anemia: mildly worse from last labs, follows with hem/onc for his  lymphoma  Lab Results   Component Value Date    HGB 13.4 (L) 01/22/2024        5. DM:  Last HbA1C: well controlled with medication  Lab Results   Component Value Date    HGBA1C 6.1 (H) 01/22/2024       6. Lipid management: On a statin  Lab Results   Component Value Date    LDLCALC 47.8 (L) 07/02/2021      7. Lymphoma: follow with hem/onc, recent PET scan was ok    See back in 4 month with labs    Renal profile in 2 wks.

## 2024-03-30 NOTE — TELEPHONE ENCOUNTER
No care due was identified.  [facility] Embedded Care Due Messages. Reference number: [id number].   3/30/2024 6:37:16 PM CDT

## 2024-03-31 ENCOUNTER — EXTERNAL CHRONIC CARE MANAGEMENT (OUTPATIENT)
Dept: PRIMARY CARE CLINIC | Facility: CLINIC | Age: 89
End: 2024-03-31
Payer: MEDICARE

## 2024-03-31 PROCEDURE — 99490 CHRNC CARE MGMT STAFF 1ST 20: CPT | Mod: PBBFAC,PO | Performed by: INTERNAL MEDICINE

## 2024-03-31 PROCEDURE — 99490 CHRNC CARE MGMT STAFF 1ST 20: CPT | Mod: S$PBB,,, | Performed by: INTERNAL MEDICINE

## 2024-04-01 RX ORDER — VALSARTAN 320 MG/1
320 TABLET ORAL DAILY
Qty: 90 TABLET | Refills: 3 | Status: SHIPPED | OUTPATIENT
Start: 2024-04-01

## 2024-04-30 ENCOUNTER — EXTERNAL CHRONIC CARE MANAGEMENT (OUTPATIENT)
Dept: PRIMARY CARE CLINIC | Facility: CLINIC | Age: 89
End: 2024-04-30
Payer: MEDICARE

## 2024-04-30 PROCEDURE — 99490 CHRNC CARE MGMT STAFF 1ST 20: CPT | Mod: S$PBB,,, | Performed by: INTERNAL MEDICINE

## 2024-04-30 PROCEDURE — 99490 CHRNC CARE MGMT STAFF 1ST 20: CPT | Mod: PBBFAC,PO | Performed by: INTERNAL MEDICINE

## 2024-05-02 RX ORDER — PANTOPRAZOLE SODIUM 40 MG/1
40 TABLET, DELAYED RELEASE ORAL
Qty: 90 TABLET | Refills: 1 | Status: SHIPPED | OUTPATIENT
Start: 2024-05-02

## 2024-05-02 NOTE — TELEPHONE ENCOUNTER
Care Due:                  Date            Visit Type   Department     Provider  --------------------------------------------------------------------------------                                [facility]                              FOLLOWUP/OF  MED/ INTERNAL  Adiel Coleman  Last Visit: 09-      FICE VISIT   MED/ PEDS      [place]  Next Visit: None Scheduled  None         None Found                                                            Last  Test          Frequency    Reason                     Performed    Due Date  --------------------------------------------------------------------------------    HBA1C.......  6 months...  ERWIN glipiZIDE.......  01- 07-    Lipid Panel.  12 months..  atorvastatin.............  07- 06-    Health [facility] Embedded Care Due Messages. Reference number: [id number].   5/02/2024 3:36:41 PM CDT

## 2024-05-02 NOTE — TELEPHONE ENCOUNTER
Provider Staff:  Action required for this patient    Requires labs      Please see care gap opportunities below in Care Due Message.    Thanks!  [facility]     Appointments      Date Provider   Last Visit   9/26/2023 Adiel Bragg MD   Next Visit   Visit date not found Adiel Bragg MD     Refill Decision Note   Bria Lawson  is requesting a refill authorization.  Brief Assessment and Rationale for Refill:  Approve     Medication Therapy Plan:         Comments:     Note composed:5:37 PM 05/02/2024

## 2024-05-11 NOTE — TELEPHONE ENCOUNTER
No care due was identified.  Health [facility] Embedded Care Due Messages. Reference number: [id number].   5/11/2024 5:28:40 AM CDT

## 2024-05-11 NOTE — TELEPHONE ENCOUNTER
Refill Routing Note   Medication(s) are not appropriate for processing by [facility] for the following reason(s):        Required labs abnormal    [facility] action(s):  Defer               Appointments  past 12m or future 3m with PCP    Date Provider   Last Visit   9/26/2023 Adiel Bragg MD   Next Visit   Visit date not found Adiel Bragg MD   ED visits in past 90 days: 0        Note composed:10:57 AM 05/11/2024

## 2024-05-13 RX ORDER — GLIPIZIDE 10 MG/1
TABLET, FILM COATED, EXTENDED RELEASE ORAL
Qty: 30 TABLET | Refills: 0 | Status: SHIPPED | OUTPATIENT
Start: 2024-05-13 | End: 2024-06-10

## 2024-05-14 RX ORDER — GLIPIZIDE 10 MG/1
TABLET, FILM COATED, EXTENDED RELEASE ORAL
Qty: 90 TABLET | OUTPATIENT
Start: 2024-05-14

## 2024-05-14 NOTE — TELEPHONE ENCOUNTER
Refill Decision Note   Bria Lawson  is requesting a refill authorization.  Brief Assessment and Rationale for Refill:  Quick Discontinue     Medication Therapy Plan: E-Prescribing Status: Receipt confirmed by pharmacy (5/13/2024 10:55 PM CDT)          Comments:     Note composed:10:20 AM 05/14/2024

## 2024-05-14 NOTE — TELEPHONE ENCOUNTER
No care due was identified.  Health [facility] Embedded Care Due Messages. Reference number: [id number].   5/14/2024 7:09:26 AM CDT

## 2024-05-30 ENCOUNTER — OFFICE VISIT (OUTPATIENT)
Dept: UROLOGY | Facility: CLINIC | Age: 89
End: 2024-05-30
Payer: MEDICARE

## 2024-05-30 VITALS — WEIGHT: 191.81 LBS | HEIGHT: 71 IN | BODY MASS INDEX: 26.85 KG/M2

## 2024-05-30 DIAGNOSIS — R31.0 GROSS HEMATURIA: Primary | ICD-10-CM

## 2024-05-30 PROCEDURE — 99999 PR PBB SHADOW E&M-EST. PATIENT-LVL III: CPT | Mod: PBBFAC,,, | Performed by: UROLOGY

## 2024-05-30 PROCEDURE — 88112 CYTOPATH CELL ENHANCE TECH: CPT | Mod: 26,,, | Performed by: PATHOLOGY

## 2024-05-30 PROCEDURE — 99213 OFFICE O/P EST LOW 20 MIN: CPT | Mod: PBBFAC,25 | Performed by: UROLOGY

## 2024-05-30 PROCEDURE — 88112 CYTOPATH CELL ENHANCE TECH: CPT | Performed by: PATHOLOGY

## 2024-05-30 PROCEDURE — 99213 OFFICE O/P EST LOW 20 MIN: CPT | Mod: S$PBB,,, | Performed by: UROLOGY

## 2024-05-30 NOTE — PROGRESS NOTES
Subjective:       Patient ID: Bria Lawson is a 88 y.o. male.    Chief Complaint: Hematuria (/Pt here for instance of hematuria. )    HPI patient had microscopic hematuria in the past and a negative workup by me he is now having gross hematuria he has not taking Proscar he has not a smoker going to get a renal ultrasound cysto and urine cytology    Past Medical History:   Diagnosis Date    Arthritis     Atrial fibrillation     CKD stage 3 due to type 2 diabetes mellitus 05/26/2015    Colon polyp     Coronary artery disease     Diabetes mellitus with renal manifestations, uncontrolled 02/26/2015    Diabetic retinopathy     GERD (gastroesophageal reflux disease) 02/26/2015    Gout     Gout, chronic 02/26/2015    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    Hyperlipidemia 02/26/2015    Hypertension     Lymphoma     Uncontrolled secondary diabetes mellitus with stage 3 CKD (GFR 30-59) 08/28/2015       Past Surgical History:   Procedure Laterality Date    BONE MARROW BIOPSY Left 09/19/2018    Procedure: Biopsy-bone marrow;  Surgeon: Velia Tobias MD;  Location: [facility];  Service: Oncology;  Laterality: Left;    CATARACT EXTRACTION Bilateral 1996    COLONOSCOPY N/A 11/24/2020    Procedure: COLONOSCOPY Gol[place];  Surgeon: Masoud Hwang MD;  Location: [facility];  Service: Endoscopy;  Laterality: N/A;    ESOPHAGOGASTRODUODENOSCOPY N/A 11/24/2020    Procedure: EGD (ESOPHAGOGASTRODUODENOSCOPY);  Surgeon: Masoud Hwang MD;  Location: [facility];  Service: Endoscopy;  Laterality: N/A;    eye lid sx Bilateral 2017    EYE SURGERY      cataracts    JOINT REPLACEMENT      knee replacement    retinal injection s Bilateral     scrotal cyst         Family History   Problem Relation Name Age of Onset    Hypertension Mother      Diabetes Father      No Known Problems Sister      No Known Problems Brother      No Known Problems Maternal Aunt      No Known Problems Maternal Uncle      No Known  Problems Paternal Aunt      No Known Problems Paternal Uncle      No Known Problems Maternal Grandmother      No Known Problems Maternal Grandfather      No Known Problems Paternal Grandmother      No Known Problems Paternal Grandfather      No Known Problems Daughter      No Known Problems Son      No Known Problems Son      No Known Problems Other      Amblyopia Neg Hx      Blindness Neg Hx      Cancer Neg Hx      Cataracts Neg Hx      Glaucoma Neg Hx      Macular degeneration Neg Hx      Retinal detachment Neg Hx      Strabismus Neg Hx      Stroke Neg Hx      Thyroid disease Neg Hx         Social History     Socioeconomic History    Marital status:    Tobacco Use    Smoking status: Former    Smokeless tobacco: Never    Tobacco comments:     09/26/23 Patient Quit Smoking At The Age of 38 - Over 50 Years Ago   Substance and Sexual Activity    Alcohol use: Yes     Comment: socially - maybe few times a week    Drug use: No    Sexual activity: Yes     Partners: Female     Social Determinants of Health     Financial Resource Strain: Low Risk  (1/22/2024)    Overall Financial Resource Strain (CARDIA)     Difficulty of Paying Living Expenses: Not hard at all   Food Insecurity: No Food Insecurity (1/22/2024)    Hunger Vital Sign     Worried About Running Out of Food in the Last Year: Never true     Ran Out of Food in the Last Year: Never true   Transportation Needs: No Transportation Needs (1/22/2024)    PRAPARE - Transportation     Lack of Transportation (Medical): No     Lack of Transportation (Non-Medical): No   Physical Activity: Inactive (1/22/2024)    Exercise Vital Sign     Days of Exercise per Week: 0 days     Minutes of Exercise per Session: 0 min   Stress: No Stress Concern Present (1/22/2024)    [employer] [place] [employer] - Occupational Stress Questionnaire     Feeling of Stress : Only a little   Housing Stability: Low Risk  (1/22/2024)    Housing Stability  Vital Sign     Unable to Pay for Housing in the Last Year: No     Number of Places Lived in the Last Year: 1     Unstable Housing in the Last Year: No       Allergies:  Patient has no known allergies.    Medications:    Current Outpatient Medications:     allopurinoL (ZYLOPRIM) 100 MG tablet, TAKE 1 TABLET(100 MG) BY MOUTH EVERY DAY, Disp: 90 tablet, Rfl: 1    amLODIPine (NORVASC) 10 MG tablet, TAKE 1 TABLET(10 MG) BY MOUTH EVERY DAY, Disp: 90 tablet, Rfl: 3    atorvastatin (LIPITOR) 20 MG tablet, TAKE 1 TABLET(20 MG) BY MOUTH EVERY DAY, Disp: 90 tablet, Rfl: 12    blood sugar diagnostic Strp, 1 strip by Misc.(Non-Drug; Combo Route) route 2 (two) times daily. Disp glucometer and lancets as well, Disp: 100 strip, Rfl: 12    carvediloL (COREG) 6.25 MG tablet, TAKE 1 TABLET(6.25 MG) BY MOUTH TWICE DAILY WITH MEALS, Disp: 180 tablet, Rfl: 3    chlorthalidone (HYGROTEN) 25 MG Tab, Take 0.5 tablets (12.5 mg total) by mouth once daily., Disp: 45 tablet, Rfl: 4    dapagliflozin propanediol (FARXIGA) 10 mg tablet, Take 1 tablet (10 mg total) by mouth once daily., Disp: 90 tablet, Rfl: 4    DUPIXENT  mg/2 mL PnIj, Inject into the skin., Disp: , Rfl:     FEROSUL 325 mg (65 mg iron) Tab tablet, TAKE 1 TABLET BY MOUTH EVERY DAY WITH BREAKFAST, Disp: 90 tablet, Rfl: 12    folic acid (FOLVITE) 800 MCG Tab, Take 800 mcg by mouth once daily., Disp: , Rfl:     gabapentin (NEURONTIN) 300 MG capsule, TAKE 1 CAPSULE(300 MG) BY MOUTH THREE TIMES DAILY, Disp: 270 capsule, Rfl: 3    glipiZIDE (GLUCOTROL) 10 MG TR24, TAKE 1 TABLET(10 MG) BY MOUTH EVERY DAY, Disp: 30 tablet, Rfl: 0    JANUVIA 100 mg Tab, TAKE 1 TABLET ONCE DAILY, Disp: 90 tablet, Rfl: 12    multivit-min/FA/lycopen/lutein (CENTRUM SILVER MEN ORAL), Take by mouth., Disp: , Rfl:     niacin 500 MG Tab, Take 100 mg by mouth every evening., Disp: , Rfl:     pantoprazole (PROTONIX) 40 MG tablet, TAKE 1 TABLET(40 MG) BY MOUTH EVERY DAY, Disp: 90 tablet,  Rfl: 1    TRUEPLUS LANCETS 33 gauge Misc, USE TO TEST BLOOD SUGAR BID, Disp: , Rfl: 12    TURMERIC ROOT EXTRACT ORAL, Take by mouth., Disp: , Rfl:     valsartan (DIOVAN) 320 MG tablet, Take 1 tablet (320 mg total) by mouth once daily., Disp: 90 tablet, Rfl: 3    vitamin E 400 UNIT capsule, Take 400 Units by mouth once daily., Disp: , Rfl:     Review of Systems    Objective:      Physical Exam  Constitutional:       Appearance: He is well-developed.   HENT:      Head: Normocephalic.   Cardiovascular:      Rate and Rhythm: Normal rate.   Pulmonary:      Effort: Pulmonary effort is normal.   Abdominal:      Palpations: Abdomen is soft.   Genitourinary:     Prostate: Normal.      Comments:  Prostate 30 g benign no nodules  Skin:     General: Skin is warm.   Neurological:      Mental Status: He is alert.       Assessment:       1. Gross hematuria        Plan:       Bria was seen today for hematuria.    Diagnoses and all orders for this visit:    Gross hematuria  -     US Retroperitoneal Complete; Future  -     Cystoscopy; Future  -     Cytology, Urine

## 2024-05-31 ENCOUNTER — EXTERNAL CHRONIC CARE MANAGEMENT (OUTPATIENT)
Dept: PRIMARY CARE CLINIC | Facility: CLINIC | Age: 89
End: 2024-05-31
Payer: MEDICARE

## 2024-05-31 PROCEDURE — 99490 CHRNC CARE MGMT STAFF 1ST 20: CPT | Mod: PBBFAC,PO | Performed by: INTERNAL MEDICINE

## 2024-05-31 PROCEDURE — 99490 CHRNC CARE MGMT STAFF 1ST 20: CPT | Mod: S$PBB,,, | Performed by: INTERNAL MEDICINE

## 2024-06-03 LAB
FINAL PATHOLOGIC DIAGNOSIS: ABNORMAL
Lab: ABNORMAL

## 2024-06-10 RX ORDER — GLIPIZIDE 10 MG/1
TABLET, FILM COATED, EXTENDED RELEASE ORAL
Qty: 90 TABLET | Refills: 0 | Status: SHIPPED | OUTPATIENT
Start: 2024-06-10

## 2024-06-10 NOTE — TELEPHONE ENCOUNTER
Refill Routing Note   Medication(s) are not appropriate for processing by [facility] for the following reason(s):        Required labs abnormal    [facility] action(s):  Defer     Requires labs : Yes             Appointments  past 12m or future 3m with PCP    Date Provider   Last Visit   9/26/2023 Adiel Bragg MD   Next Visit   6/27/2024 Adiel Bragg MD   ED visits in past 90 days: 0        Note composed:5:49 AM 06/10/2024

## 2024-06-10 NOTE — TELEPHONE ENCOUNTER
Care Due:                  Date            Visit Type   Department     Provider  --------------------------------------------------------------------------------                                [facility]                              FOLLOWUP/OF  MED/ INTERNAL  [facility]  Last Visit: 09-      FICE VISIT   MED/ PEDS      Ehrensing                              [facility]                              FOLLOWUP/OF  MED/ INTERNAL  [facility]  Next Visit: 06-      FICE VISIT   MED/ PEDS      Ehrensing                                                            Last  Test          Frequency    Reason                     Performed    Due Date  --------------------------------------------------------------------------------    CMP.........  12 months..  allopurinoL, atorvastatin  08- 08-    [facility] Embedded Care Due Messages. Reference number: [id number].   6/10/2024 5:29:20 AM CDT

## 2024-06-26 ENCOUNTER — TELEPHONE (OUTPATIENT)
Dept: FAMILY MEDICINE | Facility: CLINIC | Age: 89
End: 2024-06-26
Payer: MEDICARE

## 2024-06-26 NOTE — TELEPHONE ENCOUNTER
I left two voice mail reminders for the patient in regards to an upcoming appointment on 06/27/24 with Dr. Bragg.

## 2024-06-27 ENCOUNTER — OFFICE VISIT (OUTPATIENT)
Dept: FAMILY MEDICINE | Facility: CLINIC | Age: 89
End: 2024-06-27
Payer: MEDICARE

## 2024-06-27 VITALS
WEIGHT: 196 LBS | TEMPERATURE: 98 F | HEART RATE: 61 BPM | HEIGHT: 71 IN | SYSTOLIC BLOOD PRESSURE: 138 MMHG | DIASTOLIC BLOOD PRESSURE: 64 MMHG | OXYGEN SATURATION: 96 % | BODY MASS INDEX: 27.44 KG/M2

## 2024-06-27 DIAGNOSIS — R31.29 MICROHEMATURIA: ICD-10-CM

## 2024-06-27 DIAGNOSIS — E53.8 B12 DEFICIENCY: ICD-10-CM

## 2024-06-27 DIAGNOSIS — E11.311 DIABETIC RETINOPATHY OF BOTH EYES WITH MACULAR EDEMA ASSOCIATED WITH TYPE 2 DIABETES MELLITUS, UNSPECIFIED RETINOPATHY SEVERITY: ICD-10-CM

## 2024-06-27 DIAGNOSIS — N18.31 STAGE 3A CHRONIC KIDNEY DISEASE: ICD-10-CM

## 2024-06-27 DIAGNOSIS — M06.09 SERONEGATIVE ARTHROPATHY OF MULTIPLE SITES: ICD-10-CM

## 2024-06-27 DIAGNOSIS — E11.21 DIABETES MELLITUS WITH PROTEINURIC DIABETIC NEPHROPATHY: Primary | ICD-10-CM

## 2024-06-27 DIAGNOSIS — C82.93 NODULAR LYMPHOMA OF INTRA-ABDOMINAL LYMPH NODES: ICD-10-CM

## 2024-06-27 DIAGNOSIS — D61.818 PANCYTOPENIA: ICD-10-CM

## 2024-06-27 DIAGNOSIS — D50.9 IRON DEFICIENCY ANEMIA, UNSPECIFIED IRON DEFICIENCY ANEMIA TYPE: ICD-10-CM

## 2024-06-27 DIAGNOSIS — J30.2 SEASONAL ALLERGIC RHINITIS, UNSPECIFIED TRIGGER: ICD-10-CM

## 2024-06-27 DIAGNOSIS — I10 ESSENTIAL HYPERTENSION: ICD-10-CM

## 2024-06-27 PROCEDURE — 99999 PR PBB SHADOW E&M-EST. PATIENT-LVL IV: CPT | Mod: PBBFAC,,, | Performed by: INTERNAL MEDICINE

## 2024-06-27 PROCEDURE — 99214 OFFICE O/P EST MOD 30 MIN: CPT | Mod: PBBFAC,PO | Performed by: INTERNAL MEDICINE

## 2024-06-27 PROCEDURE — 99214 OFFICE O/P EST MOD 30 MIN: CPT | Mod: S$PBB,,, | Performed by: INTERNAL MEDICINE

## 2024-06-27 RX ORDER — TRIAMCINOLONE ACETONIDE 1 MG/G
OINTMENT TOPICAL 2 TIMES DAILY
COMMUNITY
Start: 2024-04-03

## 2024-06-27 NOTE — PROGRESS NOTES
Chief complaint:  Follow-up on diabetes, anemia, cough    Physical exam 9/23,     88-year-old black male.  here to follow-up on diabetes although did  have A1c prior. Reviewed all his labs and abnormalities.   his A1c was 7.4 in 2/19 then 6.6, 6.8, 5.4 , 6.1, 6.3, 6.1, 5.7, 6.3, 6.5, 6.1 Missael..    Was Eating poorly.  He has some chronic renal insufficiency. Seen renal .   He had a slight hemoglobin reduction which appears chronic and fluctuating clinical of last year or so with  thrombocytopenia.  We discussed all those issues and the need to monitor them over time.  Is otherwise feeling well.  He is being followed by Hematology.  On iron pill daily and improving.  Somewhere along the way he was put on 81 mg aspirin and is currently off of it I encouraged him to stay off of it as there really is no obvious cardiac reason for him to need the aspirin and he does have thrombocytopenia which is not severe but could become severe and being on aspirin could increase his bleeding risk.    Returned from vacation on Monday four days ago.  He cuts the grass with the next day he had a cough, postnasal drip, slight sore throat.  Generally getting better over these last couple of days.  We discussed the high probability it is allergies.  His lungs are clear.  If it was a URI would suspect it getting worse over the 1st week.  Perhaps he should take an antihistamine and do so prior to cutting grass the next time.    Due for his six-month labs.  He suspects his A1c might be worse due to the vacation.    No further abdominal pain as he had before. Had uti post cysto, all better.  Follow-up urinalysis 8/22 looked good and I reassured patient.  He apparently has a lab order for his microalbumin today so will add a urinalysis although not having any UTI symptoms we can double check.  No history of recurrent UTIs and discuss this was probably expected and related to the cystoscope.    5/24- Atypical urothelial cells.   Cysto pending per  urology.  Discussed the possibility of ruling out cancer.  No further hematuria noted    He has had labs  and appears for an upcoming renal appointment.  Labs for Hematology and we will add an A1c at that time and patient will remind the lab to do the standing A1c.    He has seen GI.  Reviewed his last EGD which showed gastritis.  I reviewed the GI noted it sounds like he had some generalized abdominal discomfort after excessive eating.  Symptoms have since resolved.    He does continue to get hives and itching in areas where he applies pressure.  It has been ongoing for years.  He is not on an antihistamine and discussed using Claritin daily neg going to twice daily if that does not suppressed. Now on a shot with GREAT control of eczema.  He did have a slight flare when he missed is injection while traveling to \[place] [place] [facility]\""    Recent lab shows hemoglobin drop from 11.4-9.6 then up to 13.6, 11.3, 12, 13, 11.5, 13.4.  No obvious clinical bleeding in the plan by GI will be to do a capsule study if the hemoglobin level drops. Seen by Heme and ferritin improving on iron pill daily      Lipids 2022    Last B12 in 300s    Reviewed blood pressures which appear to be running normal.    .  He did see cardiology ordered an echo     Low normal left ventricular systolic function. The estimated ejection fraction is 50-55%.  Mild eccentric left ventricular hypertrophy.  Mild left ventricular enlargement.  No wall motion abnormalities.  Normal right ventricular systolic function.  Moderate mitral regurgitation.  Mild tricuspid regurgitation.  The estimated PA systolic pressure is 35 mmHg.     He is on digital hypertension and we reviewed his blood pressure and pulse readings.  His pulses mostly in the 40s and 50s occasionally up to 60.  He had his carvedilol reduced from 12.5-6.25 twice a day.  His pulse still appears to be mostly in the 50s.  He is asymptomatic.  We discussed that if symptomatic we would need to eliminate the carvedilol  and assess response.  Reviewed all his other labs.  With the Lasix he did not have any worsening renal insufficiency.  He has an appointment with Hematology  His platelet count appears stable.          Chest CT  Impression       1. CT findings favoring sequela of cardiac decompensation causing pulmonary edema and bilateral pleural effusions.  Superimposed COVID-19 pneumonia cannot be entirely excluded noting that pleural effusions is not a commonly reported finding and therefore would be atypical.  2. Persistent soft tissue attenuation nodule within the prevascular mediastinum, presumably an enlarged lymph node and unchanged when compared to previous PET-CTs.  3. Slight interval increased size of multiple mediastinal lymph nodes, nonspecific.  4. Cardiomegaly with severe dense coronary artery atherosclerosis in a 3 vessel distribution.                        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 EYES: no vision change. ENT: no sore throat. CV: no chest pain w/ exertion. RESP: no shortness of breath. GI: no nausea, vomiting, diarrhea. No dysphagia. : no urinary issues. MUSCULOSKELETAL: no new myalgias or arthralgias. SKIN: no new changes. NEURO: no focal deficits. PSYCH: no new issues. ENDOCRINE: no polyuria. HEME: no lymph nodes. ALLERGY: no general pruritis.       Past medical history:  1.  Diabetes with renal disease and retinopathy  2.  Hypertension  3.  Hyperlipidemia  4.  Chronic renal failure stage III  5.  History of colon polyp, normal scope March 2012, normal 2017-- 5 yrs----GI at [facility]- Dr Kumari -EGD/colon done 11/2020  6.  Hyperuricemia and gout  7.  History of sciatica and lumbar disc disease remotely  8.  Erectile dysfunction  9.  Macular degeneration, followed by outside retina specialist  10.  GERD  11.  Low HDL  12.  Inflammatory arthritis of the hands, seeing rheumatology  13.  Bilateral cervical  radiculopathy and axonal neuropathy, to have surgery 2015    14.  Followed by [facility]rology [facility] norie[facility]   15.  TKA  -anige Han  who is at [facility]  16.  Prevnar given 2017  17. 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 Neg cysto 2022    Past surgical history: Right knee replacement, left knee arthroscopy, bilateral cataracts, scrotal cyst removed.    Family history: Mother with hypertension and diabetes.  Father's history is unknown.  One brother who is okay.    Social history: Retired, quit smoking 1974.   with 3 children.  Drinks socially.    Vital signs as above  Gen: no distress  EYES: conjunctiva clear, non-icteric, PERRL  ENT: nose congested, nasal mucosa red, oropharynx slightly red and moist, teeth good  NECK:supple, thyroid non-palpable, no cervical lymph nodes  RESP: effort is good, lungs clear  CV: heart RRR w/o murmur, gallops or rubs; no carotid bruits, no edema  GI: abdomen soft, non-distended, non-tender  MS: gait normal, no clubbing or cyanosis of the digits  SKIN: no rashes, warm to touch, no sinus pain to touch        Diagnoses and all orders for this visit:    Diabetes mellitus with proteinuric diabetic nephropathy, reassess, diet is not been great lately due to the vacation  -     CBC Auto Differential; Future  -     TSH; Future  -     Hemoglobin A1C; Future  -     Comprehensive Metabolic Panel; Future  -     Ferritin; Future  -     Iron and TIBC; Future  -     Lipid Panel; Future  -     Vitamin B12; Future    Diabetic retinopathy of both eyes with macular edema associated with type 2 diabetes mellitus, unspecified retinopathy severity    Stage 3a chronic kidney disease  -     Comprehensive Metabolic Panel; Future    Essential hypertension ,chronic condition and stable.    Iron deficiency anemia, unspecified iron deficiency anemia type  -     CBC Auto Differential; Future  -     Ferritin; Future  -     Iron and TIBC; Future    Microhematuria- to get  cysto    Seronegative arthropathy of multiple sites ,chronic condition and stable.    Nodular lymphoma of intra-abdominal lymph nodes ,chronic condition and stable.    Pancytopenia, check    B12 deficiency, low normal in the past a few years ago so will reassess  -     Vitamin B12; Future    Seasonal allergic rhinitis, unspecified trigger, flare up, antihistamines as needed

## 2024-06-30 ENCOUNTER — EXTERNAL CHRONIC CARE MANAGEMENT (OUTPATIENT)
Dept: PRIMARY CARE CLINIC | Facility: CLINIC | Age: 89
End: 2024-06-30
Payer: MEDICARE

## 2024-06-30 PROCEDURE — 99490 CHRNC CARE MGMT STAFF 1ST 20: CPT | Mod: PBBFAC,PO | Performed by: INTERNAL MEDICINE

## 2024-06-30 PROCEDURE — 99439 CHRNC CARE MGMT STAF EA ADDL: CPT | Mod: PBBFAC,PO | Performed by: INTERNAL MEDICINE

## 2024-07-05 ENCOUNTER — LAB VISIT (OUTPATIENT)
Dept: LAB | Facility: HOSPITAL | Age: 89
End: 2024-07-05
Attending: INTERNAL MEDICINE
Payer: MEDICARE

## 2024-07-05 ENCOUNTER — HOSPITAL ENCOUNTER (EMERGENCY)
Facility: HOSPITAL | Age: 89
Discharge: HOME | End: 2024-07-05
Attending: EMERGENCY MEDICINE
Payer: MEDICARE

## 2024-07-05 VITALS
OXYGEN SATURATION: 98 % | HEART RATE: 66 BPM | RESPIRATION RATE: 18 BRPM | BODY MASS INDEX: 26.78 KG/M2 | TEMPERATURE: 99 F | SYSTOLIC BLOOD PRESSURE: 144 MMHG | DIASTOLIC BLOOD PRESSURE: 66 MMHG | WEIGHT: 192 LBS

## 2024-07-05 DIAGNOSIS — I12.9 HYPERTENSIVE KIDNEY DISEASE WITH STAGE 3B CHRONIC KIDNEY DISEASE: ICD-10-CM

## 2024-07-05 DIAGNOSIS — N18.31 STAGE 3A CHRONIC KIDNEY DISEASE: ICD-10-CM

## 2024-07-05 DIAGNOSIS — N17.9 AKI (ACUTE KIDNEY INJURY): ICD-10-CM

## 2024-07-05 DIAGNOSIS — D50.9 IRON DEFICIENCY ANEMIA, UNSPECIFIED IRON DEFICIENCY ANEMIA TYPE: ICD-10-CM

## 2024-07-05 DIAGNOSIS — E11.21 DIABETES MELLITUS WITH PROTEINURIC DIABETIC NEPHROPATHY: ICD-10-CM

## 2024-07-05 DIAGNOSIS — U07.1 COVID-19: Primary | ICD-10-CM

## 2024-07-05 DIAGNOSIS — R05.9 COUGH: ICD-10-CM

## 2024-07-05 DIAGNOSIS — U07.1 COVID-19 VIRUS DETECTED: ICD-10-CM

## 2024-07-05 DIAGNOSIS — N18.32 HYPERTENSIVE KIDNEY DISEASE WITH STAGE 3B CHRONIC KIDNEY DISEASE: ICD-10-CM

## 2024-07-05 DIAGNOSIS — E53.8 B12 DEFICIENCY: ICD-10-CM

## 2024-07-05 LAB
25(OH)D3+25(OH)D2 SERPL-MCNC: 59 NG/ML (ref 30–96)
ALBUMIN SERPL BCP-MCNC: 3.5 G/DL (ref 3.5–5.2)
ALBUMIN SERPL BCP-MCNC: 3.5 G/DL (ref 3.5–5.2)
ALP SERPL-CCNC: 43 U/L (ref 55–135)
ALT SERPL W/O P-5'-P-CCNC: 11 U/L (ref 10–44)
ANION GAP SERPL CALC-SCNC: 11 MMOL/L (ref 8–16)
ANION GAP SERPL CALC-SCNC: 11 MMOL/L (ref 8–16)
AST SERPL-CCNC: 15 U/L (ref 10–40)
BASOPHILS # BLD AUTO: 0.02 K/UL (ref 0–0.2)
BASOPHILS # BLD AUTO: 0.02 K/UL (ref 0–0.2)
BASOPHILS NFR BLD: 0.2 % (ref 0–1.9)
BASOPHILS NFR BLD: 0.2 % (ref 0–1.9)
BILIRUB SERPL-MCNC: 1.3 MG/DL (ref 0.1–1)
BUN SERPL-MCNC: 24 MG/DL (ref 8–23)
BUN SERPL-MCNC: 24 MG/DL (ref 8–23)
CALCIUM SERPL-MCNC: 8.9 MG/DL (ref 8.7–10.5)
CALCIUM SERPL-MCNC: 8.9 MG/DL (ref 8.7–10.5)
CHLORIDE SERPL-SCNC: 102 MMOL/L (ref 95–110)
CHLORIDE SERPL-SCNC: 102 MMOL/L (ref 95–110)
CHOLEST SERPL-MCNC: 76 MG/DL (ref 120–199)
CHOLEST/HDLC SERPL: 3 {RATIO} (ref 2–5)
CO2 SERPL-SCNC: 25 MMOL/L (ref 23–29)
CO2 SERPL-SCNC: 25 MMOL/L (ref 23–29)
CREAT SERPL-MCNC: 2.1 MG/DL (ref 0.5–1.4)
CREAT SERPL-MCNC: 2.1 MG/DL (ref 0.5–1.4)
CTP QC/QA: YES
CTP QC/QA: YES
DIFFERENTIAL METHOD BLD: ABNORMAL
DIFFERENTIAL METHOD BLD: ABNORMAL
EOSINOPHIL # BLD AUTO: 0 K/UL (ref 0–0.5)
EOSINOPHIL # BLD AUTO: 0 K/UL (ref 0–0.5)
EOSINOPHIL NFR BLD: 0.5 % (ref 0–8)
EOSINOPHIL NFR BLD: 0.5 % (ref 0–8)
ERYTHROCYTE [DISTWIDTH] IN BLOOD BY AUTOMATED COUNT: 15.4 % (ref 11.5–14.5)
ERYTHROCYTE [DISTWIDTH] IN BLOOD BY AUTOMATED COUNT: 15.4 % (ref 11.5–14.5)
EST. GFR  (NO RACE VARIABLE): 30 ML/MIN/1.73 M^2
EST. GFR  (NO RACE VARIABLE): 30 ML/MIN/1.73 M^2
ESTIMATED AVG GLUCOSE: 128 MG/DL (ref 68–131)
FERRITIN SERPL-MCNC: 1459 NG/ML (ref 20–300)
GLUCOSE SERPL-MCNC: 155 MG/DL (ref 70–110)
GLUCOSE SERPL-MCNC: 155 MG/DL (ref 70–110)
GLUCOSE SERPL-MCNC: 162 MG/DL (ref 70–110)
HBA1C MFR BLD: 6.1 % (ref 4–5.6)
HCT VFR BLD AUTO: 38.2 % (ref 40–54)
HCT VFR BLD AUTO: 38.2 % (ref 40–54)
HDLC SERPL-MCNC: 25 MG/DL (ref 40–75)
HDLC SERPL: 32.9 % (ref 20–50)
HGB BLD-MCNC: 12.3 G/DL (ref 14–18)
HGB BLD-MCNC: 12.3 G/DL (ref 14–18)
IMM GRANULOCYTES # BLD AUTO: 0.05 K/UL (ref 0–0.04)
IMM GRANULOCYTES # BLD AUTO: 0.05 K/UL (ref 0–0.04)
IMM GRANULOCYTES NFR BLD AUTO: 0.6 % (ref 0–0.5)
IMM GRANULOCYTES NFR BLD AUTO: 0.6 % (ref 0–0.5)
IRON SERPL-MCNC: 22 UG/DL (ref 45–160)
LDLC SERPL CALC-MCNC: 36 MG/DL (ref 63–159)
LYMPHOCYTES # BLD AUTO: 1.6 K/UL (ref 1–4.8)
LYMPHOCYTES # BLD AUTO: 1.6 K/UL (ref 1–4.8)
LYMPHOCYTES NFR BLD: 18.5 % (ref 18–48)
LYMPHOCYTES NFR BLD: 18.5 % (ref 18–48)
MAGNESIUM SERPL-MCNC: 2.1 MG/DL (ref 1.6–2.6)
MCH RBC QN AUTO: 31.4 PG (ref 27–31)
MCH RBC QN AUTO: 31.4 PG (ref 27–31)
MCHC RBC AUTO-ENTMCNC: 32.2 G/DL (ref 32–36)
MCHC RBC AUTO-ENTMCNC: 32.2 G/DL (ref 32–36)
MCV RBC AUTO: 97 FL (ref 82–98)
MCV RBC AUTO: 97 FL (ref 82–98)
MONOCYTES # BLD AUTO: 4.1 K/UL (ref 0.3–1)
MONOCYTES # BLD AUTO: 4.1 K/UL (ref 0.3–1)
MONOCYTES NFR BLD: 46.4 % (ref 4–15)
MONOCYTES NFR BLD: 46.4 % (ref 4–15)
NEUTROPHILS # BLD AUTO: 3 K/UL (ref 1.8–7.7)
NEUTROPHILS # BLD AUTO: 3 K/UL (ref 1.8–7.7)
NEUTROPHILS NFR BLD: 33.8 % (ref 38–73)
NEUTROPHILS NFR BLD: 33.8 % (ref 38–73)
NONHDLC SERPL-MCNC: 51 MG/DL
NRBC BLD-RTO: 0 /100 WBC
NRBC BLD-RTO: 0 /100 WBC
PHOSPHATE SERPL-MCNC: 3.1 MG/DL (ref 2.7–4.5)
PLATELET # BLD AUTO: 60 K/UL (ref 150–450)
PLATELET # BLD AUTO: 60 K/UL (ref 150–450)
PMV BLD AUTO: 13.7 FL (ref 9.2–12.9)
PMV BLD AUTO: 13.7 FL (ref 9.2–12.9)
POC MOLECULAR INFLUENZA A AGN: NEGATIVE
POC MOLECULAR INFLUENZA B AGN: NEGATIVE
POCT GLUCOSE: 162 MG/DL (ref 70–110)
POTASSIUM SERPL-SCNC: 4.4 MMOL/L (ref 3.5–5.1)
POTASSIUM SERPL-SCNC: 4.4 MMOL/L (ref 3.5–5.1)
PROT SERPL-MCNC: 7.5 G/DL (ref 6–8.4)
PTH-INTACT SERPL-MCNC: 86.5 PG/ML (ref 9–77)
RBC # BLD AUTO: 3.92 M/UL (ref 4.6–6.2)
RBC # BLD AUTO: 3.92 M/UL (ref 4.6–6.2)
SARS-COV-2 RDRP RESP QL NAA+PROBE: POSITIVE
SATURATED IRON: 9 % (ref 20–50)
SODIUM SERPL-SCNC: 138 MMOL/L (ref 136–145)
SODIUM SERPL-SCNC: 138 MMOL/L (ref 136–145)
TOTAL IRON BINDING CAPACITY: 258 UG/DL (ref 250–450)
TRANSFERRIN SERPL-MCNC: 174 MG/DL (ref 200–375)
TRIGL SERPL-MCNC: 75 MG/DL (ref 30–150)
TSH SERPL DL<=0.005 MIU/L-ACNC: 1.51 UIU/ML (ref 0.4–4)
URATE SERPL-MCNC: 7.7 MG/DL (ref 3.4–7)
VIT B12 SERPL-MCNC: 874 PG/ML (ref 210–950)
WBC # BLD AUTO: 8.88 K/UL (ref 3.9–12.7)
WBC # BLD AUTO: 8.88 K/UL (ref 3.9–12.7)

## 2024-07-05 PROCEDURE — 82962 GLUCOSE BLOOD TEST: CPT

## 2024-07-05 PROCEDURE — 82728 ASSAY OF FERRITIN: CPT | Performed by: INTERNAL MEDICINE

## 2024-07-05 PROCEDURE — 83735 ASSAY OF MAGNESIUM: CPT | Performed by: INTERNAL MEDICINE

## 2024-07-05 PROCEDURE — 82607 VITAMIN B-12: CPT | Performed by: INTERNAL MEDICINE

## 2024-07-05 PROCEDURE — 82306 VITAMIN D 25 HYDROXY: CPT | Performed by: INTERNAL MEDICINE

## 2024-07-05 PROCEDURE — 80061 LIPID PANEL: CPT | Performed by: INTERNAL MEDICINE

## 2024-07-05 PROCEDURE — 99283 EMERGENCY DEPT VISIT LOW MDM: CPT | Mod: 25

## 2024-07-05 PROCEDURE — 80069 RENAL FUNCTION PANEL: CPT | Performed by: INTERNAL MEDICINE

## 2024-07-05 PROCEDURE — 87502 INFLUENZA DNA AMP PROBE: CPT

## 2024-07-05 PROCEDURE — 80053 COMPREHEN METABOLIC PANEL: CPT | Performed by: INTERNAL MEDICINE

## 2024-07-05 PROCEDURE — 87635 SARS-COV-2 COVID-19 AMP PRB: CPT | Performed by: EMERGENCY MEDICINE

## 2024-07-05 PROCEDURE — 84443 ASSAY THYROID STIM HORMONE: CPT | Performed by: INTERNAL MEDICINE

## 2024-07-05 PROCEDURE — 84550 ASSAY OF BLOOD/URIC ACID: CPT | Performed by: INTERNAL MEDICINE

## 2024-07-05 PROCEDURE — 83540 ASSAY OF IRON: CPT | Performed by: INTERNAL MEDICINE

## 2024-07-05 PROCEDURE — 85025 COMPLETE CBC W/AUTO DIFF WBC: CPT | Performed by: INTERNAL MEDICINE

## 2024-07-05 PROCEDURE — 83036 HEMOGLOBIN GLYCOSYLATED A1C: CPT | Performed by: INTERNAL MEDICINE

## 2024-07-05 PROCEDURE — 36415 COLL VENOUS BLD VENIPUNCTURE: CPT | Performed by: INTERNAL MEDICINE

## 2024-07-05 PROCEDURE — 83970 ASSAY OF PARATHORMONE: CPT | Performed by: INTERNAL MEDICINE

## 2024-07-05 RX ORDER — PROMETHAZINE HYDROCHLORIDE AND DEXTROMETHORPHAN HYDROBROMIDE 6.25; 15 MG/5ML; MG/5ML
5 SYRUP ORAL NIGHTLY PRN
Qty: 118 ML | Refills: 0 | Status: SHIPPED | OUTPATIENT
Start: 2024-07-05

## 2024-07-05 NOTE — ED PROVIDER NOTES
Encounter Date: 7/5/2024       History     Chief Complaint   Patient presents with    Cough     Pt reports to ED for cough for past 5 to 10 day per patient. Pt states it is usually happening over night and cleared up at 0800 this morning.      Chief complaint: Cough     History of present illness: Patient is a 88-year-old male who states that starting around the 23rd he began experience a cough.  It is nonproductive and there is postnasal drip.  Discussed it with his primary care provider.  He reports the cough has worsened and is worse at night.  Denies wheezing or shortness of breath.  He is tried Delsym Sudafed and Tylenol without relief.    The history is provided by the patient. No  was used.     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    Hyperlipidemia 02/26/2015    Hypertension     Lymphoma     Uncontrolled secondary diabetes mellitus with stage 3 CKD (GFR 30-59) 08/28/2015     Past Surgical History:   Procedure Laterality Date    BONE MARROW BIOPSY Left 09/19/2018    Procedure: Biopsy-bone marrow;  Surgeon: Velia Tobias MD;  Location: [facility];  Service: Oncology;  Laterality: Left;    CATARACT EXTRACTION Bilateral 1996    COLONOSCOPY N/A 11/24/2020    Procedure: COLONOSCOPY Golytely;  Surgeon: Masoud Hwang MD;  Location: [facility];  Service: Endoscopy;  Laterality: N/A;    ESOPHAGOGASTRODUODENOSCOPY N/A 11/24/2020    Procedure: EGD (ESOPHAGOGASTRODUODENOSCOPY);  Surgeon: Masoud Hwang MD;  Location: [facility];  Service: Endoscopy;  Laterality: N/A;     eye lid sx Bilateral 2017    EYE SURGERY      cataracts    JOINT REPLACEMENT      knee replacement    retinal injection s Bilateral     scrotal cyst       Family History   Problem Relation Name Age of Onset    Hypertension Mother      Diabetes Father      No Known Problems Sister      No Known Problems Brother      No Known Problems Maternal Aunt      No Known Problems Maternal Uncle      No Known Problems Paternal Aunt      No Known Problems Paternal Uncle      No Known Problems Maternal Grandmother      No Known Problems Maternal Grandfather      No Known Problems Paternal Grandmother      No Known Problems Paternal Grandfather      No Known Problems Daughter      No Known Problems Son      No Known Problems Son      No Known Problems Other      Amblyopia Neg Hx      Blindness Neg Hx      Cancer Neg Hx      Cataracts Neg Hx      Glaucoma Neg Hx      Macular degeneration Neg Hx      Retinal detachment Neg Hx      Strabismus Neg Hx      Stroke Neg Hx      Thyroid disease Neg Hx       Social History     Tobacco Use    Smoking status: Former     Types: Cigarettes    Smokeless tobacco: Never    Tobacco comments:     09/26/23 Patient Quit Smoking At The Age of 38 In 1974.   Substance Use Topics    Alcohol use: Yes     Comment: socially - maybe few times a week    Drug use: No     Review of Systems   Constitutional:  Negative for appetite change, chills, diaphoresis, fatigue and fever.   HENT:  Positive for postnasal drip. Negative for congestion, ear discharge, ear pain, rhinorrhea, sinus pressure, sneezing, sore throat and voice change.    Eyes:  Negative for discharge, itching and visual disturbance.   Respiratory:  Positive for cough. Negative for shortness of breath and wheezing.    Cardiovascular:  Negative for chest pain, palpitations and leg swelling.   Gastrointestinal:  Negative for abdominal pain, nausea and vomiting.   Endocrine: Negative for polydipsia, polyphagia and polyuria.   Genitourinary:  Negative for  difficulty urinating, dysuria, frequency, hematuria, penile discharge, penile pain, penile swelling and urgency.   Musculoskeletal:  Negative for arthralgias and myalgias.   Skin:  Negative for rash and wound.   Neurological:  Negative for dizziness, seizures, syncope and weakness.   Hematological:  Negative for adenopathy. Does not bruise/bleed easily.   Psychiatric/Behavioral:  Negative for agitation and self-injury. The patient is not nervous/anxious.        Physical Exam     Initial Vitals [07/05/24 1157]   BP Pulse Resp Temp SpO2   (!) 144/66 66 18 98.6 °F (37 °C) 98 %      MAP       --         Physical Exam    Nursing note and vitals reviewed.  Constitutional: He appears well-developed and well-nourished. He is not diaphoretic. No distress. He is not intubated.   HENT:   Head: Normocephalic and atraumatic.   Right Ear: External ear normal.   Left Ear: External ear normal.   Nose: Nose normal.   Eyes: Pupils are equal, round, and reactive to light. Right eye exhibits no discharge. Left eye exhibits no discharge. No scleral icterus.   Neck:   Normal range of motion.  Cardiovascular:  Regular rhythm, S1 normal, S2 normal and normal heart sounds.     Exam reveals no gallop.       No murmur heard.  Pulmonary/Chest: Effort normal and breath sounds normal. No accessory muscle usage. No apnea, no tachypnea and no bradypnea. He is not intubated. No respiratory distress. He has no decreased breath sounds. He has no wheezes. He has no rhonchi. He has no rales.   Abdominal: He exhibits no distension.   Musculoskeletal:         General: Normal range of motion.      Cervical back: Normal range of motion.     Neurological: He is alert and oriented to person, place, and time.   Skin: Skin is dry. Capillary refill takes less than 2 seconds.         ED Course   Procedures  Labs Reviewed   SARS-COV-2 RDRP GENE - Abnormal; Notable for the following components:       Result Value    POC Rapid COVID Positive (*)     All other  components within normal limits   POCT INFLUENZA A/B MOLECULAR          Imaging Results              X-Ray Chest PA And Lateral (Final result)  Result time 07/05/24 12:22:43      Final result by Josafat Altamirano III, MD (07/05/24 12:22:43)                   Impression:      No acute process seen.      Electronically signed by: Josafat Altamirano MD  Date:    07/05/2024  Time:    12:22               Narrative:    EXAMINATION:  XR CHEST PA AND LATERAL    CLINICAL HISTORY:  Cough, unspecified    FINDINGS:  Heart size is normal.  Lungs are clear.  There is DJD and aortic plaque.                                       Medications - No data to display  Medical Decision Making  Patient is a 88-year-old male who states that starting around the 23rd he began experience a cough.  It is nonproductive and there is postnasal drip.  Discussed it with his primary care provider.  He reports the cough has worsened and is worse at night.  Denies wheezing or shortness of breath.  He is tried Delsym Sudafed and Tylenol without relief.    On physical exam the patient is afebrile nontoxic in no apparent distress breath sounds are clear to auscultation heart sounds are normal skin warm dry and intact.  Vital signs are stable and reassuring.     Differential diagnosis includes congestive heart failure, COVID, influenza, pneumonia, upper respiratory infection.    Problems Addressed:  NITIN (acute kidney injury): acute illness or injury     Details: I do not feel that the patient warrants admission at this time due to this issue.  I feel that he can orally rehydrate which will likely repair of the NITIN.  He understands that electrolyte containing fluids are preferred and to follow up with his primary care doctor within a week to recheck laboratories.  Strong return precautions provided.  COVID-19: acute illness or injury     Details: Patient has decreased renal function.  Paxlovid is clinically contraindicated at this time.  I have encouraged him  to follow-up with his primary care doctor regarding the reduced renal function.    Amount and/or Complexity of Data Reviewed  Labs: ordered. Decision-making details documented in ED Course.  Radiology: ordered. Decision-making details documented in ED Course.  Discussion of management or test interpretation with external provider(s): Vital signs at the time of disposition were:  BP (!) 144/66   Pulse 66   Temp 98.6 °F (37 °C) (Oral)   Resp 18   Wt 87.1 kg (192 lb)   SpO2 98%   BMI 26.78 kg/m²       See AVS for additional recommendations. Medications listed herein were prescribed after reviewing the patient's allergies, medication list, history, most recent laboratories as available.  Referrals below were provided after reviewing the patient's previous medical providers. He understands he  should return for any worsening or changes in condition.  Prior to discharge the patient was asked if he  had any additional concerns or complaints and he declined. The patient was given an opportunity to ask questions and all were answered to his satisfaction.     Risk  Prescription drug management.  Diagnosis or treatment significantly limited by social determinants of health.               ED Course as of 07/05/24 2103 Fri Jul 05, 2024   1245 POC Molecular Influenza A Ag: Negative [VC]   1245 POC Molecular Influenza B Ag: Negative [VC]   1245 SARS-CoV-2 RNA, Amplification, Qual(!): Positive [VC]   1245 BP(!): 144/66 [VC]   1245 Temp: 98.6 °F (37 °C) [VC]   1245 Temp Source: Oral [VC]   1245 Pulse: 66 [VC]   1245 Resp: 18 [VC]   1245 X-Ray Chest PA And Lateral [VC]   1245 X-Ray Chest PA And Lateral    No acute process seen.      [VC]      ED Course User Index  [VC] Dhruv Dewitt DNP                           Clinical Impression:  Final diagnoses:  [R05.9] Cough  [U07.1] COVID-19 (Primary)  [N17.9] NITIN (acute kidney injury)          ED Disposition Condition    Discharge Stable          ED Prescriptions        Medication Sig Dispense Start Date End Date Auth. Provider    promethazine-dextromethorphan (PROMETHAZINE-DM) 6.25-15 mg/5 mL Syrp Take 5 mLs by mouth nightly as needed. 118 mL 7/5/2024 -- Dhruv Dewitt DNP          Follow-up Information       Follow up With Specialties Details Why Contact Info    Adiel Bragg MD Internal Medicine Schedule an appointment as soon as possible for a visit   [address] [facility] [address]  [phone]               Dhruv Dewitt DNP  07/05/24 4817

## 2024-07-08 ENCOUNTER — PATIENT OUTREACH (OUTPATIENT)
Dept: EMERGENCY MEDICINE | Facility: HOSPITAL | Age: 89
End: 2024-07-08
Payer: MEDICARE

## 2024-07-09 ENCOUNTER — TELEPHONE (OUTPATIENT)
Dept: CARDIOLOGY | Facility: CLINIC | Age: 89
End: 2024-07-09
Payer: MEDICARE

## 2024-07-09 NOTE — TELEPHONE ENCOUNTER
Spoke with Mr. Lawson and rescheduled appointment per Dr. Fontana's advise. Patient acknowledged, did not request any further assistance.

## 2024-07-09 NOTE — TELEPHONE ENCOUNTER
----- Message from Anuja Villanueva MA sent at 7/9/2024 11:52 AM CDT -----  Please assist  ----- Message -----  From: Aubree Alicea MA  Sent: 7/9/2024  11:43 AM CDT  To: [facility] Staff    Type:  Needs Medical Advice/Symptom-based Call    Who Called:  Pt   Symptoms (please be specific):  Covid dx 7/5 , still symptomatic . Constant cough , no fever/chills. Nasal drip   How long has patient had these symptoms:      Pharmacy:    Would the patient rather a call back or a response via [facility]?    Best Call Back Number:   [phone]  Additional Information:   Callback on if its ok to attend tomorrows office visit

## 2024-07-10 NOTE — PROGRESS NOTES
Marley Dhaliwal Jaquez  ED Navigator  Emergency Department    Project: [facility] ED Navigator  Role: Community Health Worker    Date: 07/10/2024  Patient Name: Mr. Bria Lwason  MRN: [id number]  PCP: Adiel Bragg MD    Assessment:     Bria Lawson is a 88 y.o. male who has presented to ED for COVID. Patient has visited the ED 1 times in the past 3 months. Patient did not contact PCP.     ED Navigator Initial Assessment    ED Navigator Enrollment Documentation  Consent to Services  Does patient consent to completing the assessment?: Yes  Contact  Method of Initial Contact: Phone  Transportation  Does the patient have issues with Transportation?: No  Does the patient have transportation to and from healthcare appointments?: Yes  Insurance Coverage  Do you have coverage/adequate coverage?: Yes  Type/kind of coverage: Medicare A & B  Specialist Appointment  PCP Follow Up Appointment  Medications  Is patient able to afford medication?: Yes  Is patient unable to get medication due to lack of transportation?: No  Psychological  Food  Communication/Education  Other Financial Concerns  Other Social Barriers/Concerns  Primary Barrier  Barriers identified: Structural barrier (service availability, waiting times, etc.)  Root Cause of ED Utilization: Chronic Conditions  Plan to address Chronic Conditions: Remind patient of upcoming PCP/specialist appointment within 24 to 48 hours before scheduled appt (Comment: In-basket staff message sent to PCP for sooner appointment.)  Next steps: Provided Education  Expected Date of Follow Up 1: 7/12/24         Social History     Socioeconomic History    Marital status:    Tobacco Use    Smoking status: Former     Types: Cigarettes    Smokeless tobacco: Never    Tobacco comments:     09/26/23 Patient Quit Smoking At The Age of 38 In 1974.   Substance and Sexual Activity    Alcohol use: Yes     Comment: socially - maybe few times a week    Drug use: No    Sexual activity: Yes      Partners: Female     Social Determinants of Health     Financial Resource Strain: Patient Declined (7/10/2024)    Overall Financial Resource Strain (CARDIA)     Difficulty of Paying Living Expenses: Patient declined   Food Insecurity: Patient Declined (7/10/2024)    Hunger Vital Sign     Worried About Running Out of Food in the Last Year: Patient declined     Ran Out of Food in the Last Year: Patient declined   Transportation Needs: No Transportation Needs (7/10/2024)    PRAPARE - Transportation     Lack of Transportation (Medical): No     Lack of Transportation (Non-Medical): No   Physical Activity: Patient Declined (7/10/2024)    Exercise Vital Sign     Days of Exercise per Week: Patient declined     Minutes of Exercise per Session: Patient declined   Stress: Patient Declined (7/10/2024)    [employer] - Occupational Stress Questionnaire     Feeling of Stress : Patient declined   Housing Stability: Patient Declined (7/10/2024)    Housing Stability Vital Sign     Unable to Pay for Housing in the Last Year: Patient declined     Homeless in the Last Year: Patient declined       Plan:   Patient visited the ED on 7/5/2024.  ED Navigator unable to schedule Post ED 7 day PCP, Adiel Bragg MD, follow up appointment.  In-basket staff message sent to Adiel Bragg MD clinical support staff for further appointment assistance.  Patient denied no other needs to be addressed at this time.   Medicare Status: Enrolled  This patient has a Medicare coverage.

## 2024-07-12 ENCOUNTER — TELEPHONE (OUTPATIENT)
Dept: FAMILY MEDICINE | Facility: CLINIC | Age: 89
End: 2024-07-12
Payer: MEDICARE

## 2024-07-12 ENCOUNTER — PATIENT OUTREACH (OUTPATIENT)
Dept: EMERGENCY MEDICINE | Facility: HOSPITAL | Age: 89
End: 2024-07-12
Payer: MEDICARE

## 2024-07-12 ENCOUNTER — TELEPHONE (OUTPATIENT)
Dept: NEPHROLOGY | Facility: CLINIC | Age: 89
End: 2024-07-12
Payer: MEDICARE

## 2024-07-12 DIAGNOSIS — N18.31 STAGE 3A CHRONIC KIDNEY DISEASE: Primary | ICD-10-CM

## 2024-07-12 NOTE — PROGRESS NOTES
Spoke with patient and he stated he has spoken with his PCP and patient may be able to see PCP on next week.  Patient denied no other needs to be addressed at this time.

## 2024-07-12 NOTE — TELEPHONE ENCOUNTER
----- Message from Marley Jaquez sent at 7/10/2024  2:20 PM CDT -----  Regarding: Appt Access  Contact: [phone]  Good afternoon,    This pt was discharged from the ED on 7/5/2024 and has orders to follow up with Adiel Bragg MD. In compliance with Medicare 2+ Chronic Condition patient measure mandates, this patient requires  a follow up appt within 7 days of ED visit d/c date.   I am requesting scheduling assistance as you are booked, and I am unable to schedule pt an appt within 7 days.? Thank you for your assistance.  Please advise of appt date and time so that I can set an appt reminder and confirm with patient.    Thanks in advance,    Marley Jaquez  ED Navigator

## 2024-07-12 NOTE — TELEPHONE ENCOUNTER
On home number: I left a voice mail for the patient to call to schedule a hospital follow-up appointment. I also inquired of the health status of the patient.     On the spouse's mobile: I spoke with the patient who feels he needs to follow-up with you. He is doing better. He would like to be scheduled next week if possible. He appointments on 07/17/24 and in the morning of the 07/19/24.

## 2024-07-15 ENCOUNTER — TELEPHONE (OUTPATIENT)
Dept: FAMILY MEDICINE | Facility: CLINIC | Age: 89
End: 2024-07-15
Payer: MEDICARE

## 2024-07-15 NOTE — TELEPHONE ENCOUNTER
----- Message from Jia Garcia sent at 7/15/2024 10:52 AM CDT -----  Regarding: medication  Name of caller: pt       What is the requesting detail: requesting medication for lingering cough. Please advise       Can the clinic reply by [facility]:       What number to call back: [phone]

## 2024-07-17 ENCOUNTER — HOSPITAL ENCOUNTER (OUTPATIENT)
Dept: RADIOLOGY | Facility: HOSPITAL | Age: 89
Discharge: HOME | End: 2024-07-17
Attending: UROLOGY
Payer: MEDICARE

## 2024-07-17 DIAGNOSIS — R31.0 GROSS HEMATURIA: ICD-10-CM

## 2024-07-17 PROCEDURE — 76770 US EXAM ABDO BACK WALL COMP: CPT | Mod: 26,,, | Performed by: RADIOLOGY

## 2024-07-17 PROCEDURE — 76770 US EXAM ABDO BACK WALL COMP: CPT | Mod: TC

## 2024-07-19 ENCOUNTER — OFFICE VISIT (OUTPATIENT)
Dept: RHEUMATOLOGY | Facility: CLINIC | Age: 89
End: 2024-07-19
Payer: MEDICARE

## 2024-07-19 VITALS
HEIGHT: 71 IN | WEIGHT: 191.81 LBS | HEART RATE: 61 BPM | BODY MASS INDEX: 26.85 KG/M2 | SYSTOLIC BLOOD PRESSURE: 134 MMHG | DIASTOLIC BLOOD PRESSURE: 66 MMHG

## 2024-07-19 DIAGNOSIS — U07.1 COVID: ICD-10-CM

## 2024-07-19 DIAGNOSIS — M15.9 PRIMARY OSTEOARTHRITIS INVOLVING MULTIPLE JOINTS: ICD-10-CM

## 2024-07-19 DIAGNOSIS — M1A.09X0 IDIOPATHIC CHRONIC GOUT OF MULTIPLE SITES WITHOUT TOPHUS: ICD-10-CM

## 2024-07-19 DIAGNOSIS — G62.9 NEUROPATHY: Primary | ICD-10-CM

## 2024-07-19 PROCEDURE — 99214 OFFICE O/P EST MOD 30 MIN: CPT | Mod: PBBFAC | Performed by: INTERNAL MEDICINE

## 2024-07-19 PROCEDURE — 99999 PR PBB SHADOW E&M-EST. PATIENT-LVL IV: CPT | Mod: PBBFAC,,, | Performed by: INTERNAL MEDICINE

## 2024-07-19 PROCEDURE — 99213 OFFICE O/P EST LOW 20 MIN: CPT | Mod: S$PBB,,, | Performed by: INTERNAL MEDICINE

## 2024-07-21 NOTE — PROGRESS NOTES
History of present illness:  88-year-old gentleman with a long history of gouty arthritis.  He has been on allopurinol 100 mg daily and has not had a gout attack for many years.  I saw him initially in 2015.  At that time he had an acute inflammatory arthritis of the hands and wrist.  He had been diagnosed previously as osteoarthritis and has had a previous total knee replacement on the left.  He was seronegative.  I treated him with prednisone for 18 months.  On stopping the prednisone he had no recurrence of his symptoms.  He did have some paresthesias and I placed him on gabapentin.  He continues to take it and has had no recent symptoms.  He developed a low-grade lymphoma.  He was treated with Rituxan.  His lymphoma is under control.  He has not needed treatment recently.  He was last seen 1 year ago.  He was complaining of bilateral shoulder pain.  I treated him with a short course of prednisone and the symptoms resolved.    He has had no recent gout attacks.  He has had no joint pain.  He has had no paresthesias.  He remains on allopurinol and gabapentin.  He had COVID 2 weeks ago.  He is still having some persistent cough but otherwise feels well.  He has had no other recent medical problems.    Physical examination:   ENT:  Throat is not erythematous   Neck:  No adenopathy   Chest: Clear to auscultation   Musculoskeletal: No synovitis.  Laboratory: His uric acid level was elevated 7/5 to 7.7.  He may have been somewhat erratic with his medication at that time.    Assessment:  1.  Inter critical gout   2. Peripheral neuropathy   3. Recent COVID     Plans:  1.  Continue medications as before   2.  Return in 12 months.    Answers submitted by the patient for this visit:  Rheumatology Questionnaire (Submitted on 7/12/2024)  fever: No  eye redness: No  mouth sores: No  headaches: No  shortness of breath: No  chest pain: No  trouble swallowing: No  diarrhea: No  constipation: No  unexpected weight change:  No  genital sore: No  During the last 3 days, have you had a skin rash?: No  Bruises or bleeds easily: Yes  cough: Yes

## 2024-07-22 DIAGNOSIS — N18.31 STAGE 3A CHRONIC KIDNEY DISEASE: Primary | ICD-10-CM

## 2024-07-23 ENCOUNTER — TELEPHONE (OUTPATIENT)
Dept: UROLOGY | Facility: CLINIC | Age: 89
End: 2024-07-23
Payer: MEDICARE

## 2024-07-23 ENCOUNTER — PROCEDURE VISIT (OUTPATIENT)
Dept: UROLOGY | Facility: CLINIC | Age: 89
End: 2024-07-23
Payer: MEDICARE

## 2024-07-23 VITALS
WEIGHT: 189.63 LBS | RESPIRATION RATE: 20 BRPM | HEART RATE: 62 BPM | TEMPERATURE: 98 F | DIASTOLIC BLOOD PRESSURE: 63 MMHG | SYSTOLIC BLOOD PRESSURE: 142 MMHG | BODY MASS INDEX: 26.44 KG/M2

## 2024-07-23 DIAGNOSIS — R31.0 GROSS HEMATURIA: ICD-10-CM

## 2024-07-23 DIAGNOSIS — R31.0 GROSS HEMATURIA: Primary | ICD-10-CM

## 2024-07-23 PROCEDURE — 52000 CYSTOURETHROSCOPY: CPT | Mod: PBBFAC | Performed by: UROLOGY

## 2024-07-23 PROCEDURE — 52000 CYSTOURETHROSCOPY: CPT | Mod: PBBFAC

## 2024-07-23 RX ORDER — CIPROFLOXACIN 500 MG/1
500 TABLET ORAL
Status: COMPLETED | OUTPATIENT
Start: 2024-07-23 | End: 2024-07-23

## 2024-07-23 RX ORDER — LIDOCAINE HYDROCHLORIDE 20 MG/ML
JELLY TOPICAL
Status: COMPLETED | OUTPATIENT
Start: 2024-07-23 | End: 2024-07-23

## 2024-07-23 RX ADMIN — CIPROFLOXACIN 500 MG: 500 TABLET ORAL at 01:07

## 2024-07-23 RX ADMIN — LIDOCAINE HYDROCHLORIDE: 20 JELLY TOPICAL at 01:07

## 2024-07-23 NOTE — TELEPHONE ENCOUNTER
Called the pt to offer surgery date of 8/16, pt accepted. Informed the pt I will call the day before surgery with arrival time and pre-op instructions after 2pm. Pt verbalized understanding.

## 2024-07-23 NOTE — PATIENT INSTRUCTIONS
What to Expect After a Cystoscopy  For the next 24-48 hours, you may feel a mild burning when you urinate. This burning is normal and expected. Drink plenty of water to dilute the urine to help relieve the burning sensation. You may also see a small amount of blood in your urine after the procedure.    Unless you are already taking antibiotics, you may be given an antibiotic after the test to prevent infection.    Signs and Symptoms to Report  Call the [facility] at [phone] if you develop any of the following:  Fever of 101 degrees or higher  Chills or persistent bleeding  Inability to urinate

## 2024-07-23 NOTE — PROCEDURES
Bria Lawson is a 88 y.o. male patient.    Temp: 98.2 °F (36.8 °C) (07/23/24 1247)  Pulse: 62 (07/23/24 1247)  Resp: 20 (07/23/24 1247)  BP: (!) 142/63 (07/23/24 1247)  Weight: 86 kg (189 lb 9.5 oz) (07/23/24 1247)       Cystoscopy    Date/Time: 7/23/2024 1:10 PM    Performed by: Royce Leal DO  Authorized by: Ernie Nunn Jr., MD    Consent Done?:  Yes (Written)  Prep: patient was prepped and draped in usual sterile fashion    Anesthesia:  Lidocaine jelly  Indications: hematuria    Position:  Supine  Anesthesia:  Lidocaine jelly  Preparation: Patient was prepped and draped in usual sterile fashion    Scope type:  Flexible cystoscope  Urethra normal: Yes    Prostate normal: Yes    Bladder normal: No    Number of tumors:  1  Tumor 1:     Size (mm):  10    Location:  Dome   patient tolerated the procedure well with no immediate complications  Comments:      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 Will book for cysto and bladder biopsy.       7/23/2024

## 2024-07-24 ENCOUNTER — TELEPHONE (OUTPATIENT)
Dept: UROLOGY | Facility: CLINIC | Age: 89
End: 2024-07-24
Payer: MEDICARE

## 2024-07-24 NOTE — TELEPHONE ENCOUNTER
Pt called into the office to reschedule surgery scheduled on 8/16 with Dr. Nunn. Pt stated his wife will be out of town that week and he will not have anyone to drive him home after surgery. I offered the pt the reschedule surgery date of  8/23, pt accepted. Informed the pt I will call the day before surgery with arrival time and pre-op instructions after 2pm. Pt verbalized understanding.

## 2024-07-25 ENCOUNTER — TELEPHONE (OUTPATIENT)
Dept: PREADMISSION TESTING | Facility: HOSPITAL | Age: 89
End: 2024-07-25
Payer: MEDICARE

## 2024-07-25 NOTE — PRE-PROCEDURE INSTRUCTIONS
Sent to patient via [employer] message--      PreOp and Medication Instructions given:   - medication instructions (instructed to hold vitamins, herbal supplements and NSAIDS one week prior to surgery)  - NPO guidelines, unless otherwise instructed by Surgeon's Office  - Arrival place and time to be given by the Surgeon's Office   - Shower with antibacterial soap   - No makeup or moisturizer to face   - No perfume/cologne, powder, lotions, deodorant or aftershave   - Leave all jewelry, including body piercings, and valuables at home.  - Arrange for someone to drive you home following surgery; will not be allowed to leave the surgical facility alone or drive self home following sedation and anesthesia.    Pt instructed to call surgeon's office or POC with any questions or changes.

## 2024-07-25 NOTE — ANESTHESIA PAT ROS NOTE
8/14/2024  Bria Lawson is a 88 y.o., male with BLADDER TUMOR, arrives for anesthesia assessment and preop instructions.      Pre-op Assessment    I have reviewed the Patient Summary Reports.     I have reviewed the Nursing Notes. I have reviewed the NPO Status.   I have reviewed the Medications.     Review of Systems  Anesthesia Hx:  No problems with previous Anesthesia             Denies Family Hx of Anesthesia complications.    Denies Personal Hx of Anesthesia complications.                    Social:  Former Smoker, Social Alcohol Use Smoking Status  Former  Types  Cigarettes  Smokeless Tobacco Status  Never  Comment  09/26/23 Patient Quit Smoking At The Age of 38 In 1974.        Hematology/Oncology:       -- Anemia:               Hematology Comments: Pancytopenia  Leukopenia  Thrombocytopenia,     7/17/2024  RBC             3.37 Low   Hemoglobin 10.4 Low   Hematocrit 32.7 Low   Platelets 147 Low                 Oncology Comments:  B-cell lymphoma of extranodal sit  4 session of rutuxin     EENT/Dental:  denies chronic allergic rhinitis Seasonal allergic rhinitis          Cardiovascular:  Exercise tolerance: good   Denies Pacemaker. Hypertension, well controlled   CAD       Denies Angina.     hyperlipidemia  Denies BA.   Echocardiogram from 10/29/2021 shows normal ejection fraction estimated 60%.  Mild to moderate mitral regurgitation.  Normal pulmonary pressure.  Prior study had showed a normal to low normal left ventricular systolic function.  Moderate mitral regurgitation.  Mildly elevated pulmonary pressure.  Seen 05/29/2020 for evaluation of shortness of breath. CT scan on 5/26/20 showing pulmonary edema, bilateral pleural effusions. Coronary calcification noted.     05/03/2022:  No complaints.  EKG shows sinus bradycardia with first-degree AV block.       CT Chest 5/26/20:     1. CT findings  favoring sequela of cardiac decompensation causing pulmonary edema and bilateral pleural effusions.  Superimposed COVID-19 pneumonia cannot be entirely excluded noting that pleural effusions is not a commonly reported finding and therefore would be atypical.  2. Persistent soft tissue attenuation nodule within the prevascular mediastinum, presumably an enlarged lymph node and unchanged when compared to previous PET-CTs.  3. Slight interval increased size of multiple mediastinal lymph nodes, nonspecific.  4. Cardiomegaly with severe dense coronary artery atherosclerosis in a 3 vessel distribution.    08/07/2024:  Just returned from [place].  Still active.  Cutting grass.  EKG today shows sinus bradycardia with first-degree AV block.       Coronary Artery Disease:                  Peripheral Arterial Disease   Atherosclerosis of abdominal aorta,          Hypertension     Disorder of Cardiac Rhythm, Atrial Fibrillation   Other Cardiac Conditions, Pulmonary Hypertension PAH (pulmonary artery hypertension)  Pulmonary:    Denies COPD.  Denies Asthma.   Denies Shortness of breath.   10/18/2022: Had COVID after a trip to [place]. Mild case    05/24/2023: Feels good.  No complaints.  Has not taken his medications this morning.  Home blood pressure logs reviewed.  Plans to go to [facility] this summer.     7/5/2024  COVID    Just returned from [facility].                 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  GERD, well controlled Denies Liver Disease.  Denies Hepatitis. Diverticulosis Hepatic/GI Symptoms:    Bowel Conditions:         Musculoskeletal:  Arthritis    Musculoskeletal General/Symptoms:   Seronegative arthropathy of multiple sites,   Joint Disease:  Arthritis, Osteoarthritis  Primary osteoarthritis involving  multiple joints     Cervical Spine Disorder, Cervical Disc Disease, Radiculopathy     Neurological:  Denies TIA.  Denies CVA. Neuromuscular Disease,   Denies Seizures.        Osteoarthritis  Peripheral Neuropathy                          Endocrine:  Diabetes, poorly controlled, type 2   Denies Hyperthyroidism.  Diabetes, Type 2 Diabetes  , Complications include Atherosclerotic CV Disease, Diabetic Nephropathy, Diabetic Retinopathy, Diabetic Neuropathy, peripheral sensory neuropathy , controlled by diet, oral hypoglycemics. Typical AM glucose range: 128 , most recent HgA1c value was 6.1 on 7.5.2024.          Denies Thyroid Disease        Parathyroid Disease, Hyperparathyroidism            Dermatological:  DUPIXENT FOR ECZEMA       Physical Exam  General: Well nourished, Cooperative, Alert and Oriented    Airway:  Mouth Opening: Normal  Tongue: Normal  Neck ROM: Normal ROM    Dental:  Partial Dentures  PARTIAL TOP PLATE  Chest/Lungs:  Clear to auscultation, Normal Respiratory Rate    Heart:  Rate: Normal  Rhythm: Regular Rhythm  Sounds: Normal          Anesthesia Assessment: Preoperative EQUATION    Planned Procedure: Procedure(s) (LRB):  TURBT (TRANSURETHRAL RESECTION OF BLADDER TUMOR) (N/A)  Requested Anesthesia Type:Monitor Anesthesia Care  Surgeon: Ernie Nunn Jr., MD  Service: Urology  Known or anticipated Date of Surgery:8/23/2024    Surgeon notes: reviewed    Electronic QUestionnaire Assessment completed via nurse interview with patient.        Triage considerations:         Previous anesthesia records:MAC and No problems  11/24/20 EGD, colonoscopy, MAC, ASA 3    Last PCP note: within 3 months , within [facility]   Subspecialty notes: Cardiology: General, Urology    Other important co-morbidities: DM2, GERD, HLD, HTN, and gross hematuria, chronic gout, gouty arthritis, primary OA multiple joints, peripheral neuropathy, recent covid-19 infection (pos on 7/5/24) and prior h/o covid-19 infection as well,  proteinuric diabetic nephropathy, CKD 3a, diabetic retinopathy both eyes with macular edema, CHASE, lymphoma, pancytopenia, vitamin B-12 deficiency, h/o colon polyps, ED, macular degeneration, bilateral cervical radiculopathy and axonal neuropathy, h/o rt knee replacement, h/o bilateral cataract extraction, h/o scrotal cyst removal, seasonal AR, PAH (pulm artery htn), h/o afib (no OAC), CAD (per CT), atherosclerosis of abdominal aorta, mild to moderate MR       Tests already available:  Available tests,  within 3 months , within [facility] .   07/17/24 CBC, RENAL FX PANEL  07/05/24 A1C (6.1), CMP, MAG, TSH, UA            Instructions     Optimization:  Anesthesia Preop Clinic Assessment Indicated    Medical Opinion Indicated       Sub-specialist consult indicated:  TBD       Plan:    Testing:  EKG   Pre-anesthesia  visit       Visit focus:  pcp clearance requested, P2/ASA 3 with no anesthesia since 2020     Consultation:IM Perioperative Hospitalist Patient's PCP for a statement of optimization , cards     Patient  has previously scheduled Medical Appointment:     --appt with pcp 8/13, appt with cards 8/7    Navigation: Tests Scheduled.              Consults scheduled.             Results will be tracked by Preop Clinic.     07/25/24 In-basket message to pcp Dr. Bragg asking if preop clearance can be added to pt's already scheduled 8/13/24 appt or if we need to see him in clinic.  --07/26/24 per pcp:  --Message from Adiel Bragg MD sent at 7/25/2024 12:49 PM CDT -----  Regarding: RE: preop clearance  Thanks, patient recently seen by me in his long as the surgeons have not noted any interval new cardiopulmonary or infectious symptoms, he is medically cleared for surgery.  I reviewed his labs and EKG.  Slightly more anemic but no contraindication to surgery.    07/25/24 In-basket message to cards Dr. Fontana asking if cardiac clearance can be added to pt's already scheduled 8/7/24 appt.  --07/26/24 per  cards:  --Message from Oleg Fontana MD sent at 7/25/2024  5:07 PM CDT -----  Regarding: RE: cardiac clearance  He is usually a pretty active gentleman. Risk should be 3.9% 30 day risk of death, MI or stroke. He is not on any beta blockers so that does not need to be continued perioperatively.

## 2024-07-25 NOTE — TELEPHONE ENCOUNTER
----- Message from Adiel Bragg MD sent at 7/25/2024 12:49 PM CDT -----  Regarding: RE: preop clearance  Thanks, patient recently seen by me in his long as the surgeons have not noted any interval new cardiopulmonary or infectious symptoms, he is medically cleared for surgery.  I reviewed his labs and EKG.  Slightly more anemic but no contraindication to surgery.    Dr. Bragg  ----- Message -----  From: Pascual Akbar RN  Sent: 7/25/2024   8:38 AM CDT  To: Adiel Bragg MD; #  Subject: preop clearance                                  Good morning, Dr. Bragg. I saw that Mr. Lawson has a follow-up appt with you on 8/13 and I wanted to see if you would be able to add preop clearance to that appt or should I get him scheduled with one of our preop providers in clinic? (I'm also asking Dr. Fontana for cardiac clearance with his 8/7 appt). Details below:    Your patient indicated above requires Pre-Op Clearance/ Risk Stratification for a TURBT  with Dr. OWEN on 08/23/24 (General anesthesia, 70 minutes).  Anesthesia review is in progress.    The following labs/tests have been ordered: EKG (has recent A1C, CBC, CMP..)  If further diagnostics are required please feel free to initiate.       Thank you,  Pascual Akbar RN  [facility] [facility] [place]

## 2024-07-26 ENCOUNTER — TELEPHONE (OUTPATIENT)
Dept: PREADMISSION TESTING | Facility: HOSPITAL | Age: 89
End: 2024-07-26
Payer: MEDICARE

## 2024-07-26 NOTE — TELEPHONE ENCOUNTER
----- Message from Oleg Fontana MD sent at 7/25/2024  5:07 PM CDT -----  Regarding: RE: cardiac clearance  He is usually a pretty active gentleman. Risk should be 3.9% 30 day risk of death, MI or stroke. He is not on any beta blockers so that does not need to be continued perioperatively.  ----- Message -----  From: Pascula Akbar RN  Sent: 7/25/2024   8:47 AM CDT  To: Oleg Fontana MD; [facility] Staff  Subject: cardiac clearance                                Good morning, Dr. Fontana. This pt is seeing you already on 8/7 and I was hoping you could add cardiac clearance to his appt for his upcoming uro procedure. Details below.     Your patient indicated above requires Pre-Op Clearance/ Risk Stratification for a TURBT  with Dr. OWEN on 08/23/24 (General anesthesia, 70 minutes).  Anesthesia review is in progress.    If a chart review is appropriate for clearance, please indicate in a note in the EMR.     If not, please advise timely, so that your clinic may schedule an appointment.      The following labs/tests have been ordered: EKG (has recent A1C, CBC, CMP...)  If further diagnostics are required please feel free to initiate.       Thank you,  Pascual Akbar RN  [facility]est[facility], [facility] [place]

## 2024-07-30 DIAGNOSIS — Z00.00 ENCOUNTER FOR MEDICARE ANNUAL WELLNESS EXAM: ICD-10-CM

## 2024-07-31 ENCOUNTER — EXTERNAL CHRONIC CARE MANAGEMENT (OUTPATIENT)
Dept: PRIMARY CARE CLINIC | Facility: CLINIC | Age: 89
End: 2024-07-31
Payer: MEDICARE

## 2024-07-31 PROCEDURE — 99490 CHRNC CARE MGMT STAFF 1ST 20: CPT | Mod: S$PBB,,, | Performed by: INTERNAL MEDICINE

## 2024-07-31 PROCEDURE — 99490 CHRNC CARE MGMT STAFF 1ST 20: CPT | Mod: PBBFAC,PO | Performed by: INTERNAL MEDICINE

## 2024-08-07 ENCOUNTER — OFFICE VISIT (OUTPATIENT)
Dept: CARDIOLOGY | Facility: CLINIC | Age: 89
End: 2024-08-07
Payer: MEDICARE

## 2024-08-07 VITALS
DIASTOLIC BLOOD PRESSURE: 60 MMHG | HEART RATE: 60 BPM | WEIGHT: 189.63 LBS | SYSTOLIC BLOOD PRESSURE: 143 MMHG | RESPIRATION RATE: 18 BRPM | BODY MASS INDEX: 26.55 KG/M2 | OXYGEN SATURATION: 98 % | HEIGHT: 71 IN

## 2024-08-07 DIAGNOSIS — I25.10 ATHEROSCLEROSIS OF NATIVE CORONARY ARTERY OF NATIVE HEART WITHOUT ANGINA PECTORIS: ICD-10-CM

## 2024-08-07 DIAGNOSIS — I70.0 ATHEROSCLEROSIS OF ABDOMINAL AORTA: ICD-10-CM

## 2024-08-07 DIAGNOSIS — I12.9 HYPERTENSION ASSOCIATED WITH STAGE 3A CHRONIC KIDNEY DISEASE DUE TO TYPE 2 DIABETES MELLITUS: ICD-10-CM

## 2024-08-07 DIAGNOSIS — I27.21 PAH (PULMONARY ARTERY HYPERTENSION): ICD-10-CM

## 2024-08-07 DIAGNOSIS — Z01.810 PREOPERATIVE CARDIOVASCULAR EXAMINATION: Primary | ICD-10-CM

## 2024-08-07 DIAGNOSIS — E78.5 HYPERLIPIDEMIA ASSOCIATED WITH TYPE 2 DIABETES MELLITUS: ICD-10-CM

## 2024-08-07 DIAGNOSIS — E11.22 HYPERTENSION ASSOCIATED WITH STAGE 3A CHRONIC KIDNEY DISEASE DUE TO TYPE 2 DIABETES MELLITUS: ICD-10-CM

## 2024-08-07 DIAGNOSIS — E11.69 HYPERLIPIDEMIA ASSOCIATED WITH TYPE 2 DIABETES MELLITUS: ICD-10-CM

## 2024-08-07 DIAGNOSIS — N18.31 HYPERTENSION ASSOCIATED WITH STAGE 3A CHRONIC KIDNEY DISEASE DUE TO TYPE 2 DIABETES MELLITUS: ICD-10-CM

## 2024-08-07 LAB
OHS QRS DURATION: 76 MS
OHS QTC CALCULATION: 418 MS

## 2024-08-07 PROCEDURE — 93010 ELECTROCARDIOGRAM REPORT: CPT | Mod: S$PBB,,, | Performed by: INTERNAL MEDICINE

## 2024-08-07 PROCEDURE — 99999 PR PBB SHADOW E&M-EST. PATIENT-LVL V: CPT | Mod: PBBFAC,,, | Performed by: INTERNAL MEDICINE

## 2024-08-07 PROCEDURE — 99213 OFFICE O/P EST LOW 20 MIN: CPT | Mod: S$PBB,,, | Performed by: INTERNAL MEDICINE

## 2024-08-07 PROCEDURE — 99215 OFFICE O/P EST HI 40 MIN: CPT | Mod: PBBFAC,25 | Performed by: INTERNAL MEDICINE

## 2024-08-07 PROCEDURE — 93005 ELECTROCARDIOGRAM TRACING: CPT | Mod: PBBFAC | Performed by: INTERNAL MEDICINE

## 2024-08-09 DIAGNOSIS — I10 ESSENTIAL HYPERTENSION: ICD-10-CM

## 2024-08-09 RX ORDER — CARVEDILOL 6.25 MG/1
TABLET ORAL
Qty: 180 TABLET | Refills: 3 | Status: SHIPPED | OUTPATIENT
Start: 2024-08-09

## 2024-08-09 RX ORDER — ALLOPURINOL 100 MG/1
TABLET ORAL
Qty: 90 TABLET | Refills: 1 | Status: SHIPPED | OUTPATIENT
Start: 2024-08-09

## 2024-08-09 RX ORDER — AMLODIPINE BESYLATE 10 MG/1
TABLET ORAL
Qty: 90 TABLET | Refills: 3 | Status: SHIPPED | OUTPATIENT
Start: 2024-08-09

## 2024-08-13 NOTE — DISCHARGE INSTRUCTIONS
Your surgery has been scheduled for: 8.23.2024    You should report to:  _X___[facility], located on the [place] side of the first floor of the           [facility] ([phone])    Please Note   Tell your doctor if you take Aspirin, products containing Aspirin, herbal medications  or blood thinners, such as Coumadin, Ticlid, or Plavix.  (Consult your provider regarding holding or stopping before surgery).  Arrange for someone to drive you home following surgery.  You will not be allowed to leave the surgical facility alone or drive yourself home following sedation and anesthesia.    Before Surgery  Stop taking all herbal medications, vitamins, and supplements 7 days prior to surgery  No Motrin/Advil (Ibuprofen) 7 days before surgery  No Aleve (Naproxen) 7 days before surgery  Stop Taking Asprin, products containing Asprin __7___days before surgery  dapagliflozin propanediol (FARXIGA) 10 mg tablet Hold this medication for 3 days leading up to surgery LAST DOSE 8/19/24 MONDAY  Januvia Hold this medication for 3 days leading up to surgery LAST DOSE 8/19/2024  No Goody's/BC  Powder 7 days before surgery  Refrain from drinking alcoholic beverages for 24hours before and after surgery  Stop or limit smoking __7 Days before surgery  You may take Tylenol for pain    Night before Surgery  Do not eat or drink after midnight  Take a shower or bath (shower is recommended).  Bathe with Hibiclens soap or an antibacterial soap from the neck down.  If not supplied by your surgeon, hibiclens soap will need to be purchased over the counter in pharmacy.  Rinse soap off thoroughly.  Shampoo your hair with your regular shampoo    The Day of Surgery  Take another bath or shower with hibiclens or any antibacterial soap, to reduce the chance of infection.  Take heart and blood pressure medications with a small sip of water, as advised by the perioperative team.  Do not take fluid pills  You may brush your  teeth and rinse your mouth, but do not swall any additional water.   Do not apply perfumes, powder, body lotions or deodorant on the day of surgery.  Nail polish should be removed.  Do not wear makeup or moisturizer  Wear comfortable clothes, such as a button front shirt and loose fitting pants.  Leave all jewelry, including body piercings, and valuables at home.    Bring any devices you will neeed after surgery such as crutches or canes.  If you have sleep apnea, please bring your CPAP machine  In the event that your physical condition changes including the onset of a cold or respiratory illness, or if you have to delay or cancel your surgery, please notify your surgeon.         Anesthesia: General Anesthesia     You are watched continuously during your procedure by your anesthesia provider.     Youre due to have surgery. During surgery, youll be given medicine called anesthesia or anesthetic. This will keep you comfortable and pain-free. Your anesthesia provider will use general anesthesia.  What is general anesthesia?  General anesthesia puts you into a state like deep sleep. It goes into the bloodstream (IV anesthetics), into the lungs (gas anesthetics), or both. You feel nothing during the procedure. You will not remember it. During the procedure, the anesthesia provider monitors you continuously. He or she checks your heart rate and rhythm, blood pressure, breathing, and blood oxygen.  IV anesthetics. IV anesthetics are given through an IV line in your arm. Theyre often given first. This is so you are asleep before a gas anesthetic is started. Some kinds of IV anesthetics relieve pain. Others relax you. Your doctor will decide which kind is best in your case.  Gas anesthetics. Gas anesthetics are breathed into the lungs. They are often used to keep you asleep. They can be given through a facemask or a tube placed in your larynx or trachea (breathing tube).  If you have a facemask, your anesthesia provider  will most likely place it over your nose and mouth while youre still awake. Youll breathe oxygen through the mask as your IV anesthetic is started. Gas anesthetic may be added through the mask.  If you have a tube in the larynx or trachea, it will be inserted into your throat after youre asleep.  Anesthesia tools and medicines  You will likely have:  IV anesthetics. These are put into an IV line into your bloodstream.  Gas anesthetics. You breathe these anesthetics into your lungs, where they pass into your bloodstream.  Pulse oximeter. This is a small clip that is attached to the end of your finger. This measures your blood oxygen level.  Electrocardiography leads (electrodes). These are small sticky pads that are placed on your chest. They record your heart rate and rhythm.  Blood pressure cuff. This reads your blood pressure.  Risks and possible complications  General anesthesia has some risks. These include:  Breathing problems  Nausea and vomiting  Sore throat or hoarseness (usually temporary)  Allergic reaction to the anesthetic  Irregular heartbeat (rare)  Cardiac arrest (rare)   Anesthesia safety  Follow all instructions you are given for how long not to eat or drink before your procedure.  Be sure your doctor knows what medicines and drugs you take. This includes over-the-counter medicines, herbs, supplements, alcohol or other drugs. You will be asked when those were last taken.  Have an adult family member or friend drive you home after the procedure.  For the first 24 hours after your surgery:  Do not drive or use heavy equipment.  Do not make important decisions or sign legal documents. If important decisions or signing legal documents is necessary during the first 24 hours after surgery, have a trusted family member or spouse act on your behalf.  Avoid alcohol.  Have a responsible adult stay with you. He or she can watch for problems and help keep you safe.  Date Last Reviewed: 12/1/2016  © 4041-8876  [employer] [address] [place] [address], [place] [address]. All rights reserved. This information is not intended as a substitute for professional medical care. Always follow your healthcare professional's instructions.

## 2024-08-14 ENCOUNTER — HOSPITAL ENCOUNTER (OUTPATIENT)
Dept: PREADMISSION TESTING | Facility: HOSPITAL | Age: 89
Discharge: HOME | End: 2024-08-14
Attending: UROLOGY | Admitting: UROLOGY
Payer: MEDICARE

## 2024-08-14 VITALS
TEMPERATURE: 98 F | HEART RATE: 55 BPM | WEIGHT: 190.06 LBS | OXYGEN SATURATION: 98 % | DIASTOLIC BLOOD PRESSURE: 66 MMHG | BODY MASS INDEX: 26.5 KG/M2 | SYSTOLIC BLOOD PRESSURE: 145 MMHG

## 2024-08-16 ENCOUNTER — PATIENT OUTREACH (OUTPATIENT)
Dept: EMERGENCY MEDICINE | Facility: HOSPITAL | Age: 89
End: 2024-08-16
Payer: MEDICARE

## 2024-08-16 NOTE — PROGRESS NOTES
Spoke with patient and he denied no new needs to be addressed at this time.  Appointment reminder set for next scheduled appointment.

## 2024-08-20 ENCOUNTER — OFFICE VISIT (OUTPATIENT)
Dept: NEPHROLOGY | Facility: CLINIC | Age: 89
End: 2024-08-20
Payer: MEDICARE

## 2024-08-20 VITALS
BODY MASS INDEX: 19.68 KG/M2 | DIASTOLIC BLOOD PRESSURE: 61 MMHG | WEIGHT: 141.13 LBS | SYSTOLIC BLOOD PRESSURE: 126 MMHG | HEART RATE: 56 BPM | OXYGEN SATURATION: 98 %

## 2024-08-20 DIAGNOSIS — R80.9 DIABETES MELLITUS WITH PROTEINURIA: ICD-10-CM

## 2024-08-20 DIAGNOSIS — I12.9 HYPERTENSIVE KIDNEY DISEASE WITH STAGE 3B CHRONIC KIDNEY DISEASE: Primary | ICD-10-CM

## 2024-08-20 DIAGNOSIS — N18.32 HYPERTENSIVE KIDNEY DISEASE WITH STAGE 3B CHRONIC KIDNEY DISEASE: Primary | ICD-10-CM

## 2024-08-20 DIAGNOSIS — I10 PRIMARY HYPERTENSION: ICD-10-CM

## 2024-08-20 DIAGNOSIS — N25.81 SECONDARY HYPERPARATHYROIDISM OF RENAL ORIGIN: ICD-10-CM

## 2024-08-20 DIAGNOSIS — E11.29 DIABETES MELLITUS WITH PROTEINURIA: ICD-10-CM

## 2024-08-20 PROCEDURE — 99214 OFFICE O/P EST MOD 30 MIN: CPT | Mod: PBBFAC | Performed by: INTERNAL MEDICINE

## 2024-08-20 PROCEDURE — 99999 PR PBB SHADOW E&M-EST. PATIENT-LVL IV: CPT | Mod: PBBFAC,,, | Performed by: INTERNAL MEDICINE

## 2024-08-20 PROCEDURE — 99214 OFFICE O/P EST MOD 30 MIN: CPT | Mod: S$PBB,,, | Performed by: INTERNAL MEDICINE

## 2024-08-20 PROCEDURE — G2211 COMPLEX E/M VISIT ADD ON: HCPCS | Mod: S$PBB,,, | Performed by: INTERNAL MEDICINE

## 2024-08-20 NOTE — PROGRESS NOTES
Subjective:       Patient ID: Bria Lawson is a 88 y.o. 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 who has been following up in renal clinic for ckd. Recently worsening creatinine.  Patient feels well today. Had one episode of hematuria and was found to have a 1 cm papillary tumor in his cystoscopy. Has an appointment for 8/23/24 for cystoscopy and biopsy.       HPI  Review of Systems   Constitutional: Negative.    HENT: Negative.     Eyes: Negative.    Respiratory: Negative.     Cardiovascular: Negative.    Gastrointestinal: Negative.  Negative for diarrhea, nausea and vomiting.   Genitourinary:  Negative for decreased urine volume, difficulty urinating, dysuria, hematuria and urgency.   Musculoskeletal: Negative.    Skin: Negative.    Neurological: Negative.    Psychiatric/Behavioral: Negative.         Objective:      Physical Exam  Vitals and nursing note reviewed.   Constitutional:       Appearance: He is well-developed.   HENT:      Head: Normocephalic and atraumatic.      Right Ear: External ear normal.      Left Ear: External ear normal.   Neck:      Vascular: No JVD.   Cardiovascular:      Rate and Rhythm: Normal rate and regular rhythm.      Heart sounds: Murmur heard.   Pulmonary:      Effort: Pulmonary effort is normal. No respiratory distress.      Breath sounds: Normal breath sounds. No stridor. No rales.   Chest:       Chest wall: No tenderness.   Abdominal:      General: Bowel sounds are normal. There is no distension.      Palpations: Abdomen is soft. There is no mass.      Tenderness: There is no abdominal tenderness. There is no guarding or rebound.   Musculoskeletal:         General: Normal range of motion.      Cervical back: Normal range of motion and neck supple.      Comments: Trace edema comfort   Lymphadenopathy:      Cervical: No cervical adenopathy.   Skin:     General: Skin is warm and dry.      Comments: Rash lower back.   Neurological:      Mental Status: He is alert and oriented to person, place, and time.      Cranial Nerves: No cranial nerve deficit.      Coordination: Coordination normal.      Deep Tendon Reflexes: Reflexes are normal and symmetric.   Psychiatric:         Behavior: Behavior normal.         Thought Content: Thought content normal.         Judgment: Judgment normal.         Assessment:       1. 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 Primary hypertension    3. Diabetes mellitus with proteinuria    4. Secondary hyperparathyroidism of renal origin            Plan:       1. CKD3b: With low grade proteinuria (now resolved with medication), likely from DMII/HTN, was started on SGLT2 inhibitor with some increase in creatinine. Diminished proteinuria.     on Valsartan  on Dapagliflozin (had a small creatinine increase with initiation)    - continue excellent BP control  - encouraged to drink plenty of fluid  Lab Results   Component Value Date    CREATININE 2.0 (H) 08/14/2024     Protein Creatinine Ratios:    Prot/Creat Ratio, Urine   Date Value Ref Range Status   08/14/2024 0.18 0.00 - 0.20 Final   07/05/2024 0.22 (H) 0.00 - 0.20 Final   01/22/2024 0.38 (H) 0.00 - 0.20 Final         Acid-Base:   Lab Results   Component Value Date     08/14/2024    K 4.3 08/14/2024    CO2 28 08/14/2024     2. HTN: Blood pressures now decently controlled.    On Valsartan and  amlodipine, on coreg and chlorthalidone. Is doing ambulatory BP measuring.          3. Secondary (renal) hyperparathyroidism: last PTH within target  Lab Results   Component Value Date    PTH 86.5 (H) 07/05/2024    CALCIUM 9.8 08/14/2024    PHOS 3.4 08/14/2024       4. Anemia: mildly worse from last labs, follows with hem/onc for his lymphoma  Lab Results   Component Value Date    HGB 12.8 (L) 08/14/2024        5. DM:  Last HbA1C: well controlled with medication  Lab Results   Component Value Date    HGBA1C 6.1 (H) 07/05/2024       6. Lipid management: On a statin  Lab Results   Component Value Date    LDLCALC 36.0 (L) 07/05/2024      7. Lymphoma: follow with hem/onc, recent PET scan was ok    8. Follow up with urology for his bladder tumor    Visit today included increased complexity associated with managing the longitudinal care of the patient due to the serious and/or complex managed problems CKDb.     See back in 4-5 month with labs

## 2024-08-21 ENCOUNTER — PATIENT MESSAGE (OUTPATIENT)
Dept: OTHER | Facility: OTHER | Age: 89
End: 2024-08-21
Payer: MEDICARE

## 2024-08-21 ENCOUNTER — OFFICE VISIT (OUTPATIENT)
Dept: HEMATOLOGY/ONCOLOGY | Facility: CLINIC | Age: 89
End: 2024-08-21
Payer: MEDICARE

## 2024-08-21 VITALS
WEIGHT: 189.13 LBS | RESPIRATION RATE: 18 BRPM | BODY MASS INDEX: 26.48 KG/M2 | HEIGHT: 71 IN | HEART RATE: 53 BPM | SYSTOLIC BLOOD PRESSURE: 120 MMHG | TEMPERATURE: 98 F | DIASTOLIC BLOOD PRESSURE: 58 MMHG | OXYGEN SATURATION: 95 %

## 2024-08-21 DIAGNOSIS — N18.30 CKD STAGE 3 DUE TO TYPE 2 DIABETES MELLITUS: ICD-10-CM

## 2024-08-21 DIAGNOSIS — C85.19 B-CELL LYMPHOMA OF EXTRANODAL SITE: ICD-10-CM

## 2024-08-21 DIAGNOSIS — C83.01 SMALL B-CELL LYMPHOMA OF LYMPH NODES OF NECK: Primary | ICD-10-CM

## 2024-08-21 DIAGNOSIS — E11.22 CKD STAGE 3 DUE TO TYPE 2 DIABETES MELLITUS: ICD-10-CM

## 2024-08-21 PROCEDURE — 99214 OFFICE O/P EST MOD 30 MIN: CPT | Mod: PBBFAC | Performed by: INTERNAL MEDICINE

## 2024-08-21 PROCEDURE — 99999 PR PBB SHADOW E&M-EST. PATIENT-LVL IV: CPT | Mod: PBBFAC,,, | Performed by: INTERNAL MEDICINE

## 2024-08-21 PROCEDURE — 99215 OFFICE O/P EST HI 40 MIN: CPT | Mod: S$PBB,,, | Performed by: INTERNAL MEDICINE

## 2024-08-21 NOTE — PROGRESS NOTES
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     He was initially evaluated for Thrombocytopenia. 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   He has completed one four week course of rituxan Monotherapy. Completed 10/2018       --11/25/22 CT Chest/abd/pelvis: Anterior mediastinal nodule, slightly larger than the 05/27/2020 exam.  Decreased size of previously noted mediastinal lymph nodes.  Moderate scattered calcific atherosclerosis.  Cholelithiasis Small cystic lesion in the pancreatic neck, unchanged.    --PET on 1/25/23: 1. No definite hypermetabolic tumor or new lymphadenopathy.  2. Relatively stable low-density anterior mediastinal structure with background level radiotracer uptake less than blood pool.  Etiology remains uncertain.  The Deauville Score is: 2    Interval History:  Mr. Lawson had a very good year. Went to [place] and came home with COVID.     Denies all B symptoms.     No new issues or complaints.       PAST MEDICAL HISTORY:   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    Hyperlipidemia 02/26/2015    Hypertension     Lymphoma     Uncontrolled secondary diabetes mellitus with stage 3 CKD (GFR 30-59) 08/28/2015       PAST SURGICAL HISTORY:   Past Surgical History:   Procedure Laterality Date    BONE MARROW BIOPSY Left 09/19/2018    Procedure: Biopsy-bone marrow;  Surgeon: Velia Tobias MD;  Location: [facility] ENDO;  Service:  Oncology;  Laterality: Left;    CATARACT EXTRACTION Bilateral 1996    COLONOSCOPY N/A 11/24/2020    Procedure: COLONOSCOPY Golytely;  Surgeon: Masoud Hwang MD;  Location: [facility] ENDO;  Service: Endoscopy;  Laterality: N/A;    ESOPHAGOGASTRODUODENOSCOPY N/A 11/24/2020    Procedure: EGD (ESOPHAGOGASTRODUODENOSCOPY);  Surgeon: Masoud Hwang MD;  Location: [facility] ENDO;  Service: Endoscopy;  Laterality: N/A;    eye lid sx Bilateral 2017    EYE SURGERY      cataracts    JOINT REPLACEMENT      knee replacement    retinal injection s Bilateral     scrotal cyst         PAST SOCIAL HISTORY:   reports that he has quit smoking. His smoking use included cigarettes. He has never used smokeless tobacco. He reports current alcohol use. He reports that he does not use drugs.    FAMILY HISTORY:  Family History   Problem Relation Name Age of Onset    Hypertension Mother      Diabetes Father      No Known Problems Sister      No Known Problems Brother      No Known Problems Maternal Aunt      No Known Problems Maternal Uncle      No Known Problems Paternal Aunt      No Known Problems Paternal Uncle      No Known Problems Maternal Grandmother      No Known Problems Maternal Grandfather      No Known Problems Paternal Grandmother      No Known Problems Paternal Grandfather      No Known Problems Daughter      No Known Problems Son      No Known Problems Son      No Known Problems Other      Amblyopia Neg Hx      Blindness Neg Hx      Cancer Neg Hx      Cataracts Neg Hx      Glaucoma Neg Hx      Macular degeneration Neg Hx      Retinal detachment Neg Hx      Strabismus Neg Hx      Stroke Neg Hx      Thyroid disease Neg Hx         CURRENT MEDICATIONS:   Current Outpatient Medications   Medication Sig    allopurinoL (ZYLOPRIM) 100 MG tablet TAKE 1 TABLET(100 MG) BY MOUTH EVERY DAY    amLODIPine (NORVASC) 10 MG tablet TAKE 1 TABLET(10 MG) BY MOUTH EVERY DAY    atorvastatin (LIPITOR) 20 MG tablet TAKE 1 TABLET(20 MG) BY MOUTH EVERY DAY     blood sugar diagnostic Strp 1 strip by Misc.(Non-Drug; Combo Route) route 2 (two) times daily. Disp glucometer and lancets as well    carvediloL (COREG) 6.25 MG tablet TAKE 1 TABLET(6.25 MG) BY MOUTH TWICE DAILY WITH MEALS    chlorthalidone (HYGROTEN) 25 MG Tab Take 0.5 tablets (12.5 mg total) by mouth once daily.    dapagliflozin propanediol (FARXIGA) 10 mg tablet Take 1 tablet (10 mg total) by mouth once daily.    DUPIXENT  mg/2 mL PnIj Inject into the skin. a biologic that is given by subcutaneous (under the skin) injection that may be used to treat inflammatory conditions    FEROSUL 325 mg (65 mg iron) Tab tablet TAKE 1 TABLET BY MOUTH EVERY DAY WITH BREAKFAST    folic acid (FOLVITE) 800 MCG Tab Take 800 mcg by mouth once daily.    gabapentin (NEURONTIN) 300 MG capsule TAKE 1 CAPSULE(300 MG) BY MOUTH THREE TIMES DAILY    glipiZIDE (GLUCOTROL) 10 MG TR24 TAKE 1 TABLET(10 MG) BY MOUTH EVERY DAY    JANUVIA 100 mg Tab TAKE 1 TABLET ONCE DAILY    multivit-min/FA/lycopen/lutein (CENTRUM SILVER MEN ORAL) Take by mouth.    niacin 500 MG Tab Take 100 mg by mouth every evening.    pantoprazole (PROTONIX) 40 MG tablet TAKE 1 TABLET(40 MG) BY MOUTH EVERY DAY    promethazine-dextromethorphan (PROMETHAZINE-DM) 6.25-15 mg/5 mL Syrp Take 5 mLs by mouth nightly as needed.    triamcinolone acetonide 0.1% (KENALOG) 0.1 % ointment Apply topically 2 (two) times daily.    TRUEPLUS LANCETS 33 gauge Misc USE TO TEST BLOOD SUGAR BID    TURMERIC ROOT EXTRACT ORAL Take by mouth.    valsartan (DIOVAN) 320 MG tablet Take 1 tablet (320 mg total) by mouth once daily.    vitamin E 400 UNIT capsule Take 400 Units by mouth once daily.     No current facility-administered medications for this visit.     ALLERGIES:   Review of patient's allergies indicates:  No Known Allergies      Review of Systems:     Review of Systems   Constitutional:  Negative for appetite change, chills, diaphoresis, fatigue, fever and unexpected weight change.    HENT:   Negative for hearing loss, mouth sores, nosebleeds, sore throat, trouble swallowing and voice change.    Eyes:  Negative for eye problems and icterus.   Respiratory:  Negative for chest tightness, cough, hemoptysis, shortness of breath and wheezing.    Cardiovascular:  Negative for chest pain, leg swelling and palpitations.   Gastrointestinal:  Negative for abdominal distention, abdominal pain, blood in stool, diarrhea, nausea and vomiting.   Endocrine: Negative for hot flashes.   Genitourinary:  Negative for bladder incontinence, difficulty urinating, dysuria, frequency and hematuria.    Musculoskeletal:  Negative for arthralgias, back pain, flank pain, gait problem, myalgias, neck pain and neck stiffness.   Skin:  Negative for itching, rash and wound.   Neurological:  Negative for dizziness, extremity weakness, gait problem, headaches, numbness, seizures and speech difficulty.   Hematological:  Negative for adenopathy. Does not bruise/bleed easily.   Psychiatric/Behavioral:  Negative for confusion, depression and sleep disturbance. The patient is not nervous/anxious.           Physical Exam:     Vitals:    08/21/24 1302   BP: (!) 120/58   Pulse: (!) 53   Resp: 18   Temp: 98.3 °F (36.8 °C)     Physical Exam  Constitutional:       General: He is not in acute distress.     Appearance: He is well-developed. He is not diaphoretic.   HENT:      Head: Normocephalic and atraumatic.      Mouth/Throat:      Pharynx: No oropharyngeal exudate.   Eyes:      Conjunctiva/sclera: Conjunctivae normal.      Pupils: Pupils are equal, round, and reactive to light.   Neck:      Thyroid: No thyromegaly.      Vascular: No JVD.      Trachea: No tracheal deviation.   Cardiovascular:      Rate and Rhythm: Normal rate and regular rhythm.      Heart sounds: Normal heart sounds. No murmur heard.     No friction rub.   Pulmonary:      Effort: Pulmonary effort is normal. No respiratory distress.      Breath sounds: Normal breath  sounds. No stridor. No wheezing or rales.   Chest:      Chest wall: No tenderness.   Abdominal:      General: Bowel sounds are normal. There is no distension.      Palpations: Abdomen is soft.      Tenderness: There is no abdominal tenderness. There is no guarding or rebound.   Musculoskeletal:         General: No tenderness or deformity. Normal range of motion.      Cervical back: Normal range of motion and neck supple.   Skin:     General: Skin is warm and dry.      Capillary Refill: Capillary refill takes less than 2 seconds.      Coloration: Skin is not pale.      Findings: No erythema or rash.   Neurological:      Mental Status: He is alert and oriented to person, place, and time.      Cranial Nerves: No cranial nerve deficit.      Sensory: No sensory deficit.      Motor: No abnormal muscle tone.      Coordination: Coordination normal.      Deep Tendon Reflexes: Reflexes normal.   Psychiatric:         Behavior: Behavior normal.         Thought Content: Thought content normal.         Judgment: Judgment normal.       ECOG Performance Status: (foot note - ECOG PS provided by Eastern Cooperative Oncology Group) 0 - Asymptomatic    Karnofsky Performance Score:  100%- Normal, No Complaints, No Evidence of Disease    Labs:   Lab Results   Component Value Date    WBC 3.43 (L) 08/14/2024    HGB 12.8 (L) 08/14/2024    HCT 40.0 08/14/2024    PLT 57 (L) 08/14/2024    CHOL 76 (L) 07/05/2024    TRIG 75 07/05/2024    HDL 25 (L) 07/05/2024    ALT 11 07/05/2024    AST 15 07/05/2024     08/14/2024    K 4.3 08/14/2024     08/14/2024    CREATININE 2.0 (H) 08/14/2024    BUN 23 08/14/2024    CO2 28 08/14/2024    TSH 1.510 07/05/2024    HGBA1C 6.1 (H) 07/05/2024     LDH: 214    Imaging: Previous imaging has been reviewed     Assessment and Plan:     Mr. Lawson is a pleasant 88 year old male with low Grade B Cell Lymphoma    Low Grade B Cell Lymphoma  --Treated with 4 weekly infusions of RItuxan  --Most recent PET had a  dauville score of 2  --Will plan for q6 month labs/physical and yearly imaging    40 minutes in total were spent on this encounter of which 30 minutes were spent face to face with the patient to discuss the disease, natural history, and treatment options. I have provided the patient with an opportunity to ask questions and have all questions answered to his satisfaction.       he will return to clinic in 6 months, but knows to call in the interim if symptoms change or should a problem arise.        Jumana Guzmán MD  Hematology and Medical Oncology  Bone Marrow Transplant  [facility]        BMT Chart Routing      Follow up with physician 6 months. 1. see me in 6 months with a cbc,cmp, ldh   Follow up with BOYD    Provider visit type    Infusion scheduling note    Injection scheduling note    Labs    Imaging    Pharmacy appointment    Other referrals

## 2024-08-22 ENCOUNTER — ANESTHESIA EVENT (OUTPATIENT)
Dept: SURGERY | Facility: HOSPITAL | Age: 89
End: 2024-08-22
Payer: MEDICARE

## 2024-08-22 ENCOUNTER — TELEPHONE (OUTPATIENT)
Dept: UROLOGY | Facility: CLINIC | Age: 89
End: 2024-08-22
Payer: MEDICARE

## 2024-08-22 ENCOUNTER — PATIENT MESSAGE (OUTPATIENT)
Dept: UROLOGY | Facility: CLINIC | Age: 89
End: 2024-08-22
Payer: MEDICARE

## 2024-08-22 RX ORDER — CIPROFLOXACIN 2 MG/ML
400 INJECTION, SOLUTION INTRAVENOUS
Status: CANCELLED | OUTPATIENT
Start: 2024-08-22

## 2024-08-22 NOTE — TELEPHONE ENCOUNTER
Good Morning. Your surgery will start at 7 am, but your arrival time is 5:30 am.   It will be on the first floor [facility] behind the [facility].  You will need for someone to drive you home because you will be under anaesthesia.  No eating or drinking after Midnight. Take a shower the night before and the morning of with a anti-bacterial soap, ( Dial Soap/ Hibiclens) Do not apply and deodorant, perfume, powder or body lotions the morning of surgery. Wear comfortable clothing, like loose fitting pants and a button down shirt. Leave all jewelry and valuables at home.   If you have any questions don't hesitate to call me. Becky [phone]

## 2024-08-23 ENCOUNTER — HOSPITAL ENCOUNTER (OUTPATIENT)
Facility: HOSPITAL | Age: 89
Discharge: HOME | End: 2024-08-23
Attending: UROLOGY | Admitting: UROLOGY
Payer: MEDICARE

## 2024-08-23 ENCOUNTER — ANESTHESIA (OUTPATIENT)
Dept: SURGERY | Facility: HOSPITAL | Age: 89
End: 2024-08-23
Payer: MEDICARE

## 2024-08-23 VITALS
RESPIRATION RATE: 16 BRPM | BODY MASS INDEX: 26.46 KG/M2 | HEIGHT: 71 IN | DIASTOLIC BLOOD PRESSURE: 72 MMHG | WEIGHT: 189 LBS | SYSTOLIC BLOOD PRESSURE: 149 MMHG | TEMPERATURE: 98 F | HEART RATE: 55 BPM | OXYGEN SATURATION: 97 %

## 2024-08-23 DIAGNOSIS — N21.0 BLADDER STONE: ICD-10-CM

## 2024-08-23 DIAGNOSIS — D49.4 BLADDER TUMOR: Primary | ICD-10-CM

## 2024-08-23 DIAGNOSIS — Z01.818 PREOP TESTING: ICD-10-CM

## 2024-08-23 DIAGNOSIS — I10 PRIMARY HYPERTENSION: ICD-10-CM

## 2024-08-23 LAB
POCT GLUCOSE: 112 MG/DL (ref 70–110)
POCT GLUCOSE: 127 MG/DL (ref 70–110)

## 2024-08-23 PROCEDURE — 36000707: Performed by: UROLOGY

## 2024-08-23 PROCEDURE — 71000015 HC POSTOP RECOV 1ST HR: Performed by: UROLOGY

## 2024-08-23 PROCEDURE — 27201423 OPTIME MED/SURG SUP & DEVICES STERILE SUPPLY: Performed by: UROLOGY

## 2024-08-23 PROCEDURE — 37000009 HC ANESTHESIA EA ADD 15 MINS: Performed by: UROLOGY

## 2024-08-23 PROCEDURE — 37000008 HC ANESTHESIA 1ST 15 MINUTES: Performed by: UROLOGY

## 2024-08-23 PROCEDURE — 63600175 PHARM REV CODE 636 W HCPCS: Performed by: NURSE ANESTHETIST, CERTIFIED REGISTERED

## 2024-08-23 PROCEDURE — 25000003 PHARM REV CODE 250: Performed by: NURSE ANESTHETIST, CERTIFIED REGISTERED

## 2024-08-23 PROCEDURE — 25000003 PHARM REV CODE 250: Performed by: STUDENT IN AN ORGANIZED HEALTH CARE EDUCATION/TRAINING PROGRAM

## 2024-08-23 PROCEDURE — 88307 TISSUE EXAM BY PATHOLOGIST: CPT | Performed by: PATHOLOGY

## 2024-08-23 PROCEDURE — 88307 TISSUE EXAM BY PATHOLOGIST: CPT | Mod: 26,,, | Performed by: PATHOLOGY

## 2024-08-23 PROCEDURE — 36000706: Performed by: UROLOGY

## 2024-08-23 PROCEDURE — 82962 GLUCOSE BLOOD TEST: CPT | Performed by: UROLOGY

## 2024-08-23 PROCEDURE — 63600175 PHARM REV CODE 636 W HCPCS: Performed by: STUDENT IN AN ORGANIZED HEALTH CARE EDUCATION/TRAINING PROGRAM

## 2024-08-23 PROCEDURE — 71000044 HC DOSC ROUTINE RECOVERY FIRST HOUR: Performed by: UROLOGY

## 2024-08-23 RX ORDER — FENTANYL CITRATE 50 UG/ML
INJECTION, SOLUTION INTRAMUSCULAR; INTRAVENOUS
Status: DISCONTINUED | OUTPATIENT
Start: 2024-08-23 | End: 2024-08-23

## 2024-08-23 RX ORDER — DEXAMETHASONE SODIUM PHOSPHATE 4 MG/ML
INJECTION, SOLUTION INTRA-ARTICULAR; INTRALESIONAL; INTRAMUSCULAR; INTRAVENOUS; SOFT TISSUE
Status: DISCONTINUED | OUTPATIENT
Start: 2024-08-23 | End: 2024-08-23

## 2024-08-23 RX ORDER — ROCURONIUM BROMIDE 10 MG/ML
INJECTION, SOLUTION INTRAVENOUS
Status: DISCONTINUED | OUTPATIENT
Start: 2024-08-23 | End: 2024-08-23

## 2024-08-23 RX ORDER — ONDANSETRON HYDROCHLORIDE 2 MG/ML
INJECTION, SOLUTION INTRAMUSCULAR; INTRAVENOUS
Status: DISCONTINUED | OUTPATIENT
Start: 2024-08-23 | End: 2024-08-23

## 2024-08-23 RX ORDER — LIDOCAINE HYDROCHLORIDE 20 MG/ML
INJECTION INTRAVENOUS
Status: DISCONTINUED | OUTPATIENT
Start: 2024-08-23 | End: 2024-08-23

## 2024-08-23 RX ORDER — GENTAMICIN SULFATE 100 MG/100ML
240 INJECTION, SOLUTION INTRAVENOUS
Status: COMPLETED | OUTPATIENT
Start: 2024-08-23 | End: 2024-08-23

## 2024-08-23 RX ORDER — PROPOFOL 10 MG/ML
VIAL (ML) INTRAVENOUS
Status: DISCONTINUED | OUTPATIENT
Start: 2024-08-23 | End: 2024-08-23

## 2024-08-23 RX ADMIN — SUGAMMADEX 400 MG: 100 INJECTION, SOLUTION INTRAVENOUS at 07:08

## 2024-08-23 RX ADMIN — FENTANYL CITRATE 50 MCG: 0.05 INJECTION, SOLUTION INTRAMUSCULAR; INTRAVENOUS at 06:08

## 2024-08-23 RX ADMIN — DEXAMETHASONE SODIUM PHOSPHATE 4 MG: 4 INJECTION, SOLUTION INTRAMUSCULAR; INTRAVENOUS at 07:08

## 2024-08-23 RX ADMIN — ONDANSETRON 4 MG: 2 INJECTION INTRAMUSCULAR; INTRAVENOUS at 07:08

## 2024-08-23 RX ADMIN — ROCURONIUM BROMIDE 50 MG: 10 INJECTION, SOLUTION INTRAVENOUS at 07:08

## 2024-08-23 RX ADMIN — GENTAMICIN SULFATE 240 MG: 40 INJECTION, SOLUTION INTRAMUSCULAR; INTRAVENOUS at 07:08

## 2024-08-23 RX ADMIN — FENTANYL CITRATE 50 MCG: 0.05 INJECTION, SOLUTION INTRAMUSCULAR; INTRAVENOUS at 07:08

## 2024-08-23 RX ADMIN — LIDOCAINE HYDROCHLORIDE 100 MG: 20 INJECTION INTRAVENOUS at 07:08

## 2024-08-23 RX ADMIN — PROPOFOL 140 MG: 10 INJECTION, EMULSION INTRAVENOUS at 07:08

## 2024-08-23 RX ADMIN — SODIUM CHLORIDE: 0.9 INJECTION, SOLUTION INTRAVENOUS at 06:08

## 2024-08-23 RX ADMIN — AMPICILLIN 1 G: 1 INJECTION, POWDER, FOR SOLUTION INTRAMUSCULAR; INTRAVENOUS at 07:08

## 2024-08-23 NOTE — ANESTHESIA POSTPROCEDURE EVALUATION
Anesthesia Post Evaluation    Patient: Bria Lawson    Procedure(s) Performed: Procedure(s) (LRB):  TURBT (TRANSURETHRAL RESECTION OF BLADDER TUMOR) (N/A)  CYSTOSCOPY    Final Anesthesia Type: general      Patient location during evaluation: PACU  Patient participation: Yes- Able to Participate  Level of consciousness: awake  Post-procedure vital signs: reviewed and stable  Pain management: adequate  Airway patency: patent    PONV status at discharge: No PONV  Anesthetic complications: no      Cardiovascular status: blood pressure returned to baseline  Respiratory status: unassisted, spontaneous ventilation and room air                Vitals Value Taken Time   /72 08/23/24 0830   Temp 36.6 °C (97.9 °F) 08/23/24 0830   Pulse 55 08/23/24 0830   Resp 16 08/23/24 0830   SpO2 97 % 08/23/24 0830         No case tracking events are documented in the log.      Pain/Lee Score: Lee Score: 10 (8/23/2024  8:00 AM)

## 2024-08-23 NOTE — PROGRESS NOTES
Recovery care complete. Pt tolerated well. Discharge instructions and handouts provided. Pt verbalized understanding. Pt voided 240 ml blood tinged urine w/o clots. Pt given po fluids and tolerated well. Pt in NAD, VSS. Pt discharge home to self care.

## 2024-08-23 NOTE — PROGRESS NOTES
"   08/23/24 0610   Vital Signs   Temp 98.8 °F (37.1 °C)   Temp Source Temporal   Pulse 64   Heart Rate Source SpO2   Resp 16   SpO2 98 %   Device (Oxygen Therapy) room air   BP (!) 186/84   MAP (mmHg) 121   BP Location Left arm   BP Method Automatic   Patient Position Lying   Height and Weight   Height 5' 11" (1.803 m)   Height Method Stated   Weight 85.7 kg (189 lb)   Weight Method Stated   BSA (Calculated - sq m) 2.07 sq meters   BMI (Calculated) 26.4   Weight in (lb) to have BMI = 25 178.9     Notified Dr Champagne of the above. Okay to proceed.  wctm  "

## 2024-08-23 NOTE — ANESTHESIA PREPROCEDURE EVALUATION
Pre-operative evaluation for Procedure(s) (LRB):  TURBT (TRANSURETHRAL RESECTION OF BLADDER TUMOR) (N/A)    Bria Lawson is a 88 y.o. male w/ CAD, DM2, Bcell lymphoma, mild/mod mitral regurg, HTN, CKD and gross hematuria who presents for TURBT.       Prev airway: none on record      2D Echo (10/29/2021):  The left ventricle is mildly enlarged with eccentric hypertrophy and normal systolic function.  The estimated ejection fraction is 60%.  Indeterminate left ventricular diastolic function.  Normal right ventricular size with normal right ventricular systolic function.  Moderate left atrial enlargement.  Mild-to-moderate mitral regurgitation.  Mild tricuspid regurgitation.  Normal central venous pressure (3 mmHg).  The estimated PA systolic pressure is 27 mmHg.      EKG (8/7/24):   Vent. Rate : 057 BPM     Atrial Rate : 057 BPM      P-R Int : 380 ms          QRS Dur : 076 ms       QT Int : 430 ms       P-R-T Axes : 015 016 033 degrees      QTc Int : 418 ms   Sinus bradycardia with 1st degree A-V block   Anterior infarct (cited on or before 03-MAY-2022)   Abnormal ECG     Patient Active Problem List   Diagnosis    GERD (gastroesophageal reflux disease)    Gout, chronic    HTN (hypertension)    Hyperlipidemia    CKD stage 3 due to type 2 diabetes mellitus    HDL lipoprotein deficiency    Cervical radiculopathy, acute    CN (constipation)    Pancytopenia    Stage 3a chronic kidney disease    Primary osteoarthritis involving multiple joints    Diabetes mellitus with proteinuria    Diabetes mellitus with proteinuric diabetic nephropathy    Hypertensive kidney disease with stage 3b chronic kidney disease    Leukopenia    Thrombocytopenia    B-cell lymphoma of extranodal site    Neutropenia    Refractive error    Pseudophakia    Screening for malignant neoplasm of colon    PAH (pulmonary artery hypertension)    Atherosclerosis of abdominal aorta    Colon, diverticulosis    Microhematuria    Secondary  "hyperparathyroidism of renal origin    Neuropathy    Seronegative arthropathy of multiple sites    Atherosclerosis of native coronary artery of native heart without angina pectoris       Review of patient's allergies indicates:  No Known Allergies    Past Surgical History:   Procedure Laterality Date    BONE MARROW BIOPSY Left 09/19/2018    Procedure: Biopsy-bone marrow;  Surgeon: Velia Tobias MD;  Location: [facility];  Service: Oncology;  Laterality: Left;    CATARACT EXTRACTION Bilateral 1996    COLONOSCOPY N/A 11/24/2020    Procedure: COLONOSCOPY Golytely;  Surgeon: Masoud Hwang MD;  Location: [facility];  Service: Endoscopy;  Laterality: N/A;    ESOPHAGOGASTRODUODENOSCOPY N/A 11/24/2020    Procedure: EGD (ESOPHAGOGASTRODUODENOSCOPY);  Surgeon: Masoud Hwang MD;  Location: [facility];  Service: Endoscopy;  Laterality: N/A;    eye lid sx Bilateral 2017    EYE SURGERY      cataracts    JOINT REPLACEMENT      knee replacement    retinal injection s Bilateral     scrotal cyst           Vital Signs:  Temp:  [37.1 °C (98.8 °F)]   Pulse:  [64]   Resp:  [16]   BP: (186)/(84)   SpO2:  [98 %]       CBC: No results for input(s): "WBC", "RBC", "HGB", "HCT", "PLT", "MCV", "MCH", "MCHC" in the last 72 hours.    CMP: No results for input(s): "NA", "K", "CL", "CO2", "BUN", "CREATININE", "GLU", "MG", "PHOS", "CALCIUM", "ALBUMIN", "PROT", "ALKPHOS", "ALT", "AST", "BILITOT" in the last 72 hours.    INR  No results for input(s): "PT", "INR", "PROTIME", "APTT" in the last 72 hours.          Pre-op Assessment    I have reviewed the Patient Summary Reports.     I have reviewed the Nursing Notes. I have reviewed the NPO Status.   I have reviewed the Medications.     Review of Systems  Anesthesia Hx:  No problems with previous Anesthesia             Denies Family Hx of Anesthesia complications.    Denies Personal Hx of Anesthesia complications.                    Hematology/Oncology:                      Current/Recent Cancer. "                Cardiovascular:     Denies Pacemaker. Hypertension  Denies Valvular problems/Murmurs.  CAD    Denies CABG/stent.        hyperlipidemia   ECG has been reviewed.                          Pulmonary:  Pulmonary Normal    Denies Asthma.                    Renal/:  Chronic Renal Disease, CKD                Hepatic/GI:     GERD             Musculoskeletal:  Arthritis               Neurological:    Neuromuscular Disease,   Denies Seizures.                                Endocrine:  Diabetes               Physical Exam  General: Alert and Oriented    Airway:  Mallampati: II   Mouth Opening: Normal  TM Distance: Normal  Tongue: Normal    Dental:  Intact    Chest/Lungs:  Clear to auscultation, Normal Respiratory Rate    Heart:  Rate: Normal  Rhythm: Regular Rhythm        Anesthesia Plan  Type of Anesthesia, risks & benefits discussed:    Anesthesia Type: Gen ETT  Intra-op Monitoring Plan: Standard ASA Monitors  Post Op Pain Control Plan: IV/PO Opioids PRN and multimodal analgesia  Induction:  IV  Airway Plan: Direct and Video  Informed Consent: Informed consent signed with the Patient and all parties understand the risks and agree with anesthesia plan.  All questions answered.   ASA Score: 3  Day of Surgery Review of History & Physical: H&P Update referred to the surgeon/provider.    Ready For Surgery From Anesthesia Perspective.     .

## 2024-08-23 NOTE — H&P
Urology ([facility]) H&P    HPI:  Bria Lawson is a 88 y.o. male with history of gross hematuria and small dome bladder tumor seen on office cysto.      ROS:  Neg except per HPI    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    Hyperlipidemia 02/26/2015    Hypertension     Lymphoma     Uncontrolled secondary diabetes mellitus with stage 3 CKD (GFR 30-59) 08/28/2015       Past Surgical History:   Procedure Laterality Date    BONE MARROW BIOPSY Left 09/19/2018    Procedure: Biopsy-bone marrow;  Surgeon: Velia Tobias MD;  Location: [facility];  Service: Oncology;  Laterality: Left;    CATARACT EXTRACTION Bilateral 1996    COLONOSCOPY N/A 11/24/2020    Procedure: COLONOSCOPY Golytely;  Surgeon: Masoud Hwang MD;  Location: [facility];  Service: Endoscopy;  Laterality: N/A;    ESOPHAGOGASTRODUODENOSCOPY N/A 11/24/2020    Procedure: EGD (ESOPHAGOGASTRODUODENOSCOPY);  Surgeon: Masoud Hwang MD;  Location: [facility];  Service: Endoscopy;  Laterality: N/A;    eye lid sx Bilateral 2017    EYE SURGERY      cataracts    JOINT REPLACEMENT      knee replacement    retinal injection s Bilateral     scrotal cyst         Social History     Socioeconomic History    Marital status:    Tobacco Use    Smoking status: Former     Types: Cigarettes    Smokeless tobacco: Never    Tobacco comments:     09/26/23 Patient Quit Smoking At The Age of 38 In 1974.   Substance and Sexual Activity    Alcohol use: Yes     Comment: socially - maybe few times a week    Drug use: No    Sexual activity: Yes     Partners: Female     Social Determinants of Health     Financial  Resource Strain: Patient Declined (7/10/2024)    Overall Financial Resource Strain (CARDIA)     Difficulty of Paying Living Expenses: Patient declined   Food Insecurity: Patient Declined (7/10/2024)    Hunger Vital Sign     Worried About Running Out of Food in the Last Year: Patient declined     Ran Out of Food in the Last Year: Patient declined   Transportation Needs: No Transportation Needs (7/10/2024)    PRAPARE - Transportation     Lack of Transportation (Medical): No     Lack of Transportation (Non-Medical): No   Physical Activity: Patient Declined (7/10/2024)    Exercise Vital Sign     Days of Exercise per Week: Patient declined     Minutes of Exercise per Session: Patient declined   Stress: Patient Declined (7/10/2024)    [facility] - Occupational Stress Questionnaire     Feeling of Stress : Patient declined   Housing Stability: Patient Declined (7/10/2024)    Housing Stability Vital Sign     Unable to Pay for Housing in the Last Year: Patient declined     Homeless in the Last Year: Patient declined       Family History   Problem Relation Name Age of Onset    Hypertension Mother      Diabetes Father      No Known Problems Sister      No Known Problems Brother      No Known Problems Maternal Aunt      No Known Problems Maternal Uncle      No Known Problems Paternal Aunt      No Known Problems Paternal Uncle      No Known Problems Maternal Grandmother      No Known Problems Maternal Grandfather      No Known Problems Paternal Grandmother      No Known Problems Paternal Grandfather      No Known Problems Daughter      No Known Problems Son      No Known Problems Son      No Known Problems Other      Amblyopia Neg Hx      Blindness Neg Hx      Cancer Neg Hx      Cataracts Neg Hx      Glaucoma Neg Hx      Macular degeneration Neg Hx      Retinal detachment Neg Hx      Strabismus Neg Hx      Stroke Neg Hx      Thyroid disease Neg Hx         Review of patient's allergies indicates:  No  Known Allergies    No current facility-administered medications on file prior to encounter.     Current Outpatient Medications on File Prior to Encounter   Medication Sig Dispense Refill    atorvastatin (LIPITOR) 20 MG tablet TAKE 1 TABLET(20 MG) BY MOUTH EVERY DAY 90 tablet 12    chlorthalidone (HYGROTEN) 25 MG Tab Take 0.5 tablets (12.5 mg total) by mouth once daily. 45 tablet 4    DUPIXENT  mg/2 mL PnIj Inject into the skin. a biologic that is given by subcutaneous (under the skin) injection that may be used to treat inflammatory conditions      FEROSUL 325 mg (65 mg iron) Tab tablet TAKE 1 TABLET BY MOUTH EVERY DAY WITH BREAKFAST 90 tablet 12    gabapentin (NEURONTIN) 300 MG capsule TAKE 1 CAPSULE(300 MG) BY MOUTH THREE TIMES DAILY 270 capsule 3    glipiZIDE (GLUCOTROL) 10 MG TR24 TAKE 1 TABLET(10 MG) BY MOUTH EVERY DAY 90 tablet 0    JANUVIA 100 mg Tab TAKE 1 TABLET ONCE DAILY 90 tablet 12    pantoprazole (PROTONIX) 40 MG tablet TAKE 1 TABLET(40 MG) BY MOUTH EVERY DAY 90 tablet 1    valsartan (DIOVAN) 320 MG tablet Take 1 tablet (320 mg total) by mouth once daily. 90 tablet 3    blood sugar diagnostic Strp 1 strip by Misc.(Non-Drug; Combo Route) route 2 (two) times daily. Disp glucometer and lancets as well 100 strip 12    dapagliflozin propanediol (FARXIGA) 10 mg tablet Take 1 tablet (10 mg total) by mouth once daily. 90 tablet 4    folic acid (FOLVITE) 800 MCG Tab Take 800 mcg by mouth once daily.      multivit-min/FA/lycopen/lutein (CENTRUM SILVER MEN ORAL) Take by mouth.      niacin 500 MG Tab Take 100 mg by mouth every evening.      promethazine-dextromethorphan (PROMETHAZINE-DM) 6.25-15 mg/5 mL Syrp Take 5 mLs by mouth nightly as needed. 118 mL 0    triamcinolone acetonide 0.1% (KENALOG) 0.1 % ointment Apply topically 2 (two) times daily.      TRUEPLUS LANCETS 33 gauge Misc USE TO TEST BLOOD SUGAR BID  12    TURMERIC ROOT EXTRACT ORAL Take by mouth.      vitamin E 400 UNIT capsule Take 400 Units by  "mouth once daily.      [DISCONTINUED] omeprazole (PRILOSEC) 40 MG capsule TAKE 1 CAPSULE DAILY 90 capsule 3       Anticoagulation:  No    Physical Exam:  General: No acute distress, well developed. AAOx3  Head: Normocephalic, Atraumatic  Eyes: Extra-occular movements intact, No discharge  Neck: supple, symmetrical, trachea midline  Lungs: normal respiratory effort, no respiratory distress, no wheezes  CV: regular rate, 2+ pulses  Abdomen: soft, non-tender, non-distended, no organomegaly  MSK: no edema, no deformities, normal ROM  Skin: skin color, texture, turgor normal.  Neurologic: no focal deficits, sensation intact    Labs:    Urine dipstick today - negative for all components    Lab Results   Component Value Date    WBC 3.43 (L) 08/14/2024    HGB 12.8 (L) 08/14/2024    HCT 40.0 08/14/2024    MCV 98 08/14/2024    PLT 57 (L) 08/14/2024           BMP  Lab Results   Component Value Date     08/14/2024    K 4.3 08/14/2024     08/14/2024    CO2 28 08/14/2024    BUN 23 08/14/2024    CREATININE 2.0 (H) 08/14/2024    CALCIUM 9.8 08/14/2024    ANIONGAP 9 08/14/2024    EGFRNORACEVR 31.7 (A) 08/14/2024       No results found for: "PSA"    Assessment: Bria Lawson is a 88 y.o. male with a small dome bladder tumor    Plan:     1. To OR on 8/23/24 for TURBT  2. Consents signed   3. I have explained the risk, benefits, and alternatives of the procedure in detail. The patient voices understanding and all questions have been answered. The patient agrees to proceed as planned.       Bereket Guerrier MD    "

## 2024-08-23 NOTE — TRANSFER OF CARE
"Anesthesia Transfer of Care Note    Patient: Bria Lawson    Procedure(s) Performed: Procedure(s) (LRB):  TURBT (TRANSURETHRAL RESECTION OF BLADDER TUMOR) (N/A)  CYSTOSCOPY    Patient location: PACU    Anesthesia Type: general    Transport from OR: Transported from OR on 6-10 L/min O2 by face mask with adequate spontaneous ventilation    Post pain: adequate analgesia    Post assessment: no apparent anesthetic complications and tolerated procedure well    Post vital signs: stable    Level of consciousness: lethargic    Nausea/Vomiting: no nausea/vomiting    Complications: none    Transfer of care protocol was followed      Last vitals: Visit Vitals  BP (S) (!) 186/84 (BP Location: Left arm, Patient Position: Lying)   Pulse 64   Temp 37.1 °C (98.8 °F) (Temporal)   Resp 16   Ht 5' 11" (1.803 m)   Wt 85.7 kg (189 lb)   SpO2 98%   BMI 26.36 kg/m²     "

## 2024-08-23 NOTE — ANESTHESIA PROCEDURE NOTES
Intubation    Date/Time: 8/23/2024 7:15 AM    Performed by: Maribell Dahl CRNA  Authorized by: Lena Champagne MD    Intubation:     Induction:  Intravenous    Intubated:  Postinduction    Mask Ventilation:  Easy with oral airway    Attempts:  1    Attempted By:  Student    Method of Intubation:  Video laryngoscopy    Blade:  Andrea 3    Laryngeal View Grade: Grade I - full view of cords      Difficult Airway Encountered?: No      Complications:  None    Airway Device:  Oral endotracheal tube    Airway Device Size:  7.5    Style/Cuff Inflation:  Cuffed    Inflation Amount (mL):  6    Tube secured:  22    Secured at:  The lips    Placement Verified By:  Capnometry    Complicating Factors:  None

## 2024-08-23 NOTE — DISCHARGE SUMMARY
[facility] - [facility] (1st Fl)  Discharge Note  Short Stay    Procedure(s) (LRB):  TURBT (TRANSURETHRAL RESECTION OF BLADDER TUMOR) (N/A)  CYSTOSCOPY      OUTCOME: Patient tolerated treatment/procedure well without complication and is now ready for discharge.    DISPOSITION: Home or Self Care    FINAL DIAGNOSIS:  Bladder tumor    FOLLOWUP: In clinic    DISCHARGE INSTRUCTIONS:    Discharge Procedure Orders   Notify your health care provider if you experience any of the following:  temperature >100.4     Notify your health care provider if you experience any of the following:  persistent nausea and vomiting or diarrhea     Notify your health care provider if you experience any of the following:  severe uncontrolled pain     Notify your health care provider if you experience any of the following:  redness, tenderness, or signs of infection (pain, swelling, redness, odor or green/yellow discharge around incision site)     Notify your health care provider if you experience any of the following:  worsening rash     Notify your health care provider if you experience any of the following:  persistent dizziness, light-headedness, or visual disturbances     EKG 12-lead   Standing Status: Future Standing Exp. Date: 07/25/25        TIME SPENT ON DISCHARGE: 15 minutes

## 2024-08-23 NOTE — DISCHARGE INSTRUCTIONS
Post Cystoscopy and Transurethral resection of Bladder Tumor Instructions  Do not strain to have a bowel movement  No strenuous exercise x 7 days  No driving while you are on narcotic pain medications or if your branch  catheter is in place    You can expect:  To see blood in your urine.    Go to the ER if:  Your catheter stops draining  You are having severe abdominal pain  Inability to void if you do not have a catheter    Call the doctor if:  Temperature is greater than 101F  Persistent vomiting and inability to keep food down

## 2024-08-23 NOTE — OP NOTE
[facility]  Operative Note    Date: 08/23/2024    Pre-Op Diagnosis: bladder tumor    Post-Op Diagnosis: same    Procedure(s) Performed:   1.  TURBT of small (1cm) sized bladder tumor      Specimen(s): posterior dome bladder tumor    Staff Surgeon: Ernie Nunn MD    Assistant Surgeon: Bereket Guerrier MD    Anesthesia: General endotracheal anesthesia    Indications: Bria Lawson is a 88 y.o. male with a bladder tumor.  He presents today for surgical resection.      Findings:   1.  Small (1cm) low grade papillary bladder tumor on the posterior dome. Completely resected with apparent muscle in resection      Estimated Blood Loss: min    Drains: none    Procedure in detail:  After the risks, benefits and possible complications of the procedure were explained, consents were obtained. The patient was taken to the operating room and placed under anesthesia. Pre-operative antibiotics were administered 30 minutes prior to expected start time. The patient was placed in the dorsal lithotomy position and prepped and draped in the normal and sterile fashion. Time out was performed.      A rigid cystoscope in a 22 Fr sheath was introduced into the bladder per urethra. This passed easily.  The entire urethra was visualized which showed no masses or strictures.  The right and left ureteral orifices were identified in the normal anatomic position and were seen effluxing clear urine.  Formal cystoscopy was performed which revealed the above findings      The resectoscope was then assembled with the visual obturator. This was placed into the bladder via the urethra and the visual obturator was exchanged for the resecting mechanism.  The tumor was then resected, superficially until the base was identified.  Specimens were then removed and passed off the field for pathologic analysis.      The bladder was drained and hemostasis was achieved.  The resectoscope was removed.      The patient tolerated the procedure  well and was transferred to recovery in stable condition.    Disposition:  The patient will follow up with Dr. Nunn in 3 weeks.       Bereket Guerrier MD

## 2024-08-27 LAB
FINAL PATHOLOGIC DIAGNOSIS: NORMAL
GROSS: NORMAL
Lab: NORMAL

## 2024-08-31 ENCOUNTER — EXTERNAL CHRONIC CARE MANAGEMENT (OUTPATIENT)
Dept: PRIMARY CARE CLINIC | Facility: CLINIC | Age: 89
End: 2024-08-31
Payer: MEDICARE

## 2024-08-31 PROCEDURE — 99490 CHRNC CARE MGMT STAFF 1ST 20: CPT | Mod: PBBFAC,PO | Performed by: INTERNAL MEDICINE

## 2024-08-31 PROCEDURE — 99490 CHRNC CARE MGMT STAFF 1ST 20: CPT | Mod: S$PBB,,, | Performed by: INTERNAL MEDICINE

## 2024-09-08 RX ORDER — GLIPIZIDE 10 MG/1
TABLET, FILM COATED, EXTENDED RELEASE ORAL
Qty: 90 TABLET | Refills: 0 | Status: SHIPPED | OUTPATIENT
Start: 2024-09-08

## 2024-09-08 NOTE — TELEPHONE ENCOUNTER
No care due was identified.  [facility] Embedded Care Due Messages. Reference number: [id number].   9/08/2024 5:29:12 AM CDT

## 2024-09-16 ENCOUNTER — OFFICE VISIT (OUTPATIENT)
Dept: UROLOGY | Facility: CLINIC | Age: 89
End: 2024-09-16
Payer: MEDICARE

## 2024-09-16 VITALS
WEIGHT: 188.94 LBS | HEART RATE: 57 BPM | BODY MASS INDEX: 26.45 KG/M2 | DIASTOLIC BLOOD PRESSURE: 58 MMHG | SYSTOLIC BLOOD PRESSURE: 140 MMHG | HEIGHT: 71 IN

## 2024-09-16 DIAGNOSIS — D30.3 BENIGN TUMOR OF BLADDER: Primary | ICD-10-CM

## 2024-09-16 PROCEDURE — 99214 OFFICE O/P EST MOD 30 MIN: CPT | Mod: S$PBB,,, | Performed by: STUDENT IN AN ORGANIZED HEALTH CARE EDUCATION/TRAINING PROGRAM

## 2024-09-16 PROCEDURE — 99214 OFFICE O/P EST MOD 30 MIN: CPT | Mod: PBBFAC | Performed by: STUDENT IN AN ORGANIZED HEALTH CARE EDUCATION/TRAINING PROGRAM

## 2024-09-16 PROCEDURE — 99999 PR PBB SHADOW E&M-EST. PATIENT-LVL IV: CPT | Mod: PBBFAC,,, | Performed by: STUDENT IN AN ORGANIZED HEALTH CARE EDUCATION/TRAINING PROGRAM

## 2024-09-16 NOTE — PROGRESS NOTES
[facility]  Urologic Oncology Clinic Note        Date of Service: 9/16/2024        Chief Complaint/Reason for Consultation: Bladder tumor    Requesting Provider:   No referring provider defined for this encounter.      History of Present Illness:   Patient 88 y.o. male presents with hx of TURBT by Dr. Nunn on 8/23/2024 that showed PUNLMP.    Stopped smoking when he was 38.    Blood thinners:  None    No Hx of TIA, MI, and CVA   Abdominal surgeries:  None    Here today to discuss most recent pathology from TURBT. Discussed that this finding despite verbage is a benign finding on TURBT.    No hematuria today.       Urologic History:     7/17/2024 -- Renal US -- no hydro, bilateral renal cysts, enlarged prostate  8/23/2024 -- TURBT -- 2cm bladder tumor posterior / dome -- PUNLUMP      Patient Active Problem List    Diagnosis Date Noted    Bladder tumor 08/23/2024    Atherosclerosis of native coronary artery of native heart without angina pectoris 08/07/2024    Seronegative arthropathy of multiple sites 06/27/2024    Neuropathy 04/26/2023    Secondary hyperparathyroidism of renal origin 02/10/2023    Microhematuria 04/27/2022    Colon, diverticulosis 04/08/2021    PAH (pulmonary artery hypertension) 01/11/2021    Atherosclerosis of abdominal aorta 01/11/2021    Screening for malignant neoplasm of colon 11/24/2020    Refractive error 05/16/2019    Pseudophakia 05/16/2019    Neutropenia 12/12/2018    B-cell lymphoma of extranodal site 10/17/2018    Leukopenia 10/11/2017    Thrombocytopenia 10/11/2017    Hypertensive kidney disease with stage 3b chronic kidney disease 03/23/2017    Diabetes mellitus with proteinuria 02/21/2017    Diabetes mellitus with proteinuric diabetic nephropathy 02/21/2017    Primary osteoarthritis involving multiple joints 10/10/2016    Stage 3a chronic kidney disease 05/18/2016    Pancytopenia 03/09/2016    Cervical radiculopathy, acute 06/15/2015    CN (constipation) 06/15/2015    CKD  stage 3 due to type 2 diabetes mellitus 05/26/2015    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    GERD (gastroesophageal reflux disease) 02/26/2015    Gout, chronic 02/26/2015    HTN (hypertension) 02/26/2015    Hyperlipidemia 02/26/2015        Review of patient's allergies indicates:  No Known Allergies     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    Hyperlipidemia 02/26/2015    Hypertension     Lymphoma     Uncontrolled secondary diabetes mellitus with stage 3 CKD (GFR 30-59) 08/28/2015      Past Surgical History:   Procedure Laterality Date    BONE MARROW BIOPSY Left 09/19/2018    Procedure: Biopsy-bone marrow;  Surgeon: Velia Tobias MD;  Location: [facility];  Service: Oncology;  Laterality: Left;    CATARACT EXTRACTION Bilateral 1996    COLONOSCOPY N/A 11/24/2020    Procedure: COLONOSCOPY Golytely;  Surgeon: Masoud Hwang MD;  Location: [facility];  Service: Endoscopy;  Laterality: N/A;    CYSTOSCOPY  8/23/2024    Procedure: CYSTOSCOPY;  Surgeon: Ernie Nunn Jr., MD;  Location: [address];  Service: Urology;;    ESOPHAGOGASTRODUODENOSCOPY N/A 11/24/2020    Procedure: EGD (ESOPHAGOGASTRODUODENOSCOPY);  Surgeon: Masoud Hwang MD;  Location: [facility];  Service: Endoscopy;  Laterality: N/A;    eye lid sx Bilateral 2017    EYE SURGERY      cataracts    JOINT REPLACEMENT      knee replacement    retinal injection s Bilateral     scrotal cyst      TURBT (TRANSURETHRAL RESECTION OF BLADDER TUMOR) N/A 8/23/2024    Procedure: TURBT (TRANSURETHRAL RESECTION OF BLADDER TUMOR);  Surgeon: Ernie Nunn Jr., MD;  Location: [facility] OR [address] [place] [address];   "Service: Urology;  Laterality: N/A;      Family History   Problem Relation Name Age of Onset    Hypertension Mother      Diabetes Father      No Known Problems Sister      No Known Problems Brother      No Known Problems Maternal Aunt      No Known Problems Maternal Uncle      No Known Problems Paternal Aunt      No Known Problems Paternal Uncle      No Known Problems Maternal Grandmother      No Known Problems Maternal Grandfather      No Known Problems Paternal Grandmother      No Known Problems Paternal Grandfather      No Known Problems Daughter      No Known Problems Son      No Known Problems Son      No Known Problems Other      Amblyopia Neg Hx      Blindness Neg Hx      Cancer Neg Hx      Cataracts Neg Hx      Glaucoma Neg Hx      Macular degeneration Neg Hx      Retinal detachment Neg Hx      Strabismus Neg Hx      Stroke Neg Hx      Thyroid disease Neg Hx        Social History     Tobacco Use    Smoking status: Former     Types: Cigarettes    Smokeless tobacco: Never    Tobacco comments:     09/26/23 Patient Quit Smoking At The Age of 38 In 1974.   Substance Use Topics    Alcohol use: Yes     Comment: socially - maybe few times a week        Review of Systems   Constitutional:  Negative for activity change, fever and unexpected weight change.   Respiratory:  Negative for cough.    Cardiovascular:  Negative for chest pain.   Gastrointestinal:  Negative for abdominal pain.   Genitourinary:  Negative for hematuria.             OBJECTIVE:     Vitals:    09/16/24 0957   BP: (!) 140/58   Pulse: (!) 57   Weight: 85.7 kg (188 lb 15 oz)   Height: 5' 11" (1.803 m)          General Appearance: Alert, cooperative, no distress  Head: Normocephalic  Eyes: Clear conjunctiva  Neck: No obvious LND or JVD  Lungs: Normal chest excursion, no accessory muscle use  Chest: Regular rate rhythm by palpation, no pedal edema  Abdomen: Soft, non-tender, non-distended  Male Genitalia: Deferred  Extremities: Atraumatic   Lymph Nodes: " No appreciable lymph adenopathy  Neurologic: No gross gait, motor or sensory deficits            LAB:      All laboratory values listed below was/were independently reviewed with patient at this clinic visit.    Lab Results   Component Value Date    WBC 3.43 (L) 08/14/2024    HGB 12.8 (L) 08/14/2024    HCT 40.0 08/14/2024    MCV 98 08/14/2024    PLT 57 (L) 08/14/2024       CMP  Sodium   Date Value Ref Range Status   08/14/2024 140 136 - 145 mmol/L Final     Potassium   Date Value Ref Range Status   08/14/2024 4.3 3.5 - 5.1 mmol/L Final     Chloride   Date Value Ref Range Status   08/14/2024 103 95 - 110 mmol/L Final     CO2   Date Value Ref Range Status   08/14/2024 28 23 - 29 mmol/L Final     Glucose   Date Value Ref Range Status   08/14/2024 205 (H) 70 - 110 mg/dL Final     BUN   Date Value Ref Range Status   08/14/2024 23 8 - 23 mg/dL Final     Creatinine   Date Value Ref Range Status   08/14/2024 2.0 (H) 0.5 - 1.4 mg/dL Final     Calcium   Date Value Ref Range Status   08/14/2024 9.8 8.7 - 10.5 mg/dL Final     Total Protein   Date Value Ref Range Status   07/05/2024 7.5 6.0 - 8.4 g/dL Final     Albumin   Date Value Ref Range Status   08/14/2024 4.0 3.5 - 5.2 g/dL Final     Total Bilirubin   Date Value Ref Range Status   07/05/2024 1.3 (H) 0.1 - 1.0 mg/dL Final     Comment:     For infants and newborns, interpretation of results should be based  on gestational age, weight and in agreement with clinical  observations.    Premature Infant recommended reference ranges:  Up to 24 hours.............<8.0 mg/dL  Up to 48 hours............<12.0 mg/dL  3-5 days..................<15.0 mg/dL  6-29 days.................<15.0 mg/dL       Alkaline Phosphatase   Date Value Ref Range Status   07/05/2024 43 (L) 55 - 135 U/L Final     AST   Date Value Ref Range Status   07/05/2024 15 10 - 40 U/L Final     ALT   Date Value Ref Range Status   07/05/2024 11 10 - 44 U/L Final     Anion Gap   Date Value Ref Range Status   08/14/2024 9  8 - 16 mmol/L Final     eGFR   Date Value Ref Range Status   08/14/2024 31.7 (A) >60 mL/min/1.73 m^2 Final       IMAGING:      All imaging listed below was/were independently reviewed with patient at this clinic visit.    7/17/2024  US RETROPERITONEAL COMPLETE     CLINICAL HISTORY:  Gross hematuria     TECHNIQUE:  Ultrasound of kidneys and urinary bladder.     COMPARISON:  09/21/2023     FINDINGS:  Right: Measures 13.1 cm.  Normal cortical thickness and echogenicity.  Resistive index measures 0.83.  There are multiple cysts.  Largest located at the midpole demonstrates a thin septation and measures 5.3 cm.  Remaining cysts appear stable and measure up to 3.7 cm.  No hydronephrosis.     Left: Measures 13.9 cm.  Normal cortical thickness and echogenicity.  Resistive index measures 0.85.  There are multiple simple cysts measuring up to 5.1 cm.  No hydronephrosis.     Urinary bladder: Unremarkable.     Prostate measures 4.4 x 5.9 x 4.8 cm.     Impression:     1. Bilateral simple and minimally complex renal cysts.  2. Elevated resistive indices, consistent with medical renal disease.  3. Enlarged prostate.        Electronically signed by:Raul Pickering MD  Date:                                            07/17/2024  Time:                                           11:55      ASSESSMENT/PLAN:     89 yo M w hx of PUNLMP on TURBT    Plan:    PUNLMP  -Today I discussed the implications of this pathologic finding. Discussed that it is considered benign and no further surveillance is recommended. Did suggest that it would be appropriate to consider cytoscopy for surveillance in 1 year.      Patient in agreement with plan.        Dez Pearson MD  Urologic Oncology  P: [phone]

## 2024-09-26 RX ORDER — GABAPENTIN 300 MG/1
CAPSULE ORAL
Qty: 270 CAPSULE | Refills: 3 | Status: SHIPPED | OUTPATIENT
Start: 2024-09-26

## 2024-09-30 ENCOUNTER — EXTERNAL CHRONIC CARE MANAGEMENT (OUTPATIENT)
Dept: PRIMARY CARE CLINIC | Facility: CLINIC | Age: 89
End: 2024-09-30
Payer: MEDICARE

## 2024-09-30 PROCEDURE — 99490 CHRNC CARE MGMT STAFF 1ST 20: CPT | Mod: S$PBB,,, | Performed by: INTERNAL MEDICINE

## 2024-09-30 PROCEDURE — 99490 CHRNC CARE MGMT STAFF 1ST 20: CPT | Mod: PBBFAC,PO | Performed by: INTERNAL MEDICINE

## 2024-10-15 ENCOUNTER — TELEPHONE (OUTPATIENT)
Dept: ADMINISTRATIVE | Facility: CLINIC | Age: 89
End: 2024-10-15
Payer: MEDICARE

## 2024-10-15 NOTE — TELEPHONE ENCOUNTER
Called pt, informed pt I was calling to remind pt of his in office EAWV on 10/16/24; clinic location provided to patient; pt confirmed appointment

## 2024-10-16 ENCOUNTER — OFFICE VISIT (OUTPATIENT)
Dept: FAMILY MEDICINE | Facility: CLINIC | Age: 89
End: 2024-10-16
Payer: MEDICARE

## 2024-10-16 VITALS
DIASTOLIC BLOOD PRESSURE: 64 MMHG | TEMPERATURE: 98 F | BODY MASS INDEX: 26.98 KG/M2 | RESPIRATION RATE: 19 BRPM | HEIGHT: 71 IN | OXYGEN SATURATION: 98 % | SYSTOLIC BLOOD PRESSURE: 118 MMHG | WEIGHT: 192.69 LBS | HEART RATE: 55 BPM

## 2024-10-16 DIAGNOSIS — N18.32 HYPERTENSIVE KIDNEY DISEASE WITH STAGE 3B CHRONIC KIDNEY DISEASE: ICD-10-CM

## 2024-10-16 DIAGNOSIS — Z00.00 ENCOUNTER FOR MEDICARE ANNUAL WELLNESS EXAM: ICD-10-CM

## 2024-10-16 DIAGNOSIS — N18.30 CKD STAGE 3 DUE TO TYPE 2 DIABETES MELLITUS: ICD-10-CM

## 2024-10-16 DIAGNOSIS — D61.818 PANCYTOPENIA: ICD-10-CM

## 2024-10-16 DIAGNOSIS — I70.0 ATHEROSCLEROSIS OF ABDOMINAL AORTA: ICD-10-CM

## 2024-10-16 DIAGNOSIS — I12.9 HYPERTENSIVE KIDNEY DISEASE WITH STAGE 3B CHRONIC KIDNEY DISEASE: ICD-10-CM

## 2024-10-16 DIAGNOSIS — E11.22 CKD STAGE 3 DUE TO TYPE 2 DIABETES MELLITUS: ICD-10-CM

## 2024-10-16 DIAGNOSIS — N25.81 SECONDARY HYPERPARATHYROIDISM OF RENAL ORIGIN: ICD-10-CM

## 2024-10-16 DIAGNOSIS — Z00.00 ENCOUNTER FOR PREVENTIVE HEALTH EXAMINATION: Primary | ICD-10-CM

## 2024-10-16 DIAGNOSIS — I27.21 PAH (PULMONARY ARTERY HYPERTENSION): ICD-10-CM

## 2024-10-16 PROCEDURE — 99999 PR PBB SHADOW E&M-EST. PATIENT-LVL V: CPT | Mod: PBBFAC,,, | Performed by: NURSE PRACTITIONER

## 2024-10-16 PROCEDURE — 99215 OFFICE O/P EST HI 40 MIN: CPT | Mod: PBBFAC,PN | Performed by: NURSE PRACTITIONER

## 2024-10-16 NOTE — PATIENT INSTRUCTIONS
Counseling and Referral of Other Preventative  (Italic type indicates deductible and co-insurance are waived)    Patient Name: Bria Lawson  Today's Date: 10/16/2024    Health Maintenance       Date Due Completion Date    Aspirin/Antiplatelet Therapy Never done ---    RSV Vaccine (Age 60+ and Pregnant patients) (1 - 1-dose 75+ series) Never done ---    TETANUS VACCINE 05/12/2024 5/12/2014    Shingles Vaccine (2 of 2) 06/25/2024 4/30/2024    Influenza Vaccine (1) 09/01/2024 1/24/2024 (Declined)    Override on 1/24/2024: Declined (Declined for season)    COVID-19 Vaccine (7 - 2024-25 season) 09/01/2024 11/15/2023    Hemoglobin A1c 01/05/2025 7/5/2024    Diabetes Urine Screening 01/22/2025 1/22/2024        No orders of the defined types were placed in this encounter.      The following information is provided to all patients.  This information is to help you find resources for any of the problems found today that may be affecting your health:                  Living healthy guide: www.[facility].[place].gov      Understanding Diabetes: www.diabetes.org      Eating healthy: www.cdc.gov/healthyweight      CDC home safety checklist: www.cdc.gov/steadi/patient.html      Agency on Aging: www.goea.[place].[facility]      Alcoholics anonymous (AA): www.aa.org      Physical Activity: www.joaquin.nih.gov/sr0qcqz      Tobacco use: www.quitwithusla.org

## 2024-10-16 NOTE — PROGRESS NOTES
"  Bria Lawson presented for a  Medicare AWV and comprehensive Health Risk Assessment today. The following components were reviewed and updated:    Medical history  Family History  Social history  Allergies and Current Medications  Health Risk Assessment  Health Maintenance  Care Team         ** See Completed Assessments for Annual Wellness Visit within the encounter summary.**         The following assessments were completed:  Living Situation  CAGE  Depression Screening  Timed Get Up and Go  Whisper Test  Cognitive Function Screening  Nutrition Screening  ADL Screening  PAQ Screening      Opioid documentation:      Patient does not have a current opioid prescription.        Vitals:    10/16/24 1304   BP: (!) 142/60   BP Location: Right arm   Patient Position: Sitting   Pulse: (!) 55   Resp: 19   Temp: 97.9 °F (36.6 °C)   TempSrc: Oral   SpO2: 98%   Weight: 87.4 kg (192 lb 10.9 oz)   Height: 5' 11" (1.803 m)     Body mass index is 26.87 kg/m².  Physical Exam  Vitals reviewed.   Constitutional:       General: He is not in acute distress.     Appearance: Normal appearance. He is not ill-appearing, toxic-appearing or diaphoretic.   HENT:      Head: Normocephalic and atraumatic.   Pulmonary:      Effort: Pulmonary effort is normal. No respiratory distress.      Breath sounds: No wheezing.   Skin:     General: Skin is warm and dry.   Neurological:      Mental Status: He is alert and oriented to person, place, and time.   Psychiatric:         Mood and Affect: Mood normal.         Behavior: Behavior normal.               Diagnoses and health risks identified today and associated recommendations/orders:    1. Encounter for Medicare annual wellness exam  The patient was seen today for an annual Medicare wellness exam.  Health maintenance and screening topics were discussed.  Proper diet and exercise recommendations were reviewed.  - Ambulatory Referral/Consult to Enhanced Annual Wellness Visit (eAWV)    2. Encounter for " preventive health examination  The patient was seen today for an annual Medicare wellness exam.  Health maintenance and screening topics were discussed.  Proper diet and exercise recommendations were reviewed.    3. Hypertensive kidney disease with stage 3b chronic kidney disease  BP Controlled on current medications.  Continue.    4. CKD stage 3 due to type 2 diabetes mellitus  Stable without any acute worsening    5. PAH (pulmonary artery hypertension)  Stable without shortness breath or chest pain.    6. Atherosclerosis of abdominal aorta  On statin.  Continue.    7. Secondary hyperparathyroidism of renal origin  Most recent PTH stable.  No acute concerns    8. Pancytopenia  Stable without any acute symptoms      Provided Bria with a 5-10 year written screening schedule and personal prevention plan. Recommendations were developed using the USPSTF age appropriate recommendations. Education, counseling, and referrals were provided as needed. After Visit Summary printed and given to patient which includes a list of additional screenings\tests needed.    Follow up in about 1 year (around 10/16/2025) for AWV.    Royce Tapia, NP  I offered to discuss advanced care planning, including how to pick a person who would make decisions for you if you were unable to make them for yourself, called a health care power of , and what kind of decisions you might make such as use of life sustaining treatments such as ventilators and tube feeding when faced with a life limiting illness recorded on a living will that they will need to know. (How you want to be cared for as you near the end of your natural life)     X Patient is interested in learning more about how to make advanced directives.  I provided them paperwork and offered to discuss this with them.

## 2024-10-22 RX ORDER — PANTOPRAZOLE SODIUM 40 MG/1
40 TABLET, DELAYED RELEASE ORAL
Qty: 90 TABLET | Refills: 12 | Status: SHIPPED | OUTPATIENT
Start: 2024-10-22

## 2024-10-22 NOTE — TELEPHONE ENCOUNTER
Care Due:                  Date            Visit Type   Department     Provider  --------------------------------------------------------------------------------                                [facility]                              FOLLOWUP/OF  MED/ INTERNAL  Adiel Coleman  Last Visit: 06-      FICE VISIT   MED/ PEDS      Ehrensing  Next Visit: None Scheduled  None         None Found                                                            Last  Test          Frequency    Reason                     Performed    Due Date  --------------------------------------------------------------------------------    HBA1C.......  6 months...  karuna HOODZIDE.......  07- 01-    [facility] Embedded Care Due Messages. Reference number: [id number].   10/22/2024 7:20:45 AM CDT

## 2024-10-31 ENCOUNTER — EXTERNAL CHRONIC CARE MANAGEMENT (OUTPATIENT)
Dept: PRIMARY CARE CLINIC | Facility: CLINIC | Age: 89
End: 2024-10-31
Payer: MEDICARE

## 2024-10-31 PROCEDURE — 99490 CHRNC CARE MGMT STAFF 1ST 20: CPT | Mod: S$PBB,,, | Performed by: INTERNAL MEDICINE

## 2024-10-31 PROCEDURE — 99490 CHRNC CARE MGMT STAFF 1ST 20: CPT | Mod: PBBFAC,PO | Performed by: INTERNAL MEDICINE

## 2024-11-09 NOTE — TELEPHONE ENCOUNTER
No care due was identified.  Health [facility] Embedded Care Due Messages. Reference number: [id number].   11/09/2024 3:11:41 AM CST

## 2024-11-10 RX ORDER — ATORVASTATIN CALCIUM 20 MG/1
TABLET, FILM COATED ORAL
Qty: 90 TABLET | Refills: 12 | Status: SHIPPED | OUTPATIENT
Start: 2024-11-10

## 2024-11-30 ENCOUNTER — EXTERNAL CHRONIC CARE MANAGEMENT (OUTPATIENT)
Dept: PRIMARY CARE CLINIC | Facility: CLINIC | Age: 89
End: 2024-11-30
Payer: MEDICARE

## 2024-11-30 PROCEDURE — 99490 CHRNC CARE MGMT STAFF 1ST 20: CPT | Mod: S$PBB,,, | Performed by: INTERNAL MEDICINE

## 2024-11-30 PROCEDURE — 99439 CHRNC CARE MGMT STAF EA ADDL: CPT | Mod: S$PBB,,, | Performed by: INTERNAL MEDICINE

## 2024-11-30 PROCEDURE — 99490 CHRNC CARE MGMT STAFF 1ST 20: CPT | Mod: PBBFAC,PO | Performed by: INTERNAL MEDICINE

## 2024-11-30 PROCEDURE — 99439 CHRNC CARE MGMT STAF EA ADDL: CPT | Mod: PBBFAC,PO | Performed by: INTERNAL MEDICINE

## 2024-12-07 NOTE — TELEPHONE ENCOUNTER
No care due was identified.  Health [facility] Embedded Care Due Messages. Reference number: [id number].   12/07/2024 5:28:52 AM CST

## 2024-12-10 RX ORDER — GLIPIZIDE 10 MG/1
TABLET, FILM COATED, EXTENDED RELEASE ORAL
Qty: 90 TABLET | Refills: 0 | Status: SHIPPED | OUTPATIENT
Start: 2024-12-10

## 2024-12-10 NOTE — TELEPHONE ENCOUNTER
No care due was identified.  Health [facility] Embedded Care Due Messages. Reference number: [id number].   12/09/2024 11:09:56 PM CST  
Please see the attached refill request.  
No indicators present

## 2024-12-13 NOTE — PROGRESS NOTES
Chart reviewed.   Immunizations: updated  Orders placed: n/a  Upcoming appts to satisfy TUCKER topics: n/a     Massage therapy has been ordered.  Our pre-service department should reach out to you to schedule, or you can call them at [phone]

## 2024-12-23 DIAGNOSIS — N18.30 CKD STAGE 3 DUE TO TYPE 2 DIABETES MELLITUS: Primary | ICD-10-CM

## 2024-12-23 DIAGNOSIS — E11.22 CKD STAGE 3 DUE TO TYPE 2 DIABETES MELLITUS: Primary | ICD-10-CM

## 2024-12-24 RX ORDER — DAPAGLIFLOZIN 10 MG/1
10 TABLET, FILM COATED ORAL DAILY
Qty: 90 TABLET | Refills: 4 | Status: SHIPPED | OUTPATIENT
Start: 2024-12-24

## 2024-12-31 ENCOUNTER — EXTERNAL CHRONIC CARE MANAGEMENT (OUTPATIENT)
Dept: PRIMARY CARE CLINIC | Facility: CLINIC | Age: 89
End: 2024-12-31
Payer: MEDICARE

## 2024-12-31 PROCEDURE — 99490 CHRNC CARE MGMT STAFF 1ST 20: CPT | Mod: PBBFAC,PO | Performed by: INTERNAL MEDICINE

## 2024-12-31 PROCEDURE — 99439 CHRNC CARE MGMT STAF EA ADDL: CPT | Mod: PBBFAC,PO | Performed by: INTERNAL MEDICINE

## 2025-01-07 ENCOUNTER — OFFICE VISIT (OUTPATIENT)
Dept: URGENT CARE | Facility: CLINIC | Age: 89
End: 2025-01-07
Payer: MEDICARE

## 2025-01-07 VITALS
WEIGHT: 194.25 LBS | HEIGHT: 71 IN | HEART RATE: 59 BPM | BODY MASS INDEX: 27.19 KG/M2 | SYSTOLIC BLOOD PRESSURE: 144 MMHG | RESPIRATION RATE: 16 BRPM | DIASTOLIC BLOOD PRESSURE: 71 MMHG | TEMPERATURE: 99 F | OXYGEN SATURATION: 96 %

## 2025-01-07 DIAGNOSIS — R05.9 COUGH, UNSPECIFIED TYPE: Primary | ICD-10-CM

## 2025-01-07 DIAGNOSIS — B34.9 VIRAL ILLNESS: ICD-10-CM

## 2025-01-07 PROCEDURE — 99213 OFFICE O/P EST LOW 20 MIN: CPT | Mod: S$GLB,,,

## 2025-01-07 RX ORDER — BENZONATATE 200 MG/1
200 CAPSULE ORAL 3 TIMES DAILY PRN
Qty: 21 CAPSULE | Refills: 0 | Status: SHIPPED | OUTPATIENT
Start: 2025-01-07 | End: 2025-01-14

## 2025-01-07 NOTE — PROGRESS NOTES
Subjective:      Patient ID: Bria Lawson is a 89 y.o. male.    Vitals:  height is 5' 11" (1.803 m) and weight is 88.1 kg (194 lb 3.6 oz). His oral temperature is 98.5 °F (36.9 °C). His blood pressure is 144/71 (abnormal) and his pulse is 59 (abnormal). His respiration is 16 and oxygen saturation is 96%.     Chief Complaint: Cough    Patient present today for a cough that started 3 week ago. Patient has been taking delsym and robitussin.  Patient states his severity of the cough has waxed and waned, however he describes the cough is much better now and dry.  He denies any chest pain, shortness for breath, fever, GI symptoms, mucus production, or significant sinus congestion.  Patient is accompanied by his wife who has similar symptoms    Cough  This is a new problem. The current episode started 1 to 4 weeks ago. The problem has been unchanged. The problem occurs hourly. The cough is Productive of sputum. Pertinent negatives include no chest pain, ear pain, fever, headaches, sore throat or shortness of breath. Nothing aggravates the symptoms. He has tried OTC cough suppressant for the symptoms. The treatment provided no relief.       Constitution: Negative for fever and generalized weakness.   HENT:  Negative for ear pain, congestion, sinus pain and sore throat.    Neck: Negative for neck pain.   Cardiovascular:  Negative for chest pain.   Respiratory:  Positive for cough. Negative for sputum production and shortness of breath.    Gastrointestinal:  Negative for abdominal pain.   Neurological:  Negative for headaches.      Objective:     Physical Exam   Constitutional: He is oriented to person, place, and time. He appears well-developed. He is cooperative.  Non-toxic appearance. He does not appear ill. No distress.      Comments:Well-appearing, sitting comfortably in exam chair     HENT:   Head: Normocephalic and atraumatic.   Ears:   Right Ear: Hearing, tympanic membrane, external ear and ear canal normal.   Left " Ear: Hearing, tympanic membrane, external ear and ear canal normal.   Nose: Nose normal. No mucosal edema, rhinorrhea or nasal deformity. No epistaxis. Right sinus exhibits no maxillary sinus tenderness and no frontal sinus tenderness. Left sinus exhibits no maxillary sinus tenderness and no frontal sinus tenderness.   Mouth/Throat: Uvula is midline, oropharynx is clear and moist and mucous membranes are normal. No trismus in the jaw. Normal dentition. No uvula swelling. No oropharyngeal exudate, posterior oropharyngeal edema or posterior oropharyngeal erythema.   Eyes: Conjunctivae and lids are normal. No scleral icterus.   Neck: Trachea normal and phonation normal. Neck supple. No edema present. No erythema present. No neck rigidity present.   Cardiovascular: Normal rate, regular rhythm, normal heart sounds and normal pulses.   Pulmonary/Chest: Effort normal and breath sounds normal. No respiratory distress. He has no decreased breath sounds. He has no wheezes. He has no rhonchi.   Breath sounds clear bilaterally, no wheezing, no cough heard during exam         Comments: Breath sounds clear bilaterally, no wheezing, no cough heard during exam    Abdominal: Normal appearance.   Musculoskeletal: Normal range of motion.         General: No deformity. Normal range of motion.   Neurological: He is alert and oriented to person, place, and time. He exhibits normal muscle tone. Coordination normal.   Skin: Skin is warm, dry, intact, not diaphoretic and not pale.   Psychiatric: His speech is normal and behavior is normal. Judgment and thought content normal.   Nursing note and vitals reviewed.      Assessment:     1. Cough, unspecified type    2. Viral illness        Plan:   Physical exam as documented above, no clinical evidence of respiratory failure, dehydration, or focal/systemic bacterial infection at this time. Anticipatory guidance regarding viral URI's discussed with patient.  Low suspicion of bacterial sinusitis  or pneumonia at this time based on history and physical exam.  Wipes viral testing is negative for COVID flu, RSV, and as patient is more well-appearing than she is, I have low suspicion of these illnesses for him.  Discussed use of over-the-counter medications, such as antihistamine, for symptoms as well as supportive care and return precautions. Patient verbalizes understanding and agrees to follow-up with PCP as needed.       Cough, unspecified type  -     benzonatate (TESSALON) 200 MG capsule; Take 1 capsule (200 mg total) by mouth 3 (three) times daily as needed.  Dispense: 21 capsule; Refill: 0    Viral illness

## 2025-01-07 NOTE — PATIENT INSTRUCTIONS
Your illness is caused by a virus and antibiotics would not be beneficial at this time.    You may use Tessalon Perles to help with cough, your cough may linger for 2-3 weeks after your illness subsides.    Please drink plenty of fluids and get plenty of rest.    For Congestion:     --  Take over the counter antihistamine such as Claritin, Zyrtec or Allegra to dry you out.     --  Use Flonase 2 sprays/nostril twice per day. It is a local acting steroid nasal spray and will take 3-4 days to work at full strength so please use this consistently.  If you develop a bloody nose, stop using the medication immediately.      For Cough:   --  Use Mucinex (guaifenisin) to break up mucous up to 2400mg/day to loosen any mucous. The Mucinex DM pill has a cough suppressant that can be sedating. It can be used at night to stop the tickle at the back of your throat.     --  May gargle salt water for sore throat, a tablespoon of honey is helpful for cough, especially at night.     If not allergic, please take over the counter Tylenol (Acetaminophen) and/or Motrin (Ibuprofen) as directed for control of pain and/or fever.    Please follow up with your primary care doctor or specialist as needed.    - You must understand that you have received an Urgent Care treatment only and that you may be released before all of your medical problems are known or treated.   - You, the patient, will arrange for follow up care as instructed.   - If your condition worsens or fails to improve we recommend that you receive another evaluation at the ER immediately or contact your PCP to discuss your concerns or return here.   - Follow up with your PCP or specialty clinic as directed in the next 1-2 weeks if not improved or as needed.  You can call [phone] to schedule an appointment with the appropriate provider.      If your symptoms do not improve or worsen, go to the emergency room immediately.

## 2025-01-08 ENCOUNTER — TELEPHONE (OUTPATIENT)
Dept: URGENT CARE | Facility: CLINIC | Age: 89
End: 2025-01-08
Payer: MEDICARE

## 2025-01-08 DIAGNOSIS — R05.9 COUGH, UNSPECIFIED TYPE: Primary | ICD-10-CM

## 2025-01-08 RX ORDER — BENZONATATE 100 MG/1
200 CAPSULE ORAL 3 TIMES DAILY PRN
Qty: 60 CAPSULE | Refills: 0 | Status: SHIPPED | OUTPATIENT
Start: 2025-01-08 | End: 2025-01-18

## 2025-01-08 NOTE — TELEPHONE ENCOUNTER
Out going call: pt called clinic, [facility] does not haveTessalon Perles in stock, and would like RX sent to [facility] to on Tiffany Portillo

## 2025-01-10 RX ORDER — CHLORTHALIDONE 25 MG/1
12.5 TABLET ORAL DAILY
Qty: 45 TABLET | Refills: 4 | Status: SHIPPED | OUTPATIENT
Start: 2025-01-10 | End: 2026-04-05

## 2025-01-15 ENCOUNTER — OFFICE VISIT (OUTPATIENT)
Dept: PODIATRY | Facility: CLINIC | Age: 89
End: 2025-01-15
Payer: MEDICARE

## 2025-01-15 VITALS
SYSTOLIC BLOOD PRESSURE: 158 MMHG | BODY MASS INDEX: 27.19 KG/M2 | DIASTOLIC BLOOD PRESSURE: 72 MMHG | HEIGHT: 71 IN | WEIGHT: 194.25 LBS | HEART RATE: 78 BPM

## 2025-01-15 DIAGNOSIS — E11.49 TYPE II DIABETES MELLITUS WITH NEUROLOGICAL MANIFESTATIONS: ICD-10-CM

## 2025-01-15 DIAGNOSIS — E11.9 ENCOUNTER FOR DIABETIC FOOT EXAM: Primary | ICD-10-CM

## 2025-01-15 DIAGNOSIS — M20.5X2 HALLUX LIMITUS, ACQUIRED, LEFT: ICD-10-CM

## 2025-01-15 DIAGNOSIS — M20.42 HAMMER TOES OF BOTH FEET: ICD-10-CM

## 2025-01-15 DIAGNOSIS — M20.41 HAMMER TOES OF BOTH FEET: ICD-10-CM

## 2025-01-15 DIAGNOSIS — M20.5X1 HALLUX LIMITUS, ACQUIRED, RIGHT: ICD-10-CM

## 2025-01-15 PROCEDURE — 99999 PR PBB SHADOW E&M-EST. PATIENT-LVL V: CPT | Mod: PBBFAC,,, | Performed by: PODIATRIST

## 2025-01-15 PROCEDURE — 99214 OFFICE O/P EST MOD 30 MIN: CPT | Mod: S$PBB,,, | Performed by: PODIATRIST

## 2025-01-15 PROCEDURE — 99215 OFFICE O/P EST HI 40 MIN: CPT | Mod: PBBFAC,PO | Performed by: PODIATRIST

## 2025-01-15 NOTE — PROGRESS NOTES
Subjective:      Patient ID: Bria Lawson is a 89 y.o. male.    Chief Complaint: Diabetes Mellitus (10/16/24 Dr Tapia) and Nail Care      Bria is a 89 y.o. male who presents to the clinic for evaluation and treatment of high risk feet. 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  The patient has no major complaints with feet. Chief concern is how to care for feet as a diabetic.      This patient has documented high risk feet requiring routine maintenance secondary to diabetes mellitis and those secondary complications of diabetes, as mentioned..    PCP: Adiel Bragg MD    Date Last Seen by PCP:   Chief Complaint   Patient presents with    Diabetes Mellitus     10/16/24 Dr Tapia    Nail Care     Current shoe gear:  Tennis shoes    Hemoglobin A1C   Date Value Ref Range Status   07/05/2024 6.1 (H) 4.0 - 5.6 % Final     Comment:     ADA Screening Guidelines:  5.7-6.4%  Consistent with prediabetes  >or=6.5%  Consistent with diabetes    High levels of fetal hemoglobin interfere with the HbA1C  assay. Heterozygous hemoglobin variants (HbS, HgC, etc)do  not significantly interfere with this assay.   However, presence of multiple variants may affect accuracy.     01/22/2024 6.1 (H) 4.0 - 5.6 % Final     Comment:     ADA Screening Guidelines:  5.7-6.4%  Consistent with prediabetes  >or=6.5%  Consistent with diabetes    High levels of fetal hemoglobin interfere with the HbA1C  assay. Heterozygous hemoglobin variants (HbS, HgC, etc)do  not  significantly interfere with this assay.   However, presence of multiple variants may affect accuracy.     09/18/2023 6.5 (H) 4.0 - 5.6 % Final     Comment:     ADA Screening Guidelines:  5.7-6.4%  Consistent with prediabetes  >or=6.5%  Consistent with diabetes    High levels of fetal hemoglobin interfere with the HbA1C  assay. Heterozygous hemoglobin variants (HbS, HgC, etc)do  not significantly interfere with this assay.   However, presence of multiple variants may affect accuracy.           Patient Active Problem List   Diagnosis    GERD (gastroesophageal reflux disease)    Gout, chronic    HTN (hypertension)    Hyperlipidemia    CKD stage 3 due to type 2 diabetes mellitus    HDL lipoprotein deficiency    Cervical radiculopathy, acute    CN (constipation)    Pancytopenia    Stage 3a chronic kidney disease    Primary osteoarthritis involving multiple joints    Diabetes mellitus with proteinuria    Diabetes mellitus with proteinuric diabetic nephropathy    Hypertensive kidney disease with stage 3b chronic kidney disease    Leukopenia    Thrombocytopenia    B-cell lymphoma of extranodal site    Neutropenia    Refractive error    Pseudophakia    Screening for malignant neoplasm of colon    PAH (pulmonary artery hypertension)    Atherosclerosis of abdominal aorta    Colon, diverticulosis    Microhematuria    Secondary hyperparathyroidism of renal origin    Neuropathy    Seronegative arthropathy of multiple sites    Atherosclerosis of native coronary artery of native heart without angina pectoris    Bladder tumor       Current Outpatient Medications on File Prior to Visit   Medication Sig Dispense Refill    allopurinoL (ZYLOPRIM) 100 MG tablet TAKE 1 TABLET(100 MG) BY MOUTH EVERY DAY 90 tablet 1    amLODIPine (NORVASC) 10 MG tablet TAKE 1 TABLET(10 MG) BY MOUTH EVERY DAY 90 tablet 3    atorvastatin (LIPITOR) 20 MG tablet TAKE 1 TABLET(20 MG) BY MOUTH EVERY DAY 90 tablet 12    benzonatate (TESSALON) 100 MG capsule Take  2 capsules (200 mg total) by mouth 3 (three) times daily as needed. 60 capsule 0    blood sugar diagnostic Strp 1 strip by Misc.(Non-Drug; Combo Route) route 2 (two) times daily. Disp glucometer and lancets as well 100 strip 12    carvediloL (COREG) 6.25 MG tablet TAKE 1 TABLET(6.25 MG) BY MOUTH TWICE DAILY WITH MEALS 180 tablet 3    chlorthalidone (HYGROTEN) 25 MG Tab Take 0.5 tablets (12.5 mg total) by mouth once daily. 45 tablet 4    dapagliflozin propanediol (FARXIGA) 10 mg tablet Take 1 tablet (10 mg total) by mouth once daily. 90 tablet 4    DUPIXENT  mg/2 mL PnIj Inject into the skin. a biologic that is given by subcutaneous (under the skin) injection that may be used to treat inflammatory conditions      FEROSUL 325 mg (65 mg iron) Tab tablet TAKE 1 TABLET BY MOUTH EVERY DAY WITH BREAKFAST 90 tablet 12    folic acid (FOLVITE) 800 MCG Tab Take 800 mcg by mouth once daily.      gabapentin (NEURONTIN) 300 MG capsule TAKE 1 CAPSULE(300 MG) BY MOUTH THREE TIMES DAILY 270 capsule 3    glipiZIDE (GLUCOTROL) 10 MG TR24 TAKE 1 TABLET(10 MG) BY MOUTH EVERY DAY 90 tablet 0    multivit-min/FA/lycopen/lutein (CENTRUM SILVER MEN ORAL) Take by mouth.      niacin 500 MG Tab Take 100 mg by mouth every evening.      pantoprazole (PROTONIX) 40 MG tablet TAKE 1 TABLET(40 MG) BY MOUTH EVERY DAY 90 tablet 12    promethazine-dextromethorphan (PROMETHAZINE-DM) 6.25-15 mg/5 mL Syrp Take 5 mLs by mouth nightly as needed. 118 mL 0    SITagliptin phosphate (JANUVIA) 100 MG Tab Take 1 tablet (100 mg total) by mouth once daily. 90 tablet 12    triamcinolone acetonide 0.1% (KENALOG) 0.1 % ointment Apply topically 2 (two) times daily.      TRUEPLUS LANCETS 33 gauge Misc USE TO TEST BLOOD SUGAR BID  12    TURMERIC ROOT EXTRACT ORAL Take by mouth.      valsartan (DIOVAN) 320 MG tablet Take 1 tablet (320 mg total) by mouth once daily. 90 tablet 3    vitamin E 400 UNIT capsule Take 400 Units by mouth once daily.      []  benzonatate (TESSALON) 200 MG capsule Take 1 capsule (200 mg total) by mouth 3 (three) times daily as needed. 21 capsule 0    [DISCONTINUED] omeprazole (PRILOSEC) 40 MG capsule TAKE 1 CAPSULE DAILY 90 capsule 3     No current facility-administered medications on file prior to visit.       Review of patient's allergies indicates:  No Known Allergies    Past Surgical History:   Procedure Laterality Date    BONE MARROW BIOPSY Left 09/19/2018    Procedure: Biopsy-bone marrow;  Surgeon: Velia Tobias MD;  Location: [facility];  Service: Oncology;  Laterality: Left;    CATARACT EXTRACTION Bilateral 1996    COLONOSCOPY N/A 11/24/2020    Procedure: COLONOSCOPY Golytely;  Surgeon: Masoud Hwang MD;  Location: [facility];  Service: Endoscopy;  Laterality: N/A;    CYSTOSCOPY  8/23/2024    Procedure: CYSTOSCOPY;  Surgeon: Ernie Nunn Jr., MD;  Location: [facility] OR [address] [place] [address];  Service: Urology;;    ESOPHAGOGASTRODUODENOSCOPY N/A 11/24/2020    Procedure: EGD (ESOPHAGOGASTRODUODENOSCOPY);  Surgeon: Masoud Hwang MD;  Location: [facility];  Service: Endoscopy;  Laterality: N/A;    eye lid sx Bilateral 2017    EYE SURGERY      cataracts    JOINT REPLACEMENT      knee replacement    retinal injection s Bilateral     scrotal cyst      TURBT (TRANSURETHRAL RESECTION OF BLADDER TUMOR) N/A 8/23/2024    Procedure: TURBT (TRANSURETHRAL RESECTION OF BLADDER TUMOR);  Surgeon: Ernie Nunn Jr., MD;  Location: [address];  Service: Urology;  Laterality: N/A;       Family History   Problem Relation Name Age of Onset    Hypertension Mother      Diabetes Father      No Known Problems Sister      No Known Problems Brother      No Known Problems Maternal Aunt      No Known Problems Maternal Uncle      No Known Problems Paternal Aunt      No Known Problems Paternal Uncle      No Known Problems Maternal Grandmother      No Known Problems Maternal Grandfather      No Known Problems Paternal Grandmother      No Known Problems Paternal  Grandfather      No Known Problems Daughter      No Known Problems Son      No Known Problems Son      No Known Problems Other      Amblyopia Neg Hx      Blindness Neg Hx      Cancer Neg Hx      Cataracts Neg Hx      Glaucoma Neg Hx      Macular degeneration Neg Hx      Retinal detachment Neg Hx      Strabismus Neg Hx      Stroke Neg Hx      Thyroid disease Neg Hx         Social History     Socioeconomic History    Marital status:    Tobacco Use    Smoking status: Former     Types: Cigarettes    Smokeless tobacco: Never    Tobacco comments:     09/26/23 Patient Quit Smoking At The Age of 38 In 1974.   Substance and Sexual Activity    Alcohol use: Yes     Comment: socially - maybe few times a week    Drug use: No    Sexual activity: Yes     Partners: Female     Social Drivers of Health     Financial Resource Strain: Low Risk  (10/16/2024)    Overall Financial Resource Strain (CARDIA)     Difficulty of Paying Living Expenses: Not hard at all   Food Insecurity: No Food Insecurity (10/16/2024)    Hunger Vital Sign     Worried About Running Out of Food in the Last Year: Never true     Ran Out of Food in the Last Year: Never true   Transportation Needs: No Transportation Needs (10/16/2024)    PRAPARE - Transportation     Lack of Transportation (Medical): No     Lack of Transportation (Non-Medical): No   Physical Activity: Insufficiently Active (10/16/2024)    Exercise Vital Sign     Days of Exercise per Week: 1 day     Minutes of Exercise per Session: 60 min   Stress: No Stress Concern Present (10/16/2024)    [employer] [place] [employer] - Occupational Stress Questionnaire     Feeling of Stress : Only a little   Housing Stability: Low Risk  (10/16/2024)    Housing Stability Vital Sign     Unable to Pay for Housing in the Last Year: No     Homeless in the Last Year: No           Review of Systems   Constitutional: Negative for chills and fever.   Cardiovascular:  Positive for leg swelling. Negative for  "claudication.   Respiratory:  Negative for cough and shortness of breath.    Skin:  Positive for dry skin, itching, nail changes and rash.        Lichen planus   Musculoskeletal:  Positive for arthritis (RA), back pain, joint pain and myalgias. Negative for falls, joint swelling and muscle weakness.   Gastrointestinal:  Negative for diarrhea, nausea and vomiting.   Neurological:  Positive for paresthesias. Negative for numbness, tremors and weakness.   Psychiatric/Behavioral:  Negative for altered mental status and hallucinations.            Objective:       Vitals:    01/15/25 1204   BP: (!) 158/72   Pulse: 78   Weight: 88.1 kg (194 lb 3.6 oz)   Height: 5' 11" (1.803 m)   PainSc: 0-No pain         Physical Exam  Nursing note reviewed.   Constitutional:       General: He is not in acute distress.     Appearance: He is not toxic-appearing or diaphoretic.      Comments: Pt. is well-developed, well-nourished, appears stated age, in no acute distress, alert and oriented x 3. No evidence of depression, anxiety, or agitation. Calm, cooperative, and communicative. Appropriate interactions and affect.   Cardiovascular:      Pulses:           Dorsalis pedis pulses are 2+ on the right side and 2+ on the left side.        Posterior tibial pulses are 1+ on the right side and 1+ on the left side.      Comments: There is decreased digital hair. Skin is atrophic  Pulmonary:      Effort: No respiratory distress.   Musculoskeletal:      Right ankle: No tenderness. No lateral malleolus, medial malleolus, AITF ligament, CF ligament or posterior TF ligament tenderness.      Right Achilles Tendon: No defects. Dahl's test negative.      Left ankle: No tenderness. No lateral malleolus, medial malleolus, AITF ligament, CF ligament or posterior TF ligament tenderness.      Left Achilles Tendon: No defects. Dahl's test negative.      Right foot: No tenderness or bony tenderness.      Left foot: No tenderness or bony tenderness.      " Comments: Fat pad atrophy to heels and met heads bilateral    Decreased stride, station of gait.  apropulsive toe off.  Increased angle and base of gait.    Patient has hammertoes of digits 2-5 bilateral partially reducible without symptom today.    Decreased first MPJ range of motion both weightbearing and nonweightbearing, no crepitus observed the first MP joint, + dorsal flag sign.Mild  bunion deformity is observed .     Lymphadenopathy:      Comments: No lymphatic streaking    Negative lymphadenopathy bilateral popliteal fossa and tarsal tunnel.     Skin:     General: Skin is warm and dry.      Coloration: Skin is not pale.      Findings: No abrasion, bruising, burn, ecchymosis or rash.      Nails: There is no clubbing.      Comments: Skin is thin, atrophic    Toenails 1-2 bilaterally are discolored/yellowed, dystrophic, brittle with subungual debris.    Neurological:      Comments: Light touch present    [place] 5.07 monofilament is intact bilateral feet. Sharp/dull sensation is also intact Bilateral feet.    proprioception intact    Vibratory intact    Paresthesias, and hyperesthesia bilateral feet with no clearly identified trigger or source.           Psychiatric:         Attention and Perception: He is attentive.         Mood and Affect: Mood is not anxious. Affect is not inappropriate.         Speech: He is communicative. Speech is not slurred.         Behavior: Behavior is not combative.             Assessment:       Encounter Diagnoses   Name Primary?    Encounter for diabetic foot exam Yes    Type II diabetes mellitus with neurological manifestations     Hallux limitus, acquired, left     Hallux limitus, acquired, right     Hammer toes of both feet              Plan:       Bria was seen today for diabetes mellitus and nail care.    Diagnoses and all orders for this visit:    Encounter for diabetic foot exam    Type II diabetes mellitus with neurological manifestations  -     DIABETIC SHOES  FOR HOME USE    Hallux limitus, acquired, left  -     DIABETIC SHOES FOR HOME USE    Hallux limitus, acquired, right  -     DIABETIC SHOES FOR HOME USE    Hammer toes of both feet  -     DIABETIC SHOES FOR HOME USE          I counseled the patient on his conditions, their implications and medical management.      Education about the diabetic foot, neuropathy, and prevention of limb loss.    Shoe inspection. Diabetic Foot Education. Patient reminded of the importance of good nutrition/healthy diet/weight management and blood sugar control to help prevent podiatric complications of diabetes. Patient instructed on proper foot hygeine. Wear comfortable, proper fitting shoes. Wash feet daily. Dry well. After drying, apply moisturizer to feet (no lotion to webspaces). Inspect feet daily for skin breaks, blisters, swelling, or redness. Wear cotton socks (preferably white)  Change socks every day. Do NOT walk barefoot. Do NOT use heating pads or hot water soaks. We discussed wearing proper shoe gear, daily foot inspections, never walking without protective shoe gear.     Discussed edema control and the importance of daily moisturizer to the feet such as Gold bonds diabetic foot cream    Recommend applying vicks vaporub to thick abnormal toenails daily x 6 months to treat fungal nail infection.    Rx diabetic shoes for protection and support    Based on chart review this patient does not qualify for nail care (Patients who qualify for nail care are usually as follows: diabetic with neurological manifestations, PVD, pernicious anemia, or CKD with appropriate modifiers that indicate high amputation risk).     He will continue to monitor the areas daily, inspect his feet, wear protective shoe gear when ambulatory, moisturizer to maintain skin integrity and follow in this office in approximately 12 months, sooner p.r.n.

## 2025-01-15 NOTE — PATIENT INSTRUCTIONS
Over the counter pain creams: Voltaren Gel, Biofreeze, Bengay, tiger balm, two old goat, lidocaine gel,  Absorbine Veterinary Liniment Gel Topical Analgesic Sore Muscle and Joint Pain Relief    Recommend lotions: eucerin, eucerin for diabetics, aquaphor, A&D ointment, gold bond for diabetics, sween, [place]'s Bees all purpose baby ointment,  urea 40 with aloe or SkinIntegra rapid crack repair (found on amazon.com)    Shoe recommendations: (try 6pm.com, zappos.com , nordstromrack.Neocis, or shoes.com for discounted prices) you can visit varsity shoes in [place], DSW shoes in [place]  or abhishek rack in the [facility] (there are also several shoe brand outlets in the [facility])    ONLY purchase stability style tennis shoes AVOID flex, foam, free, yoga mat style shoes or shoes that claim to feel like clouds  If you have a flatter foot purchase shoes for PRONATION  If you have a high arch purchase shoes for SUPINATION    Shoe examples:    Asics (GT or gel foundations, gel-kayano-30), new balance stability type shoes (such as the 940 series or the [id number]), joey (Guide 16, stabil c3),  Warren (GTS or Beast or   transcend), propet, HokaOne (marlen 7, arahi, gato) Sudeep (tennis shoes and boots)    Sofft Brand (women) Kelli&Dorian (men), clarks, crocs, aerosoles, naturalizers, SAS, ecco, born, fran quinonez, rockports (dress shoes)    Vionic, burkenstocks, fitflops, propet, taos, baretraps, Hoka or vionic recovery slides/sandals (sandals)    Hoka or vionic recovery slides/sandals, crocs, propet (house shoes)      Nail Home remedy:  Vicks Vapor rub or Emuaid to nails for easier manageability    Diabetes: Inspecting Your Feet  Diabetes increases your chances of developing foot problems. So inspect your feet every day. This helps you find small skin irritations before they become serious infections. If you have trouble seeing the bottoms of your feet, use a mirror or ask a family member or friend to help.     Pressure  spots on the bottom of the foot are common areas where problems develop.   How to check your feet  Below are tips to help you look for foot problems. Try to check your feet at the same time each day, such as when you get out of bed in the morning:  Check the top of each foot. The tops of toes, back of the heel, and outer edge of the foot can get a lot of rubbing from poor-fitting shoes.  Check the bottom of each foot. Daily wear and tear often leads to problems at pressure spots.  Check the toes and nails. Fungal infections often occur between toes. Toenail problems can also be a sign of fungal infections or lead to breaks in the skin.  Check your shoes, too. Loose objects inside a shoe can injure the foot. Use your hand to feel inside your shoes for things like lane, loose stitching, or rough areas that could irritate your skin.  Warning signs  Look for any color changes in the foot. Redness with streaks can signal a severe infection, which needs immediate medical attention. Tell your doctor right away if you have any of these problems:  Swelling, sometimes with color changes, may be a sign of poor blood flow or infection. Symptoms include tenderness and an increase in the size of your foot.  Warm or hot areas on your feet may be signs of infection. A foot that is cold may not be getting enough blood.  Sensations such as burning, tingling, or pins and needles can be signs of a problem. Also check for areas that may be numb.  Hot spots are caused by friction or pressure. Look for hot spots in areas that get a lot of rubbing. Hot spots can turn into blisters, calluses, or sores.  Cracks and sores are caused by dry or irritated skin. They are a sign that the skin is breaking down, which can lead to infection.  Toenail problems to watch for include nails growing into the skin (ingrown toenail) and causing redness or pain. Thick, yellow, or discolored nails can signal a fungal infection.  Drainage and odor can  develop from untreated sores and ulcers. Call your doctor right away if you notice white or yellow drainage, bleeding, or unpleasant odor.   © 0529-4345 [employer]. [address] [place] [address]. All rights reserved. This information is not intended as a substitute for professional medical care. Always follow your healthcare professional's instructions.        Step-by-Step:  Inspecting Your Feet (Diabetes)    Date Last Reviewed: 10/1/2016  © 0466-2047 [employer]. [address] [place] [address]. All rights reserved. This information is not intended as a substitute for professional medical care. Always follow your healthcare professional's instructions.

## 2025-01-31 ENCOUNTER — EXTERNAL CHRONIC CARE MANAGEMENT (OUTPATIENT)
Dept: PRIMARY CARE CLINIC | Facility: CLINIC | Age: 89
End: 2025-01-31
Payer: MEDICARE

## 2025-01-31 PROCEDURE — 99490 CHRNC CARE MGMT STAFF 1ST 20: CPT | Mod: PBBFAC,PO | Performed by: INTERNAL MEDICINE

## 2025-01-31 PROCEDURE — 99490 CHRNC CARE MGMT STAFF 1ST 20: CPT | Mod: S$PBB,,, | Performed by: INTERNAL MEDICINE

## 2025-02-02 DIAGNOSIS — D50.9 IRON DEFICIENCY ANEMIA, UNSPECIFIED IRON DEFICIENCY ANEMIA TYPE: ICD-10-CM

## 2025-02-03 RX ORDER — FERROUS SULFATE 325(65) MG
TABLET ORAL
Qty: 90 TABLET | Refills: 12 | Status: SHIPPED | OUTPATIENT
Start: 2025-02-03

## 2025-02-03 NOTE — TELEPHONE ENCOUNTER
Refill Routing Note   Medication(s) are not appropriate for processing by [facility] for the following reason(s):        Outside of protocol    [facility] action(s):  Route               Appointments  past 12m or future 3m with PCP    Date Provider   Last Visit   6/27/2024 Adiel Bragg MD   Next Visit   2/19/2025 Adiel Bragg MD   ED visits in past 90 days: 0        Note composed:7:59 PM 02/02/2025

## 2025-02-05 ENCOUNTER — OFFICE VISIT (OUTPATIENT)
Dept: CARDIOLOGY | Facility: CLINIC | Age: 89
End: 2025-02-05
Payer: MEDICARE

## 2025-02-05 VITALS
DIASTOLIC BLOOD PRESSURE: 70 MMHG | HEART RATE: 58 BPM | WEIGHT: 194.69 LBS | BODY MASS INDEX: 27.26 KG/M2 | RESPIRATION RATE: 18 BRPM | OXYGEN SATURATION: 98 % | SYSTOLIC BLOOD PRESSURE: 152 MMHG | HEIGHT: 71 IN

## 2025-02-05 DIAGNOSIS — I70.0 ATHEROSCLEROSIS OF ABDOMINAL AORTA: ICD-10-CM

## 2025-02-05 DIAGNOSIS — I27.21 PAH (PULMONARY ARTERY HYPERTENSION): ICD-10-CM

## 2025-02-05 DIAGNOSIS — I25.10 ATHEROSCLEROSIS OF NATIVE CORONARY ARTERY OF NATIVE HEART WITHOUT ANGINA PECTORIS: Primary | ICD-10-CM

## 2025-02-05 DIAGNOSIS — E78.5 HYPERLIPIDEMIA ASSOCIATED WITH TYPE 2 DIABETES MELLITUS: ICD-10-CM

## 2025-02-05 DIAGNOSIS — I10 PRIMARY HYPERTENSION: ICD-10-CM

## 2025-02-05 DIAGNOSIS — E11.69 HYPERLIPIDEMIA ASSOCIATED WITH TYPE 2 DIABETES MELLITUS: ICD-10-CM

## 2025-02-05 DIAGNOSIS — I34.0 NONRHEUMATIC MITRAL VALVE REGURGITATION: ICD-10-CM

## 2025-02-05 PROCEDURE — 93010 ELECTROCARDIOGRAM REPORT: CPT | Mod: S$PBB,,, | Performed by: INTERNAL MEDICINE

## 2025-02-05 PROCEDURE — 93005 ELECTROCARDIOGRAM TRACING: CPT | Mod: PBBFAC | Performed by: INTERNAL MEDICINE

## 2025-02-05 PROCEDURE — 99999 PR PBB SHADOW E&M-EST. PATIENT-LVL V: CPT | Mod: PBBFAC,,, | Performed by: INTERNAL MEDICINE

## 2025-02-05 PROCEDURE — 99213 OFFICE O/P EST LOW 20 MIN: CPT | Mod: S$PBB,,, | Performed by: INTERNAL MEDICINE

## 2025-02-05 PROCEDURE — 99215 OFFICE O/P EST HI 40 MIN: CPT | Mod: PBBFAC | Performed by: INTERNAL MEDICINE

## 2025-02-05 NOTE — PROGRESS NOTES
CARDIOLOGY CLINIC VISIT        HISTORY OF PRESENT ILLNESS:     Bria Lawson presents for continued care.      Echocardiogram from 10/29/2021 shows normal ejection fraction estimated 60%.  Mild to moderate mitral regurgitation.  Normal pulmonary pressure.  Prior study had showed a normal to low normal left ventricular systolic function.  Moderate mitral regurgitation.  Mildly elevated pulmonary pressure.  Seen 05/29/2020 for evaluation of shortness of breath. CT scan on 5/26/20 showing pulmonary edema, bilateral pleural effusions. Coronary calcification noted. He is without complaints. No chest pain.  No shortness of breath. Digital hypertension reviewed.    05/03/2022:  No complaints.  EKG shows sinus bradycardia with first-degree AV block.  Home blood pressure logs reviewed.  Normal values.    CT Chest 5/26/20:     1. CT findings favoring sequela of cardiac decompensation causing pulmonary edema and bilateral pleural effusions.  Superimposed COVID-19 pneumonia cannot be entirely excluded noting that pleural effusions is not a commonly reported finding and therefore would be atypical.  2. Persistent soft tissue attenuation nodule within the prevascular mediastinum, presumably an enlarged lymph node and unchanged when compared to previous PET-CTs.  3. Slight interval increased size of multiple mediastinal lymph nodes, nonspecific.  4. Cardiomegaly with severe dense coronary artery atherosclerosis in a 3 vessel distribution.  5. Additional findings as detailed in the body of the report.    10/18/2022: Had COVID after a trip to [place]. Mild case. Overall he feels good. No complaints. Blood pressure is high but usual for him. (White coat). EKG today shows sinus bradycardia with first degree AVB.    05/24/2023: Feels good.  No complaints.  Has not taken his medications this morning.  Home blood pressure logs reviewed.  Plans to go to [facility] this summer.    08/07/2024: Seen by Dr. Goins on 01/24/2024.  Plans for TURBT.   No complaints.  Just returned from [place].  Still active.  Cutting grass.  EKG today shows sinus bradycardia with first-degree AV block.    02/05/2025:  No complaints.  No chest pain or shortness of breath.  No PND orthopnea.  EKG shows sinus bradycardia with first-degree AV block.  Occasional PVCs.  Home blood pressure logs reviewed.  Normotensive.    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    Hyperlipidemia 02/26/2015    Hypertension     Lymphoma     Uncontrolled secondary diabetes mellitus with stage 3 CKD (GFR 30-59) 08/28/2015       PAST SURGICAL HISTORY:     Past Surgical History:   Procedure Laterality Date    BONE MARROW BIOPSY Left 09/19/2018    Procedure: Biopsy-bone marrow;  Surgeon: Velia Tobias MD;  Location: [facility];  Service: Oncology;  Laterality: Left;    CATARACT EXTRACTION Bilateral 1996    COLONOSCOPY N/A 11/24/2020    Procedure: COLONOSCOPY [place];  Surgeon: Masoud Hwang MD;  Location: [facility];  Service: Endoscopy;  Laterality: N/A;    CYSTOSCOPY  8/23/2024    Procedure: CYSTOSCOPY;  Surgeon: Ernie Nunn Jr., MD;  Location: [address];  Service: Urology;;    ESOPHAGOGASTRODUODENOSCOPY N/A 11/24/2020    Procedure: EGD (ESOPHAGOGASTRODUODENOSCOPY);  Surgeon: Masoud Hwang MD;  Location: [facility];  Service: Endoscopy;  Laterality: N/A;    eye lid sx Bilateral 2017    EYE SURGERY      cataracts    JOINT REPLACEMENT      knee replacement    retinal injection s  Bilateral     scrotal cyst      TURBT (TRANSURETHRAL RESECTION OF BLADDER TUMOR) N/A 8/23/2024    Procedure: TURBT (TRANSURETHRAL RESECTION OF BLADDER TUMOR);  Surgeon: Ernie Nunn Jr., MD;  Location: [facility] OR [address] [place] [address];  Service: Urology;  Laterality: N/A;       ALLERGIES AND MEDICATION:   Review of patient's allergies indicates:  No Known Allergies     Medication List            Accurate as of February 5, 2025  2:13 PM. If you have any questions, ask your nurse or doctor.                CONTINUE taking these medications      allopurinoL 100 MG tablet  Commonly known as: ZYLOPRIM  TAKE 1 TABLET(100 MG) BY MOUTH EVERY DAY     amLODIPine 10 MG tablet  Commonly known as: NORVASC  TAKE 1 TABLET(10 MG) BY MOUTH EVERY DAY     atorvastatin 20 MG tablet  Commonly known as: LIPITOR  TAKE 1 TABLET(20 MG) BY MOUTH EVERY DAY     blood sugar diagnostic Strp  1 strip by Misc.(Non-Drug; Combo Route) route 2 (two) times daily. Disp glucometer and lancets as well     carvediloL 6.25 MG tablet  Commonly known as: COREG  TAKE 1 TABLET(6.25 MG) BY MOUTH TWICE DAILY WITH MEALS     CENTRUM SILVER MEN ORAL     chlorthalidone 25 MG Tab  Commonly known as: HYGROTEN  Take 0.5 tablets (12.5 mg total) by mouth once daily.     dapagliflozin propanediol 10 mg tablet  Commonly known as: FARXIGA  Take 1 tablet (10 mg total) by mouth once daily.     DUPIXENT  mg/2 mL Pnij  Generic drug: dupilumab     FeroSuL 325 mg (65 mg iron) Tab tablet  Generic drug: ferrous sulfate  TAKE 1 TABLET BY MOUTH EVERY DAY WITH BREAKFAST     folic acid 800 MCG Tab  Commonly known as: FOLVITE     gabapentin 300 MG capsule  Commonly known as: NEURONTIN  TAKE 1 CAPSULE(300 MG) BY MOUTH THREE TIMES DAILY     glipiZIDE 10 MG Tr24  Commonly known as: GLUCOTROL  TAKE 1 TABLET(10 MG) BY MOUTH EVERY DAY     niacin 500 MG Tab     pantoprazole 40 MG tablet  Commonly known as: PROTONIX  TAKE 1 TABLET(40 MG) BY MOUTH EVERY DAY     promethazine-dextromethorphan 6.25-15  mg/5 mL Syrp  Commonly known as: PROMETHAZINE-DM  Take 5 mLs by mouth nightly as needed.     SITagliptin phosphate 100 MG Tab  Commonly known as: JANUVIA  Take 1 tablet (100 mg total) by mouth once daily.     triamcinolone acetonide 0.1% 0.1 % ointment  Commonly known as: KENALOG     TRUEPLUS LANCETS 33 gauge Misc  Generic drug: lancets     TURMERIC ROOT EXTRACT ORAL     valsartan 320 MG tablet  Commonly known as: DIOVAN  Take 1 tablet (320 mg total) by mouth once daily.     vitamin E 400 UNIT capsule              SOCIAL HISTORY:     Social History     Socioeconomic History    Marital status:    Tobacco Use    Smoking status: Former     Types: Cigarettes    Smokeless tobacco: Never    Tobacco comments:     09/26/23 Patient Quit Smoking At The Age of 38 In 1974.   Substance and Sexual Activity    Alcohol use: Yes     Comment: socially - maybe few times a week    Drug use: No    Sexual activity: Yes     Partners: Female     Social Drivers of Health     Financial Resource Strain: Low Risk  (10/16/2024)    Overall Financial Resource Strain (CARDIA)     Difficulty of Paying Living Expenses: Not hard at all   Food Insecurity: No Food Insecurity (10/16/2024)    Hunger Vital Sign     Worried About Running Out of Food in the Last Year: Never true     Ran Out of Food in the Last Year: Never true   Transportation Needs: No Transportation Needs (10/16/2024)    PRAPARE - Transportation     Lack of Transportation (Medical): No     Lack of Transportation (Non-Medical): No   Physical Activity: Insufficiently Active (10/16/2024)    Exercise Vital Sign     Days of Exercise per Week: 1 day     Minutes of Exercise per Session: 60 min   Stress: No Stress Concern Present (10/16/2024)    [employer] - Occupational Stress Questionnaire     Feeling of Stress : Only a little   Housing Stability: Low Risk  (10/16/2024)    Housing Stability Vital Sign     Unable to Pay for Housing in the Last Year: No      Homeless in the Last Year: No       FAMILY HISTORY:     Family History   Problem Relation Name Age of Onset    Hypertension Mother      Diabetes Father      No Known Problems Sister      No Known Problems Brother      No Known Problems Maternal Aunt      No Known Problems Maternal Uncle      No Known Problems Paternal Aunt      No Known Problems Paternal Uncle      No Known Problems Maternal Grandmother      No Known Problems Maternal Grandfather      No Known Problems Paternal Grandmother      No Known Problems Paternal Grandfather      No Known Problems Daughter      No Known Problems Son      No Known Problems Son      No Known Problems Other      Amblyopia Neg Hx      Blindness Neg Hx      Cancer Neg Hx      Cataracts Neg Hx      Glaucoma Neg Hx      Macular degeneration Neg Hx      Retinal detachment Neg Hx      Strabismus Neg Hx      Stroke Neg Hx      Thyroid disease Neg Hx         REVIEW OF SYSTEMS:   Review of Systems   Constitutional:  Negative for chills, diaphoresis, fever, malaise/fatigue and weight loss.   HENT:  Negative for congestion, hearing loss, sinus pain, sore throat and tinnitus.    Eyes:  Negative for blurred vision, double vision, photophobia and pain.   Respiratory:  Negative for cough, hemoptysis, sputum production, shortness of breath, wheezing and stridor.    Cardiovascular:  Negative for chest pain, palpitations, orthopnea, claudication, leg swelling and PND.   Gastrointestinal:  Negative for abdominal pain, blood in stool, heartburn, melena, nausea and vomiting.   Musculoskeletal:  Negative for back pain, falls, joint pain, myalgias and neck pain.   Neurological:  Negative for dizziness, tingling, tremors, sensory change, speech change, focal weakness, seizures, loss of consciousness, weakness and headaches.   Endo/Heme/Allergies:  Does not bruise/bleed easily.   Psychiatric/Behavioral:  Negative for depression, memory loss and substance abuse. The patient is not nervous/anxious.   "      PHYSICAL EXAM:     Vitals:    02/05/25 1358   BP: (!) 152/70   Pulse: (!) 58   Resp: 18      Body mass index is 27.15 kg/m².  Weight: 88.3 kg (194 lb 10.7 oz)   Height: 5' 11" (180.3 cm)     Physical Exam  Vitals reviewed.   Constitutional:       General: He is not in acute distress.     Appearance: Normal appearance. He is well-developed. He is not diaphoretic.   Neck:      Vascular: No carotid bruit or JVD.   Cardiovascular:      Rate and Rhythm: Regular rhythm. Bradycardia present.      Pulses: Normal pulses.      Heart sounds: Murmur heard.      Systolic murmur is present with a grade of 3/6.   Pulmonary:      Effort: Pulmonary effort is normal.      Breath sounds: Normal breath sounds.   Abdominal:      General: Bowel sounds are normal.      Palpations: Abdomen is soft.      Tenderness: There is no abdominal tenderness.   Musculoskeletal:      Right lower leg: No edema.      Left lower leg: No edema.   Neurological:      Mental Status: He is alert and oriented to person, place, and time.   Psychiatric:         Speech: Speech normal.         Behavior: Behavior normal.         DATA:     EKG (personally reviewed tracing):  02/05/2025-sinus bradycardia with first-degree AV block.  Occasional PVCs.   08/07/2024-sinus bradycardia with first-degree AV block  10/18/2022 - sinus bradycardia with first degree AVB    Laboratory:  CBC:  Recent Labs   Lab 07/05/24  1140 07/17/24  1130 08/14/24  1046   WBC 8.88  8.88 7.50 3.43 L   Hemoglobin 12.3 L  12.3 L 10.4 L 12.8 L   Hematocrit 38.2 L  38.2 L 32.7 L 40.0   Platelets 60 L  60 L 147 L 57 L       CHEMISTRIES:  Recent Labs   Lab 05/09/22  1459 07/02/22  1413 07/14/22  1025 08/04/22  1126 01/22/24  1120 07/05/24  1140 07/17/24  1130 08/14/24  1046   Glucose  --  238 H 309 H   < > 183 H 155 H  155 H 180 H 205 H   Sodium  --  139 136   < > 142 138  138 137 140   Potassium  --  4.5 4.3   < > 4.4 4.4  4.4 4.4 4.3   BUN  --  15 19   < > 19 24 H  24 H 26 H 23 "   Creatinine 1.2 1.3 1.4   < > 1.5 H 2.1 H  2.1 H 1.8 H 2.0 H   eGFR if  >60.0 58 A 53 A  --   --   --   --   --    eGFR if non  54.8 A 50 A 45 A  --   --   --   --   --    Calcium  --  9.3 8.7   < > 9.2 8.9  8.9 9.6 9.8   Magnesium  --   --   --   --  2.1 2.1  --   --     < > = values in this interval not displayed.       CARDIAC BIOMARKERS:          COAGS:        LIPIDS/LFTS:  Recent Labs   Lab 01/25/23  0920 08/31/23  1005 07/05/24  1140   Cholesterol  --   --  76 L   Triglycerides  --   --  75   HDL  --   --  25 L   LDL Cholesterol  --   --  36.0 L   Non-HDL Cholesterol  --   --  51   AST 16 16 15   ALT 8 L 15 11       Cardiovascular Testing:    Echocardiogram 10/29/2021:    The left ventricle is mildly enlarged with eccentric hypertrophy and normal systolic function.  The estimated ejection fraction is 60%.  Indeterminate left ventricular diastolic function.  Normal right ventricular size with normal right ventricular systolic function.  Moderate left atrial enlargement.  Mild-to-moderate mitral regurgitation.  Mild tricuspid regurgitation.  Normal central venous pressure (3 mmHg).  The estimated PA systolic pressure is 27 mmHg.    Echo 6/19/20:     Low normal left ventricular systolic function. The estimated ejection fraction is 50-55%.  Mild eccentric left ventricular hypertrophy.  Mild left ventricular enlargement.  No wall motion abnormalities.  Normal right ventricular systolic function.  Moderate mitral regurgitation.  Mild tricuspid regurgitation.  The estimated PA systolic pressure is 35 mmHg.    ASSESSMENT:     Coronary atherosclerosis:  No symptoms suggestive of angina  Hypertension  Hyperlipidemia: LDL 36  Pulmonary hypertension: normal by last echo.  Mitral regurgitation:  Mild/Moderate by last echo  Atherosclerosis of abdominal aorta  Diabetes mellitus  Lymphoma    PLAN:     Coronary atherosclerosis:  Stable.  Hypertension:  Continue current  management  Hyperlipidemia:  Continue current management  Mitral regurgitation:  Last echo showed mild to moderate  Pulmonary hypertension:  Last echocardiogram showed normal pulmonary pressure.  Return to clinic 6 months.            Oleg Fontana MD, MPH, FACC, [facility]

## 2025-02-06 LAB
OHS QRS DURATION: 78 MS
OHS QTC CALCULATION: 416 MS

## 2025-02-12 ENCOUNTER — OFFICE VISIT (OUTPATIENT)
Dept: HEMATOLOGY/ONCOLOGY | Facility: CLINIC | Age: 89
End: 2025-02-12
Payer: MEDICARE

## 2025-02-12 ENCOUNTER — LAB VISIT (OUTPATIENT)
Dept: LAB | Facility: HOSPITAL | Age: 89
End: 2025-02-12
Payer: MEDICARE

## 2025-02-12 VITALS
DIASTOLIC BLOOD PRESSURE: 66 MMHG | HEART RATE: 54 BPM | TEMPERATURE: 98 F | SYSTOLIC BLOOD PRESSURE: 140 MMHG | WEIGHT: 195.31 LBS | RESPIRATION RATE: 16 BRPM | HEIGHT: 71 IN | BODY MASS INDEX: 27.34 KG/M2

## 2025-02-12 DIAGNOSIS — C83.03 SMALL B-CELL LYMPHOMA OF INTRA-ABDOMINAL LYMPH NODES: Primary | ICD-10-CM

## 2025-02-12 DIAGNOSIS — C83.01 SMALL B-CELL LYMPHOMA OF LYMPH NODES OF NECK: ICD-10-CM

## 2025-02-12 DIAGNOSIS — N18.30 CKD STAGE 3 DUE TO TYPE 2 DIABETES MELLITUS: ICD-10-CM

## 2025-02-12 DIAGNOSIS — C85.19 B-CELL LYMPHOMA OF EXTRANODAL SITE: ICD-10-CM

## 2025-02-12 DIAGNOSIS — E11.22 CKD STAGE 3 DUE TO TYPE 2 DIABETES MELLITUS: ICD-10-CM

## 2025-02-12 LAB
ALBUMIN SERPL BCP-MCNC: 4.2 G/DL (ref 3.5–5.2)
ALP SERPL-CCNC: 56 U/L (ref 40–150)
ALT SERPL W/O P-5'-P-CCNC: 11 U/L (ref 10–44)
ANION GAP SERPL CALC-SCNC: 10 MMOL/L (ref 8–16)
AST SERPL-CCNC: 15 U/L (ref 10–40)
BASOPHILS # BLD AUTO: 0.01 K/UL (ref 0–0.2)
BASOPHILS NFR BLD: 0.2 % (ref 0–1.9)
BILIRUB SERPL-MCNC: 1.2 MG/DL (ref 0.1–1)
BUN SERPL-MCNC: 24 MG/DL (ref 8–23)
CALCIUM SERPL-MCNC: 9.4 MG/DL (ref 8.7–10.5)
CHLORIDE SERPL-SCNC: 99 MMOL/L (ref 95–110)
CO2 SERPL-SCNC: 28 MMOL/L (ref 23–29)
CREAT SERPL-MCNC: 1.9 MG/DL (ref 0.5–1.4)
DIFFERENTIAL METHOD BLD: ABNORMAL
EOSINOPHIL # BLD AUTO: 0 K/UL (ref 0–0.5)
EOSINOPHIL NFR BLD: 1 % (ref 0–8)
ERYTHROCYTE [DISTWIDTH] IN BLOOD BY AUTOMATED COUNT: 15.1 % (ref 11.5–14.5)
EST. GFR  (NO RACE VARIABLE): 33.5 ML/MIN/1.73 M^2
GLUCOSE SERPL-MCNC: 208 MG/DL (ref 70–110)
HCT VFR BLD AUTO: 41.9 % (ref 40–54)
HGB BLD-MCNC: 13.7 G/DL (ref 14–18)
IMM GRANULOCYTES # BLD AUTO: 0.03 K/UL (ref 0–0.04)
IMM GRANULOCYTES NFR BLD AUTO: 0.7 % (ref 0–0.5)
LDH SERPL L TO P-CCNC: 188 U/L (ref 110–260)
LYMPHOCYTES # BLD AUTO: 1.3 K/UL (ref 1–4.8)
LYMPHOCYTES NFR BLD: 31.8 % (ref 18–48)
MCH RBC QN AUTO: 31.8 PG (ref 27–31)
MCHC RBC AUTO-ENTMCNC: 32.7 G/DL (ref 32–36)
MCV RBC AUTO: 97 FL (ref 82–98)
MONOCYTES # BLD AUTO: 0.9 K/UL (ref 0.3–1)
MONOCYTES NFR BLD: 21.5 % (ref 4–15)
NEUTROPHILS # BLD AUTO: 1.8 K/UL (ref 1.8–7.7)
NEUTROPHILS NFR BLD: 44.8 % (ref 38–73)
NRBC BLD-RTO: 0 /100 WBC
PLATELET # BLD AUTO: 51 K/UL (ref 150–450)
PMV BLD AUTO: 13.7 FL (ref 9.2–12.9)
POTASSIUM SERPL-SCNC: 4.2 MMOL/L (ref 3.5–5.1)
PROT SERPL-MCNC: 7.9 G/DL (ref 6–8.4)
RBC # BLD AUTO: 4.31 M/UL (ref 4.6–6.2)
SODIUM SERPL-SCNC: 137 MMOL/L (ref 136–145)
WBC # BLD AUTO: 4.09 K/UL (ref 3.9–12.7)

## 2025-02-12 PROCEDURE — 80053 COMPREHEN METABOLIC PANEL: CPT | Performed by: INTERNAL MEDICINE

## 2025-02-12 PROCEDURE — 85025 COMPLETE CBC W/AUTO DIFF WBC: CPT | Performed by: INTERNAL MEDICINE

## 2025-02-12 PROCEDURE — 99214 OFFICE O/P EST MOD 30 MIN: CPT | Mod: PBBFAC | Performed by: INTERNAL MEDICINE

## 2025-02-12 PROCEDURE — 83615 LACTATE (LD) (LDH) ENZYME: CPT | Performed by: INTERNAL MEDICINE

## 2025-02-12 PROCEDURE — 99999 PR PBB SHADOW E&M-EST. PATIENT-LVL IV: CPT | Mod: PBBFAC,,, | Performed by: INTERNAL MEDICINE

## 2025-02-12 PROCEDURE — 36415 COLL VENOUS BLD VENIPUNCTURE: CPT | Performed by: INTERNAL MEDICINE

## 2025-02-12 PROCEDURE — 99215 OFFICE O/P EST HI 40 MIN: CPT | Mod: S$PBB,,, | Performed by: INTERNAL MEDICINE

## 2025-02-12 NOTE — PROGRESS NOTES
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     He was initially evaluated for Thrombocytopenia. 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   He has completed one four week course of rituxan Monotherapy. Completed 10/2018       --11/25/22 CT Chest/abd/pelvis: Anterior mediastinal nodule, slightly larger than the 05/27/2020 exam.  Decreased size of previously noted mediastinal lymph nodes.  Moderate scattered calcific atherosclerosis.  Cholelithiasis Small cystic lesion in the pancreatic neck, unchanged.    --PET on 1/25/23: 1. No definite hypermetabolic tumor or new lymphadenopathy.  2. Relatively stable low-density anterior mediastinal structure with background level radiotracer uptake less than blood pool.  Etiology remains uncertain.  The Deauville Score is: 2    Interval History:  Mr. Lawson had a very good year. Had planned a cruise to [place] in July due to airfare. Now planning on 18 day cruise for new years.    Denies all B symptoms.     No new issues or complaints.     Continues to struggle with blood sugar control.      PAST MEDICAL HISTORY:   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    Hyperlipidemia 02/26/2015    Hypertension     Lymphoma     Uncontrolled secondary diabetes mellitus with stage 3 CKD (GFR 30-59) 08/28/2015       PAST SURGICAL HISTORY:   Past Surgical History:   Procedure Laterality Date    BONE MARROW BIOPSY Left  09/19/2018    Procedure: Biopsy-bone marrow;  Surgeon: Velia Tobias MD;  Location: [facility] ENDO;  Service: Oncology;  Laterality: Left;    CATARACT EXTRACTION Bilateral 1996    COLONOSCOPY N/A 11/24/2020    Procedure: COLONOSCOPY Golytely;  Surgeon: Masoud Hwang MD;  Location: [facility];  Service: Endoscopy;  Laterality: N/A;    CYSTOSCOPY  8/23/2024    Procedure: CYSTOSCOPY;  Surgeon: Ernie Nunn Jr., MD;  Location: [address];  Service: Urology;;    ESOPHAGOGASTRODUODENOSCOPY N/A 11/24/2020    Procedure: EGD (ESOPHAGOGASTRODUODENOSCOPY);  Surgeon: Masoud Hwang MD;  Location: [facility];  Service: Endoscopy;  Laterality: N/A;    eye lid sx Bilateral 2017    EYE SURGERY      cataracts    JOINT REPLACEMENT      knee replacement    retinal injection s Bilateral     scrotal cyst      TURBT (TRANSURETHRAL RESECTION OF BLADDER TUMOR) N/A 8/23/2024    Procedure: TURBT (TRANSURETHRAL RESECTION OF BLADDER TUMOR);  Surgeon: Ernie Nunn Jr., MD;  Location: [address];  Service: Urology;  Laterality: N/A;       PAST SOCIAL HISTORY:   reports that he has quit smoking. His smoking use included cigarettes. He has never used smokeless tobacco. He reports current alcohol use. He reports that he does not use drugs.    FAMILY HISTORY:  Family History   Problem Relation Name Age of Onset    Hypertension Mother      Diabetes Father      No Known Problems Sister      No Known Problems Brother      No Known Problems Maternal Aunt      No Known Problems Maternal Uncle      No Known Problems Paternal Aunt      No Known Problems Paternal Uncle      No Known Problems Maternal Grandmother      No Known Problems Maternal Grandfather      No Known Problems Paternal Grandmother      No Known Problems Paternal Grandfather      No Known Problems Daughter      No Known Problems Son      No Known Problems Son      No Known Problems Other      Amblyopia Neg Hx      Blindness Neg Hx      Cancer Neg Hx      Cataracts Neg  Hx      Glaucoma Neg Hx      Macular degeneration Neg Hx      Retinal detachment Neg Hx      Strabismus Neg Hx      Stroke Neg Hx      Thyroid disease Neg Hx         CURRENT MEDICATIONS:   Current Outpatient Medications   Medication Sig    allopurinoL (ZYLOPRIM) 100 MG tablet TAKE 1 TABLET(100 MG) BY MOUTH EVERY DAY    amLODIPine (NORVASC) 10 MG tablet TAKE 1 TABLET(10 MG) BY MOUTH EVERY DAY    atorvastatin (LIPITOR) 20 MG tablet TAKE 1 TABLET(20 MG) BY MOUTH EVERY DAY    blood sugar diagnostic Strp 1 strip by Misc.(Non-Drug; Combo Route) route 2 (two) times daily. Disp glucometer and lancets as well    carvediloL (COREG) 6.25 MG tablet TAKE 1 TABLET(6.25 MG) BY MOUTH TWICE DAILY WITH MEALS    chlorthalidone (HYGROTEN) 25 MG Tab Take 0.5 tablets (12.5 mg total) by mouth once daily.    dapagliflozin propanediol (FARXIGA) 10 mg tablet Take 1 tablet (10 mg total) by mouth once daily.    DUPIXENT  mg/2 mL PnIj Inject into the skin. a biologic that is given by subcutaneous (under the skin) injection that may be used to treat inflammatory conditions    FEROSUL 325 mg (65 mg iron) Tab tablet TAKE 1 TABLET BY MOUTH EVERY DAY WITH BREAKFAST    folic acid (FOLVITE) 800 MCG Tab Take 800 mcg by mouth once daily.    gabapentin (NEURONTIN) 300 MG capsule TAKE 1 CAPSULE(300 MG) BY MOUTH THREE TIMES DAILY    glipiZIDE (GLUCOTROL) 10 MG TR24 TAKE 1 TABLET(10 MG) BY MOUTH EVERY DAY    multivit-min/FA/lycopen/lutein (CENTRUM SILVER MEN ORAL) Take by mouth.    niacin 500 MG Tab Take 100 mg by mouth every evening.    pantoprazole (PROTONIX) 40 MG tablet TAKE 1 TABLET(40 MG) BY MOUTH EVERY DAY    promethazine-dextromethorphan (PROMETHAZINE-DM) 6.25-15 mg/5 mL Syrp Take 5 mLs by mouth nightly as needed.    SITagliptin phosphate (JANUVIA) 100 MG Tab Take 1 tablet (100 mg total) by mouth once daily.    triamcinolone acetonide 0.1% (KENALOG) 0.1 % ointment Apply topically 2 (two) times daily.    TRUEPLUS LANCETS 33 gauge Misc USE TO  TEST BLOOD SUGAR BID    TURMERIC ROOT EXTRACT ORAL Take by mouth.    valsartan (DIOVAN) 320 MG tablet Take 1 tablet (320 mg total) by mouth once daily.    vitamin E 400 UNIT capsule Take 400 Units by mouth once daily.     No current facility-administered medications for this visit.     ALLERGIES:   Review of patient's allergies indicates:  No Known Allergies      Review of Systems:     Review of Systems   Constitutional:  Negative for appetite change, chills, diaphoresis, fatigue, fever and unexpected weight change.   HENT:   Negative for hearing loss, mouth sores, nosebleeds, sore throat, trouble swallowing and voice change.    Eyes:  Negative for eye problems and icterus.   Respiratory:  Negative for chest tightness, cough, hemoptysis, shortness of breath and wheezing.    Cardiovascular:  Negative for chest pain, leg swelling and palpitations.   Gastrointestinal:  Negative for abdominal distention, abdominal pain, blood in stool, diarrhea, nausea and vomiting.   Endocrine: Negative for hot flashes.   Genitourinary:  Negative for bladder incontinence, difficulty urinating, dysuria, frequency and hematuria.    Musculoskeletal:  Negative for arthralgias, back pain, flank pain, gait problem, myalgias, neck pain and neck stiffness.   Skin:  Negative for itching, rash and wound.   Neurological:  Negative for dizziness, extremity weakness, gait problem, headaches, numbness, seizures and speech difficulty.   Hematological:  Negative for adenopathy. Does not bruise/bleed easily.   Psychiatric/Behavioral:  Negative for confusion, depression and sleep disturbance. The patient is not nervous/anxious.           Physical Exam:     Vitals:    02/12/25 1252   BP: (!) 140/66   Pulse: (!) 54   Resp: 16   Temp: 97.9 °F (36.6 °C)       Physical Exam  Constitutional:       General: He is not in acute distress.     Appearance: He is well-developed. He is not diaphoretic.   HENT:      Head: Normocephalic and atraumatic.       Mouth/Throat:      Pharynx: No oropharyngeal exudate.   Eyes:      Conjunctiva/sclera: Conjunctivae normal.      Pupils: Pupils are equal, round, and reactive to light.   Neck:      Thyroid: No thyromegaly.      Vascular: No JVD.      Trachea: No tracheal deviation.   Cardiovascular:      Rate and Rhythm: Normal rate and regular rhythm.      Heart sounds: Normal heart sounds. No murmur heard.     No friction rub.   Pulmonary:      Effort: Pulmonary effort is normal. No respiratory distress.      Breath sounds: Normal breath sounds. No stridor. No wheezing or rales.   Chest:      Chest wall: No tenderness.   Abdominal:      General: Bowel sounds are normal. There is no distension.      Palpations: Abdomen is soft.      Tenderness: There is no abdominal tenderness. There is no guarding or rebound.   Musculoskeletal:         General: No tenderness or deformity. Normal range of motion.      Cervical back: Normal range of motion and neck supple.   Skin:     General: Skin is warm and dry.      Capillary Refill: Capillary refill takes less than 2 seconds.      Coloration: Skin is not pale.      Findings: No erythema or rash.   Neurological:      Mental Status: He is alert and oriented to person, place, and time.      Cranial Nerves: No cranial nerve deficit.      Sensory: No sensory deficit.      Motor: No abnormal muscle tone.      Coordination: Coordination normal.      Deep Tendon Reflexes: Reflexes normal.   Psychiatric:         Behavior: Behavior normal.         Thought Content: Thought content normal.         Judgment: Judgment normal.         ECOG Performance Status: (foot note - ECOG PS provided by Eastern Cooperative Oncology Group) 0 - Asymptomatic    Karnofsky Performance Score:  100%- Normal, No Complaints, No Evidence of Disease    Labs:   Lab Results   Component Value Date    WBC 4.09 02/12/2025    HGB 13.7 (L) 02/12/2025    HCT 41.9 02/12/2025    PLT 51 (L) 02/12/2025    CHOL 76 (L) 07/05/2024    TRIG 75  07/05/2024    HDL 25 (L) 07/05/2024    ALT 11 02/12/2025    AST 15 02/12/2025     02/12/2025    K 4.2 02/12/2025    CL 99 02/12/2025    CREATININE 1.9 (H) 02/12/2025    BUN 24 (H) 02/12/2025    CO2 28 02/12/2025    TSH 1.510 07/05/2024    HGBA1C 6.1 (H) 07/05/2024     LDH: 214 --> 188    Imaging: Previous imaging has been reviewed     Assessment and Plan:     Mr. Lawson is a pleasant 89 year old male with low Grade B Cell Lymphoma    Low Grade B Cell Lymphoma  --Treated with 4 weekly infusions of RItuxan  --Most recent PET had a dauville score of 2  --Will plan for q6 month labs/physical and yearly imaging      CKD  --CR stable near 2    Diabetes  --Most recent A1C was 6.1  --Will continue to work with PCP      40 minutes in total were spent on this encounter of which 30 minutes were spent face to face with the patient to discuss the disease, natural history, and treatment options. I have provided the patient with an opportunity to ask questions and have all questions answered to his satisfaction.       he will return to clinic in 6 months, but knows to call in the interim if symptoms change or should a problem arise.        Jumana Guzmán MD  Hematology and Medical Oncology  Bone Marrow Transplant  [facility]        BMT Chart Routing      Follow up with physician 6 months. 1. see me in 6 months with cbc,cmp,ldh   Follow up with BOYD    Provider visit type    Infusion scheduling note    Injection scheduling note    Labs    Imaging    Pharmacy appointment    Other referrals

## 2025-02-17 DIAGNOSIS — E11.22 CKD STAGE 3 DUE TO TYPE 2 DIABETES MELLITUS: Primary | ICD-10-CM

## 2025-02-17 DIAGNOSIS — N18.30 CKD STAGE 3 DUE TO TYPE 2 DIABETES MELLITUS: Primary | ICD-10-CM

## 2025-02-18 ENCOUNTER — TELEPHONE (OUTPATIENT)
Dept: FAMILY MEDICINE | Facility: CLINIC | Age: 89
End: 2025-02-18
Payer: MEDICARE

## 2025-02-19 ENCOUNTER — OFFICE VISIT (OUTPATIENT)
Dept: FAMILY MEDICINE | Facility: CLINIC | Age: 89
End: 2025-02-19
Payer: MEDICARE

## 2025-02-19 ENCOUNTER — LAB VISIT (OUTPATIENT)
Dept: LAB | Facility: HOSPITAL | Age: 89
End: 2025-02-19
Attending: INTERNAL MEDICINE
Payer: MEDICARE

## 2025-02-19 VITALS
HEIGHT: 71 IN | HEART RATE: 57 BPM | BODY MASS INDEX: 27.9 KG/M2 | OXYGEN SATURATION: 96 % | SYSTOLIC BLOOD PRESSURE: 110 MMHG | TEMPERATURE: 98 F | WEIGHT: 199.31 LBS | DIASTOLIC BLOOD PRESSURE: 56 MMHG

## 2025-02-19 DIAGNOSIS — D61.818 PANCYTOPENIA: ICD-10-CM

## 2025-02-19 DIAGNOSIS — N25.81 SECONDARY HYPERPARATHYROIDISM OF RENAL ORIGIN: ICD-10-CM

## 2025-02-19 DIAGNOSIS — E11.311 DIABETIC RETINOPATHY OF BOTH EYES WITH MACULAR EDEMA ASSOCIATED WITH TYPE 2 DIABETES MELLITUS, UNSPECIFIED RETINOPATHY SEVERITY: ICD-10-CM

## 2025-02-19 DIAGNOSIS — N18.32 HYPERTENSIVE KIDNEY DISEASE WITH STAGE 3B CHRONIC KIDNEY DISEASE: ICD-10-CM

## 2025-02-19 DIAGNOSIS — I12.9 HYPERTENSIVE KIDNEY DISEASE WITH STAGE 3B CHRONIC KIDNEY DISEASE: ICD-10-CM

## 2025-02-19 DIAGNOSIS — I10 ESSENTIAL HYPERTENSION: ICD-10-CM

## 2025-02-19 DIAGNOSIS — E11.21 DIABETES MELLITUS WITH PROTEINURIC DIABETIC NEPHROPATHY: Primary | ICD-10-CM

## 2025-02-19 DIAGNOSIS — E11.21 DIABETES MELLITUS WITH PROTEINURIC DIABETIC NEPHROPATHY: ICD-10-CM

## 2025-02-19 DIAGNOSIS — M06.09 SERONEGATIVE ARTHROPATHY OF MULTIPLE SITES: ICD-10-CM

## 2025-02-19 DIAGNOSIS — N18.31 STAGE 3A CHRONIC KIDNEY DISEASE: ICD-10-CM

## 2025-02-19 DIAGNOSIS — D69.6 THROMBOCYTOPENIA: ICD-10-CM

## 2025-02-19 PROCEDURE — 99214 OFFICE O/P EST MOD 30 MIN: CPT | Mod: PBBFAC,PO | Performed by: INTERNAL MEDICINE

## 2025-02-19 PROCEDURE — 83036 HEMOGLOBIN GLYCOSYLATED A1C: CPT | Performed by: INTERNAL MEDICINE

## 2025-02-19 PROCEDURE — 36415 COLL VENOUS BLD VENIPUNCTURE: CPT | Mod: PO | Performed by: INTERNAL MEDICINE

## 2025-02-19 NOTE — PROGRESS NOTES
Chief complaint:  Follow-up on diabetes, anemia,     Physical exam 9/23,     89-year-old black male.  here to follow-up on diabetes although did  have A1c prior. Reviewed all his labs and abnormalities.   his A1c was 7.4 in 2/19 then 6.6, 6.8, 5.4 , 6.1, 6.3, 6.1, 5.7, 6.3, 6.5, 6.1, 6/1 July 2024. Had other labs but no A1c. ..    Was Eating poorly.  He has some chronic renal insufficiency. Seen renal .   He had a slight hemoglobin reduction which appears chronic and fluctuating clinical of last year or so with  thrombocytopenia.  We discussed all those issues and the need to monitor them over time.  Is otherwise feeling well.  He is being followed by Hematology.  On iron pill daily and improving.  Somewhere along the way he was put on 81 mg aspirin and is currently off of it I encouraged him to stay off of it as there really is no obvious cardiac reason for him to need the aspirin and he does have thrombocytopenia which is not severe but could become severe and being on aspirin could increase his bleeding risk.    He is willing to get his A1c today.    - Labs for Hematology and we  added an A1c prior and patient will remind the lab to do the standing A1c but NOT done     No further abdominal pain as he had before. Had uti post cysto, all better.  Follow-up urinalysis 8/22 looked good and I reassured patient..  No history of recurrent UTIs and discuss this was probably expected and related to the cystoscope.    5/24- Atypical urothelial cells.   Cysto done per urology.  Discussed the possibility of ruling out cancer.  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 Discussed that it is considered benign and no further surveillance is recommended. Did suggest that it would be appropriate to consider cytoscopy for surveillance in 1 year.       He has seen GI.  Reviewed his last EGD which  showed gastritis.  I reviewed the GI noted it sounds like he had some generalized abdominal discomfort after excessive eating.  Symptoms have since resolved.    He does continue to get hives and itching in areas where he applies pressure.  It has been ongoing for years.  He is not on an antihistamine and discussed using Claritin daily neg going to twice daily if that does not suppressed. Now on a shot with GREAT control of eczema.  He did have a slight flare when he missed is injection while traveling to [facility]     lab shows hemoglobin drop from 11.4-9.6 then up to 13.6, 11.3, 12, 13, 11.5, 13.4.  No obvious clinical bleeding in the plan by GI will be to do a capsule study if the hemoglobin level drops. Seen by Heme and ferritin improving on iron pill daily      Lipids 2022    Last B12 in 300s    Reviewed blood pressures which appear to be running normal.    .  He did see cardiology ordered an echo     Low normal left ventricular systolic function. The estimated ejection fraction is 50-55%.  Mild eccentric left ventricular hypertrophy.  Mild left ventricular enlargement.  No wall motion abnormalities.  Normal right ventricular systolic function.  Moderate mitral regurgitation.  Mild tricuspid regurgitation.  The estimated PA systolic pressure is 35 mmHg.     He is on digital hypertension and we reviewed his blood pressure and pulse readings.  His pulses mostly in the 40s and 50s occasionally up to 60.  He had his carvedilol reduced from 12.5-6.25 twice a day.  His pulse still appears to be mostly in the 50s.  He is asymptomatic.  We discussed that if symptomatic we would need to eliminate the carvedilol and assess response.  Reviewed all his other labs.  With the Lasix he did not have any worsening renal insufficiency.  He has an appointment with Hematology  His platelet count appears stable.          Chest CT  Impression       1. CT findings favoring sequela of cardiac decompensation causing pulmonary edema and  bilateral pleural effusions.  Superimposed COVID-19 pneumonia cannot be entirely excluded noting that pleural effusions is not a commonly reported finding and therefore would be atypical.  2. Persistent soft tissue attenuation nodule within the prevascular mediastinum, presumably an enlarged lymph node and unchanged when compared to previous PET-CTs.  3. Slight interval increased size of multiple mediastinal lymph nodes, nonspecific.  4. Cardiomegaly with severe dense coronary artery atherosclerosis in a 3 vessel distribution.                        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 EYES: no vision change. ENT: no sore throat. CV: no chest pain w/ exertion. RESP: no shortness of breath. GI: no nausea, vomiting, diarrhea. No dysphagia. : no urinary issues. MUSCULOSKELETAL: no new myalgias or arthralgias. SKIN: no new changes. NEURO: no focal deficits. PSYCH: no new issues. ENDOCRINE: no polyuria. HEME: no lymph nodes. ALLERGY: no general pruritis.       Past medical history:  1.  Diabetes with renal disease and retinopathy  2.  Hypertension  3.  Hyperlipidemia  4.  Chronic renal failure stage III  5.  History of colon polyp, normal scope March 2012, normal 2017-- 5 yrs----GI [facility]- Dr Kumari -EGD/colon done 11/2020  6.  Hyperuricemia and gout  7.  History of sciatica and lumbar disc disease remotely  8.  Erectile dysfunction  9.  Macular degeneration, followed by outside retina specialist  10.  GERD  11.  Low HDL  12.  Inflammatory arthritis of the hands, seeing rheumatology  13.  Bilateral cervical radiculopathy and axonal neuropathy, to have surgery 2015    14.  Followed by outside urology at [facility] Dr. noriega   15.  TKA  -angie Han  who is at [facility]  16.  Prevnar given 2017  17. 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 Neg cysto 2022    Past surgical history: Right knee replacement,  left knee arthroscopy, bilateral cataracts, scrotal cyst removed.    Family history: Mother with hypertension and diabetes.  Father's history is unknown.  One brother who is okay.    Social history: Retired, quit smoking 1974.   with 3 children.  Drinks socially.    Vital signs as above  Gen: no distress  EYES: conjunctiva clear, non-icteric, PERRL  ENT: nose congested, nasal mucosa red, oropharynx slightly red and moist, teeth good  NECK:supple, thyroid non-palpable, no cervical lymph nodes  RESP: effort is good, lungs clear  CV: heart RRR w/o murmur, gallops or rubs; no carotid bruits, no edema  GI: abdomen soft, non-distended, non-tender  MS: gait normal, no clubbing or cyanosis of the digits  SKIN: no rashes, warm to touch, no sinus pain to touch      Diagnoses and all orders for this visit:    Diabetes mellitus with proteinuric diabetic nephropathy, due for assessment  -     Hemoglobin A1C; Future    Diabetic retinopathy of both eyes with macular edema associated with type 2 diabetes mellitus, unspecified retinopathy severity, hopefully chronic and stable    Stage 3a chronic kidney disease my chronic and stable    Essential hypertension, chronic and stable, good control    Seronegative arthropathy of multiple sites, chronic and stable    Pancytopenia, chronic and stable and follows with Hematology    Hypertensive kidney disease with stage 3b chronic kidney disease, chronic and stable    Secondary hyperparathyroidism of renal origin, chronic and stable, follows with renal    Thrombocytopenia, reviewed over several years and appears to fluctuate up and down, chronic and stable, most recent down in the 50s but he has been down in the 40s before with spontaneous improving, does not appear to have any recent illnesses that would have precipitated platelet drop

## 2025-02-20 LAB
ESTIMATED AVG GLUCOSE: 134 MG/DL (ref 68–131)
HBA1C MFR BLD: 6.3 % (ref 4–5.6)

## 2025-02-20 NOTE — TELEPHONE ENCOUNTER
No care due was identified.  [facility] Embedded Care Due Messages. Reference number: [id number].   2/20/2025 12:42:58 PM CST

## 2025-02-23 RX ORDER — GLIPIZIDE 10 MG/1
TABLET, FILM COATED, EXTENDED RELEASE ORAL
Qty: 90 TABLET | Refills: 0 | Status: SHIPPED | OUTPATIENT
Start: 2025-02-23

## 2025-02-28 ENCOUNTER — EXTERNAL CHRONIC CARE MANAGEMENT (OUTPATIENT)
Dept: PRIMARY CARE CLINIC | Facility: CLINIC | Age: 89
End: 2025-02-28
Payer: MEDICARE

## 2025-02-28 PROCEDURE — 99490 CHRNC CARE MGMT STAFF 1ST 20: CPT | Mod: PBBFAC,PO | Performed by: INTERNAL MEDICINE

## 2025-02-28 PROCEDURE — 99490 CHRNC CARE MGMT STAFF 1ST 20: CPT | Mod: S$PBB,,, | Performed by: INTERNAL MEDICINE

## 2025-03-12 NOTE — PROGRESS NOTES
Addended by: Caesar Barney on: 6/1/2022 03:35 PM     Modules accepted: Orders Spoke with Alexis, confirmed he should continue on Bactrim.    Subjective:       Patient ID: Bria Lawson is a 83 y.o. Black or  male who presents for follow-up evaluation of Chronic Kidney Disease    HPI     Mr. Lawson is seen in nephrology clinic for follow up on CKD. He was last seen on 3/29/19. He has CKD III, diabetes type 2, hypertension since age 38, chronic knee pain, DJD, rheumatoid arthritis. He had prior nephrology care at [facility] when he was told of eGFR in the range of 40's in 2013.     Interim he has been doing ok and does not have any new symptoms or any acute illness. He does have BP of 104/60 in the clinic today. He admits he does come across BP in similar range at home but he did not bring his home BP readings. He reports he continues to work in his yard a lot and does lot of physical activity especially in hot weather and as a result develops drenching sweats. In around 4/2019 his antihypertensive regimen was changed by his PCP and he has been on coreg, HCTZ, ARB since then.    Due to persistent thrombocytopenia he was referred to oncology/ hematology. He received work up including a bone marrow study when he was diagnosed with B cell lymphoma, low grade. He received 4 infusions of Rituxan by his oncologist. He does not have any urinary symptoms. He has chronic leg edema. He does not feel it has worsened.     He is overall doing fine. He reports otherwise he has been doing fine, denies any nausea/ decreased or increased urine/ flank pain/ vomiting/ diarrhea/ fever. He denies NSAID use. He has stable appetite. He denies any night sweats/ bone pain/ bleeding gums.      He also takes Omeprazole. He states he does not tolerate GERD symptoms if he skips taking Omeprazole.     Prior labs include normal C4, hep B and C screen negative, no evidence of paraproteinemia. US from 1/16 showed normal kidney size and no mass or obstruction.     Renal Function:  Lab Results   Component Value Date     (H) 07/17/2019     (H) 07/09/2019      07/17/2019     07/09/2019    K 4.1 07/17/2019    K 4.2 07/09/2019     07/17/2019     07/09/2019    CO2 27 07/17/2019    CO2 25 07/09/2019    BUN 22 07/17/2019    BUN 22 07/09/2019    CALCIUM 9.3 07/17/2019    CALCIUM 9.1 07/09/2019    CREATININE 1.4 07/17/2019    CREATININE 1.4 07/09/2019    ALBUMIN 4.0 07/17/2019    ALBUMIN 4.0 07/09/2019    PHOS 3.1 07/17/2019    PHOS 3.0 10/19/2018    ESTGFRAFRICA 54 (A) 07/17/2019    ESTGFRAFRICA 54 (A) 07/09/2019    EGFRNONAA 46 (A) 07/17/2019    EGFRNONAA 46 (A) 07/09/2019       Urinalysis:  Lab Results   Component Value Date    APPEARANCEUA Clear 07/17/2019    PHUR 5.0 07/17/2019    SPECGRAV 1.010 07/17/2019    PROTEINUA Negative 07/17/2019    GLUCUA 3+ (A) 07/17/2019    OCCULTUA Negative 07/17/2019    NITRITE Negative 07/17/2019    LEUKOCYTESUR Negative 07/17/2019       Protein/Creatinine Ratio:  Lab Results   Component Value Date    PROTEINURINE <7 07/17/2019    CREATRANDUR 59.6 07/17/2019    UTPCR Unable to calculate 07/17/2019       CBC:  Lab Results   Component Value Date    WBC 4.89 07/09/2019    HGB 11.8 (L) 07/09/2019    HCT 36.5 (L) 07/09/2019     Last vit D 46, PTH 57.8      Review of Systems     Constitutional: Negative for fever, chills, activity change, appetite change and fatigue.   HENT: Negative for hearing loss and nosebleeds.    Respiratory: Negative for cough, shortness of breath and wheezing.   Cardiovascular: Negative for chest pain and palpitations.   Gastrointestinal: Negative for nausea, vomiting, abdominal pain and diarrhea.   Endocrine: Negative for polydipsia and polyuria.   Genitourinary: Negative for dysuria, frequency, hematuria and flank pain.   Musculoskeletal: Positive for back pain. Negative for myalgias.   Skin: Negative for pallor and rash.   Neurological: Negative for dizziness, light-headedness and headaches.   Psychiatric/Behavioral: Negative for behavioral problems. The patient is not nervous/anxious    Objective:       Physical Exam     Constitutional: He is oriented to person, place, and time. He appears well-developed and well-nourished. No distress.   Head: Normocephalic and atraumatic.   Neck: Normal range of motion. Neck supple. No JVD present.   Cardiovascular: Normal rate, regular rhythm and normal heart sounds.   No murmur heard.  Pulmonary/Chest: Effort normal and breath sounds normal. No respiratory distress. He has no wheezes. He has no rales.   Abdominal: Soft. Bowel sounds are normal. He exhibits no distension. There is no tenderness.   Musculoskeletal: He exhibits trace edema.   Neurological: He is alert and oriented to person, place, and time.   Skin: Skin is warm and dry. He is not diaphoretic.    Psychiatric: He has a normal mood and affect. His behavior is normal.   Vitals reviewed.    Assessment:       1. CKD (chronic kidney disease), stage III    2. Hypertensive kidney disease with stage 3 chronic kidney disease    3. Thrombocytopenia    4. Diabetes mellitus with proteinuric diabetic nephropathy    5. Idiopathic chronic gout without tophus, unspecified site        Plan:     Mr. Lawson has longstanding type 2 diabetes, hypertension, occasional NSAID use, has CKD due to diabetic, hypertensive nephropathy and occasional NSAID use in the past, he has CKD III.    Interim due to thrombocytopenia he was referred to hematology, he had further work up including a bone marrow study. He was diagnosed with a low grade B cell lymphoma. He has been treated with Rituxan by his oncologist.     Overall CKD III remains at baseline but he does not have proteinuria. D/w him to follow home BP closely and send readings for review. D/w him to hold BP medicines if SBP < 100 mm hg. D/w him that he could be getting volume depleted from working outside in hot weather and that could also contribute to relatively lower BP readings. If that persists then he may need to come off diuretic. Further plan pending him sending BP readings over  next 1-2 weeks.     CKD III  Stable renal labs  Continue current regimen of antihypertensive including ARB containing regimen for RAAS blockade  Low salt diet  avoid NSAID/ bactrim/ IV contrast/ gadolinium/ aminoglycoside/ Fleet enema where possible    hypertension  Strict low salt diet  continue ARB containing regimen  home BP monitoring, goal less than 130-140/80     Pt encouraged to follow BP more closely at home and send readings periodically                                                                                                                                                                                                                                                                                                                                                                 Chronic gout  Continue allopurinol  Follow uric acid level    Leukopenia  Thrombocytopenia  B cell lymphoma  Management per oncologist    He has higher risk of contrast induced NITIN. avoid NSAID/ bactrim/ IV contrast/ gadolinium/ aminoglycoside where possible.      Diabetes, hyperlipidemia, RA management per PCP and rheumatology.  Labs discussed with patient, potential for decline in renal function explained to him, periodic follow up on renal function stressed. His questions were answered to his satisfaction. Stressed importance of better glycemic control, weight loss and strict low salt diet. He agreed with the plan.   Surveillance renal panel in 3 months   RTC 3-5  months.

## 2025-03-26 RX ORDER — VALSARTAN 320 MG/1
320 TABLET ORAL DAILY
Qty: 90 TABLET | Refills: 3 | Status: SHIPPED | OUTPATIENT
Start: 2025-03-26

## 2025-03-26 NOTE — TELEPHONE ENCOUNTER
No care due was identified.  [facility] Embedded Care Due Messages. Reference number: [id number].   3/25/2025 8:19:34 PM CDT

## 2025-03-31 ENCOUNTER — EXTERNAL CHRONIC CARE MANAGEMENT (OUTPATIENT)
Dept: PRIMARY CARE CLINIC | Facility: CLINIC | Age: 89
End: 2025-03-31
Payer: MEDICARE

## 2025-03-31 PROCEDURE — 99490 CHRNC CARE MGMT STAFF 1ST 20: CPT | Mod: PBBFAC,PO | Performed by: INTERNAL MEDICINE

## 2025-03-31 PROCEDURE — 99490 CHRNC CARE MGMT STAFF 1ST 20: CPT | Mod: S$PBB,,, | Performed by: INTERNAL MEDICINE

## 2025-04-01 ENCOUNTER — HOSPITAL ENCOUNTER (INPATIENT)
Facility: HOSPITAL | Age: 89
LOS: 4 days | Discharge: HOME HEALTH | DRG: 291 | End: 2025-04-05
Attending: EMERGENCY MEDICINE | Admitting: EMERGENCY MEDICINE
Payer: MEDICARE

## 2025-04-01 DIAGNOSIS — R53.83 FATIGUE, UNSPECIFIED TYPE: ICD-10-CM

## 2025-04-01 DIAGNOSIS — R68.83 CHILLS: ICD-10-CM

## 2025-04-01 DIAGNOSIS — R79.89 ELEVATED BRAIN NATRIURETIC PEPTIDE (BNP) LEVEL: ICD-10-CM

## 2025-04-01 DIAGNOSIS — R05.9 COUGH, UNSPECIFIED TYPE: ICD-10-CM

## 2025-04-01 DIAGNOSIS — R50.9 FEVER, UNSPECIFIED FEVER CAUSE: ICD-10-CM

## 2025-04-01 DIAGNOSIS — R53.1 WEAKNESS: ICD-10-CM

## 2025-04-01 DIAGNOSIS — R00.1 BRADYCARDIA: ICD-10-CM

## 2025-04-01 DIAGNOSIS — J22 LOWER RESPIRATORY INFECTION: ICD-10-CM

## 2025-04-01 DIAGNOSIS — R07.9 CHEST PAIN: ICD-10-CM

## 2025-04-01 DIAGNOSIS — R06.02 SHORTNESS OF BREATH: ICD-10-CM

## 2025-04-01 DIAGNOSIS — R09.02 HYPOXIA: Primary | ICD-10-CM

## 2025-04-01 LAB
ABSOLUTE NEUTROPHIL MANUAL (OHS): 7.1 K/UL
ALBUMIN SERPL BCP-MCNC: 3.2 G/DL (ref 3.5–5.2)
ALLENS TEST: ABNORMAL
ALP SERPL-CCNC: 48 UNIT/L (ref 40–150)
ALT SERPL W/O P-5'-P-CCNC: 18 UNIT/L (ref 10–44)
ANION GAP (OHS): 11 MMOL/L (ref 8–16)
AST SERPL-CCNC: 21 UNIT/L (ref 11–45)
BILIRUB SERPL-MCNC: 2.9 MG/DL (ref 0.1–1)
BNP SERPL-MCNC: 1847 PG/ML (ref 0–99)
BUN SERPL-MCNC: 37 MG/DL (ref 8–23)
CALCIUM SERPL-MCNC: 8.5 MG/DL (ref 8.7–10.5)
CHLORIDE SERPL-SCNC: 109 MMOL/L (ref 95–110)
CO2 SERPL-SCNC: 18 MMOL/L (ref 23–29)
CREAT SERPL-MCNC: 2.3 MG/DL (ref 0.5–1.4)
CTP QC/QA: YES
CTP QC/QA: YES
D DIMER PPP IA.FEU-MCNC: 4.33 MG/L FEU
DELSYS: ABNORMAL
ERYTHROCYTE [DISTWIDTH] IN BLOOD BY AUTOMATED COUNT: 16.3 % (ref 11.5–14.5)
FLOW: 4
GFR SERPLBLD CREATININE-BSD FMLA CKD-EPI: 27 ML/MIN/1.73/M2
GLUCOSE SERPL-MCNC: 183 MG/DL (ref 70–110)
HCO3 UR-SCNC: 18.7 MMOL/L (ref 24–28)
HCT VFR BLD AUTO: 28.9 % (ref 40–54)
HGB BLD-MCNC: 9.8 GM/DL (ref 14–18)
INR PPP: 1.2 (ref 0.8–1.2)
LACTATE SERPL-SCNC: 1.2 MMOL/L (ref 0.5–2.2)
LYMPHOCYTES NFR BLD MANUAL: 18 % (ref 18–48)
MAGNESIUM SERPL-MCNC: 1.7 MG/DL (ref 1.6–2.6)
MCH RBC QN AUTO: 32.7 PG (ref 27–31)
MCHC RBC AUTO-ENTMCNC: 33.9 G/DL (ref 32–36)
MCV RBC AUTO: 96 FL (ref 82–98)
MODE: ABNORMAL
MONOCYTES NFR BLD MANUAL: 30 % (ref 4–15)
NEUTROPHILS NFR BLD MANUAL: 52 % (ref 38–73)
NUCLEATED RBC (/100WBC) (OHS): 0 /100 WBC
PCO2 BLDA: 29.5 MMHG (ref 35–45)
PH SMN: 7.41 [PH] (ref 7.35–7.45)
PLATELET # BLD AUTO: 54 K/UL (ref 150–450)
PLATELET BLD QL SMEAR: ABNORMAL
PMV BLD AUTO: 14.5 FL (ref 9.2–12.9)
PO2 BLDA: 41 MMHG (ref 40–60)
POC BE: -5 MMOL/L
POC MOLECULAR INFLUENZA A AGN: NEGATIVE
POC MOLECULAR INFLUENZA B AGN: NEGATIVE
POC SATURATED O2: 77 % (ref 95–100)
POC TCO2: 20 MMOL/L (ref 24–29)
POCT GLUCOSE: 213 MG/DL (ref 70–110)
POTASSIUM SERPL-SCNC: 3.8 MMOL/L (ref 3.5–5.1)
PROCALCITONIN SERPL-MCNC: 0.33 NG/ML
PROT SERPL-MCNC: 7.5 GM/DL (ref 6–8.4)
PROTHROMBIN TIME: 12.9 SECONDS (ref 9–12.5)
RBC # BLD AUTO: 3 M/UL (ref 4.6–6.2)
SAMPLE: ABNORMAL
SARS-COV-2 RDRP RESP QL NAA+PROBE: NEGATIVE
SITE: ABNORMAL
SODIUM SERPL-SCNC: 138 MMOL/L (ref 136–145)
SP02: 92
T4 FREE SERPL-MCNC: NORMAL NG/DL
TROPONIN I SERPL DL<=0.01 NG/ML-MCNC: 0.09 NG/ML
TSH SERPL-ACNC: 3.25 UIU/ML (ref 0.4–4)
WBC # BLD AUTO: 13.56 K/UL (ref 3.9–12.7)

## 2025-04-01 PROCEDURE — 87040 BLOOD CULTURE FOR BACTERIA: CPT | Performed by: EMERGENCY MEDICINE

## 2025-04-01 PROCEDURE — 83605 ASSAY OF LACTIC ACID: CPT | Performed by: EMERGENCY MEDICINE

## 2025-04-01 PROCEDURE — 99900035 HC TECH TIME PER 15 MIN (STAT)

## 2025-04-01 PROCEDURE — 63700000 PHARM REV CODE 250 ALT 637 W/O HCPCS: Performed by: EMERGENCY MEDICINE

## 2025-04-01 PROCEDURE — 82803 BLOOD GASES ANY COMBINATION: CPT

## 2025-04-01 PROCEDURE — 83880 ASSAY OF NATRIURETIC PEPTIDE: CPT | Performed by: EMERGENCY MEDICINE

## 2025-04-01 PROCEDURE — 96375 TX/PRO/DX INJ NEW DRUG ADDON: CPT

## 2025-04-01 PROCEDURE — 12000002 HC ACUTE/MED SURGE SEMI-PRIVATE ROOM

## 2025-04-01 PROCEDURE — 85610 PROTHROMBIN TIME: CPT | Performed by: EMERGENCY MEDICINE

## 2025-04-01 PROCEDURE — 84145 PROCALCITONIN (PCT): CPT | Performed by: EMERGENCY MEDICINE

## 2025-04-01 PROCEDURE — 82962 GLUCOSE BLOOD TEST: CPT

## 2025-04-01 PROCEDURE — 85379 FIBRIN DEGRADATION QUANT: CPT | Performed by: EMERGENCY MEDICINE

## 2025-04-01 PROCEDURE — 87502 INFLUENZA DNA AMP PROBE: CPT

## 2025-04-01 PROCEDURE — 25000242 PHARM REV CODE 250 ALT 637 W/ HCPCS: Performed by: EMERGENCY MEDICINE

## 2025-04-01 PROCEDURE — 84443 ASSAY THYROID STIM HORMONE: CPT | Performed by: EMERGENCY MEDICINE

## 2025-04-01 PROCEDURE — 96374 THER/PROPH/DIAG INJ IV PUSH: CPT

## 2025-04-01 PROCEDURE — 94640 AIRWAY INHALATION TREATMENT: CPT | Mod: XB

## 2025-04-01 PROCEDURE — 84484 ASSAY OF TROPONIN QUANT: CPT | Performed by: EMERGENCY MEDICINE

## 2025-04-01 PROCEDURE — 83735 ASSAY OF MAGNESIUM: CPT | Performed by: EMERGENCY MEDICINE

## 2025-04-01 PROCEDURE — 87635 SARS-COV-2 COVID-19 AMP PRB: CPT | Performed by: EMERGENCY MEDICINE

## 2025-04-01 PROCEDURE — 93005 ELECTROCARDIOGRAM TRACING: CPT

## 2025-04-01 PROCEDURE — 99285 EMERGENCY DEPT VISIT HI MDM: CPT | Mod: 25

## 2025-04-01 PROCEDURE — 82040 ASSAY OF SERUM ALBUMIN: CPT | Performed by: EMERGENCY MEDICINE

## 2025-04-01 PROCEDURE — 85007 BL SMEAR W/DIFF WBC COUNT: CPT | Performed by: EMERGENCY MEDICINE

## 2025-04-01 PROCEDURE — 93010 ELECTROCARDIOGRAM REPORT: CPT | Mod: ,,, | Performed by: INTERNAL MEDICINE

## 2025-04-01 PROCEDURE — 25000003 PHARM REV CODE 250: Performed by: EMERGENCY MEDICINE

## 2025-04-01 PROCEDURE — 63600175 PHARM REV CODE 636 W HCPCS: Performed by: EMERGENCY MEDICINE

## 2025-04-01 RX ORDER — AZITHROMYCIN 250 MG/1
500 TABLET, FILM COATED ORAL
Status: COMPLETED | OUTPATIENT
Start: 2025-04-01 | End: 2025-04-01

## 2025-04-01 RX ORDER — IPRATROPIUM BROMIDE AND ALBUTEROL SULFATE 2.5; .5 MG/3ML; MG/3ML
3 SOLUTION RESPIRATORY (INHALATION)
Status: COMPLETED | OUTPATIENT
Start: 2025-04-01 | End: 2025-04-01

## 2025-04-01 RX ORDER — FUROSEMIDE 10 MG/ML
40 INJECTION INTRAMUSCULAR; INTRAVENOUS
Status: COMPLETED | OUTPATIENT
Start: 2025-04-01 | End: 2025-04-01

## 2025-04-01 RX ORDER — CEFTRIAXONE 1 G/1
1 INJECTION, POWDER, FOR SOLUTION INTRAMUSCULAR; INTRAVENOUS
Status: COMPLETED | OUTPATIENT
Start: 2025-04-01 | End: 2025-04-01

## 2025-04-01 RX ORDER — ASPIRIN 325 MG
325 TABLET ORAL
Status: COMPLETED | OUTPATIENT
Start: 2025-04-01 | End: 2025-04-01

## 2025-04-01 RX ADMIN — IPRATROPIUM BROMIDE AND ALBUTEROL SULFATE 3 ML: 2.5; .5 SOLUTION RESPIRATORY (INHALATION) at 10:04

## 2025-04-01 RX ADMIN — FUROSEMIDE 40 MG: 10 INJECTION, SOLUTION INTRAVENOUS at 11:04

## 2025-04-01 RX ADMIN — ASPIRIN 325 MG ORAL TABLET 325 MG: 325 PILL ORAL at 11:04

## 2025-04-01 RX ADMIN — CEFTRIAXONE SODIUM 1 G: 1 INJECTION, POWDER, FOR SOLUTION INTRAMUSCULAR; INTRAVENOUS at 10:04

## 2025-04-01 RX ADMIN — AZITHROMYCIN DIHYDRATE 500 MG: 250 TABLET ORAL at 10:04

## 2025-04-02 ENCOUNTER — PATIENT OUTREACH (OUTPATIENT)
Dept: ADMINISTRATIVE | Facility: OTHER | Age: 89
End: 2025-04-02
Payer: MEDICARE

## 2025-04-02 PROBLEM — J18.9 COMMUNITY ACQUIRED PNEUMONIA: Status: ACTIVE | Noted: 2025-04-02

## 2025-04-02 PROBLEM — R79.89 ELEVATED BRAIN NATRIURETIC PEPTIDE (BNP) LEVEL: Status: ACTIVE | Noted: 2025-04-02

## 2025-04-02 PROBLEM — I48.91 ATRIAL FIBRILLATION: Status: ACTIVE | Noted: 2025-04-02

## 2025-04-02 PROBLEM — I27.20 PULMONARY HYPERTENSION: Status: ACTIVE | Noted: 2021-01-11

## 2025-04-02 PROBLEM — J96.01 ACUTE RESPIRATORY FAILURE WITH HYPOXIA: Status: ACTIVE | Noted: 2025-04-02

## 2025-04-02 LAB
ABSOLUTE NEUTROPHIL MANUAL (OHS): 4.7 K/UL
ALBUMIN SERPL BCP-MCNC: 2.9 G/DL (ref 3.5–5.2)
ALP SERPL-CCNC: 44 UNIT/L (ref 40–150)
ALT SERPL W/O P-5'-P-CCNC: 13 UNIT/L (ref 10–44)
ANION GAP (OHS): 14 MMOL/L (ref 8–16)
ASCENDING AORTA: 3.22 CM
AST SERPL-CCNC: 18 UNIT/L (ref 11–45)
AV INDEX (PROSTH): 0.61
AV MEAN GRADIENT: 6 MMHG
AV PEAK GRADIENT: 10 MMHG
AV VALVE AREA BY VELOCITY RATIO: 2 CM²
AV VALVE AREA: 1.9 CM²
AV VELOCITY RATIO: 0.63
BACTERIA #/AREA URNS AUTO: ABNORMAL /HPF
BILIRUB SERPL-MCNC: 1.8 MG/DL (ref 0.1–1)
BILIRUB UR QL STRIP.AUTO: NEGATIVE
BSA FOR ECHO PROCEDURE: 2.09 M2
BUN SERPL-MCNC: 41 MG/DL (ref 8–23)
CALCIUM SERPL-MCNC: 8.2 MG/DL (ref 8.7–10.5)
CHLORIDE SERPL-SCNC: 109 MMOL/L (ref 95–110)
CLARITY UR: ABNORMAL
CO2 SERPL-SCNC: 17 MMOL/L (ref 23–29)
COLOR UR AUTO: YELLOW
CREAT SERPL-MCNC: 2.4 MG/DL (ref 0.5–1.4)
CTP QC/QA: YES
CV ECHO LV RWT: 0.46 CM
DOP CALC AO PEAK VEL: 1.6 M/S
DOP CALC AO VTI: 35.1 CM
DOP CALC LVOT AREA: 3.1 CM2
DOP CALC LVOT DIAMETER: 2 CM
DOP CALC LVOT PEAK VEL: 1 M/S
DOP CALC LVOT STROKE VOLUME: 67.2 CM3
DOP CALC MV VTI: 25.1 CM
DOP CALCLVOT PEAK VEL VTI: 21.4 CM
E WAVE DECELERATION TIME: 143 MSEC
E/A RATIO: 1.2
E/E' RATIO: 11 M/S
ECHO LV POSTERIOR WALL: 1.3 CM (ref 0.6–1.1)
ERYTHROCYTE [DISTWIDTH] IN BLOOD BY AUTOMATED COUNT: 16.2 % (ref 11.5–14.5)
FRACTIONAL SHORTENING: 28.6 % (ref 28–44)
GFR SERPLBLD CREATININE-BSD FMLA CKD-EPI: 25 ML/MIN/1.73/M2
GLUCOSE SERPL-MCNC: 178 MG/DL (ref 70–110)
GLUCOSE SERPL-MCNC: 195 MG/DL (ref 70–110)
GLUCOSE UR QL STRIP: ABNORMAL
GRAN CASTS UR QL COMP ASSIST: 14 /LPF (ref ?–0)
HCT VFR BLD AUTO: 25.7 % (ref 40–54)
HGB BLD-MCNC: 8.7 GM/DL (ref 14–18)
HGB UR QL STRIP: ABNORMAL
HYPOCHROMIA BLD QL SMEAR: ABNORMAL
INTERVENTRICULAR SEPTUM: 1.2 CM (ref 0.6–1.1)
IVC DIAMETER: 2.3 CM
IVRT: 74 MSEC
KETONES UR QL STRIP: NEGATIVE
LA MAJOR: 6.1 CM
LA MINOR: 6.4 CM
LA WIDTH: 4.4 CM
LEFT ATRIUM SIZE: 5 CM
LEFT ATRIUM VOLUME INDEX: 56 ML/M2
LEFT ATRIUM VOLUME: 117 CM3
LEFT INTERNAL DIMENSION IN SYSTOLE: 4 CM (ref 2.1–4)
LEFT VENTRICLE DIASTOLIC VOLUME INDEX: 75.36 ML/M2
LEFT VENTRICLE DIASTOLIC VOLUME: 156 ML
LEFT VENTRICLE MASS INDEX: 143.3 G/M2
LEFT VENTRICLE SYSTOLIC VOLUME INDEX: 33.3 ML/M2
LEFT VENTRICLE SYSTOLIC VOLUME: 69 ML
LEFT VENTRICULAR INTERNAL DIMENSION IN DIASTOLE: 5.6 CM (ref 3.5–6)
LEFT VENTRICULAR MASS: 296.6 G
LEUKOCYTE ESTERASE UR QL STRIP: NEGATIVE
LV LATERAL E/E' RATIO: 11.2 M/S
LV SEPTAL E/E' RATIO: 11.2 M/S
LVED V (TEICH): 155.98 ML
LVES V (TEICH): 68.53 ML
LVOT MG: 2.25 MMHG
LVOT MV: 0.71 CM/S
LYMPHOCYTES NFR BLD MANUAL: 21 % (ref 18–48)
MAGNESIUM SERPL-MCNC: 1.9 MG/DL (ref 1.6–2.6)
MCH RBC QN AUTO: 32.5 PG (ref 27–31)
MCHC RBC AUTO-ENTMCNC: 33.9 G/DL (ref 32–36)
MCV RBC AUTO: 96 FL (ref 82–98)
MICROSCOPIC COMMENT: ABNORMAL
MONOCYTES NFR BLD MANUAL: 39 % (ref 4–15)
MV MEAN GRADIENT: 4 MMHG
MV PEAK A VEL: 0.93 M/S
MV PEAK E VEL: 1.12 M/S
MV PEAK GRADIENT: 7 MMHG
MV STENOSIS PRESSURE HALF TIME: 41.34 MS
MV VALVE AREA BY CONTINUITY EQUATION: 2.68 CM2
MV VALVE AREA P 1/2 METHOD: 5.32 CM2
NEUTROPHILS NFR BLD MANUAL: 38 % (ref 38–73)
NEUTS BAND NFR BLD MANUAL: 2 %
NITRITE UR QL STRIP: NEGATIVE
NUCLEATED RBC (/100WBC) (OHS): 0 /100 WBC
OHS CV RV/LV RATIO: 0.7 CM
OHS QRS DURATION: 82 MS
OHS QRS DURATION: 84 MS
OHS QTC CALCULATION: 455 MS
OHS QTC CALCULATION: 478 MS
PH UR STRIP: 5 [PH]
PHOSPHATE SERPL-MCNC: 3.7 MG/DL (ref 2.7–4.5)
PISA TR MAX VEL: 2.4 M/S
PLATELET # BLD AUTO: 44 K/UL (ref 150–450)
PLATELET BLD QL SMEAR: ABNORMAL
PLATELET BLD QL SMEAR: ABNORMAL
PMV BLD AUTO: ABNORMAL FL
POC MOLECULAR INFLUENZA A AGN: NEGATIVE
POC MOLECULAR INFLUENZA B AGN: NEGATIVE
POCT GLUCOSE: 124 MG/DL (ref 70–110)
POCT GLUCOSE: 136 MG/DL (ref 70–110)
POCT GLUCOSE: 136 MG/DL (ref 70–110)
POCT GLUCOSE: 195 MG/DL (ref 70–110)
POCT GLUCOSE: 241 MG/DL (ref 70–110)
POTASSIUM SERPL-SCNC: 3.6 MMOL/L (ref 3.5–5.1)
PROT SERPL-MCNC: 6.7 GM/DL (ref 6–8.4)
PROT UR QL STRIP: ABNORMAL
PV PEAK GRADIENT: 5 MMHG
PV PEAK VELOCITY: 1.13 M/S
RA MAJOR: 5.56 CM
RA PRESSURE ESTIMATED: 8 MMHG
RA WIDTH: 3.9 CM
RBC # BLD AUTO: 2.68 M/UL (ref 4.6–6.2)
RIGHT VENTRICLE DIASTOLIC BASEL DIMENSION: 3.9 CM
RIGHT VENTRICULAR END-DIASTOLIC DIMENSION: 3.92 CM
RV TB RVSP: 10 MMHG
RV TISSUE DOPPLER FREE WALL SYSTOLIC VELOCITY 1 (APICAL 4 CHAMBER VIEW): 9.79 CM/S
SINUS: 3.7 CM
SODIUM SERPL-SCNC: 140 MMOL/L (ref 136–145)
SP GR UR STRIP: 1.01
STJ: 2.58 CM
TDI LATERAL: 0.1 M/S
TDI SEPTAL: 0.1 M/S
TDI: 0.1 M/S
TR MAX PG: 23 MMHG
TRICUSPID ANNULAR PLANE SYSTOLIC EXCURSION: 1.98 CM
TROPONIN I SERPL DL<=0.01 NG/ML-MCNC: 0.08 NG/ML
TV REST PULMONARY ARTERY PRESSURE: 31 MMHG
UROBILINOGEN UR STRIP-ACNC: NEGATIVE EU/DL
WBC # BLD AUTO: 11.69 K/UL (ref 3.9–12.7)
WBC #/AREA URNS AUTO: 2 /HPF (ref 0–5)
YEAST UR QL AUTO: ABNORMAL /HPF
Z-SCORE OF LEFT VENTRICULAR DIMENSION IN END DIASTOLE: -1.17
Z-SCORE OF LEFT VENTRICULAR DIMENSION IN END SYSTOLE: 0.3

## 2025-04-02 PROCEDURE — 85027 COMPLETE CBC AUTOMATED: CPT | Performed by: STUDENT IN AN ORGANIZED HEALTH CARE EDUCATION/TRAINING PROGRAM

## 2025-04-02 PROCEDURE — 21400001 HC TELEMETRY ROOM

## 2025-04-02 PROCEDURE — 87449 NOS EACH ORGANISM AG IA: CPT | Performed by: STUDENT IN AN ORGANIZED HEALTH CARE EDUCATION/TRAINING PROGRAM

## 2025-04-02 PROCEDURE — 25000003 PHARM REV CODE 250: Performed by: STUDENT IN AN ORGANIZED HEALTH CARE EDUCATION/TRAINING PROGRAM

## 2025-04-02 PROCEDURE — 94761 N-INVAS EAR/PLS OXIMETRY MLT: CPT

## 2025-04-02 PROCEDURE — 84484 ASSAY OF TROPONIN QUANT: CPT | Performed by: STUDENT IN AN ORGANIZED HEALTH CARE EDUCATION/TRAINING PROGRAM

## 2025-04-02 PROCEDURE — 36415 COLL VENOUS BLD VENIPUNCTURE: CPT | Performed by: STUDENT IN AN ORGANIZED HEALTH CARE EDUCATION/TRAINING PROGRAM

## 2025-04-02 PROCEDURE — 27000221 HC OXYGEN, UP TO 24 HOURS

## 2025-04-02 PROCEDURE — 84100 ASSAY OF PHOSPHORUS: CPT | Performed by: STUDENT IN AN ORGANIZED HEALTH CARE EDUCATION/TRAINING PROGRAM

## 2025-04-02 PROCEDURE — 63600175 PHARM REV CODE 636 W HCPCS: Performed by: STUDENT IN AN ORGANIZED HEALTH CARE EDUCATION/TRAINING PROGRAM

## 2025-04-02 PROCEDURE — 63600175 PHARM REV CODE 636 W HCPCS: Performed by: HOSPITALIST

## 2025-04-02 PROCEDURE — 93005 ELECTROCARDIOGRAM TRACING: CPT

## 2025-04-02 PROCEDURE — 83735 ASSAY OF MAGNESIUM: CPT | Performed by: STUDENT IN AN ORGANIZED HEALTH CARE EDUCATION/TRAINING PROGRAM

## 2025-04-02 PROCEDURE — 93010 ELECTROCARDIOGRAM REPORT: CPT | Mod: ,,, | Performed by: INTERNAL MEDICINE

## 2025-04-02 PROCEDURE — 81003 URINALYSIS AUTO W/O SCOPE: CPT | Performed by: EMERGENCY MEDICINE

## 2025-04-02 PROCEDURE — 99223 1ST HOSP IP/OBS HIGH 75: CPT | Mod: ,,, | Performed by: INTERNAL MEDICINE

## 2025-04-02 PROCEDURE — 87070 CULTURE OTHR SPECIMN AEROBIC: CPT | Performed by: STUDENT IN AN ORGANIZED HEALTH CARE EDUCATION/TRAINING PROGRAM

## 2025-04-02 PROCEDURE — 5A0945A ASSISTANCE WITH RESPIRATORY VENTILATION, 24-96 CONSECUTIVE HOURS, HIGH NASAL FLOW/VELOCITY: ICD-10-PCS | Performed by: INTERNAL MEDICINE

## 2025-04-02 PROCEDURE — 80053 COMPREHEN METABOLIC PANEL: CPT | Performed by: STUDENT IN AN ORGANIZED HEALTH CARE EDUCATION/TRAINING PROGRAM

## 2025-04-02 RX ORDER — ACETAMINOPHEN 325 MG/1
650 TABLET ORAL EVERY 4 HOURS PRN
Status: DISCONTINUED | OUTPATIENT
Start: 2025-04-02 | End: 2025-04-05 | Stop reason: HOSPADM

## 2025-04-02 RX ORDER — SODIUM,POTASSIUM PHOSPHATES 280-250MG
2 POWDER IN PACKET (EA) ORAL
Status: DISCONTINUED | OUTPATIENT
Start: 2025-04-02 | End: 2025-04-05 | Stop reason: HOSPADM

## 2025-04-02 RX ORDER — LANOLIN ALCOHOL/MO/W.PET/CERES
800 CREAM (GRAM) TOPICAL
Status: DISCONTINUED | OUTPATIENT
Start: 2025-04-02 | End: 2025-04-05 | Stop reason: HOSPADM

## 2025-04-02 RX ORDER — FUROSEMIDE 10 MG/ML
60 INJECTION INTRAMUSCULAR; INTRAVENOUS
Status: COMPLETED | OUTPATIENT
Start: 2025-04-02 | End: 2025-04-02

## 2025-04-02 RX ORDER — FOLIC ACID 0.4 MG
800 TABLET ORAL DAILY
Status: DISCONTINUED | OUTPATIENT
Start: 2025-04-02 | End: 2025-04-02 | Stop reason: CLARIF

## 2025-04-02 RX ORDER — INSULIN ASPART 100 [IU]/ML
0-5 INJECTION, SOLUTION INTRAVENOUS; SUBCUTANEOUS
Status: DISCONTINUED | OUTPATIENT
Start: 2025-04-02 | End: 2025-04-05 | Stop reason: HOSPADM

## 2025-04-02 RX ORDER — SIMETHICONE 80 MG
1 TABLET,CHEWABLE ORAL 4 TIMES DAILY PRN
Status: DISCONTINUED | OUTPATIENT
Start: 2025-04-02 | End: 2025-04-05 | Stop reason: HOSPADM

## 2025-04-02 RX ORDER — ALUMINUM HYDROXIDE, MAGNESIUM HYDROXIDE, AND SIMETHICONE 1200; 120; 1200 MG/30ML; MG/30ML; MG/30ML
30 SUSPENSION ORAL 4 TIMES DAILY PRN
Status: DISCONTINUED | OUTPATIENT
Start: 2025-04-02 | End: 2025-04-05 | Stop reason: HOSPADM

## 2025-04-02 RX ORDER — ONDANSETRON HYDROCHLORIDE 2 MG/ML
4 INJECTION, SOLUTION INTRAVENOUS EVERY 8 HOURS PRN
Status: DISCONTINUED | OUTPATIENT
Start: 2025-04-02 | End: 2025-04-05 | Stop reason: HOSPADM

## 2025-04-02 RX ORDER — GLUCAGON 1 MG
1 KIT INJECTION
Status: DISCONTINUED | OUTPATIENT
Start: 2025-04-02 | End: 2025-04-05 | Stop reason: HOSPADM

## 2025-04-02 RX ORDER — ALLOPURINOL 100 MG/1
100 TABLET ORAL DAILY
Status: DISCONTINUED | OUTPATIENT
Start: 2025-04-02 | End: 2025-04-05 | Stop reason: HOSPADM

## 2025-04-02 RX ORDER — AMLODIPINE BESYLATE 5 MG/1
10 TABLET ORAL DAILY
Status: DISCONTINUED | OUTPATIENT
Start: 2025-04-02 | End: 2025-04-02

## 2025-04-02 RX ORDER — IBUPROFEN 200 MG
16 TABLET ORAL
Status: DISCONTINUED | OUTPATIENT
Start: 2025-04-02 | End: 2025-04-05 | Stop reason: HOSPADM

## 2025-04-02 RX ORDER — TALC
6 POWDER (GRAM) TOPICAL NIGHTLY PRN
Status: DISCONTINUED | OUTPATIENT
Start: 2025-04-02 | End: 2025-04-05 | Stop reason: HOSPADM

## 2025-04-02 RX ORDER — SODIUM CHLORIDE 0.9 % (FLUSH) 0.9 %
10 SYRINGE (ML) INJECTION EVERY 12 HOURS PRN
Status: DISCONTINUED | OUTPATIENT
Start: 2025-04-02 | End: 2025-04-05 | Stop reason: HOSPADM

## 2025-04-02 RX ORDER — GABAPENTIN 100 MG/1
200 CAPSULE ORAL 3 TIMES DAILY
Status: DISCONTINUED | OUTPATIENT
Start: 2025-04-02 | End: 2025-04-05 | Stop reason: HOSPADM

## 2025-04-02 RX ORDER — FUROSEMIDE 10 MG/ML
60 INJECTION INTRAMUSCULAR; INTRAVENOUS EVERY 12 HOURS
Status: DISCONTINUED | OUTPATIENT
Start: 2025-04-02 | End: 2025-04-02

## 2025-04-02 RX ORDER — CARVEDILOL 6.25 MG/1
6.25 TABLET ORAL 2 TIMES DAILY
Status: DISCONTINUED | OUTPATIENT
Start: 2025-04-02 | End: 2025-04-04

## 2025-04-02 RX ORDER — AMLODIPINE BESYLATE 5 MG/1
5 TABLET ORAL DAILY
Status: DISCONTINUED | OUTPATIENT
Start: 2025-04-03 | End: 2025-04-04

## 2025-04-02 RX ORDER — VALSARTAN 80 MG/1
320 TABLET ORAL DAILY
Status: DISCONTINUED | OUTPATIENT
Start: 2025-04-02 | End: 2025-04-02

## 2025-04-02 RX ORDER — BISACODYL 10 MG/1
10 SUPPOSITORY RECTAL DAILY PRN
Status: DISCONTINUED | OUTPATIENT
Start: 2025-04-02 | End: 2025-04-05 | Stop reason: HOSPADM

## 2025-04-02 RX ORDER — IBUPROFEN 200 MG
24 TABLET ORAL
Status: DISCONTINUED | OUTPATIENT
Start: 2025-04-02 | End: 2025-04-05 | Stop reason: HOSPADM

## 2025-04-02 RX ORDER — NALOXONE HCL 0.4 MG/ML
0.02 VIAL (ML) INJECTION
Status: DISCONTINUED | OUTPATIENT
Start: 2025-04-02 | End: 2025-04-05 | Stop reason: HOSPADM

## 2025-04-02 RX ORDER — CEFTRIAXONE 2 G/1
2 INJECTION, POWDER, FOR SOLUTION INTRAMUSCULAR; INTRAVENOUS
Status: DISCONTINUED | OUTPATIENT
Start: 2025-04-02 | End: 2025-04-05 | Stop reason: HOSPADM

## 2025-04-02 RX ORDER — FUROSEMIDE 10 MG/ML
20 INJECTION INTRAMUSCULAR; INTRAVENOUS EVERY 12 HOURS
Status: DISCONTINUED | OUTPATIENT
Start: 2025-04-02 | End: 2025-04-04

## 2025-04-02 RX ORDER — FOLIC ACID 1 MG/1
1 TABLET ORAL DAILY
Status: DISCONTINUED | OUTPATIENT
Start: 2025-04-02 | End: 2025-04-05 | Stop reason: HOSPADM

## 2025-04-02 RX ORDER — ATORVASTATIN CALCIUM 10 MG/1
20 TABLET, FILM COATED ORAL DAILY
Status: DISCONTINUED | OUTPATIENT
Start: 2025-04-02 | End: 2025-04-05 | Stop reason: HOSPADM

## 2025-04-02 RX ORDER — AMOXICILLIN 250 MG
1 CAPSULE ORAL DAILY PRN
Status: DISCONTINUED | OUTPATIENT
Start: 2025-04-02 | End: 2025-04-05 | Stop reason: HOSPADM

## 2025-04-02 RX ORDER — PANTOPRAZOLE SODIUM 40 MG/1
40 TABLET, DELAYED RELEASE ORAL DAILY
Status: DISCONTINUED | OUTPATIENT
Start: 2025-04-02 | End: 2025-04-05 | Stop reason: HOSPADM

## 2025-04-02 RX ORDER — ACETAMINOPHEN 325 MG/1
650 TABLET ORAL EVERY 8 HOURS PRN
Status: DISCONTINUED | OUTPATIENT
Start: 2025-04-02 | End: 2025-04-05 | Stop reason: HOSPADM

## 2025-04-02 RX ADMIN — PANTOPRAZOLE SODIUM 40 MG: 40 TABLET, DELAYED RELEASE ORAL at 09:04

## 2025-04-02 RX ADMIN — CARVEDILOL 6.25 MG: 6.25 TABLET, FILM COATED ORAL at 09:04

## 2025-04-02 RX ADMIN — INSULIN ASPART 2 UNITS: 100 INJECTION, SOLUTION INTRAVENOUS; SUBCUTANEOUS at 12:04

## 2025-04-02 RX ADMIN — FUROSEMIDE 60 MG: 10 INJECTION, SOLUTION INTRAVENOUS at 01:04

## 2025-04-02 RX ADMIN — ATORVASTATIN CALCIUM 20 MG: 10 TABLET, FILM COATED ORAL at 09:04

## 2025-04-02 RX ADMIN — AZITHROMYCIN MONOHYDRATE 500 MG: 500 INJECTION, POWDER, LYOPHILIZED, FOR SOLUTION INTRAVENOUS at 10:04

## 2025-04-02 RX ADMIN — CARVEDILOL 6.25 MG: 6.25 TABLET, FILM COATED ORAL at 08:04

## 2025-04-02 RX ADMIN — FUROSEMIDE 60 MG: 10 INJECTION, SOLUTION INTRAVENOUS at 09:04

## 2025-04-02 RX ADMIN — CEFTRIAXONE 2 G: 2 INJECTION, POWDER, FOR SOLUTION INTRAMUSCULAR; INTRAVENOUS at 10:04

## 2025-04-02 RX ADMIN — GABAPENTIN 200 MG: 100 CAPSULE ORAL at 09:04

## 2025-04-02 RX ADMIN — FOLIC ACID 1 MG: 1 TABLET ORAL at 09:04

## 2025-04-02 RX ADMIN — FUROSEMIDE 20 MG: 10 INJECTION, SOLUTION INTRAMUSCULAR; INTRAVENOUS at 09:04

## 2025-04-02 RX ADMIN — ALLOPURINOL 100 MG: 100 TABLET ORAL at 09:04

## 2025-04-02 RX ADMIN — GABAPENTIN 200 MG: 100 CAPSULE ORAL at 03:04

## 2025-04-02 RX ADMIN — GABAPENTIN 200 MG: 100 CAPSULE ORAL at 08:04

## 2025-04-02 NOTE — ASSESSMENT & PLAN NOTE
Patient's blood pressure range in the last 24 hours was: BP  Min: 109/58  Max: 150/66.The patient's inpatient anti-hypertensive regimen is listed below:  Current Antihypertensives  furosemide injection 60 mg, ED 1 Time, Intravenous  amLODIPine tablet 10 mg, Daily, Oral  carvediloL tablet 6.25 mg, 2 times daily, Oral  valsartan tablet 320 mg, Daily, Oral  furosemide injection 60 mg, Every 12 hours, Intravenous    Plan  - BP is controlled, no changes needed to their regimen

## 2025-04-02 NOTE — ED NOTES
Pt arrive to ED c/o sob since Saturday worsening today, reports on Saturday started to feel ill and has been worsening since then but today spiked a fever with sob while sleeping. Pt denies resp hx or being around anyone sick recently. Pt denies cp, abd pain, n/v/d. AAOx4. Wife at bedside.

## 2025-04-02 NOTE — PLAN OF CARE
[facility] - Emergency Dept  Initial Discharge Assessment       Primary Care Provider: Adiel Bragg MD  Case Management Assessment     PCP: See above  [facility]armacy: [facility]    Patient Arrived From: home with spouse  Existing Help at Home: spouse    Barriers to Discharge: none    Discharge Plan:    A. home   B. home with family      Discharge planning assessment completed with patient's assistance.  Patient is from home  and independent.  Patient has  no DME or  OP services.    When medically stable, patient will discharge to  home . Patient's wife will transport home.  Patient will need followup appointments  with PCP and/or as ordered by the discharging provider.  Case Management will continue to follow and assist with discharge planning.        Admission Diagnosis: Hypoxia [R09.02]    Admission Date: 4/1/2025  Expected Discharge Date:     Transition of Care Barriers: None    Payor: MEDICARE / Plan: MEDICARE PART A & B / Product Type: Government /     Extended Emergency Contact Information  Primary Emergency Contact: Janessa Lawson  Address: [address] [place] [address]           [place] [address] [facility]  Home Phone: [phone]  Mobile Phone: [phone]  Relation: Spouse    Discharge Plan A: Home  Discharge Plan B: Home with family      [id number] - [place] - [address] AT [facility] [address]  [address] [place] [address]  Phone: [phone] Fax: [phone]    [facility] - [place] - [facility] AT Portal to Registered [facility] Sites  [facility]  [place] [address]  Phone: [phone] Fax: [phone]    [id number] - [place] - [address]  [address] [address]  Phone: [phone] Fax: [phone]      Initial Assessment (most recent)       Adult Discharge Assessment - 04/02/25 1621          Discharge Assessment    Assessment Type  Discharge Planning Assessment     Confirmed/corrected address, phone number and insurance Yes     Confirmed Demographics Correct on Facesheet     Source of Information patient;family   wife at the bedside    When was your last doctors appointment? 02/26/25     Reason For Admission Hypoxia     People in Home spouse     Facility Arrived From: home with spouse     Do you expect to return to your current living situation? Yes     Do you have help at home or someone to help you manage your care at home? Yes     Who are your caregiver(s) and their phone number(s)? Ramesh Lawson  [phone]     Prior to hospitilization cognitive status: Alert/Oriented     Current cognitive status: Alert/Oriented     Walking or Climbing Stairs Difficulty no     Dressing/Bathing Difficulty no     Equipment Currently Used at Home none     Readmission within 30 days? No     Patient currently being followed by outpatient case management? No     Do you currently have service(s) that help you manage your care at home? No     Do you take prescription medications? Yes     Do you have prescription coverage? Yes     Coverage BCBS     Do you have any problems affording any of your prescribed medications? No     Is the patient taking medications as prescribed? yes     Who is going to help you get home at discharge? wife:  Ramesh     How do you get to doctors appointments? car, drives self     Are you on dialysis? No     Do you take coumadin? No     Discharge Plan A Home     Discharge Plan B Home with family     DME Needed Upon Discharge  none     Discharge Plan discussed with: Patient;Spouse/sig other     Name(s) and Number(s) Ramesh Lawson  [phone]     Transition of Care Barriers None

## 2025-04-02 NOTE — SUBJECTIVE & OBJECTIVE
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;  Service: Oncology;  Laterality: Left;    CATARACT EXTRACTION Bilateral 1996    COLONOSCOPY N/A 11/24/2020    Procedure: COLONOSCOPY Golytely;  Surgeon: Masoud Hwang MD;  Location: [facility];  Service: Endoscopy;  Laterality: N/A;    CYSTOSCOPY  8/23/2024    Procedure: CYSTOSCOPY;  Surgeon: Ernie Nunn Jr., MD;  Location: [facility] OR [address] [place] [address];  Service: Urology;;    ESOPHAGOGASTRODUODENOSCOPY N/A 11/24/2020    Procedure: EGD (ESOPHAGOGASTRODUODENOSCOPY);  Surgeon: Masoud Hwang MD;  Location: [facility];  Service: Endoscopy;  Laterality: N/A;    eye lid sx Bilateral 2017    EYE SURGERY      cataracts    JOINT REPLACEMENT      knee replacement    retinal injection s Bilateral     scrotal cyst      TURBT (TRANSURETHRAL RESECTION OF BLADDER TUMOR) N/A 8/23/2024    Procedure: TURBT (TRANSURETHRAL RESECTION OF BLADDER TUMOR);  Surgeon: Ernie Nunn Jr., MD;  Location: [facility] OR [address] [place] [address];  Service: Urology;  Laterality: N/A;       Review of patient's allergies indicates:  No Known Allergies    No current facility-administered medications on file prior to encounter.     Current Outpatient Medications on File Prior to Encounter   Medication Sig    allopurinoL (ZYLOPRIM) 100 MG tablet TAKE 1  TABLET(100 MG) BY MOUTH EVERY DAY    amLODIPine (NORVASC) 10 MG tablet TAKE 1 TABLET(10 MG) BY MOUTH EVERY DAY    atorvastatin (LIPITOR) 20 MG tablet TAKE 1 TABLET(20 MG) BY MOUTH EVERY DAY    carvediloL (COREG) 6.25 MG tablet TAKE 1 TABLET(6.25 MG) BY MOUTH TWICE DAILY WITH MEALS    dapagliflozin propanediol (FARXIGA) 10 mg tablet Take 1 tablet (10 mg total) by mouth once daily.    FEROSUL 325 mg (65 mg iron) Tab tablet TAKE 1 TABLET BY MOUTH EVERY DAY WITH BREAKFAST    folic acid (FOLVITE) 800 MCG Tab Take 800 mcg by mouth once daily.    gabapentin (NEURONTIN) 300 MG capsule TAKE 1 CAPSULE(300 MG) BY MOUTH THREE TIMES DAILY    glipiZIDE (GLUCOTROL) 10 MG TR24 TAKE 1 TABLET(10 MG) BY MOUTH EVERY DAY    multivit-min/FA/lycopen/lutein (CENTRUM SILVER MEN ORAL) Take by mouth.    niacin 500 MG Tab Take 100 mg by mouth every evening.    pantoprazole (PROTONIX) 40 MG tablet TAKE 1 TABLET(40 MG) BY MOUTH EVERY DAY    SITagliptin phosphate (JANUVIA) 100 MG Tab Take 1 tablet (100 mg total) by mouth once daily.    valsartan (DIOVAN) 320 MG tablet Take 1 tablet (320 mg total) by mouth once daily.    blood sugar diagnostic Strp 1 strip by Misc.(Non-Drug; Combo Route) route 2 (two) times daily. Disp glucometer and lancets as well    DUPIXENT  mg/2 mL PnIj Inject into the skin. a biologic that is given by subcutaneous (under the skin) injection that may be used to treat inflammatory conditions    TURMERIC ROOT EXTRACT ORAL Take by mouth.    vitamin E 400 UNIT capsule Take 400 Units by mouth once daily.    [DISCONTINUED] omeprazole (PRILOSEC) 40 MG capsule TAKE 1 CAPSULE DAILY     Family History       Problem Relation (Age of Onset)    Diabetes Father    Hypertension Mother    No Known Problems Sister, Brother, Maternal Aunt, Maternal Uncle, Paternal Aunt, Paternal Uncle, Maternal Grandmother, Maternal Grandfather, Paternal Grandmother, Paternal Grandfather, Daughter, Son, Son, Other          Tobacco Use    Smoking status:  Former     Types: Cigarettes    Smokeless tobacco: Never    Tobacco comments:     09/26/23 Patient Quit Smoking At The Age of 38 In 1974.   Substance and Sexual Activity    Alcohol use: Yes     Comment: socially - maybe few times a week    Drug use: No    Sexual activity: Yes     Partners: Female     Review of Systems   Constitutional:  Positive for chills, fatigue and fever.   Respiratory:  Positive for cough and shortness of breath.    Cardiovascular: Negative.    Gastrointestinal: Negative.    Genitourinary: Negative.    Musculoskeletal: Negative.    Neurological: Negative.      Objective:     Vital Signs (Most Recent):  Temp: 98.5 °F (36.9 °C) (04/01/25 2130)  Pulse: (!) 57 (04/02/25 0000)  Resp: (!) 27 (04/02/25 0017)  BP: 115/74 (04/02/25 0016)  SpO2: (!) 94 % (04/02/25 0000) Vital Signs (24h Range):  Temp:  [98.5 °F (36.9 °C)] 98.5 °F (36.9 °C)  Pulse:  [57-71] 57  Resp:  [18-30] 27  SpO2:  [82 %-95 %] 94 %  BP: (109-150)/(56-74) 115/74     Weight: 87.1 kg (192 lb)  Body mass index is 26.78 kg/m².     Physical Exam  Vitals and nursing note reviewed.   Constitutional:       General: He is not in acute distress.     Appearance: He is not ill-appearing.   HENT:      Mouth/Throat:      Mouth: Mucous membranes are moist.   Cardiovascular:      Rate and Rhythm: Normal rate.   Pulmonary:      Effort: Pulmonary effort is normal.      Breath sounds: Decreased breath sounds present.   Abdominal:      General: Abdomen is flat.   Skin:     General: Skin is warm.   Neurological:      General: No focal deficit present.      Mental Status: He is alert.                Significant Labs: All pertinent labs within the past 24 hours have been reviewed.    Significant Imaging: I have reviewed all pertinent imaging results/findings within the past 24 hours.

## 2025-04-02 NOTE — ED PROVIDER NOTES
"Encounter Date: 4/1/2025       History     Chief Complaint   Patient presents with    Shortness of Breath    Fever    Cough    Weakness     Pt presents to ER with complaints of SOB, fever, cough and weakness since Saturday night. Pt states he took Tylenol and tessalon pearls and has had no relief. Pt denies chest pain, nausea, vomiting and any other issues at this time. Pt is not on blood thinners. Pt states when he coughs all his insides hurt.      88yo male with paroxysmal afib not on anticoagulation, DM2, CKD3, presents via wife with fever, cough, and shortness of breath.  Patient reports that he developed fatigue and mostly nonproductive cough 3 days ago (Saturday 3/29/25).  Wife notes that patient was breathing rapidly when he was asleep.  Patient had decreased appetite yesterday Monday 3/31/25 and today Tuesday 4/1/25.  Patient had subjective fevers and chills earlier tonight.  Wife reports oral temp 100F and gave 2 extra strength APAP around 8pm.  No chest pain.  No nausea/vomiting/diarrhea/urinary complaints.  Patient reports abdominal "soreness" only on coughing.  History of lymphoma, no recent chemo.      PMD is Dr. Adiel Bragg.  Heme/Onc is Dr. Jumana Gumzán.  Cardiology is Dr. Oleg Fontana.  Nephrology is Dr. Ryan Avila.    TTE 10/29/21  · Show Result ComparisonThe left ventricle is mildly enlarged with eccentric hypertrophy and normal systolic function.  · The estimated ejection fraction is 60%.  · Indeterminate left ventricular diastolic function.  · Normal right ventricular size with normal right ventricular systolic function.  · Moderate left atrial enlargement.  · Mild-to-moderate mitral regurgitation.  · Mild tricuspid regurgitation.  · Normal central venous pressure (3 mmHg).  · The estimated PA systolic pressure is 27 mmHg.           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    Hyperlipidemia 02/26/2015    Hypertension     Lymphoma     Uncontrolled secondary diabetes mellitus with stage 3 CKD (GFR 30-59) 08/28/2015     Past Surgical History:   Procedure Laterality Date    BONE MARROW BIOPSY Left 09/19/2018    Procedure: Biopsy-bone marrow;  Surgeon: Velia Tobias MD;  Location: [facility];  Service: Oncology;  Laterality: Left;    CATARACT EXTRACTION Bilateral 1996    COLONOSCOPY N/A 11/24/2020    Procedure: COLONOSCOPY Golytely;  Surgeon: Masoud Hwang MD;  Location: [facility];  Service: Endoscopy;  Laterality: N/A;    CYSTOSCOPY  8/23/2024    Procedure: CYSTOSCOPY;  Surgeon: Ernie Nunn Jr., MD;  Location: [facility] OR [address] [place] [address];  Service: Urology;;    ESOPHAGOGASTRODUODENOSCOPY N/A 11/24/2020    Procedure: EGD (ESOPHAGOGASTRODUODENOSCOPY);  Surgeon: Masoud Hwang MD;  Location: [facility];  Service: Endoscopy;  Laterality: N/A;    eye lid sx Bilateral 2017    EYE SURGERY      cataracts    JOINT REPLACEMENT      knee replacement    retinal injection s Bilateral     scrotal cyst      TURBT (TRANSURETHRAL RESECTION OF BLADDER TUMOR) N/A 8/23/2024    Procedure: TURBT (TRANSURETHRAL RESECTION OF BLADDER TUMOR);  Surgeon: Ernie Nunn Jr., MD;  Location: [facility] OR [address] [place] [address];  Service: Urology;  Laterality: N/A;     Family History   Problem Relation Name Age of Onset    Hypertension Mother      Diabetes Father      No Known Problems Sister      No Known Problems Brother      No Known Problems Maternal Aunt      No Known Problems Maternal Uncle      No Known Problems Paternal Aunt      No Known Problems Paternal Uncle      No Known Problems Maternal Grandmother      No Known  Problems Maternal Grandfather      No Known Problems Paternal Grandmother      No Known Problems Paternal Grandfather      No Known Problems Daughter      No Known Problems Son      No Known Problems Son      No Known Problems Other      Amblyopia Neg Hx      Blindness Neg Hx      Cancer Neg Hx      Cataracts Neg Hx      Glaucoma Neg Hx      Macular degeneration Neg Hx      Retinal detachment Neg Hx      Strabismus Neg Hx      Stroke Neg Hx      Thyroid disease Neg Hx       Social History[1]  Review of Systems   Constitutional:  Positive for appetite change, chills, fatigue and fever.   Respiratory:  Positive for cough and shortness of breath.        Physical Exam     Initial Vitals [04/01/25 2130]   BP Pulse Resp Temp SpO2   (!) 118/56 71 20 98.5 °F (36.9 °C) (!) 82 %      MAP       --         Physical Exam    Nursing note and vitals reviewed.  Constitutional: He appears well-developed and well-nourished. He is not diaphoretic.   Awake, alert elderly male. Mildly tachypneic but conversant on 4L O2 by NC.   HENT:   Head: Normocephalic and atraumatic.   Dry mouth.   Eyes: Conjunctivae and EOM are normal. Pupils are equal, round, and reactive to light.   Neck: Neck supple.   Normal range of motion.  Cardiovascular:  Normal rate, regular rhythm and intact distal pulses.           Murmur heard.  Pulmonary/Chest: He is in respiratory distress. He has wheezes. He has rhonchi (bilateral lower lungs). He has no rales.   Abdominal: Abdomen is soft. There is no abdominal tenderness.   Musculoskeletal:         General: Edema (trace bilat) present. No tenderness. Normal range of motion.      Cervical back: Normal range of motion and neck supple.     Neurological: He is alert and oriented to person, place, and time.   Moving all extremities   Skin: Skin is warm and dry. There is pallor.   Psychiatric: He has a normal mood and affect.         ED Course   Critical Care    Date/Time: 4/1/2025 11:30 PM    Performed by: Sonny  Taisha GRIFFITH MD  Authorized by: Taisha Dennis MD  Direct patient critical care time: 18 minutes  Additional history critical care time: 10 minutes  Ordering / reviewing critical care time: 8 minutes  Documentation critical care time: 8 minutes  Consulting other physicians critical care time: 7 minutes  Total critical care time (exclusive of procedural time) : 51 minutes        Labs Reviewed   B-TYPE NATRIURETIC PEPTIDE - Abnormal       Result Value    BNP 1,847 (*)    COMPREHENSIVE METABOLIC PANEL - Abnormal    Sodium 138      Potassium 3.8      Chloride 109      CO2 18 (*)     Glucose 183 (*)     BUN 37 (*)     Creatinine 2.3 (*)     Calcium 8.5 (*)     Protein Total 7.5      Albumin 3.2 (*)     Bilirubin Total 2.9 (*)     ALP 48      AST 21      ALT 18      Anion Gap 11      eGFR 27 (*)    PROTIME-INR - Abnormal    PT 12.9 (*)     INR 1.2     TROPONIN I - Abnormal    Troponin-I 0.087 (*)    CBC WITH DIFFERENTIAL - Abnormal    WBC 13.56 (*)     RBC 3.00 (*)     HGB 9.8 (*)     HCT 28.9 (*)     MCV 96      MCH 32.7 (*)     MCHC 33.9      RDW 16.3 (*)     Platelet Count 54 (*)     MPV 14.5 (*)     Nucleated RBC 0     D DIMER, QUANTITATIVE - Abnormal    D-Dimer 4.33 (*)    URINALYSIS, REFLEX TO URINE CULTURE - Abnormal    Color, UA Yellow      Appearance, UA Hazy (*)     pH, UA 5.0      Spec Grav UA 1.010      Protein, UA Trace (*)     Glucose, UA 4+ (*)     Ketones, UA Negative      Bilirubin, UA Negative      Blood, UA Trace (*)     Nitrites, UA Negative      Urobilinogen, UA Negative      Leukocyte Esterase, UA Negative     PROCALCITONIN - Abnormal    Procalcitonin 0.33 (*)    MANUAL DIFFERENTIAL - Abnormal    Gran # (ANC) 7.1      Segmented Neutrophil % 52.0      Lymphocyte % 18.0      Monocyte % 30.0 (*)     Platelet Estimate Decreased (*)    TROPONIN I - Abnormal    Troponin-I 0.081 (*)    COMPREHENSIVE METABOLIC PANEL - Abnormal    Sodium 140      Potassium 3.6      Chloride 109      CO2 17 (*)     Glucose 178  (*)     BUN 41 (*)     Creatinine 2.4 (*)     Calcium 8.2 (*)     Protein Total 6.7      Albumin 2.9 (*)     Bilirubin Total 1.8 (*)     ALP 44      AST 18      ALT 13      Anion Gap 14      eGFR 25 (*)    CBC WITH DIFFERENTIAL - Abnormal    WBC 11.69      RBC 2.68 (*)     HGB 8.7 (*)     HCT 25.7 (*)     MCV 96      MCH 32.5 (*)     MCHC 33.9      RDW 16.2 (*)     Platelet Count 44 (*)     MPV        Nucleated RBC 0     URINALYSIS MICROSCOPIC - Abnormal    WBC, UA 2      Bacteria, UA None      Yeast, UA None      Granular Casts 14 (*)     Microscopic Comment       PLATELET REVIEW - Abnormal    Platelet Estimate Decreased (*)    MANUAL DIFFERENTIAL - Abnormal    Gran # (ANC) 4.7      Segmented Neutrophil % 38.0      Bands % 2.0      Lymphocyte % 21.0      Monocyte % 39.0 (*)     Platelet Estimate Decreased (*)     Hypochromia Occasional     POCT GLUCOSE, HAND-HELD DEVICE - Abnormal    POC Glucose 195 (*)    POCT GLUCOSE - Abnormal    POCT Glucose 213 (*)    ISTAT PROCEDURE - Abnormal    POC PH 7.411      POC PCO2 29.5 (*)     POC PO2 41      POC HCO3 18.7 (*)     POC BE -5 (*)     POC SATURATED O2 77      POC TCO2 20 (*)     Sample VENOUS      Site Other      Allens Test N/A      DelSys Nasal Can      Mode SPONT      Flow 4      Sp02 92     POCT GLUCOSE - Abnormal    POCT Glucose 195 (*)    CULTURE, BLOOD - Normal    Blood Culture No Growth After 6 Hours     CULTURE, BLOOD - Normal    Blood Culture No Growth After 6 Hours     MAGNESIUM - Normal    Magnesium  1.7     LACTIC ACID, PLASMA - Normal    Lactic Acid Level 1.2      Narrative:     Falsely low lactic acid results can be found in samples containing >=13.0 mg/dL total bilirubin and/or >=3.5 mg/dL direct bilirubin.    PHOSPHORUS - Normal    Phosphorus Level 3.7     MAGNESIUM - Normal    Magnesium  1.9     CULTURE, RESPIRATORY   CBC W/ AUTO DIFFERENTIAL    Narrative:     The following orders were created for panel order CBC auto differential.  Procedure                                Abnormality         Status                     ---------                               -----------         ------                     CBC with Differential[[id number]]       Abnormal            Final result               Manual Differential[[id number]]         Abnormal            Final result                 Please view results for these tests on the individual orders.   TSH    TSH 3.254      Free T4       CBC W/ AUTO DIFFERENTIAL    Narrative:     The following orders were created for panel order CBC Auto Differential.  Procedure                               Abnormality         Status                     ---------                               -----------         ------                     CBC with Differential[[id number]]       Abnormal            Final result               Manual Differential[[id number]]         Abnormal            Final result                 Please view results for these tests on the individual orders.   LEGIONELLA ANTIGEN, URINE RANDOM   SARS-COV-2 RDRP GENE    POC Rapid COVID Negative       Acceptable Yes     POCT INFLUENZA A/B MOLECULAR    POC Molecular Influenza A Ag Negative      POC Molecular Influenza B Ag Negative       Acceptable Yes     POCT GLUCOSE, HAND-HELD DEVICE   POCT GLUCOSE MONITORING CONTINUOUS     EKG Readings: (Independently Interpreted)   21:36: NSR, HR 70. 1st degree AV block. Normal axis. No ectopy. No STEMI.       Imaging Results              X-Ray Chest AP Portable (Final result)  Result time 04/01/25 21:55:43      Final result by Carolina Sanchez MD (04/01/25 21:55:43)                   Impression:      As above.      Electronically signed by: Carolina Sanchez MD  Date:    04/01/2025  Time:    21:55               Narrative:    EXAMINATION:  XR CHEST AP PORTABLE    CLINICAL HISTORY:  shortness of breath;    TECHNIQUE:  Single frontal view of the chest was performed.    COMPARISON:  July 2024.    FINDINGS:  Cardiac  silhouette is normal in size.  Lungs are symmetrically expanded.  Mild increased attenuation is seen in the lungs, most pronounced in the right perihilar region.  Findings could reflect potential perihilar edema or infectious process/developing pneumonia in the right clinical setting.  Future radiographic follow-up is recommended to ensure resolution as potential underlying neoplastic process is not excluded.  No pneumothorax or significant pleural effusion seen.  No acute osseous abnormality identified.                                       Medications   allopurinoL tablet 100 mg (has no administration in time range)   amLODIPine tablet 10 mg (has no administration in time range)   atorvastatin tablet 20 mg (has no administration in time range)   carvediloL tablet 6.25 mg (has no administration in time range)   gabapentin capsule 200 mg (has no administration in time range)   pantoprazole EC tablet 40 mg (has no administration in time range)   valsartan tablet 320 mg (has no administration in time range)   sodium chloride 0.9% flush 10 mL (has no administration in time range)   melatonin tablet 6 mg (has no administration in time range)   ondansetron injection 4 mg (has no administration in time range)   senna-docusate 8.6-50 mg per tablet 1 tablet (has no administration in time range)   bisacodyL suppository 10 mg (has no administration in time range)   acetaminophen tablet 650 mg (has no administration in time range)   simethicone chewable tablet 80 mg (has no administration in time range)   aluminum-magnesium hydroxide-simethicone 200-200-20 mg/5 mL suspension 30 mL (has no administration in time range)   acetaminophen tablet 650 mg (has no administration in time range)   naloxone 0.4 mg/mL injection 0.02 mg (has no administration in time range)   potassium bicarbonate disintegrating tablet 50 mEq (has no administration in time range)   potassium bicarbonate disintegrating tablet 35 mEq (has no administration in  time range)   potassium bicarbonate disintegrating tablet 60 mEq (has no administration in time range)   magnesium oxide tablet 800 mg (has no administration in time range)   magnesium oxide tablet 800 mg (has no administration in time range)   potassium, sodium phosphates 280-160-250 mg packet 2 packet (has no administration in time range)   potassium, sodium phosphates 280-160-250 mg packet 2 packet (has no administration in time range)   potassium, sodium phosphates 280-160-250 mg packet 2 packet (has no administration in time range)   glucose chewable tablet 16 g (has no administration in time range)   glucose chewable tablet 24 g (has no administration in time range)   glucagon (human recombinant) injection 1 mg (has no administration in time range)   insulin aspart U-100 pen 0-5 Units (has no administration in time range)   azithromycin (ZITHROMAX) 500 mg in 0.9% NaCl 250 mL IVPB (admixture device) (has no administration in time range)   cefTRIAXone injection 2 g (has no administration in time range)   furosemide injection 60 mg (has no administration in time range)   folic acid tablet 1 mg (has no administration in time range)   albuterol-ipratropium 2.5 mg-0.5 mg/3 mL nebulizer solution 3 mL (3 mLs Nebulization Given 4/1/25 2233)   cefTRIAXone injection 1 g (1 g Intravenous Given 4/1/25 2239)   azithromycin tablet 500 mg (500 mg Oral Given 4/1/25 2238)   furosemide injection 40 mg (40 mg Intravenous Given 4/1/25 2334)   aspirin tablet 325 mg (325 mg Oral Given 4/1/25 2334)   furosemide injection 60 mg (60 mg Intravenous Given 4/2/25 0115)     Medical Decision Making  89 y.o. male with cough, shortness of breath, fever, chills, fatigue, decreased appetite.  Pulse ox 82% at triage, not on home O2, not on CPAP/BIPAP.    On exam, tachypneic, pulse ox 90% on 4L by NC.  RRR, murmur.  Coarse breath sounds bilat, rare wheezes.  Trace bilat LE edema.    Ddx includes viral URI such as COVID-19 or influenza,  bronchitis, pneumonia, ACS, CHF, PE, other.    EKG no STEMI.     CXR bilateral increased attenuation reginaldo R perihilar.    Labs: Hgb 9.8, from 13.7 on 2/12.  Thrombocytopenia (platelets 54) c/w prior.  BUN 37 / creatinine 2.3, eGFR 27, mildly worse than baseline.  BNP 1,847 (new high).  Troponin 0.087 (also never previously elevated).  Procalcitonin 0.33 but lactate normal.    D-dimer is 4.33; however I doubt PE as patient is not in afib currently, is not tachycardic, and his symptoms are not c/w PE.  Given patient's creatinine, CTA should be avoided, especially as patient might need cardiac cath.  Also, anticoagulation with platelets 54 might be harmful.    I ordered IV Rocephin 1g and PO azithromycin 500mg for CAP, and DuoNeb x 3; however patient's sats did not improve with DuoNeb.  I also ordered PO ASA 325mg and IV lasix 40mg for pulmonary edema / new CHF.    Patient requires admission for hypoxia likely secondary to new CHF with elevated troponin as well as likely infectious process (bronchitis vs PNA) causing fever.      I discussed this case via [facility] Secure Chat with nocturnists for hospital medicine Dr. Khalida Jimenez and Dr. Ruddy Payne.    Patient and wife updated as to diagnosis and plan.      Amount and/or Complexity of Data Reviewed  Labs: ordered.  Radiology: ordered.  ECG/medicine tests: ordered.    Risk  Decision regarding hospitalization.                                      Clinical Impression:  Final diagnoses:  [R06.02] Shortness of breath  [R09.02] Hypoxia (Primary)  [R53.83] Fatigue, unspecified type  [R53.1] Weakness  [R50.9] Fever, unspecified fever cause  [R68.83] Chills  [R05.9] Cough, unspecified type  [J22] Lower respiratory infection  [R00.1] Bradycardia          ED Disposition Condition    Admit Stable                    [1]   Social History  Tobacco Use    Smoking status: Former     Types: Cigarettes    Smokeless tobacco: Never    Tobacco comments:     09/26/23 Patient Quit Smoking At The  Age of 38 In 1974.   Substance Use Topics    Alcohol use: Yes     Comment: socially - maybe few times a week    Drug use: No        Taisha Dennis MD  04/02/25 0884

## 2025-04-02 NOTE — ASSESSMENT & PLAN NOTE
No prior elevated readings   Concern for new onset heart failure   BNP  Recent Labs   Lab 04/01/25  2153   BNP 1,847*       Continue diuresis   Echocardiogram

## 2025-04-02 NOTE — PHARMACY MED REC
"Admission Medication History     The home medication history was taken by Park Hatch.    You may go to "Admission" then "Reconcile Home Medications" tabs to review and/or act upon these items.     The home medication list has been updated by the Pharmacy department.   Please read ALL comments highlighted in yellow.   Please address this information as you see fit.    Feel free to contact us if you have any questions or require assistance.      Medications listed below were obtained from: Patient/family and Analytic software- [employer]  (Not in a hospital admission)          Park Hatch  [phone]                 .          "

## 2025-04-02 NOTE — ASSESSMENT & PLAN NOTE
Patient with Hypoxic Respiratory failure which is Acute.  he is not on home oxygen. Supplemental oxygen was provided and noted- Oxygen Concentration (%):  [50] 50    .   Signs/symptoms of respiratory failure include- increased work of breathing and respiratory distress. Contributing diagnoses includes - Pneumonia and volume overload Labs and images were reviewed. Patient Has not had a recent ABG. Will treat underlying causes and adjust management of respiratory failure as follows- wean O2 as tolerated

## 2025-04-02 NOTE — PROGRESS NOTES
CHW - Initial Contact    This Community Health Worker completed Social Determinant of Health questionnaire with patient  at bedside  today.    Pt identified barriers of most importance are: has no needs at this time.   Referrals to community agencies completed with patient/caregiver consent outside of [facility] include: no: none at this time.  Referrals were put through [facility] - no: none at this time.  Support and Services: has no support at this time.  Other information discussed the patient needs / wants help with: Completed SDOH with patient at bedside today, pt has no needs at this time. Will follow up in one week to check pt's future needs.    Follow up required: yes.  Follow-up Outreach - Due: 4/8/2025

## 2025-04-02 NOTE — CARE UPDATE
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    In the ED, the patient was tachypneic (20s-30s), hypoxic (SpO2:  82%, requiring Ventimask).  Labs were remarkable for leukocytosis (13.5), normocytic anemia (9.8-baseline around 10-11), thrombocytopenia (54- chronically low), elevated D-dimer (4.33), elevated creatinine (2.3-baseline around 2.0), elevated T bili (2.9), elevated BNP (1847), elevated troponin (0.087), elevated procalcitonin (0.33).  VBG showed a pH of 7.41, pCO2 of 29.5.  Chest x-ray showed increased attenuation, most pronounced in the right perihilar region (edema or infectious process).  Patient was given Lasix 40 mg IV, ceftriaxone 1 g IV, azithromycin 500 mg p.o., aspirin 325 mg, DuoNeb x3.  Patient is admitted for acute hypoxic respiratory failure,on IV abx for pneumonia,IV lasix for CHF ,echo. Is pending.has hemoptysis,check CT chest,leg DVT and consulted pulmonology. .

## 2025-04-02 NOTE — RESPIRATORY THERAPY
Latest Reference Range & Units 04/01/25 22:29   POC PH 7.35 - 7.45  7.411   POC PCO2 35 - 45 mmHg 29.5 (L)   POC PO2 40 - 60 mmHg 41   POC HCO3 24 - 28 mmol/L 18.7 (L)   POC SATURATED O2 95 - 100 % 77   Sample  VENOUS   POC TCO2 24 - 29 mmol/L 20 (L)   POC BE -2 to 2 mmol/L -5 (L)   Flow  4   DelSys  Nasal Can   Site  Other   Mode  SPONT   (L): Data is abnormally low

## 2025-04-02 NOTE — ASSESSMENT & PLAN NOTE
Creatine stable for now. BMP reviewed- noted Estimated Creatinine Clearance: 23.6 mL/min (A) (based on SCr of 2.3 mg/dL (H)). according to latest data. Based on current GFR, CKD stage is stage 3 - GFR 30-59.  Monitor UOP and serial BMP and adjust therapy as needed. Renally dose meds. Avoid nephrotoxic medications and procedures.

## 2025-04-02 NOTE — NURSING
Patient arrived via stretcher, resp even and unlabored, NAD noted, alert and oriented, able to make needs known, safely self transferred into bed, oxygen via hi flow NC at 10 L, wife at bedside, vitals obtained, skin assessment completed, skin intact.

## 2025-04-02 NOTE — HPI
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     Patient presents for evaluation of shortness of breath that started 3 days prior to presentation. He reports worsening dyspnea and "feeling bad". Additional symptoms include productive cough, decreased po intake, low grade fever and polyuria. He denies exposure to sick contacts. He denied chest pain or lower extremity swelling.     In the ED, the patient was tachypneic (20s-30s), hypoxic (SpO2:  82%, requiring Ventimask).  Labs were remarkable for leukocytosis (13.5), normocytic anemia (9.8-baseline around 10-11), thrombocytopenia (54- chronically low), elevated D-dimer (4.33), elevated creatinine (2.3-baseline around 2.0), elevated T bili (2.9), elevated BNP (1847), elevated troponin (0.087), elevated procalcitonin (0.33).  VBG showed a pH of 7.41, pCO2 of 29.5.  Chest x-ray showed increased attenuation, most pronounced in the right perihilar region (edema or infectious process).  Patient was given Lasix 40 mg IV, ceftriaxone 1 g IV, azithromycin 500 mg p.o., aspirin 325 mg, DuoNeb x3.  He was admitted for further management.  "

## 2025-04-02 NOTE — NURSING
[facility] [place]  Nurses Note -- 4 Eyes        Date:  04/02/2025        Skin assessed on:  Admit        [x] No Pressure Injuries Present                 []Prevention Measures Documented     [] Yes LDA Previously documented      [] Yes New Pressure Injury Discovered              [] LDA Added        Attending RN:  FIOR Abrams     Second RN:  FIOR Paredes

## 2025-04-02 NOTE — ASSESSMENT & PLAN NOTE
Patient has a diagnosis of pneumonia. The cause of the pneumonia is suspected to be bacterial in etiology but organism is not known. The pneumonia is stable. The patient has the following signs/symptoms of pneumonia: persistent hypoxia , cough, sputum production, and shortness of breath. The patient does have a current oxygen requirement and the patient does not have a home oxygen requirement. I have reviewed the pertinent imaging. The following cultures have been collected: Blood cultures and Sputum culture The culture results are listed below.     Current antimicrobial regimen consists of the antibiotics listed below. Will monitor patient closely and continue current treatment plan unchanged.    Antibiotics (From admission, onward)      Start     Stop Route Frequency Ordered    04/02/25 2300  azithromycin (ZITHROMAX) 500 mg in 0.9% NaCl 250 mL IVPB (admixture device)  (Order Panel)         -- IV Every 24 hours (non-standard times) 04/02/25 0046    04/02/25 2300  cefTRIAXone injection 2 g  (Order Panel)         -- IV Every 24 hours (non-standard times) 04/02/25 0046            Microbiology Results (last 7 days)       Procedure Component Value Units Date/Time    Culture, Respiratory with Gram Stain [[id number]]     Order Status: Sent Specimen: Respiratory from Sputum     Blood culture #1 **CANNOT BE ORDERED STAT** [[id number]] Collected: 04/01/25 2153    Order Status: Resulted Specimen: Blood from Peripheral, Forearm, Left Updated: 04/01/25 2202    Blood culture #2 **CANNOT BE ORDERED STAT** [[id number]] Collected: 04/01/25 2156    Order Status: Resulted Specimen: Blood from Peripheral, Forearm, Left Updated: 04/01/25 2201

## 2025-04-02 NOTE — RESPIRATORY THERAPY
Aerosol treatment given times three. Patient remains on NC 4 LPM. SP02 93% on oxygen. RA Oxygen saturation 87%.

## 2025-04-02 NOTE — H&P
"  [facility]  [facility] Medicine  History & Physical    Patient Name: Bria Lawson  MRN: [id number]  Patient Class: IP- Inpatient  Admission Date: 4/1/2025  Attending Physician: Fer Valentine, *   Primary Care Provider: Adiel Bragg MD         Patient information was obtained from patient and ER records.     Subjective:     Principal Problem:Acute respiratory failure with hypoxia    Chief Complaint:   Chief Complaint   Patient presents with    Shortness of Breath    Fever    Cough    Weakness     Pt presents to ER with complaints of SOB, fever, cough and weakness since Saturday night. Pt states he took Tylenol and tessalon pearls and has had no relief. Pt denies chest pain, nausea, vomiting and any other issues at this time. Pt is not on blood thinners. Pt states when he coughs all his insides hurt.         HPI: This is an 89-year-old male with a past medical history of Afib (not on AC), hypertension, type 2 diabetes, hyperlipidemia, CKD 3, GERD, gout, pulmonary hypertension, 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     Patient presents for evaluation of shortness of breath that started 3 days prior to presentation. He reports worsening dyspnea and "feeling bad". Additional symptoms include productive cough, decreased po intake, low grade fever and polyuria. He denies exposure to sick contacts. He denied chest pain or lower extremity swelling.     In the ED, the patient was tachypneic (20s-30s), hypoxic (SpO2:  82%, requiring Ventimask).  Labs were remarkable for leukocytosis (13.5), normocytic anemia (9.8-baseline around 10-11), thrombocytopenia (54- chronically low), elevated D-dimer (4.33), elevated creatinine (2.3-baseline around 2.0), elevated T bili (2.9), elevated BNP (1847), elevated troponin (0.087), elevated procalcitonin (0.33).  VBG showed a pH of 7.41, pCO2 of 29.5.  Chest x-ray " showed increased attenuation, most pronounced in the right perihilar region (edema or infectious process).  Patient was given Lasix 40 mg IV, ceftriaxone 1 g IV, azithromycin 500 mg p.o., aspirin 325 mg, DuoNeb x3.  He was admitted for further management.    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    Hyperlipidemia 02/26/2015    Hypertension     Lymphoma     Uncontrolled secondary diabetes mellitus with stage 3 CKD (GFR 30-59) 08/28/2015       Past Surgical History:   Procedure Laterality Date    BONE MARROW BIOPSY Left 09/19/2018    Procedure: Biopsy-bone marrow;  Surgeon: Velia Tobias MD;  Location: [facility];  Service: Oncology;  Laterality: Left;    CATARACT EXTRACTION Bilateral 1996    COLONOSCOPY N/A 11/24/2020    Procedure: COLONOSCOPY Golytely;  Surgeon: Masoud wHang MD;  Location: [facility];  Service: Endoscopy;  Laterality: N/A;    CYSTOSCOPY  8/23/2024    Procedure: CYSTOSCOPY;  Surgeon: Ernie Nunn Jr., MD;  Location: [facility] OR [address] [place] [address];  Service: Urology;;    ESOPHAGOGASTRODUODENOSCOPY N/A 11/24/2020    Procedure: EGD (ESOPHAGOGASTRODUODENOSCOPY);  Surgeon: Masoud Hwang MD;  Location: [facility];  Service: Endoscopy;  Laterality: N/A;    eye lid sx Bilateral 2017    EYE SURGERY      cataracts    JOINT REPLACEMENT      knee replacement    retinal injection s Bilateral     scrotal cyst      TURBT (TRANSURETHRAL RESECTION OF BLADDER TUMOR) N/A 8/23/2024    Procedure: TURBT (TRANSURETHRAL RESECTION OF BLADDER TUMOR);  Surgeon: Ernie Nunn Jr., MD;  Location: [facility] OR [address] [place] [address];  Service: Urology;  Laterality: N/A;       Review of  patient's allergies indicates:  No Known Allergies    No current facility-administered medications on file prior to encounter.     Current Outpatient Medications on File Prior to Encounter   Medication Sig    allopurinoL (ZYLOPRIM) 100 MG tablet TAKE 1 TABLET(100 MG) BY MOUTH EVERY DAY    amLODIPine (NORVASC) 10 MG tablet TAKE 1 TABLET(10 MG) BY MOUTH EVERY DAY    atorvastatin (LIPITOR) 20 MG tablet TAKE 1 TABLET(20 MG) BY MOUTH EVERY DAY    carvediloL (COREG) 6.25 MG tablet TAKE 1 TABLET(6.25 MG) BY MOUTH TWICE DAILY WITH MEALS    dapagliflozin propanediol (FARXIGA) 10 mg tablet Take 1 tablet (10 mg total) by mouth once daily.    FEROSUL 325 mg (65 mg iron) Tab tablet TAKE 1 TABLET BY MOUTH EVERY DAY WITH BREAKFAST    folic acid (FOLVITE) 800 MCG Tab Take 800 mcg by mouth once daily.    gabapentin (NEURONTIN) 300 MG capsule TAKE 1 CAPSULE(300 MG) BY MOUTH THREE TIMES DAILY    glipiZIDE (GLUCOTROL) 10 MG TR24 TAKE 1 TABLET(10 MG) BY MOUTH EVERY DAY    multivit-min/FA/lycopen/lutein (CENTRUM SILVER MEN ORAL) Take by mouth.    niacin 500 MG Tab Take 100 mg by mouth every evening.    pantoprazole (PROTONIX) 40 MG tablet TAKE 1 TABLET(40 MG) BY MOUTH EVERY DAY    SITagliptin phosphate (JANUVIA) 100 MG Tab Take 1 tablet (100 mg total) by mouth once daily.    valsartan (DIOVAN) 320 MG tablet Take 1 tablet (320 mg total) by mouth once daily.    blood sugar diagnostic Strp 1 strip by Misc.(Non-Drug; Combo Route) route 2 (two) times daily. Disp glucometer and lancets as well    DUPIXENT  mg/2 mL PnIj Inject into the skin. a biologic that is given by subcutaneous (under the skin) injection that may be used to treat inflammatory conditions    TURMERIC ROOT EXTRACT ORAL Take by mouth.    vitamin E 400 UNIT capsule Take 400 Units by mouth once daily.    [DISCONTINUED] omeprazole (PRILOSEC) 40 MG capsule TAKE 1 CAPSULE DAILY     Family History       Problem Relation (Age of Onset)    Diabetes Father    Hypertension Mother     No Known Problems Sister, Brother, Maternal Aunt, Maternal Uncle, Paternal Aunt, Paternal Uncle, Maternal Grandmother, Maternal Grandfather, Paternal Grandmother, Paternal Grandfather, Daughter, Son, Son, Other          Tobacco Use    Smoking status: Former     Types: Cigarettes    Smokeless tobacco: Never    Tobacco comments:     09/26/23 Patient Quit Smoking At The Age of 38 In 1974.   Substance and Sexual Activity    Alcohol use: Yes     Comment: socially - maybe few times a week    Drug use: No    Sexual activity: Yes     Partners: Female     Review of Systems   Constitutional:  Positive for chills, fatigue and fever.   Respiratory:  Positive for cough and shortness of breath.    Cardiovascular: Negative.    Gastrointestinal: Negative.    Genitourinary: Negative.    Musculoskeletal: Negative.    Neurological: Negative.      Objective:     Vital Signs (Most Recent):  Temp: 98.5 °F (36.9 °C) (04/01/25 2130)  Pulse: (!) 57 (04/02/25 0000)  Resp: (!) 27 (04/02/25 0017)  BP: 115/74 (04/02/25 0016)  SpO2: (!) 94 % (04/02/25 0000) Vital Signs (24h Range):  Temp:  [98.5 °F (36.9 °C)] 98.5 °F (36.9 °C)  Pulse:  [57-71] 57  Resp:  [18-30] 27  SpO2:  [82 %-95 %] 94 %  BP: (109-150)/(56-74) 115/74     Weight: 87.1 kg (192 lb)  Body mass index is 26.78 kg/m².     Physical Exam  Vitals and nursing note reviewed.   Constitutional:       General: He is not in acute distress.     Appearance: He is not ill-appearing.   HENT:      Mouth/Throat:      Mouth: Mucous membranes are moist.   Cardiovascular:      Rate and Rhythm: Normal rate.   Pulmonary:      Effort: Pulmonary effort is normal.      Breath sounds: Decreased breath sounds present.   Abdominal:      General: Abdomen is flat.   Skin:     General: Skin is warm.   Neurological:      General: No focal deficit present.      Mental Status: He is alert.                Significant Labs: All pertinent labs within the past 24 hours have been reviewed.    Significant Imaging: I  have reviewed all pertinent imaging results/findings within the past 24 hours.  Assessment/Plan:     Assessment & Plan  Acute respiratory failure with hypoxia  Patient with Hypoxic Respiratory failure which is Acute.  he is not on home oxygen. Supplemental oxygen was provided and noted- Oxygen Concentration (%):  [50] 50    .   Signs/symptoms of respiratory failure include- increased work of breathing and respiratory distress. Contributing diagnoses includes - Pneumonia and volume overload  Labs and images were reviewed. Patient Has not had a recent ABG. Will treat underlying causes and adjust management of respiratory failure as follows- wean O2 as tolerated   GERD (gastroesophageal reflux disease)  Continue PPI     HTN (hypertension)  Patient's blood pressure range in the last 24 hours was: BP  Min: 109/58  Max: 150/66.The patient's inpatient anti-hypertensive regimen is listed below:  Current Antihypertensives  furosemide injection 60 mg, ED 1 Time, Intravenous  amLODIPine tablet 10 mg, Daily, Oral  carvediloL tablet 6.25 mg, 2 times daily, Oral  valsartan tablet 320 mg, Daily, Oral  furosemide injection 60 mg, Every 12 hours, Intravenous    Plan  - BP is controlled, no changes needed to their regimen    Hyperlipidemia associated with type 2 diabetes mellitus  Continue statin     CKD stage 3 due to type 2 diabetes mellitus  Creatine stable for now. BMP reviewed- noted Estimated Creatinine Clearance: 23.6 mL/min (A) (based on SCr of 2.3 mg/dL (H)). according to latest data. Based on current GFR, CKD stage is stage 3 - GFR 30-59.  Monitor UOP and serial BMP and adjust therapy as needed. Renally dose meds. Avoid nephrotoxic medications and procedures.  Stage 3a chronic kidney disease  Creatine stable for now. BMP reviewed- noted Estimated Creatinine Clearance: 23.6 mL/min (A) (based on SCr of 2.3 mg/dL (H)). according to latest data. Based on current GFR, CKD stage is stage 3 - GFR 30-59.  Monitor UOP and serial BMP  and adjust therapy as needed. Renally dose meds. Avoid nephrotoxic medications and procedures.  PAH (pulmonary artery hypertension)  Optimize volume status     Atherosclerosis of native coronary artery of native heart without angina pectoris  History noted. No acute issues.   Atrial fibrillation  Patient is currently in sinus rhythm. [id number] Score: 4. The patients heart rate in the last 24 hours is as follows:  Pulse  Min: 57  Max: 71     Antiarrhythmics       Anticoagulants       Plan  - Replete lytes with a goal of K>4, Mg >2  - Patient is not anticoagulated due to thrombocytopenia  - Patient's afib is currently controlled      Community acquired pneumonia  Patient has a diagnosis of pneumonia. The cause of the pneumonia is suspected to be bacterial in etiology but organism is not known. The pneumonia is stable. The patient has the following signs/symptoms of pneumonia: persistent hypoxia , cough, sputum production, and shortness of breath. The patient does have a current oxygen requirement and the patient does not have a home oxygen requirement. I have reviewed the pertinent imaging. The following cultures have been collected: Blood cultures and Sputum culture The culture results are listed below.     Current antimicrobial regimen consists of the antibiotics listed below. Will monitor patient closely and continue current treatment plan unchanged.    Antibiotics (From admission, onward)      Start     Stop Route Frequency Ordered    04/02/25 2300  azithromycin (ZITHROMAX) 500 mg in 0.9% NaCl 250 mL IVPB (admixture device)  (Order Panel)         -- IV Every 24 hours (non-standard times) 04/02/25 0046    04/02/25 2300  cefTRIAXone injection 2 g  (Order Panel)         -- IV Every 24 hours (non-standard times) 04/02/25 0046            Microbiology Results (last 7 days)       Procedure Component Value Units Date/Time    Culture, Respiratory with Gram Stain [[id number]]     Order Status: Sent Specimen: Respiratory  from Sputum     Blood culture #1 **CANNOT BE ORDERED STAT** [[id number]] Collected: 04/01/25 2153    Order Status: Resulted Specimen: Blood from Peripheral, Forearm, Left Updated: 04/01/25 2202    Blood culture #2 **CANNOT BE ORDERED STAT** [[id number]] Collected: 04/01/25 2156    Order Status: Resulted Specimen: Blood from Peripheral, Forearm, Left Updated: 04/01/25 2201          Elevated brain natriuretic peptide (BNP) level  No prior elevated readings   Concern for new onset heart failure   BNP  Recent Labs   Lab 04/01/25 2153   BNP 1,847*       Continue diuresis   Echocardiogram     VTE Risk Mitigation (From admission, onward)           Ordered     IP VTE HIGH RISK PATIENT  Once         04/02/25 0045     Place sequential compression device  Until discontinued         04/02/25 0045                                    Ruddy Panye MD  Department [facility]  [facility]

## 2025-04-02 NOTE — ASSESSMENT & PLAN NOTE
Patient is currently in sinus rhythm. [id number] Score: 4. The patients heart rate in the last 24 hours is as follows:  Pulse  Min: 57  Max: 71     Antiarrhythmics       Anticoagulants       Plan  - Replete lytes with a goal of K>4, Mg >2  - Patient is not anticoagulated due to thrombocytopenia  - Patient's afib is currently controlled

## 2025-04-03 PROBLEM — I50.31 ACUTE DIASTOLIC CHF (CONGESTIVE HEART FAILURE): Status: ACTIVE | Noted: 2025-04-02

## 2025-04-03 PROBLEM — G47.33 OSA (OBSTRUCTIVE SLEEP APNEA): Status: ACTIVE | Noted: 2025-04-03

## 2025-04-03 LAB
ABSOLUTE NEUTROPHIL MANUAL (OHS): 4.3 K/UL
ANION GAP (OHS): 12 MMOL/L (ref 8–16)
BUN SERPL-MCNC: 46 MG/DL (ref 8–23)
CALCIUM SERPL-MCNC: 8.2 MG/DL (ref 8.7–10.5)
CHLORIDE SERPL-SCNC: 109 MMOL/L (ref 95–110)
CO2 SERPL-SCNC: 22 MMOL/L (ref 23–29)
CREAT SERPL-MCNC: 2.2 MG/DL (ref 0.5–1.4)
ERYTHROCYTE [DISTWIDTH] IN BLOOD BY AUTOMATED COUNT: 16.1 % (ref 11.5–14.5)
GFR SERPLBLD CREATININE-BSD FMLA CKD-EPI: 28 ML/MIN/1.73/M2
GLUCOSE SERPL-MCNC: 64 MG/DL (ref 70–110)
HCT VFR BLD AUTO: 26.7 % (ref 40–54)
HGB BLD-MCNC: 8.9 GM/DL (ref 14–18)
LYMPHOCYTES NFR BLD MANUAL: 9 % (ref 18–48)
MCH RBC QN AUTO: 32.2 PG (ref 27–31)
MCHC RBC AUTO-ENTMCNC: 33.3 G/DL (ref 32–36)
MCV RBC AUTO: 97 FL (ref 82–98)
MONOCYTES NFR BLD MANUAL: 32 % (ref 4–15)
NEUTROPHILS NFR BLD MANUAL: 59 % (ref 38–73)
NUCLEATED RBC (/100WBC) (OHS): 0 /100 WBC
PLATELET # BLD AUTO: 54 K/UL (ref 150–450)
PLATELET BLD QL SMEAR: ABNORMAL
PMV BLD AUTO: 14.9 FL (ref 9.2–12.9)
POCT GLUCOSE: 154 MG/DL (ref 70–110)
POCT GLUCOSE: 180 MG/DL (ref 70–110)
POCT GLUCOSE: 183 MG/DL (ref 70–110)
POCT GLUCOSE: 210 MG/DL (ref 70–110)
POCT GLUCOSE: 58 MG/DL (ref 70–110)
POTASSIUM SERPL-SCNC: 3.2 MMOL/L (ref 3.5–5.1)
RBC # BLD AUTO: 2.76 M/UL (ref 4.6–6.2)
SODIUM SERPL-SCNC: 143 MMOL/L (ref 136–145)
WBC # BLD AUTO: 7.22 K/UL (ref 3.9–12.7)

## 2025-04-03 PROCEDURE — 97535 SELF CARE MNGMENT TRAINING: CPT

## 2025-04-03 PROCEDURE — 82310 ASSAY OF CALCIUM: CPT | Performed by: HOSPITALIST

## 2025-04-03 PROCEDURE — 25000003 PHARM REV CODE 250: Performed by: HOSPITALIST

## 2025-04-03 PROCEDURE — 27100171 HC OXYGEN HIGH FLOW UP TO 24 HOURS

## 2025-04-03 PROCEDURE — 25000003 PHARM REV CODE 250: Performed by: STUDENT IN AN ORGANIZED HEALTH CARE EDUCATION/TRAINING PROGRAM

## 2025-04-03 PROCEDURE — 63600175 PHARM REV CODE 636 W HCPCS: Performed by: HOSPITALIST

## 2025-04-03 PROCEDURE — 63600175 PHARM REV CODE 636 W HCPCS: Performed by: STUDENT IN AN ORGANIZED HEALTH CARE EDUCATION/TRAINING PROGRAM

## 2025-04-03 PROCEDURE — 36415 COLL VENOUS BLD VENIPUNCTURE: CPT | Performed by: HOSPITALIST

## 2025-04-03 PROCEDURE — 97116 GAIT TRAINING THERAPY: CPT

## 2025-04-03 PROCEDURE — 97161 PT EVAL LOW COMPLEX 20 MIN: CPT

## 2025-04-03 PROCEDURE — 21400001 HC TELEMETRY ROOM

## 2025-04-03 PROCEDURE — 99233 SBSQ HOSP IP/OBS HIGH 50: CPT | Mod: ,,, | Performed by: INTERNAL MEDICINE

## 2025-04-03 PROCEDURE — 94761 N-INVAS EAR/PLS OXIMETRY MLT: CPT

## 2025-04-03 PROCEDURE — 97165 OT EVAL LOW COMPLEX 30 MIN: CPT

## 2025-04-03 PROCEDURE — 85027 COMPLETE CBC AUTOMATED: CPT | Performed by: HOSPITALIST

## 2025-04-03 RX ORDER — POTASSIUM CHLORIDE 20 MEQ/1
40 TABLET, EXTENDED RELEASE ORAL ONCE
Status: COMPLETED | OUTPATIENT
Start: 2025-04-03 | End: 2025-04-03

## 2025-04-03 RX ORDER — HEPARIN SODIUM 5000 [USP'U]/ML
5000 INJECTION, SOLUTION INTRAVENOUS; SUBCUTANEOUS EVERY 8 HOURS
Status: DISCONTINUED | OUTPATIENT
Start: 2025-04-03 | End: 2025-04-03

## 2025-04-03 RX ADMIN — GABAPENTIN 200 MG: 100 CAPSULE ORAL at 02:04

## 2025-04-03 RX ADMIN — PANTOPRAZOLE SODIUM 40 MG: 40 TABLET, DELAYED RELEASE ORAL at 08:04

## 2025-04-03 RX ADMIN — Medication 16 G: at 07:04

## 2025-04-03 RX ADMIN — FOLIC ACID 1 MG: 1 TABLET ORAL at 08:04

## 2025-04-03 RX ADMIN — GABAPENTIN 200 MG: 100 CAPSULE ORAL at 08:04

## 2025-04-03 RX ADMIN — ALLOPURINOL 100 MG: 100 TABLET ORAL at 08:04

## 2025-04-03 RX ADMIN — INSULIN ASPART 1 UNITS: 100 INJECTION, SOLUTION INTRAVENOUS; SUBCUTANEOUS at 09:04

## 2025-04-03 RX ADMIN — CARVEDILOL 6.25 MG: 6.25 TABLET, FILM COATED ORAL at 08:04

## 2025-04-03 RX ADMIN — FUROSEMIDE 20 MG: 10 INJECTION, SOLUTION INTRAMUSCULAR; INTRAVENOUS at 09:04

## 2025-04-03 RX ADMIN — AMLODIPINE BESYLATE 5 MG: 5 TABLET ORAL at 08:04

## 2025-04-03 RX ADMIN — FUROSEMIDE 20 MG: 10 INJECTION, SOLUTION INTRAMUSCULAR; INTRAVENOUS at 08:04

## 2025-04-03 RX ADMIN — POTASSIUM CHLORIDE 40 MEQ: 1500 TABLET, EXTENDED RELEASE ORAL at 11:04

## 2025-04-03 RX ADMIN — ATORVASTATIN CALCIUM 20 MG: 10 TABLET, FILM COATED ORAL at 08:04

## 2025-04-03 RX ADMIN — CEFTRIAXONE 2 G: 2 INJECTION, POWDER, FOR SOLUTION INTRAMUSCULAR; INTRAVENOUS at 11:04

## 2025-04-03 RX ADMIN — SENNOSIDES AND DOCUSATE SODIUM 1 TABLET: 50; 8.6 TABLET ORAL at 08:04

## 2025-04-03 RX ADMIN — AZITHROMYCIN MONOHYDRATE 500 MG: 500 INJECTION, POWDER, LYOPHILIZED, FOR SOLUTION INTRAVENOUS at 10:04

## 2025-04-03 NOTE — ASSESSMENT & PLAN NOTE
No prior elevated readings   Concern for new onset heart failure   BNP  Recent Labs   Lab 04/01/25  2153   BNP 1,847*     echo with diastolic CHF,.on IV lasix.

## 2025-04-03 NOTE — PT/OT/SLP EVAL
Occupational Therapy Evaluation     Name: Bria Lawson  MRN: [id number]  Admitting Diagnosis: Acute respiratory failure with hypoxia  Recent Surgery: * No surgery found *      Recommendations:     Discharge Recommendations: Low Intensity Therapy  Level of Assistance Recommended: Intermittent supervision  Discharge Equipment Recommendations: none  Barriers to discharge: None    Assessment:     Bria Lawson is a 89 y.o. male with a medical diagnosis of Acute respiratory failure with hypoxia. He presents with performance deficits affecting function including impaired endurance, impaired cardiopulmonary response to activity. Patient on hi flow NC at 7L while seated in bed and at 81% on room air when he removed his NC, so occupational therapy educated him re: replacing NC and it increased to lower 90s. He said that he does not use oxygen at home and that he has been independent including cutting his grass and driving. He was not SOB while walking to the bathroom, but became SOB slightly when walking to sink and then back to bed. He rested, but stayed at 89-mid 90s% oxygen resting in bed while occupational therapy obtained a recliner. He stayed in 90s when walking from the bed to the chair.     Rehab Prognosis: Good; patient would  not need acute OT services to address these deficits and reach maximum level of function.    Plan:   Plan of Care Expires: 04/03/25  Plan of Care Reviewed with: patient    Subjective     Chief Complaint: patient did not have any pain, but he said he Is still coughing up some blood tinged phlegm   Patient Comments/Goals: He needed to use the restroom and he wants to return home   Pain/Comfort:  Pain Rating 1: 0/10    Patients cultural, spiritual, Spiritism conflicts given the current situation: no    Social History:  Living Environment: Patient lives with their spouse in a single story home with number of outside stair(s): 0   Prior Level of Function: Prior to admission, patient was  independent  Roles and Routines: Patient was driving and retired prior to admission.  Equipment Used at Home: bedside commode, walker, rolling (in storage)  DME owned (not currently used): rolling walker and bedside commode in attic from knee replacement in 2017   Assistance Upon Discharge: significant other    Objective:     Communicated with RN  prior to session. Patient found HOB elevated with telemetry, oxygen, pulse ox (continuous) upon OT entry to room.    General Precautions: Standard, fall   Orthopedic Precautions: N/A   Braces: N/A    Respiratory Status: Nasal cannula, flow 7  L/min    Occupational Performance    Gait belt applied - Yes    Bed Mobility:   Rolling/Turning to Left with modified independence  Scooting to HOB in supine: modified independence  Scooting anteriorly to EOB to have both feet planted on floor: modified independence  Supine to sit from left side of bed with modified independence  Sit to Supine with modified independence on left  side of bed    Functional Mobility/Transfers:  Sit <> Stand Transfer with modified independence with no AD  Bed <> Chair Transfer using Step Transfer technique with modified independence with no AD  Toilet Transfer Step Transfer technique with modified independence with no AD  Functional Mobility: Patient needed mod independence with walking due to need for oxygen while walking to prevent SOB.     Activities of Daily Living:  Toileting: modified independence    Cognitive/Visual Perceptual:  Cognitive/Psychosocial Skills:    -     Oriented to: Person, Place, Time, Situation  -     Follows Commands/attention: Follows multistep  commands  -     Communication: clear/fluent  -     Memory: No Deficits noted  -     Safety awareness/insight to disability: impaired re: need for oxygen, but redirected   -     Mood/Affect/Coping skills/emotional control: Appropriate to situation  Visual/Perceptual:  wears glasses     Physical Exam:  Balance:    -     Sitting: modified  independence  -     Standing: modified independence  Postural examination/scapula alignment:    -       Rounded shoulders  -       Forward head  Skin integrity: Visible skin intact  Edema:  None noted  Sensation:    -       Intact  Motor Planning: Intact  Dominant hand: Right  Upper Extremity Range of Motion:     -       Right Upper Extremity: WNL  -       Left Upper Extremity: WNL  Upper Extremity Strength:    -       Right Upper Extremity: WNL  -       Left Upper Extremity: WNL   Strength:    -       Right Upper Extremity: WNL  -       Left Upper Extremity: WNL  Fine Motor Coordination:    -       Intact  Gross motor coordination:   WFL    AMPAC 6 Click ADL:  AMPAC Total Score: 24    Treatment & Education:  Patient educated on role of OT, POC, and goals for therapy  Patient educated on importance of OOB activities with staff member assistance and sitting OOB majority of the day  Occupational therapy educated patient re: need for oxygen due to desaturation on room air  Patient says he has BSC and RW in storage, so he can use it if needed.     Patient clear to ambulate to/from bathroom with RN/PCT, assist x1 for oxygen management  .    Patient left up in chair with all lines intact, call button in reach, and RN notified.    GOALS:   Multidisciplinary Problems       Occupational Therapy Goals       Not on file              Multidisciplinary Problems (Resolved)          Problem: Occupational Therapy    Goal Priority Disciplines Outcome Interventions   Occupational Therapy Goal   (Resolved)     OT, PT/OT Met    Description: Patient is at PLOF with the exception of high flow oxygen use today. Occupational therapy to recommend low intensity therapy and no DME needed.                        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    Hyperlipidemia 02/26/2015    Hypertension     Lymphoma     Uncontrolled secondary diabetes mellitus with stage 3 CKD (GFR 30-59) 08/28/2015         Past Surgical History:   Procedure Laterality Date    BONE MARROW BIOPSY Left 09/19/2018    Procedure: Biopsy-bone marrow;  Surgeon: Velia Tobias MD;  Location: [facility];  Service: Oncology;  Laterality: Left;    CATARACT EXTRACTION Bilateral 1996    COLONOSCOPY N/A 11/24/2020    Procedure: COLONOSCOPY Golytely;  Surgeon: Masoud Hwang MD;  Location: [facility];  Service: Endoscopy;  Laterality: N/A;    CYSTOSCOPY  8/23/2024    Procedure: CYSTOSCOPY;  Surgeon: Ernie Nunn Jr., MD;  Location: [facility] OR [address] [place] [address];  Service: Urology;;    ESOPHAGOGASTRODUODENOSCOPY N/A 11/24/2020    Procedure: EGD (ESOPHAGOGASTRODUODENOSCOPY);  Surgeon: Masoud Hwang MD;  Location: [facility];  Service: Endoscopy;  Laterality: N/A;    eye lid sx Bilateral 2017    EYE SURGERY      cataracts    JOINT REPLACEMENT      knee replacement    retinal injection s Bilateral     scrotal cyst      TURBT (TRANSURETHRAL RESECTION OF BLADDER TUMOR) N/A 8/23/2024    Procedure: TURBT (TRANSURETHRAL RESECTION OF BLADDER TUMOR);  Surgeon: Ernie Nunn Jr., MD;  Location: [facility] OR [address] [place] [address];  Service: Urology;  Laterality: N/A;       Time Tracking:     OT Date of Treatment: 04/03/25  OT Start Time: 0923  OT Stop Time: 0950  OT Total Time (min): 27 min    Billable Minutes: Evaluation 15  and Self Care/Home Management 12     4/3/2025

## 2025-04-03 NOTE — PT/OT/SLP EVAL
Physical Therapy Evaluation and Treatment    Patient Name: Bria Lawson   MRN: [id number]  Recent Surgery: * No surgery found *      Recommendations:     Discharge Recommendations: No Therapy Indicated   Discharge Equipment Recommendations: none       Assessment:     Bria Lawson is a 89 y.o. male admitted with a medical diagnosis of Acute respiratory failure with hypoxia. He presents with the following impairments/functional limitations: impaired endurance.     Rehab Prognosis: Good; patient would benefit from acute PT services to address these deficits and reach maximum level of function.    Plan:     During this hospitalization, patient to be seen 1 x/week to address the above listed problems via  (initial evaluation)    Plan of Care Expires: 04/03/25    Subjective     Chief Complaint: None  Patient Comments/Goals: Pt agreeable to PT evaluation  Pain/Comfort:  Pain Rating 1: 0/10    Social History:  Living Environment: Patient lives with their spouse in a single story home with threshold entry.  Prior Level of Function: Prior to admission, patient was independent  Equipment Used at Home: none  DME owned (not currently used): rolling walker and bedside commode  Assistance Upon Discharge:  wife    Objective:     Communicated with SCOT Wang prior to session. Patient found  reclined in chair  with oxygen upon PT entry to room.    General Precautions: Standard,     Orthopedic Precautions: N/A   Braces: N/A    Respiratory Status: Nasal cannula, flow 6 L/min    Exams:  Cognition: Patient is oriented to Person, Place, Time, Situation  RLE ROM: WFL  RLE Strength: WFL  LLE ROM: WFL  LLE Strength: WFL      Functional Mobility:  Gait belt applied - No  Bed Mobility  NT  Transfers  Sit to Stand: supervision with no AD  Gait  Patient ambulated 100' with no AD and supervision. Patient demonstrates steady gait. O2@5L.   Balance  Sitting: independence  Standing: independence and supervision      Therapeutic Activities and  Exercises:   Patient educated on role of acute care PT and PT POC and call light usage  Patient educated about importance of OOB mobility and remaining up in chair most of the day.  Ambulated with portable O2 in hallway 100' then returned to room and B/S chair.  Pulmonologist presented and left with patient.       AM-PAC 6 CLICK MOBILITY  Total Score:24    Patient left  reclined in chair  with all lines intact, call button in reach, MD present, and all needs in reach .    GOALS:   Multidisciplinary Problems       Physical Therapy Goals          Problem: Physical Therapy    Goal Priority Disciplines Outcome Interventions   Physical Therapy Goal     PT, PT/OT Adequate for Care Transition                        DME Justifications:  No DME recommended requiring DME justifications    History:     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    Hyperlipidemia 02/26/2015    Hypertension     Lymphoma     Uncontrolled secondary diabetes mellitus with stage 3 CKD (GFR 30-59) 08/28/2015       Past Surgical History:   Procedure Laterality Date    BONE MARROW BIOPSY Left 09/19/2018    Procedure: Biopsy-bone marrow;  Surgeon: Velia Tobias MD;  Location: [facility];  Service: Oncology;  Laterality: Left;    CATARACT EXTRACTION Bilateral 1996    COLONOSCOPY N/A 11/24/2020    Procedure: COLONOSCOPY Golytely;  Surgeon: Masoud Hwang MD;  Location: [facility];  Service: Endoscopy;  Laterality: N/A;    CYSTOSCOPY  8/23/2024    Procedure: CYSTOSCOPY;  Surgeon: Ernie Nunn Jr., MD;  Location: [address];  Service: Urology;;    ESOPHAGOGASTRODUODENOSCOPY N/A  11/24/2020    Procedure: EGD (ESOPHAGOGASTRODUODENOSCOPY);  Surgeon: Masoud Hwang MD;  Location: [facility];  Service: Endoscopy;  Laterality: N/A;    eye lid sx Bilateral 2017    EYE SURGERY      cataracts    JOINT REPLACEMENT      knee replacement    retinal injection s Bilateral     scrotal cyst      TURBT (TRANSURETHRAL RESECTION OF BLADDER TUMOR) N/A 8/23/2024    Procedure: TURBT (TRANSURETHRAL RESECTION OF BLADDER TUMOR);  Surgeon: Ernie Nunn Jr., MD;  Location: [address];  Service: Urology;  Laterality: N/A;       Time Tracking:     PT Received On: 04/03/25  PT Start Time: 1230  PT Stop Time: 1253  PT Total Time (min): 23 min     Billable Minutes: Evaluation 10 and Gait Training 13    4/3/2025

## 2025-04-03 NOTE — SUBJECTIVE & OBJECTIVE
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    Hyperlipidemia 02/26/2015    Hypertension     Lymphoma     Uncontrolled secondary diabetes mellitus with stage 3 CKD (GFR 30-59) 08/28/2015       Past Surgical History:   Procedure Laterality Date    BONE MARROW BIOPSY Left 09/19/2018    Procedure: Biopsy-bone marrow;  Surgeon: Velia Tobias MD;  Location: [facility];  Service: Oncology;  Laterality: Left;    CATARACT EXTRACTION Bilateral 1996    COLONOSCOPY N/A 11/24/2020    Procedure: COLONOSCOPY Golytely;  Surgeon: Masoud Hwang MD;  Location: [facility];  Service: Endoscopy;  Laterality: N/A;    CYSTOSCOPY  8/23/2024    Procedure: CYSTOSCOPY;  Surgeon: Ernie Nunn Jr., MD;  Location: [facility] OR [address] [place] [address];  Service: Urology;;    ESOPHAGOGASTRODUODENOSCOPY N/A 11/24/2020    Procedure: EGD (ESOPHAGOGASTRODUODENOSCOPY);  Surgeon: Masoud Hwang MD;  Location: [facility];  Service: Endoscopy;  Laterality: N/A;    eye lid sx Bilateral 2017    EYE SURGERY      cataracts    JOINT REPLACEMENT      knee replacement    retinal injection s Bilateral     scrotal cyst      TURBT (TRANSURETHRAL RESECTION OF BLADDER TUMOR) N/A 8/23/2024    Procedure: TURBT (TRANSURETHRAL RESECTION OF BLADDER TUMOR);  Surgeon: Ernie Nunn Jr., MD;  Location: [facility] OR [address] [place] [address];  Service: Urology;  Laterality: N/A;       Review of patient's allergies indicates:  No Known Allergies    Family History       Problem Relation (Age of Onset)    Diabetes Father    Hypertension Mother    No Known Problems Sister, Brother, Maternal Aunt, Maternal Uncle, Paternal Aunt, Paternal Uncle, Maternal  Patient is calling stating that she has her MRI of her shoulder on 7/27/21.   She stated that she gets claustrophobic and would like to get some medication to help her with this.  Pharmacy is selected below.   Please call [phone]      Grandmother, Maternal Grandfather, Paternal Grandmother, Paternal Grandfather, Daughter, Son, Son, Other          Tobacco Use    Smoking status: Former     Types: Cigarettes    Smokeless tobacco: Never    Tobacco comments:     09/26/23 Patient Quit Smoking At The Age of 38 In 1974.   Substance and Sexual Activity    Alcohol use: Yes     Comment: socially - maybe few times a week    Drug use: No    Sexual activity: Yes     Partners: Female         Review of Systems   Constitutional:  Positive for chills and fatigue.   Respiratory:  Positive for shortness of breath.    Endocrine: Positive for cold intolerance.   All other systems reviewed and are negative.    Objective:     Vital Signs (Most Recent):  Temp: 100.1 °F (37.8 °C) (04/02/25 1750)  Pulse: 71 (04/02/25 1905)  Resp: (!) 22 (04/02/25 1750)  BP: (!) 163/72 (04/02/25 1750)  SpO2: 98 % (04/02/25 1750) Vital Signs (24h Range):  Temp:  [97.7 °F (36.5 °C)-100.1 °F (37.8 °C)] 100.1 °F (37.8 °C)  Pulse:  [43-80] 71  Resp:  [18-39] 22  SpO2:  [82 %-98 %] 98 %  BP: ()/(54-97) 163/72     Weight: 92.3 kg (203 lb 6.4 oz)  Body mass index is 28.37 kg/m².    No intake or output data in the 24 hours ending 04/02/25 1912     Physical Exam  Vitals reviewed.   Constitutional:       Appearance: Normal appearance. He is normal weight.   HENT:      Head: Normocephalic and atraumatic.      Mouth/Throat:      Mouth: Mucous membranes are moist.   Eyes:      Pupils: Pupils are equal, round, and reactive to light.   Cardiovascular:      Rate and Rhythm: Normal rate and regular rhythm.      Pulses: Normal pulses.   Pulmonary:      Effort: Pulmonary effort is normal.      Breath sounds: Rales present. No wheezing or rhonchi.   Abdominal:      General: Abdomen is flat.      Palpations: Abdomen is soft.   Musculoskeletal:      Right lower leg: No edema.      Left lower leg: No edema.   Skin:     General: Skin is warm and dry.      Capillary Refill: Capillary refill takes less than 2  "seconds.   Neurological:      General: No focal deficit present.      Mental Status: He is alert and oriented to person, place, and time. Mental status is at baseline.   Psychiatric:         Mood and Affect: Mood normal.         Behavior: Behavior normal.          Vents:  Oxygen Concentration (%): 50 (04/01/25 2336)    Lines/Drains/Airways       Peripheral Intravenous Line  Duration                  Peripheral IV - Single Lumen 04/01/25 2151 20 G Anterior;Left Forearm <1 day         Peripheral IV - Single Lumen 04/01/25 2154 20 G Left;Posterior Forearm <1 day                    Significant Labs:    CBC/Anemia Profile:  Recent Labs   Lab 04/01/25 2153 04/02/25  0431   WBC 13.56* 11.69   HGB 9.8* 8.7*   HCT 28.9* 25.7*   PLT 54* 44*   MCV 96 96   RDW 16.3* 16.2*        Chemistries:  Recent Labs   Lab 04/01/25 2153 04/02/25  0431    140   K 3.8 3.6    109   CO2 18* 17*   BUN 37* 41*   CREATININE 2.3* 2.4*   CALCIUM 8.5* 8.2*   ALBUMIN 3.2* 2.9*   BILITOT 2.9* 1.8*   ALKPHOS 48 44   ALT 18 13   AST 21 18   GLUCOSE 183* 178*   MG 1.7 1.9   PHOS  --  3.7       Blood Culture: No results for input(s): "LABBLOO" in the last 48 hours.  Respiratory Culture: No results for input(s): "GSRESP", "RESPIRATORYC" in the last 48 hours.  All pertinent labs within the past 24 hours have been reviewed.    Significant Imaging:   I have reviewed all pertinent imaging results/findings within the past 24 hours.  CT: I have reviewed all pertinent results/findings within the past 24 hours and my personal findings are:  bilateral interlobular septal thickening, GGO and trivial pleural effusions most consistent with pulmonary edema but cannot rule out viral or atypical PNA  "

## 2025-04-03 NOTE — PROGRESS NOTES
[facility] Surg  Pulmonology  Progress Note    Patient Name: Bria Lawson  MRN: [id number]  Admission Date: 4/1/2025  Hospital Length of Stay: 2 days  Code Status: Full Code  Attending Provider: Fer Valentine, *  Primary Care Provider: Adiel Bragg MD   Principal Problem: Acute respiratory failure with hypoxia    Subjective:     Interval History: diuresing well.   Oxygen requirement is improving.  Coughing less.        Objective:     Vital Signs (Most Recent):  Temp: 98.3 °F (36.8 °C) (04/03/25 1107)  Pulse: (!) 57 (04/03/25 1107)  Resp: 20 (04/03/25 0838)  BP: (!) 107/43 (04/03/25 1107)  SpO2: (!) 94 % (04/03/25 1107) Vital Signs (24h Range):  Temp:  [98.1 °F (36.7 °C)-100.1 °F (37.8 °C)] 98.3 °F (36.8 °C)  Pulse:  [50-80] 57  Resp:  [18-22] 20  SpO2:  [94 %-98 %] 94 %  BP: (107-163)/(43-77) 107/43     Weight: 89.2 kg (196 lb 9.6 oz)  Body mass index is 27.42 kg/m².      Intake/Output Summary (Last 24 hours) at 4/3/2025 1418  Last data filed at 4/3/2025 0443  Gross per 24 hour   Intake 600.09 ml   Output 1450 ml   Net -849.91 ml        Physical Exam  Vitals reviewed.   Constitutional:       Appearance: Normal appearance. He is normal weight.   HENT:      Head: Normocephalic and atraumatic.      Mouth/Throat:      Mouth: Mucous membranes are moist.   Eyes:      Pupils: Pupils are equal, round, and reactive to light.   Cardiovascular:      Rate and Rhythm: Normal rate and regular rhythm.      Pulses: Normal pulses.   Pulmonary:      Effort: Pulmonary effort is normal.      Breath sounds: Rales present. No wheezing or rhonchi.   Abdominal:      General: Abdomen is flat.      Palpations: Abdomen is soft.   Musculoskeletal:      Right lower leg: No edema.      Left lower leg: No edema.   Skin:     General: Skin is warm and dry.      Capillary Refill: Capillary refill takes less than 2 seconds.   Neurological:      General: No focal deficit present.      Mental Status: He is alert and oriented to  PACU ANESTHESIA ADMISSION NOTE      Procedure: Implantation of biventricular defibrillator    Intramedullary rodding of fracture of right femur    Creation, tracheostomy, planned, adult      Post op diagnosis:  Afib    trachesostomy   Respiratory failure        ____ x  Intubated  TV:______       Rate: ______      FiO2: ______    ____  Patent Airway    ____  Full return of protective reflexes    ____  Full recovery from anesthesia / back to baseline status    Vitals:  temp(F) 98  /63  spo2 98  RR CMV  pulse75    Mental Status:  ____ Awake   _____ Alert   _____ Drowsy   ____ x_ Sedated    Nausea/Vomiting:  _x ___ NO  ______Yes,   See Post - Op Orders          Pain Scale (0-10):  _____    Treatment: ____ None    _x ___ See Post - Op/PCA Orders    Post - Operative Fluids:   ____ Oral   _x ___ See Post - Op Orders    Plan: Discharge:   ____Home       _____Floor     ___x __Critical Care    CCU_____  Other:_________________     Comments: uneventful anesthesia course no complications. Vitals  stable. Pt transferred to CCU .staff given the sign out care transferred to CCU team "person, place, and time. Mental status is at baseline.   Psychiatric:         Mood and Affect: Mood normal.         Behavior: Behavior normal.           Review of Systems    Vents:  Oxygen Concentration (%): 50 (04/03/25 0800)    Lines/Drains/Airways       Peripheral Intravenous Line  Duration                  Peripheral IV - Single Lumen 04/01/25 2151 20 G Anterior;Left Forearm 1 day         Peripheral IV - Single Lumen 04/01/25 2154 20 G Left;Posterior Forearm 1 day                    Significant Labs:    CBC/Anemia Profile:  Recent Labs   Lab 04/01/25 2153 04/02/25 0431 04/03/25 0457   WBC 13.56* 11.69 7.22   HGB 9.8* 8.7* 8.9*   HCT 28.9* 25.7* 26.7*   PLT 54* 44* 54*   MCV 96 96 97   RDW 16.3* 16.2* 16.1*        Chemistries:  Recent Labs   Lab 04/01/25 2153 04/02/25 0431 04/03/25 0457    140 143   K 3.8 3.6 3.2*    109 109   CO2 18* 17* 22*   BUN 37* 41* 46*   CREATININE 2.3* 2.4* 2.2*   CALCIUM 8.5* 8.2* 8.2*   ALBUMIN 3.2* 2.9*  --    BILITOT 2.9* 1.8*  --    ALKPHOS 48 44  --    ALT 18 13  --    AST 21 18  --    GLUCOSE 183* 178* 64*   MG 1.7 1.9  --    PHOS  --  3.7  --        ABGs:   Recent Labs   Lab 04/01/25 2229   PH 7.411   PCO2 29.5*   HCO3 18.7*   POCSATURATED 77   BE -5*     BNP  Recent Labs   Lab 04/01/25 2153   BNP 1,847*       Cardiac Markers: No results for input(s): "CKMB", "TROPONINT", "MYOGLOBIN" in the last 48 hours.  Lactic Acid:   Recent Labs   Lab 04/01/25 2153   LACTATE 1.2     Respiratory Culture: No results for input(s): "GSRESP", "RESPIRATORYC" in the last 48 hours.    Significant Imaging:  I have reviewed all pertinent imaging results/findings within the past 24 hours.  CT: I have reviewed all pertinent results/findings within the past 24 hours and my personal findings are:  bilateral ggo with small effusion.     ABG  Recent Labs   Lab 04/01/25  2229   PH 7.411   PO2 41   PCO2 29.5*   HCO3 18.7*   BE -5*     Assessment/Plan:     Pulmonary  * Acute respiratory " failure with hypoxia  Patient with Hypoxic Respiratory failure which is Acute.  he is not on home oxygen. Supplemental oxygen was provided and noted- Oxygen Concentration (%):  [50] 50    .   Signs/symptoms of respiratory failure include- increased work of breathing. Contributing diagnoses includes - CHF Labs and images were reviewed. Patient Has not had a recent ABG. Will treat underlying causes and adjust management of respiratory failure as follows-   diuresis and monitor O2 needs. Repeat CXR after diuresis  Given mild leukocytosis and elevated procal, would recommend 5 days total of cap coverage.      Renal/  CKD stage 3 due to type 2 diabetes mellitus  Stable creatinine despite negative fluid balance.    Other  LORELEI (obstructive sleep apnea)  High pretest for lorelei.  Would benefit from outpatient sleep study           Krish Cordova MD  Pulmonology  [facility] - [place], [place] - Med Surg

## 2025-04-03 NOTE — CONSULTS
[facility]  Pulmonology  Consult Note    Patient Name: Bria Lawson  MRN: [id number]  Admission Date: 4/1/2025  Hospital Length of Stay: 1 days  Code Status: Full Code  Attending Physician: Fer Valentine, *  Primary Care Provider: Adiel Bragg MD   Principal Problem: Acute respiratory failure with hypoxia    Inpatient consult to Pulmonology  Consult performed by: Yovanny Vickers MD  Consult ordered by: Fer Valentine MD        Subjective:     HPI:  88 yo male with h/o lymphoma who presents with 4 days of worsening dyspnea, some mild URI symptoms and low grade fevers. He reports he had a similar illness 2 months ago when both he and his wife got ill and they were diagnosed with a viral illness. This time he had no sick contact. Low grade fever on Saturday night and some chills since. No LE edema. Mild orthopnea. Progressive worsening BA. NO mucus production. No chest pains.    In the ER he has received antibiotics and diuretics and feels better. Remains on supplemental O2 which he does not use at home.     Distant former smoker quit 50 years ago.    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    Hyperlipidemia 02/26/2015    Hypertension     Lymphoma     Uncontrolled secondary diabetes mellitus with stage 3 CKD (GFR 30-59) 08/28/2015       Past Surgical History:   Procedure Laterality Date    BONE MARROW BIOPSY Left 09/19/2018    Procedure: Biopsy-bone marrow;  Surgeon: Velia Tobias MD;  Location: [facility];  Service: Oncology;  Laterality: Left;    CATARACT EXTRACTION Bilateral 1996    COLONOSCOPY N/A 11/24/2020     Procedure: COLONOSCOPY Golytely;  Surgeon: Masoud Hwang MD;  Location: [facility];  Service: Endoscopy;  Laterality: N/A;    CYSTOSCOPY  8/23/2024    Procedure: CYSTOSCOPY;  Surgeon: Ernie Nunn Jr., MD;  Location: [facility] OR [address] [place] [address];  Service: Urology;;    ESOPHAGOGASTRODUODENOSCOPY N/A 11/24/2020    Procedure: EGD (ESOPHAGOGASTRODUODENOSCOPY);  Surgeon: Masoud Hwang MD;  Location: [facility] ENDO;  Service: Endoscopy;  Laterality: N/A;    eye lid sx Bilateral 2017    EYE SURGERY      cataracts    JOINT REPLACEMENT      knee replacement    retinal injection s Bilateral     scrotal cyst      TURBT (TRANSURETHRAL RESECTION OF BLADDER TUMOR) N/A 8/23/2024    Procedure: TURBT (TRANSURETHRAL RESECTION OF BLADDER TUMOR);  Surgeon: Ernie Nunn Jr., MD;  Location: [facility] OR [address] [place] [address];  Service: Urology;  Laterality: N/A;       Review of patient's allergies indicates:  No Known Allergies    Family History       Problem Relation (Age of Onset)    Diabetes Father    Hypertension Mother    No Known Problems Sister, Brother, Maternal Aunt, Maternal Uncle, Paternal Aunt, Paternal Uncle, Maternal Grandmother, Maternal Grandfather, Paternal Grandmother, Paternal Grandfather, Daughter, Son, Son, Other          Tobacco Use    Smoking status: Former     Types: Cigarettes    Smokeless tobacco: Never    Tobacco comments:     09/26/23 Patient Quit Smoking At The Age of 38 In 1974.   Substance and Sexual Activity    Alcohol use: Yes     Comment: socially - maybe few times a week    Drug use: No    Sexual activity: Yes     Partners: Female         Review of Systems   Constitutional:  Positive for chills and fatigue.   Respiratory:  Positive for shortness of breath.    Endocrine: Positive for cold intolerance.   All other systems reviewed and are negative.    Objective:     Vital Signs (Most Recent):  Temp: 100.1 °F (37.8 °C) (04/02/25 1750)  Pulse: 71 (04/02/25 1905)  Resp: (!) 22 (04/02/25 1750)  BP: (!) 163/72 (04/02/25  1750)  SpO2: 98 % (04/02/25 1750) Vital Signs (24h Range):  Temp:  [97.7 °F (36.5 °C)-100.1 °F (37.8 °C)] 100.1 °F (37.8 °C)  Pulse:  [43-80] 71  Resp:  [18-39] 22  SpO2:  [82 %-98 %] 98 %  BP: ()/(54-97) 163/72     Weight: 92.3 kg (203 lb 6.4 oz)  Body mass index is 28.37 kg/m².    No intake or output data in the 24 hours ending 04/02/25 1912     Physical Exam  Vitals reviewed.   Constitutional:       Appearance: Normal appearance. He is normal weight.   HENT:      Head: Normocephalic and atraumatic.      Mouth/Throat:      Mouth: Mucous membranes are moist.   Eyes:      Pupils: Pupils are equal, round, and reactive to light.   Cardiovascular:      Rate and Rhythm: Normal rate and regular rhythm.      Pulses: Normal pulses.   Pulmonary:      Effort: Pulmonary effort is normal.      Breath sounds: Rales present. No wheezing or rhonchi.   Abdominal:      General: Abdomen is flat.      Palpations: Abdomen is soft.   Musculoskeletal:      Right lower leg: No edema.      Left lower leg: No edema.   Skin:     General: Skin is warm and dry.      Capillary Refill: Capillary refill takes less than 2 seconds.   Neurological:      General: No focal deficit present.      Mental Status: He is alert and oriented to person, place, and time. Mental status is at baseline.   Psychiatric:         Mood and Affect: Mood normal.         Behavior: Behavior normal.          Vents:  Oxygen Concentration (%): 50 (04/01/25 2336)    Lines/Drains/Airways       Peripheral Intravenous Line  Duration                  Peripheral IV - Single Lumen 04/01/25 2151 20 G Anterior;Left Forearm <1 day         Peripheral IV - Single Lumen 04/01/25 2154 20 G Left;Posterior Forearm <1 day                    Significant Labs:    CBC/Anemia Profile:  Recent Labs   Lab 04/01/25 2153 04/02/25  0431   WBC 13.56* 11.69   HGB 9.8* 8.7*   HCT 28.9* 25.7*   PLT 54* 44*   MCV 96 96   RDW 16.3* 16.2*        Chemistries:  Recent Labs   Lab 04/01/25 2153  "04/02/25  0431    140   K 3.8 3.6    109   CO2 18* 17*   BUN 37* 41*   CREATININE 2.3* 2.4*   CALCIUM 8.5* 8.2*   ALBUMIN 3.2* 2.9*   BILITOT 2.9* 1.8*   ALKPHOS 48 44   ALT 18 13   AST 21 18   GLUCOSE 183* 178*   MG 1.7 1.9   PHOS  --  3.7       Blood Culture: No results for input(s): "LABBLOO" in the last 48 hours.  Respiratory Culture: No results for input(s): "GSRESP", "RESPIRATORYC" in the last 48 hours.  All pertinent labs within the past 24 hours have been reviewed.    Significant Imaging:   I have reviewed all pertinent imaging results/findings within the past 24 hours.  CT: I have reviewed all pertinent results/findings within the past 24 hours and my personal findings are:  bilateral interlobular septal thickening, GGO and trivial pleural effusions most consistent with pulmonary edema but cannot rule out viral or atypical PNA    ABG  Recent Labs   Lab 04/01/25  2229   PH 7.411   PO2 41   PCO2 29.5*   HCO3 18.7*   BE -5*     Assessment/Plan:     Pulmonary  * Acute respiratory failure with hypoxia  Patient with Hypoxic Respiratory failure which is Acute.  he is not on home oxygen. Supplemental oxygen was provided and noted- Oxygen Concentration (%):  [50] 50    .   Signs/symptoms of respiratory failure include- increased work of breathing. Contributing diagnoses includes - CHF Labs and images were reviewed. Patient Has not had a recent ABG. Will treat underlying causes and adjust management of respiratory failure as follows- diuresis and monitor O2 needs. Repeat CXR after diuresis    Community acquired pneumonia  With elevated WBC and mildly elevated procal, cont abx and fu cultures. May be viral in nature. COVID negative, flu pending. If not improving, consider Resp PCR    Cardiac/Vascular  Elevated brain natriuretic peptide (BNP) level  Continue diuresis. CT most consistent with volume overload as  of presentation    Pulmonary hypertension  Most consistent with Group 2- " diuresis    Renal/  CKD stage 3 due to type 2 diabetes mellitus  Monitor with diuresis          Thank you for your consult. I will follow-up with patient. Please contact us if you have any additional questions.     Yovanny Vickers MD  Pulmonology  [facility]

## 2025-04-03 NOTE — ASSESSMENT & PLAN NOTE
With elevated WBC and mildly elevated procal, cont abx and fu cultures. May be viral in nature. COVID negative, flu pending. If not improving, consider Resp PCR

## 2025-04-03 NOTE — NURSING
[facility]  Nurses Note -- 4 Eyes      4/3/2025       Skin assessed on: Q Shift      [x] No Pressure Injuries Present    [x]Prevention Measures Documented    [] Yes LDA  for Pressure Injury Previously documented     [] Yes New Pressure Injury Discovered   [] LDA for New Pressure Injury Added      Attending RN:  Keyshawn Hinojosa RN     Second RN:  Yadira Pat RN

## 2025-04-03 NOTE — ASSESSMENT & PLAN NOTE
Patient is currently in sinus rhythm. [id number] Score: 4. The patients heart rate in the last 24 hours is as follows:  Pulse  Min: 50  Max: 80     Antiarrhythmics       Anticoagulants       Plan  - Replete lytes with a goal of K>4, Mg >2  - Patient is not anticoagulated due to thrombocytopenia  - Patient's afib is currently controlled

## 2025-04-03 NOTE — SUBJECTIVE & OBJECTIVE
Interval History: diuresing well.   Oxygen requirement is improving.  Coughing less.        Objective:     Vital Signs (Most Recent):  Temp: 98.3 °F (36.8 °C) (04/03/25 1107)  Pulse: (!) 57 (04/03/25 1107)  Resp: 20 (04/03/25 0838)  BP: (!) 107/43 (04/03/25 1107)  SpO2: (!) 94 % (04/03/25 1107) Vital Signs (24h Range):  Temp:  [98.1 °F (36.7 °C)-100.1 °F (37.8 °C)] 98.3 °F (36.8 °C)  Pulse:  [50-80] 57  Resp:  [18-22] 20  SpO2:  [94 %-98 %] 94 %  BP: (107-163)/(43-77) 107/43     Weight: 89.2 kg (196 lb 9.6 oz)  Body mass index is 27.42 kg/m².      Intake/Output Summary (Last 24 hours) at 4/3/2025 1418  Last data filed at 4/3/2025 0443  Gross per 24 hour   Intake 600.09 ml   Output 1450 ml   Net -849.91 ml        Physical Exam  Vitals reviewed.   Constitutional:       Appearance: Normal appearance. He is normal weight.   HENT:      Head: Normocephalic and atraumatic.      Mouth/Throat:      Mouth: Mucous membranes are moist.   Eyes:      Pupils: Pupils are equal, round, and reactive to light.   Cardiovascular:      Rate and Rhythm: Normal rate and regular rhythm.      Pulses: Normal pulses.   Pulmonary:      Effort: Pulmonary effort is normal.      Breath sounds: Rales present. No wheezing or rhonchi.   Abdominal:      General: Abdomen is flat.      Palpations: Abdomen is soft.   Musculoskeletal:      Right lower leg: No edema.      Left lower leg: No edema.   Skin:     General: Skin is warm and dry.      Capillary Refill: Capillary refill takes less than 2 seconds.   Neurological:      General: No focal deficit present.      Mental Status: He is alert and oriented to person, place, and time. Mental status is at baseline.   Psychiatric:         Mood and Affect: Mood normal.         Behavior: Behavior normal.           Review of Systems    Vents:  Oxygen Concentration (%): 50 (04/03/25 0800)    Lines/Drains/Airways       Peripheral Intravenous Line  Duration                  Peripheral IV - Single Lumen 04/01/25 6692  "20 G Anterior;Left Forearm 1 day         Peripheral IV - Single Lumen 04/01/25 2154 20 G Left;Posterior Forearm 1 day                    Significant Labs:    CBC/Anemia Profile:  Recent Labs   Lab 04/01/25 2153 04/02/25 0431 04/03/25 0457   WBC 13.56* 11.69 7.22   HGB 9.8* 8.7* 8.9*   HCT 28.9* 25.7* 26.7*   PLT 54* 44* 54*   MCV 96 96 97   RDW 16.3* 16.2* 16.1*        Chemistries:  Recent Labs   Lab 04/01/25 2153 04/02/25 0431 04/03/25 0457    140 143   K 3.8 3.6 3.2*    109 109   CO2 18* 17* 22*   BUN 37* 41* 46*   CREATININE 2.3* 2.4* 2.2*   CALCIUM 8.5* 8.2* 8.2*   ALBUMIN 3.2* 2.9*  --    BILITOT 2.9* 1.8*  --    ALKPHOS 48 44  --    ALT 18 13  --    AST 21 18  --    GLUCOSE 183* 178* 64*   MG 1.7 1.9  --    PHOS  --  3.7  --        ABGs:   Recent Labs   Lab 04/01/25 2229   PH 7.411   PCO2 29.5*   HCO3 18.7*   POCSATURATED 77   BE -5*     BNP  Recent Labs   Lab 04/01/25 2153   BNP 1,847*       Cardiac Markers: No results for input(s): "CKMB", "TROPONINT", "MYOGLOBIN" in the last 48 hours.  Lactic Acid:   Recent Labs   Lab 04/01/25 2153   LACTATE 1.2     Respiratory Culture: No results for input(s): "GSRESP", "RESPIRATORYC" in the last 48 hours.    Significant Imaging:  I have reviewed all pertinent imaging results/findings within the past 24 hours.  CT: I have reviewed all pertinent results/findings within the past 24 hours and my personal findings are:  bilateral ggo with small effusion.   "

## 2025-04-03 NOTE — ASSESSMENT & PLAN NOTE
Creatine stable for now. BMP reviewed- noted Estimated Creatinine Clearance: 24.7 mL/min (A) (based on SCr of 2.2 mg/dL (H)). according to latest data. Based on current GFR, CKD stage is stage 3 - GFR 30-59.  Monitor UOP and serial BMP and adjust therapy as needed. Renally dose meds. Avoid nephrotoxic medications and procedures.

## 2025-04-03 NOTE — PROGRESS NOTES
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     Patient presents for evaluation of shortness of breath that started 3 days prior to presentation. He reports worsening dyspnea and "feeling bad". Additional symptoms include productive cough, decreased po intake, low grade fever and polyuria. He denies exposure to sick contacts. He denied chest pain or lower extremity swelling.     In the ED, the patient was tachypneic (20s-30s), hypoxic (SpO2:  82%, requiring Ventimask).  Labs were remarkable for leukocytosis (13.5), normocytic anemia (9.8-baseline around 10-11), thrombocytopenia (54- chronically low), elevated D-dimer (4.33), elevated creatinine (2.3-baseline around 2.0), elevated T bili (2.9), elevated BNP (1847), elevated troponin (0.087), elevated procalcitonin (0.33).  VBG showed a pH of 7.41, pCO2 of 29.5.  Chest x-ray showed increased attenuation, most pronounced in the right perihilar region (edema or infectious process).  Patient was given Lasix 40 mg IV, ceftriaxone 1 g IV, azithromycin 500 mg p.o., aspirin 325 mg, DuoNeb x3.  He was admitted for further management.    Overview/Hospital Course:  89-year-old male with a past medical history of Afib (not on AC), hypertension, type 2 diabetes, hyperlipidemia, CKD 3, GERD, gout, pulmonary hypertension, low grade B Cell Lymphoma (completed 4 rounds " of rituxan in 2018), chronic thrombocytopenia who presents with shortness of breath.    In the ED, the patient was tachypneic (20s-30s), hypoxic (SpO2:  82%, requiring Ventimask).  Labs were remarkable for leukocytosis (13.5), normocytic anemia (9.8-baseline around 10-11), thrombocytopenia (54- chronically low), elevated D-dimer (4.33), elevated creatinine (2.3-baseline around 2.0), elevated T bili (2.9), elevated BNP (1847), elevated troponin (0.087), elevated procalcitonin (0.33).  VBG showed a pH of 7.41, pCO2 of 29.5.  Chest x-ray showed increased attenuation, most pronounced in the right perihilar region (edema or infectious process).  Patient was given Lasix 40 mg IV, ceftriaxone 1 g IV, azithromycin 500 mg p.o., aspirin 325 mg, DuoNeb x3.  Patient is admitted for acute hypoxic respiratory failure,on IV abx for pneumonia,IV lasix for CHF ,echo with diastolic CHF,. Is pending.has hemoptysis,checked CT chest,,show fluid overload,infiltrate,US leg show no DVT and consulted pulmonology. .  Respiratory status is improving weaning of oxygen.    Interval History: echo with diastolic CHF,. Is pending.has hemoptysis,checked CT chest,,show fluid overload,infiltrate,US leg show no DVT and consulted pulmonology. .  Respiratory status is improving weaning of oxygen.    Review of Systems   Constitutional:  Positive for activity change and appetite change.   Respiratory:  Negative for apnea.    Gastrointestinal:  Negative for abdominal distention.   Neurological:  Positive for weakness.     Objective:     Vital Signs (Most Recent):  Temp: 98.4 °F (36.9 °C) (04/03/25 0727)  Pulse: (!) 50 (04/03/25 0731)  Resp: 20 (04/03/25 0838)  BP: 125/63 (04/03/25 0727)  SpO2: 97 % (04/03/25 0838) Vital Signs (24h Range):  Temp:  [98.1 °F (36.7 °C)-100.1 °F (37.8 °C)] 98.4 °F (36.9 °C)  Pulse:  [50-80] 50  Resp:  [18-27] 20  SpO2:  [94 %-98 %] 97 %  BP: (118-163)/(54-97) 125/63     Weight: 89.2 kg (196 lb 9.6 oz)  Body mass index is 27.42  kg/m².    Intake/Output Summary (Last 24 hours) at 4/3/2025 1049  Last data filed at 4/3/2025 0443  Gross per 24 hour   Intake 600.09 ml   Output 1450 ml   Net -849.91 ml         Physical Exam  Cardiovascular:      Rate and Rhythm: Normal rate.   Pulmonary:      Effort: No respiratory distress.   Skin:     Coloration: Skin is not jaundiced.      Findings: No bruising.               Significant Labs: All pertinent labs within the past 24 hours have been reviewed.  BMP:   Recent Labs   Lab 04/02/25 0431 04/03/25  0457    143   K 3.6 3.2*    109   CO2 17* 22*   BUN 41* 46*   CREATININE 2.4* 2.2*   CALCIUM 8.2* 8.2*   MG 1.9  --      CBC:   Recent Labs   Lab 04/01/25 2153 04/02/25 0431 04/03/25 0457   WBC 13.56* 11.69 7.22   HGB 9.8* 8.7* 8.9*   HCT 28.9* 25.7* 26.7*   PLT 54* 44* 54*     CMP:   Recent Labs   Lab 04/01/25 2153 04/02/25 0431 04/03/25  0457    140 143   K 3.8 3.6 3.2*    109 109   CO2 18* 17* 22*   BUN 37* 41* 46*   CREATININE 2.3* 2.4* 2.2*   CALCIUM 8.5* 8.2* 8.2*   ALBUMIN 3.2* 2.9*  --    BILITOT 2.9* 1.8*  --    ALKPHOS 48 44  --    AST 21 18  --    ALT 18 13  --    ANIONGAP 11 14 12       Significant Imaging: I have reviewed all pertinent imaging results/findings within the past 24 hours.      Assessment & Plan  Acute respiratory failure with hypoxia  Patient with Hypoxic Respiratory failure which is Acute.  he is not on home oxygen. Supplemental oxygen was provided and noted-      .   Signs/symptoms of respiratory failure include- increased work of breathing and respiratory distress. Contributing diagnoses includes - Pneumonia and volume overload  Labs and images were reviewed. Patient Has not had a recent ABG. Will treat underlying causes and adjust management of respiratory failure as follows- wean O2 as tolerated   echo with diastolic CHF,. Is pending.has hemoptysis,checked CT chest,,show fluid overload,infiltrate,US leg show no DVT and consulted pulmonology.  .  Respiratory status is improving weaning of oxygen.  GERD (gastroesophageal reflux disease)  Continue PPI     HTN (hypertension)  Patient's blood pressure range in the last 24 hours was: BP  Min: 118/63  Max: 163/72.The patient's inpatient anti-hypertensive regimen is listed below:  Current Antihypertensives  carvediloL tablet 6.25 mg, 2 times daily, Oral  amLODIPine tablet 5 mg, Daily, Oral  furosemide injection 20 mg, Every 12 hours, Intravenous    Plan  - BP is controlled, no changes needed to their regimen    Hyperlipidemia associated with type 2 diabetes mellitus  Continue statin     CKD stage 3 due to type 2 diabetes mellitus  Creatine stable for now. BMP reviewed- noted Estimated Creatinine Clearance: 24.7 mL/min (A) (based on SCr of 2.2 mg/dL (H)). according to latest data. Based on current GFR, CKD stage is stage 3 - GFR 30-59.  Monitor UOP and serial BMP and adjust therapy as needed. Renally dose meds. Avoid nephrotoxic medications and procedures.  Stage 3a chronic kidney disease  Creatine stable for now. BMP reviewed- noted Estimated Creatinine Clearance: 24.7 mL/min (A) (based on SCr of 2.2 mg/dL (H)). according to latest data. Based on current GFR, CKD stage is stage 3 - GFR 30-59.  Monitor UOP and serial BMP and adjust therapy as needed. Renally dose meds. Avoid nephrotoxic medications and procedures.  Pulmonary hypertension  Optimize volume status     Atherosclerosis of native coronary artery of native heart without angina pectoris  History noted. No acute issues.   Atrial fibrillation  Patient is currently in sinus rhythm. [id number] Score: 4. The patients heart rate in the last 24 hours is as follows:  Pulse  Min: 50  Max: 80     Antiarrhythmics       Anticoagulants       Plan  - Replete lytes with a goal of K>4, Mg >2  - Patient is not anticoagulated due to thrombocytopenia  - Patient's afib is currently controlled      Community acquired pneumonia  Patient has a diagnosis of pneumonia. The cause  of the pneumonia is suspected to be bacterial in etiology but organism is not known. The pneumonia is stable. The patient has the following signs/symptoms of pneumonia: persistent hypoxia , cough, sputum production, and shortness of breath. The patient does have a current oxygen requirement and the patient does not have a home oxygen requirement. I have reviewed the pertinent imaging. The following cultures have been collected: Blood cultures and Sputum culture The culture results are listed below.     Current antimicrobial regimen consists of the antibiotics listed below. Will monitor patient closely and continue current treatment plan unchanged.    Antibiotics (From admission, onward)      Start     Stop Route Frequency Ordered    04/02/25 2300  azithromycin (ZITHROMAX) 500 mg in 0.9% NaCl 250 mL IVPB (admixture device)  (Order Panel)         -- IV Every 24 hours (non-standard times) 04/02/25 0046    04/02/25 2300  cefTRIAXone injection 2 g  (Order Panel)         -- IV Every 24 hours (non-standard times) 04/02/25 0046            Microbiology Results (last 7 days)       Procedure Component Value Units Date/Time    Culture, Respiratory with Gram Stain [[id number]] Collected: 04/02/25 0907    Order Status: Completed Specimen: Respiratory from Sputum Updated: 04/03/25 0714     Respiratory Culture Normal respiratory howard     GRAM STAIN <10 Epithelial Cells/LPF      Rare WBC seen      Rare Gram Negative Rods      Rare Gram positive cocci      Rare Gram negative diplococci    Blood culture #1 **CANNOT BE ORDERED STAT** [[id number]]  (Normal) Collected: 04/01/25 2153    Order Status: Completed Specimen: Blood from Peripheral, Forearm, Left Updated: 04/02/25 2301     Blood Culture No Growth After 24 Hours    Blood culture #2 **CANNOT BE ORDERED STAT** [[id number]]  (Normal) Collected: 04/01/25 2156    Order Status: Completed Specimen: Blood from Peripheral, Forearm, Left Updated: 04/02/25 2301     Blood Culture No Growth  After 24 Hours    Influenza A & B by Molecular [[id number]]     Order Status: Canceled Specimen: Nasal Swab           Acute diastolic CHF (congestive heart failure)  No prior elevated readings   Concern for new onset heart failure   BNP  Recent Labs   Lab 04/01/25 2153   BNP 1,847*     echo with diastolic CHF,.on IV lasix.    Thrombocytopenia  The likely etiology of thrombocytopenia is bone marrow suppression due to CLL  . The patients 3 most recent labs are listed below.  Recent Labs     04/01/25 2153 04/02/25  0431 04/03/25  0457   PLT 54* 44* 54*     Plan  - Will transfuse if platelet count is <20k.  -     B-cell lymphoma of extranodal site  Per record.    Atherosclerosis of abdominal aorta  On medical treatments.    VTE Risk Mitigation (From admission, onward)           Ordered     IP VTE HIGH RISK PATIENT  Once         04/02/25 0045     Place sequential compression device  Until discontinued         04/02/25 0045                    Discharge Planning   ALTAF:      Code Status: Full Code   Medical Readiness for Discharge Date:   Discharge Plan A: Home                Please place Justification for DME        Fer Valentine MD  Department of Hospital Medicine   [facility] [place] [facility]

## 2025-04-03 NOTE — NURSING
PHYSICAL THERAPY - DAILY TREATMENT NOTE Patient Name: Ina Erickson        Date: 2019 : 1967   YES Patient  Verified Visit #:   3     Insurance: Payor: BLUE CROSS / Plan: [facility] PPO / Product Type: PPO / In time: 4:30 Out time: 5:25 Total Treatment Time: 54 Medicare Time Tracking (below) Total Timed Codes (min):  na 1:1 Treatment Time:  na  
TREATMENT AREA = Right knee pain [M25.561] Right ankle pain [M25.571] SUBJECTIVE Pain Level (on 0 to 10 scale):  1  / 10 Medication Changes/New allergies or changes in medical history, any new surgeries or procedures? NO    If yes, update Summary List  
Subjective Functional Status/Changes:  []  No changes reported Pt reports her knee has been feeling better, but she has not tested it with any squatting. OBJECTIVE Modalities Rationale:     decrease edema, decrease inflammation and decrease pain to improve patient's ability to increase activity tolerance 
 min [] Estim, type/location:   
                                 []  att     []  unatt     []  w/US     []  w/ice    []  w/heat 
 min []  Mechanical Traction: type/lbs   
               []  pro   []  sup   []  int   []  cont    []  before manual    []  after manual  
 min []  Ultrasound, settings/location:    
 min []  Iontophoresis w/ dexamethasone, location:   
                                           []  take home patch       []  in clinic  
10 min [x]  Ice     []  Heat    location/position: R Knee - supine after treatment  
 min []  Vasopneumatic Device, press/temp:   
 min []  Other:   
[] Skin assessment post-treatment (if applicable):   
[]  intact    []  redness- no adverse reaction    
[]redness  adverse reaction:     
45(40) min Therapeutic Exercise:  [x]  See flow sheet Rationale:      increase ROM, increase strength and improve coordination to improve the patients ability to regain activity tolerance  
 min Manual Therapy: Received report care assumed. Patient lying in bed resting, NAD noted. Safety precautions maintained. Spouse at the bedside.    [facility] [place]  Nurses Note -- 4 Eyes      4/2/2025       Skin assessed on: Q Shift      [x] No Pressure Injuries Present    [x]Prevention Measures Documented    [] Yes LDA  for Pressure Injury Previously documented     [] Yes New Pressure Injury Discovered   [] LDA for New Pressure Injury Added      Attending RN:  Willow Cristina LPN     Second RN:  Aliza Joyner RN       Rationale:      decrease pain, increase ROM and increase tissue extensibility to improve patient's ability to decrease symptoms min Therapeutic Activity:   
 
 min Neuromuscular Re-ed:   
 
 min Gait Training:   
 min Patient Education:  YES  Reviewed HEP []  Progressed/Changed HEP based on: Other Objective/Functional Measures: No longer having increased tone/tenderness in distal lateral hamstrings Attempted mini squats today, but pt started having ache in lateral knee, therefore added Meyer tape to R knee Post Treatment Pain Level (on 0 to 10) scale:   2  / 10 ASSESSMENT Assessment/Changes in Function:  
 
Pt returned to // bars for second set of squats and noted having less lateral knee pain. Will monitor for effects of taping technique. []  See Progress Note/Recertification Patient will continue to benefit from skilled PT services to modify and progress therapeutic interventions, address functional mobility deficits, address ROM deficits, address strength deficits, analyze and address soft tissue restrictions, analyze and cue movement patterns, analyze and modify body mechanics/ergonomics and assess and modify postural abnormalities to attain remaining goals. Progress toward goals / Updated goals: 
Pt tolerating more exercises, will continue to progress to squatting exercises as able PLAN [x]  Upgrade activities as tolerated YES Continue plan of care  
[]  Discharge due to :   
[]  Other:   
 
Therapist: Earl Valencia PT Date: 1/2/2019 Time: 10:00 AM  
 
Future Appointments Date Time Provider Joseph Paez 1/2/2019  4:30 PM Kourtney Galvan, PT [facility] [place]  
1/3/2019 10:00 AM [facility] PT [address]  
1/8/2019 10:00 AM [facility] PT [address]  
1/10/2019 10:00 AM [facility] PT [address]

## 2025-04-03 NOTE — ASSESSMENT & PLAN NOTE
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 The patients 3 most recent labs are listed below.  Recent Labs     04/01/25  2153 04/02/25  0431 04/03/25  0457   PLT 54* 44* 54*     Plan  - Will transfuse if platelet count is <20k.  -

## 2025-04-03 NOTE — PLAN OF CARE
Problem: Occupational Therapy  Goal: Occupational Therapy Goal  Description: Patient is at PLOF with the exception of high flow oxygen use today. Occupational therapy to recommend low intensity therapy and no DME needed.   Outcome: Met      How Severe Are Your Spot(S)?: moderate What Type Of Note Output Would You Prefer (Optional)?: Standard Output What Is The Reason For Today's Visit?: Annual Full Body Skin Examination with No Concerns What Is The Reason For Today's Visit? (Being Monitored For X): concerning skin lesions on an annual basis Additional History: Rash on extremities that she breakouts with after being in the sun, pt says she has treated in the past with TAC cream. Helps but rash comes back.

## 2025-04-03 NOTE — ASSESSMENT & PLAN NOTE
Patient with Hypoxic Respiratory failure which is Acute.  he is not on home oxygen. Supplemental oxygen was provided and noted-      .   Signs/symptoms of respiratory failure include- increased work of breathing and respiratory distress. Contributing diagnoses includes - Pneumonia and volume overload Labs and images were reviewed. Patient Has not had a recent ABG. Will treat underlying causes and adjust management of respiratory failure as follows- wean O2 as tolerated   echo with diastolic CHF,. Is pending.has hemoptysis,checked CT chest,,show fluid overload,infiltrate,US leg show no DVT and consulted pulmonology. .  Respiratory status is improving weaning of oxygen.

## 2025-04-03 NOTE — SUBJECTIVE & OBJECTIVE
Interval History: echo with diastolic CHF,. Is pending.has hemoptysis,checked CT chest,,show fluid overload,infiltrate,US leg show no DVT and consulted pulmonology. .  Respiratory status is improving weaning of oxygen.    Review of Systems   Constitutional:  Positive for activity change and appetite change.   Respiratory:  Negative for apnea.    Gastrointestinal:  Negative for abdominal distention.   Neurological:  Positive for weakness.     Objective:     Vital Signs (Most Recent):  Temp: 98.4 °F (36.9 °C) (04/03/25 0727)  Pulse: (!) 50 (04/03/25 0731)  Resp: 20 (04/03/25 0838)  BP: 125/63 (04/03/25 0727)  SpO2: 97 % (04/03/25 0838) Vital Signs (24h Range):  Temp:  [98.1 °F (36.7 °C)-100.1 °F (37.8 °C)] 98.4 °F (36.9 °C)  Pulse:  [50-80] 50  Resp:  [18-27] 20  SpO2:  [94 %-98 %] 97 %  BP: (118-163)/(54-97) 125/63     Weight: 89.2 kg (196 lb 9.6 oz)  Body mass index is 27.42 kg/m².    Intake/Output Summary (Last 24 hours) at 4/3/2025 1049  Last data filed at 4/3/2025 0443  Gross per 24 hour   Intake 600.09 ml   Output 1450 ml   Net -849.91 ml         Physical Exam  Cardiovascular:      Rate and Rhythm: Normal rate.   Pulmonary:      Effort: No respiratory distress.   Skin:     Coloration: Skin is not jaundiced.      Findings: No bruising.               Significant Labs: All pertinent labs within the past 24 hours have been reviewed.  BMP:   Recent Labs   Lab 04/02/25  0431 04/03/25  0457    143   K 3.6 3.2*    109   CO2 17* 22*   BUN 41* 46*   CREATININE 2.4* 2.2*   CALCIUM 8.2* 8.2*   MG 1.9  --      CBC:   Recent Labs   Lab 04/01/25  2153 04/02/25  0431 04/03/25  0457   WBC 13.56* 11.69 7.22   HGB 9.8* 8.7* 8.9*   HCT 28.9* 25.7* 26.7*   PLT 54* 44* 54*     CMP:   Recent Labs   Lab 04/01/25  2153 04/02/25  0431 04/03/25  0457    140 143   K 3.8 3.6 3.2*    109 109   CO2 18* 17* 22*   BUN 37* 41* 46*   CREATININE 2.3* 2.4* 2.2*   CALCIUM 8.5* 8.2* 8.2*   ALBUMIN 3.2* 2.9*  --    BILITOT 2.9*  1.8*  --    ALKPHOS 48 44  --    AST 21 18  --    ALT 18 13  --    ANIONGAP 11 14 12       Significant Imaging: I have reviewed all pertinent imaging results/findings within the past 24 hours.

## 2025-04-03 NOTE — ASSESSMENT & PLAN NOTE
Patient has a diagnosis of pneumonia. The cause of the pneumonia is suspected to be bacterial in etiology but organism is not known. The pneumonia is stable. The patient has the following signs/symptoms of pneumonia: persistent hypoxia , cough, sputum production, and shortness of breath. The patient does have a current oxygen requirement and the patient does not have a home oxygen requirement. I have reviewed the pertinent imaging. The following cultures have been collected: Blood cultures and Sputum culture The culture results are listed below.     Current antimicrobial regimen consists of the antibiotics listed below. Will monitor patient closely and continue current treatment plan unchanged.    Antibiotics (From admission, onward)      Start     Stop Route Frequency Ordered    04/02/25 2300  azithromycin (ZITHROMAX) 500 mg in 0.9% NaCl 250 mL IVPB (admixture device)  (Order Panel)         -- IV Every 24 hours (non-standard times) 04/02/25 0046    04/02/25 2300  cefTRIAXone injection 2 g  (Order Panel)         -- IV Every 24 hours (non-standard times) 04/02/25 0046            Microbiology Results (last 7 days)       Procedure Component Value Units Date/Time    Culture, Respiratory with Gram Stain [[id number]] Collected: 04/02/25 0907    Order Status: Completed Specimen: Respiratory from Sputum Updated: 04/03/25 0714     Respiratory Culture Normal respiratory howard     GRAM STAIN <10 Epithelial Cells/LPF      Rare WBC seen      Rare Gram Negative Rods      Rare Gram positive cocci      Rare Gram negative diplococci    Blood culture #1 **CANNOT BE ORDERED STAT** [[id number]]  (Normal) Collected: 04/01/25 2153    Order Status: Completed Specimen: Blood from Peripheral, Forearm, Left Updated: 04/02/25 2301     Blood Culture No Growth After 24 Hours    Blood culture #2 **CANNOT BE ORDERED STAT** [[id number]]  (Normal) Collected: 04/01/25 2156    Order Status: Completed Specimen: Blood from Peripheral, Forearm, Left  Updated: 04/02/25 230     Blood Culture No Growth After 24 Hours    Influenza A & B by Molecular [[id number]]     Order Status: Canceled Specimen: Nasal Swab

## 2025-04-03 NOTE — PLAN OF CARE
Patient AAOx4 and able to make needs known. Patient remains on 10L high flow nasal cannula w/humidification. No acute distress noted, patient free from falls or injury this shift.  Bed in low position, wheels locked, call light in reach for assistance, plan of care continued.      Problem: Infection  Goal: Absence of Infection Signs and Symptoms  Outcome: Progressing     Problem: Diabetes Comorbidity  Goal: Blood Glucose Level Within Targeted Range  Outcome: Progressing     Problem: Pneumonia  Goal: Fluid Balance  Outcome: Progressing     Problem: Fall Injury Risk  Goal: Absence of Fall and Fall-Related Injury  Outcome: Progressing

## 2025-04-03 NOTE — HPI
89 yo male with h/o lymphoma who presents with 4 days of worsening dyspnea, some mild URI symptoms and low grade fevers. He reports he had a similar illness 2 months ago when both he and his wife got ill and they were diagnosed with a viral illness. This time he had no sick contact. Low grade fever on Saturday night and some chills since. No LE edema. Mild orthopnea. Progressive worsening BA. NO mucus production. No chest pains.    In the ER he has received antibiotics and diuretics and feels better. Remains on supplemental O2 which he does not use at home.     Distant former smoker quit 50 years ago.

## 2025-04-03 NOTE — ASSESSMENT & PLAN NOTE
Patient with Hypoxic Respiratory failure which is Acute.  he is not on home oxygen. Supplemental oxygen was provided and noted- Oxygen Concentration (%):  [50] 50    .   Signs/symptoms of respiratory failure include- increased work of breathing. Contributing diagnoses includes - CHF Labs and images were reviewed. Patient Has not had a recent ABG. Will treat underlying causes and adjust management of respiratory failure as follows- diuresis and monitor O2 needs. Repeat CXR after diuresis

## 2025-04-03 NOTE — ASSESSMENT & PLAN NOTE
Patient with Hypoxic Respiratory failure which is Acute.  he is not on home oxygen. Supplemental oxygen was provided and noted- Oxygen Concentration (%):  [50] 50    .   Signs/symptoms of respiratory failure include- increased work of breathing. Contributing diagnoses includes - CHF Labs and images were reviewed. Patient Has not had a recent ABG. Will treat underlying causes and adjust management of respiratory failure as follows-   diuresis and monitor O2 needs. Repeat CXR after diuresis  Given mild leukocytosis and elevated procal, would recommend 5 days total of cap coverage.

## 2025-04-03 NOTE — HOSPITAL COURSE
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    In the ED, the patient was tachypneic (20s-30s), hypoxic (SpO2:  82%, requiring Ventimask).  Labs were remarkable for leukocytosis (13.5), normocytic anemia (9.8-baseline around 10-11), thrombocytopenia (54- chronically low), elevated D-dimer (4.33), elevated creatinine (2.3-baseline around 2.0), elevated T bili (2.9), elevated BNP (1847), elevated troponin (0.087), elevated procalcitonin (0.33).  VBG showed a pH of 7.41, pCO2 of 29.5.  Chest x-ray showed increased attenuation, most pronounced in the right perihilar region (edema or infectious process).  Patient was given Lasix 40 mg IV, ceftriaxone 1 g IV, azithromycin 500 mg p.o., aspirin 325 mg, DuoNeb x3.  Patient was admitted for acute hypoxic respiratory failure,was on IV abx for pneumonia,IV lasix for CHF ,echo with diastolic CHF,..has hemoptysis,checked CT chest,,show fluid overload,infiltrate,US leg show no DVT and consulted pulmonology. .  Respiratory status is improved slowly,weaned  of oxygen.doing well with PT,OT.  At DC time patient was stable on RA,patient was discharged home with PO Abx,lasix and HH and follow up with PCP as out patient.

## 2025-04-04 PROBLEM — J90 PLEURAL EFFUSION: Status: ACTIVE | Noted: 2025-04-04

## 2025-04-04 LAB
ABSOLUTE EOSINOPHIL (OHS): 0.02 K/UL
ABSOLUTE MONOCYTE (OHS): 2.17 K/UL (ref 0.3–1)
ABSOLUTE NEUTROPHIL COUNT (OHS): 2.74 K/UL (ref 1.8–7.7)
ANION GAP (OHS): 9 MMOL/L (ref 8–16)
BACTERIA SPT CULT: NORMAL
BASOPHILS # BLD AUTO: 0.01 K/UL
BASOPHILS NFR BLD AUTO: 0.2 %
BUN SERPL-MCNC: 48 MG/DL (ref 8–23)
CALCIUM SERPL-MCNC: 8.1 MG/DL (ref 8.7–10.5)
CHLORIDE SERPL-SCNC: 108 MMOL/L (ref 95–110)
CO2 SERPL-SCNC: 22 MMOL/L (ref 23–29)
CREAT SERPL-MCNC: 1.9 MG/DL (ref 0.5–1.4)
ERYTHROCYTE [DISTWIDTH] IN BLOOD BY AUTOMATED COUNT: 15.8 % (ref 11.5–14.5)
GFR SERPLBLD CREATININE-BSD FMLA CKD-EPI: 34 ML/MIN/1.73/M2
GLUCOSE SERPL-MCNC: 138 MG/DL (ref 70–110)
GRAM STN SPEC: NORMAL
HCT VFR BLD AUTO: 26.2 % (ref 40–54)
HGB BLD-MCNC: 8.5 GM/DL (ref 14–18)
IMM GRANULOCYTES # BLD AUTO: 0.07 K/UL (ref 0–0.04)
IMM GRANULOCYTES NFR BLD AUTO: 1.2 % (ref 0–0.5)
LYMPHOCYTES # BLD AUTO: 0.92 K/UL (ref 1–4.8)
MCH RBC QN AUTO: 31.3 PG (ref 27–31)
MCHC RBC AUTO-ENTMCNC: 32.4 G/DL (ref 32–36)
MCV RBC AUTO: 96 FL (ref 82–98)
NUCLEATED RBC (/100WBC) (OHS): 0 /100 WBC
PLATELET # BLD AUTO: 72 K/UL (ref 150–450)
PLATELET BLD QL SMEAR: ABNORMAL
PMV BLD AUTO: 13.7 FL (ref 9.2–12.9)
POCT GLUCOSE: 121 MG/DL (ref 70–110)
POCT GLUCOSE: 182 MG/DL (ref 70–110)
POCT GLUCOSE: 210 MG/DL (ref 70–110)
POCT GLUCOSE: 230 MG/DL (ref 70–110)
POTASSIUM SERPL-SCNC: 3.3 MMOL/L (ref 3.5–5.1)
RBC # BLD AUTO: 2.72 M/UL (ref 4.6–6.2)
RELATIVE EOSINOPHIL (OHS): 0.3 %
RELATIVE LYMPHOCYTE (OHS): 15.5 % (ref 18–48)
RELATIVE MONOCYTE (OHS): 36.6 % (ref 4–15)
RELATIVE NEUTROPHIL (OHS): 46.2 % (ref 38–73)
SODIUM SERPL-SCNC: 139 MMOL/L (ref 136–145)
WBC # BLD AUTO: 5.93 K/UL (ref 3.9–12.7)

## 2025-04-04 PROCEDURE — 63600175 PHARM REV CODE 636 W HCPCS: Performed by: STUDENT IN AN ORGANIZED HEALTH CARE EDUCATION/TRAINING PROGRAM

## 2025-04-04 PROCEDURE — 25000003 PHARM REV CODE 250: Performed by: STUDENT IN AN ORGANIZED HEALTH CARE EDUCATION/TRAINING PROGRAM

## 2025-04-04 PROCEDURE — 36415 COLL VENOUS BLD VENIPUNCTURE: CPT | Performed by: HOSPITALIST

## 2025-04-04 PROCEDURE — 80048 BASIC METABOLIC PNL TOTAL CA: CPT | Performed by: HOSPITALIST

## 2025-04-04 PROCEDURE — 85025 COMPLETE CBC W/AUTO DIFF WBC: CPT | Performed by: HOSPITALIST

## 2025-04-04 PROCEDURE — 63600175 PHARM REV CODE 636 W HCPCS: Performed by: HOSPITALIST

## 2025-04-04 PROCEDURE — 25000003 PHARM REV CODE 250: Performed by: HOSPITALIST

## 2025-04-04 PROCEDURE — 27000221 HC OXYGEN, UP TO 24 HOURS

## 2025-04-04 PROCEDURE — 94761 N-INVAS EAR/PLS OXIMETRY MLT: CPT

## 2025-04-04 PROCEDURE — 99233 SBSQ HOSP IP/OBS HIGH 50: CPT | Mod: ,,, | Performed by: INTERNAL MEDICINE

## 2025-04-04 PROCEDURE — 21400001 HC TELEMETRY ROOM

## 2025-04-04 PROCEDURE — 63600175 PHARM REV CODE 636 W HCPCS: Performed by: INTERNAL MEDICINE

## 2025-04-04 RX ORDER — FUROSEMIDE 10 MG/ML
40 INJECTION INTRAMUSCULAR; INTRAVENOUS EVERY 12 HOURS
Status: DISCONTINUED | OUTPATIENT
Start: 2025-04-04 | End: 2025-04-05 | Stop reason: HOSPADM

## 2025-04-04 RX ORDER — POTASSIUM CHLORIDE 20 MEQ/1
40 TABLET, EXTENDED RELEASE ORAL ONCE
Status: COMPLETED | OUTPATIENT
Start: 2025-04-04 | End: 2025-04-04

## 2025-04-04 RX ADMIN — GABAPENTIN 200 MG: 100 CAPSULE ORAL at 08:04

## 2025-04-04 RX ADMIN — FUROSEMIDE 20 MG: 10 INJECTION, SOLUTION INTRAMUSCULAR; INTRAVENOUS at 08:04

## 2025-04-04 RX ADMIN — AZITHROMYCIN MONOHYDRATE 500 MG: 500 INJECTION, POWDER, LYOPHILIZED, FOR SOLUTION INTRAVENOUS at 10:04

## 2025-04-04 RX ADMIN — INSULIN ASPART 2 UNITS: 100 INJECTION, SOLUTION INTRAVENOUS; SUBCUTANEOUS at 12:04

## 2025-04-04 RX ADMIN — GABAPENTIN 200 MG: 100 CAPSULE ORAL at 03:04

## 2025-04-04 RX ADMIN — ATORVASTATIN CALCIUM 20 MG: 10 TABLET, FILM COATED ORAL at 08:04

## 2025-04-04 RX ADMIN — POTASSIUM CHLORIDE 40 MEQ: 1500 TABLET, EXTENDED RELEASE ORAL at 08:04

## 2025-04-04 RX ADMIN — ALLOPURINOL 100 MG: 100 TABLET ORAL at 08:04

## 2025-04-04 RX ADMIN — CEFTRIAXONE 2 G: 2 INJECTION, POWDER, FOR SOLUTION INTRAMUSCULAR; INTRAVENOUS at 10:04

## 2025-04-04 RX ADMIN — FUROSEMIDE 40 MG: 10 INJECTION, SOLUTION INTRAVENOUS at 08:04

## 2025-04-04 RX ADMIN — INSULIN ASPART 1 UNITS: 100 INJECTION, SOLUTION INTRAVENOUS; SUBCUTANEOUS at 08:04

## 2025-04-04 RX ADMIN — PANTOPRAZOLE SODIUM 40 MG: 40 TABLET, DELAYED RELEASE ORAL at 08:04

## 2025-04-04 RX ADMIN — AMLODIPINE BESYLATE 5 MG: 5 TABLET ORAL at 08:04

## 2025-04-04 RX ADMIN — FOLIC ACID 1 MG: 1 TABLET ORAL at 08:04

## 2025-04-04 NOTE — ASSESSMENT & PLAN NOTE
Patient found to have small pleural effusion on imaging. I have personally reviewed and interpreted the following imaging: CT. A thoracentesis was deferred. Most likely etiology includes Congestive Heart Failure. Management to include Diuresis and antibiotics  Repeat cxr without over effusion.

## 2025-04-04 NOTE — ASSESSMENT & PLAN NOTE
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 The patients 3 most recent labs are listed below.  Recent Labs     04/02/25  0431 04/03/25  0457 04/04/25  0417   PLT 44* 54* 72*     Plan  - Will transfuse if platelet count is <20k.  -

## 2025-04-04 NOTE — NURSING
[facility] [place]  Nurses Note -- 4 Eyes      4/4/2025       Skin assessed on: Q Shift      [x] No Pressure Injuries Present    [x]Prevention Measures Documented    [] Yes LDA  for Pressure Injury Previously documented     [] Yes New Pressure Injury Discovered   [] LDA for New Pressure Injury Added      Attending RN:  Keyshawn Hinojosa RN     Second RN:  Sylvia Patel RN

## 2025-04-04 NOTE — PLAN OF CARE
Patient remains on 5L high flow nasal cannula. No acute distress noted, patient free from falls or injury this shift.  Bed in low position, wheels locked, call light in reach for assistance, plan of care continued.      Problem: Infection  Goal: Absence of Infection Signs and Symptoms  Outcome: Progressing     Problem: Diabetes Comorbidity  Goal: Blood Glucose Level Within Targeted Range  Outcome: Progressing     Problem: Pneumonia  Goal: Fluid Balance  Outcome: Progressing

## 2025-04-04 NOTE — ASSESSMENT & PLAN NOTE
Patient has a diagnosis of pneumonia. The cause of the pneumonia is suspected to be bacterial in etiology but organism is not known. The pneumonia is stable. The patient has the following signs/symptoms of pneumonia: persistent hypoxia , cough, sputum production, and shortness of breath. The patient does have a current oxygen requirement and the patient does not have a home oxygen requirement. I have reviewed the pertinent imaging. The following cultures have been collected: Blood cultures and Sputum culture The culture results are listed below.     Current antimicrobial regimen consists of the antibiotics listed below. Will monitor patient closely and continue current treatment plan unchanged.    Antibiotics (From admission, onward)      Start     Stop Route Frequency Ordered    04/02/25 2300  azithromycin (ZITHROMAX) 500 mg in 0.9% NaCl 250 mL IVPB (admixture device)  (Order Panel)         -- IV Every 24 hours (non-standard times) 04/02/25 0046    04/02/25 2300  cefTRIAXone injection 2 g  (Order Panel)         -- IV Every 24 hours (non-standard times) 04/02/25 0046            Microbiology Results (last 7 days)       Procedure Component Value Units Date/Time    Culture, Respiratory with Gram Stain [[id number]] Collected: 04/02/25 0907    Order Status: Completed Specimen: Respiratory from Sputum Updated: 04/04/25 0543     Respiratory Culture Normal respiratory howard     GRAM STAIN <10 Epithelial Cells/LPF      Rare WBC seen      Rare Gram Negative Rods      Rare Gram positive cocci      Rare Gram negative diplococci    Blood culture #1 **CANNOT BE ORDERED STAT** [[id number]]  (Normal) Collected: 04/01/25 2153    Order Status: Completed Specimen: Blood from Peripheral, Forearm, Left Updated: 04/03/25 2300     Blood Culture No Growth After 48 Hours    Blood culture #2 **CANNOT BE ORDERED STAT** [[id number]]  (Normal) Collected: 04/01/25 2156    Order Status: Completed Specimen: Blood from Peripheral, Forearm, Left  Updated: 04/03/25 2300     Blood Culture No Growth After 48 Hours    Influenza A & B by Molecular [[id number]]     Order Status: Canceled Specimen: Nasal Swab

## 2025-04-04 NOTE — SUBJECTIVE & OBJECTIVE
Interval History: echo with diastolic CHF,. Is pending.has hemoptysis,checked CT chest,,show fluid overload,infiltrate,US leg show no DVT and consulted pulmonology. .  Respiratory status is improving weaning of oxygen.  doing well with PT,OT.    Review of Systems   Constitutional:  Positive for activity change and appetite change.   Respiratory:  Negative for apnea.    Gastrointestinal:  Negative for abdominal distention.   Neurological:  Positive for weakness.     Objective:     Vital Signs (Most Recent):  Temp: 98.1 °F (36.7 °C) (04/04/25 1106)  Pulse: (!) 55 (04/04/25 1106)  Resp: 20 (04/04/25 0711)  BP: (!) 125/52 (04/04/25 1106)  SpO2: 95 % (04/04/25 1106) Vital Signs (24h Range):  Temp:  [97.5 °F (36.4 °C)-98.1 °F (36.7 °C)] 98.1 °F (36.7 °C)  Pulse:  [52-62] 55  Resp:  [18-20] 20  SpO2:  [93 %-98 %] 95 %  BP: (117-138)/(52-69) 125/52     Weight: 90.7 kg (200 lb)  Body mass index is 27.89 kg/m².    Intake/Output Summary (Last 24 hours) at 4/4/2025 1208  Last data filed at 4/4/2025 0947  Gross per 24 hour   Intake 600 ml   Output 1800 ml   Net -1200 ml         Physical Exam  Cardiovascular:      Rate and Rhythm: Normal rate.   Pulmonary:      Effort: No respiratory distress.   Skin:     Coloration: Skin is not jaundiced.      Findings: No bruising.               Significant Labs: All pertinent labs within the past 24 hours have been reviewed.  BMP:   Recent Labs   Lab 04/04/25 0417      K 3.3*      CO2 22*   BUN 48*   CREATININE 1.9*   CALCIUM 8.1*     CBC:   Recent Labs   Lab 04/03/25 0457 04/04/25 0417   WBC 7.22 5.93   HGB 8.9* 8.5*   HCT 26.7* 26.2*   PLT 54* 72*     CMP:   Recent Labs   Lab 04/03/25 0457 04/04/25 0417    139   K 3.2* 3.3*    108   CO2 22* 22*   BUN 46* 48*   CREATININE 2.2* 1.9*   CALCIUM 8.2* 8.1*   ANIONGAP 12 9       Significant Imaging: I have reviewed all pertinent imaging results/findings within the past 24 hours.

## 2025-04-04 NOTE — NURSING
Received report care assumed. Patient lying in bed resting, NAD noted. Safety precautions maintained.    [facility] [place]  Nurses Note -- 4 Eyes      4/3/2025       Skin assessed on: Q Shift      [x] No Pressure Injuries Present    [x]Prevention Measures Documented    [] Yes LDA  for Pressure Injury Previously documented     [] Yes New Pressure Injury Discovered   [] LDA for New Pressure Injury Added      Attending RN:  Willow Cristina LPN     Second RN:  Keyshawn Hinojosa RN

## 2025-04-04 NOTE — PLAN OF CARE
Pt alert able to make needs known, tolerates medications well by mouth. IV antibiotics remain in progress, no signs or symptoms of adverse reactions noted.  Repositioned self q 2hrs. Plan of care explained, diet tolerated, pt denies SOB. Remains free from falls and hospital acquired pressure injuries, safety maintained. Will continue following plan of care.      Problem: Pneumonia  Goal: Fluid Balance  Outcome: Progressing     Problem: Pneumonia  Goal: Resolution of Infection Signs and Symptoms  Outcome: Progressing     Problem: Pneumonia  Goal: Effective Oxygenation and Ventilation  Outcome: Progressing     Problem: Fall Injury Risk  Goal: Absence of Fall and Fall-Related Injury  Outcome: Progressing

## 2025-04-04 NOTE — ASSESSMENT & PLAN NOTE
Creatine stable for now. BMP reviewed- noted Estimated Creatinine Clearance: 31 mL/min (A) (based on SCr of 1.9 mg/dL (H)). according to latest data. Based on current GFR, CKD stage is stage 3 - GFR 30-59.  Monitor UOP and serial BMP and adjust therapy as needed. Renally dose meds. Avoid nephrotoxic medications and procedures.

## 2025-04-04 NOTE — ASSESSMENT & PLAN NOTE
Patient with Hypoxic Respiratory failure which is Acute.  he is not on home oxygen. Supplemental oxygen was provided and noted-      .   Signs/symptoms of respiratory failure include- increased work of breathing. Contributing diagnoses includes - CHF Labs and images were reviewed. Patient Has not had a recent ABG. Will treat underlying causes and adjust management of respiratory failure as follows-   diuresis and monitor O2 needs.   Will increase lasix to 40 mg bid  Given mild leukocytosis and elevated procal, would recommend 5 days total of cap coverage.

## 2025-04-04 NOTE — PROGRESS NOTES
"[facility]  Progress Note    Patient Name: Bria Lawson  MRN: [id number]  Patient Class: IP- Inpatient   Admission Date: 4/1/2025  Length of Stay: 3 days  Attending Physician: Fer Valentine, *  Primary Care Provider: Adiel Bragg MD        Subjective     Principal Problem:Acute respiratory failure with hypoxia        HPI:  This is an 89-year-old male with a past medical history of Afib (not on AC), hypertension, type 2 diabetes, hyperlipidemia, CKD 3, GERD, gout, pulmonary hypertension, 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     Patient presents for evaluation of shortness of breath that started 3 days prior to presentation. He reports worsening dyspnea and "feeling bad". Additional symptoms include productive cough, decreased po intake, low grade fever and polyuria. He denies exposure to sick contacts. He denied chest pain or lower extremity swelling.     In the ED, the patient was tachypneic (20s-30s), hypoxic (SpO2:  82%, requiring Ventimask).  Labs were remarkable for leukocytosis (13.5), normocytic anemia (9.8-baseline around 10-11), thrombocytopenia (54- chronically low), elevated D-dimer (4.33), elevated creatinine (2.3-baseline around 2.0), elevated T bili (2.9), elevated BNP (1847), elevated troponin (0.087), elevated procalcitonin (0.33).  VBG showed a pH of 7.41, pCO2 of 29.5.  Chest x-ray showed increased attenuation, most pronounced in the right perihilar region (edema or infectious process).  Patient was given Lasix 40 mg IV, ceftriaxone 1 g IV, azithromycin 500 mg p.o., aspirin 325 mg, DuoNeb x3.  He was admitted for further management.    Overview/Hospital Course:  89-year-old male with a past medical history of Afib (not on AC), hypertension, type 2 diabetes, hyperlipidemia, CKD 3, GERD, gout, pulmonary hypertension, low grade B Cell Lymphoma (completed 4 rounds " of rituxan in 2018), chronic thrombocytopenia who presents with shortness of breath.    In the ED, the patient was tachypneic (20s-30s), hypoxic (SpO2:  82%, requiring Ventimask).  Labs were remarkable for leukocytosis (13.5), normocytic anemia (9.8-baseline around 10-11), thrombocytopenia (54- chronically low), elevated D-dimer (4.33), elevated creatinine (2.3-baseline around 2.0), elevated T bili (2.9), elevated BNP (1847), elevated troponin (0.087), elevated procalcitonin (0.33).  VBG showed a pH of 7.41, pCO2 of 29.5.  Chest x-ray showed increased attenuation, most pronounced in the right perihilar region (edema or infectious process).  Patient was given Lasix 40 mg IV, ceftriaxone 1 g IV, azithromycin 500 mg p.o., aspirin 325 mg, DuoNeb x3.  Patient is admitted for acute hypoxic respiratory failure,on IV abx for pneumonia,IV lasix for CHF ,echo with diastolic CHF,. Is pending.has hemoptysis,checked CT chest,,show fluid overload,infiltrate,US leg show no DVT and consulted pulmonology. .  Respiratory status is improving weaning of oxygen.doing well with PT,OT.    Interval History: echo with diastolic CHF,. Is pending.has hemoptysis,checked CT chest,,show fluid overload,infiltrate,US leg show no DVT and consulted pulmonology. .  Respiratory status is improving weaning of oxygen.  doing well with PT,OT.    Review of Systems   Constitutional:  Positive for activity change and appetite change.   Respiratory:  Negative for apnea.    Gastrointestinal:  Negative for abdominal distention.   Neurological:  Positive for weakness.     Objective:     Vital Signs (Most Recent):  Temp: 98.1 °F (36.7 °C) (04/04/25 1106)  Pulse: (!) 55 (04/04/25 1106)  Resp: 20 (04/04/25 0711)  BP: (!) 125/52 (04/04/25 1106)  SpO2: 95 % (04/04/25 1106) Vital Signs (24h Range):  Temp:  [97.5 °F (36.4 °C)-98.1 °F (36.7 °C)] 98.1 °F (36.7 °C)  Pulse:  [52-62] 55  Resp:  [18-20] 20  SpO2:  [93 %-98 %] 95 %  BP: (117-138)/(52-69) 125/52     Weight:  90.7 kg (200 lb)  Body mass index is 27.89 kg/m².    Intake/Output Summary (Last 24 hours) at 4/4/2025 1208  Last data filed at 4/4/2025 0947  Gross per 24 hour   Intake 600 ml   Output 1800 ml   Net -1200 ml         Physical Exam  Cardiovascular:      Rate and Rhythm: Normal rate.   Pulmonary:      Effort: No respiratory distress.   Skin:     Coloration: Skin is not jaundiced.      Findings: No bruising.               Significant Labs: All pertinent labs within the past 24 hours have been reviewed.  BMP:   Recent Labs   Lab 04/04/25 0417      K 3.3*      CO2 22*   BUN 48*   CREATININE 1.9*   CALCIUM 8.1*     CBC:   Recent Labs   Lab 04/03/25 0457 04/04/25 0417   WBC 7.22 5.93   HGB 8.9* 8.5*   HCT 26.7* 26.2*   PLT 54* 72*     CMP:   Recent Labs   Lab 04/03/25 0457 04/04/25 0417    139   K 3.2* 3.3*    108   CO2 22* 22*   BUN 46* 48*   CREATININE 2.2* 1.9*   CALCIUM 8.2* 8.1*   ANIONGAP 12 9       Significant Imaging: I have reviewed all pertinent imaging results/findings within the past 24 hours.      Assessment & Plan  Acute respiratory failure with hypoxia  Patient with Hypoxic Respiratory failure which is Acute.  he is not on home oxygen. Supplemental oxygen was provided and noted-      .   Signs/symptoms of respiratory failure include- increased work of breathing and respiratory distress. Contributing diagnoses includes - Pneumonia and volume overload  Labs and images were reviewed. Patient Has not had a recent ABG. Will treat underlying causes and adjust management of respiratory failure as follows- wean O2 as tolerated   echo with diastolic CHF,. Is pending.has hemoptysis,checked CT chest,,show fluid overload,infiltrate,US leg show no DVT and consulted pulmonology. .  Respiratory status is improving weaning of oxygen.  GERD (gastroesophageal reflux disease)  Continue PPI     HTN (hypertension)  Patient's blood pressure range in the last 24 hours was: BP  Min: 117/54  Max:  138/69.The patient's inpatient anti-hypertensive regimen is listed below:  Current Antihypertensives  furosemide injection 20 mg, Every 12 hours, Intravenous    Plan  - BP is controlled, no changes needed to their regimen    Hyperlipidemia associated with type 2 diabetes mellitus  Continue statin     CKD stage 3 due to type 2 diabetes mellitus  Creatine stable for now. BMP reviewed- noted Estimated Creatinine Clearance: 31 mL/min (A) (based on SCr of 1.9 mg/dL (H)). according to latest data. Based on current GFR, CKD stage is stage 3 - GFR 30-59.  Monitor UOP and serial BMP and adjust therapy as needed. Renally dose meds. Avoid nephrotoxic medications and procedures.  Stage 3a chronic kidney disease  Creatine stable for now. BMP reviewed- noted Estimated Creatinine Clearance: 31 mL/min (A) (based on SCr of 1.9 mg/dL (H)). according to latest data. Based on current GFR, CKD stage is stage 3 - GFR 30-59.  Monitor UOP and serial BMP and adjust therapy as needed. Renally dose meds. Avoid nephrotoxic medications and procedures.  Pulmonary hypertension  Optimize volume status     Atherosclerosis of native coronary artery of native heart without angina pectoris  History noted. No acute issues.   Atrial fibrillation  Patient is currently in sinus rhythm. [id number] Score: 4. The patients heart rate in the last 24 hours is as follows:  Pulse  Min: 52  Max: 62     Antiarrhythmics       Anticoagulants       Plan  - Replete lytes with a goal of K>4, Mg >2  - Patient is not anticoagulated due to thrombocytopenia  - Patient's afib is currently controlled      Community acquired pneumonia  Patient has a diagnosis of pneumonia. The cause of the pneumonia is suspected to be bacterial in etiology but organism is not known. The pneumonia is stable. The patient has the following signs/symptoms of pneumonia: persistent hypoxia , cough, sputum production, and shortness of breath. The patient does have a current oxygen requirement and the  patient does not have a home oxygen requirement. I have reviewed the pertinent imaging. The following cultures have been collected: Blood cultures and Sputum culture The culture results are listed below.     Current antimicrobial regimen consists of the antibiotics listed below. Will monitor patient closely and continue current treatment plan unchanged.    Antibiotics (From admission, onward)      Start     Stop Route Frequency Ordered    04/02/25 2300  azithromycin (ZITHROMAX) 500 mg in 0.9% NaCl 250 mL IVPB (admixture device)  (Order Panel)         -- IV Every 24 hours (non-standard times) 04/02/25 0046    04/02/25 2300  cefTRIAXone injection 2 g  (Order Panel)         -- IV Every 24 hours (non-standard times) 04/02/25 0046            Microbiology Results (last 7 days)       Procedure Component Value Units Date/Time    Culture, Respiratory with Gram Stain [[id number]] Collected: 04/02/25 0907    Order Status: Completed Specimen: Respiratory from Sputum Updated: 04/04/25 0543     Respiratory Culture Normal respiratory howard     GRAM STAIN <10 Epithelial Cells/LPF      Rare WBC seen      Rare Gram Negative Rods      Rare Gram positive cocci      Rare Gram negative diplococci    Blood culture #1 **CANNOT BE ORDERED STAT** [[id number]]  (Normal) Collected: 04/01/25 2153    Order Status: Completed Specimen: Blood from Peripheral, Forearm, Left Updated: 04/03/25 2300     Blood Culture No Growth After 48 Hours    Blood culture #2 **CANNOT BE ORDERED STAT** [[id number]]  (Normal) Collected: 04/01/25 2156    Order Status: Completed Specimen: Blood from Peripheral, Forearm, Left Updated: 04/03/25 2300     Blood Culture No Growth After 48 Hours    Influenza A & B by Molecular [[id number]]     Order Status: Canceled Specimen: Nasal Swab           Acute diastolic CHF (congestive heart failure)  No prior elevated readings   Concern for new onset heart failure   BNP  Recent Labs   Lab 04/01/25 2153   BNP 1,847*     echo with  diastolic CHF,.on IV lasix.    Thrombocytopenia  The likely etiology of thrombocytopenia is bone marrow suppression due to CLL  . The patients 3 most recent labs are listed below.  Recent Labs     04/02/25  0431 04/03/25  0457 04/04/25  0417   PLT 44* 54* 72*     Plan  - Will transfuse if platelet count is <20k.  -     B-cell lymphoma of extranodal site  Per record.    Atherosclerosis of abdominal aorta  On medical treatments.    LORELEI (obstructive sleep apnea)  He is not wearing.    VTE Risk Mitigation (From admission, onward)           Ordered     IP VTE HIGH RISK PATIENT  Once         04/02/25 0045     Place sequential compression device  Until discontinued         04/02/25 0045                    Discharge Planning   ALTAF: 4/6/2025     Code Status: Full Code   Medical Readiness for Discharge Date:   Discharge Plan A: Home                        Fer Valentine MD  Department of Hospital Medicine   [facility]

## 2025-04-04 NOTE — PROGRESS NOTES
[facility]  Pulmonology  Progress Note    Patient Name: Bria Lawson  MRN: [id number]  Admission Date: 4/1/2025  Hospital Length of Stay: 3 days  Code Status: Full Code  Attending Provider: Fer Valentine, *  Primary Care Provider: Adiel Bragg MD   Principal Problem: Acute respiratory failure with hypoxia    Subjective:     Interval History: diuresing well.   Oxygen requirement is improving.  Coughing less.  Dyspnea is improving.        Objective:     Vital Signs (Most Recent):  Temp: 98.1 °F (36.7 °C) (04/04/25 1106)  Pulse: (!) 57 (04/04/25 1445)  Resp: 20 (04/04/25 1335)  BP: (!) 125/52 (04/04/25 1106)  SpO2: 97 % (04/04/25 1335) Vital Signs (24h Range):  Temp:  [97.5 °F (36.4 °C)-98.1 °F (36.7 °C)] 98.1 °F (36.7 °C)  Pulse:  [52-62] 57  Resp:  [18-20] 20  SpO2:  [93 %-98 %] 97 %  BP: (117-138)/(52-69) 125/52     Weight: 90.7 kg (200 lb)  Body mass index is 27.89 kg/m².      Intake/Output Summary (Last 24 hours) at 4/4/2025 1555  Last data filed at 4/4/2025 0947  Gross per 24 hour   Intake 600 ml   Output 1800 ml   Net -1200 ml        Physical Exam  Vitals reviewed.   Constitutional:       Appearance: Normal appearance. He is normal weight.   HENT:      Head: Normocephalic and atraumatic.      Mouth/Throat:      Mouth: Mucous membranes are moist.   Eyes:      Pupils: Pupils are equal, round, and reactive to light.   Cardiovascular:      Rate and Rhythm: Normal rate and regular rhythm.      Pulses: Normal pulses.   Pulmonary:      Effort: Pulmonary effort is normal.      Breath sounds: Rales present. No wheezing or rhonchi.   Abdominal:      General: Abdomen is flat.      Palpations: Abdomen is soft.   Musculoskeletal:      Right lower leg: No edema.      Left lower leg: No edema.   Skin:     General: Skin is warm and dry.      Capillary Refill: Capillary refill takes less than 2 seconds.   Neurological:      General: No focal deficit present.      Mental Status: He is alert and oriented  "to person, place, and time. Mental status is at baseline.   Psychiatric:         Mood and Affect: Mood normal.         Behavior: Behavior normal.           Review of Systems    Vents:  Oxygen Concentration (%): 50 (04/03/25 0800)    Lines/Drains/Airways       Peripheral Intravenous Line  Duration                  Peripheral IV - Single Lumen 04/01/25 2151 20 G Anterior;Left Forearm 2 days                    Significant Labs:    CBC/Anemia Profile:  Recent Labs   Lab 04/03/25 0457 04/04/25 0417   WBC 7.22 5.93   HGB 8.9* 8.5*   HCT 26.7* 26.2*   PLT 54* 72*   MCV 97 96   RDW 16.1* 15.8*        Chemistries:  Recent Labs   Lab 04/03/25 0457 04/04/25 0417    139   K 3.2* 3.3*    108   CO2 22* 22*   BUN 46* 48*   CREATININE 2.2* 1.9*   CALCIUM 8.2* 8.1*   GLUCOSE 64* 138*       ABGs:   No results for input(s): "PH", "PCO2", "HCO3", "POCSATURATED", "BE" in the last 48 hours.    BNP  Recent Labs   Lab 04/01/25 2153   BNP 1,847*     Echo 4/2/25    Left Ventricle: The left ventricle is normal in size. Mildly increased wall thickness. There is concentric hypertrophy. There is normal systolic function with a visually estimated ejection fraction of 55 - 60%. There is diastolic dysfunction.    Right Ventricle: The right ventricle is normal in size. Systolic function is normal.    Left Atrium: Severely dilated    Right Atrium: Right atrium is mildly dilated.    Mitral Valve: There is moderate regurgitation.    Tricuspid Valve: There is mild regurgitation.    Pulmonary Artery: The estimated pulmonary artery systolic pressure is 31 mmHg.    IVC/SVC: Intermediate venous pressure at 8 mmHg.    Cardiac Markers: No results for input(s): "CKMB", "TROPONINT", "MYOGLOBIN" in the last 48 hours.  Lactic Acid:   No results for input(s): "LACTATE" in the last 48 hours.    Respiratory Culture: No results for input(s): "GSRESP", "RESPIRATORYC" in the last 48 hours.    Significant Imaging:  I have reviewed all pertinent imaging " results/findings within the past 24 hours.  CT: I have reviewed all pertinent results/findings within the past 24 hours and my personal findings are:  bilateral ggo with small effusion.     ABG  Recent Labs   Lab 04/01/25  2229   PH 7.411   PO2 41   PCO2 29.5*   HCO3 18.7*   BE -5*     Assessment/Plan:     Pulmonary  * Acute respiratory failure with hypoxia  Patient with Hypoxic Respiratory failure which is Acute.  he is not on home oxygen. Supplemental oxygen was provided and noted-      .   Signs/symptoms of respiratory failure include- increased work of breathing. Contributing diagnoses includes - CHF Labs and images were reviewed. Patient Has not had a recent ABG. Will treat underlying causes and adjust management of respiratory failure as follows-   diuresis and monitor O2 needs.   Will increase lasix to 40 mg bid  Given mild leukocytosis and elevated procal, would recommend 5 days total of cap coverage.      Pleural effusion  Patient found to have small pleural effusion on imaging. I have personally reviewed and interpreted the following imaging: CT. A thoracentesis was deferred. Most likely etiology includes Congestive Heart Failure. Management to include Diuresis and antibiotics  Repeat cxr without over effusion.        Cardiac/Vascular  Acute diastolic CHF (congestive heart failure)  Continue diuresis. CT most consistent with volume overload as  of presentation    Pulmonary hypertension  Most consistent with Group 2- diuresis    Other  LORELEI (obstructive sleep apnea)  High pretest for lorelei.  Would benefit from outpatient sleep study           Krish Cordova MD  Pulmonology  [facility]    Will be available upon request

## 2025-04-04 NOTE — ASSESSMENT & PLAN NOTE
Patient is currently in sinus rhythm. [id number] Score: 4. The patients heart rate in the last 24 hours is as follows:  Pulse  Min: 52  Max: 62     Antiarrhythmics       Anticoagulants       Plan  - Replete lytes with a goal of K>4, Mg >2  - Patient is not anticoagulated due to thrombocytopenia  - Patient's afib is currently controlled

## 2025-04-04 NOTE — ASSESSMENT & PLAN NOTE
Patient's blood pressure range in the last 24 hours was: BP  Min: 117/54  Max: 138/69.The patient's inpatient anti-hypertensive regimen is listed below:  Current Antihypertensives  furosemide injection 20 mg, Every 12 hours, Intravenous    Plan  - BP is controlled, no changes needed to their regimen

## 2025-04-04 NOTE — SUBJECTIVE & OBJECTIVE
Interval History: diuresing well.   Oxygen requirement is improving.  Coughing less.  Dyspnea is improving.        Objective:     Vital Signs (Most Recent):  Temp: 98.1 °F (36.7 °C) (04/04/25 1106)  Pulse: (!) 57 (04/04/25 1445)  Resp: 20 (04/04/25 1335)  BP: (!) 125/52 (04/04/25 1106)  SpO2: 97 % (04/04/25 1335) Vital Signs (24h Range):  Temp:  [97.5 °F (36.4 °C)-98.1 °F (36.7 °C)] 98.1 °F (36.7 °C)  Pulse:  [52-62] 57  Resp:  [18-20] 20  SpO2:  [93 %-98 %] 97 %  BP: (117-138)/(52-69) 125/52     Weight: 90.7 kg (200 lb)  Body mass index is 27.89 kg/m².      Intake/Output Summary (Last 24 hours) at 4/4/2025 1555  Last data filed at 4/4/2025 0947  Gross per 24 hour   Intake 600 ml   Output 1800 ml   Net -1200 ml        Physical Exam  Vitals reviewed.   Constitutional:       Appearance: Normal appearance. He is normal weight.   HENT:      Head: Normocephalic and atraumatic.      Mouth/Throat:      Mouth: Mucous membranes are moist.   Eyes:      Pupils: Pupils are equal, round, and reactive to light.   Cardiovascular:      Rate and Rhythm: Normal rate and regular rhythm.      Pulses: Normal pulses.   Pulmonary:      Effort: Pulmonary effort is normal.      Breath sounds: Rales present. No wheezing or rhonchi.   Abdominal:      General: Abdomen is flat.      Palpations: Abdomen is soft.   Musculoskeletal:      Right lower leg: No edema.      Left lower leg: No edema.   Skin:     General: Skin is warm and dry.      Capillary Refill: Capillary refill takes less than 2 seconds.   Neurological:      General: No focal deficit present.      Mental Status: He is alert and oriented to person, place, and time. Mental status is at baseline.   Psychiatric:         Mood and Affect: Mood normal.         Behavior: Behavior normal.           Review of Systems    Vents:  Oxygen Concentration (%): 50 (04/03/25 0800)    Lines/Drains/Airways       Peripheral Intravenous Line  Duration                  Peripheral IV - Single Lumen 04/01/25  "2151 20 G Anterior;Left Forearm 2 days                    Significant Labs:    CBC/Anemia Profile:  Recent Labs   Lab 04/03/25 0457 04/04/25 0417   WBC 7.22 5.93   HGB 8.9* 8.5*   HCT 26.7* 26.2*   PLT 54* 72*   MCV 97 96   RDW 16.1* 15.8*        Chemistries:  Recent Labs   Lab 04/03/25 0457 04/04/25 0417    139   K 3.2* 3.3*    108   CO2 22* 22*   BUN 46* 48*   CREATININE 2.2* 1.9*   CALCIUM 8.2* 8.1*   GLUCOSE 64* 138*       ABGs:   No results for input(s): "PH", "PCO2", "HCO3", "POCSATURATED", "BE" in the last 48 hours.    BNP  Recent Labs   Lab 04/01/25 2153   BNP 1,847*     Echo 4/2/25    Left Ventricle: The left ventricle is normal in size. Mildly increased wall thickness. There is concentric hypertrophy. There is normal systolic function with a visually estimated ejection fraction of 55 - 60%. There is diastolic dysfunction.    Right Ventricle: The right ventricle is normal in size. Systolic function is normal.    Left Atrium: Severely dilated    Right Atrium: Right atrium is mildly dilated.    Mitral Valve: There is moderate regurgitation.    Tricuspid Valve: There is mild regurgitation.    Pulmonary Artery: The estimated pulmonary artery systolic pressure is 31 mmHg.    IVC/SVC: Intermediate venous pressure at 8 mmHg.    Cardiac Markers: No results for input(s): "CKMB", "TROPONINT", "MYOGLOBIN" in the last 48 hours.  Lactic Acid:   No results for input(s): "LACTATE" in the last 48 hours.    Respiratory Culture: No results for input(s): "GSRESP", "RESPIRATORYC" in the last 48 hours.    Significant Imaging:  I have reviewed all pertinent imaging results/findings within the past 24 hours.  CT: I have reviewed all pertinent results/findings within the past 24 hours and my personal findings are:  bilateral ggo with small effusion.   "

## 2025-04-05 VITALS
SYSTOLIC BLOOD PRESSURE: 121 MMHG | HEART RATE: 67 BPM | DIASTOLIC BLOOD PRESSURE: 60 MMHG | HEIGHT: 71 IN | TEMPERATURE: 98 F | OXYGEN SATURATION: 95 % | WEIGHT: 200 LBS | RESPIRATION RATE: 16 BRPM | BODY MASS INDEX: 28 KG/M2

## 2025-04-05 LAB
ABSOLUTE EOSINOPHIL (OHS): 0.06 K/UL
ABSOLUTE MONOCYTE (OHS): 1.19 K/UL (ref 0.3–1)
ABSOLUTE NEUTROPHIL COUNT (OHS): 2.21 K/UL (ref 1.8–7.7)
ANION GAP (OHS): 11 MMOL/L (ref 8–16)
BASOPHILS # BLD AUTO: 0.01 K/UL
BASOPHILS NFR BLD AUTO: 0.2 %
BUN SERPL-MCNC: 42 MG/DL (ref 8–23)
CALCIUM SERPL-MCNC: 8.6 MG/DL (ref 8.7–10.5)
CHLORIDE SERPL-SCNC: 107 MMOL/L (ref 95–110)
CO2 SERPL-SCNC: 23 MMOL/L (ref 23–29)
CREAT SERPL-MCNC: 1.6 MG/DL (ref 0.5–1.4)
ERYTHROCYTE [DISTWIDTH] IN BLOOD BY AUTOMATED COUNT: 15.4 % (ref 11.5–14.5)
GFR SERPLBLD CREATININE-BSD FMLA CKD-EPI: 41 ML/MIN/1.73/M2
GLUCOSE SERPL-MCNC: 109 MG/DL (ref 70–110)
HCT VFR BLD AUTO: 27.7 % (ref 40–54)
HGB BLD-MCNC: 9 GM/DL (ref 14–18)
IMM GRANULOCYTES # BLD AUTO: 0.04 K/UL (ref 0–0.04)
IMM GRANULOCYTES NFR BLD AUTO: 0.9 % (ref 0–0.5)
LYMPHOCYTES # BLD AUTO: 0.78 K/UL (ref 1–4.8)
MCH RBC QN AUTO: 31.3 PG (ref 27–31)
MCHC RBC AUTO-ENTMCNC: 32.5 G/DL (ref 32–36)
MCV RBC AUTO: 96 FL (ref 82–98)
NUCLEATED RBC (/100WBC) (OHS): 0 /100 WBC
PLATELET # BLD AUTO: 92 K/UL (ref 150–450)
PMV BLD AUTO: 12.7 FL (ref 9.2–12.9)
POCT GLUCOSE: 128 MG/DL (ref 70–110)
POCT GLUCOSE: 232 MG/DL (ref 70–110)
POTASSIUM SERPL-SCNC: 3.6 MMOL/L (ref 3.5–5.1)
RBC # BLD AUTO: 2.88 M/UL (ref 4.6–6.2)
RELATIVE EOSINOPHIL (OHS): 1.4 %
RELATIVE LYMPHOCYTE (OHS): 18.2 % (ref 18–48)
RELATIVE MONOCYTE (OHS): 27.7 % (ref 4–15)
RELATIVE NEUTROPHIL (OHS): 51.6 % (ref 38–73)
SODIUM SERPL-SCNC: 141 MMOL/L (ref 136–145)
WBC # BLD AUTO: 4.29 K/UL (ref 3.9–12.7)

## 2025-04-05 PROCEDURE — 25000003 PHARM REV CODE 250: Performed by: STUDENT IN AN ORGANIZED HEALTH CARE EDUCATION/TRAINING PROGRAM

## 2025-04-05 PROCEDURE — 25000003 PHARM REV CODE 250: Performed by: HOSPITALIST

## 2025-04-05 PROCEDURE — 36415 COLL VENOUS BLD VENIPUNCTURE: CPT | Performed by: HOSPITALIST

## 2025-04-05 PROCEDURE — 80048 BASIC METABOLIC PNL TOTAL CA: CPT | Performed by: HOSPITALIST

## 2025-04-05 PROCEDURE — 85025 COMPLETE CBC W/AUTO DIFF WBC: CPT | Performed by: HOSPITALIST

## 2025-04-05 PROCEDURE — 63600175 PHARM REV CODE 636 W HCPCS: Performed by: INTERNAL MEDICINE

## 2025-04-05 RX ORDER — VALSARTAN 40 MG/1
40 TABLET ORAL 2 TIMES DAILY
Status: DISCONTINUED | OUTPATIENT
Start: 2025-04-05 | End: 2025-04-05 | Stop reason: HOSPADM

## 2025-04-05 RX ORDER — DOXYCYCLINE HYCLATE 100 MG
100 TABLET ORAL EVERY 12 HOURS
Qty: 14 TABLET | Refills: 0 | Status: SHIPPED | OUTPATIENT
Start: 2025-04-05 | End: 2025-04-12

## 2025-04-05 RX ORDER — FUROSEMIDE 40 MG/1
40 TABLET ORAL DAILY
Qty: 30 TABLET | Refills: 2 | Status: SHIPPED | OUTPATIENT
Start: 2025-04-05 | End: 2025-04-11

## 2025-04-05 RX ORDER — POLYETHYLENE GLYCOL 3350 17 G/17G
17 POWDER, FOR SOLUTION ORAL 2 TIMES DAILY
Status: DISCONTINUED | OUTPATIENT
Start: 2025-04-05 | End: 2025-04-05 | Stop reason: HOSPADM

## 2025-04-05 RX ADMIN — FOLIC ACID 1 MG: 1 TABLET ORAL at 08:04

## 2025-04-05 RX ADMIN — FUROSEMIDE 40 MG: 10 INJECTION, SOLUTION INTRAVENOUS at 08:04

## 2025-04-05 RX ADMIN — GABAPENTIN 200 MG: 100 CAPSULE ORAL at 08:04

## 2025-04-05 RX ADMIN — PANTOPRAZOLE SODIUM 40 MG: 40 TABLET, DELAYED RELEASE ORAL at 08:04

## 2025-04-05 RX ADMIN — ALLOPURINOL 100 MG: 100 TABLET ORAL at 08:04

## 2025-04-05 RX ADMIN — VALSARTAN 40 MG: 40 TABLET, FILM COATED ORAL at 10:04

## 2025-04-05 RX ADMIN — ATORVASTATIN CALCIUM 20 MG: 10 TABLET, FILM COATED ORAL at 08:04

## 2025-04-05 RX ADMIN — POLYETHYLENE GLYCOL 3350 17 G: 17 POWDER, FOR SOLUTION ORAL at 10:04

## 2025-04-05 NOTE — NURSING
Pt SpO2 95% on 2L NC, pt weaned down to 1L NC no distress/sob noted. Safety measures maintained. Will cont to monitor

## 2025-04-05 NOTE — ASSESSMENT & PLAN NOTE
Patient found to have small pleural effusion on imaging. I have not personally reviewed and interpreted the following imaging: Xray. A thoracentesis was deferred. Most likely etiology includes Congestive Heart Failure. Management to include Diuresis

## 2025-04-05 NOTE — ASSESSMENT & PLAN NOTE
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 The patients 3 most recent labs are listed below.  Recent Labs     04/03/25  0457 04/04/25  0417 04/05/25  0529   PLT 54* 72* 92*     Plan  - Will transfuse if platelet count is <20k.  -

## 2025-04-05 NOTE — NURSING
Received handoff from FIOR Mahajan. Pt resting in bed watching tv with Wife at bedside. No complaints. IV saline lock. Pt on 3L NC, no distress/sob noted. Pt on tele monitor #[id number]. Safety measures maintained. Will cont to monitor

## 2025-04-05 NOTE — PLAN OF CARE
Case Management Final Discharge Note    Discharge Disposition: Home Health- [facility]    New DME ordered / company name: None    Relevant SDOH / Transition of Care Barriers:  None    Person available to provide assistance at home when needed and their contact information: Spouse to assist as needed     Scheduled followup appointment: PCP 04/15/25 @ 10 am & [facility] to start 04/06/25    Referrals placed: None    Transportation: Private Vehicle; Family     Patient and family educated on discharge services and updated on DC plan. Bedside RN notified, patient clear to discharge from Case Management Perspective.     04/05/25 1138   Final Note   Assessment Type Final Discharge Note   Anticipated Discharge Disposition Home-Health   What phone number can be called within the next 1-3 days to see how you are doing after discharge? [phone]   Hospital Resources/Appts/Education Provided Appointments scheduled and added to AVS;Post-Acute resouces added to AVS   Post-Acute Status   Post-Acute Authorization Home Health   Home Health Status Set-up Complete/Auth obtained   Discharge Delays None known at this time

## 2025-04-05 NOTE — PLAN OF CARE
Problem: Adult Inpatient Plan of Care  Goal: Plan of Care Review  Outcome: Adequate for Care Transition  Goal: Patient-Specific Goal (Individualized)  Outcome: Adequate for Care Transition  Goal: Absence of Hospital-Acquired Illness or Injury  Outcome: Adequate for Care Transition  Goal: Optimal Comfort and Wellbeing  Outcome: Adequate for Care Transition  Goal: Readiness for Transition of Care  Outcome: Adequate for Care Transition     Problem: Infection  Goal: Absence of Infection Signs and Symptoms  Outcome: Adequate for Care Transition     Problem: Diabetes Comorbidity  Goal: Blood Glucose Level Within Targeted Range  Outcome: Adequate for Care Transition     Problem: Pneumonia  Goal: Fluid Balance  Outcome: Adequate for Care Transition  Goal: Resolution of Infection Signs and Symptoms  Outcome: Adequate for Care Transition  Goal: Effective Oxygenation and Ventilation  Outcome: Adequate for Care Transition     Problem: Fall Injury Risk  Goal: Absence of Fall and Fall-Related Injury  Outcome: Adequate for Care Transition

## 2025-04-05 NOTE — NURSING
Vitals assessed. Scheduled medications given. No complaints. Accu check with insulin coverage. Pt SpO2 95% on 3L NC weaned down to 2L NC, no sob/distress noted. Safety measures maintained. Will cont to monitor

## 2025-04-05 NOTE — NURSING
[facility] [place]  Nurses Note -- 4 Eyes      4/5/2025       Skin assessed on: Q Shift      [x] No Pressure Injuries Present    [x]Prevention Measures Documented    [] Yes LDA  for Pressure Injury Previously documented     [] Yes New Pressure Injury Discovered   [] LDA for New Pressure Injury Added      Attending RN:  Radha Patel, RN     Second RN:  Sophie Vargas

## 2025-04-05 NOTE — PLAN OF CARE
I provided the patient a choice of post acute providers and offered a list of CMS rated home health.   Patient has declined to select a preferred provider and elects placement with the first accepting in network provider that is available to provide services as ordered by the physician.

## 2025-04-05 NOTE — DISCHARGE INSTRUCTIONS
Our goal at [facility] is to always give you outstanding care and exceptional service. You may receive a survey from [employer] by mail, text or e-mail in the next 24-48 hours asking about the care you received with us. The survey should only take 5-10 minutes to complete and is very important to us.     Your feedback provides us with a way to recognize our staff who work tirelessly to provide the best care! Also, your responses help us learn how to improve when your experience was below our aspiration of excellence. We are always looking for ways to improve your stay. We WILL use your feedback to continue making improvements to help us provide the highest quality care. We keep your personal information and feedback confidential. We appreciate your time completing this survey and can't wait to hear from you!!!    We look forward to your continued care with us! Thanks so much for choosing [facility] for your healthcare needs!

## 2025-04-05 NOTE — PLAN OF CARE
[facility]      HOME HEALTH ORDERS  FACE TO FACE ENCOUNTER    Patient Name: Bria Lawson  YOB: 1935    PCP: Adiel Bragg MD   PCP Address: [address] / [place] [address]  PCP Phone Number: [phone]  PCP Fax: [phone]    Encounter Date: 4/1/25    Admit to Home Health    Diagnoses:  Active Hospital Problems    Diagnosis  POA    *Acute respiratory failure with hypoxia [J96.01]  Yes    Pleural effusion [J90]  Unknown    LORELEI (obstructive sleep apnea) [G47.33]  Yes    Atrial fibrillation [I48.91]  Yes    Community acquired pneumonia [J18.9]  Yes    Acute diastolic CHF (congestive heart failure) [I50.31]  Yes    Atherosclerosis of native coronary artery of native heart without angina pectoris [I25.10]  Yes    Pulmonary hypertension [I27.20]  Yes    Atherosclerosis of abdominal aorta [I70.0]  Yes    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       Follow Up Appointments:  Future Appointments   Date Time Provider Department Center   4/9/2025 11:45 AM SPE[facility] [place]   4/9/2025 12:00 PM LAB, [facility] [place]   4/9/2025  1:15 PM Ellen Gibson OD [facility] OPTOMTY [place]   4/11/2025 11:00 AM Ryan Avila MD [facility] NEPHRO [place]   9/18/2025  1:00 PM PROCEDURE, UROLOGY ROOM 2 [facility] UROPRO [facility]       Allergies:Review of patient's allergies indicates:  No Known Allergies    Medications: Review discharge medications with patient and family and provide education.    Current Medications[1]     Medication List        START taking these medications      doxycycline 100 MG  tablet  Commonly known as: VIBRA-TABS  Take 1 tablet (100 mg total) by mouth every 12 (twelve) hours. for 7 days     furosemide 40 MG tablet  Commonly known as: LASIX  Take 1 tablet (40 mg total) by mouth once daily.            CONTINUE taking these medications      allopurinoL 100 MG tablet  Commonly known as: ZYLOPRIM  TAKE 1 TABLET(100 MG) BY MOUTH EVERY DAY     atorvastatin 20 MG tablet  Commonly known as: LIPITOR  TAKE 1 TABLET(20 MG) BY MOUTH EVERY DAY     blood sugar diagnostic Strp  1 strip by Misc.(Non-Drug; Combo Route) route 2 (two) times daily. Disp glucometer and lancets as well     CENTRUM SILVER MEN ORAL  Take by mouth.     dapagliflozin propanediol 10 mg tablet  Commonly known as: FARXIGA  Take 1 tablet (10 mg total) by mouth once daily.     DUPIXENT  mg/2 mL Pnij  Generic drug: dupilumab  Inject into the skin. a biologic that is given by subcutaneous (under the skin) injection that may be used to treat inflammatory conditions     FeroSuL 325 mg (65 mg iron) Tab tablet  Generic drug: ferrous sulfate  TAKE 1 TABLET BY MOUTH EVERY DAY WITH BREAKFAST     folic acid 800 MCG Tab  Commonly known as: FOLVITE  Take 800 mcg by mouth once daily.     gabapentin 300 MG capsule  Commonly known as: NEURONTIN  TAKE 1 CAPSULE(300 MG) BY MOUTH THREE TIMES DAILY     glipiZIDE 10 MG Tr24  Commonly known as: GLUCOTROL  TAKE 1 TABLET(10 MG) BY MOUTH EVERY DAY     niacin 500 MG Tab  Take 100 mg by mouth every evening.     pantoprazole 40 MG tablet  Commonly known as: PROTONIX  TAKE 1 TABLET(40 MG) BY MOUTH EVERY DAY     SITagliptin phosphate 100 MG Tab  Commonly known as: JANUVIA  Take 1 tablet (100 mg total) by mouth once daily.     TURMERIC ROOT EXTRACT ORAL  Take by mouth.     valsartan 320 MG tablet  Commonly known as: DIOVAN  Take 1 tablet (320 mg total) by mouth once daily.     vitamin E 400 UNIT capsule  Take 400 Units by mouth once daily.            STOP taking these medications      amLODIPine 10 MG  tablet  Commonly known as: NORVASC     carvediloL 6.25 MG tablet  Commonly known as: COREG                I have seen and examined this patient within the last 30 days. My clinical findings that support the need for the home health skilled services and home bound status are the following:no   Weakness/numbness causing balance and gait disturbance due to Weakness/Debility making it taxing to leave home.     Diet:   diabetic diet 2000 calorie and 2 gram sodium diet        Referrals/ Consults  Physical Therapy to evaluate and treat. Evaluate for home safety and equipment needs; Establish/upgrade home exercise program. Perform / instruct on therapeutic exercises, gait training, transfer training, and Range of Motion.  Occupational Therapy to evaluate and treat. Evaluate home environment for safety and equipment needs. Perform/Instruct on transfers, ADL training, ROM, and therapeutic exercises.    Activities:   activity as tolerated    Nursing:   Agency to admit patient within 24 hours of hospital discharge unless specified on physician order or at patient request    SN to complete comprehensive assessment including routine vital signs. Instruct on disease process and s/s of complications to report to MD. Review/verify medication list sent home with the patient at time of discharge  and instruct patient/caregiver as needed. Frequency may be adjusted depending on start of care date.     Skilled nurse to perform up to 3 visits PRN for symptoms related to diagnosis    Notify MD if SBP > 160 or < 90; DBP > 90 or < 50; HR > 120 or < 50; Temp > 101; O2 < 88%; Other:       Ok to schedule additional visits based on staff availability and patient request on consecutive days within the home health episode.    When multiple disciplines ordered:    Start of Care occurs on Sunday - Wednesday schedule remaining discipline evaluations as ordered on separate consecutive days following the start of care.    Thursday SOC -schedule  subsequent evaluations Friday and Monday the following week.     Friday - Saturday SOC - schedule subsequent discipline evaluations on consecutive days starting Monday of the following week.    For all post-discharge communication and subsequent orders please contact patient's primary care physician. If unable to reach primary care physician or do not receive response within 30 minutes, please contact [facility]  for clinical staff order clarification    Miscellaneous   Routine Skin for Bedridden Patients: Instruct patient/caregiver to apply moisture barrier cream to all skin folds and wet areas in perineal area daily and after baths and all bowel movements.    Home Health Aide:  Nursing Twice weekly, Physical Therapy Twice weekly, and Occupational Therapy Twice weekly    Wound Care Orders  no    I certify that this patient is confined to his home and needs intermittent skilled nursing care, physical therapy, and occupational therapy.              [1]   Current Facility-Administered Medications   Medication Dose Route Frequency Provider Last Rate Last Admin    acetaminophen tablet 650 mg  650 mg Oral Q8H PRN Ruddy Payne MD        acetaminophen tablet 650 mg  650 mg Oral Q4H PRN Ruddy Payne MD        allopurinoL tablet 100 mg  100 mg Oral Daily Ruddy Payne MD   100 mg at 04/05/25 0841    aluminum-magnesium hydroxide-simethicone 200-200-20 mg/5 mL suspension 30 mL  30 mL Oral QID PRN Ruddy Payne MD        atorvastatin tablet 20 mg  20 mg Oral Daily Ruddy Payne MD   20 mg at 04/05/25 0841    azithromycin (ZITHROMAX) 500 mg in 0.9% NaCl 250 mL IVPB (admixture device)  500 mg Intravenous Q24H Ruddy Payne MD   Stopped at 04/04/25 2336    bisacodyL suppository 10 mg  10 mg Rectal Daily PRN Ruddy Payne MD        cefTRIAXone injection 2 g  2 g Intravenous Q24H Ruddy Payne MD   2 g at 04/04/25 2236    folic acid tablet 1 mg  1 mg Oral Daily Ruddy Payne MD   1 mg at 04/05/25 0842    furosemide injection 40 mg   40 mg Intravenous Q12H Krish Cordova MD   40 mg at 04/05/25 0842    gabapentin capsule 200 mg  200 mg Oral TID Ruddy Payne MD   200 mg at 04/05/25 0842    glucagon (human recombinant) injection 1 mg  1 mg Intramuscular PRN Ruddy Payne MD        glucose chewable tablet 16 g  16 g Oral REBECAN Ruddy Payne MD   16 g at 04/03/25 0742    glucose chewable tablet 24 g  24 g Oral PRN Ruddy Payne MD        insulin aspart U-100 pen 0-5 Units  0-5 Units Subcutaneous QID (AC + HS) Ruddy Irvin MD   1 Units at 04/04/25 2028    magnesium oxide tablet 800 mg  800 mg Oral Ruddy Irvin MD        magnesium oxide tablet 800 mg  800 mg Oral PRN Ruddy Payne MD        melatonin tablet 6 mg  6 mg Oral Nightly Ruddy Irvin MD        naloxone 0.4 mg/mL injection 0.02 mg  0.02 mg Intravenous PRRuddy Valentin MD        ondansetron injection 4 mg  4 mg Intravenous Q8H PRN Ruddy Payne MD        pantoprazole EC tablet 40 mg  40 mg Oral Daily Ruddy Payne MD   40 mg at 04/05/25 0841    polyethylene glycol packet 17 g  17 g Oral BID Fer Valentine MD        potassium bicarbonate disintegrating tablet 35 mEq  35 mEq Oral Ruddy Irvin MD        potassium bicarbonate disintegrating tablet 50 mEq  50 mEq Oral Ruddy Irvin MD        potassium bicarbonate disintegrating tablet 60 mEq  60 mEq Oral Ruddy Irvin MD        potassium, sodium phosphates 280-160-250 mg packet 2 packet  2 packet Oral PRN Ruddy Payne MD        potassium, sodium phosphates 280-160-250 mg packet 2 packet  2 packet Oral PRN Ruddy Payne MD        potassium, sodium phosphates 280-160-250 mg packet 2 packet  2 packet Oral PRN Ruddy Payne MD        senna-docusate 8.6-50 mg per tablet 1 tablet  1 tablet Oral Daily Ruddy Irvin MD   1 tablet at 04/03/25 2031    simethicone chewable tablet 80 mg  1 tablet Oral QID Ruddy Irvin MD        sodium chloride 0.9% flush 10 mL  10 mL Intravenous Q12H PRN Ruddy Payne MD         valsartan tablet 40 mg  40 mg Oral BID Fer Valentine MD

## 2025-04-05 NOTE — DISCHARGE SUMMARY
"[facility] Medicine  Discharge Summary      Patient Name: Bria Lawson  MRN: [id number]  GALILEO: [id number]  Patient Class: IP- Inpatient  Admission Date: 4/1/2025  Hospital Length of Stay: 4 days  Discharge Date and Time: 04/05/2025 11:57 AM  Attending Physician: Fer Valentine, *   Discharging Provider: Fer Valentine MD  Primary Care Provider: Adiel Bragg MD    Primary Care Team: Networked reference to record PCT     HPI:   This is an 89-year-old male with a past medical history of Afib (not on AC), hypertension, type 2 diabetes, hyperlipidemia, CKD 3, GERD, gout, pulmonary hypertension, 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     Patient presents for evaluation of shortness of breath that started 3 days prior to presentation. He reports worsening dyspnea and "feeling bad". Additional symptoms include productive cough, decreased po intake, low grade fever and polyuria. He denies exposure to sick contacts. He denied chest pain or lower extremity swelling.     In the ED, the patient was tachypneic (20s-30s), hypoxic (SpO2:  82%, requiring Ventimask).  Labs were remarkable for leukocytosis (13.5), normocytic anemia (9.8-baseline around 10-11), thrombocytopenia (54- chronically low), elevated D-dimer (4.33), elevated creatinine (2.3-baseline around 2.0), elevated T bili (2.9), elevated BNP (1847), elevated troponin (0.087), elevated procalcitonin (0.33).  VBG showed a pH of 7.41, pCO2 of 29.5.  Chest x-ray showed increased attenuation, most pronounced in the right perihilar region (edema or infectious process).  Patient was given Lasix 40 mg IV, ceftriaxone 1 g IV, azithromycin 500 mg p.o., aspirin 325 mg, DuoNeb x3.  He was admitted for further management.    * No surgery found *      Hospital Course:   89-year-old male with a past medical history of Afib (not on AC), hypertension, " type 2 diabetes, hyperlipidemia, CKD 3, GERD, gout, pulmonary hypertension, 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    In the ED, the patient was tachypneic (20s-30s), hypoxic (SpO2:  82%, requiring Ventimask).  Labs were remarkable for leukocytosis (13.5), normocytic anemia (9.8-baseline around 10-11), thrombocytopenia (54- chronically low), elevated D-dimer (4.33), elevated creatinine (2.3-baseline around 2.0), elevated T bili (2.9), elevated BNP (1847), elevated troponin (0.087), elevated procalcitonin (0.33).  VBG showed a pH of 7.41, pCO2 of 29.5.  Chest x-ray showed increased attenuation, most pronounced in the right perihilar region (edema or infectious process).  Patient was given Lasix 40 mg IV, ceftriaxone 1 g IV, azithromycin 500 mg p.o., aspirin 325 mg, DuoNeb x3.  Patient was admitted for acute hypoxic respiratory failure,was on IV abx for pneumonia,IV lasix for CHF ,echo with diastolic CHF,..has hemoptysis,checked CT chest,,show fluid overload,infiltrate,US leg show no DVT and consulted pulmonology. .  Respiratory status is improved slowly,weaned  of oxygen.doing well with PT,OT.  At DC time patient was stable on RA,patient was discharged home with PO Abx,lasix and HH and follow up with PCP as out patient.     Goals of Care Treatment Preferences:  Code Status: Full Code         Consults:   Consults (From admission, onward)          Status Ordering Provider     Inpatient consult to Pulmonology  Once        Provider:  Yovanny Vickers MD    Completed [address]            Assessment & Plan  Acute respiratory failure with hypoxia  Patient with Hypoxic Respiratory failure which is Acute.  he is not on home oxygen. Supplemental oxygen was provided and noted-      .   Signs/symptoms of respiratory failure include- increased work of breathing and respiratory distress. Contributing diagnoses includes -  Pneumonia and volume overload  Labs and images were reviewed. Patient Has not had a recent ABG. Will treat underlying causes and adjust management of respiratory failure as follows- wean O2 as tolerated   echo with diastolic CHF,. Is pending.has hemoptysis,checked CT chest,,show fluid overload,infiltrate,US leg show no DVT and consulted pulmonology. .  Respiratory status is improving weaning of oxygen.  GERD (gastroesophageal reflux disease)  Continue PPI     HTN (hypertension)  Patient's blood pressure range in the last 24 hours was: BP  Min: 121/60  Max: 157/76.The patient's inpatient anti-hypertensive regimen is listed below:  Current Antihypertensives  furosemide injection 40 mg, Every 12 hours, Intravenous  valsartan tablet 40 mg, 2 times daily, Oral  furosemide (LASIX) tablet, Daily, Oral    Plan  - BP is controlled, no changes needed to their regimen    Hyperlipidemia associated with type 2 diabetes mellitus  Continue statin     CKD stage 3 due to type 2 diabetes mellitus  Creatine stable for now. BMP reviewed- noted Estimated Creatinine Clearance: 36.8 mL/min (A) (based on SCr of 1.6 mg/dL (H)). according to latest data. Based on current GFR, CKD stage is stage 3 - GFR 30-59.  Monitor UOP and serial BMP and adjust therapy as needed. Renally dose meds. Avoid nephrotoxic medications and procedures.  Stage 3a chronic kidney disease  Creatine stable for now. BMP reviewed- noted Estimated Creatinine Clearance: 36.8 mL/min (A) (based on SCr of 1.6 mg/dL (H)). according to latest data. Based on current GFR, CKD stage is stage 3 - GFR 30-59.  Monitor UOP and serial BMP and adjust therapy as needed. Renally dose meds. Avoid nephrotoxic medications and procedures.  Pulmonary hypertension  Optimize volume status     Atherosclerosis of native coronary artery of native heart without angina pectoris  History noted. No acute issues.   Atrial fibrillation  Patient is currently in sinus rhythm. [id number] Score: 4. The  patients heart rate in the last 24 hours is as follows:  Pulse  Min: 48  Max: 67     Antiarrhythmics       Anticoagulants       Plan  - Replete lytes with a goal of K>4, Mg >2  - Patient is not anticoagulated due to thrombocytopenia  - Patient's afib is currently controlled      Community acquired pneumonia  Patient has a diagnosis of pneumonia. The cause of the pneumonia is suspected to be bacterial in etiology but organism is not known. The pneumonia is stable. The patient has the following signs/symptoms of pneumonia: persistent hypoxia , cough, sputum production, and shortness of breath. The patient does have a current oxygen requirement and the patient does not have a home oxygen requirement. I have reviewed the pertinent imaging. The following cultures have been collected: Blood cultures and Sputum culture The culture results are listed below.     Current antimicrobial regimen consists of the antibiotics listed below. Will monitor patient closely and continue current treatment plan unchanged.    Antibiotics (From admission, onward)      Start     Stop Route Frequency Ordered    04/02/25 2300  azithromycin (ZITHROMAX) 500 mg in 0.9% NaCl 250 mL IVPB (admixture device)  (Order Panel)         -- IV Every 24 hours (non-standard times) 04/02/25 0046    04/02/25 2300  cefTRIAXone injection 2 g  (Order Panel)         -- IV Every 24 hours (non-standard times) 04/02/25 0046            Microbiology Results (last 7 days)       Procedure Component Value Units Date/Time    Blood culture #1 **CANNOT BE ORDERED STAT** [[id number]]  (Normal) Collected: 04/01/25 2153    Order Status: Completed Specimen: Blood from Peripheral, Forearm, Left Updated: 04/04/25 2301     Blood Culture No Growth After 72 Hours    Blood culture #2 **CANNOT BE ORDERED STAT** [[id number]]  (Normal) Collected: 04/01/25 2156    Order Status: Completed Specimen: Blood from Peripheral, Forearm, Left Updated: 04/04/25 2301     Blood Culture No Growth After  72 Hours    Culture, Respiratory with Gram Stain [[id number]] Collected: 04/02/25 0907    Order Status: Completed Specimen: Respiratory from Sputum Updated: 04/04/25 0543     Respiratory Culture Normal respiratory howard     GRAM STAIN <10 Epithelial Cells/LPF      Rare WBC seen      Rare Gram Negative Rods      Rare Gram positive cocci      Rare Gram negative diplococci    Influenza A & B by Molecular [[id number]]     Order Status: Canceled Specimen: Nasal Swab           Acute diastolic CHF (congestive heart failure)  No prior elevated readings   Concern for new onset heart failure   BNP  Recent Labs   Lab 04/01/25  2153   BNP 1,847*     echo with diastolic CHF,.on IV lasix.    Thrombocytopenia  The likely etiology of thrombocytopenia is bone marrow suppression due to CLL  . The patients 3 most recent labs are listed below.  Recent Labs     04/03/25  0457 04/04/25  0417 04/05/25  0529   PLT 54* 72* 92*     Plan  - Will transfuse if platelet count is <20k.  -     B-cell lymphoma of extranodal site  Per record.    Atherosclerosis of abdominal aorta  On medical treatments.    LORELEI (obstructive sleep apnea)  He is not wearing.    Pleural effusion  Patient found to have small pleural effusion on imaging. I have not personally reviewed and interpreted the following imaging: Xray. A thoracentesis was deferred. Most likely etiology includes Congestive Heart Failure. Management to include Diuresis    Final Active Diagnoses:    Diagnosis Date Noted POA    PRINCIPAL PROBLEM:  Acute respiratory failure with hypoxia [J96.01] 04/02/2025 Yes    Pleural effusion [J90] 04/04/2025 Yes    LORELEI (obstructive sleep apnea) [G47.33] 04/03/2025 Yes    Atrial fibrillation [I48.91] 04/02/2025 Yes    Community acquired pneumonia [J18.9] 04/02/2025 Yes    Acute diastolic CHF (congestive heart failure) [I50.31] 04/02/2025 Yes    Atherosclerosis of native coronary artery of native heart without angina pectoris [I25.10] 08/07/2024 Yes    Pulmonary  hypertension [I27.20] 01/11/2021 Yes    Atherosclerosis of abdominal aorta [I70.0] 01/11/2021 Yes    B-cell lymphoma of extranodal site [C85.19] 10/17/2018 Yes    Thrombocytopenia [D69.6] 10/11/2017 Yes    Stage 3a chronic kidney disease [N18.31] 05/18/2016 Yes    CKD stage 3 due to type 2 diabetes mellitus [E11.22, N18.30] 05/26/2015 Yes    Hyperlipidemia associated with type 2 diabetes mellitus [E11.69, E78.5] 02/26/2015 Yes    HTN (hypertension) [I10] 02/26/2015 Yes    GERD (gastroesophageal reflux disease) [K21.9] 02/26/2015 Yes      Problems Resolved During this Admission:       Discharged Condition: stable    [place]position[place] [facility] [place]    Follow Up:   Follow-up Information       Adiel Bragg MD. Go on 4/15/2025.    Specialty: Internal Medicine  Why: Hospital Follow-Up Appt. 04/15/2025@10am  Contact information:  [address] [facility]  [place] [id number] [phone]               [facility] Follow up.    Specialties: Home Health Services, Home Therapy Services, Home Living Aide Services  Why: Nurse will call to schedule first Home Health appt. for 04/06/2025  Contact information:  [address] [facility] [address] [id number] [phone]                         Patient Instructions:      Activity as tolerated       Significant Diagnostic Studies: Labs: BMP:   Recent Labs   Lab 04/04/25  0417 04/05/25  0529    141   K 3.3* 3.6    107   CO2 22* 23   BUN 48* 42*   CREATININE 1.9* 1.6*   CALCIUM 8.1* 8.6*   , CMP   Recent Labs   Lab 04/04/25  0417 04/05/25  0529    141   K 3.3* 3.6    107   CO2 22* 23   BUN 48* 42*   CREATININE 1.9* 1.6*   CALCIUM 8.1* 8.6*   ANIONGAP 9 11   , and CBC   Recent Labs   Lab 04/04/25 0417 04/05/25  0529   WBC 5.93 4.29   HGB 8.5* 9.0*   HCT 26.2* 27.7*   PLT 72* 92*     Radiology: X-Ray: CXR: X-Ray Chest 1 View (CXR):   Results for orders placed or performed during the hospital encounter of 04/01/25   X-Ray Chest 1  View    Narrative    EXAMINATION:  XR CHEST 1 VIEW    CLINICAL HISTORY:  eval ETT/line placement/pna/ptx;    TECHNIQUE:  Single frontal view of the chest was performed.    COMPARISON:  N 04/01/2025 one    FINDINGS:  Heart size normal.  Diffuse accentuation interstitial markings and/or edema similar to the previous study.  No extrapulmonary air or pleural effusion identified.  .      Impression    No significant changes      Electronically signed by: Junior Walters MD  Date:    04/04/2025  Time:    09:43    and X-Ray Chest PA and Lateral (CXR): No results found for this visit on 04/01/25.  Cardiac Graphics: Echocardiogram: 2D echo with color flow doppler: No results found. However, due to the size of the patient record, not all encounters were searched. Please check Results Review for a complete set of results. and Transthoracic echo (TTE) complete (Cupid Only):   Results for orders placed or performed during the hospital encounter of 04/01/25   Echo   Result Value Ref Range    BSA 2.09 m2    LVOT stroke volume 67.2 cm3    LVIDd 5.6 3.5 - 6.0 cm    LV Systolic Volume 69 mL    LV Systolic Volume Index 33.3 mL/m2    LVIDs 4.0 2.1 - 4.0 cm    LV Diastolic Volume 156 mL    LV Diastolic Volume Index 75.36 mL/m2    Left Ventricular End Systolic Volume by Teichholz Method 68.53 mL    Left Ventricular End Diastolic Volume by Teichholz Method 155.98 mL    IVS 1.2 (A) 0.6 - 1.1 cm    LVOT diameter 2.0 cm    LVOT area 3.1 cm2    FS 28.6 28 - 44 %    Left Ventricle Relative Wall Thickness 0.46 cm    PW 1.3 (A) 0.6 - 1.1 cm    LV mass 296.6 g    LV Mass Index 143.3 g/m2    MV Peak E Evaristo 1.12 m/s    TDI LATERAL 0.10 m/s    TDI SEPTAL 0.10 m/s    E/E' ratio 11 m/s    MV Peak A Evaristo 0.93 m/s    TR Max Evaristo 2.4 m/s    E/A ratio 1.20     IVRT 74 msec    E wave deceleration time 143 msec    LV SEPTAL E/E' RATIO 11.2 m/s    LV LATERAL E/E' RATIO 11.2 m/s    LVOT peak evaristo 1.0 m/s    Left Ventricular Outflow Tract Mean Velocity 0.71 cm/s     Left Ventricular Outflow Tract Mean Gradient 2.25 mmHg    RV- green basal diam 3.9 cm    RV S' 9.79 cm/s    TAPSE 1.98 cm    RV/LV Ratio 0.70 cm    LA size 5.0 cm    Left Atrium Minor Axis 6.4 cm    Left Atrium Major Axis 6.1 cm    RA Major Axis 5.56 cm    AV mean gradient 6 mmHg    AV peak gradient 10 mmHg    Ao peak con 1.6 m/s    Ao VTI 35.1 cm    LVOT peak VTI 21.4 cm    AV valve area 1.9 cm²    AV Velocity Ratio 0.63     AV index (prosthetic) 0.61     SANGEETHA by Velocity Ratio 2.0 cm²    MV mean gradient 4 mmHg    MV peak gradient 7 mmHg    MV stenosis pressure 1/2 time 41.34 ms    MV valve area p 1/2 method 5.32 cm2    MV valve area by continuity eq 2.68 cm2    MV VTI 25.1 cm    Triscuspid Valve Regurgitation Peak Gradient 23 mmHg    PV PEAK VELOCITY 1.13 m/s    PV peak gradient 5 mmHg    Sinus 3.70 cm    STJ 2.58 cm    Ascending aorta 3.22 cm    IVC diameter 2.30 cm    Mean e' 0.10 m/s    ZLVIDS 0.30     ZLVIDD -1.17     RVDD 3.92 cm    KAUSHIK 56 mL/m2    LA Vol 117 cm3    LA WIDTH 4.4 cm    RA Width 3.9 cm    TV resting pulmonary artery pressure 31 mmHg    RV TB RVSP 10 mmHg    Est. RA pres 8 mmHg    Narrative      Left Ventricle: The left ventricle is normal in size. Mildly increased   wall thickness. There is concentric hypertrophy. There is normal systolic   function with a visually estimated ejection fraction of 55 - 60%. There is   diastolic dysfunction.    Right Ventricle: The right ventricle is normal in size. Systolic   function is normal.    Left Atrium: Severely dilated    Right Atrium: Right atrium is mildly dilated.    Mitral Valve: There is moderate regurgitation.    Tricuspid Valve: There is mild regurgitation.    Pulmonary Artery: The estimated pulmonary artery systolic pressure is   31 mmHg.    IVC/SVC: Intermediate venous pressure at 8 mmHg.         Pending Diagnostic Studies:       Procedure Component Value Units Date/Time    Legionella antigen, urine [[id number]] Collected: 04/02/25 0247    Order  Status: Sent Lab Status: In process Updated: 04/02/25 0253    Specimen: Urine, Clean Catch            Medications:  Reconciled Home Medications:      Medication List        START taking these medications      doxycycline 100 MG tablet  Commonly known as: VIBRA-TABS  Take 1 tablet (100 mg total) by mouth every 12 (twelve) hours. for 7 days     furosemide 40 MG tablet  Commonly known as: LASIX  Take 1 tablet (40 mg total) by mouth once daily.            CONTINUE taking these medications      allopurinoL 100 MG tablet  Commonly known as: ZYLOPRIM  TAKE 1 TABLET(100 MG) BY MOUTH EVERY DAY     atorvastatin 20 MG tablet  Commonly known as: LIPITOR  TAKE 1 TABLET(20 MG) BY MOUTH EVERY DAY     blood sugar diagnostic Strp  1 strip by Misc.(Non-Drug; Combo Route) route 2 (two) times daily. Disp glucometer and lancets as well     CENTRUM SILVER MEN ORAL  Take by mouth.     dapagliflozin propanediol 10 mg tablet  Commonly known as: FARXIGA  Take 1 tablet (10 mg total) by mouth once daily.     DUPIXENT  mg/2 mL Pnij  Generic drug: dupilumab  Inject into the skin. a biologic that is given by subcutaneous (under the skin) injection that may be used to treat inflammatory conditions     FeroSuL 325 mg (65 mg iron) Tab tablet  Generic drug: ferrous sulfate  TAKE 1 TABLET BY MOUTH EVERY DAY WITH BREAKFAST     folic acid 800 MCG Tab  Commonly known as: FOLVITE  Take 800 mcg by mouth once daily.     gabapentin 300 MG capsule  Commonly known as: NEURONTIN  TAKE 1 CAPSULE(300 MG) BY MOUTH THREE TIMES DAILY     glipiZIDE 10 MG Tr24  Commonly known as: GLUCOTROL  TAKE 1 TABLET(10 MG) BY MOUTH EVERY DAY     niacin 500 MG Tab  Take 100 mg by mouth every evening.     pantoprazole 40 MG tablet  Commonly known as: PROTONIX  TAKE 1 TABLET(40 MG) BY MOUTH EVERY DAY     SITagliptin phosphate 100 MG Tab  Commonly known as: JANUVIA  Take 1 tablet (100 mg total) by mouth once daily.     TURMERIC ROOT EXTRACT ORAL  Take by mouth.     valsartan  320 MG tablet  Commonly known as: DIOVAN  Take 1 tablet (320 mg total) by mouth once daily.     vitamin E 400 UNIT capsule  Take 400 Units by mouth once daily.            STOP taking these medications      amLODIPine 10 MG tablet  Commonly known as: NORVASC     carvediloL 6.25 MG tablet  Commonly known as: COREG              Indwelling Lines/Drains at time of discharge:   Lines/Drains/Airways       None                   Time spent on the discharge of patient:  over 30  minutes         Fer Valentine MD  Department of Hospital Medicine  [facility] Surg

## 2025-04-05 NOTE — PLAN OF CARE
04/05/25 1044   Medicare Message   Important Message from Medicare regarding Discharge Appeal Rights Given to patient/caregiver;Explained to patient/caregiver;Signed/date by patient/caregiver   Date IMM was signed 04/05/25   Time IMM was signed 1042

## 2025-04-05 NOTE — ASSESSMENT & PLAN NOTE
Patient has a diagnosis of pneumonia. The cause of the pneumonia is suspected to be bacterial in etiology but organism is not known. The pneumonia is stable. The patient has the following signs/symptoms of pneumonia: persistent hypoxia , cough, sputum production, and shortness of breath. The patient does have a current oxygen requirement and the patient does not have a home oxygen requirement. I have reviewed the pertinent imaging. The following cultures have been collected: Blood cultures and Sputum culture The culture results are listed below.     Current antimicrobial regimen consists of the antibiotics listed below. Will monitor patient closely and continue current treatment plan unchanged.    Antibiotics (From admission, onward)      Start     Stop Route Frequency Ordered    04/02/25 2300  azithromycin (ZITHROMAX) 500 mg in 0.9% NaCl 250 mL IVPB (admixture device)  (Order Panel)         -- IV Every 24 hours (non-standard times) 04/02/25 0046    04/02/25 2300  cefTRIAXone injection 2 g  (Order Panel)         -- IV Every 24 hours (non-standard times) 04/02/25 0046            Microbiology Results (last 7 days)       Procedure Component Value Units Date/Time    Blood culture #1 **CANNOT BE ORDERED STAT** [[id number]]  (Normal) Collected: 04/01/25 2153    Order Status: Completed Specimen: Blood from Peripheral, Forearm, Left Updated: 04/04/25 2301     Blood Culture No Growth After 72 Hours    Blood culture #2 **CANNOT BE ORDERED STAT** [[id number]]  (Normal) Collected: 04/01/25 2156    Order Status: Completed Specimen: Blood from Peripheral, Forearm, Left Updated: 04/04/25 2301     Blood Culture No Growth After 72 Hours    Culture, Respiratory with Gram Stain [[id number]] Collected: 04/02/25 0907    Order Status: Completed Specimen: Respiratory from Sputum Updated: 04/04/25 0543     Respiratory Culture Normal respiratory howard     GRAM STAIN <10 Epithelial Cells/LPF      Rare WBC seen      Rare Gram Negative Rods       Rare Gram positive cocci      Rare Gram negative diplococci    Influenza A & B by Molecular [[id number]]     Order Status: Canceled Specimen: Nasal Swab

## 2025-04-05 NOTE — NURSING
Pt discharged per MD order. IV removed. Catheter tip intact. No distress noted. AVS given to pt.  Vitals per chart. afebrile. No complaints of pain, N/V, diarrhea, or SOB. Pt waiting for virtual nurse

## 2025-04-05 NOTE — ASSESSMENT & PLAN NOTE
Patient is currently in sinus rhythm. CUVTQ3ZHTv Score: 4. The patients heart rate in the last 24 hours is as follows:  Pulse  Min: 48  Max: 67     Antiarrhythmics       Anticoagulants       Plan  - Replete lytes with a goal of K>4, Mg >2  - Patient is not anticoagulated due to thrombocytopenia  - Patient's afib is currently controlled

## 2025-04-05 NOTE — ASSESSMENT & PLAN NOTE
Creatine stable for now. BMP reviewed- noted Estimated Creatinine Clearance: 36.8 mL/min (A) (based on SCr of 1.6 mg/dL (H)). according to latest data. Based on current GFR, CKD stage is stage 3 - GFR 30-59.  Monitor UOP and serial BMP and adjust therapy as needed. Renally dose meds. Avoid nephrotoxic medications and procedures.

## 2025-04-05 NOTE — ASSESSMENT & PLAN NOTE
Patient's blood pressure range in the last 24 hours was: BP  Min: 121/60  Max: 157/76.The patient's inpatient anti-hypertensive regimen is listed below:  Current Antihypertensives  furosemide injection 40 mg, Every 12 hours, Intravenous  valsartan tablet 40 mg, 2 times daily, Oral  furosemide (LASIX) tablet, Daily, Oral    Plan  - BP is controlled, no changes needed to their regimen

## 2025-04-06 LAB
BACTERIA BLD CULT: NORMAL
BACTERIA BLD CULT: NORMAL

## 2025-04-07 LAB — W LEGIONELLA URINARY ANTIGEN: NEGATIVE

## 2025-04-08 ENCOUNTER — PATIENT OUTREACH (OUTPATIENT)
Dept: ADMINISTRATIVE | Facility: CLINIC | Age: 89
End: 2025-04-08
Payer: MEDICARE

## 2025-04-08 ENCOUNTER — PATIENT OUTREACH (OUTPATIENT)
Dept: ADMINISTRATIVE | Facility: HOSPITAL | Age: 89
End: 2025-04-08
Payer: MEDICARE

## 2025-04-08 NOTE — PROGRESS NOTES
C3 nurse spoke with Bria Lawson for a TCC post hospital discharge follow up call. The patient has a scheduled HOSFU appointment with Adiel Bragg MD  on 04/15/25 @ 7633.

## 2025-04-09 ENCOUNTER — OFFICE VISIT (OUTPATIENT)
Dept: OPTOMETRY | Facility: CLINIC | Age: 89
End: 2025-04-09
Payer: MEDICARE

## 2025-04-09 DIAGNOSIS — Z96.1 PSEUDOPHAKIA: ICD-10-CM

## 2025-04-09 DIAGNOSIS — E11.9 TYPE 2 DIABETES MELLITUS WITHOUT RETINOPATHY: Primary | ICD-10-CM

## 2025-04-09 PROCEDURE — 99999 PR PBB SHADOW E&M-EST. PATIENT-LVL III: CPT | Mod: PBBFAC,,, | Performed by: OPTOMETRIST

## 2025-04-09 PROCEDURE — 92014 COMPRE OPH EXAM EST PT 1/>: CPT | Mod: S$PBB,,, | Performed by: OPTOMETRIST

## 2025-04-09 PROCEDURE — 99213 OFFICE O/P EST LOW 20 MIN: CPT | Mod: PBBFAC,PO | Performed by: OPTOMETRIST

## 2025-04-09 NOTE — PROGRESS NOTES
Subjective:       Patient ID: Bria Lawson is a 89 y.o. male      Chief Complaint   Patient presents with    Concerns About Ocular Health     History of Present Illness  Dls: 2/26/24 Dr. Gibson     89 y/o male presents today for diabetic eye exam.  Pt states no changes in vision.  Pt wears trifocal glasses.      today     No tearing   + ou itching   No burning   No pain   No ha's   No floaters   No flashes     Eye meds   Otc gtts ou prn     Pohx:   S/p CE Bilateral 1996   S/p eye lids sx   Retinal injections     Fohx:   None     Hemoglobin A1C       Date                     Value               Ref Range             Status                02/19/2025               6.3 (H)             4.0 - 5.6 %           Final                   07/05/2024               6.1 (H)             4.0 - 5.6 %           Final                  01/22/2024               6.1 (H)             4.0 - 5.6 %           Final                  Assessment/Plan:     1. Type 2 diabetes mellitus without retinopathy (Primary)  No diabetic retinopathy. Discussed with pt the effects of diabetes on vision, importance of good blood sugar control, compliance with meds, and follow up care with PCP. Return in 1 year for dilated eye exam, sooner PRN.      2. Pseudophakia  Well-centered. Clear.     Follow up in about 1 year (around 4/9/2026).

## 2025-04-11 ENCOUNTER — OFFICE VISIT (OUTPATIENT)
Dept: NEPHROLOGY | Facility: CLINIC | Age: 89
End: 2025-04-11
Payer: MEDICARE

## 2025-04-11 VITALS
BODY MASS INDEX: 26.44 KG/M2 | WEIGHT: 189.63 LBS | HEART RATE: 68 BPM | OXYGEN SATURATION: 99 % | DIASTOLIC BLOOD PRESSURE: 73 MMHG | SYSTOLIC BLOOD PRESSURE: 168 MMHG

## 2025-04-11 DIAGNOSIS — N25.81 SECONDARY HYPERPARATHYROIDISM OF RENAL ORIGIN: ICD-10-CM

## 2025-04-11 DIAGNOSIS — N18.30 CKD STAGE 3 DUE TO TYPE 2 DIABETES MELLITUS: Primary | ICD-10-CM

## 2025-04-11 DIAGNOSIS — E11.29 DIABETES MELLITUS WITH PROTEINURIA: ICD-10-CM

## 2025-04-11 DIAGNOSIS — I10 PRIMARY HYPERTENSION: ICD-10-CM

## 2025-04-11 DIAGNOSIS — I12.9 HYPERTENSIVE KIDNEY DISEASE WITH STAGE 3B CHRONIC KIDNEY DISEASE: ICD-10-CM

## 2025-04-11 DIAGNOSIS — R80.9 DIABETES MELLITUS WITH PROTEINURIA: ICD-10-CM

## 2025-04-11 DIAGNOSIS — E11.22 CKD STAGE 3 DUE TO TYPE 2 DIABETES MELLITUS: Primary | ICD-10-CM

## 2025-04-11 DIAGNOSIS — N18.32 HYPERTENSIVE KIDNEY DISEASE WITH STAGE 3B CHRONIC KIDNEY DISEASE: ICD-10-CM

## 2025-04-11 PROCEDURE — 99214 OFFICE O/P EST MOD 30 MIN: CPT | Mod: PBBFAC | Performed by: INTERNAL MEDICINE

## 2025-04-11 PROCEDURE — 99999 PR PBB SHADOW E&M-EST. PATIENT-LVL IV: CPT | Mod: PBBFAC,,, | Performed by: INTERNAL MEDICINE

## 2025-04-11 RX ORDER — AMLODIPINE BESYLATE 10 MG/1
10 TABLET ORAL DAILY
Qty: 90 TABLET | Refills: 3 | Status: SHIPPED | OUTPATIENT
Start: 2025-04-11 | End: 2026-04-11

## 2025-04-11 RX ORDER — CHLORTHALIDONE 25 MG/1
25 TABLET ORAL DAILY
Qty: 90 TABLET | Refills: 4 | Status: SHIPPED | OUTPATIENT
Start: 2025-04-11 | End: 2026-04-11

## 2025-04-11 NOTE — PROGRESS NOTES
Subjective:       Patient ID: Bria Lawson is a 89 y.o. 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 who has been following up in renal clinic for ckd. Recently worsening creatinine.  Patient feels well today. Had one episode of hematuria and was found to have a 1 cm papillary tumor in his cystoscopy. Had an appointment for 8/23/24 for cystoscopy and biopsy.       HPI  Review of Systems   Constitutional: Negative.    HENT: Negative.     Eyes: Negative.    Respiratory: Negative.     Cardiovascular: Negative.    Gastrointestinal: Negative.  Negative for diarrhea, nausea and vomiting.   Genitourinary:  Negative for decreased urine volume, difficulty urinating, dysuria, hematuria and urgency.   Musculoskeletal: Negative.    Skin: Negative.    Neurological: Negative.    Psychiatric/Behavioral: Negative.         Objective:      Physical Exam  Vitals and nursing note reviewed.   Constitutional:       Appearance: He is well-developed.   HENT:      Head: Normocephalic and atraumatic.      Right Ear: External ear normal.      Left Ear: External ear normal.   Neck:      Vascular: No JVD.   Cardiovascular:      Rate and Rhythm: Normal rate and regular rhythm.      Heart sounds: Murmur heard.   Pulmonary:      Effort: Pulmonary effort is normal. No respiratory distress.      Breath sounds: Normal breath sounds. No stridor. No rales.   Chest:       Chest wall: No tenderness.   Abdominal:      General: Bowel sounds are normal. There is no distension.      Palpations: Abdomen is soft. There is no mass.      Tenderness: There is no abdominal tenderness. There is no guarding or rebound.   Musculoskeletal:         General: Normal range of motion.      Cervical back: Normal range of motion and neck supple.      Comments: Trace edema comfort   Lymphadenopathy:      Cervical: No cervical adenopathy.   Skin:     General: Skin is warm and dry.      Comments: Rash lower back.   Neurological:      Mental Status: He is alert and oriented to person, place, and time.      Cranial Nerves: No cranial nerve deficit.      Coordination: Coordination normal.      Deep Tendon Reflexes: Reflexes are normal and symmetric.   Psychiatric:         Behavior: Behavior normal.         Thought Content: Thought content normal.         Judgment: Judgment normal.         Assessment:       1. CKD stage 3 due to type 2 diabetes mellitus    2. 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 Primary hypertension    4. Secondary hyperparathyroidism of renal origin    5. Diabetes mellitus with proteinuria              Plan:       1. CKD3b: With microalbuminuria (Proteinuria improved with medication), likely from DMII/HTN, was started on SGLT2 inhibitor with some increase in creatinine. Diminished proteinuria.     on Valsartan  on Dapagliflozin (had a small creatinine increase with initiation)    -   BP control  - encouraged to drink plenty of fluid  Lab Results   Component Value Date    CREATININE 1.5 (H) 04/09/2025     Protein Creatinine Ratios:    Prot/Creat Ratio, Urine   Date Value Ref Range Status   08/14/2024 0.18 0.00 - 0.20 Final   07/05/2024 0.22 (H) 0.00 - 0.20 Final   01/22/2024 0.38 (H) 0.00 - 0.20 Final         Acid-Base:   Lab Results   Component Value Date     04/09/2025    K 4.4 04/09/2025    CO2 26 04/09/2025     2. HTN: Blood pressures not well  controlled because he was taken off the Amlodipine and Coreg during his last hospital stay.    On Valsartan, low dose Furosemide, will re-start amlodipine and Chlorthalidone for BP control. Stop Furosemide.          3. Secondary (renal) hyperparathyroidism: last PTH within target  Lab Results   Component Value Date    PTH 67.8 04/09/2025    CALCIUM 9.2 04/09/2025    PHOS 3.0 04/09/2025       4. Anemia: mildly worse from last labs, follows with hem/onc for his lymphoma  Lab Results   Component Value Date    HGB 10.1 (L) 04/09/2025        5. DM:  Last HbA1C: well controlled with medication  Lab Results   Component Value Date    HGBA1C 6.3 (H) 02/19/2025       6. Lipid management: On a statin  Lab Results   Component Value Date    LDLCALC 36.0 (L) 07/05/2024      7. Lymphoma: follow with hem/onc, recent PET scan was ok    8. Heart failure: diastolic with concentric hypertrophy. Follow up with cardiology.  On SGLT2 inhibitor and Valsartan.    Visit today included increased complexity associated with managing the longitudinal care of the patient due to the serious and/or complex managed problems CKDb and elevated bp as acute issue.     See back in 4-5 month with labs

## 2025-04-15 ENCOUNTER — OFFICE VISIT (OUTPATIENT)
Dept: FAMILY MEDICINE | Facility: CLINIC | Age: 89
End: 2025-04-15
Payer: MEDICARE

## 2025-04-15 VITALS
HEART RATE: 64 BPM | SYSTOLIC BLOOD PRESSURE: 150 MMHG | OXYGEN SATURATION: 99 % | TEMPERATURE: 98 F | BODY MASS INDEX: 27.19 KG/M2 | WEIGHT: 194.25 LBS | DIASTOLIC BLOOD PRESSURE: 60 MMHG | HEIGHT: 71 IN

## 2025-04-15 DIAGNOSIS — N18.31 STAGE 3A CHRONIC KIDNEY DISEASE: ICD-10-CM

## 2025-04-15 DIAGNOSIS — R91.1 PULMONARY NODULE 1 CM OR GREATER IN DIAMETER: ICD-10-CM

## 2025-04-15 DIAGNOSIS — I50.31 ACUTE DIASTOLIC CHF (CONGESTIVE HEART FAILURE): ICD-10-CM

## 2025-04-15 DIAGNOSIS — D61.818 PANCYTOPENIA: ICD-10-CM

## 2025-04-15 DIAGNOSIS — N18.30 CKD STAGE 3 DUE TO TYPE 2 DIABETES MELLITUS: ICD-10-CM

## 2025-04-15 DIAGNOSIS — I27.21 PAH (PULMONARY ARTERY HYPERTENSION): ICD-10-CM

## 2025-04-15 DIAGNOSIS — I48.91 ATRIAL FIBRILLATION, UNSPECIFIED TYPE: ICD-10-CM

## 2025-04-15 DIAGNOSIS — J18.9 COMMUNITY ACQUIRED PNEUMONIA, UNSPECIFIED LATERALITY: Primary | ICD-10-CM

## 2025-04-15 DIAGNOSIS — E11.21 DIABETES MELLITUS WITH PROTEINURIC DIABETIC NEPHROPATHY: ICD-10-CM

## 2025-04-15 DIAGNOSIS — J96.01 ACUTE RESPIRATORY FAILURE WITH HYPOXIA: ICD-10-CM

## 2025-04-15 DIAGNOSIS — I10 ESSENTIAL HYPERTENSION: ICD-10-CM

## 2025-04-15 DIAGNOSIS — E11.22 CKD STAGE 3 DUE TO TYPE 2 DIABETES MELLITUS: ICD-10-CM

## 2025-04-15 PROCEDURE — 99212 OFFICE O/P EST SF 10 MIN: CPT | Mod: PBBFAC,PO | Performed by: INTERNAL MEDICINE

## 2025-04-15 PROCEDURE — 99999 PR PBB SHADOW E&M-EST. PATIENT-LVL II: CPT | Mod: PBBFAC,,, | Performed by: INTERNAL MEDICINE

## 2025-04-15 PROCEDURE — 99214 OFFICE O/P EST MOD 30 MIN: CPT | Mod: S$PBB,,, | Performed by: INTERNAL MEDICINE

## 2025-04-15 PROCEDURE — G2211 COMPLEX E/M VISIT ADD ON: HCPCS | Mod: S$PBB,,, | Performed by: INTERNAL MEDICINE

## 2025-04-15 RX ORDER — GLIPIZIDE 5 MG/1
TABLET, FILM COATED, EXTENDED RELEASE ORAL
Qty: 90 TABLET | Refills: 3 | Status: SHIPPED | OUTPATIENT
Start: 2025-04-15

## 2025-04-15 NOTE — PROGRESS NOTES
"Chief complaint:Hosp  Follow-up,  diabetes, anemia, last seen 2/25    Physical exam 9/23,     89-year-old black male.  Interval hospitalization reviewed.  He had some cough but that is all resolved.  All respiratory symptoms are better.  Incidentally found to have a 1 cm right upper lobe nodule that is new and a three-month CT or PET scan or biopsy recommended so will refer him to Pulmonary for long term follow up and make the decision about what testing is needed in three months.    Eats breakfast and dinner.  Some sweats at night which do not sound like infection related and having very normal sugar so maybe having some low sugars at night as he is on the glipizide 10 mg.  We discussed for safety reasons reducing it to 5 mg.  His A1c two months ago was 6.3.    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      Patient presents for evaluation of shortness of breath that started 3 days prior to presentation. He reports worsening dyspnea and "feeling bad". Additional symptoms include productive cough, decreased po intake, low grade fever and polyuria. He denies exposure to sick contacts. He denied chest pain or lower extremity swelling.      In the ED, the patient was tachypneic (20s-30s), hypoxic (SpO2:  82%, requiring Ventimask).  Labs were remarkable for leukocytosis (13.5), normocytic anemia (9.8-baseline around 10-11), thrombocytopenia (54- chronically low), elevated D-dimer (4.33), elevated creatinine (2.3-baseline around 2.0), elevated T bili (2.9), elevated BNP (1847), elevated troponin (0.087), elevated procalcitonin (0.33).  VBG showed a pH of 7.41, pCO2 of 29.5.  Chest x-ray showed increased attenuation, most pronounced in the " right perihilar region (edema or infectious process).  Patient was given Lasix 40 mg IV, ceftriaxone 1 g IV, azithromycin 500 mg p.o., aspirin 325 mg, DuoNeb x3.  He was admitted for further management.    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    In the ED, the patient was tachypneic (20s-30s), hypoxic (SpO2:  82%, requiring Ventimask).  Labs were remarkable for leukocytosis (13.5), normocytic anemia (9.8-baseline around 10-11), thrombocytopenia (54- chronically low), elevated D-dimer (4.33), elevated creatinine (2.3-baseline around 2.0), elevated T bili (2.9), elevated BNP (1847), elevated troponin (0.087), elevated procalcitonin (0.33).  VBG showed a pH of 7.41, pCO2 of 29.5.  Chest x-ray showed increased attenuation, most pronounced in the right perihilar region (edema or infectious process).  Patient was given Lasix 40 mg IV, ceftriaxone 1 g IV, azithromycin 500 mg p.o., aspirin 325 mg, DuoNeb x3.  Patient was admitted for acute hypoxic respiratory failure,was on IV abx for pneumonia,IV lasix for CHF ,echo with diastolic CHF,..has hemoptysis,checked CT chest,,show fluid overload,infiltrate,US leg show no DVT and consulted pulmonology. .  Respiratory status is improved slowly,weaned  of oxygen.doing well with PT,OT.  At DC time patient was stable on RA,patient was discharged home with PO Abx,lasix and HH and follow up with PCP as out patient.             here to follow-up on diabetes although did  have A1c prior. Reviewed all his labs and abnormalities.   his A1c was 7.4 in 2/19 then 6.6, 6.8, 5.4 , 6.1, 6.3, 6.1, 5.7, 6.3, 6.5, 6.1, 6/1 July 2024, then 6.3..    Was Eating poorly.  He has some chronic renal insufficiency. Seen renal .   He had a  slight hemoglobin reduction which appears chronic and fluctuating clinical of last year or so with  thrombocytopenia.  We discussed all those issues and the need to monitor them over time.  Is otherwise feeling well.  He is being followed by Hematology.  On iron pill daily and improving.  Somewhere along the way he was put on 81 mg aspirin and is currently off of it I encouraged him to stay off of it as there really is no obvious cardiac reason for him to need the aspirin and he does have thrombocytopenia which is not severe but could become severe and being on aspirin could increase his bleeding risk.    He is willing to get his A1c today.    - Labs for Hematology and we  added an A1c prior and patient will remind the lab to do the standing A1c but NOT done     No further abdominal pain as he had before. Had uti post cysto, all better.  Follow-up urinalysis 8/22 looked good and I reassured patient..  No history of recurrent UTIs and discuss this was probably expected and related to the cystoscope.    5/24- Atypical urothelial cells.   Cysto done per urology.  Discussed the possibility of ruling out cancer.  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 Discussed that it is considered benign and no further surveillance is recommended. Did suggest that it would be appropriate to consider cytoscopy for surveillance in 1 year.       He has seen GI.  Reviewed his last EGD which showed gastritis.  I reviewed the GI noted it sounds like he had some generalized abdominal discomfort after excessive eating.  Symptoms have since resolved.    He does continue to get hives and itching in areas where he applies pressure.  It has been ongoing for years.  He is not on an antihistamine and discussed using Claritin daily neg going to twice daily if that does not suppressed. Now on a shot with GREAT  control of eczema.  He did have a slight flare when he missed is injection while traveling to \[facility] [place] [facility]\""     lab shows hemoglobin drop from 11.4-9.6 then up to 13.6, 11.3, 12, 13, 11.5, 13.4.  No obvious clinical bleeding in the plan by GI will be to do a capsule study if the hemoglobin level drops. Seen by Heme and ferritin improving on iron pill daily. Hgb down to 8.5 in 4/25 admit then up to 10 on d/c      Lipids 2022    Last B12 in 300s    Reviewed blood pressures which appear to be running normal.    .  He did see cardiology ordered an echo     Low normal left ventricular systolic function. The estimated ejection fraction is 50-55%.  Mild eccentric left ventricular hypertrophy.  Mild left ventricular enlargement.  No wall motion abnormalities.  Normal right ventricular systolic function.  Moderate mitral regurgitation.  Mild tricuspid regurgitation.  The estimated PA systolic pressure is 35 mmHg.     He is on digital hypertension and we reviewed his blood pressure and pulse readings.  His pulses mostly in the 40s and 50s occasionally up to 60.  He had his carvedilol reduced from 12.5-6.25 twice a day.  His pulse still appears to be mostly in the 50s.  He is asymptomatic.  We discussed that if symptomatic we would need to eliminate the carvedilol and assess response.  Reviewed all his other labs.  With the Lasix he did not have any worsening renal insufficiency.  He has an appointment with Hematology  His platelet count appears stable.          Chest CT  Impression       1. CT findings favoring sequela of cardiac decompensation causing pulmonary edema and bilateral pleural effusions.  Superimposed COVID-19 pneumonia cannot be entirely excluded noting that pleural effusions is not a commonly reported finding and therefore would be atypical.  2. Persistent soft tissue attenuation nodule within the prevascular mediastinum, presumably an enlarged lymph node and unchanged when compared to previous PET-CTs.  3. Slight  interval increased size of multiple mediastinal lymph nodes, nonspecific.  4. Cardiomegaly with severe dense coronary artery atherosclerosis in a 3 vessel distribution.                        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 EYES: no vision change. ENT: no sore throat. CV: no chest pain w/ exertion. RESP: no shortness of breath. GI: no nausea, vomiting, diarrhea. No dysphagia. : no urinary issues. MUSCULOSKELETAL: no new myalgias or arthralgias. SKIN: no new changes. NEURO: no focal deficits. PSYCH: no new issues. ENDOCRINE: no polyuria. HEME: no lymph nodes. ALLERGY: no general pruritis.       Past medical history:  1.  Diabetes with renal disease and retinopathy  2.  Hypertension  3.  Hyperlipidemia  4.  Chronic renal failure stage III  5.  History of colon polyp, normal scope March 2012, normal 2017-- 5 yrs----GI at [facility]- Dr Kumari -EGD/colon done 11/2020  6.  Hyperuricemia and gout  7.  History of sciatica and lumbar disc disease remotely  8.  Erectile dysfunction  9.  Macular degeneration, followed by outside retina specialist  10.  GERD  11.  Low HDL  12.  Inflammatory arthritis of the hands, seeing rheumatology  13.  Bilateral cervical radiculopathy and axonal neuropathy, to have surgery 2015    14.  Followed by outside urology at [facility] Dr. noriega   15.  TKA  -angie Han  who is at [facility]  16.  Prevnar given 2017  17. 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 Neg cysto 2022    Past surgical history: Right knee replacement, left knee arthroscopy, bilateral cataracts, scrotal cyst removed.    Family history: Mother with hypertension and diabetes.  Father's history is unknown.  One brother who is okay.    Social history: Retired, quit smoking 1974.   with 3 children.  Drinks socially.    Vital signs as above  Gen: no distress  EYES: conjunctiva clear, non-icteric, PERRL  ENT:  nose congested, nasal mucosa red, oropharynx slightly red and moist, teeth good  NECK:supple, thyroid non-palpable, no cervical lymph nodes  RESP: effort is good, lungs clear  CV: heart RRR w/o murmur, gallops or rubs; no carotid bruits, no edema  GI: abdomen soft, non-distended, non-tender  MS: gait normal, no clubbing or cyanosis of the digits  SKIN: no rashes, warm to touch, no sinus pain to touch    Diagnoses and all orders for this visit:    Community acquired pneumonia, unspecified laterality, clinically resolved    Pulmonary nodule 1 cm or greater in diameter, refer to pulmonary  -     Ambulatory referral/consult to Pulmonology; Future    Acute respiratory failure with hypoxia, improved    Acute diastolic CHF (congestive heart failure), improved and compensated    Atrial fibrillation, unspecified type, chronic and stable    Diabetes mellitus with proteinuric diabetic nephropathy, chronic and stable but maybe having some low sugars and so for safety reasons might be better to reduce glipizide to 5 mg    Stage 3a chronic kidney disease, chronic and stable    Essential hypertension, chronic and stable    Pancytopenia, chronic and stable and platelet count was normalizing before discharge    CKD stage 3 due to type 2 diabetes mellitus, chronic and stable    PAH (pulmonary artery hypertension), noted, chronic and stable    Other orders  -     glipiZIDE 5 MG TR24; TAKE 1 TABLET(5MG) BY MOUTH EVERY DAY     This is a patient with chronic and complex diagnoses whose overall, ongoing care is being managed and monitored by me and our primary care clinic. As such,   is the appropriate add-on code to accompany the other E/M billing for this visit.

## 2025-04-16 ENCOUNTER — TELEPHONE (OUTPATIENT)
Dept: FAMILY MEDICINE | Facility: CLINIC | Age: 89
End: 2025-04-16
Payer: MEDICARE

## 2025-04-16 NOTE — TELEPHONE ENCOUNTER
I spoke with that patient and informed him that his Diabetic Shoes Order is at the  on the second floor for him to . He understood.

## 2025-04-22 RX ORDER — ALLOPURINOL 100 MG/1
100 TABLET ORAL
Qty: 90 TABLET | Refills: 3 | Status: SHIPPED | OUTPATIENT
Start: 2025-04-22

## 2025-04-22 NOTE — TELEPHONE ENCOUNTER
Care Due:                  Date            Visit Type   Department     Provider  --------------------------------------------------------------------------------                                             [facility]     / INTERNAL [facility] Adiel Coleman  Last Visit: 04-      FOLLOW UP    / PEDS         [place]  Next Visit: None Scheduled  None         None Found                                                            Last  Test          Frequency    Reason                     Performed    Due Date  --------------------------------------------------------------------------------    Lipid Panel.  12 months..  atorvastatin.............  07- 06-    [facility] Embedded Care Due Messages. Reference number: [id number].   4/22/2025 1:18:28 PM CDT

## 2025-04-22 NOTE — TELEPHONE ENCOUNTER
Refill Routing Note   Medication(s) are not appropriate for processing by [facility] for the following reason(s):        Required labs abnormal:    CREATININE 1.5 (H) 04/09/2025       OR action(s):  Defer   Requires labs : Yes           Pharmacist review requested: Yes     Appointments  past 12m or future 3m with PCP    Date Provider   Last Visit   4/15/2025 Adiel Bragg MD   Next Visit   Visit date not found Adiel Bragg MD   ED visits in past 90 days: 0        Note composed:6:44 PM 04/22/2025

## 2025-04-23 NOTE — TELEPHONE ENCOUNTER
Provider Staff:  Action required for this patient     Please see care gap opportunities below in Care Due Message.    Thanks!  [facility]     Appointments      Date Provider   Last Visit   4/15/2025 Adiel Bragg MD   Next Visit   Visit date not found Adiel Bragg MD      Refill Decision Note   Bria Lawson  is requesting a refill authorization.  Brief Assessment and Rationale for Refill:  Approve     Medication Therapy Plan:         Pharmacist review requested: Yes   Extended chart review required: Yes   Comments:     Note composed:7:52 PM 04/22/2025

## 2025-04-30 ENCOUNTER — EXTERNAL CHRONIC CARE MANAGEMENT (OUTPATIENT)
Dept: PRIMARY CARE CLINIC | Facility: CLINIC | Age: 89
End: 2025-04-30
Payer: MEDICARE

## 2025-04-30 PROCEDURE — 99490 CHRNC CARE MGMT STAFF 1ST 20: CPT | Mod: PBBFAC,PO | Performed by: INTERNAL MEDICINE

## 2025-05-19 ENCOUNTER — PATIENT OUTREACH (OUTPATIENT)
Dept: ADMINISTRATIVE | Facility: OTHER | Age: 89
End: 2025-05-19
Payer: MEDICARE

## 2025-05-19 NOTE — PROGRESS NOTES
CHW - Case Closure    This Community Health Worker spoke to patient via telephone today.   Pt reported: Patient states he has no needs, nor assistance at this time. Pt has graduated and agreed to case closure.   Pt denied any additional needs at this time and agrees with episode closure at this time.  Provided patient with Community Health Worker's contact information and encouraged him to contact this Community Health Worker if additional needs arise.

## 2025-05-26 ENCOUNTER — OFFICE VISIT (OUTPATIENT)
Dept: PULMONOLOGY | Facility: CLINIC | Age: 89
End: 2025-05-26
Payer: MEDICARE

## 2025-05-26 VITALS
HEIGHT: 71 IN | WEIGHT: 196.19 LBS | DIASTOLIC BLOOD PRESSURE: 64 MMHG | OXYGEN SATURATION: 94 % | HEART RATE: 72 BPM | SYSTOLIC BLOOD PRESSURE: 132 MMHG | BODY MASS INDEX: 27.47 KG/M2

## 2025-05-26 DIAGNOSIS — G47.39 OTHER SLEEP APNEA: ICD-10-CM

## 2025-05-26 DIAGNOSIS — J96.01 ACUTE RESPIRATORY FAILURE WITH HYPOXEMIA: ICD-10-CM

## 2025-05-26 DIAGNOSIS — R91.1 PULMONARY NODULE 1 CM OR GREATER IN DIAMETER: Primary | ICD-10-CM

## 2025-05-26 PROCEDURE — 99215 OFFICE O/P EST HI 40 MIN: CPT | Mod: PBBFAC | Performed by: INTERNAL MEDICINE

## 2025-05-26 PROCEDURE — 99215 OFFICE O/P EST HI 40 MIN: CPT | Mod: S$PBB,,, | Performed by: INTERNAL MEDICINE

## 2025-05-26 PROCEDURE — 99999 PR PBB SHADOW E&M-EST. PATIENT-LVL V: CPT | Mod: PBBFAC,,, | Performed by: INTERNAL MEDICINE

## 2025-05-26 NOTE — PROGRESS NOTES
General Pulmonary Clinic  New Patient Visit    Chief Complaint: lung nodules     HPI     Bria Lawson is a 89 y.o. 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, thrombocytoepnia presenting with chief complaint of recent hospitalization for acute hypoxemic resp failure and lung nodules    # acute hypoxemic resp failure  # lung nodule    Discharge summary from 4/5/2025 personally reviewed  He presented w/ dyspnea x 3 days with productive cough, fever  He was found to be hypoxemic in the ER and placed on ventimask  Labs notale for leukocytosis, thrombocytopenia, elevated ddimer, elevated Cr, elevated procal  and elevated BNP 1000    He underwent CT chest 4/2/25 personally interpreted (lung portion only)  Right lung ground glass opacities with centrilobular nodules in RUL and interlobular septal thickening  Bilateral pleural effusions  Nodules  RUL 1 cm solid nodule  RML peripheral 4 mm nodule  LLL costophrenic 5 mm bnodule  Mild mediastinal EFREN    He was diuresed and treated w antibiotics for CAP w/ symptomatic improvement    CURRENT RESPIRATORY SYMPTOMS:  He denies wheezing but has cough and phlegm production, which he attributes to post-nasal drip.    SLEEP:  His wife reports episodes of apnea during sleep, though he has never had a formal sleep study. He reports an irregular sleep schedule, easily napping at 7:00 PM or 9:00 PM, and typically going to bed around 2:30 AM.        ROS:  General: -fever, -chills, -fatigue, -weight gain, -weight loss  Eyes: -vision changes, -redness, -discharge  ENT: -ear pain, -nasal congestion, -sore throat, +nasal discharge, +post nasal drip  Cardiovascular: -chest pain, -palpitations, +lower extremity edema  Respiratory: -cough, -shortness of breath, +wheezing, +exertional dyspnea, +apnea, +intermittent breathing while sleeping  Gastrointestinal: -abdominal pain, -nausea, -vomiting, -diarrhea, -constipation, -blood in  stool  Genitourinary: -dysuria, -hematuria, -frequency, +nocturia  Musculoskeletal: +joint pain, -muscle pain  Skin: -rash, +lesion  Neurological: -headache, -dizziness, -numbness, -tingling, +sleep disturbances, +difficulty staying asleep, +difficulty falling asleep  Psychiatric: -anxiety, -depression, +sleep difficulty         LORELEI: + snoring, witnessed apnea, he does have frequent night awakenings, non restorative sleep, can take naps easily      Childhood: no history of wheezing, coughing, asthma; no recurrent pneumonias or infections    Exposures  Former smoker - 1 ppd x 20 years. 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   [place] in [place].   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 He reports past marijuana use with last use approximately one year ago, denying current use or vaping. He served in the  between the [place] and [place] wars (circa 1955) with exposure to airplane cleaning solvents and degreasers. 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    Hyperlipidemia 02/26/2015    Hypertension     Lymphoma     Uncontrolled secondary diabetes mellitus with stage 3 CKD (GFR 30-59) 08/28/2015         Past Surgical History:  Past  Surgical History:   Procedure Laterality Date    BONE MARROW BIOPSY Left 09/19/2018    Procedure: Biopsy-bone marrow;  Surgeon: Velia Tobias MD;  Location: [facility];  Service: Oncology;  Laterality: Left;    CATARACT EXTRACTION Bilateral 1996    COLONOSCOPY N/A 11/24/2020    Procedure: COLONOSCOPY [place];  Surgeon: Masoud Hwang MD;  Location: [facility];  Service: Endoscopy;  Laterality: N/A;    CYSTOSCOPY  8/23/2024    Procedure: CYSTOSCOPY;  Surgeon: Ernie Nunn Jr., MD;  Location: [address];  Service: Urology;;    ESOPHAGOGASTRODUODENOSCOPY N/A 11/24/2020    Procedure: EGD (ESOPHAGOGASTRODUODENOSCOPY);  Surgeon: Masoud Hwang MD;  Location: [facility];  Service: Endoscopy;  Laterality: N/A;    eye lid sx Bilateral 2017    EYE SURGERY      cataracts    JOINT REPLACEMENT      knee replacement    retinal injection s Bilateral     scrotal cyst      TURBT (TRANSURETHRAL RESECTION OF BLADDER TUMOR) N/A 8/23/2024    Procedure: TURBT (TRANSURETHRAL RESECTION OF BLADDER TUMOR);  Surgeon: Ernie Nunn Jr., MD;  Location: [address];  Service: Urology;  Laterality: N/A;         Social History:   Social History     Socioeconomic History    Marital status:    Tobacco Use    Smoking status: Former     Types: Cigarettes    Smokeless tobacco: Never    Tobacco comments:     09/26/23 Patient Quit Smoking At The Age of 38 In 1974.   Substance and Sexual Activity    Alcohol use: Yes     Comment: socially - maybe few times a week    Drug use: No    Sexual activity: Yes     Partners: Female     Social Drivers of Health     Financial Resource Strain: Low Risk  (5/23/2025)    Overall Financial Resource Strain (CARDIA)     Difficulty of Paying Living Expenses: Not hard at all   Food Insecurity: No Food Insecurity (5/23/2025)    Hunger Vital Sign     Worried About Running Out of Food in the Last Year: Never true     Ran Out of Food in the Last Year: Never true   Transportation Needs: No  "Transportation Needs (5/23/2025)    PRAPARE - Transportation     Lack of Transportation (Medical): No     Lack of Transportation (Non-Medical): No   Physical Activity: Inactive (5/23/2025)    Exercise Vital Sign     Days of Exercise per Week: 0 days     Minutes of Exercise per Session: 20 min   Stress: No Stress Concern Present (5/23/2025)    [employer] [place] [employer] - Occupational Stress Questionnaire     Feeling of Stress : Not at all   Housing Stability: Low Risk  (5/23/2025)    Housing Stability Vital Sign     Unable to Pay for Housing in the Last Year: No     Number of Times Moved in the Last Year: 0     Homeless in the Last Year: No         Family Hx:  No family history of pulmonary fibrosis, pre-mature graying of hair, cirrhosis, or hematologic malignancies  No family history of emphysema or spontaneous PTX  no family history of lung cancer or lymphoma    Allergies and Pertinent Medications   Allergies and Medications Reviewed    Patient has no known allergies.  [unfilled]             Physical Exam   /64   Pulse 72   Ht 5' 11" (1.803 m)   Wt 89 kg (196 lb 3.4 oz)   SpO2 (!) 94%   BMI 27.37 kg/m²       Constitutional: No acute distress, Atraumatic   HEENT: moist mucus membranes, extraocular movements intact  Cardiovascular: regular rate and rhythm, no murmurs, rubs or gallops  Pulmonary: normal respiratory rate and chest rise, no chest wall deformity, normal breath sounds with no wheezing or crackles  Abdominal: non-distended, bowel sounds present  Musculoskeletal: No lower extremity edema, no clubbing  Neurological: normal speech/thomas, moves all extremities against gravity  Skin: no finger cyanosis, no rashes on exposed body parts  Psych: Appropriate affect, normal mood       Diagnostic Studies      All diagnostic studies relevant to chief complaint reviewed personally    Labs:  Lab Results   Component Value Date    WBC 3.01 (L) 04/09/2025    HGB 10.1 (L) 04/09/2025    HGB " "13.7 (L) 02/12/2025     04/09/2025    PLT 51 (L) 02/12/2025    MCV 99 (H) 04/09/2025    MCV 97 02/12/2025     Lab Results   Component Value Date     04/09/2025     02/12/2025    K 4.4 04/09/2025    K 4.2 02/12/2025    CO2 26 04/09/2025    CO2 28 02/12/2025    BUN 23 04/09/2025    CREATININE 1.5 (H) 04/09/2025    MG 1.5 (L) 04/09/2025     Lab Results   Component Value Date    AST 18 04/02/2025    AST 15 02/12/2025    ALT 13 04/02/2025    ALT 11 02/12/2025    ALBUMIN 3.4 (L) 04/09/2025    ALBUMIN 4.2 02/12/2025    PROT 6.7 04/02/2025    PROT 7.9 02/12/2025    BILITOT 1.8 (H) 04/02/2025    BILITOT 1.2 (H) 02/12/2025         PFTs:  Pulmonary Functions Testing Results:  No results found for: "FEV1", "FVC", "ZAS9KEW", "TLC", "DLCO"      FVC FEV1 FEV/FVC TLC DLCO 6MWT Interpret                                                         TTE:  Results for orders placed during the hospital encounter of 04/01/25    Echo    Interpretation Summary    Left Ventricle: The left ventricle is normal in size. Mildly increased wall thickness. There is concentric hypertrophy. There is normal systolic function with a visually estimated ejection fraction of 55 - 60%. There is diastolic dysfunction.    Right Ventricle: The right ventricle is normal in size. Systolic function is normal.    Left Atrium: Severely dilated    Right Atrium: Right atrium is mildly dilated.    Mitral Valve: There is moderate regurgitation.    Tricuspid Valve: There is mild regurgitation.    Pulmonary Artery: The estimated pulmonary artery systolic pressure is 31 mmHg.    IVC/SVC: Intermediate venous pressure at 8 mmHg.            Assessment and Plan   Bria Lawson is a 89 y.o. male  presenting with chief complaint of lung nodule and recent admission for acute hypoxemia      LUNG NODULES - unclear chronicity  RECENT ADMISSION FOR ACUTE HYPOXEMIA - resolved, likely due to PNA     Recommend repeat CT Chest in 3 months to reassess lung spots found " during acute illness, as they may have been due to infection.   If spots persist on follow-up CT, will likely recommend biopsy of largest spot.   Ordered pulmonary function testing and 6-minute walk test to assess lung function and oxygen needs, given borderline low SpO2 (94%).    OBSTRUCTIVE SLEEP APNEA: chronic, stable   Explained that untreated sleep apnea can lead to increased risks of a-fib, stroke, heart failure, and worsening respiratory function.   Described different types of CPAP masks available (full face mask vs nasal pillows).   Discussed that CPAP remains the gold standard treatment for sleep apnea, with stronger evidence compared to newer alternatives like Inspire.   Recommend home sleep study to evaluate for obstructive sleep apnea, given wife's report of breathing pauses during sleep and daytime sleepiness.        FOLLOW-UP:   Follow up on July 8th at 11:30 am as scheduled to review CT results and other test findings.         Explained to the patient I work Monday-Thursdays, so any results or messages received after working hours Thursday through Monday AM would not be addressed until I was back in the office. If he/she experienced any acute symptoms he/she should go the emergency room for immediate help.    Differential, diagnoses, diagnostic work up, and possible treatments were discussed with the patient. Questions were answered.    Prema Tipton MD  Pulmonary-Critical Care Medicine              For this visit, the following time was spent  preparing to see the patient (e.g., review of tests) 10 minutes  obtaining and/or reviewing separately obtained history 0 minutes  Performing a medically necessary appropriate examination and/or evaluation 13 minutes  Counseling and educating the patient/family/caregiver 14 minutes  Ordering medications, tests, or procedures 3 minutes  Referring and communicating with other health care professionals (when not reported separately) 0minutes  Documenting clinical  information in the electronic or other health record 5 minutes  Care coordination (not reported separately) 0minutes    Total time = 45 minutes      This note was generated with the assistance of ambient listening technology. Verbal consent was obtained by the patient and accompanying visitor(s) for the recording of patient appointment to facilitate this note. I attest to having reviewed and edited the generated note for accuracy, though some syntax or spelling errors may persist. Please contact the author of this note for any clarification.

## 2025-05-30 ENCOUNTER — HOSPITAL ENCOUNTER (OUTPATIENT)
Dept: SLEEP MEDICINE | Facility: HOSPITAL | Age: 89
Discharge: HOME | End: 2025-05-30
Attending: INTERNAL MEDICINE
Payer: MEDICARE

## 2025-05-30 DIAGNOSIS — G47.39 OTHER SLEEP APNEA: ICD-10-CM

## 2025-05-30 PROCEDURE — 95800 SLP STDY UNATTENDED: CPT | Mod: 26,,, | Performed by: INTERNAL MEDICINE

## 2025-05-30 PROCEDURE — 95800 SLP STDY UNATTENDED: CPT

## 2025-05-30 NOTE — PROGRESS NOTES
The patient ID was verified. He  was instructed on how to turn the Home Sleep Testing device on and off, how to apply the sensors. He was encouraged to sleep on supine position and must have 6 hours of sleep. The patient was instructed not to get the device wet and return it back to us. All questions were answered prior to patient leaving. He  was provided the after visit summary.

## 2025-05-31 ENCOUNTER — EXTERNAL CHRONIC CARE MANAGEMENT (OUTPATIENT)
Dept: PRIMARY CARE CLINIC | Facility: CLINIC | Age: 89
End: 2025-05-31
Payer: MEDICARE

## 2025-05-31 PROCEDURE — 99490 CHRNC CARE MGMT STAFF 1ST 20: CPT | Mod: PBBFAC,PO | Performed by: INTERNAL MEDICINE

## 2025-06-02 ENCOUNTER — HOSPITAL ENCOUNTER (OUTPATIENT)
Dept: RESPIRATORY THERAPY | Facility: HOSPITAL | Age: 89
Discharge: HOME | End: 2025-06-02
Attending: INTERNAL MEDICINE
Payer: MEDICARE

## 2025-06-02 VITALS — OXYGEN SATURATION: 99 % | RESPIRATION RATE: 18 BRPM | HEART RATE: 62 BPM

## 2025-06-02 DIAGNOSIS — J96.01 ACUTE RESPIRATORY FAILURE WITH HYPOXEMIA: ICD-10-CM

## 2025-06-02 PROCEDURE — 25000242 PHARM REV CODE 250 ALT 637 W/ HCPCS: Performed by: INTERNAL MEDICINE

## 2025-06-02 PROCEDURE — 94200 LUNG FUNCTION TEST (MBC/MVV): CPT

## 2025-06-02 PROCEDURE — 94618 PULMONARY STRESS TESTING: CPT

## 2025-06-02 PROCEDURE — 94729 DIFFUSING CAPACITY: CPT

## 2025-06-02 RX ORDER — ALBUTEROL SULFATE 2.5 MG/.5ML
2.5 SOLUTION RESPIRATORY (INHALATION) ONCE
Status: COMPLETED | OUTPATIENT
Start: 2025-06-02 | End: 2025-06-02

## 2025-06-02 RX ADMIN — ALBUTEROL SULFATE 2.5 MG: 2.5 SOLUTION RESPIRATORY (INHALATION) at 08:06

## 2025-06-03 ENCOUNTER — PATIENT MESSAGE (OUTPATIENT)
Dept: PULMONOLOGY | Facility: CLINIC | Age: 89
End: 2025-06-03
Payer: MEDICARE

## 2025-06-03 PROBLEM — G47.39 OTHER SLEEP APNEA: Status: ACTIVE | Noted: 2025-04-03

## 2025-06-05 LAB
BRPFT: ABNORMAL
DLCO ADJ PRE: 29.03 ML/(MIN*MMHG) (ref 17.44–31.29)
DLCO SINGLE BREATH LLN: 17.44
DLCO SINGLE BREATH PRE REF: 100.7 %
DLCO SINGLE BREATH REF: 24.37
DLCOC SBVA LLN: 2.25
DLCOC SBVA PRE REF: 146.8 %
DLCOC SBVA REF: 3.34
DLCOC SINGLE BREATH LLN: 17.44
DLCOC SINGLE BREATH PRE REF: 119.2 %
DLCOC SINGLE BREATH REF: 24.37
DLCOVA LLN: 2.25
DLCOVA PRE REF: 124 %
DLCOVA PRE: 4.14 ML/(MIN*MMHG*L) (ref 2.25–4.42)
DLCOVA REF: 3.34
DLVAADJ PRE: 4.9 ML/(MIN*MMHG*L) (ref 2.25–4.42)
ERVN2 LLN: -16449.15
ERVN2 PRE REF: 62.4 %
ERVN2 PRE: 0.53 L (ref -16449.15–16450.85)
ERVN2 REF: 0.85
FEF 25 75 CHG: 43.7 %
FEF 25 75 LLN: 0.7
FEF 25 75 POST REF: 130.6 %
FEF 25 75 PRE REF: 90.9 %
FEF 25 75 REF: 2.41
FET100 CHG: -14.6 %
FEV1 CHG: 4.1 %
FEV1 FVC CHG: 8.3 %
FEV1 FVC LLN: 58
FEV1 FVC POST REF: 113.1 %
FEV1 FVC PRE REF: 104.5 %
FEV1 FVC REF: 74
FEV1 LLN: 1.81
FEV1 POST REF: 97.5 %
FEV1 PRE REF: 93.7 %
FEV1 REF: 2.71
FRCN2 LLN: 2.93
FRCN2 PRE REF: 73.8 %
FRCN2 REF: 3.91
FVC CHG: -3.9 %
FVC LLN: 2.63
FVC POST REF: 85 %
FVC PRE REF: 88.4 %
FVC REF: 3.74
IVC PRE: 3.37 L (ref 2.63–4.85)
IVC SINGLE BREATH LLN: 2.63
IVC SINGLE BREATH PRE REF: 90 %
IVC SINGLE BREATH REF: 3.74
PEF CHG: -16.2 %
PEF LLN: 3.97
PEF POST REF: 133.5 %
PEF PRE REF: 159.3 %
PEF REF: 6.34
POST FEF 25 75: 3.14 L/S (ref 0.7–4.12)
POST FET 100: 7.46 SEC
POST FEV1 FVC: 83.2 % (ref 57.7–89.39)
POST FEV1: 2.65 L (ref 1.81–3.61)
POST FVC: 3.18 L (ref 2.63–4.85)
POST PEF: 8.47 L/S (ref 3.97–8.72)
PRE DLCO: 24.53 ML/(MIN*MMHG) (ref 17.44–31.29)
PRE FEF 25 75: 2.19 L/S (ref 0.7–4.12)
PRE FET 100: 8.74 SEC
PRE FEV1 FVC: 76.84 % (ref 57.7–89.39)
PRE FEV1: 2.54 L (ref 1.81–3.61)
PRE FRC N2: 2.89 L
PRE FVC: 3.31 L (ref 2.63–4.85)
PRE PEF: 10.1 L/S (ref 3.97–8.72)
RVN2 LLN: 2.39
RVN2 PRE REF: 77 %
RVN2 PRE: 2.36 L (ref 2.39–3.74)
RVN2 REF: 3.06
RVN2TLCN2 LLN: 39.3
RVN2TLCN2 PRE REF: 88.9 %
RVN2TLCN2 PRE: 42.91 % (ref 39.3–57.26)
RVN2TLCN2 REF: 48.28
TLCN2 LLN: 6.15
TLCN2 PRE REF: 75.3 %
TLCN2 PRE: 5.5 L (ref 6.15–8.45)
TLCN2 REF: 7.3
VA PRE: 5.93 L (ref 7.15–7.15)
VA SINGLE BREATH LLN: 7.15
VA SINGLE BREATH PRE REF: 82.9 %
VA SINGLE BREATH REF: 7.15
VCMAXN2 LLN: 2.63
VCMAXN2 PRE REF: 84 %
VCMAXN2 PRE: 3.14 L (ref 2.63–4.85)
VCMAXN2 REF: 3.74

## 2025-06-06 ENCOUNTER — RESULTS FOLLOW-UP (OUTPATIENT)
Dept: PULMONOLOGY | Facility: CLINIC | Age: 89
End: 2025-06-06

## 2025-06-06 DIAGNOSIS — G47.33 SEVERE OBSTRUCTIVE SLEEP APNEA: Primary | ICD-10-CM

## 2025-06-09 ENCOUNTER — EXTERNAL HOME HEALTH (OUTPATIENT)
Dept: HOME HEALTH SERVICES | Facility: HOSPITAL | Age: 89
End: 2025-06-09
Payer: MEDICARE

## 2025-06-30 ENCOUNTER — EXTERNAL CHRONIC CARE MANAGEMENT (OUTPATIENT)
Dept: PRIMARY CARE CLINIC | Facility: CLINIC | Age: 89
End: 2025-06-30
Payer: MEDICARE

## 2025-06-30 PROCEDURE — 99490 CHRNC CARE MGMT STAFF 1ST 20: CPT | Mod: PBBFAC,PO | Performed by: INTERNAL MEDICINE

## 2025-07-01 ENCOUNTER — HOSPITAL ENCOUNTER (OUTPATIENT)
Dept: RADIOLOGY | Facility: HOSPITAL | Age: 89
Discharge: HOME | End: 2025-07-01
Attending: INTERNAL MEDICINE
Payer: MEDICARE

## 2025-07-01 DIAGNOSIS — R91.1 PULMONARY NODULE 1 CM OR GREATER IN DIAMETER: ICD-10-CM

## 2025-07-01 PROCEDURE — 71250 CT THORAX DX C-: CPT | Mod: TC

## 2025-07-01 PROCEDURE — 71250 CT THORAX DX C-: CPT | Mod: 26,,, | Performed by: RADIOLOGY

## 2025-07-08 ENCOUNTER — OFFICE VISIT (OUTPATIENT)
Dept: PULMONOLOGY | Facility: CLINIC | Age: 89
End: 2025-07-08
Payer: MEDICARE

## 2025-07-08 VITALS
HEART RATE: 66 BPM | DIASTOLIC BLOOD PRESSURE: 66 MMHG | SYSTOLIC BLOOD PRESSURE: 112 MMHG | OXYGEN SATURATION: 96 % | BODY MASS INDEX: 27.29 KG/M2 | WEIGHT: 195.69 LBS

## 2025-07-08 DIAGNOSIS — G47.33 SEVERE OBSTRUCTIVE SLEEP APNEA: Primary | ICD-10-CM

## 2025-07-08 DIAGNOSIS — E32.8 THYMIC CYST: ICD-10-CM

## 2025-07-08 DIAGNOSIS — R91.1 PULMONARY NODULE: ICD-10-CM

## 2025-07-08 PROCEDURE — 99214 OFFICE O/P EST MOD 30 MIN: CPT | Mod: PBBFAC | Performed by: INTERNAL MEDICINE

## 2025-07-08 PROCEDURE — 99214 OFFICE O/P EST MOD 30 MIN: CPT | Mod: S$PBB,,, | Performed by: INTERNAL MEDICINE

## 2025-07-08 PROCEDURE — 99999 PR PBB SHADOW E&M-EST. PATIENT-LVL IV: CPT | Mod: PBBFAC,,, | Performed by: INTERNAL MEDICINE

## 2025-07-08 NOTE — PROGRESS NOTES
General Pulmonary Clinic  New Patient Visit    Sept 9    Chief Complaint: lung nodules     HPI     Bria Lawson is a 89 y.o. 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  PFTs w/ borderline restriction othwewise normal  Sleep study w/ severe sleep apnea, auto titrating CPAP ordered, scheduled 7/21/25 to receive    Overall dyspnea has resolved    Presenting HPI  # acute hypoxemic resp failure  # lung nodule    Discharge summary from 4/5/2025 personally reviewed  He presented w/ dyspnea x 3 days with productive cough, fever  He was found to be hypoxemic in the ER and placed on ventimask  Labs notale for leukocytosis, thrombocytopenia, elevated ddimer, elevated Cr, elevated procal  and elevated BNP 1000    He underwent CT chest 4/2/25 personally interpreted (lung portion only)  Right lung ground glass opacities with centrilobular nodules in RUL and interlobular septal thickening  Bilateral pleural effusions  Nodules  RUL 1 cm solid nodule  RML peripheral 4 mm nodule  LLL costophrenic 5 mm bnodule  Mild mediastinal EFREN    He was diuresed and treated w antibiotics for CAP w/ symptomatic improvement    CURRENT RESPIRATORY SYMPTOMS:  He denies wheezing but has cough and phlegm production, which he attributes to post-nasal drip.    SLEEP:  His wife reports episodes of apnea during sleep, though he has never had a formal sleep study. He reports an irregular sleep schedule, easily napping at 7:00 PM or 9:00 PM, and typically going to bed around 2:30 AM.        ROS:  General: -fever, -chills, -fatigue, -weight gain, -weight  loss  Eyes: -vision changes, -redness, -discharge  ENT: -ear pain, -nasal congestion, -sore throat, +nasal discharge, +post nasal drip  Cardiovascular: -chest pain, -palpitations, +lower extremity edema  Respiratory: -cough, -shortness of breath, +wheezing, +exertional dyspnea, +apnea, +intermittent breathing while sleeping  Gastrointestinal: -abdominal pain, -nausea, -vomiting, -diarrhea, -constipation, -blood in stool  Genitourinary: -dysuria, -hematuria, -frequency, +nocturia  Musculoskeletal: +joint pain, -muscle pain  Skin: -rash, +lesion  Neurological: -headache, -dizziness, -numbness, -tingling, +sleep disturbances, +difficulty staying asleep, +difficulty falling asleep  Psychiatric: -anxiety, -depression, +sleep difficulty         LORELEI: + snoring, witnessed apnea, he does have frequent night awakenings, non restorative sleep, can take naps easily      Childhood: no history of wheezing, coughing, asthma; no recurrent pneumonias or infections    Exposures  Former smoker - 1 ppd x 20 years. 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   [place] in [employer].   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 He reports past marijuana use with last use approximately one year ago, denying current use or vaping. He served in the  between the [place] and [place] wars (circa 1955) with exposure to airplane cleaning solvents and degreasers. 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    Hyperlipidemia 02/26/2015    Hypertension     Lymphoma     Uncontrolled secondary diabetes mellitus with stage 3 CKD (GFR 30-59) 08/28/2015         Past Surgical History:  Past Surgical History:   Procedure Laterality Date    BONE MARROW BIOPSY Left 09/19/2018    Procedure: Biopsy-bone marrow;  Surgeon: Velia Tobias MD;  Location: [facility];  Service: Oncology;  Laterality: Left;    CATARACT EXTRACTION Bilateral 1996    COLONOSCOPY N/A 11/24/2020    Procedure: COLONOSCOPY Gol[place];  Surgeon: Masoud Hwang MD;  Location: [facility];  Service: Endoscopy;  Laterality: N/A;    CYSTOSCOPY  8/23/2024    Procedure: CYSTOSCOPY;  Surgeon: Ernie Nunn Jr., MD;  Location: [facility] OR [address] [place] [address];  Service: Urology;;    ESOPHAGOGASTRODUODENOSCOPY N/A 11/24/2020    Procedure: EGD (ESOPHAGOGASTRODUODENOSCOPY);  Surgeon: Masoud Hwang MD;  Location: [facility];  Service: Endoscopy;  Laterality: N/A;    eye lid sx Bilateral 2017    EYE SURGERY      cataracts    JOINT REPLACEMENT      knee replacement    retinal injection s Bilateral     scrotal cyst      TURBT (TRANSURETHRAL RESECTION OF BLADDER TUMOR) N/A 8/23/2024    Procedure: TURBT (TRANSURETHRAL RESECTION OF BLADDER TUMOR);  Surgeon: Ernie Nunn Jr., MD;  Location: [facility] OR [address] [place] [address];  Service: Urology;  Laterality: N/A;         Social History:   Social History     Socioeconomic History    Marital status:    Tobacco Use    Smoking status: Former     Types: Cigarettes    Smokeless tobacco: Never    Tobacco comments:     09/26/23 Patient Quit Smoking At The Age of 38 In 1974.   Substance and Sexual Activity    Alcohol use: Yes     Comment: socially - maybe few times a week    Drug use: No    Sexual activity: Yes      Partners: Female     Social Drivers of Health     Financial Resource Strain: Low Risk  (5/23/2025)    Overall Financial Resource Strain (CARDIA)     Difficulty of Paying Living Expenses: Not hard at all   Food Insecurity: No Food Insecurity (5/23/2025)    Hunger Vital Sign     Worried About Running Out of Food in the Last Year: Never true     Ran Out of Food in the Last Year: Never true   Transportation Needs: No Transportation Needs (5/23/2025)    PRAPARE - Transportation     Lack of Transportation (Medical): No     Lack of Transportation (Non-Medical): No   Physical Activity: Inactive (5/23/2025)    Exercise Vital Sign     Days of Exercise per Week: 0 days     Minutes of Exercise per Session: 20 min   Stress: No Stress Concern Present (5/23/2025)    [employer] - Occupational Stress Questionnaire     Feeling of Stress : Not at all   Housing Stability: Low Risk  (5/23/2025)    Housing Stability Vital Sign     Unable to Pay for Housing in the Last Year: No     Number of Times Moved in the Last Year: 0     Homeless in the Last Year: No         Family Hx:  No family history of pulmonary fibrosis, pre-mature graying of hair, cirrhosis, or hematologic malignancies  No family history of emphysema or spontaneous PTX  no family history of lung cancer or lymphoma    Allergies and Pertinent Medications   Allergies and Medications Reviewed    Patient has no known allergies.  [unfilled]             Physical Exam   There were no vitals taken for this visit.      Constitutional: No acute distress, Atraumatic   HEENT: moist mucus membranes, extraocular movements intact  Cardiovascular: regular rate and rhythm, no murmurs, rubs or gallops  Pulmonary: normal respiratory rate and chest rise, no chest wall deformity, normal breath sounds with no wheezing or crackles  Abdominal: non-distended, bowel sounds present  Musculoskeletal: No lower extremity edema, no clubbing  Neurological: normal  "speech/thomas, moves all extremities against gravity  Skin: no finger cyanosis, no rashes on exposed body parts  Psych: Appropriate affect, normal mood       Diagnostic Studies      All diagnostic studies relevant to chief complaint reviewed personally    Labs:  Lab Results   Component Value Date    WBC 3.01 (L) 04/09/2025    HGB 10.1 (L) 04/09/2025    HGB 13.7 (L) 02/12/2025     04/09/2025    PLT 51 (L) 02/12/2025    MCV 99 (H) 04/09/2025    MCV 97 02/12/2025     Lab Results   Component Value Date     04/09/2025     02/12/2025    K 4.4 04/09/2025    K 4.2 02/12/2025    CO2 26 04/09/2025    CO2 28 02/12/2025    BUN 23 04/09/2025    CREATININE 1.5 (H) 04/09/2025    MG 1.5 (L) 04/09/2025     Lab Results   Component Value Date    AST 18 04/02/2025    AST 15 02/12/2025    ALT 13 04/02/2025    ALT 11 02/12/2025    ALBUMIN 3.4 (L) 04/09/2025    ALBUMIN 4.2 02/12/2025    PROT 6.7 04/02/2025    PROT 7.9 02/12/2025    BILITOT 1.8 (H) 04/02/2025    BILITOT 1.2 (H) 02/12/2025         PFTs:  Pulmonary Functions Testing Results:  No results found for: "FEV1", "FVC", "TIV7OGC", "TLC", "DLCO"         TTE:  Results for orders placed during the hospital encounter of 04/01/25    Echo    Interpretation Summary    Left Ventricle: The left ventricle is normal in size. Mildly increased wall thickness. There is concentric hypertrophy. There is normal systolic function with a visually estimated ejection fraction of 55 - 60%. There is diastolic dysfunction.    Right Ventricle: The right ventricle is normal in size. Systolic function is normal.    Left Atrium: Severely dilated    Right Atrium: Right atrium is mildly dilated.    Mitral Valve: There is moderate regurgitation.    Tricuspid Valve: There is mild regurgitation.    Pulmonary Artery: The estimated pulmonary artery systolic pressure is 31 mmHg.    IVC/SVC: Intermediate venous pressure at 8 mmHg.            Assessment and Plan   Bria Lawson is a 89 y.o. 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 Consider repeat CT chest in 4/2026      FOLLOW-UP:   Sept 9 for CPAP adherence follow up    Explained to the patient I work Monday-Thursdays, so any results or messages received after working hours Thursday through Monday AM would not be addressed until I was back in the office. If he/she experienced any acute symptoms he/she should go the emergency room for immediate help.    Differential, diagnoses, diagnostic work up, and possible treatments were discussed with the patient. Questions were answered.    Prema Tipton MD  Pulmonary-Critical Care Medicine              For this visit, the following time was spent  preparing to see the patient (e.g., review of tests) 10 minutes  obtaining and/or reviewing separately obtained history 0 minutes  Performing a medically necessary appropriate examination and/or evaluation 10 minutes  Counseling and educating the patient/family/caregiver 140minutes  Ordering medications, tests, or procedures 2 minutes  Referring and communicating with other health care professionals (when not reported separately) 0minutes  Documenting clinical information in the electronic or other health record 5 minutes  Care coordination (not reported separately) 0minutes    Total time = 37 minutes

## 2025-07-13 PROBLEM — R91.1 PULMONARY NODULE: Status: ACTIVE | Noted: 2025-07-13

## 2025-07-13 PROBLEM — E32.8 THYMIC CYST: Status: ACTIVE | Noted: 2025-07-13

## 2025-07-14 ENCOUNTER — TELEPHONE (OUTPATIENT)
Dept: RHEUMATOLOGY | Facility: CLINIC | Age: 89
End: 2025-07-14
Payer: MEDICARE

## 2025-07-14 NOTE — TELEPHONE ENCOUNTER
Called pt to reschedule appointment due to provider being out of clinic at this day/time. Pt r/s to 8/28/25 at 9:20am.

## 2025-07-21 ENCOUNTER — OFFICE VISIT (OUTPATIENT)
Dept: CARDIOLOGY | Facility: CLINIC | Age: 89
End: 2025-07-21
Payer: MEDICARE

## 2025-07-21 VITALS
WEIGHT: 196.75 LBS | BODY MASS INDEX: 27.54 KG/M2 | SYSTOLIC BLOOD PRESSURE: 148 MMHG | HEIGHT: 71 IN | HEART RATE: 72 BPM | OXYGEN SATURATION: 98 % | DIASTOLIC BLOOD PRESSURE: 62 MMHG | RESPIRATION RATE: 19 BRPM

## 2025-07-21 DIAGNOSIS — E11.22 CKD STAGE 3 DUE TO TYPE 2 DIABETES MELLITUS: ICD-10-CM

## 2025-07-21 DIAGNOSIS — I70.0 ATHEROSCLEROSIS OF ABDOMINAL AORTA: ICD-10-CM

## 2025-07-21 DIAGNOSIS — E78.5 HYPERLIPIDEMIA ASSOCIATED WITH TYPE 2 DIABETES MELLITUS: ICD-10-CM

## 2025-07-21 DIAGNOSIS — I34.0 NONRHEUMATIC MITRAL VALVE REGURGITATION: ICD-10-CM

## 2025-07-21 DIAGNOSIS — I25.10 ATHEROSCLEROSIS OF NATIVE CORONARY ARTERY OF NATIVE HEART WITHOUT ANGINA PECTORIS: Primary | ICD-10-CM

## 2025-07-21 DIAGNOSIS — E11.21 DIABETES MELLITUS WITH PROTEINURIC DIABETIC NEPHROPATHY: ICD-10-CM

## 2025-07-21 DIAGNOSIS — N18.30 CKD STAGE 3 DUE TO TYPE 2 DIABETES MELLITUS: ICD-10-CM

## 2025-07-21 DIAGNOSIS — I10 PRIMARY HYPERTENSION: ICD-10-CM

## 2025-07-21 DIAGNOSIS — C85.19 B-CELL LYMPHOMA OF EXTRANODAL SITE: ICD-10-CM

## 2025-07-21 DIAGNOSIS — I27.20 PULMONARY HYPERTENSION: ICD-10-CM

## 2025-07-21 DIAGNOSIS — I50.32 CHRONIC DIASTOLIC CHF (CONGESTIVE HEART FAILURE), NYHA CLASS 1: ICD-10-CM

## 2025-07-21 DIAGNOSIS — E11.69 HYPERLIPIDEMIA ASSOCIATED WITH TYPE 2 DIABETES MELLITUS: ICD-10-CM

## 2025-07-21 PROCEDURE — 99214 OFFICE O/P EST MOD 30 MIN: CPT | Mod: S$PBB,,, | Performed by: INTERNAL MEDICINE

## 2025-07-21 PROCEDURE — 99215 OFFICE O/P EST HI 40 MIN: CPT | Mod: PBBFAC | Performed by: INTERNAL MEDICINE

## 2025-07-21 PROCEDURE — 99999 PR PBB SHADOW E&M-EST. PATIENT-LVL V: CPT | Mod: PBBFAC,,, | Performed by: INTERNAL MEDICINE

## 2025-07-21 NOTE — PROGRESS NOTES
CARDIOLOGY CLINIC VISIT        HISTORY OF PRESENT ILLNESS:     Bria Lawson presents for continued care.      Echocardiogram from 10/29/2021 shows normal ejection fraction estimated 60%.  Mild to moderate mitral regurgitation.  Normal pulmonary pressure.  Prior study had showed a normal to low normal left ventricular systolic function.  Moderate mitral regurgitation.  Mildly elevated pulmonary pressure.  Seen 05/29/2020 for evaluation of shortness of breath. CT scan on 5/26/20 showing pulmonary edema, bilateral pleural effusions. Coronary calcification noted. He is without complaints. No chest pain.  No shortness of breath. Digital hypertension reviewed.    05/03/2022:  No complaints.  EKG shows sinus bradycardia with first-degree AV block.  Home blood pressure logs reviewed.  Normal values.    CT Chest 5/26/20:     1. CT findings favoring sequela of cardiac decompensation causing pulmonary edema and bilateral pleural effusions.  Superimposed COVID-19 pneumonia cannot be entirely excluded noting that pleural effusions is not a commonly reported finding and therefore would be atypical.  2. Persistent soft tissue attenuation nodule within the prevascular mediastinum, presumably an enlarged lymph node and unchanged when compared to previous PET-CTs.  3. Slight interval increased size of multiple mediastinal lymph nodes, nonspecific.  4. Cardiomegaly with severe dense coronary artery atherosclerosis in a 3 vessel distribution.  5. Additional findings as detailed in the body of the report.    10/18/2022: Had COVID after a trip to [place]. Mild case. Overall he feels good. No complaints. Blood pressure is high but usual for him. (White coat). EKG today shows sinus bradycardia with first degree AVB.    05/24/2023: Feels good.  No complaints.  Has not taken his medications this morning.  Home blood pressure logs reviewed.  Plans to go to [facility] this summer.    08/07/2024: Seen by Dr. Goins on 01/24/2024.  Plans for TURBT.   No complaints.  Just returned from [place].  Still active.  Cutting grass.  EKG today shows sinus bradycardia with first-degree AV block.    02/05/2025:  No complaints.  No chest pain or shortness of breath.  No PND orthopnea.  EKG shows sinus bradycardia with first-degree AV block.  Occasional PVCs.  Home blood pressure logs reviewed.  Normotensive.    07/21/2025: In April treated for respiratory failure. Treated for both pneumonia and heart failure. Echocardiogram showed EF 55-60%. Diastolic dysfunction. Mildly elevated pulmonary pressure. On discharge given doxycycline. Lasix. No complaints today. No chest pain or shortness of breath. EKG today sinus rhythm with first degree AVB. Delayed R wave progression. Recent CT chest showed resolution of right-sided pulmonary nodules and airspace disease     CARDIOVASCULAR HISTORY:     Coronary atherosclerosis  Diastolic heart failure  Mitral regurgitation  Pulmonary hypertension  Aortic atherosclerosis    PAST MEDICAL HISTORY:     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    Hyperlipidemia 02/26/2015    Hypertension     Lymphoma     Uncontrolled secondary diabetes mellitus with stage 3 CKD (GFR 30-59) 08/28/2015       PAST SURGICAL HISTORY:     Past Surgical History:   Procedure Laterality Date    BONE MARROW BIOPSY Left 09/19/2018    Procedure: Biopsy-bone marrow;  Surgeon: Velia Tobias MD;  Location: [facility];  Service: Oncology;  Laterality: Left;    CATARACT EXTRACTION Bilateral 1996    COLONOSCOPY N/A 11/24/2020    Procedure: COLONOSCOPY Gol[place];  Surgeon: Masoud Hwang MD;  Location: [facility];   Service: Endoscopy;  Laterality: N/A;    CYSTOSCOPY  8/23/2024    Procedure: CYSTOSCOPY;  Surgeon: Ernie Nunn Jr., MD;  Location: [facility] OR [address] [place] [address];  Service: Urology;;    ESOPHAGOGASTRODUODENOSCOPY N/A 11/24/2020    Procedure: EGD (ESOPHAGOGASTRODUODENOSCOPY);  Surgeon: Masoud Hwang MD;  Location: [facility];  Service: Endoscopy;  Laterality: N/A;    eye lid sx Bilateral 2017    EYE SURGERY      cataracts    JOINT REPLACEMENT      knee replacement    retinal injection s Bilateral     scrotal cyst      TURBT (TRANSURETHRAL RESECTION OF BLADDER TUMOR) N/A 8/23/2024    Procedure: TURBT (TRANSURETHRAL RESECTION OF BLADDER TUMOR);  Surgeon: Ernie Nunn Jr., MD;  Location: [address];  Service: Urology;  Laterality: N/A;       ALLERGIES AND MEDICATION:   Review of patient's allergies indicates:  No Known Allergies     Medication List            Accurate as of July 21, 2025  2:22 PM. If you have any questions, ask your nurse or doctor.                CONTINUE taking these medications      allopurinoL 100 MG tablet  Commonly known as: ZYLOPRIM  TAKE 1 TABLET(100 MG) BY MOUTH EVERY DAY     amLODIPine 10 MG tablet  Commonly known as: NORVASC  Take 1 tablet (10 mg total) by mouth once daily.     atorvastatin 20 MG tablet  Commonly known as: LIPITOR  TAKE 1 TABLET(20 MG) BY MOUTH EVERY DAY     blood sugar diagnostic Strp  1 strip by Misc.(Non-Drug; Combo Route) route 2 (two) times daily. Disp glucometer and lancets as well     CENTRUM SILVER MEN ORAL     chlorthalidone 25 MG Tab  Commonly known as: HYGROTEN  Take 1 tablet (25 mg total) by mouth once daily.     dapagliflozin propanediol 10 mg tablet  Commonly known as: FARXIGA  Take 1 tablet (10 mg total) by mouth once daily.     DUPIXENT  mg/2 mL Pnij  Generic drug: dupilumab     FeroSuL 325 mg (65 mg iron) Tab tablet  Generic drug: ferrous sulfate  TAKE 1 TABLET BY MOUTH EVERY DAY WITH BREAKFAST     folic acid 800 MCG Tab  Commonly known as: FOLVITE      gabapentin 300 MG capsule  Commonly known as: NEURONTIN  TAKE 1 CAPSULE(300 MG) BY MOUTH THREE TIMES DAILY     glipiZIDE 5 MG Tr24  TAKE 1 TABLET(5MG) BY MOUTH EVERY DAY     niacin 500 MG Tab     pantoprazole 40 MG tablet  Commonly known as: PROTONIX  TAKE 1 TABLET(40 MG) BY MOUTH EVERY DAY     SITagliptin phosphate 100 MG Tab  Commonly known as: JANUVIA  Take 1 tablet (100 mg total) by mouth once daily.     TURMERIC ROOT EXTRACT ORAL     valsartan 320 MG tablet  Commonly known as: DIOVAN  Take 1 tablet (320 mg total) by mouth once daily.     vitamin E 400 UNIT capsule              SOCIAL HISTORY:     Social History     Socioeconomic History    Marital status:    Tobacco Use    Smoking status: Former     Types: Cigarettes    Smokeless tobacco: Never    Tobacco comments:     09/26/23 Patient Quit Smoking At The Age of 38 In 1974.   Substance and Sexual Activity    Alcohol use: Yes     Comment: socially - maybe few times a week    Drug use: No    Sexual activity: Yes     Partners: Female     Social Drivers of Health     Financial Resource Strain: Low Risk  (5/23/2025)    Overall Financial Resource Strain (CARDIA)     Difficulty of Paying Living Expenses: Not hard at all   Food Insecurity: No Food Insecurity (5/23/2025)    Hunger Vital Sign     Worried About Running Out of Food in the Last Year: Never true     Ran Out of Food in the Last Year: Never true   Transportation Needs: No Transportation Needs (5/23/2025)    PRAPARE - Transportation     Lack of Transportation (Medical): No     Lack of Transportation (Non-Medical): No   Physical Activity: Inactive (5/23/2025)    Exercise Vital Sign     Days of Exercise per Week: 0 days     Minutes of Exercise per Session: 20 min   Stress: No Stress Concern Present (5/23/2025)    [employer] - Occupational Stress Questionnaire     Feeling of Stress : Not at all   Housing Stability: Low Risk  (5/23/2025)    Housing Stability Vital Sign      Unable to Pay for Housing in the Last Year: No     Number of Times Moved in the Last Year: 0     Homeless in the Last Year: No       FAMILY HISTORY:     Family History   Problem Relation Name Age of Onset    Hypertension Mother      Diabetes Father      No Known Problems Sister      No Known Problems Brother      No Known Problems Maternal Aunt      No Known Problems Maternal Uncle      No Known Problems Paternal Aunt      No Known Problems Paternal Uncle      No Known Problems Maternal Grandmother      No Known Problems Maternal Grandfather      No Known Problems Paternal Grandmother      No Known Problems Paternal Grandfather      No Known Problems Daughter      No Known Problems Son      No Known Problems Son      No Known Problems Other      Amblyopia Neg Hx      Blindness Neg Hx      Cancer Neg Hx      Cataracts Neg Hx      Glaucoma Neg Hx      Macular degeneration Neg Hx      Retinal detachment Neg Hx      Strabismus Neg Hx      Stroke Neg Hx      Thyroid disease Neg Hx         REVIEW OF SYSTEMS:   Review of Systems   Constitutional:  Negative for chills, diaphoresis, fever, malaise/fatigue and weight loss.   HENT:  Negative for congestion, hearing loss, sinus pain, sore throat and tinnitus.    Eyes:  Negative for blurred vision, double vision, photophobia and pain.   Respiratory:  Negative for cough, hemoptysis, sputum production, shortness of breath, wheezing and stridor.    Cardiovascular:  Negative for chest pain, palpitations, orthopnea, claudication, leg swelling and PND.   Gastrointestinal:  Negative for abdominal pain, blood in stool, heartburn, melena, nausea and vomiting.   Musculoskeletal:  Negative for back pain, falls, joint pain, myalgias and neck pain.   Neurological:  Negative for dizziness, tingling, tremors, sensory change, speech change, focal weakness, seizures, loss of consciousness, weakness and headaches.   Endo/Heme/Allergies:  Does not bruise/bleed easily.   Psychiatric/Behavioral:   "Negative for depression, memory loss and substance abuse. The patient is not nervous/anxious.        PHYSICAL EXAM:     Vitals:    07/21/25 1403   BP: (!) 148/62   Pulse: 72   Resp: 19        Body mass index is 27.44 kg/m².  Weight: 89.3 kg (196 lb 12.2 oz)   Height: 5' 11" (180.3 cm)     Physical Exam  Vitals reviewed.   Constitutional:       General: He is not in acute distress.     Appearance: Normal appearance. He is well-developed. He is not diaphoretic.   Neck:      Vascular: No carotid bruit or JVD.   Cardiovascular:      Rate and Rhythm: Regular rhythm. Bradycardia present.      Pulses: Normal pulses.      Heart sounds: Murmur heard.      Systolic murmur is present with a grade of 3/6.   Pulmonary:      Effort: Pulmonary effort is normal.      Breath sounds: Normal breath sounds.   Abdominal:      General: Bowel sounds are normal.      Palpations: Abdomen is soft.      Tenderness: There is no abdominal tenderness.   Musculoskeletal:      Right lower leg: No edema.      Left lower leg: No edema.   Neurological:      Mental Status: He is alert and oriented to person, place, and time.   Psychiatric:         Speech: Speech normal.         Behavior: Behavior normal.         DATA:     EKG (personally reviewed tracing):  07/21/2025-sinus rhythm with first degree AVB. Delayed R wave progression  02/05/2025-sinus bradycardia with first-degree AV block.  Occasional PVCs.   08/07/2024-sinus bradycardia with first-degree AV block  10/18/2022 - sinus bradycardia with first degree AVB    Laboratory:  CBC:  Recent Labs   Lab 04/04/25  0417 04/05/25  0529 04/09/25  1214   WBC 5.93 4.29 3.01 L   HGB 8.5 L 9.0 L 10.1 L   HCT 26.2 L 27.7 L 32.1 L   Platelet Count 72 L 92 L 178       CHEMISTRIES:  Recent Labs   Lab 04/01/25  2153 04/02/25  0431 04/03/25  0457 04/04/25  0417 04/05/25  0529 04/09/25  1214   Glucose 183 H 178 H   < > 138 H 109 215 H   Sodium 138 140   < > 139 141 140   Potassium 3.8 3.6   < > 3.3 L 3.6 4.4   BUN " 37 H 41 H   < > 48 H 42 H 23   Creatinine 2.3 H 2.4 H   < > 1.9 H 1.6 H 1.5 H   Calcium 8.5 L 8.2 L   < > 8.1 L 8.6 L 9.2   Magnesium  1.7 1.9  --   --   --  1.5 L    < > = values in this interval not displayed.       CARDIAC BIOMARKERS:  Recent Labs   Lab 04/01/25 2153 04/02/25  0431   Troponin-I 0.087 H 0.081 H         COAGS:  Recent Labs   Lab 04/01/25 2153   INR 1.2       LIPIDS/LFTS:  Recent Labs   Lab 07/05/24  1140 02/12/25  1212 04/01/25 2153 04/02/25  0431   Cholesterol 76 L  --   --   --    Triglycerides 75  --   --   --    HDL 25 L  --   --   --    LDL Cholesterol 36.0 L  --   --   --    Non-HDL Cholesterol 51  --   --   --    AST 15 15 21 18   ALT 11 11 18 13       Cardiovascular Testing:    Echocardiogram 04/02/2025:      Left Ventricle: The left ventricle is normal in size. Mildly increased wall thickness. There is concentric hypertrophy. There is normal systolic function with a visually estimated ejection fraction of 55 - 60%. There is diastolic dysfunction.    Right Ventricle: The right ventricle is normal in size. Systolic function is normal.    Left Atrium: Severely dilated    Right Atrium: Right atrium is mildly dilated.    Mitral Valve: There is moderate regurgitation.    Tricuspid Valve: There is mild regurgitation.    Pulmonary Artery: The estimated pulmonary artery systolic pressure is 31 mmHg.    IVC/SVC: Intermediate venous pressure at 8 mmHg.    Echocardiogram 10/29/2021:    The left ventricle is mildly enlarged with eccentric hypertrophy and normal systolic function.  The estimated ejection fraction is 60%.  Indeterminate left ventricular diastolic function.  Normal right ventricular size with normal right ventricular systolic function.  Moderate left atrial enlargement.  Mild-to-moderate mitral regurgitation.  Mild tricuspid regurgitation.  Normal central venous pressure (3 mmHg).  The estimated PA systolic pressure is 27 mmHg.    Echo 6/19/20:     Low normal left ventricular systolic  function. The estimated ejection fraction is 50-55%.  Mild eccentric left ventricular hypertrophy.  Mild left ventricular enlargement.  No wall motion abnormalities.  Normal right ventricular systolic function.  Moderate mitral regurgitation.  Mild tricuspid regurgitation.  The estimated PA systolic pressure is 35 mmHg.    ASSESSMENT:     Coronary atherosclerosis  Diastolic heart failure  Abnormal troponin: flat. Preserved LVEF. No anginal symptoms.  Hypertension  Hyperlipidemia: LDL 36 atorvastatin 20  Mitral regurgitation  Pulmonary hypertension: normal by last echo.  Atherosclerosis of abdominal aorta  Diabetes mellitus  Lymphoma    PLAN:     Coronary atherosclerosis:  Stable.  Diastolic heart failure: Compensated. No longer on lasix.  Abnormal troponin: in light of diastolic heart failure, pneumonia  Hypertension:  Continue current management  Hyperlipidemia:  Continue current management  Mitral regurgitation:  Last echo showed moderate  Pulmonary hypertension:  Last echocardiogram showed mildly elevated pulmonary pressure.  Return to clinic 6 months.            Oleg Fontana MD, MPH, FACC, [facility]

## 2025-07-31 ENCOUNTER — EXTERNAL CHRONIC CARE MANAGEMENT (OUTPATIENT)
Dept: PRIMARY CARE CLINIC | Facility: CLINIC | Age: 89
End: 2025-07-31
Payer: MEDICARE

## 2025-07-31 PROCEDURE — 99490 CHRNC CARE MGMT STAFF 1ST 20: CPT | Mod: PBBFAC,PO | Performed by: INTERNAL MEDICINE

## 2025-08-15 ENCOUNTER — OFFICE VISIT (OUTPATIENT)
Dept: FAMILY MEDICINE | Facility: CLINIC | Age: 89
End: 2025-08-15
Payer: MEDICARE

## 2025-08-15 VITALS
BODY MASS INDEX: 27.56 KG/M2 | OXYGEN SATURATION: 97 % | TEMPERATURE: 98 F | HEIGHT: 71 IN | DIASTOLIC BLOOD PRESSURE: 60 MMHG | SYSTOLIC BLOOD PRESSURE: 126 MMHG | HEART RATE: 63 BPM | WEIGHT: 196.88 LBS

## 2025-08-15 DIAGNOSIS — I27.21 PAH (PULMONARY ARTERY HYPERTENSION): ICD-10-CM

## 2025-08-15 DIAGNOSIS — I10 ESSENTIAL HYPERTENSION: ICD-10-CM

## 2025-08-15 DIAGNOSIS — I48.91 ATRIAL FIBRILLATION, UNSPECIFIED TYPE: ICD-10-CM

## 2025-08-15 DIAGNOSIS — D50.9 IRON DEFICIENCY ANEMIA, UNSPECIFIED IRON DEFICIENCY ANEMIA TYPE: ICD-10-CM

## 2025-08-15 DIAGNOSIS — D61.818 PANCYTOPENIA: ICD-10-CM

## 2025-08-15 DIAGNOSIS — E53.8 B12 DEFICIENCY: ICD-10-CM

## 2025-08-15 DIAGNOSIS — N18.31 STAGE 3A CHRONIC KIDNEY DISEASE: ICD-10-CM

## 2025-08-15 DIAGNOSIS — E78.6 HIGH-DENSITY LIPOPROTEIN DEFICIENCY: ICD-10-CM

## 2025-08-15 DIAGNOSIS — E78.2 MIXED HYPERLIPIDEMIA: ICD-10-CM

## 2025-08-15 DIAGNOSIS — E11.21 DIABETES MELLITUS WITH PROTEINURIC DIABETIC NEPHROPATHY: Primary | ICD-10-CM

## 2025-08-15 PROCEDURE — 99214 OFFICE O/P EST MOD 30 MIN: CPT | Mod: PBBFAC,PO | Performed by: INTERNAL MEDICINE

## 2025-08-15 PROCEDURE — 99999 PR PBB SHADOW E&M-EST. PATIENT-LVL IV: CPT | Mod: PBBFAC,,, | Performed by: INTERNAL MEDICINE

## 2025-08-15 RX ORDER — GLIPIZIDE 10 MG/1
TABLET, FILM COATED, EXTENDED RELEASE ORAL
Qty: 90 TABLET | Refills: 3 | Status: SHIPPED | OUTPATIENT
Start: 2025-08-15

## 2025-08-16 ENCOUNTER — LAB VISIT (OUTPATIENT)
Dept: LAB | Facility: HOSPITAL | Age: 89
End: 2025-08-16
Attending: INTERNAL MEDICINE
Payer: MEDICARE

## 2025-08-16 DIAGNOSIS — E11.21 DIABETES MELLITUS WITH PROTEINURIC DIABETIC NEPHROPATHY: ICD-10-CM

## 2025-08-16 LAB
ABSOLUTE EOSINOPHIL (OHS): 0.06 K/UL
ABSOLUTE MONOCYTE (OHS): 1.7 K/UL (ref 0.3–1)
ABSOLUTE NEUTROPHIL COUNT (OHS): 2.49 K/UL (ref 1.8–7.7)
ALBUMIN SERPL BCP-MCNC: 4.2 G/DL (ref 3.5–5.2)
ALP SERPL-CCNC: 57 UNIT/L (ref 40–150)
ALT SERPL W/O P-5'-P-CCNC: 10 UNIT/L (ref 0–55)
ANION GAP (OHS): 13 MMOL/L (ref 8–16)
AST SERPL-CCNC: 23 UNIT/L (ref 0–50)
BASOPHILS # BLD AUTO: 0.02 K/UL
BASOPHILS NFR BLD AUTO: 0.3 %
BILIRUB SERPL-MCNC: 1.1 MG/DL (ref 0.1–1)
BUN SERPL-MCNC: 37 MG/DL (ref 8–23)
CALCIUM SERPL-MCNC: 9.5 MG/DL (ref 8.7–10.5)
CHLORIDE SERPL-SCNC: 104 MMOL/L (ref 95–110)
CHOLEST SERPL-MCNC: 90 MG/DL (ref 120–199)
CHOLEST/HDLC SERPL: 2.9 {RATIO} (ref 2–5)
CO2 SERPL-SCNC: 25 MMOL/L (ref 23–29)
CREAT SERPL-MCNC: 1.9 MG/DL (ref 0.5–1.4)
EAG (OHS): 140 MG/DL (ref 68–131)
ERYTHROCYTE [DISTWIDTH] IN BLOOD BY AUTOMATED COUNT: 16 % (ref 11.5–14.5)
GFR SERPLBLD CREATININE-BSD FMLA CKD-EPI: 34 ML/MIN/1.73/M2
GLUCOSE SERPL-MCNC: 105 MG/DL (ref 70–110)
HBA1C MFR BLD: 6.5 % (ref 4–5.6)
HCT VFR BLD AUTO: 41.6 % (ref 40–54)
HDLC SERPL-MCNC: 31 MG/DL (ref 40–75)
HDLC SERPL: 34.4 % (ref 20–50)
HGB BLD-MCNC: 13.7 GM/DL (ref 14–18)
IMM GRANULOCYTES # BLD AUTO: 0.03 K/UL (ref 0–0.04)
IMM GRANULOCYTES NFR BLD AUTO: 0.5 % (ref 0–0.5)
LDLC SERPL CALC-MCNC: 41 MG/DL (ref 63–159)
LYMPHOCYTES # BLD AUTO: 1.66 K/UL (ref 1–4.8)
MCH RBC QN AUTO: 33.1 PG (ref 27–31)
MCHC RBC AUTO-ENTMCNC: 32.9 G/DL (ref 32–36)
MCV RBC AUTO: 101 FL (ref 82–98)
NONHDLC SERPL-MCNC: 59 MG/DL
NUCLEATED RBC (/100WBC) (OHS): 0 /100 WBC
PLATELET # BLD AUTO: 55 K/UL (ref 150–450)
PMV BLD AUTO: 13.3 FL (ref 9.2–12.9)
POTASSIUM SERPL-SCNC: 3.6 MMOL/L (ref 3.5–5.1)
PROT SERPL-MCNC: 7.6 GM/DL (ref 6–8.4)
RBC # BLD AUTO: 4.14 M/UL (ref 4.6–6.2)
RELATIVE EOSINOPHIL (OHS): 1 %
RELATIVE LYMPHOCYTE (OHS): 27.9 % (ref 18–48)
RELATIVE MONOCYTE (OHS): 28.5 % (ref 4–15)
RELATIVE NEUTROPHIL (OHS): 41.8 % (ref 38–73)
SODIUM SERPL-SCNC: 142 MMOL/L (ref 136–145)
TRIGL SERPL-MCNC: 90 MG/DL (ref 30–150)
WBC # BLD AUTO: 5.96 K/UL (ref 3.9–12.7)

## 2025-08-16 PROCEDURE — 80053 COMPREHEN METABOLIC PANEL: CPT

## 2025-08-16 PROCEDURE — 85025 COMPLETE CBC W/AUTO DIFF WBC: CPT

## 2025-08-16 PROCEDURE — 83036 HEMOGLOBIN GLYCOSYLATED A1C: CPT

## 2025-08-16 PROCEDURE — 36415 COLL VENOUS BLD VENIPUNCTURE: CPT | Mod: PO

## 2025-08-16 PROCEDURE — 80061 LIPID PANEL: CPT

## 2025-08-25 ENCOUNTER — PATIENT MESSAGE (OUTPATIENT)
Dept: ADMINISTRATIVE | Facility: OTHER | Age: 89
End: 2025-08-25
Payer: MEDICARE

## 2025-08-26 ENCOUNTER — OFFICE VISIT (OUTPATIENT)
Dept: RHEUMATOLOGY | Facility: CLINIC | Age: 89
End: 2025-08-26
Payer: MEDICARE

## 2025-08-26 VITALS
DIASTOLIC BLOOD PRESSURE: 67 MMHG | SYSTOLIC BLOOD PRESSURE: 144 MMHG | WEIGHT: 197.06 LBS | BODY MASS INDEX: 27.49 KG/M2 | HEART RATE: 62 BPM

## 2025-08-26 DIAGNOSIS — M1A.00X0 IDIOPATHIC CHRONIC GOUT WITHOUT TOPHUS, UNSPECIFIED SITE: ICD-10-CM

## 2025-08-26 DIAGNOSIS — M15.0 PRIMARY OSTEOARTHRITIS INVOLVING MULTIPLE JOINTS: ICD-10-CM

## 2025-08-26 DIAGNOSIS — G62.9 NEUROPATHY: Primary | ICD-10-CM

## 2025-08-26 PROCEDURE — 99999 PR PBB SHADOW E&M-EST. PATIENT-LVL IV: CPT | Mod: PBBFAC,,, | Performed by: INTERNAL MEDICINE

## 2025-08-26 PROCEDURE — 99214 OFFICE O/P EST MOD 30 MIN: CPT | Mod: PBBFAC | Performed by: INTERNAL MEDICINE

## 2025-08-26 PROCEDURE — 99213 OFFICE O/P EST LOW 20 MIN: CPT | Mod: S$PBB,,, | Performed by: INTERNAL MEDICINE

## 2025-08-26 PROCEDURE — G2211 COMPLEX E/M VISIT ADD ON: HCPCS | Mod: ,,, | Performed by: INTERNAL MEDICINE

## 2025-09-02 DIAGNOSIS — C83.03 SMALL B-CELL LYMPHOMA OF INTRA-ABDOMINAL LYMPH NODES: Primary | ICD-10-CM

## (undated) DEVICE — SYR 10CC LUER LOCK

## (undated) DEVICE — TRAY CYSTO BASIN OMC

## (undated) DEVICE — SET SINGLE BASIN

## (undated) DEVICE — PACK CYSTOSCOPY III SIRUS

## (undated) DEVICE — ADAPTER HOSE 10FT 8MM

## (undated) DEVICE — UNDERGLOVES BIOGEL PI SIZE 7.5

## (undated) DEVICE — SOL IRR NACL .9% 3000ML

## (undated) DEVICE — GAUZE SPONGE XRAY 4X4

## (undated) DEVICE — SOL NACL IRR 1000ML BTL

## (undated) DEVICE — SYR 50ML CATH TIP

## (undated) DEVICE — LOOP CUT BIPOLAR 24/26FR YEL